# Patient Record
Sex: FEMALE | Race: WHITE | NOT HISPANIC OR LATINO | URBAN - METROPOLITAN AREA
[De-identification: names, ages, dates, MRNs, and addresses within clinical notes are randomized per-mention and may not be internally consistent; named-entity substitution may affect disease eponyms.]

---

## 2017-05-01 PROBLEM — Z00.00 ENCOUNTER FOR PREVENTIVE HEALTH EXAMINATION: Status: ACTIVE | Noted: 2017-05-01

## 2018-03-15 ENCOUNTER — EMERGENCY (EMERGENCY)
Facility: HOSPITAL | Age: 63
LOS: 1 days | Discharge: HOME | End: 2018-03-15
Attending: EMERGENCY MEDICINE | Admitting: EMERGENCY MEDICINE
Payer: COMMERCIAL

## 2018-03-15 VITALS
HEIGHT: 65 IN | TEMPERATURE: 98 F | WEIGHT: 128.09 LBS | RESPIRATION RATE: 22 BRPM | HEART RATE: 102 BPM | OXYGEN SATURATION: 92 %

## 2018-03-15 VITALS
HEART RATE: 93 BPM | DIASTOLIC BLOOD PRESSURE: 88 MMHG | RESPIRATION RATE: 20 BRPM | SYSTOLIC BLOOD PRESSURE: 179 MMHG | TEMPERATURE: 98 F | OXYGEN SATURATION: 95 %

## 2018-03-15 LAB
BASOPHILS # BLD AUTO: 0 K/UL — SIGNIFICANT CHANGE UP (ref 0–0.2)
BASOPHILS NFR BLD AUTO: 0.7 % — SIGNIFICANT CHANGE UP (ref 0–2)
EOSINOPHIL # BLD AUTO: 0 K/UL — SIGNIFICANT CHANGE UP (ref 0–0.5)
EOSINOPHIL NFR BLD AUTO: 0.1 % — SIGNIFICANT CHANGE UP (ref 0–6)
FLUAV H3 RNA SPEC QL NAA+PROBE: DETECTED
HCT VFR BLD CALC: 41.9 % — SIGNIFICANT CHANGE UP (ref 34.5–45)
HGB BLD-MCNC: 14.1 G/DL — SIGNIFICANT CHANGE UP (ref 11.5–15.5)
LYMPHOCYTES # BLD AUTO: 0.6 K/UL — LOW (ref 1–3.3)
LYMPHOCYTES # BLD AUTO: 14.1 % — SIGNIFICANT CHANGE UP (ref 13–44)
MCHC RBC-ENTMCNC: 29.5 PG — SIGNIFICANT CHANGE UP (ref 27–34)
MCHC RBC-ENTMCNC: 33.6 GM/DL — SIGNIFICANT CHANGE UP (ref 32–36)
MCV RBC AUTO: 87.7 FL — SIGNIFICANT CHANGE UP (ref 80–100)
MONOCYTES # BLD AUTO: 0.5 K/UL — SIGNIFICANT CHANGE UP (ref 0–0.9)
MONOCYTES NFR BLD AUTO: 11.9 % — HIGH (ref 1–9)
NEUTROPHILS # BLD AUTO: 3.1 K/UL — SIGNIFICANT CHANGE UP (ref 1.8–7.4)
NEUTROPHILS NFR BLD AUTO: 73.3 % — SIGNIFICANT CHANGE UP (ref 43–77)
NT-PROBNP SERPL-SCNC: 375 PG/ML — HIGH (ref 0–125)
PLATELET # BLD AUTO: 231 K/UL — SIGNIFICANT CHANGE UP (ref 150–400)
RAPID RVP RESULT: DETECTED
RBC # BLD: 4.78 M/UL — SIGNIFICANT CHANGE UP (ref 3.8–5.2)
RBC # FLD: 12.9 % — SIGNIFICANT CHANGE UP (ref 10.3–14.5)
WBC # BLD: 4.3 K/UL — SIGNIFICANT CHANGE UP (ref 3.8–10.5)
WBC # FLD AUTO: 4.3 K/UL — SIGNIFICANT CHANGE UP (ref 3.8–10.5)

## 2018-03-15 PROCEDURE — 87633 RESP VIRUS 12-25 TARGETS: CPT

## 2018-03-15 PROCEDURE — 93005 ELECTROCARDIOGRAM TRACING: CPT

## 2018-03-15 PROCEDURE — 87798 DETECT AGENT NOS DNA AMP: CPT

## 2018-03-15 PROCEDURE — 87486 CHLMYD PNEUM DNA AMP PROBE: CPT

## 2018-03-15 PROCEDURE — 80053 COMPREHEN METABOLIC PANEL: CPT

## 2018-03-15 PROCEDURE — 96374 THER/PROPH/DIAG INJ IV PUSH: CPT

## 2018-03-15 PROCEDURE — 71046 X-RAY EXAM CHEST 2 VIEWS: CPT | Mod: 26

## 2018-03-15 PROCEDURE — 99284 EMERGENCY DEPT VISIT MOD MDM: CPT

## 2018-03-15 PROCEDURE — 85027 COMPLETE CBC AUTOMATED: CPT

## 2018-03-15 PROCEDURE — 83880 ASSAY OF NATRIURETIC PEPTIDE: CPT

## 2018-03-15 PROCEDURE — 99284 EMERGENCY DEPT VISIT MOD MDM: CPT | Mod: 25

## 2018-03-15 PROCEDURE — 87581 M.PNEUMON DNA AMP PROBE: CPT

## 2018-03-15 PROCEDURE — 71046 X-RAY EXAM CHEST 2 VIEWS: CPT

## 2018-03-15 PROCEDURE — 94640 AIRWAY INHALATION TREATMENT: CPT

## 2018-03-15 RX ORDER — AZITHROMYCIN 500 MG/1
250 TABLET, FILM COATED ORAL
Qty: 4 | Refills: 0
Start: 2018-03-15 | End: 2018-03-18

## 2018-03-15 RX ORDER — ALBUTEROL 90 UG/1
2.5 AEROSOL, METERED ORAL ONCE
Qty: 0 | Refills: 0 | Status: COMPLETED | OUTPATIENT
Start: 2018-03-15 | End: 2018-03-15

## 2018-03-15 RX ORDER — IPRATROPIUM/ALBUTEROL SULFATE 18-103MCG
3 AEROSOL WITH ADAPTER (GRAM) INHALATION ONCE
Qty: 0 | Refills: 0 | Status: COMPLETED | OUTPATIENT
Start: 2018-03-15 | End: 2018-03-15

## 2018-03-15 RX ADMIN — ALBUTEROL 2.5 MILLIGRAM(S): 90 AEROSOL, METERED ORAL at 19:48

## 2018-03-15 RX ADMIN — Medication 3 MILLILITER(S): at 18:42

## 2018-03-15 RX ADMIN — Medication 125 MILLIGRAM(S): at 18:42

## 2018-03-15 NOTE — ED PROVIDER NOTE - OBJECTIVE STATEMENT
61 y/o F with hx of COPD with SOB x 1 day.  Pt states she has been using albuterol and Symbicort without relief of symptoms.  Pt denies fevers, chills, sick contacts, chest pain, palpitations, smoking or recent travel.

## 2023-05-01 ENCOUNTER — INPATIENT (INPATIENT)
Facility: HOSPITAL | Age: 68
LOS: 2 days | Discharge: HOME | DRG: 189 | End: 2023-05-04
Attending: INTERNAL MEDICINE | Admitting: STUDENT IN AN ORGANIZED HEALTH CARE EDUCATION/TRAINING PROGRAM
Payer: COMMERCIAL

## 2023-05-01 VITALS
HEART RATE: 123 BPM | OXYGEN SATURATION: 93 % | DIASTOLIC BLOOD PRESSURE: 90 MMHG | TEMPERATURE: 99 F | RESPIRATION RATE: 22 BRPM | HEIGHT: 65 IN | WEIGHT: 145.06 LBS | SYSTOLIC BLOOD PRESSURE: 156 MMHG

## 2023-05-01 DIAGNOSIS — J44.1 CHRONIC OBSTRUCTIVE PULMONARY DISEASE WITH (ACUTE) EXACERBATION: ICD-10-CM

## 2023-05-01 DIAGNOSIS — F41.9 ANXIETY DISORDER, UNSPECIFIED: ICD-10-CM

## 2023-05-01 DIAGNOSIS — M06.9 RHEUMATOID ARTHRITIS, UNSPECIFIED: ICD-10-CM

## 2023-05-01 DIAGNOSIS — Z29.9 ENCOUNTER FOR PROPHYLACTIC MEASURES, UNSPECIFIED: ICD-10-CM

## 2023-05-01 DIAGNOSIS — I10 ESSENTIAL (PRIMARY) HYPERTENSION: ICD-10-CM

## 2023-05-01 PROBLEM — J44.9 CHRONIC OBSTRUCTIVE PULMONARY DISEASE, UNSPECIFIED: Chronic | Status: ACTIVE | Noted: 2018-03-15

## 2023-05-01 LAB
ALBUMIN SERPL ELPH-MCNC: 3.6 G/DL — SIGNIFICANT CHANGE UP (ref 3.3–5)
ALP SERPL-CCNC: 90 U/L — SIGNIFICANT CHANGE UP (ref 40–120)
ALT FLD-CCNC: 16 U/L — SIGNIFICANT CHANGE UP (ref 12–78)
ANION GAP SERPL CALC-SCNC: 4 MMOL/L — LOW (ref 5–17)
AST SERPL-CCNC: 11 U/L — LOW (ref 15–37)
BASE EXCESS BLDA CALC-SCNC: 4.2 MMOL/L — HIGH (ref -2–3)
BASOPHILS # BLD AUTO: 0.03 K/UL — SIGNIFICANT CHANGE UP (ref 0–0.2)
BASOPHILS NFR BLD AUTO: 0.2 % — SIGNIFICANT CHANGE UP (ref 0–2)
BILIRUB SERPL-MCNC: 0.4 MG/DL — SIGNIFICANT CHANGE UP (ref 0.2–1.2)
BLOOD GAS COMMENTS ARTERIAL: SIGNIFICANT CHANGE UP
BUN SERPL-MCNC: 17 MG/DL — SIGNIFICANT CHANGE UP (ref 7–23)
CALCIUM SERPL-MCNC: 10.1 MG/DL — SIGNIFICANT CHANGE UP (ref 8.5–10.1)
CHLORIDE SERPL-SCNC: 105 MMOL/L — SIGNIFICANT CHANGE UP (ref 96–108)
CO2 SERPL-SCNC: 32 MMOL/L — HIGH (ref 22–31)
CREAT SERPL-MCNC: 0.94 MG/DL — SIGNIFICANT CHANGE UP (ref 0.5–1.3)
D DIMER BLD IA.RAPID-MCNC: <150 NG/ML DDU — SIGNIFICANT CHANGE UP
EGFR: 67 ML/MIN/1.73M2 — SIGNIFICANT CHANGE UP
EOSINOPHIL # BLD AUTO: 0.09 K/UL — SIGNIFICANT CHANGE UP (ref 0–0.5)
EOSINOPHIL NFR BLD AUTO: 0.7 % — SIGNIFICANT CHANGE UP (ref 0–6)
GLUCOSE SERPL-MCNC: 109 MG/DL — HIGH (ref 70–99)
HCO3 BLDA-SCNC: 28 MMOL/L — SIGNIFICANT CHANGE UP (ref 21–28)
HCT VFR BLD CALC: 47 % — HIGH (ref 34.5–45)
HGB BLD-MCNC: 14.7 G/DL — SIGNIFICANT CHANGE UP (ref 11.5–15.5)
HOROWITZ INDEX BLDA+IHG-RTO: 40 — SIGNIFICANT CHANGE UP
IMM GRANULOCYTES NFR BLD AUTO: 0.8 % — SIGNIFICANT CHANGE UP (ref 0–0.9)
LYMPHOCYTES # BLD AUTO: 17.4 % — SIGNIFICANT CHANGE UP (ref 13–44)
LYMPHOCYTES # BLD AUTO: 2.23 K/UL — SIGNIFICANT CHANGE UP (ref 1–3.3)
MCHC RBC-ENTMCNC: 26 PG — LOW (ref 27–34)
MCHC RBC-ENTMCNC: 31.3 GM/DL — LOW (ref 32–36)
MCV RBC AUTO: 83.2 FL — SIGNIFICANT CHANGE UP (ref 80–100)
MONOCYTES # BLD AUTO: 0.94 K/UL — HIGH (ref 0–0.9)
MONOCYTES NFR BLD AUTO: 7.3 % — SIGNIFICANT CHANGE UP (ref 2–14)
NEUTROPHILS # BLD AUTO: 9.4 K/UL — HIGH (ref 1.8–7.4)
NEUTROPHILS NFR BLD AUTO: 73.6 % — SIGNIFICANT CHANGE UP (ref 43–77)
NRBC # BLD: 0 /100 WBCS — SIGNIFICANT CHANGE UP (ref 0–0)
PCO2 BLDA: 43 MMHG — HIGH (ref 32–35)
PH BLDA: 7.43 — SIGNIFICANT CHANGE UP (ref 7.35–7.45)
PLATELET # BLD AUTO: 406 K/UL — HIGH (ref 150–400)
PO2 BLDA: 190 MMHG — HIGH (ref 83–108)
POTASSIUM SERPL-MCNC: 3.3 MMOL/L — LOW (ref 3.5–5.3)
POTASSIUM SERPL-SCNC: 3.3 MMOL/L — LOW (ref 3.5–5.3)
PROT SERPL-MCNC: 7.4 G/DL — SIGNIFICANT CHANGE UP (ref 6–8.3)
RAPID RVP RESULT: SIGNIFICANT CHANGE UP
RBC # BLD: 5.65 M/UL — HIGH (ref 3.8–5.2)
RBC # FLD: 14.1 % — SIGNIFICANT CHANGE UP (ref 10.3–14.5)
SAO2 % BLDA: 100 % — HIGH (ref 94–98)
SARS-COV-2 RNA SPEC QL NAA+PROBE: SIGNIFICANT CHANGE UP
SODIUM SERPL-SCNC: 141 MMOL/L — SIGNIFICANT CHANGE UP (ref 135–145)
TROPONIN I, HIGH SENSITIVITY RESULT: 6.6 NG/L — SIGNIFICANT CHANGE UP
WBC # BLD: 12.79 K/UL — HIGH (ref 3.8–10.5)
WBC # FLD AUTO: 12.79 K/UL — HIGH (ref 3.8–10.5)

## 2023-05-01 PROCEDURE — 99223 1ST HOSP IP/OBS HIGH 75: CPT | Mod: GC

## 2023-05-01 PROCEDURE — 93010 ELECTROCARDIOGRAM REPORT: CPT

## 2023-05-01 PROCEDURE — 71045 X-RAY EXAM CHEST 1 VIEW: CPT | Mod: 26,77

## 2023-05-01 PROCEDURE — 71250 CT THORAX DX C-: CPT | Mod: 26,ME

## 2023-05-01 PROCEDURE — G1004: CPT

## 2023-05-01 PROCEDURE — 99285 EMERGENCY DEPT VISIT HI MDM: CPT

## 2023-05-01 PROCEDURE — 71045 X-RAY EXAM CHEST 1 VIEW: CPT | Mod: 26

## 2023-05-01 RX ORDER — ONDANSETRON 8 MG/1
4 TABLET, FILM COATED ORAL EVERY 8 HOURS
Refills: 0 | Status: DISCONTINUED | OUTPATIENT
Start: 2023-05-01 | End: 2023-05-04

## 2023-05-01 RX ORDER — IPRATROPIUM/ALBUTEROL SULFATE 18-103MCG
3 AEROSOL WITH ADAPTER (GRAM) INHALATION
Refills: 0 | Status: COMPLETED | OUTPATIENT
Start: 2023-05-01 | End: 2023-05-01

## 2023-05-01 RX ORDER — IPRATROPIUM/ALBUTEROL SULFATE 18-103MCG
3 AEROSOL WITH ADAPTER (GRAM) INHALATION ONCE
Refills: 0 | Status: COMPLETED | OUTPATIENT
Start: 2023-05-01 | End: 2023-05-01

## 2023-05-01 RX ORDER — LANOLIN ALCOHOL/MO/W.PET/CERES
3 CREAM (GRAM) TOPICAL AT BEDTIME
Refills: 0 | Status: DISCONTINUED | OUTPATIENT
Start: 2023-05-01 | End: 2023-05-04

## 2023-05-01 RX ORDER — HYDRALAZINE HCL 50 MG
10 TABLET ORAL ONCE
Refills: 0 | Status: COMPLETED | OUTPATIENT
Start: 2023-05-01 | End: 2023-05-01

## 2023-05-01 RX ORDER — MAGNESIUM SULFATE 500 MG/ML
2 VIAL (ML) INJECTION ONCE
Refills: 0 | Status: COMPLETED | OUTPATIENT
Start: 2023-05-01 | End: 2023-05-01

## 2023-05-01 RX ORDER — SODIUM CHLORIDE 9 MG/ML
1000 INJECTION INTRAMUSCULAR; INTRAVENOUS; SUBCUTANEOUS ONCE
Refills: 0 | Status: COMPLETED | OUTPATIENT
Start: 2023-05-01 | End: 2023-05-01

## 2023-05-01 RX ORDER — ACETAMINOPHEN 500 MG
650 TABLET ORAL EVERY 6 HOURS
Refills: 0 | Status: DISCONTINUED | OUTPATIENT
Start: 2023-05-01 | End: 2023-05-04

## 2023-05-01 RX ORDER — POTASSIUM CHLORIDE 20 MEQ
40 PACKET (EA) ORAL ONCE
Refills: 0 | Status: COMPLETED | OUTPATIENT
Start: 2023-05-01 | End: 2023-05-01

## 2023-05-01 RX ADMIN — Medication 1 MILLIGRAM(S): at 23:29

## 2023-05-01 RX ADMIN — Medication 150 GRAM(S): at 23:35

## 2023-05-01 RX ADMIN — Medication 3 MILLILITER(S): at 22:37

## 2023-05-01 RX ADMIN — Medication 125 MILLIGRAM(S): at 18:20

## 2023-05-01 RX ADMIN — SODIUM CHLORIDE 1000 MILLILITER(S): 9 INJECTION INTRAMUSCULAR; INTRAVENOUS; SUBCUTANEOUS at 21:30

## 2023-05-01 RX ADMIN — Medication 40 MILLIEQUIVALENT(S): at 22:37

## 2023-05-01 RX ADMIN — Medication 3 MILLILITER(S): at 18:20

## 2023-05-01 RX ADMIN — SODIUM CHLORIDE 1000 MILLILITER(S): 9 INJECTION INTRAMUSCULAR; INTRAVENOUS; SUBCUTANEOUS at 22:30

## 2023-05-01 RX ADMIN — Medication 3 MILLILITER(S): at 18:21

## 2023-05-01 RX ADMIN — SODIUM CHLORIDE 1000 MILLILITER(S): 9 INJECTION INTRAMUSCULAR; INTRAVENOUS; SUBCUTANEOUS at 18:20

## 2023-05-01 RX ADMIN — Medication 10 MILLIGRAM(S): at 23:14

## 2023-05-01 RX ADMIN — Medication 3 MILLILITER(S): at 18:24

## 2023-05-01 NOTE — ED PROVIDER NOTE - CLINICAL SUMMARY MEDICAL DECISION MAKING FREE TEXT BOX
This patient is a 67-year-old female with history of COPD not oxygen or steroid-dependent and rheumatoid arthritis.  Patient states on Wednesday she began with shortness of breath and wheezing and cough and sore her primary doctor who started her on a Z-Parminder and prednisone 40 mg a day and diagnosed her empirically with pneumonia.  Patient states that since then she has not gotten any relief despite taking 4 nebulizers a day at home.  Patient denies calf pain or swelling chest pain, productive cough.  Patient came to the ER for shortness of breath denies pleuritic pain.  Patient states she is not vaccinated for COVID and has never had COVID as far as she knows patient is a neck smoker.  Patient on exam with hypoxia off oxygen and corrects nicely with oxygen patient with mild increased work of breathing and pursed lip breathing with no abdominal breathing or severe respiratory distress breath sounds are distant and patient without peripheral edema.  EKG with sinus tachycardia.  Patient low suspicion for PE and D-dimer is negative EKG with no ischemic changes with a normal troponin and CT of the chest shows no evidence of pneumonia patient treated with steroids IV as well as DuoNeb x4 however she continues to have increased work of breathing.  Patient's blood pressure as well is now elevated will treat with an antihypertensive and check ABG and admit the patient to remote telemetry with continuous pulse ox and continue to monitor

## 2023-05-01 NOTE — ED ADULT NURSE NOTE - OBJECTIVE STATEMENT
Came to ED for worsening SOB.  Pt was seen at urgent care and was given prednisone and Zithromax but her symptoms have progressively been getting worst.

## 2023-05-01 NOTE — H&P ADULT - ATTENDING COMMENTS
67y female with PMHx of COPD (not on home O2), RA, anxiety admitted with COPD exacerbation. Admit to medicine with continuous pulse oximetry. Start nebulized bronchodilators ATC. Start systemic steroids with solu-medrol q8. On low dose prednisone. Supplemental O2 as needed. Low threshold for NIPPV with BIPAP given work of breathing. Was given anxiolytics by ER. Close monitoring of respiratory status. Pulm Dr. Dobson consulted - discussed with him via phone. Discussed with patient and spouse.    Agree with H&P as outlined above, edited where appropriate.

## 2023-05-01 NOTE — ED ADULT NURSE NOTE - NS ED NOTE  TALK SOMEONE YN
pt is here for abscess to penis.  c/o pain 5/10.  Denied chest pain or sob, pt calm at the bedside with family member, no fever.
No

## 2023-05-01 NOTE — H&P ADULT - NSHPREVIEWOFSYSTEMS_GEN_ALL_CORE
Constitutional: denies fever, chills  HEENT: denies headache, dizziness, or lightheadedness  Respiratory: + SOB  Cardiovascular: + Palpitations (chronic) denies CP  Gastrointestinal: denies nausea, vomiting, diarrhea, constipation, abdominal pain, or bloody stools  Genitourinary: denies painful urination, increased frequency, urgency, or bloody urine  Skin/Breast: denies rashes or itching  Musculoskeletal: denies muscle aches, joint swelling, or muscle weakness  ROS negative except as noted above

## 2023-05-01 NOTE — H&P ADULT - PROBLEM SELECTOR PLAN 2
Hypertensive on admission likely 2/2 respiratory distress/anxiety  Continue home  Monitor hemodynamics Hypertensive on admission likely 2/2 respiratory distress/anxiety  Continue home amlodipine w/ hold parameters   Con ASA 81  Monitor hemodynamics

## 2023-05-01 NOTE — H&P ADULT - NSICDXPASTMEDICALHX_GEN_ALL_CORE_FT
PAST MEDICAL HISTORY:  Anxiety     COPD (chronic obstructive pulmonary disease)     HTN (hypertension)     Rheumatoid arthritis

## 2023-05-01 NOTE — ED PROVIDER NOTE - DIFFERENTIAL DIAGNOSIS
Differential Diagnosis Shortness of breath COPD exacerbation versus pneumonia versus pulmonary embolism versus congestive heart failure

## 2023-05-01 NOTE — H&P ADULT - HISTORY OF PRESENT ILLNESS
67y female with PMHx of COPD, anxiety 67y female with PMHx of COPD (not on home O2), anxiety, HTN, Rheumatoid Arthritis presented to the ED for the evaluation of shortness of breath. She had a recent course of antibiotics and steroids by her PCP but had no relief. Thought she had pneumonia. At present, she is extremely short of breath and is using her accessory muscles to breathe. She had multiple nebulizer treatments & steroids in the ED with no improvement. Presented saturating ~93% on RA and now is saturating in the low to mid 90s on supplemental O2 via nasal cannula. She states that she has never had a COPD exacerbation before and that she is extremely anxious. 68 y/o F with PMHx of COPD (not on home O2), anxiety, HTN, Rheumatoid Arthritis presented to the ED for the evaluation of shortness of breath. About 1 week ago patient began with SOB, seen by PCP who rx'd course of antibiotics and steroids with marginal relief. At present, she is extremely short of breath and is using her accessory muscles to breathe. She had multiple nebulizer treatments & steroids in the ED with no improvement. Presented saturating ~93% on RA and now is saturating in the low to mid 90s on supplemental O2 via nasal cannula. She states that she has never had a COPD exacerbation before and that she is extremely anxious. Denies chest pain, cough, diarrhea, constipation, urinary frequency, urgency, or dysuria, headaches.    IN THE ED:  Temp  98.8 F ,  ,  / 90 ,RR 22 , SpO2 93% on RA   S/P Solu-medrol 125 IVP, Hydralazine 10 IVP, Ativan 1mg IVP, Mag 2g IV x 1, KCl 40meq x 1, Duoneb x 3, 2 L NaCl bolus  EKG:  Labs significant for WBC 12.7, K 3.3, CO2 32, Glucose 109, ABG 7.43/43/190/28  Imaging:   CT Chest: No evidence of pneumonia. 66 y/o F with PMHx of COPD (not on home O2), anxiety, HTN, Rheumatoid Arthritis presented to the ED for the evaluation of shortness of breath. About 1 week ago patient began with SOB, seen by PCP who rx'd course of antibiotics and steroids with marginal relief. At present, she is extremely short of breath and is using her accessory muscles to breathe. She had multiple nebulizer treatments & steroids in the ED with no improvement. Presented saturating ~93% on RA and now is saturating in the low to mid 90s on supplemental O2 via nasal cannula. She states that she has never had a COPD exacerbation before and that she is extremely anxious. Denies chest pain, cough, diarrhea, constipation, urinary frequency, urgency, or dysuria, headaches.    IN THE ED:  Temp  98.8 F ,  ,  / 90 ,RR 22 , SpO2 93% on RA   S/P Solu-medrol 125 IVP, Hydralazine 10 IVP, Ativan 1mg IVP, Mag 2g IV x 1, KCl 40meq x 1, Duoneb x 3, 2 L NaCl bolus  EKG: Rate 123, sinus tach, biatrial enlargement, nonspecific st and t wave abnormality  Labs significant for WBC 12.7, K 3.3, CO2 32, Glucose 109, ABG 7.43/43/190/28  Imaging:   CT Chest: No evidence of pneumonia. 68 y/o F with PMHx of COPD (not on home O2), anxiety, HTN, Rheumatoid Arthritis presented to the ED for the evaluation of shortness of breath. About 1 week ago patient began with SOB, seen by PCP who rx'd Z-Parminder and prednisone 40 qd with marginal relief. At present, she is extremely short of breath and is using her accessory muscles to breathe. She had multiple nebulizer treatments & steroids in the ED with no improvement. Presented saturating ~93% on RA and now is saturating in the low to mid 90s on supplemental O2 via nasal cannula. She states that she has never had a COPD exacerbation before and that she is extremely anxious. Denies chest pain, cough, diarrhea, constipation, urinary frequency, urgency, or dysuria, headaches.    IN THE ED:  Temp  98.8 F ,  ,  / 90 ,RR 22 , SpO2 93% on RA   S/P Solu-medrol 125 IVP, Hydralazine 10 IVP, Ativan 1mg IVP, Mag 2g IV x 1, KCl 40meq x 1, Duoneb x 3, 2 L NaCl bolus  EKG: Rate 123, sinus tach, biatrial enlargement, nonspecific st and t wave abnormality  Labs significant for WBC 12.7, K 3.3, CO2 32, Glucose 109, ABG 7.43/43/190/28  Imaging:   CT Chest: No evidence of pneumonia.

## 2023-05-01 NOTE — H&P ADULT - PROBLEM SELECTOR PLAN 5
VTE prophylaxis with subcutaneous enoxaparin VTE prophylaxis with subcutaneous enoxaparin      #Hypokalemia  S/p 40meq x 1  AM MP        #DISPO  PT consult  SW consult

## 2023-05-01 NOTE — H&P ADULT - NSHPLABSRESULTS_GEN_ALL_CORE
CXR - clear lungs    CT chest - no pneumonia     EKG  - Sinus tachycardia at 123bpm    Labs, Imaging and EKG all personally reviewed by the attending physician. CXR - clear lungs    CT chest - no pneumonia     EKG  - Sinus tachycardia at 123bpm    Repeat CXR - no acute change    Labs, Imaging and EKG all personally reviewed by the attending physician.

## 2023-05-01 NOTE — H&P ADULT - NSHPPOADEEPVENOUSTHROMB_GEN_A_CORE
Please return if her symptoms suddenly worsen redevelop other concerning symptoms otherwise follow up as instructed as discussed
no

## 2023-05-01 NOTE — H&P ADULT - PROBLEM SELECTOR PLAN 1
Admit to medicine with continuous pulse oximetry  Continue duonebs q6 ATC and albuterol nebulizations q3 PRN  Start solu-medrol 40mg q8  Continue supplemental O2 as needed  Low threshold for BIPAP tonight  CT chest reviewed - no PNA  Monitor off antibiotics, f/u cultures, RVP negative  Pulm Dr. Dobson consulted Admit to medicine with continuous pulse oximetry  Continue duonebs q6 ATC and albuterol nebulizations q3 PRN  Start solu-medrol 40mg q8  Start symbicort BID  Continue supplemental O2 as needed  Low threshold for BIPAP tonight  CT chest reviewed - no PNA  Monitor off antibiotics, f/u cultures, RVP negative  Pulm Dr. Dobson consulted Admit to medicine with continuous pulse oximetry  Start solu-medrol 40mg q8  On home breztri, Spiriva, albuterol  Continue duonebs q6 ATC and albuterol nebulizations q3 PRN  Start symbicort BID  Continue supplemental O2 as needed  Low threshold for BIPAP tonight  CT chest reviewed - no PNA  Monitor off antibiotics, f/u cultures, RVP negative  Pulm Dr. Dobson consulted

## 2023-05-01 NOTE — H&P ADULT - NSICDXNOPASTSURGICALHX_GEN_ALL_CORE
1/10/2023      Izabel Blank  [address] [place] [address]      To The Employer of Izabel Blank:    This is to certify that Izabel Blank will be under my care from 1/26/23 - . She {SURG SKK RTW OPTIONS:[id number]}       Sincerely,        Genesis Nunez PA-C  General and Endocrine Surgery  {ADDRESS:[id number]}   <-- Click to add NO significant Past Surgical History

## 2023-05-01 NOTE — H&P ADULT - NSHPPHYSICALEXAM_GEN_ALL_CORE
T(C): 37.1 (05-01-23 @ 22:46), Max: 37.1 (05-01-23 @ 17:39)  HR: 121 (05-01-23 @ 22:46) (121 - 123)  BP: 201/93 (05-01-23 @ 22:46) (156/90 - 201/93)  RR: 24 (05-01-23 @ 22:46) (22 - 24)  SpO2: 97% (05-01-23 @ 22:46) (93% - 97%) T(C): 37.1 (05-01-23 @ 22:46), Max: 37.1 (05-01-23 @ 17:39)  HR: 121 (05-01-23 @ 22:46) (121 - 123)  BP: 201/93 (05-01-23 @ 22:46) (156/90 - 201/93)  RR: 24 (05-01-23 @ 22:46) (22 - 24)  SpO2: 97% (05-01-23 @ 22:46) (93% - 97%)    General: in moderate respiratory distress  Head: normocephalic, atraumatic  Eyes: EOMI, anicteric  ENT: moist mucous membranes, no pharyngeal exudates  Heart: tachycardic, S1, S2, no murmurs  Chest: poor air entry bilaterally, using accessory muscles to breathe  Abd: BS+, soft, NT, ND  Back: no tenderness or deformity  Extr: no edema or cyanosis  Neuro: AA&Ox3, no focal weakness, sensation to light touch intact  Psych: appears anxious

## 2023-05-01 NOTE — ED PROVIDER NOTE - OBJECTIVE STATEMENT
This patient is a 67-year-old female with history of COPD not oxygen or steroid-dependent and rheumatoid arthritis.  Patient states on Wednesday she began with shortness of breath and wheezing and cough and sore her primary doctor who started her on a Z-Parminder and prednisone 40 mg a day and diagnosed her empirically with pneumonia.  Patient states that since then she has not gotten any relief despite taking 4 nebulizers a day at home.  Patient denies calf pain or swelling chest pain, productive cough.  Patient came to the ER for shortness of breath denies pleuritic pain.  Patient states she is not vaccinated for COVID and has never had COVID as far as she knows patient is a neck smoker.

## 2023-05-01 NOTE — H&P ADULT - NSHPOUTPATIENTPROVIDERS_GEN_ALL_CORE
PCP - ALESIA Cohen (AFM) PCP - ALESIA Cohen (AFM)  Pulm - Dr. Tres Dobson (stopped taking insurance) - notified PCP - ALESIA Cohen (AFM)  Pulm - Dr. Tres Dobson (stopped taking insurance)

## 2023-05-01 NOTE — ED ADULT TRIAGE NOTE - CHIEF COMPLAINT QUOTE
seen by PCP on wednesday, given antibiotics and prednisone for cough and shortness of breath, now c/o no relief of symptoms. pt satting 93% on RA at this time. pmhx COPD, rhematoid arthritis, anxiety. denies cp, dizziness, lightheadedness, n/v, body aches, fevers.

## 2023-05-02 LAB
ALBUMIN SERPL ELPH-MCNC: 3 G/DL — LOW (ref 3.3–5)
ALP SERPL-CCNC: 76 U/L — SIGNIFICANT CHANGE UP (ref 40–120)
ALT FLD-CCNC: 13 U/L — SIGNIFICANT CHANGE UP (ref 12–78)
ANION GAP SERPL CALC-SCNC: 6 MMOL/L — SIGNIFICANT CHANGE UP (ref 5–17)
AST SERPL-CCNC: 9 U/L — LOW (ref 15–37)
BASOPHILS # BLD AUTO: 0.02 K/UL — SIGNIFICANT CHANGE UP (ref 0–0.2)
BASOPHILS NFR BLD AUTO: 0.2 % — SIGNIFICANT CHANGE UP (ref 0–2)
BILIRUB SERPL-MCNC: 0.3 MG/DL — SIGNIFICANT CHANGE UP (ref 0.2–1.2)
BUN SERPL-MCNC: 14 MG/DL — SIGNIFICANT CHANGE UP (ref 7–23)
CALCIUM SERPL-MCNC: 8.9 MG/DL — SIGNIFICANT CHANGE UP (ref 8.5–10.1)
CHLORIDE SERPL-SCNC: 111 MMOL/L — HIGH (ref 96–108)
CO2 SERPL-SCNC: 26 MMOL/L — SIGNIFICANT CHANGE UP (ref 22–31)
CREAT SERPL-MCNC: 0.86 MG/DL — SIGNIFICANT CHANGE UP (ref 0.5–1.3)
EGFR: 74 ML/MIN/1.73M2 — SIGNIFICANT CHANGE UP
EOSINOPHIL # BLD AUTO: 0 K/UL — SIGNIFICANT CHANGE UP (ref 0–0.5)
EOSINOPHIL NFR BLD AUTO: 0 % — SIGNIFICANT CHANGE UP (ref 0–6)
GLUCOSE SERPL-MCNC: 167 MG/DL — HIGH (ref 70–99)
HCT VFR BLD CALC: 40.5 % — SIGNIFICANT CHANGE UP (ref 34.5–45)
HCV AB S/CO SERPL IA: 0.11 S/CO — SIGNIFICANT CHANGE UP (ref 0–0.99)
HCV AB SERPL-IMP: SIGNIFICANT CHANGE UP
HGB BLD-MCNC: 12.3 G/DL — SIGNIFICANT CHANGE UP (ref 11.5–15.5)
IMM GRANULOCYTES NFR BLD AUTO: 1.4 % — HIGH (ref 0–0.9)
LYMPHOCYTES # BLD AUTO: 0.54 K/UL — LOW (ref 1–3.3)
LYMPHOCYTES # BLD AUTO: 4.6 % — LOW (ref 13–44)
MAGNESIUM SERPL-MCNC: 2.7 MG/DL — HIGH (ref 1.6–2.6)
MCHC RBC-ENTMCNC: 26.2 PG — LOW (ref 27–34)
MCHC RBC-ENTMCNC: 30.4 GM/DL — LOW (ref 32–36)
MCV RBC AUTO: 86.2 FL — SIGNIFICANT CHANGE UP (ref 80–100)
MONOCYTES # BLD AUTO: 0.13 K/UL — SIGNIFICANT CHANGE UP (ref 0–0.9)
MONOCYTES NFR BLD AUTO: 1.1 % — LOW (ref 2–14)
NEUTROPHILS # BLD AUTO: 10.88 K/UL — HIGH (ref 1.8–7.4)
NEUTROPHILS NFR BLD AUTO: 92.7 % — HIGH (ref 43–77)
NRBC # BLD: 0 /100 WBCS — SIGNIFICANT CHANGE UP (ref 0–0)
PLATELET # BLD AUTO: 405 K/UL — HIGH (ref 150–400)
POTASSIUM SERPL-MCNC: 4.1 MMOL/L — SIGNIFICANT CHANGE UP (ref 3.5–5.3)
POTASSIUM SERPL-SCNC: 4.1 MMOL/L — SIGNIFICANT CHANGE UP (ref 3.5–5.3)
PROCALCITONIN SERPL-MCNC: 0.03 NG/ML — SIGNIFICANT CHANGE UP
PROT SERPL-MCNC: 6.4 G/DL — SIGNIFICANT CHANGE UP (ref 6–8.3)
RBC # BLD: 4.7 M/UL — SIGNIFICANT CHANGE UP (ref 3.8–5.2)
RBC # FLD: 14.6 % — HIGH (ref 10.3–14.5)
SODIUM SERPL-SCNC: 143 MMOL/L — SIGNIFICANT CHANGE UP (ref 135–145)
WBC # BLD: 11.73 K/UL — HIGH (ref 3.8–10.5)
WBC # FLD AUTO: 11.73 K/UL — HIGH (ref 3.8–10.5)

## 2023-05-02 PROCEDURE — 99233 SBSQ HOSP IP/OBS HIGH 50: CPT

## 2023-05-02 RX ORDER — TRAMADOL HYDROCHLORIDE 50 MG/1
50 TABLET ORAL AT BEDTIME
Refills: 0 | Status: DISCONTINUED | OUTPATIENT
Start: 2023-05-02 | End: 2023-05-04

## 2023-05-02 RX ORDER — AMLODIPINE BESYLATE 2.5 MG/1
1 TABLET ORAL
Refills: 0 | DISCHARGE

## 2023-05-02 RX ORDER — ENOXAPARIN SODIUM 100 MG/ML
40 INJECTION SUBCUTANEOUS EVERY 24 HOURS
Refills: 0 | Status: DISCONTINUED | OUTPATIENT
Start: 2023-05-02 | End: 2023-05-04

## 2023-05-02 RX ORDER — BUDESONIDE AND FORMOTEROL FUMARATE DIHYDRATE 160; 4.5 UG/1; UG/1
2 AEROSOL RESPIRATORY (INHALATION)
Refills: 0 | Status: DISCONTINUED | OUTPATIENT
Start: 2023-05-02 | End: 2023-05-04

## 2023-05-02 RX ORDER — AMLODIPINE BESYLATE 2.5 MG/1
10 TABLET ORAL DAILY
Refills: 0 | Status: DISCONTINUED | OUTPATIENT
Start: 2023-05-02 | End: 2023-05-04

## 2023-05-02 RX ORDER — ASPIRIN/CALCIUM CARB/MAGNESIUM 324 MG
81 TABLET ORAL DAILY
Refills: 0 | Status: DISCONTINUED | OUTPATIENT
Start: 2023-05-02 | End: 2023-05-04

## 2023-05-02 RX ORDER — METOPROLOL TARTRATE 50 MG
0 TABLET ORAL
Qty: 0 | Refills: 0 | DISCHARGE

## 2023-05-02 RX ORDER — ALPRAZOLAM 0.25 MG
0 TABLET ORAL
Qty: 0 | Refills: 0 | DISCHARGE

## 2023-05-02 RX ORDER — IPRATROPIUM/ALBUTEROL SULFATE 18-103MCG
3 AEROSOL WITH ADAPTER (GRAM) INHALATION EVERY 6 HOURS
Refills: 0 | Status: DISCONTINUED | OUTPATIENT
Start: 2023-05-01 | End: 2023-05-04

## 2023-05-02 RX ORDER — ALBUTEROL 90 UG/1
2.5 AEROSOL, METERED ORAL
Refills: 0 | Status: DISCONTINUED | OUTPATIENT
Start: 2023-05-02 | End: 2023-05-04

## 2023-05-02 RX ORDER — ALPRAZOLAM 0.25 MG
0.25 TABLET ORAL DAILY
Refills: 0 | Status: DISCONTINUED | OUTPATIENT
Start: 2023-05-02 | End: 2023-05-04

## 2023-05-02 RX ADMIN — Medication 40 MILLIGRAM(S): at 22:16

## 2023-05-02 RX ADMIN — Medication 40 MILLIGRAM(S): at 13:31

## 2023-05-02 RX ADMIN — Medication 3 MILLILITER(S): at 20:51

## 2023-05-02 RX ADMIN — Medication 81 MILLIGRAM(S): at 13:31

## 2023-05-02 RX ADMIN — TRAMADOL HYDROCHLORIDE 50 MILLIGRAM(S): 50 TABLET ORAL at 22:16

## 2023-05-02 RX ADMIN — ENOXAPARIN SODIUM 40 MILLIGRAM(S): 100 INJECTION SUBCUTANEOUS at 05:35

## 2023-05-02 RX ADMIN — Medication 0.25 MILLIGRAM(S): at 13:44

## 2023-05-02 RX ADMIN — Medication 3 MILLILITER(S): at 13:47

## 2023-05-02 RX ADMIN — AMLODIPINE BESYLATE 10 MILLIGRAM(S): 2.5 TABLET ORAL at 05:35

## 2023-05-02 RX ADMIN — Medication 40 MILLIGRAM(S): at 05:35

## 2023-05-02 NOTE — PATIENT PROFILE ADULT - FALL HARM RISK - HARM RISK INTERVENTIONS
Assistance with ambulation/Assistance OOB with selected safe patient handling equipment/Communicate Risk of Fall with Harm to all staff/Discuss with provider need for PT consult/Monitor gait and stability/Reinforce activity limits and safety measures with patient and family/Tailored Fall Risk Interventions/Visual Cue: Yellow wristband and red socks/Bed in lowest position, wheels locked, appropriate side rails in place/Call bell, personal items and telephone in reach/Instruct patient to call for assistance before getting out of bed or chair/Non-slip footwear when patient is out of bed/[place] to call system/Physically safe environment - no spills, clutter or unnecessary equipment/Purposeful Proactive Rounding/Room/bathroom lighting operational, light cord in reach

## 2023-05-02 NOTE — PROGRESS NOTE ADULT - PROBLEM SELECTOR PLAN 5
VTE prophylaxis with subcutaneous enoxaparin      #Hypokalemia resolved     #DISPO  PT consult  SW consult

## 2023-05-02 NOTE — CASE MANAGEMENT PROGRESS NOTE - NSCMPROGRESSNOTE_GEN_ALL_CORE
Pt remains acute on N/C 2L, receiving IV solumedrol. Pt confirmed that she has home oxygen, portable and concentrator 2L, pt unsure of DME company. Pt to call spouse to obtain information. Cm will continue to follow. Pt remains acute on N/C 2L, receiving IV solumedrol. Pt confirmed that she has home oxygen, portable and concentrator 2L, pt unsure of DME company. Pt to call spouse to obtain information. CM offered home care visiting nurse, pt declined.

## 2023-05-02 NOTE — CARE COORDINATION ASSESSMENT. - NSPASTMEDSURGHISTORY_GEN_ALL_CORE_FT
PAST MEDICAL & SURGICAL HISTORY:  Rheumatoid arthritis      COPD (chronic obstructive pulmonary disease)      Anxiety      HTN (hypertension)      No significant past surgical history

## 2023-05-02 NOTE — PROGRESS NOTE ADULT - PROBLEM SELECTOR PLAN 2
Hypertensive on admission likely 2/2 respiratory distress/anxiety: BP improved   Continue home amlodipine w/ hold parameters   Con ASA 81  Monitor hemodynamics

## 2023-05-02 NOTE — CARE COORDINATION ASSESSMENT. - NSCAREPROVIDERS_GEN_ALL_CORE_FT
CARE PROVIDERS:  Accepting Physician: Robbie Boone  Administration: Oscar Jones  Administration: Dillan Medina  Administration: Angie Duffy  Admitting: Robbie Boone  Attending: Robbie Boone  Clinical Doc. Improvement: Ashlyn Gamboa  Consultant: Tres Dobson  Covering Nurse: Deejay Ibanez  Covering Team: Robbie Cedeño  Covering Team: Robbie Boone  ED ACP: Vivi Bridges  ED Attending: Jona Dalal ED Nurse: Michelle Montano  Emergency Medicine: Vivi Bridges  Nurse: Alissa Adan  Nurse: Austyn Figueroa  Nurse: Aleksander Pierce  Nurse: Alissa Ashley  Ordered: ADM, User  PCA/Nursing Assistant: Zahira Zepeda  PCA/Nursing Assistant: Kailey Duffy  Primary Team: Serge Mcdonald  Registered Dietitian: Olesya Mo  Registered Dietitian: Jo Naranjo  Respiratory Therapy: Juanjose Mendoza  Respiratory Therapy: Royce Cazares  : Hoda Damian

## 2023-05-03 PROCEDURE — 99232 SBSQ HOSP IP/OBS MODERATE 35: CPT

## 2023-05-03 RX ADMIN — BUDESONIDE AND FORMOTEROL FUMARATE DIHYDRATE 2 PUFF(S): 160; 4.5 AEROSOL RESPIRATORY (INHALATION) at 05:47

## 2023-05-03 RX ADMIN — Medication 3 MILLILITER(S): at 07:24

## 2023-05-03 RX ADMIN — BUDESONIDE AND FORMOTEROL FUMARATE DIHYDRATE 2 PUFF(S): 160; 4.5 AEROSOL RESPIRATORY (INHALATION) at 16:55

## 2023-05-03 RX ADMIN — Medication 3 MILLILITER(S): at 13:46

## 2023-05-03 RX ADMIN — Medication 3 MILLILITER(S): at 01:55

## 2023-05-03 RX ADMIN — Medication 3 MILLILITER(S): at 19:18

## 2023-05-03 RX ADMIN — TRAMADOL HYDROCHLORIDE 50 MILLIGRAM(S): 50 TABLET ORAL at 23:15

## 2023-05-03 RX ADMIN — Medication 40 MILLIGRAM(S): at 13:02

## 2023-05-03 RX ADMIN — Medication 81 MILLIGRAM(S): at 13:01

## 2023-05-03 RX ADMIN — AMLODIPINE BESYLATE 10 MILLIGRAM(S): 2.5 TABLET ORAL at 05:46

## 2023-05-03 RX ADMIN — ENOXAPARIN SODIUM 40 MILLIGRAM(S): 100 INJECTION SUBCUTANEOUS at 05:46

## 2023-05-03 RX ADMIN — TRAMADOL HYDROCHLORIDE 50 MILLIGRAM(S): 50 TABLET ORAL at 00:05

## 2023-05-03 RX ADMIN — Medication 40 MILLIGRAM(S): at 05:46

## 2023-05-03 RX ADMIN — Medication 200 MILLIGRAM(S): at 16:55

## 2023-05-03 RX ADMIN — Medication 0.25 MILLIGRAM(S): at 13:04

## 2023-05-03 RX ADMIN — TRAMADOL HYDROCHLORIDE 50 MILLIGRAM(S): 50 TABLET ORAL at 22:02

## 2023-05-03 NOTE — CHART NOTE - NSCHARTNOTEFT_GEN_A_CORE
consult dictated    Impression:    COPD with exacerbation  Acute Bronchitis - resolving  Hx Anxiety    Plan:    Change to oral steroids  Probable discharge in AM  Will see if office in 1 week

## 2023-05-04 ENCOUNTER — TRANSCRIPTION ENCOUNTER (OUTPATIENT)
Age: 68
End: 2023-05-04

## 2023-05-04 VITALS — OXYGEN SATURATION: 95 %

## 2023-05-04 LAB
ANION GAP SERPL CALC-SCNC: 5 MMOL/L — SIGNIFICANT CHANGE UP (ref 5–17)
BUN SERPL-MCNC: 33 MG/DL — HIGH (ref 7–23)
CALCIUM SERPL-MCNC: 9.6 MG/DL — SIGNIFICANT CHANGE UP (ref 8.5–10.1)
CHLORIDE SERPL-SCNC: 107 MMOL/L — SIGNIFICANT CHANGE UP (ref 96–108)
CO2 SERPL-SCNC: 31 MMOL/L — SIGNIFICANT CHANGE UP (ref 22–31)
CREAT SERPL-MCNC: 0.85 MG/DL — SIGNIFICANT CHANGE UP (ref 0.5–1.3)
EGFR: 76 ML/MIN/1.73M2 — SIGNIFICANT CHANGE UP
GLUCOSE SERPL-MCNC: 158 MG/DL — HIGH (ref 70–99)
HCT VFR BLD CALC: 42.3 % — SIGNIFICANT CHANGE UP (ref 34.5–45)
HGB BLD-MCNC: 13 G/DL — SIGNIFICANT CHANGE UP (ref 11.5–15.5)
MCHC RBC-ENTMCNC: 26.4 PG — LOW (ref 27–34)
MCHC RBC-ENTMCNC: 30.7 GM/DL — LOW (ref 32–36)
MCV RBC AUTO: 85.8 FL — SIGNIFICANT CHANGE UP (ref 80–100)
NRBC # BLD: 0 /100 WBCS — SIGNIFICANT CHANGE UP (ref 0–0)
PLATELET # BLD AUTO: 395 K/UL — SIGNIFICANT CHANGE UP (ref 150–400)
POTASSIUM SERPL-MCNC: 3.9 MMOL/L — SIGNIFICANT CHANGE UP (ref 3.5–5.3)
POTASSIUM SERPL-SCNC: 3.9 MMOL/L — SIGNIFICANT CHANGE UP (ref 3.5–5.3)
RBC # BLD: 4.93 M/UL — SIGNIFICANT CHANGE UP (ref 3.8–5.2)
RBC # FLD: 14.2 % — SIGNIFICANT CHANGE UP (ref 10.3–14.5)
SODIUM SERPL-SCNC: 143 MMOL/L — SIGNIFICANT CHANGE UP (ref 135–145)
WBC # BLD: 14.29 K/UL — HIGH (ref 3.8–10.5)
WBC # FLD AUTO: 14.29 K/UL — HIGH (ref 3.8–10.5)

## 2023-05-04 PROCEDURE — 84484 ASSAY OF TROPONIN QUANT: CPT

## 2023-05-04 PROCEDURE — 80053 COMPREHEN METABOLIC PANEL: CPT

## 2023-05-04 PROCEDURE — 94760 N-INVAS EAR/PLS OXIMETRY 1: CPT

## 2023-05-04 PROCEDURE — 85027 COMPLETE CBC AUTOMATED: CPT

## 2023-05-04 PROCEDURE — 99239 HOSP IP/OBS DSCHRG MGMT >30: CPT

## 2023-05-04 PROCEDURE — 0225U NFCT DS DNA&RNA 21 SARSCOV2: CPT

## 2023-05-04 PROCEDURE — 80048 BASIC METABOLIC PNL TOTAL CA: CPT

## 2023-05-04 PROCEDURE — 71045 X-RAY EXAM CHEST 1 VIEW: CPT

## 2023-05-04 PROCEDURE — 84145 PROCALCITONIN (PCT): CPT

## 2023-05-04 PROCEDURE — G1004: CPT

## 2023-05-04 PROCEDURE — 36415 COLL VENOUS BLD VENIPUNCTURE: CPT

## 2023-05-04 PROCEDURE — 96375 TX/PRO/DX INJ NEW DRUG ADDON: CPT

## 2023-05-04 PROCEDURE — 83735 ASSAY OF MAGNESIUM: CPT

## 2023-05-04 PROCEDURE — 71250 CT THORAX DX C-: CPT | Mod: ME

## 2023-05-04 PROCEDURE — 93005 ELECTROCARDIOGRAM TRACING: CPT

## 2023-05-04 PROCEDURE — 99285 EMERGENCY DEPT VISIT HI MDM: CPT

## 2023-05-04 PROCEDURE — 94640 AIRWAY INHALATION TREATMENT: CPT

## 2023-05-04 PROCEDURE — 86803 HEPATITIS C AB TEST: CPT

## 2023-05-04 PROCEDURE — 85379 FIBRIN DEGRADATION QUANT: CPT

## 2023-05-04 PROCEDURE — 96374 THER/PROPH/DIAG INJ IV PUSH: CPT

## 2023-05-04 PROCEDURE — 82803 BLOOD GASES ANY COMBINATION: CPT

## 2023-05-04 PROCEDURE — 85025 COMPLETE CBC W/AUTO DIFF WBC: CPT

## 2023-05-04 RX ORDER — NALOXONE HYDROCHLORIDE 4 MG/.1ML
4 SPRAY NASAL
Qty: 1 | Refills: 0
Start: 2023-05-04

## 2023-05-04 RX ORDER — IPRATROPIUM/ALBUTEROL SULFATE 18-103MCG
3 AEROSOL WITH ADAPTER (GRAM) INHALATION
Qty: 0 | Refills: 0 | DISCHARGE
Start: 2023-05-04

## 2023-05-04 RX ORDER — ALBUTEROL 90 UG/1
2 AEROSOL, METERED ORAL
Refills: 0 | DISCHARGE

## 2023-05-04 RX ADMIN — AMLODIPINE BESYLATE 10 MILLIGRAM(S): 2.5 TABLET ORAL at 05:26

## 2023-05-04 RX ADMIN — Medication 0.25 MILLIGRAM(S): at 11:36

## 2023-05-04 RX ADMIN — BUDESONIDE AND FORMOTEROL FUMARATE DIHYDRATE 2 PUFF(S): 160; 4.5 AEROSOL RESPIRATORY (INHALATION) at 05:29

## 2023-05-04 RX ADMIN — Medication 3 MILLILITER(S): at 07:35

## 2023-05-04 RX ADMIN — Medication 40 MILLIGRAM(S): at 01:08

## 2023-05-04 RX ADMIN — Medication 3 MILLILITER(S): at 01:08

## 2023-05-04 RX ADMIN — ENOXAPARIN SODIUM 40 MILLIGRAM(S): 100 INJECTION SUBCUTANEOUS at 05:26

## 2023-05-04 RX ADMIN — Medication 40 MILLIGRAM(S): at 11:36

## 2023-05-04 RX ADMIN — Medication 81 MILLIGRAM(S): at 11:37

## 2023-05-04 NOTE — DISCHARGE NOTE NURSING/CASE MANAGEMENT/SOCIAL WORK - PATIENT PORTAL LINK FT
You can access the FollowMyHealth Patient Portal offered by [facility] by registering at the following website: [email or web address]. By joining [facility]’s FollowMyHealth portal, you will also be able to view your health information using other applications (apps) compatible with our system.

## 2023-05-04 NOTE — DISCHARGE NOTE PROVIDER - NSDCFUADDINST_GEN_ALL_CORE_FT
It is important to see your primary physician as well as any specialty physicians within the next week to perform a comprehensive medical review.  Call their offices for an appointment as soon as you leave the hospital.  You will also need to see them for renewal of your medications.  If have any difficulty following with a physician, contact the [facility] [phone] or via [email or web address].   To obtain your results, you can access the [employer] [facility] Patient Portal at [email or web address].  Your medical issues appear to be stable at this time, but if your symptoms recur or worsen, contact your physicians and/or return to the hospital if necessary.  If you encounter any issues or questions with your medication, call your physicians before stopping the medication.  Do not drive.  Limit your diet to 2 grams of sodium daily.

## 2023-05-04 NOTE — DISCHARGE NOTE PROVIDER - NSDCMRMEDTOKEN_GEN_ALL_CORE_FT
ALPRAZolam 0.25 mg oral tablet: 1 tab(s) orally once a day as needed for  anxiety  amLODIPine 10 mg oral tablet: 1 tab(s) orally  aspirin 81 mg oral tablet: 1 tab(s) orally once a day  budesonide/glycopyrrolate/formoterol 160 mcg-9 mcg-4.8 mcg/inh inhalation aerosol: 2 inhaler(s) inhaled 2 times a day  ipratropium-albuterol 0.5 mg-2.5 mg/3 mL inhalation solution: 3 milliliter(s) inhaled every 6 hours  predniSONE 10 mg oral tablet: 4 tab(s) orally once a day Taper by 1 tab every 3 days for 12 days total  tiotropium 2.5 mcg/inh inhalation aerosol: 2 inhaler(s) inhaled 2 times a day  traMADol 50 mg oral tablet: 1 tab(s) orally once a day   ALPRAZolam 0.25 mg oral tablet: 1 tab(s) orally once a day as needed for  anxiety  amLODIPine 10 mg oral tablet: 1 tab(s) orally once a day  aspirin 81 mg oral tablet: 1 tab(s) orally once a day  budesonide/glycopyrrolate/formoterol 160 mcg-9 mcg-4.8 mcg/inh inhalation aerosol: 2 inhaler(s) inhaled 2 times a day  ipratropium-albuterol 0.5 mg-2.5 mg/3 mL inhalation solution: 3 milliliter(s) inhaled every 6 hours  naloxone 4 mg/0.1 mL nasal spray: 4 milligram(s) intranasally once for suspected opioid overdose / sedation  predniSONE 10 mg oral tablet: 4 tab(s) orally once a day Taper by 1 tab every 3 days for 12 days total  tiotropium 2.5 mcg/inh inhalation aerosol: 2 inhaler(s) inhaled 2 times a day  traMADol 50 mg oral tablet: 1 tab(s) orally once a day

## 2023-05-04 NOTE — DISCHARGE NOTE PROVIDER - CARE PROVIDER_API CALL
Tres Dobson)  Internal Medicine; Pulmonary Disease  [address] [place] [address]  Phone: [phone]  Fax: [phone]  Follow Up Time:

## 2023-05-04 NOTE — DISCHARGE NOTE PROVIDER - NSDCCPCAREPLAN_GEN_ALL_CORE_FT
PRINCIPAL DISCHARGE DIAGNOSIS  Diagnosis: COPD exacerbation  Assessment and Plan of Treatment:       SECONDARY DISCHARGE DIAGNOSES  Diagnosis: Anxiety  Assessment and Plan of Treatment:     Diagnosis: RA (rheumatoid arthritis)  Assessment and Plan of Treatment:     Diagnosis: HTN (hypertension)  Assessment and Plan of Treatment:     Diagnosis: Acute on chronic respiratory failure with hypoxia  Assessment and Plan of Treatment:

## 2023-05-04 NOTE — PROGRESS NOTE ADULT - ASSESSMENT
67y female with PMHx of COPD (not on home O2), RA, anxiety admitted with COPD exacerbation.    # Acute COPD Exacerbation / Acute Respiratory Failure with hypoxia - clinically improving.  On home breztri, Spiriva, albuterol.  Continue duonebs q6 ATC and albuterol nebulizations q3 PRN.  Symbicort BID.  Titrate O2.  Taper steroids.  pulmonary input appreciated.    # Essential HTN - Monitor BP.  Continue antihypertensives.  # Anxiety - Continue home xanax PRN.  # RA (rheumatoid arthritis) - Hold home prednisone 5mg - steroid regimen as above.  Continue home tramadol qd for pain.  # Inpatient DVT Prophylaxis - Lovenox subcut  
67y female with PMHx of COPD (not on home O2), RA, anxiety admitted with COPD exacerbation.
67y female with PMHx of COPD (not on home O2), RA, anxiety admitted with COPD exacerbation.    # Acute COPD Exacerbation / Acute Respiratory Failure with hypoxia - clinically improving.  On home breztri, Spiriva, albuterol  Continue duonebs q6 ATC and albuterol nebulizations q3 PRN.  Symbicort BID.  Titrate O2.    # Essential HTN - Monitor BP.  Continue antihypertensives.  # Anxiety - Continue home xanax PRN.  # RA (rheumatoid arthritis) - Hold home prednisone 5mg - steroid regimen as above.  Continue home tramadol qd for pain.  # Inpatient DVT Prophylaxis - Lovenox subcut

## 2023-05-04 NOTE — DISCHARGE NOTE PROVIDER - HOSPITAL COURSE
67y female with PMHx of COPD (not on home O2), RA, anxiety admitted with COPD exacerbation.  Responded well to steroids.  now on RA.  Counselled on outpatient f/u with pulmonary.  Pt and son in agreement.  Disposition: Stable for discharge.  Outpatient followup discussed.  Total time spent on discharge is  35  minutes.

## 2023-05-04 NOTE — PROGRESS NOTE ADULT - SUBJECTIVE AND OBJECTIVE BOX
Patient: KENDALL RIDER [id number] 67y Female                            Hospitalist Attending Note    No new complaints.  Feeling much better.  Now off O2.  Son at bedside reports she appears to be at baseline.   SOB improving. Remains on O2    ____________________PHYSICAL EXAM:  GENERAL:  NAD Alert and Oriented x 3   HEENT: NCAT  CARDIOVASCULAR:  S1, S2  LUNGS: CTAB  no wheezing.   ABDOMEN:  soft, (-) tenderness, (-) distension, (+) bowel sounds, (-) guarding, (-) rebound (-) rigidity  EXTREMITIES:  no cyanosis / clubbing / edema.   ____________________    VITALS:  Vital Signs Last 24 Hrs  T(C): 36.7 (04 May 2023 04:04), Max: 36.8 (03 May 2023 20:07)  T(F): 98 (04 May 2023 04:04), Max: 98.2 (03 May 2023 20:07)  HR: 114 (04 May 2023 07:35) (108 - 118)  BP: 153/79 (04 May 2023 04:04) (153/79 - 154/74)  BP(mean): --  RR: 18 (04 May 2023 04:04) (18 - 20)  SpO2: 95% (04 May 2023 07:35) (95% - 96%)    Parameters below as of 04 May 2023 07:35  Patient On (Oxygen Delivery Method): room air     Daily     Daily Weight in k.4 (04 May 2023 04:04)  CAPILLARY BLOOD GLUCOSE        I&O's Summary    03 May 2023 07:01  -  04 May 2023 07:00  --------------------------------------------------------  IN: 120 mL / OUT: 0 mL / NET: 120 mL        LABS:                        13.0   14.29 )-----------( 395      ( 04 May 2023 08:13 )             42.3     05-04    143  |  107  |  33<H>  ----------------------------<  158<H>  3.9   |  31  |  0.85    Ca    9.6      04 May 2023 08:13                    MEDICATIONS:  acetaminophen     Tablet .. 650 milliGRAM(s) Oral every 6 hours PRN  albuterol    0.083% 2.5 milliGRAM(s) Nebulizer every 3 hours PRN  albuterol/ipratropium for Nebulization 3 milliLiter(s) Nebulizer every 6 hours  ALPRAZolam 0.25 milliGRAM(s) Oral daily PRN  amLODIPine   Tablet 10 milliGRAM(s) Oral daily  aspirin enteric coated 81 milliGRAM(s) Oral daily  budesonide  80 MICROgram(s)/formoterol 4.5 MICROgram(s) Inhaler 2 Puff(s) Inhalation two times a day  enoxaparin Injectable 40 milliGRAM(s) SubCutaneous every 24 hours  guaiFENesin Oral Liquid (Sugar-Free) 200 milliGRAM(s) Oral every 6 hours PRN  melatonin 3 milliGRAM(s) Oral at bedtime PRN  ondansetron Injectable 4 milliGRAM(s) IV Push every 8 hours PRN  predniSONE   Tablet 40 milliGRAM(s) Oral daily  traMADol 50 milliGRAM(s) Oral at bedtime    
                          Patient: KENDALL RIDER [id number] 67y Female                            Hospitalist Attending Note    No new complaints.  + cough.  SOB improving. Remains on O2    ____________________PHYSICAL EXAM:  GENERAL:  NAD Alert and Oriented x 3   HEENT: NCAT  CARDIOVASCULAR:  S1, S2  LUNGS: coarse BS b/l, no wheezing.   ABDOMEN:  soft, (-) tenderness, (-) distension, (+) bowel sounds, (-) guarding, (-) rebound (-) rigidity  EXTREMITIES:  no cyanosis / clubbing / edema.   ____________________       VITALS:  Vital Signs Last 24 Hrs  T(C): 37 (03 May 2023 10:36), Max: 37 (03 May 2023 10:36)  T(F): 98.6 (03 May 2023 10:36), Max: 98.6 (03 May 2023 10:36)  HR: 109 (03 May 2023 14:13) (74 - 114)  BP: 127/65 (03 May 2023 10:36) (127/65 - 174/89)  BP(mean): --  RR: 17 (03 May 2023 10:36) (17 - 22)  SpO2: 95% (03 May 2023 14:13) (91% - 97%)    Parameters below as of 03 May 2023 14:13  Patient On (Oxygen Delivery Method): nasal cannula,2 LPM     Daily     Daily   CAPILLARY BLOOD GLUCOSE        I&O's Summary      HISTORY:  PAST MEDICAL & SURGICAL HISTORY:  COPD (chronic obstructive pulmonary disease)      Rheumatoid arthritis      HTN (hypertension)      Anxiety      No significant past surgical history      Allergies    No Known Allergies    Intolerances       LABS:                        12.3   11.73 )-----------( 405      ( 02 May 2023 04:29 )             40.5     05-02    143  |  111<H>  |  14  ----------------------------<  167<H>  4.1   |  26  |  0.86    Ca    8.9      02 May 2023 04:29  Mg     2.7     05-02    TPro  6.4  /  Alb  3.0<L>  /  TBili  0.3  /  DBili  x   /  AST  9<L>  /  ALT  13  /  AlkPhos  76  05-02      LIVER FUNCTIONS - ( 02 May 2023 04:29 )  Alb: 3.0 g/dL / Pro: 6.4 g/dL / ALK PHOS: 76 U/L / ALT: 13 U/L / AST: 9 U/L / GGT: x                     MEDICATIONS:  MEDICATIONS  (STANDING):  albuterol/ipratropium for Nebulization 3 milliLiter(s) Nebulizer every 6 hours  amLODIPine   Tablet 10 milliGRAM(s) Oral daily  aspirin enteric coated 81 milliGRAM(s) Oral daily  budesonide  80 MICROgram(s)/formoterol 4.5 MICROgram(s) Inhaler 2 Puff(s) Inhalation two times a day  enoxaparin Injectable 40 milliGRAM(s) SubCutaneous every 24 hours  methylPREDNISolone sodium succinate Injectable 40 milliGRAM(s) IV Push every 12 hours  traMADol 50 milliGRAM(s) Oral at bedtime    MEDICATIONS  (PRN):  acetaminophen     Tablet .. 650 milliGRAM(s) Oral every 6 hours PRN Temp greater or equal to 38C (100.4F), Mild Pain (1 - 3)  albuterol    0.083% 2.5 milliGRAM(s) Nebulizer every 3 hours PRN Shortness of Breath and/or Wheezing  ALPRAZolam 0.25 milliGRAM(s) Oral daily PRN for anxiety  guaiFENesin Oral Liquid (Sugar-Free) 200 milliGRAM(s) Oral every 6 hours PRN Cough  melatonin 3 milliGRAM(s) Oral at bedtime PRN Insomnia  ondansetron Injectable 4 milliGRAM(s) IV Push every 8 hours PRN Nausea and/or Vomiting  
Patient is a 67y old  Female who presents with a chief complaint of COPD exacerbation (01 May 2023 23:17)      Subjective:  INTERVAL HPI/OVERNIGHT EVENTS: Patient seen and examined at bedside.  Patient c/o cough, congestion and SOB BUT BETTER THAN YESTERDAY  MEDICATIONS  (STANDING):  albuterol/ipratropium for Nebulization 3 milliLiter(s) Nebulizer every 6 hours  amLODIPine   Tablet 10 milliGRAM(s) Oral daily  aspirin enteric coated 81 milliGRAM(s) Oral daily  budesonide  80 MICROgram(s)/formoterol 4.5 MICROgram(s) Inhaler 2 Puff(s) Inhalation two times a day  enoxaparin Injectable 40 milliGRAM(s) SubCutaneous every 24 hours  methylPREDNISolone sodium succinate Injectable 40 milliGRAM(s) IV Push every 8 hours  traMADol 50 milliGRAM(s) Oral at bedtime    MEDICATIONS  (PRN):  acetaminophen     Tablet .. 650 milliGRAM(s) Oral every 6 hours PRN Temp greater or equal to 38C (100.4F), Mild Pain (1 - 3)  albuterol    0.083% 2.5 milliGRAM(s) Nebulizer every 3 hours PRN Shortness of Breath and/or Wheezing  ALPRAZolam 0.25 milliGRAM(s) Oral daily PRN for anxiety  melatonin 3 milliGRAM(s) Oral at bedtime PRN Insomnia  ondansetron Injectable 4 milliGRAM(s) IV Push every 8 hours PRN Nausea and/or Vomiting      Allergies    No Known Allergies    Intolerances        REVIEW OF SYSTEMS:  CONSTITUTIONAL: No fever or chills  HEENT:  No headache, no sore throat  RESPIRATORY: No cough or shortness of breath  CARDIOVASCULAR: No chest pain or palpitations  GASTROINTESTINAL: No abd pain, nausea, vomiting, or diarrhea      Objective:  Vital Signs Last 24 Hrs  T(C): 37 (02 May 2023 11:23), Max: 37.2 (02 May 2023 00:44)  T(F): 98.6 (02 May 2023 11:23), Max: 99 (02 May 2023 00:44)  HR: 116 (02 May 2023 11:23) (115 - 137)  BP: 138/79 (02 May 2023 11:23) (126/66 - 201/93)  BP(mean): --  RR: 22 (02 May 2023 11:23) (22 - 33)  SpO2: 96% (02 May 2023 11:23) (93% - 97%)    Parameters below as of 02 May 2023 11:23  Patient On (Oxygen Delivery Method): nasal cannula  O2 Flow (L/min): 1.5      GENERAL: NAD, lying in bed comfortably  HEAD:  Normocephalic  EYES:  conjunctiva and sclera clear  ENT: Moist mucous membranes  NECK: Supple  CHEST/LUNG: DECREASE AIR ENTRY, No rales or rhonchi; no wheezing. Unlabored respirations  HEART: Regular rate and rhythm; S1S2+  ABDOMEN: Bowel sounds present; Soft, Nontender, Nondistended.   EXTREMITIES:  + distal Peripheral Pulses;  No cyanosis, or edema  NERVOUS SYSTEM:  Alert & Oriented X3;  No gross focal deficits   MSK: moves all extremities  SKIN: No rashes     LABS:                        12.3   11.73 )-----------( 405      ( 02 May 2023 04:29 )             40.5     02 May 2023 04:29    143    |  111    |  14     ----------------------------<  167    4.1     |  26     |  0.86     Ca    8.9        02 May 2023 04:29  Mg     2.7       02 May 2023 04:29    TPro  6.4    /  Alb  3.0    /  TBili  0.3    /  DBili  x      /  AST  9      /  ALT  13     /  AlkPhos  76     02 May 2023 04:29        CAPILLARY BLOOD GLUCOSE              RADIOLOGY & ADDITIONAL TESTS:    Personally reviewed.     Consultant(s) Notes Reviewed:  [x] YES  [ ] NO    Plan of care discussed with patient /family SON AND SISTER AT BEDSIDE: AS PER PATIENT PERMISSION ; all questions answered

## 2023-05-04 NOTE — CASE MANAGEMENT PROGRESS NOTE - NSCMPROGRESSNOTE_GEN_ALL_CORE
CM met with patient at bedside to discuss transition to home today. CM again offered home care visiting nurse services, pt declined, stated that she has very supportive family. Pt is in agreement with transitioning to home today, her son at bedside will be transporting her home. CM discussed the importance of following up with pulmonologist after discharge. Pt stated that she already contacted her pulmonologist, whom she will call when she gets home to make a follow up appointment.

## 2023-05-04 NOTE — DISCHARGE NOTE NURSING/CASE MANAGEMENT/SOCIAL WORK - NSPROMEDSBROUGHTTOHOSP_GEN_A_NUR
Detail Level: Detailed no Quality 110: Preventive Care And Screening: Influenza Immunization: Influenza Immunization previously received during influenza season Quality 226: Preventive Care And Screening: Tobacco Use: Screening And Cessation Intervention: Patient screened for tobacco use and is an ex/non-smoker

## 2023-05-04 NOTE — DISCHARGE NOTE PROVIDER - NSDCFUADDAPPT_GEN_ALL_CORE_FT
WBC 14.2    During your hospitalization, you had abnormal findings on radiologic / laboratory studies.  Please see your primary doctor for followup of these findings to ensure that they have resolved.  You may require further evaluation to exclude certain serious conditions, and / or treatment.

## 2024-01-13 NOTE — PATIENT PROFILE ADULT - FUNCTIONAL SCREEN CURRENT LEVEL: SWALLOWING (IF SCORE 2 OR MORE FOR ANY ITEM, CONSULT REHAB SERVICES), MLM)
Pt is currently being triaged by Stacie Gonsalez RN.   Reason for Disposition   Caller has already spoken with another triager and has no further questions.    Protocols used: No Contact or Duplicate Contact Call-A-AH     0 = swallows foods/liquids without difficulty

## 2024-01-17 ENCOUNTER — INPATIENT (INPATIENT)
Facility: HOSPITAL | Age: 69
LOS: 2 days | Discharge: HOME | DRG: 192 | End: 2024-01-20
Attending: STUDENT IN AN ORGANIZED HEALTH CARE EDUCATION/TRAINING PROGRAM | Admitting: STUDENT IN AN ORGANIZED HEALTH CARE EDUCATION/TRAINING PROGRAM
Payer: MEDICARE

## 2024-01-17 VITALS
SYSTOLIC BLOOD PRESSURE: 160 MMHG | OXYGEN SATURATION: 97 % | HEART RATE: 120 BPM | RESPIRATION RATE: 20 BRPM | TEMPERATURE: 100 F | WEIGHT: 134.04 LBS | HEIGHT: 65 IN | DIASTOLIC BLOOD PRESSURE: 80 MMHG

## 2024-01-17 LAB
ALBUMIN SERPL ELPH-MCNC: 3.6 G/DL — SIGNIFICANT CHANGE UP (ref 3.3–5)
ALP SERPL-CCNC: 69 U/L — SIGNIFICANT CHANGE UP (ref 40–120)
ALT FLD-CCNC: 17 U/L — SIGNIFICANT CHANGE UP (ref 12–78)
ANION GAP SERPL CALC-SCNC: 10 MMOL/L — SIGNIFICANT CHANGE UP (ref 5–17)
AST SERPL-CCNC: 16 U/L — SIGNIFICANT CHANGE UP (ref 15–37)
BASE EXCESS BLDV CALC-SCNC: -1.5 MMOL/L — SIGNIFICANT CHANGE UP (ref -2–3)
BASOPHILS # BLD AUTO: 0.03 K/UL — SIGNIFICANT CHANGE UP (ref 0–0.2)
BASOPHILS NFR BLD AUTO: 0.3 % — SIGNIFICANT CHANGE UP (ref 0–2)
BILIRUB SERPL-MCNC: 0.5 MG/DL — SIGNIFICANT CHANGE UP (ref 0.2–1.2)
BLOOD GAS COMMENTS, VENOUS: SIGNIFICANT CHANGE UP
BUN SERPL-MCNC: 17 MG/DL — SIGNIFICANT CHANGE UP (ref 7–23)
CALCIUM SERPL-MCNC: 9.7 MG/DL — SIGNIFICANT CHANGE UP (ref 8.5–10.1)
CHLORIDE SERPL-SCNC: 104 MMOL/L — SIGNIFICANT CHANGE UP (ref 96–108)
CO2 SERPL-SCNC: 27 MMOL/L — SIGNIFICANT CHANGE UP (ref 22–31)
CREAT SERPL-MCNC: 1 MG/DL — SIGNIFICANT CHANGE UP (ref 0.5–1.3)
EGFR: 62 ML/MIN/1.73M2 — SIGNIFICANT CHANGE UP
EOSINOPHIL # BLD AUTO: 0.01 K/UL — SIGNIFICANT CHANGE UP (ref 0–0.5)
EOSINOPHIL NFR BLD AUTO: 0.1 % — SIGNIFICANT CHANGE UP (ref 0–6)
FLUAV AG NPH QL: SIGNIFICANT CHANGE UP
FLUBV AG NPH QL: SIGNIFICANT CHANGE UP
GAS PNL BLDV: SIGNIFICANT CHANGE UP
GLUCOSE SERPL-MCNC: 118 MG/DL — HIGH (ref 70–99)
HCO3 BLDV-SCNC: 24 MMOL/L — SIGNIFICANT CHANGE UP (ref 22–29)
HCT VFR BLD CALC: 40.6 % — SIGNIFICANT CHANGE UP (ref 34.5–45)
HGB BLD-MCNC: 11.9 G/DL — SIGNIFICANT CHANGE UP (ref 11.5–15.5)
IMM GRANULOCYTES NFR BLD AUTO: 0.5 % — SIGNIFICANT CHANGE UP (ref 0–0.9)
LYMPHOCYTES # BLD AUTO: 1.45 K/UL — SIGNIFICANT CHANGE UP (ref 1–3.3)
LYMPHOCYTES # BLD AUTO: 14.1 % — SIGNIFICANT CHANGE UP (ref 13–44)
MCHC RBC-ENTMCNC: 21.9 PG — LOW (ref 27–34)
MCHC RBC-ENTMCNC: 29.3 GM/DL — LOW (ref 32–36)
MCV RBC AUTO: 74.8 FL — LOW (ref 80–100)
MONOCYTES # BLD AUTO: 0.72 K/UL — SIGNIFICANT CHANGE UP (ref 0–0.9)
MONOCYTES NFR BLD AUTO: 7 % — SIGNIFICANT CHANGE UP (ref 2–14)
NEUTROPHILS # BLD AUTO: 7.99 K/UL — HIGH (ref 1.8–7.4)
NEUTROPHILS NFR BLD AUTO: 78 % — HIGH (ref 43–77)
NRBC # BLD: 0 /100 WBCS — SIGNIFICANT CHANGE UP (ref 0–0)
PCO2 BLDV: 45 MMHG — HIGH (ref 39–42)
PH BLDV: 7.34 — SIGNIFICANT CHANGE UP (ref 7.32–7.43)
PLATELET # BLD AUTO: 420 K/UL — HIGH (ref 150–400)
PO2 BLDV: 41 MMHG — SIGNIFICANT CHANGE UP (ref 25–45)
POTASSIUM SERPL-MCNC: 4 MMOL/L — SIGNIFICANT CHANGE UP (ref 3.5–5.3)
POTASSIUM SERPL-SCNC: 4 MMOL/L — SIGNIFICANT CHANGE UP (ref 3.5–5.3)
PROT SERPL-MCNC: 7.5 G/DL — SIGNIFICANT CHANGE UP (ref 6–8.3)
RBC # BLD: 5.43 M/UL — HIGH (ref 3.8–5.2)
RBC # FLD: 17 % — HIGH (ref 10.3–14.5)
RSV RNA NPH QL NAA+NON-PROBE: DETECTED
SAO2 % BLDV: 64.2 % — LOW (ref 67–88)
SARS-COV-2 RNA SPEC QL NAA+PROBE: SIGNIFICANT CHANGE UP
SODIUM SERPL-SCNC: 141 MMOL/L — SIGNIFICANT CHANGE UP (ref 135–145)
WBC # BLD: 10.25 K/UL — SIGNIFICANT CHANGE UP (ref 3.8–10.5)
WBC # FLD AUTO: 10.25 K/UL — SIGNIFICANT CHANGE UP (ref 3.8–10.5)

## 2024-01-17 PROCEDURE — 93010 ELECTROCARDIOGRAM REPORT: CPT

## 2024-01-17 PROCEDURE — 99285 EMERGENCY DEPT VISIT HI MDM: CPT

## 2024-01-17 PROCEDURE — 71045 X-RAY EXAM CHEST 1 VIEW: CPT | Mod: 26

## 2024-01-17 RX ORDER — IPRATROPIUM/ALBUTEROL SULFATE 18-103MCG
3 AEROSOL WITH ADAPTER (GRAM) INHALATION ONCE
Refills: 0 | Status: COMPLETED | OUTPATIENT
Start: 2024-01-17 | End: 2024-01-17

## 2024-01-17 RX ORDER — SODIUM CHLORIDE 9 MG/ML
1000 INJECTION INTRAMUSCULAR; INTRAVENOUS; SUBCUTANEOUS ONCE
Refills: 0 | Status: COMPLETED | OUTPATIENT
Start: 2024-01-17 | End: 2024-01-17

## 2024-01-17 RX ORDER — KETOROLAC TROMETHAMINE 30 MG/ML
15 SYRINGE (ML) INJECTION ONCE
Refills: 0 | Status: DISCONTINUED | OUTPATIENT
Start: 2024-01-17 | End: 2024-01-17

## 2024-01-17 RX ADMIN — Medication 3 MILLILITER(S): at 19:31

## 2024-01-17 RX ADMIN — Medication 15 MILLIGRAM(S): at 19:37

## 2024-01-17 RX ADMIN — Medication 3 MILLILITER(S): at 20:19

## 2024-01-17 RX ADMIN — SODIUM CHLORIDE 1000 MILLILITER(S): 9 INJECTION INTRAMUSCULAR; INTRAVENOUS; SUBCUTANEOUS at 19:30

## 2024-01-17 RX ADMIN — Medication 62.5 MILLIGRAM(S): at 19:31

## 2024-01-17 NOTE — ED PROVIDER NOTE - CARE PLAN
1 Principal Discharge DX:	COPD exacerbation  Secondary Diagnosis:	Respiratory syncytial virus (RSV)

## 2024-01-17 NOTE — ED ADULT NURSE REASSESSMENT NOTE - NS ED NURSE REASSESS COMMENT FT1
Pt received sitting up on stretcher in NAD. Pt denies pain or discomfort at this time. Noted with mild dyspnea, bbs diminished b/l. Iv inserted as noted, pt medicated per order.

## 2024-01-17 NOTE — ED ADULT NURSE NOTE - CAS EDN DISCHARGE ASSESSMENT
Instructions (Optional): Left lateral eyebrow . 9x.5cm pink pearly nodule R/O BCC vs Sebaceous Cyst \\nW/Abaza Detail Level: Detailed Procedure To Be Performed At Next Visit: Excision Alert and oriented to person, place and time

## 2024-01-17 NOTE — ED ADULT NURSE NOTE - OBJECTIVE STATEMENT
Pt received in bed alert and oriented and resting in bed with the c/o flu-like symptoms, cough, SOB and a positive COVID test as of this today. As per Md's orders MD's orders IV dawn placed blood specimen obtained and sent to the lab. Pt stable and nursing care ongoing and safety maintained.

## 2024-01-17 NOTE — ED PROVIDER NOTE - OBJECTIVE STATEMENT
69 y/o F PMH of COPD (no home oxygen), RA, Anxiety presenting with dyspnea and cough.     Has noted increased cough and dyspnea since yesterday and tested positive for +Covid-19. No chest pain, nausea/vomiting. Using PRN albuterol frequently, also on maintenance inhaler. Follows with pulmonologist Dr. Dobson. PCP: Dr. Beal 67 y/o F PMH of COPD (no home oxygen), RA, Anxiety presenting with dyspnea and cough.     Has noted increased cough and dyspnea since yesterday and tested positive for +Covid-19. No chest pain, nausea/vomiting. Using PRN albuterol frequently, also on maintenance inhaler. Follows with pulmonologist Dr. Dobson.   PCP: Dr. Beal 69 y/o F PMH of COPD (no home oxygen), RA, Anxiety presenting with dyspnea and cough.     Has noted increased cough and dyspnea since yesterday and tested positive for +Covid-19. No chest pain, nausea/vomiting. Using PRN albuterol frequently, also on maintenance inhaler. Follows with pulmonologist Dr. Dobson.   PCP: Dr. Beal/Dr. Quintero. 69 y/o F PMH of COPD (no home oxygen), RA, HTN, Anxiety presenting with dyspnea and cough.     Has noted increased cough and dyspnea since yesterday and tested positive for +Covid-19. No chest pain, nausea/vomiting. Using PRN albuterol frequently, also on maintenance inhaler. Follows with pulmonologist Dr. Dobson.   PCP: Dr. Beal/Dr. Quintero.

## 2024-01-17 NOTE — ED PROVIDER NOTE - PHYSICAL EXAMINATION
GENERAL: no acute distress respiratory   HEAD:  Atraumatic, Normocephalic  EYES: EOMI, PERRLA, conjunctiva and sclera clear  ENT: MMM; oropharynx clear  NECK: Supple, No JVD  CHEST/LUNG: Decreased air entry diffusely   HEART: Regular rate and rhythm; No murmurs, rubs, or gallops  ABDOMEN: Soft, Nontender, Nondistended; Bowel sounds present  EXTREMITIES:  2+ Peripheral Pulses, No clubbing, cyanosis, or edema  PSYCH: AAOx3  NEUROLOGY: no focal motor or sensory deficits. 5/5 muscle strength in all extremities.   SKIN: No rashes or lesions

## 2024-01-17 NOTE — ED PROVIDER NOTE - CLINICAL SUMMARY MEDICAL DECISION MAKING FREE TEXT BOX
67 y/o F PMH of COPD (no home oxygen), RA, Anxiety presenting with dyspnea and cough.   Decreased breath sounds and hypoxemia on exertion concerning for COPD exacerbation   Treat with bronchodilators, streroids  Check CXR, Flu/Covid, screening labs  EKG NSR  Admit for further management of COPD exacerbation -->desaturated to 86% on ambulation.   Supplemental oxygen to maintain saturation >90%

## 2024-01-17 NOTE — ED PROVIDER NOTE - NSICDXPASTMEDICALHX_GEN_ALL_CORE_FT
Patient is calling regarding cancelling an appointment.    Date/Time: 12/27/2023 8:00 am    Patient was rescheduled: YES [] NO [x]    Patient requesting call back to reschedule: YES [x] NO []  
PAST MEDICAL HISTORY:  Anxiety     COPD (chronic obstructive pulmonary disease)     HTN (hypertension)     Rheumatoid arthritis

## 2024-01-18 DIAGNOSIS — M06.9 RHEUMATOID ARTHRITIS, UNSPECIFIED: ICD-10-CM

## 2024-01-18 DIAGNOSIS — J44.1 CHRONIC OBSTRUCTIVE PULMONARY DISEASE WITH (ACUTE) EXACERBATION: ICD-10-CM

## 2024-01-18 DIAGNOSIS — Z29.9 ENCOUNTER FOR PROPHYLACTIC MEASURES, UNSPECIFIED: ICD-10-CM

## 2024-01-18 DIAGNOSIS — I10 ESSENTIAL (PRIMARY) HYPERTENSION: ICD-10-CM

## 2024-01-18 DIAGNOSIS — F41.9 ANXIETY DISORDER, UNSPECIFIED: ICD-10-CM

## 2024-01-18 LAB
ALBUMIN SERPL ELPH-MCNC: 3.2 G/DL — LOW (ref 3.3–5)
ALP SERPL-CCNC: 58 U/L — SIGNIFICANT CHANGE UP (ref 40–120)
ALT FLD-CCNC: 17 U/L — SIGNIFICANT CHANGE UP (ref 12–78)
ANION GAP SERPL CALC-SCNC: 4 MMOL/L — LOW (ref 5–17)
AST SERPL-CCNC: 12 U/L — LOW (ref 15–37)
BASE EXCESS BLDV CALC-SCNC: -0.3 MMOL/L — SIGNIFICANT CHANGE UP (ref -2–3)
BASOPHILS # BLD AUTO: 0 K/UL — SIGNIFICANT CHANGE UP (ref 0–0.2)
BASOPHILS NFR BLD AUTO: 0 % — SIGNIFICANT CHANGE UP (ref 0–2)
BILIRUB SERPL-MCNC: 0.3 MG/DL — SIGNIFICANT CHANGE UP (ref 0.2–1.2)
BLOOD GAS COMMENTS, VENOUS: SIGNIFICANT CHANGE UP
BUN SERPL-MCNC: 20 MG/DL — SIGNIFICANT CHANGE UP (ref 7–23)
CALCIUM SERPL-MCNC: 9.4 MG/DL — SIGNIFICANT CHANGE UP (ref 8.5–10.1)
CHLORIDE SERPL-SCNC: 107 MMOL/L — SIGNIFICANT CHANGE UP (ref 96–108)
CO2 SERPL-SCNC: 28 MMOL/L — SIGNIFICANT CHANGE UP (ref 22–31)
CREAT SERPL-MCNC: 1 MG/DL — SIGNIFICANT CHANGE UP (ref 0.5–1.3)
EGFR: 62 ML/MIN/1.73M2 — SIGNIFICANT CHANGE UP
EOSINOPHIL # BLD AUTO: 0 K/UL — SIGNIFICANT CHANGE UP (ref 0–0.5)
EOSINOPHIL NFR BLD AUTO: 0 % — SIGNIFICANT CHANGE UP (ref 0–6)
GAS PNL BLDV: SIGNIFICANT CHANGE UP
GLUCOSE SERPL-MCNC: 204 MG/DL — HIGH (ref 70–99)
HCO3 BLDV-SCNC: 26 MMOL/L — SIGNIFICANT CHANGE UP (ref 22–29)
HCT VFR BLD CALC: 36.4 % — SIGNIFICANT CHANGE UP (ref 34.5–45)
HGB BLD-MCNC: 10.8 G/DL — LOW (ref 11.5–15.5)
LYMPHOCYTES # BLD AUTO: 0.4 K/UL — LOW (ref 1–3.3)
LYMPHOCYTES # BLD AUTO: 7 % — LOW (ref 13–44)
MCHC RBC-ENTMCNC: 21.9 PG — LOW (ref 27–34)
MCHC RBC-ENTMCNC: 29.7 GM/DL — LOW (ref 32–36)
MCV RBC AUTO: 73.8 FL — LOW (ref 80–100)
MONOCYTES # BLD AUTO: 0.06 K/UL — SIGNIFICANT CHANGE UP (ref 0–0.9)
MONOCYTES NFR BLD AUTO: 1 % — LOW (ref 2–14)
NEUTROPHILS # BLD AUTO: 5.23 K/UL — SIGNIFICANT CHANGE UP (ref 1.8–7.4)
NEUTROPHILS NFR BLD AUTO: 91 % — HIGH (ref 43–77)
NRBC # BLD: SIGNIFICANT CHANGE UP /100 WBCS (ref 0–0)
PCO2 BLDV: 53 MMHG — HIGH (ref 39–42)
PH BLDV: 7.3 — LOW (ref 7.32–7.43)
PLATELET # BLD AUTO: 356 K/UL — SIGNIFICANT CHANGE UP (ref 150–400)
PO2 BLDV: 106 MMHG — HIGH (ref 25–45)
POTASSIUM SERPL-MCNC: 3.8 MMOL/L — SIGNIFICANT CHANGE UP (ref 3.5–5.3)
POTASSIUM SERPL-SCNC: 3.8 MMOL/L — SIGNIFICANT CHANGE UP (ref 3.5–5.3)
PROT SERPL-MCNC: 6.7 G/DL — SIGNIFICANT CHANGE UP (ref 6–8.3)
RAPID RVP RESULT: DETECTED
RBC # BLD: 4.93 M/UL — SIGNIFICANT CHANGE UP (ref 3.8–5.2)
RBC # FLD: 16.7 % — HIGH (ref 10.3–14.5)
RSV RNA SPEC QL NAA+PROBE: DETECTED
SAO2 % BLDV: 99.4 % — HIGH (ref 67–88)
SARS-COV-2 RNA SPEC QL NAA+PROBE: SIGNIFICANT CHANGE UP
SODIUM SERPL-SCNC: 139 MMOL/L — SIGNIFICANT CHANGE UP (ref 135–145)
WBC # BLD: 5.68 K/UL — SIGNIFICANT CHANGE UP (ref 3.8–10.5)
WBC # FLD AUTO: 5.68 K/UL — SIGNIFICANT CHANGE UP (ref 3.8–10.5)

## 2024-01-18 PROCEDURE — 99223 1ST HOSP IP/OBS HIGH 75: CPT

## 2024-01-18 RX ORDER — BUDESONIDE AND FORMOTEROL FUMARATE DIHYDRATE 160; 4.5 UG/1; UG/1
2 AEROSOL RESPIRATORY (INHALATION)
Refills: 0 | Status: DISCONTINUED | OUTPATIENT
Start: 2024-01-18 | End: 2024-01-20

## 2024-01-18 RX ORDER — TRAMADOL HYDROCHLORIDE 50 MG/1
50 TABLET ORAL DAILY
Refills: 0 | Status: DISCONTINUED | OUTPATIENT
Start: 2024-01-18 | End: 2024-01-20

## 2024-01-18 RX ORDER — PANTOPRAZOLE SODIUM 20 MG/1
40 TABLET, DELAYED RELEASE ORAL
Refills: 0 | Status: DISCONTINUED | OUTPATIENT
Start: 2024-01-18 | End: 2024-01-20

## 2024-01-18 RX ORDER — ENOXAPARIN SODIUM 100 MG/ML
40 INJECTION SUBCUTANEOUS EVERY 24 HOURS
Refills: 0 | Status: DISCONTINUED | OUTPATIENT
Start: 2024-01-18 | End: 2024-01-20

## 2024-01-18 RX ORDER — ACETAMINOPHEN 500 MG
650 TABLET ORAL EVERY 6 HOURS
Refills: 0 | Status: DISCONTINUED | OUTPATIENT
Start: 2024-01-18 | End: 2024-01-20

## 2024-01-18 RX ORDER — TIOTROPIUM BROMIDE 18 UG/1
2 CAPSULE ORAL; RESPIRATORY (INHALATION)
Refills: 0 | DISCHARGE

## 2024-01-18 RX ORDER — ALPRAZOLAM 0.25 MG
0.25 TABLET ORAL AT BEDTIME
Refills: 0 | Status: DISCONTINUED | OUTPATIENT
Start: 2024-01-18 | End: 2024-01-20

## 2024-01-18 RX ORDER — IPRATROPIUM/ALBUTEROL SULFATE 18-103MCG
3 AEROSOL WITH ADAPTER (GRAM) INHALATION EVERY 6 HOURS
Refills: 0 | Status: DISCONTINUED | OUTPATIENT
Start: 2024-01-18 | End: 2024-01-19

## 2024-01-18 RX ORDER — AMLODIPINE BESYLATE 2.5 MG/1
10 TABLET ORAL DAILY
Refills: 0 | Status: DISCONTINUED | OUTPATIENT
Start: 2024-01-18 | End: 2024-01-20

## 2024-01-18 RX ORDER — INFLUENZA VIRUS VACCINE 15; 15; 15; 15 UG/.5ML; UG/.5ML; UG/.5ML; UG/.5ML
0.7 SUSPENSION INTRAMUSCULAR ONCE
Refills: 0 | Status: DISCONTINUED | OUTPATIENT
Start: 2024-01-18 | End: 2024-01-20

## 2024-01-18 RX ORDER — ASPIRIN/CALCIUM CARB/MAGNESIUM 324 MG
81 TABLET ORAL DAILY
Refills: 0 | Status: DISCONTINUED | OUTPATIENT
Start: 2024-01-18 | End: 2024-01-20

## 2024-01-18 RX ADMIN — BUDESONIDE AND FORMOTEROL FUMARATE DIHYDRATE 2 PUFF(S): 160; 4.5 AEROSOL RESPIRATORY (INHALATION) at 08:02

## 2024-01-18 RX ADMIN — Medication 40 MILLIGRAM(S): at 05:40

## 2024-01-18 RX ADMIN — BUDESONIDE AND FORMOTEROL FUMARATE DIHYDRATE 2 PUFF(S): 160; 4.5 AEROSOL RESPIRATORY (INHALATION) at 17:25

## 2024-01-18 RX ADMIN — TRAMADOL HYDROCHLORIDE 50 MILLIGRAM(S): 50 TABLET ORAL at 11:21

## 2024-01-18 RX ADMIN — Medication 3 MILLILITER(S): at 08:01

## 2024-01-18 RX ADMIN — Medication 3 MILLILITER(S): at 13:46

## 2024-01-18 RX ADMIN — Medication 100 MILLIGRAM(S): at 21:56

## 2024-01-18 RX ADMIN — Medication 0.25 MILLIGRAM(S): at 13:17

## 2024-01-18 RX ADMIN — Medication 40 MILLIGRAM(S): at 13:17

## 2024-01-18 RX ADMIN — Medication 40 MILLIGRAM(S): at 21:56

## 2024-01-18 RX ADMIN — AMLODIPINE BESYLATE 10 MILLIGRAM(S): 2.5 TABLET ORAL at 05:40

## 2024-01-18 RX ADMIN — Medication 81 MILLIGRAM(S): at 11:20

## 2024-01-18 RX ADMIN — Medication 100 MILLIGRAM(S): at 13:16

## 2024-01-18 RX ADMIN — Medication 3 MILLILITER(S): at 01:56

## 2024-01-18 RX ADMIN — Medication 100 MILLIGRAM(S): at 05:40

## 2024-01-18 RX ADMIN — ENOXAPARIN SODIUM 40 MILLIGRAM(S): 100 INJECTION SUBCUTANEOUS at 05:40

## 2024-01-18 RX ADMIN — PANTOPRAZOLE SODIUM 40 MILLIGRAM(S): 20 TABLET, DELAYED RELEASE ORAL at 08:01

## 2024-01-18 NOTE — H&P ADULT - NSHPREVIEWOFSYSTEMS_GEN_ALL_CORE
REVIEW OF SYSTEMS:    CONSTITUTIONAL: No weakness, fevers or chills  HEENT:  No headache, no visual changes, no sore throat, neck supple  RESPIRATORY: +nonproductive cough, no wheezing, hemoptysis; +dyspnea  CARDIOVASCULAR: No chest pain or palpitations  GASTROINTESTINAL: No abdominal pain, no nausea, vomiting, or hematemesis; No diarrhea or constipation. No melena or hematochezia.  GENITOURINARY: No dysuria, frequency or hematuria  NEUROLOGICAL: No focal weakness or dizziness   MSK: No myalgias  SKIN: No itching, burning, rashes, or lesions

## 2024-01-18 NOTE — H&P ADULT - HISTORY OF PRESENT ILLNESS
67yo F with PMHx COPD (not on home oxygen), HTN, RA, anxiety presenting with c/o dyspnea and cough since yesterday. She notes her grandson has been sick with RSV recently, and she also took a home COVID test x2 which were both positive. Has been using her rescue inhaler twice a day since symptoms started, but reports at baseline she uses her rescue inhaler multiple times a week. While ambulating in the ED she desaturated to 86% on RA.    IN THE ED  VS: T 97.9, , /76, RR 18, SpO2 94% on RA -> 86% with ambulation -> 97% on 2LNC  Labs: Plt 420, VBG CO2 45, RSV+  Imaging: CXR with hyperinflated lungs, otherwise no acute pathology on personal read  EKG NSR  S/p Duoneb x3, 1LNS, IV Solumedrol 62.5, Toradol 15mg 69yo F with PMHx COPD (not on home oxygen), HTN, RA, anxiety presenting with c/o dyspnea and cough since yesterday. She notes her grandson has been sick with RSV recently, and she also took a home COVID test x2 which were both positive. Has been using her rescue inhaler twice a day since symptoms started, but reports at baseline she uses her rescue inhaler multiple times a week. While ambulating in the ED she desaturated to 86% on RA.    IN THE ED  VS: T 100.4, , /80, RR 18, SpO2 94% on RA -> 86% with ambulation -> 97% on 2LNC  Labs: Plt 420, VBG CO2 45, RSV+  Imaging: CXR with hyperinflated lungs, otherwise no acute pathology on personal read  EKG NSR  S/p Duoneb x3, 1LNS, IV Solumedrol 62.5, Toradol 15mg

## 2024-01-18 NOTE — H&P ADULT - ASSESSMENT
69yo F with PMHx COPD (not on home oxygen), HTN, RA, anxiety presenting with c/o dyspnea and cough since yesterday. Admitted with COPD exacerbation 2/2 RSV and COVID

## 2024-01-18 NOTE — H&P ADULT - NSHPPHYSICALEXAM_GEN_ALL_CORE
T(C): 36.6 (01-17-24 @ 20:24), Max: 38 (01-17-24 @ 17:08)  HR: 114 (01-17-24 @ 20:24) (114 - 120)  BP: 130/76 (01-17-24 @ 20:24) (130/76 - 160/80)  RR: 18 (01-17-24 @ 20:24) (18 - 20)  SpO2: 86% (01-17-24 @ 23:45) (86% - 97%)  Wt(kg): --    Physical Exam:   GENERAL: well-groomed, well-developed, NAD  HEENT: head NC/AT; EOMI, conjunctiva & sclera clear; hearing grossly intact, moist mucous membranes  NECK: supple, no JVD  RESPIRATORY: +b/l diffuse expiratory wheezing, no rales, rhonchi or rubs  CARDIOVASCULAR: S1&S2, RRR, no murmurs or gallops  ABDOMEN: soft, non-tender, non-distended, + Bowel sounds x4 quadrants, no guarding, rebound or rigidity  MUSCULOSKELETAL:  no clubbing, cyanosis or edema of all 4 extremities  SKIN: warm and dry, color normal  NEUROLOGIC: AA&O X3, no focal deficits  Psych: Normal mood and affect, normal behavior

## 2024-01-18 NOTE — PROGRESS NOTE ADULT - PROBLEM SELECTOR PLAN 3
Chronic  - On prednisone 5mg qd - currently on stress dose steroids  - Continue tramadol 50mg qd Chronic  - On prednisone 5mg qd at home. - Hold as currently on stress dose steroids  - Continue tramadol 50mg qd

## 2024-01-18 NOTE — PHARMACOTHERAPY INTERVENTION NOTE - COMMENTS
Patient is a 67 yo female currently ordered for standing tramadol 50 mg daily. Utilizing the age friendly 65+ report, recommended adding hold parameters for lethargy, sedation, or RR < 12. Discussed with Dr. Abdi and recommendation was accepted, order was updated to reflect hold parameters.

## 2024-01-18 NOTE — PROGRESS NOTE ADULT - PROBLEM SELECTOR PLAN 2
Chronic, BP on admission 130/76  - Continue home amlodipine 10mg qd with hold parameters  - Monitor hemodynamics  - DASH/TLC diet

## 2024-01-18 NOTE — H&P ADULT - PROBLEM SELECTOR PLAN 1
Patient with history of COPD who presents with wheezing, worsening cough and dyspnea, concerning for COPD exacerbation 2/2 RSV and COVID+  - RSV +, COVID - on ED panel however likely false negative as pt tested COVID+ x2 at home  - Isolation precautions: airborne and contact  - CXR on personal read with hyperinflated lungs, otherwise no acute pathology  - VBG with mild hypercapnia (45)  - Recheck VBG in AM, if worsening hypercapnia will transition to Bipap  - Monitor on continuous pulse ox, SpO2 goal>88%  - Duonebs q6h  - IV Solumedrol 40mg q8h  - Continue Symbicort BID (therapeutic interchange for home Breztri)  - COVID + : Remdesivir/decadron??  - Pulm consulted Dr. Dobson, f/u recs Patient with history of COPD who presents with wheezing, worsening cough and dyspnea, concerning for COPD exacerbation 2/2 RSV and COVID+  - RSV +, COVID - on ED panel, possibly false negative as pt tested COVID+ x2 at home  - Isolation precautions: airborne and contact until confirmatory RVP repeated  - CXR on personal read with hyperinflated lungs, otherwise no acute pathology  - VBG with mild hypercapnia (45)  - Recheck VBG in AM, if worsening hypercapnia will transition to Bipap  - Monitor on continuous pulse ox, SpO2 goal>88%  - Duonebs q6h  - IV Solumedrol 40mg q8h  - Continue Symbicort BID (therapeutic interchange for home Breztri)  - Will hold off on Remdesivir until repeat RVP confirms COVID  - Pulm consulted Dr. Dobson, f/u recs

## 2024-01-18 NOTE — PROGRESS NOTE ADULT - PROBLEM SELECTOR PLAN 1
Patient with history of COPD who presents with wheezing, worsening cough and dyspnea, concerning for COPD exacerbation 2/2 RSV and COVID+  - RSV +, COVID - on ED panel, possibly false negative as pt tested COVID+ x2 at home  - f/u repeat COVID  - Isolation precautions: airborne and contact until confirmatory RVP repeated  - CXR on personal read with hyperinflated lungs, otherwise no acute pathology, f/u official read  - VBG with mild hypercapnia (45)  - Recheck VBG in AM, if worsening hypercapnia will transition to Bipap  - Monitor on continuous pulse ox, SpO2 goal>88%  - Duonebs q6h  - IV Solumedrol 40mg q8h  - Continue Symbicort BID (therapeutic interchange for home Breztri)  - Will hold off on Remdesivir until repeat RVP confirms COVID  - Pulm consulted Dr. Dobson, f/u recs Patient with history of COPD who presents with wheezing, worsening cough and dyspnea, concerning for COPD exacerbation 2/2 RSV and COVID+  - RSV +, COVID - on ED panel, possibly false negative as pt tested COVID+ x2 at home. f/u repeat COVID  - Isolation precautions: airborne and contact until confirmatory RVP repeated  - CXR - hyperinflated lungs. Clear  - VBG w/ mild hypercapnia (45) - repeat worsening hypercapnia. will observe for now as Pt satting well on RA  - Monitor on continuous pulse ox, SpO2 goal>88%  - Duonebs q6h  - IV Solumedrol 40mg q8h for now, will reassess need daily  - Continue Symbicort BID (therapeutic interchange for home Breztri)  - Will hold off on Remdesivir until repeat RVP confirms COVID  - Pulm consulted Dr. Dobson, f/u recs

## 2024-01-18 NOTE — PROGRESS NOTE ADULT - SUBJECTIVE AND OBJECTIVE BOX
Patient is a 68y old  Female who presents with a chief complaint of COPD exacerbation (18 Jan 2024 01:26)      INTERVAL HPI/OVERNIGHT EVENTS: Patient was seen and examined at bedside. No overnight events. Patient reports no complaints at this time. Mild cough, without phlegm. Denies HA, chest pain, SOB, abdominal pain, n/v/d.     MEDICATIONS  (STANDING):  albuterol/ipratropium for Nebulization 3 milliLiter(s) Nebulizer every 6 hours  amLODIPine   Tablet 10 milliGRAM(s) Oral daily  aspirin enteric coated 81 milliGRAM(s) Oral daily  benzonatate 100 milliGRAM(s) Oral three times a day  budesonide 160 MICROgram(s)/formoterol 4.5 MICROgram(s) Inhaler 2 Puff(s) Inhalation two times a day  enoxaparin Injectable 40 milliGRAM(s) SubCutaneous every 24 hours  methylPREDNISolone sodium succinate Injectable 40 milliGRAM(s) IV Push every 8 hours  pantoprazole    Tablet 40 milliGRAM(s) Oral before breakfast  traMADol 50 milliGRAM(s) Oral daily    MEDICATIONS  (PRN):  acetaminophen     Tablet .. 650 milliGRAM(s) Oral every 6 hours PRN Temp greater or equal to 38C (100.4F), Mild Pain (1 - 3)  ALPRAZolam 0.25 milliGRAM(s) Oral at bedtime PRN for anxiety      Allergies    No Known Allergies    Intolerances        REVIEW OF SYSTEMS:  CONSTITUTIONAL: No fever or chills  HEENT:  No headache, no sore throat  RESPIRATORY: admits mild cough, no shortness of breath  CARDIOVASCULAR: No chest pain, palpitations  GASTROINTESTINAL: No abd pain, nausea, vomiting, or diarrhea  GENITOURINARY: No dysuria or hematuria  NEUROLOGICAL: no focal weakness or dizziness  MUSCULOSKELETAL: no myalgias     Vital Signs Last 24 Hrs  T(C): 36.7 (18 Jan 2024 08:02), Max: 38 (17 Jan 2024 17:08)  T(F): 98.1 (18 Jan 2024 08:02), Max: 100.4 (17 Jan 2024 17:08)  HR: 103 (18 Jan 2024 08:02) (95 - 120)  BP: 135/75 (18 Jan 2024 08:02) (129/68 - 160/80)  BP(mean): --  RR: 18 (18 Jan 2024 08:02) (18 - 20)  SpO2: 98% (18 Jan 2024 08:02) (86% - 98%)    Parameters below as of 18 Jan 2024 05:01  Patient On (Oxygen Delivery Method): room air        PHYSICAL EXAM:  GENERAL: NAD, resting comfortably in bed.   HEENT:  anicteric, moist mucous membranes  CHEST/LUNG:  +mild wheezing appreciated posterior lung fields. normal respiratory effort. no rales  HEART:  RRR, S1, S2  ABDOMEN:  BS+, soft, nontender, nondistended  MUSCULOSKELETAL: no edema, cyanosis, or calf tenderness  NEUROLOGIC: answers questions and follows commands appropriately      LABS:                        10.8   5.68  )-----------( 356      ( 18 Jan 2024 05:48 )             36.4     CBC Full  -  ( 18 Jan 2024 05:48 )  WBC Count : 5.68 K/uL  Hemoglobin : 10.8 g/dL  Hematocrit : 36.4 %  Platelet Count - Automated : 356 K/uL  Mean Cell Volume : 73.8 fl  Mean Cell Hemoglobin : 21.9 pg  Mean Cell Hemoglobin Concentration : 29.7 gm/dL  Auto Neutrophil # : 5.23 K/uL  Auto Lymphocyte # : 0.40 K/uL  Auto Monocyte # : 0.06 K/uL  Auto Eosinophil # : 0.00 K/uL  Auto Basophil # : 0.00 K/uL  Auto Neutrophil % : 91.0 %  Auto Lymphocyte % : 7.0 %  Auto Monocyte % : 1.0 %  Auto Eosinophil % : 0.0 %  Auto Basophil % : 0.0 %    18 Jan 2024 05:48    139    |  107    |  20     ----------------------------<  204    3.8     |  28     |  1.00     Ca    9.4        18 Jan 2024 05:48    TPro  6.7    /  Alb  3.2    /  TBili  0.3    /  DBili  x      /  AST  12     /  ALT  17     /  AlkPhos  58     18 Jan 2024 05:48      Urinalysis Basic - ( 18 Jan 2024 05:48 )    Color: x / Appearance: x / SG: x / pH: x  Gluc: 204 mg/dL / Ketone: x  / Bili: x / Urobili: x   Blood: x / Protein: x / Nitrite: x   Leuk Esterase: x / RBC: x / WBC x   Sq Epi: x / Non Sq Epi: x / Bacteria: x      CAPILLARY BLOOD GLUCOSE              RADIOLOGY & ADDITIONAL TESTS:  n/a  Personally reviewed.     Consultant(s) Notes Reviewed:  [x] YES  [ ] NO     Patient is a 68y old  Female who presents with a chief complaint of COPD exacerbation (18 Jan 2024 01:26)      INTERVAL HPI/OVERNIGHT EVENTS: Patient was seen and examined at bedside. No overnight events. Patient reports no complaints at this time. Mild cough, without phlegm. Denies HA, chest pain, SOB, abdominal pain, n/v/d.     MEDICATIONS  (STANDING):  albuterol/ipratropium for Nebulization 3 milliLiter(s) Nebulizer every 6 hours  amLODIPine   Tablet 10 milliGRAM(s) Oral daily  aspirin enteric coated 81 milliGRAM(s) Oral daily  benzonatate 100 milliGRAM(s) Oral three times a day  budesonide 160 MICROgram(s)/formoterol 4.5 MICROgram(s) Inhaler 2 Puff(s) Inhalation two times a day  enoxaparin Injectable 40 milliGRAM(s) SubCutaneous every 24 hours  methylPREDNISolone sodium succinate Injectable 40 milliGRAM(s) IV Push every 8 hours  pantoprazole    Tablet 40 milliGRAM(s) Oral before breakfast  traMADol 50 milliGRAM(s) Oral daily    MEDICATIONS  (PRN):  acetaminophen     Tablet .. 650 milliGRAM(s) Oral every 6 hours PRN Temp greater or equal to 38C (100.4F), Mild Pain (1 - 3)  ALPRAZolam 0.25 milliGRAM(s) Oral at bedtime PRN for anxiety      Allergies    No Known Allergies    Intolerances        REVIEW OF SYSTEMS:  CONSTITUTIONAL: No fever or chills  HEENT:  No headache, no sore throat  RESPIRATORY: admits mild cough, no shortness of breath  CARDIOVASCULAR: No chest pain, palpitations  GASTROINTESTINAL: No abd pain, nausea, vomiting, or diarrhea  GENITOURINARY: No dysuria or hematuria  NEUROLOGICAL: no focal weakness or dizziness  MUSCULOSKELETAL: no myalgias     Vital Signs Last 24 Hrs  T(C): 36.7 (18 Jan 2024 08:02), Max: 38 (17 Jan 2024 17:08)  T(F): 98.1 (18 Jan 2024 08:02), Max: 100.4 (17 Jan 2024 17:08)  HR: 103 (18 Jan 2024 08:02) (95 - 120)  BP: 135/75 (18 Jan 2024 08:02) (129/68 - 160/80)  BP(mean): --  RR: 18 (18 Jan 2024 08:02) (18 - 20)  SpO2: 98% (18 Jan 2024 08:02) (86% - 98%)    Parameters below as of 18 Jan 2024 05:01  Patient On (Oxygen Delivery Method): room air        PHYSICAL EXAM:  GENERAL: NAD, resting comfortably in bed.   HEENT:  anicteric, moist mucous membranes  CHEST/LUNG:  +mild wheezing appreciated posterior lung fields. normal respiratory effort. no rales  HEART:  RRR, S1, S2  ABDOMEN:  BS+, soft, nontender, nondistended  MUSCULOSKELETAL: no edema, cyanosis, or calf tenderness  NEUROLOGIC: answers questions and follows commands appropriately      LABS:                        10.8   5.68  )-----------( 356      ( 18 Jan 2024 05:48 )             36.4     CBC Full  -  ( 18 Jan 2024 05:48 )  WBC Count : 5.68 K/uL  Hemoglobin : 10.8 g/dL  Hematocrit : 36.4 %  Platelet Count - Automated : 356 K/uL  Mean Cell Volume : 73.8 fl  Mean Cell Hemoglobin : 21.9 pg  Mean Cell Hemoglobin Concentration : 29.7 gm/dL  Auto Neutrophil # : 5.23 K/uL  Auto Lymphocyte # : 0.40 K/uL  Auto Monocyte # : 0.06 K/uL  Auto Eosinophil # : 0.00 K/uL  Auto Basophil # : 0.00 K/uL  Auto Neutrophil % : 91.0 %  Auto Lymphocyte % : 7.0 %  Auto Monocyte % : 1.0 %  Auto Eosinophil % : 0.0 %  Auto Basophil % : 0.0 %    18 Jan 2024 05:48    139    |  107    |  20     ----------------------------<  204    3.8     |  28     |  1.00     Ca    9.4        18 Jan 2024 05:48    TPro  6.7    /  Alb  3.2    /  TBili  0.3    /  DBili  x      /  AST  12     /  ALT  17     /  AlkPhos  58     18 Jan 2024 05:48      Urinalysis Basic - ( 18 Jan 2024 05:48 )    Color: x / Appearance: x / SG: x / pH: x  Gluc: 204 mg/dL / Ketone: x  / Bili: x / Urobili: x   Blood: x / Protein: x / Nitrite: x   Leuk Esterase: x / RBC: x / WBC x   Sq Epi: x / Non Sq Epi: x / Bacteria: x      CAPILLARY BLOOD GLUCOSE              RADIOLOGY & ADDITIONAL TESTS:  < from: Xray Chest 1 View- PORTABLE-Urgent (Xray Chest 1 View- PORTABLE-Urgent .) (01.17.24 @ 18:20) >  IMPRESSION:  Hyperinflation of the lungs.    Clear lungs.    < end of copied text >    Personally reviewed.     Consultant(s) Notes Reviewed:  [x] YES  [ ] NO

## 2024-01-18 NOTE — H&P ADULT - VTE RISK ASSESSMENT
<<--- Click to launch Topical Clindamycin Pregnancy And Lactation Text: This medication is Pregnancy Category B and is considered safe during pregnancy. It is unknown if it is excreted in breast milk.

## 2024-01-18 NOTE — H&P ADULT - NSHPSOCIALHISTORY_GEN_ALL_CORE
Tobacco Usage:  former smoker, 75 pack yr hx, quit 10y ago  Alcohol Usage: denies  Substance Use: denies  Lives with: , sons and grandkids at home  ADLs: Independent

## 2024-01-18 NOTE — CARE COORDINATION ASSESSMENT. - OTHER PERTINENT DISCHARGE PLANNING INFORMATION:
Met with patient at bedside to discuss the role of case management with verbalized understanding.  Needs unclear at present.  Patient is RSV + and currently under medical management.  Patient follows with PCP Dr Carlos Beal and states she has no community insurance.  Will remain available

## 2024-01-18 NOTE — CARE COORDINATION ASSESSMENT. - NS SW HOME EQUIPMENT
Patient states she has home o2; home concentrator and portable tanks.  She does not know name of providing company and uses [employer]oxygen

## 2024-01-18 NOTE — PATIENT PROFILE ADULT - FALL HARM RISK - HARM RISK INTERVENTIONS

## 2024-01-18 NOTE — H&P ADULT - ATTENDING COMMENTS
69yo F with PMHx COPD (not on home oxygen), HTN, RA, anxiety presenting with c/o dyspnea and nonproductive cough since yesterday. Patient hypoxic on ambulating while in ED, requiring 2L NC. Wheezing on exam. CXR reviewed without acute pathology. Patient with exposure to RSV, and tested positive for COVID at home, but negative here.     A/P  -Will repeat RVP for possible false negative, to see if patient qualifies for Remdesivir.   -Will treat COPD exacerbation with IV solumedrol, duo-nebs and supportive measure (antitussives and oxygen as needed).  -Wean of O2 as able.  -PT when stable.

## 2024-01-18 NOTE — CARE COORDINATION ASSESSMENT. - NSCAREPROVIDERS_GEN_ALL_CORE_FT
CARE PROVIDERS:  Accepting Physician: Cuong Ortiz  Administration: Dillan Medina  Administration: Hoda Messer  Administration: Kalyan Cooley  Administration: Leodan Caballero  Admitting: Cuong Ortiz  Attending: Cuong Ortiz  Case Management: Dannie Massey  Covering Team: Destiny Riley  Covering Team: Cuong Ortiz  ED Attending: Oscar Blue  ED Nurse: Sergei Danielle  Infection Control: Susana Gracia  Nurse: Chetna Castillo  Nurse: Arnulfo Massey  Nurse: Jo Harding  Ordered: Doctor, Unknown  Ordered: ADM, User  Override: Arnulfo Massey  Override: Jo Harding  PCA/Nursing Assistant: Jean Boo  PCA/Nursing Assistant: Evita aPtricia  Primary Team: Susy Abdi  Primary Team: Juana Baeza  Primary Team: Mp Watt  Respiratory Therapy: Kristine Huertas  Respiratory Therapy: Juanjose Mendoza  Team: [facility], Team  UR// Supp. Assoc.: Cecelia Herbert

## 2024-01-18 NOTE — H&P ADULT - PROBLEM SELECTOR PLAN 2
Chronic, BP on admission 130/76  -Continue home amlodipine 10mg qd with hold parameters  -Monitor hemodynamics  -DASH/TLC diet

## 2024-01-18 NOTE — PROGRESS NOTE ADULT - ASSESSMENT
67yo F with PMHx COPD (not on home oxygen), HTN, RA, anxiety presenting with c/o dyspnea and cough since yesterday. Admitted with COPD exacerbation 2/2 RSV and possible COVID 69yo F with PMHx COPD (not on home oxygen), HTN, RA, anxiety presenting with c/o dyspnea and cough since yesterday. Admitted with COPD exacerbation 2/2 RSV and possible COVID.

## 2024-01-19 ENCOUNTER — TRANSCRIPTION ENCOUNTER (OUTPATIENT)
Age: 69
End: 2024-01-19

## 2024-01-19 DIAGNOSIS — D72.829 ELEVATED WHITE BLOOD CELL COUNT, UNSPECIFIED: ICD-10-CM

## 2024-01-19 DIAGNOSIS — R00.0 TACHYCARDIA, UNSPECIFIED: ICD-10-CM

## 2024-01-19 LAB
ALBUMIN SERPL ELPH-MCNC: 3.3 G/DL — SIGNIFICANT CHANGE UP (ref 3.3–5)
ALP SERPL-CCNC: 55 U/L — SIGNIFICANT CHANGE UP (ref 40–120)
ALT FLD-CCNC: 16 U/L — SIGNIFICANT CHANGE UP (ref 12–78)
ANION GAP SERPL CALC-SCNC: 1 MMOL/L — LOW (ref 5–17)
AST SERPL-CCNC: 12 U/L — LOW (ref 15–37)
BASOPHILS # BLD AUTO: 0.01 K/UL — SIGNIFICANT CHANGE UP (ref 0–0.2)
BASOPHILS NFR BLD AUTO: 0.1 % — SIGNIFICANT CHANGE UP (ref 0–2)
BILIRUB SERPL-MCNC: 0.3 MG/DL — SIGNIFICANT CHANGE UP (ref 0.2–1.2)
BUN SERPL-MCNC: 24 MG/DL — HIGH (ref 7–23)
CALCIUM SERPL-MCNC: 9.9 MG/DL — SIGNIFICANT CHANGE UP (ref 8.5–10.1)
CHLORIDE SERPL-SCNC: 108 MMOL/L — SIGNIFICANT CHANGE UP (ref 96–108)
CO2 SERPL-SCNC: 31 MMOL/L — SIGNIFICANT CHANGE UP (ref 22–31)
CREAT SERPL-MCNC: 0.87 MG/DL — SIGNIFICANT CHANGE UP (ref 0.5–1.3)
EGFR: 73 ML/MIN/1.73M2 — SIGNIFICANT CHANGE UP
EOSINOPHIL # BLD AUTO: 0 K/UL — SIGNIFICANT CHANGE UP (ref 0–0.5)
EOSINOPHIL NFR BLD AUTO: 0 % — SIGNIFICANT CHANGE UP (ref 0–6)
GLUCOSE SERPL-MCNC: 154 MG/DL — HIGH (ref 70–99)
HCT VFR BLD CALC: 36.2 % — SIGNIFICANT CHANGE UP (ref 34.5–45)
HGB BLD-MCNC: 10.7 G/DL — LOW (ref 11.5–15.5)
IMM GRANULOCYTES NFR BLD AUTO: 0.4 % — SIGNIFICANT CHANGE UP (ref 0–0.9)
LYMPHOCYTES # BLD AUTO: 0.91 K/UL — LOW (ref 1–3.3)
LYMPHOCYTES # BLD AUTO: 6.3 % — LOW (ref 13–44)
MAGNESIUM SERPL-MCNC: 2.2 MG/DL — SIGNIFICANT CHANGE UP (ref 1.6–2.6)
MCHC RBC-ENTMCNC: 22 PG — LOW (ref 27–34)
MCHC RBC-ENTMCNC: 29.6 GM/DL — LOW (ref 32–36)
MCV RBC AUTO: 74.5 FL — LOW (ref 80–100)
MONOCYTES # BLD AUTO: 0.56 K/UL — SIGNIFICANT CHANGE UP (ref 0–0.9)
MONOCYTES NFR BLD AUTO: 3.9 % — SIGNIFICANT CHANGE UP (ref 2–14)
NEUTROPHILS # BLD AUTO: 12.85 K/UL — HIGH (ref 1.8–7.4)
NEUTROPHILS NFR BLD AUTO: 89.3 % — HIGH (ref 43–77)
NRBC # BLD: 0 /100 WBCS — SIGNIFICANT CHANGE UP (ref 0–0)
PHOSPHATE SERPL-MCNC: 3.3 MG/DL — SIGNIFICANT CHANGE UP (ref 2.5–4.5)
PLATELET # BLD AUTO: 432 K/UL — HIGH (ref 150–400)
POTASSIUM SERPL-MCNC: 4.4 MMOL/L — SIGNIFICANT CHANGE UP (ref 3.5–5.3)
POTASSIUM SERPL-SCNC: 4.4 MMOL/L — SIGNIFICANT CHANGE UP (ref 3.5–5.3)
PROT SERPL-MCNC: 6.8 G/DL — SIGNIFICANT CHANGE UP (ref 6–8.3)
RBC # BLD: 4.86 M/UL — SIGNIFICANT CHANGE UP (ref 3.8–5.2)
RBC # FLD: 16.7 % — HIGH (ref 10.3–14.5)
SODIUM SERPL-SCNC: 140 MMOL/L — SIGNIFICANT CHANGE UP (ref 135–145)
WBC # BLD: 14.39 K/UL — HIGH (ref 3.8–10.5)
WBC # FLD AUTO: 14.39 K/UL — HIGH (ref 3.8–10.5)

## 2024-01-19 PROCEDURE — 93010 ELECTROCARDIOGRAM REPORT: CPT

## 2024-01-19 PROCEDURE — 99232 SBSQ HOSP IP/OBS MODERATE 35: CPT | Mod: GC

## 2024-01-19 RX ORDER — LEVALBUTEROL 1.25 MG/.5ML
0.63 SOLUTION, CONCENTRATE RESPIRATORY (INHALATION) EVERY 6 HOURS
Refills: 0 | Status: DISCONTINUED | OUTPATIENT
Start: 2024-01-19 | End: 2024-01-20

## 2024-01-19 RX ORDER — IPRATROPIUM/ALBUTEROL SULFATE 18-103MCG
3 AEROSOL WITH ADAPTER (GRAM) INHALATION EVERY 6 HOURS
Refills: 0 | Status: DISCONTINUED | OUTPATIENT
Start: 2024-01-19 | End: 2024-01-19

## 2024-01-19 RX ADMIN — Medication 100 MILLIGRAM(S): at 14:38

## 2024-01-19 RX ADMIN — Medication 40 MILLIGRAM(S): at 06:01

## 2024-01-19 RX ADMIN — PANTOPRAZOLE SODIUM 40 MILLIGRAM(S): 20 TABLET, DELAYED RELEASE ORAL at 06:01

## 2024-01-19 RX ADMIN — Medication 1200 MILLIGRAM(S): at 17:35

## 2024-01-19 RX ADMIN — Medication 100 MILLIGRAM(S): at 06:01

## 2024-01-19 RX ADMIN — Medication 50 MILLIGRAM(S): at 11:21

## 2024-01-19 RX ADMIN — Medication 3 MILLILITER(S): at 08:19

## 2024-01-19 RX ADMIN — Medication 81 MILLIGRAM(S): at 11:22

## 2024-01-19 RX ADMIN — AMLODIPINE BESYLATE 10 MILLIGRAM(S): 2.5 TABLET ORAL at 06:01

## 2024-01-19 RX ADMIN — ENOXAPARIN SODIUM 40 MILLIGRAM(S): 100 INJECTION SUBCUTANEOUS at 06:01

## 2024-01-19 RX ADMIN — TRAMADOL HYDROCHLORIDE 50 MILLIGRAM(S): 50 TABLET ORAL at 11:22

## 2024-01-19 RX ADMIN — BUDESONIDE AND FORMOTEROL FUMARATE DIHYDRATE 2 PUFF(S): 160; 4.5 AEROSOL RESPIRATORY (INHALATION) at 06:00

## 2024-01-19 NOTE — PROGRESS NOTE ADULT - ATTENDING COMMENTS
67yo F with PMHx COPD (not on home oxygen), HTN, RA, anxiety presenting with c/o dyspnea and cough since yesterday. Admitted with COPD exacerbation 2/2 RSV. Patient requesting DC, advised to plan for AM discharge. Tachycardic overnight- switched to Xopenex. EKG ordered. Pulm Dr Dobson reconsulted. Steroids switched to oral. Titrate off supplemental O2 with appropriate SpO2, has home O2 at home she uses PRN.

## 2024-01-19 NOTE — PROGRESS NOTE ADULT - PROBLEM SELECTOR PLAN 2
Chronic, BP on admission 130/76  - Continue home amlodipine 10mg qd with hold parameters  - Monitor hemodynamics  - DASH/TLC diet HR been in the 100s-120.  - prior EKG NSR, LVH  - f/u repeat EKG   - dced albuterol and started levalbuterol

## 2024-01-19 NOTE — CHART NOTE - NSCHARTNOTEFT_GEN_A_CORE
consult dictated    Impression:    COPD with exacerbation  RSV Bronchitis  Hx Rheumatoid Arthritis  Acute Hypoxic respiratory failure    Plan:    Discharge in AM if patient remains stable  Tapering schedule of oral steroids  Will continue Breztri inhaler after discharge

## 2024-01-19 NOTE — DISCHARGE NOTE PROVIDER - NSDCHC_MEDRECSTATUS_GEN_ALL_CORE
Admission Reconciliation is Completed  Discharge Reconciliation is Not Complete Admission Reconciliation is Completed  Discharge Reconciliation is Completed [Change in Activity] : no change in activity [Fever Above 102] : no fever [Malaise] : no malaise [Rash] : no rash [Itching] : no itching [Eye Pain] : no eye pain [Redness] : no redness [Nasal Stuffiness] : no nasal congestion [Sore Throat] : no sore throat [Wheezing] : no wheezing [Cough] : no cough [Vomiting] : no vomiting [Diarrhea] : no diarrhea [Constipation] : no constipation [Limping] : no limping [Joint Pains] : no arthralgias [Joint Swelling] : no joint swelling [Back Pain] : ~T no back pain [Muscle Aches] : no muscle aches [Diabetes] : no diabetese [Bruising] : no tendency for easy bruising [Swollen Glands] : no lymphadenopathy [Frequent Infections] : no frequent infections

## 2024-01-19 NOTE — PROGRESS NOTE ADULT - PROBLEM SELECTOR PLAN 4
Continue Xanax 0.25mg qhs PRN Yes Chronic  - On prednisone 5mg qd at home. - Hold as on stress dose.  - Continue tramadol 50mg qd Chronic, BP on admission 130/76  - Continue home amlodipine 10mg qd with hold parameters  - Monitor hemodynamics  - DASH/TLC diet

## 2024-01-19 NOTE — DISCHARGE NOTE PROVIDER - NSDCCPCAREPLAN_GEN_ALL_CORE_FT
PRINCIPAL DISCHARGE DIAGNOSIS  Diagnosis: COPD exacerbation  Assessment and Plan of Treatment: You were admitted to the hospital for a COPD exacerbation in setting of viral RSV.   -You were started on IV steroids and switched to oral steroids. Take Prednisone 50mg for 5 days then 40mg for 5 days then 30mg for 5 days then 20mg for 5 days then 10mg for 5 days then continue your home dose of 5mg  -Continue your inhaler regimen  -Follow up with pulmonologist within the week  -Follow up with your primary care physician within the week

## 2024-01-19 NOTE — PROGRESS NOTE ADULT - ASSESSMENT
69yo F with PMHx COPD (not on home oxygen), HTN, RA, anxiety presenting with c/o dyspnea and cough since yesterday. Admitted with COPD exacerbation 2/2 RSV.

## 2024-01-19 NOTE — DISCHARGE NOTE PROVIDER - ATTENDING DISCHARGE PHYSICAL EXAMINATION:
gen: NAD  lungs: diminished breath sounds at bases overall cta b/l, no wheezing  cardio: RRR S1 S2 no murmurs  neuro: AO3 grossly moves all extremities

## 2024-01-19 NOTE — DISCHARGE NOTE PROVIDER - HOSPITAL COURSE
ADMISSION DATE:  01-18-24    ---  FROM ADMISSION H+P:   HPI:  69yo F with PMHx COPD (not on home oxygen), HTN, RA, anxiety presenting with c/o dyspnea and cough since yesterday. She notes her grandson has been sick with RSV recently, and she also took a home COVID test x2 which were both positive. Has been using her rescue inhaler twice a day since symptoms started, but reports at baseline she uses her rescue inhaler multiple times a week. While ambulating in the ED she desaturated to 86% on RA.    IN THE ED  VS: T 100.4, , /80, RR 18, SpO2 94% on RA -> 86% with ambulation -> 97% on 2LNC  Labs: Plt 420, VBG CO2 45, RSV+  Imaging: CXR with hyperinflated lungs, otherwise no acute pathology on personal read  EKG NSR  S/p Duoneb x3, 1LNS, IV Solumedrol 62.5, Toradol 15mg (18 Jan 2024 01:26)      ---  HOSPITAL COURSE/PERTINENT LABS/PROCEDURES PERFORMED/PENDING TESTS:   Pt was admitted for management of COPD exacerbation 2/2 RSV. COVID negative x2. CXR reveals hyperinflated lungs. VBG showed mild hypercapnea, Pt satting appropriately on NC. Started on IV solumedrol, eventually transitioned to PO prednisone. Pt duonebs transitioned to levalbuterol. Pt tachycardic throughout admission, initial EKG NSR, LVH. Repeat EKG ***.       Patient is stable for discharge as per primary medical team and consultants.    PT consulted, recommends discharge ______    Patient showed improvement throughout hospitalization. Patient was seen and examined on day of discharge. Patient was medically optimized for discharge with close outpatient follow up.    ---  PATIENT CONDITION:  - stable    --  VITALS:   T(C): 36.7 (01-19-24 @ 05:15), Max: 36.8 (01-18-24 @ 20:26)  HR: 120 (01-19-24 @ 08:59) (98 - 120)  BP: 134/79 (01-19-24 @ 08:59) (134/79 - 175/83)  RR: 20 (01-19-24 @ 05:15) (18 - 20)  SpO2: 94% (01-19-24 @ 05:15) (94% - 96%)    PHYSICAL EXAM ON DAY OF DISCHARGE:    ---  CONSULTANTS:   -Patricia (Dr. Dobson)      ---  ADVANCED CARE PLANNING:  - Code status:      - [facility] completed:      [  ] NO     [  ] YES    ---  TIME SPENT:  I, the attending physician, was physically present for the key portions of the evaluation and management (E/M) service provided. The total amount of time spent reviewing the hospital notes, laboratory values, imaging findings, assessing/counseling the patient, discussing with consultant physicians, social work, nursing staff was -- minutes       ADMISSION DATE:  01-18-24    ---  FROM ADMISSION H+P:   HPI:  67yo F with PMHx COPD (not on home oxygen), HTN, RA, anxiety presenting with c/o dyspnea and cough since yesterday. She notes her grandson has been sick with RSV recently, and she also took a home COVID test x2 which were both positive. Has been using her rescue inhaler twice a day since symptoms started, but reports at baseline she uses her rescue inhaler multiple times a week. While ambulating in the ED she desaturated to 86% on RA.    IN THE ED  VS: T 100.4, , /80, RR 18, SpO2 94% on RA -> 86% with ambulation -> 97% on 2LNC  Labs: Plt 420, VBG CO2 45, RSV+  Imaging: CXR with hyperinflated lungs, otherwise no acute pathology on personal read  EKG NSR  S/p Duoneb x3, 1LNS, IV Solumedrol 62.5, Toradol 15mg (18 Jan 2024 01:26)      ---  HOSPITAL COURSE/PERTINENT LABS/PROCEDURES PERFORMED/PENDING TESTS:   Pt was admitted for management of COPD exacerbation 2/2 RSV. COVID negative x2. CXR reveals hyperinflated lungs. VBG showed mild hypercapnea, Pt satting appropriately on NC. Started on IV solumedrol, eventually transitioned to PO prednisone. Pt duonebs transitioned to levalbuterol. Pt tachycardic throughout admission, initial EKG NSR, LVH. Repeat EKG ***.       Patient is stable for discharge as per primary medical team and consultants.    PT consulted, recommends discharge ______    Patient showed improvement throughout hospitalization. Patient was seen and examined on day of discharge. Patient was medically optimized for discharge with close outpatient follow up.    ---  PATIENT CONDITION:  - stable    --  Vital Signs Last 24 Hrs  T(C): 36.6 (20 Jan 2024 05:04), Max: 36.9 (19 Jan 2024 12:58)  T(F): 97.8 (20 Jan 2024 05:04), Max: 98.5 (19 Jan 2024 12:58)  HR: 113 (20 Jan 2024 05:04) (97 - 120)  BP: 180/91 (20 Jan 2024 05:04) (134/79 - 180/91)  BP(mean): --  RR: 18 (20 Jan 2024 05:04) (18 - 18)  SpO2: 92% (20 Jan 2024 05:04) (92% - 99%)    Parameters below as of 20 Jan 2024 05:04  Patient On (Oxygen Delivery Method): nasal cannula    GENERAL: NAD  HEENT:  AT/NC, anicteric, moist mucous membranes, EOMI, PERRL, no lid-lag, conjunctiva and sclera clear  CHEST/LUNG: overall CTA b/l,  normal respiratory effort, no intercostal retractions  HEART:  RRR, S1, S2, no murmurs; no pitting edema  ABDOMEN:  BS+, soft, nontender, nondistended  MSK/EXTREMITIES: palpable peripheral pulses, no clubbing or cyanosis  NERVOUS SYSTEM: answers questions and follows commands appropriately A&Ox3 grossly moves all extremities, no focal deficits, speech clear  PSYCH: Appropriate affect, Alert & Awake; Good judgement  ---  CONSULTANTS:   -Pulm (Dr. Dobson)     ADMISSION DATE:  01-18-24    ---  FROM ADMISSION H+P:   HPI:  69yo F with PMHx COPD (not on home oxygen), HTN, RA, anxiety presenting with c/o dyspnea and cough since yesterday. She notes her grandson has been sick with RSV recently, and she also took a home COVID test x2 which were both positive. Has been using her rescue inhaler twice a day since symptoms started, but reports at baseline she uses her rescue inhaler multiple times a week. While ambulating in the ED she desaturated to 86% on RA.    IN THE ED  VS: T 100.4, , /80, RR 18, SpO2 94% on RA -> 86% with ambulation -> 97% on 2LNC  Labs: Plt 420, VBG CO2 45, RSV+  Imaging: CXR with hyperinflated lungs, otherwise no acute pathology on personal read  EKG NSR  S/p Duoneb x3, 1LNS, IV Solumedrol 62.5, Toradol 15mg (18 Jan 2024 01:26)      ---  HOSPITAL COURSE/PERTINENT LABS/PROCEDURES PERFORMED/PENDING TESTS:   Pt was admitted for management of COPD exacerbation 2/2 RSV. COVID negative x2. CXR reveals hyperinflated lungs. VBG showed mild hypercapnea, Pt satting appropriately on NC. Started on IV solumedrol, eventually transitioned to PO prednisone w/ taper before continuing Pt home regimen. Pt duonebs transitioned to levalbuterol. Pt tachycardic throughout admission, initial EKG NSR, LVH. Repeat EKG shows sinus tachycardia.        Patient is stable for discharge as per primary medical team and consultants.      Patient showed improvement throughout hospitalization. Patient was seen and examined on day of discharge. Patient was medically optimized for discharge with close outpatient follow up.    ---  PATIENT CONDITION:  - stable    --  Vital Signs Last 24 Hrs  T(C): 36.6 (20 Jan 2024 05:04), Max: 36.9 (19 Jan 2024 12:58)  T(F): 97.8 (20 Jan 2024 05:04), Max: 98.5 (19 Jan 2024 12:58)  HR: 113 (20 Jan 2024 05:04) (97 - 120)  BP: 180/91 (20 Jan 2024 05:04) (134/79 - 180/91)  BP(mean): --  RR: 18 (20 Jan 2024 05:04) (18 - 18)  SpO2: 92% (20 Jan 2024 05:04) (92% - 99%)    Parameters below as of 20 Jan 2024 05:04  Patient On (Oxygen Delivery Method): nasal cannula    GENERAL: NAD  HEENT:  AT/NC, anicteric, moist mucous membranes, EOMI, PERRL, no lid-lag, conjunctiva and sclera clear  CHEST/LUNG: overall CTA b/l,  normal respiratory effort, no intercostal retractions  HEART:  RRR, S1, S2, no murmurs; no pitting edema  ABDOMEN:  BS+, soft, nontender, nondistended  MSK/EXTREMITIES: palpable peripheral pulses, no clubbing or cyanosis  NERVOUS SYSTEM: answers questions and follows commands appropriately A&Ox3 grossly moves all extremities, no focal deficits, speech clear  PSYCH: Appropriate affect, Alert & Awake; Good judgement  ---  CONSULTANTS:   -Pulm (Dr. Dobson)

## 2024-01-19 NOTE — PROGRESS NOTE ADULT - PROBLEM SELECTOR PLAN 5
DVT ppx: Lovenox 40 qd Continue Xanax 0.25mg qhs PRN Chronic  - On prednisone 5mg qd at home. - Hold as on stress dose.  - Continue tramadol 50mg qd

## 2024-01-19 NOTE — DISCHARGE NOTE PROVIDER - NSDCMRMEDTOKEN_GEN_ALL_CORE_FT
Albuterol (Eqv-ProAir HFA) 90 mcg/inh inhalation aerosol: 2 puff(s) inhaled every 6 hours as needed for  shortness of breath and/or wheezing  ALPRAZolam 0.25 mg oral tablet: 1 tab(s) orally once a day as needed for  anxiety  amLODIPine 10 mg oral tablet: 1 tab(s) orally once a day  aspirin 81 mg oral tablet: 1 tab(s) orally once a day  budesonide/glycopyrrolate/formoterol 160 mcg-9 mcg-4.8 mcg/inh inhalation aerosol: 2 inhaler(s) inhaled 2 times a day  naloxone 4 mg/0.1 mL nasal spray: 4 milligram(s) intranasally once for suspected opioid overdose / sedation  predniSONE 5 mg oral tablet: 1 tab(s) orally once a day  Protonix 40 mg oral delayed release tablet: 1 tab(s) orally once a day  traMADol 50 mg oral tablet: 1 tab(s) orally once a day   Albuterol (Eqv-ProAir HFA) 90 mcg/inh inhalation aerosol: 2 puff(s) inhaled every 6 hours as needed for  shortness of breath and/or wheezing  ALPRAZolam 0.25 mg oral tablet: 1 tab(s) orally once a day as needed for  anxiety  amLODIPine 10 mg oral tablet: 1 tab(s) orally once a day  aspirin 81 mg oral tablet: 1 tab(s) orally once a day  benzonatate 100 mg oral capsule: 1 cap(s) orally 3 times a day  budesonide/glycopyrrolate/formoterol 160 mcg-9 mcg-4.8 mcg/inh inhalation aerosol: 2 inhaler(s) inhaled 2 times a day  guaiFENesin 1200 mg oral tablet, extended release: 1 tab(s) orally every 12 hours  naloxone 4 mg/0.1 mL nasal spray: 4 milligram(s) intranasally once for suspected opioid overdose / sedation  predniSONE 10 mg oral tablet: 1 tab(s) orally once a day  predniSONE 20 mg oral tablet: 2 tab(s) orally once a day 50mg for 5 days -&gt; 40mg for 5 days -&gt; 30mg for 5 days -&gt; 20mg for 5 days -&gt; 10mg for 5 days -&gt; 5mg home dose  predniSONE 50 mg oral tablet: 1 tab(s) orally once a day  Protonix 40 mg oral delayed release tablet: 1 tab(s) orally once a day  traMADol 50 mg oral tablet: 1 tab(s) orally once a day   Albuterol (Eqv-ProAir HFA) 90 mcg/inh inhalation aerosol: 2 puff(s) inhaled every 6 hours as needed for  shortness of breath and/or wheezing  ALPRAZolam 0.25 mg oral tablet: 1 tab(s) orally once a day as needed for  anxiety  amLODIPine 10 mg oral tablet: 1 tab(s) orally once a day  aspirin 81 mg oral tablet: 1 tab(s) orally once a day  benzonatate 100 mg oral capsule: 1 cap(s) orally 3 times a day  budesonide/glycopyrrolate/formoterol 160 mcg-9 mcg-4.8 mcg/inh inhalation aerosol: 2 inhaler(s) inhaled 2 times a day  guaiFENesin 1200 mg oral tablet, extended release: 1 tab(s) orally every 12 hours  Hycodan 5 mg-1.5 mg/5 mL oral syrup: 5 milliliter(s) orally once a day (at bedtime) MDD: 5  naloxone 4 mg/0.1 mL nasal spray: 4 milligram(s) intranasally once for suspected opioid overdose / sedation  nicotine 14 mg/24 hr transdermal film, extended release: 1 patch transdermally once a day  predniSONE 10 mg oral tablet: 1 tab(s) orally once a day  predniSONE 20 mg oral tablet: 2 tab(s) orally once a day 50mg for 5 days -&gt; 40mg for 5 days -&gt; 30mg for 5 days -&gt; 20mg for 5 days -&gt; 10mg for 5 days -&gt; 5mg home dose  predniSONE 50 mg oral tablet: 1 tab(s) orally once a day  Protonix 40 mg oral delayed release tablet: 1 tab(s) orally once a day  traMADol 50 mg oral tablet: 1 tab(s) orally once a day

## 2024-01-19 NOTE — DISCHARGE NOTE PROVIDER - CARE PROVIDERS DIRECT ADDRESSES
[email or web address]

## 2024-01-19 NOTE — PROGRESS NOTE ADULT - SUBJECTIVE AND OBJECTIVE BOX
Patient is a 68y old  Female who presents with a chief complaint of COPD exacerbation (18 Jan 2024 09:28)      INTERVAL HPI/OVERNIGHT EVENTS: Patient was seen and examined at bedside. No overnight events. Pt has no complaints at this time. Pt reports she never knew she had high HR, reports being on metoprolol in the past, but no longer on it. Denies HA, chest pain, SOB, abdominal pain, n/v/d.    MEDICATIONS  (STANDING):  albuterol/ipratropium for Nebulization 3 milliLiter(s) Nebulizer every 6 hours  amLODIPine   Tablet 10 milliGRAM(s) Oral daily  aspirin enteric coated 81 milliGRAM(s) Oral daily  benzonatate 100 milliGRAM(s) Oral three times a day  budesonide 160 MICROgram(s)/formoterol 4.5 MICROgram(s) Inhaler 2 Puff(s) Inhalation two times a day  enoxaparin Injectable 40 milliGRAM(s) SubCutaneous every 24 hours  influenza  Vaccine (HIGH DOSE) 0.7 milliLiter(s) IntraMuscular once  methylPREDNISolone sodium succinate Injectable 40 milliGRAM(s) IV Push every 8 hours  pantoprazole    Tablet 40 milliGRAM(s) Oral before breakfast  traMADol 50 milliGRAM(s) Oral daily    MEDICATIONS  (PRN):  acetaminophen     Tablet .. 650 milliGRAM(s) Oral every 6 hours PRN Temp greater or equal to 38C (100.4F), Mild Pain (1 - 3)  ALPRAZolam 0.25 milliGRAM(s) Oral at bedtime PRN for anxiety      Allergies    No Known Allergies    Intolerances        REVIEW OF SYSTEMS:  CONSTITUTIONAL: No fever or chills  HEENT:  No headache, no sore throat  RESPIRATORY: No cough, wheezing, or shortness of breath  CARDIOVASCULAR: No chest pain, palpitations  GASTROINTESTINAL: No abd pain, nausea, vomiting, or diarrhea  GENITOURINARY: No dysuria or hematuria  NEUROLOGICAL: no focal weakness or dizziness  MUSCULOSKELETAL: no myalgias     Vital Signs Last 24 Hrs  T(C): 36.7 (19 Jan 2024 05:15), Max: 36.8 (18 Jan 2024 20:26)  T(F): 98 (19 Jan 2024 05:15), Max: 98.2 (18 Jan 2024 20:26)  HR: 115 (19 Jan 2024 05:15) (106 - 115)  BP: 175/83 (19 Jan 2024 05:15) (133/66 - 175/83)  BP(mean): --  RR: 20 (19 Jan 2024 05:15) (18 - 20)  SpO2: 94% (19 Jan 2024 05:15) (94% - 98%)    Parameters below as of 19 Jan 2024 05:15  Patient On (Oxygen Delivery Method): nasal cannula        PHYSICAL EXAM:  GENERAL: NAD, NC in place  HEENT:  anicteric, moist mucous membranes  CHEST/LUNG:  + mild wheezing B/L posterior lower lung fields.  no rales  HEART:  tachycardic, S1, S2  ABDOMEN:  BS+, soft, nontender, nondistended  MUSCULOSKELETAL: no edema, cyanosis, or calf tenderness  NEUROLOGIC: answers questions and follows commands appropriately. A&O x3      LABS:                        10.7   14.39 )-----------( 432      ( 19 Jan 2024 06:24 )             36.2     CBC Full  -  ( 19 Jan 2024 06:24 )  WBC Count : 14.39 K/uL  Hemoglobin : 10.7 g/dL  Hematocrit : 36.2 %  Platelet Count - Automated : 432 K/uL  Mean Cell Volume : 74.5 fl  Mean Cell Hemoglobin : 22.0 pg  Mean Cell Hemoglobin Concentration : 29.6 gm/dL  Auto Neutrophil # : 12.85 K/uL  Auto Lymphocyte # : 0.91 K/uL  Auto Monocyte # : 0.56 K/uL  Auto Eosinophil # : 0.00 K/uL  Auto Basophil # : 0.01 K/uL  Auto Neutrophil % : 89.3 %  Auto Lymphocyte % : 6.3 %  Auto Monocyte % : 3.9 %  Auto Eosinophil % : 0.0 %  Auto Basophil % : 0.1 %    19 Jan 2024 06:24    140    |  108    |  24     ----------------------------<  154    4.4     |  31     |  0.87     Ca    9.9        19 Jan 2024 06:24  Phos  3.3       19 Jan 2024 06:24  Mg     2.2       19 Jan 2024 06:24    TPro  6.8    /  Alb  3.3    /  TBili  0.3    /  DBili  x      /  AST  12     /  ALT  16     /  AlkPhos  55     19 Jan 2024 06:24      Urinalysis Basic - ( 19 Jan 2024 06:24 )    Color: x / Appearance: x / SG: x / pH: x  Gluc: 154 mg/dL / Ketone: x  / Bili: x / Urobili: x   Blood: x / Protein: x / Nitrite: x   Leuk Esterase: x / RBC: x / WBC x   Sq Epi: x / Non Sq Epi: x / Bacteria: x      CAPILLARY BLOOD GLUCOSE              RADIOLOGY & ADDITIONAL TESTS:  n/a  Personally reviewed.     Consultant(s) Notes Reviewed:  [x] YES  [ ] NO

## 2024-01-19 NOTE — SOCIAL WORK PROGRESS NOTE - NSSWPROGRESSNOTE_GEN_ALL_CORE
Consult received to offer pt assistance with getting access to food. SW met with patient and provided information regarding (meals on wheels and food pantries) in the pt's area to follow up as needed. Pt confirmed that she is uninsured, patient had [employer] but she lost coverage about one and a half month ago because her spouse lost his job. Case referred to Cindy - Medicaid office.  Consult received to offer pt assistance with getting access to food. SW met with patient and provided information regarding (meals on wheels and food pantries) in the pt's area to follow up as needed. Pt confirmed that she is uninsured, patient had [employer] but she lost coverage about one and a half month ago because her spouse lost his job. Case referred to Cindy - Medicaid office. Consult marked as completed.

## 2024-01-19 NOTE — PROGRESS NOTE ADULT - TIME BILLING
Reviewing chart notes and data, reviewing telemetry monitor records, face to face time counseling the patient, coordinating care with SW/CM at [facility].   Pt seen and examined. Case discussed with resident. Agree with assessment and plan above (edited by me in detail)

## 2024-01-19 NOTE — DISCHARGE NOTE PROVIDER - CARE PROVIDER_API CALL
Tres Dobson  Pulmonary Disease  [address] [place] [address]  Phone: [phone]  Fax: [phone]  Follow Up Time:     GEM RAZO  Phone: [phone]  Fax: [phone]  Follow Up Time:

## 2024-01-19 NOTE — PROGRESS NOTE ADULT - PROBLEM SELECTOR PLAN 1
Patient with history of COPD who presents with wheezing, worsening cough and dyspnea, concerning for COPD exacerbation 2/2 RSV and COVID+  - RSV +, COVID - on ED panel, possibly false negative as pt tested COVID+ x2 at home. f/u repeat COVID  - Isolation precautions: airborne and contact until confirmatory RVP repeated  - CXR - hyperinflated lungs. Clear  - VBG w/ mild hypercapnia (45) - repeat worsening hypercapnia. observe for now as Pt satting well. - check repeat  - Monitor on continuous pulse ox, SpO2 goal>88% - ween as tolerated  - Duonebs q6h  - IV Solumedrol 40mg q8h for now, will reassess need daily *** - dc?  - Continue Symbicort BID (therapeutic interchange for home Breztri)  - COVID negative x2 despite Pt reporting + test at home. Will hold off remdesivir.  - Pulm consulted Dr. Dobson, f/u recs Patient with history of COPD who presents with wheezing, worsening cough and dyspnea, concerning for COPD exacerbation 2/2 RSV and COVID+  - RSV +, COVID - on ED panel, possibly false negative as pt tested COVID+ x2 at home. f/u repeat COVID  - Isolation precautions: airborne and contact until confirmatory RVP repeated  - CXR - hyperinflated lungs. Clear  - VBG w/ mild hypercapnia (45) - repeat worsening hypercapnia. observe for now as Pt satting well. - check repeat  - Monitor on continuous pulse ox, SpO2 goal>88% - ween as tolerated  - dced duonebs given tachycardia. Started levalbuterol  - dced IV solumedrol started PO prednisone 50 daily.  - Continue Symbicort BID (therapeutic interchange for home Breztri)  - COVID negative x2 despite Pt reporting + test at home. Will hold off remdesivir.  - Pulm consulted Dr. Dobson, f/u recs

## 2024-01-19 NOTE — PROGRESS NOTE ADULT - PROBLEM SELECTOR PLAN 3
Chronic  - On prednisone 5mg qd at home. - Hold as currently on stress dose steroids  - Continue tramadol 50mg qd Chronic, BP on admission 130/76  - Continue home amlodipine 10mg qd with hold parameters  - Monitor hemodynamics  - DASH/TLC diet WBC uptrending likely 2/2 steroids  - afebrile.  - continue to monitor.

## 2024-01-20 ENCOUNTER — TRANSCRIPTION ENCOUNTER (OUTPATIENT)
Age: 69
End: 2024-01-20

## 2024-01-20 VITALS
OXYGEN SATURATION: 92 % | SYSTOLIC BLOOD PRESSURE: 145 MMHG | HEART RATE: 116 BPM | TEMPERATURE: 98 F | RESPIRATION RATE: 16 BRPM | DIASTOLIC BLOOD PRESSURE: 72 MMHG

## 2024-01-20 LAB
ALBUMIN SERPL ELPH-MCNC: 3.4 G/DL — SIGNIFICANT CHANGE UP (ref 3.3–5)
ALP SERPL-CCNC: 54 U/L — SIGNIFICANT CHANGE UP (ref 40–120)
ALT FLD-CCNC: 16 U/L — SIGNIFICANT CHANGE UP (ref 12–78)
ANION GAP SERPL CALC-SCNC: 3 MMOL/L — LOW (ref 5–17)
AST SERPL-CCNC: 13 U/L — LOW (ref 15–37)
BASOPHILS # BLD AUTO: 0.02 K/UL — SIGNIFICANT CHANGE UP (ref 0–0.2)
BASOPHILS NFR BLD AUTO: 0.1 % — SIGNIFICANT CHANGE UP (ref 0–2)
BILIRUB SERPL-MCNC: 0.3 MG/DL — SIGNIFICANT CHANGE UP (ref 0.2–1.2)
BUN SERPL-MCNC: 36 MG/DL — HIGH (ref 7–23)
CALCIUM SERPL-MCNC: 9.3 MG/DL — SIGNIFICANT CHANGE UP (ref 8.5–10.1)
CHLORIDE SERPL-SCNC: 107 MMOL/L — SIGNIFICANT CHANGE UP (ref 96–108)
CO2 SERPL-SCNC: 33 MMOL/L — HIGH (ref 22–31)
CREAT SERPL-MCNC: 0.85 MG/DL — SIGNIFICANT CHANGE UP (ref 0.5–1.3)
EGFR: 75 ML/MIN/1.73M2 — SIGNIFICANT CHANGE UP
EOSINOPHIL # BLD AUTO: 0 K/UL — SIGNIFICANT CHANGE UP (ref 0–0.5)
EOSINOPHIL NFR BLD AUTO: 0 % — SIGNIFICANT CHANGE UP (ref 0–6)
GLUCOSE SERPL-MCNC: 110 MG/DL — HIGH (ref 70–99)
HCT VFR BLD CALC: 36 % — SIGNIFICANT CHANGE UP (ref 34.5–45)
HGB BLD-MCNC: 10.4 G/DL — LOW (ref 11.5–15.5)
IMM GRANULOCYTES NFR BLD AUTO: 0.9 % — SIGNIFICANT CHANGE UP (ref 0–0.9)
LYMPHOCYTES # BLD AUTO: 1.3 K/UL — SIGNIFICANT CHANGE UP (ref 1–3.3)
LYMPHOCYTES # BLD AUTO: 7.8 % — LOW (ref 13–44)
MCHC RBC-ENTMCNC: 21.8 PG — LOW (ref 27–34)
MCHC RBC-ENTMCNC: 28.9 GM/DL — LOW (ref 32–36)
MCV RBC AUTO: 75.6 FL — LOW (ref 80–100)
MONOCYTES # BLD AUTO: 1.44 K/UL — HIGH (ref 0–0.9)
MONOCYTES NFR BLD AUTO: 8.7 % — SIGNIFICANT CHANGE UP (ref 2–14)
NEUTROPHILS # BLD AUTO: 13.69 K/UL — HIGH (ref 1.8–7.4)
NEUTROPHILS NFR BLD AUTO: 82.5 % — HIGH (ref 43–77)
NRBC # BLD: 0 /100 WBCS — SIGNIFICANT CHANGE UP (ref 0–0)
PLATELET # BLD AUTO: 402 K/UL — HIGH (ref 150–400)
POTASSIUM SERPL-MCNC: 4.3 MMOL/L — SIGNIFICANT CHANGE UP (ref 3.5–5.3)
POTASSIUM SERPL-SCNC: 4.3 MMOL/L — SIGNIFICANT CHANGE UP (ref 3.5–5.3)
PROT SERPL-MCNC: 6.7 G/DL — SIGNIFICANT CHANGE UP (ref 6–8.3)
RBC # BLD: 4.76 M/UL — SIGNIFICANT CHANGE UP (ref 3.8–5.2)
RBC # FLD: 16.6 % — HIGH (ref 10.3–14.5)
SODIUM SERPL-SCNC: 143 MMOL/L — SIGNIFICANT CHANGE UP (ref 135–145)
WBC # BLD: 16.6 K/UL — HIGH (ref 3.8–10.5)
WBC # FLD AUTO: 16.6 K/UL — HIGH (ref 3.8–10.5)

## 2024-01-20 PROCEDURE — 80053 COMPREHEN METABOLIC PANEL: CPT

## 2024-01-20 PROCEDURE — 82803 BLOOD GASES ANY COMBINATION: CPT

## 2024-01-20 PROCEDURE — 84100 ASSAY OF PHOSPHORUS: CPT

## 2024-01-20 PROCEDURE — 83735 ASSAY OF MAGNESIUM: CPT

## 2024-01-20 PROCEDURE — 36415 COLL VENOUS BLD VENIPUNCTURE: CPT

## 2024-01-20 PROCEDURE — 85025 COMPLETE CBC W/AUTO DIFF WBC: CPT

## 2024-01-20 PROCEDURE — 71045 X-RAY EXAM CHEST 1 VIEW: CPT

## 2024-01-20 PROCEDURE — 94640 AIRWAY INHALATION TREATMENT: CPT

## 2024-01-20 PROCEDURE — 0225U NFCT DS DNA&RNA 21 SARSCOV2: CPT

## 2024-01-20 PROCEDURE — 99239 HOSP IP/OBS DSCHRG MGMT >30: CPT | Mod: GC

## 2024-01-20 PROCEDURE — 93005 ELECTROCARDIOGRAM TRACING: CPT

## 2024-01-20 PROCEDURE — 83036 HEMOGLOBIN GLYCOSYLATED A1C: CPT

## 2024-01-20 PROCEDURE — 99285 EMERGENCY DEPT VISIT HI MDM: CPT

## 2024-01-20 PROCEDURE — 87637 SARSCOV2&INF A&B&RSV AMP PRB: CPT

## 2024-01-20 RX ORDER — HYDROCODONE BITARTRATE AND HOMATROPINE METHYLBROMIDE 5; 1.5 MG/5ML; MG/5ML
5 SOLUTION ORAL
Qty: 25 | Refills: 0
Start: 2024-01-20 | End: 2024-01-24

## 2024-01-20 RX ORDER — NICOTINE POLACRILEX 2 MG
1 GUM BUCCAL
Qty: 2 | Refills: 0
Start: 2024-01-20 | End: 2024-02-18

## 2024-01-20 RX ADMIN — TRAMADOL HYDROCHLORIDE 50 MILLIGRAM(S): 50 TABLET ORAL at 12:15

## 2024-01-20 RX ADMIN — LEVALBUTEROL 0.63 MILLIGRAM(S): 1.25 SOLUTION, CONCENTRATE RESPIRATORY (INHALATION) at 10:07

## 2024-01-20 RX ADMIN — ENOXAPARIN SODIUM 40 MILLIGRAM(S): 100 INJECTION SUBCUTANEOUS at 05:15

## 2024-01-20 RX ADMIN — Medication 100 MILLIGRAM(S): at 05:21

## 2024-01-20 RX ADMIN — Medication 650 MILLIGRAM(S): at 05:20

## 2024-01-20 RX ADMIN — TRAMADOL HYDROCHLORIDE 50 MILLIGRAM(S): 50 TABLET ORAL at 11:33

## 2024-01-20 RX ADMIN — AMLODIPINE BESYLATE 10 MILLIGRAM(S): 2.5 TABLET ORAL at 05:16

## 2024-01-20 RX ADMIN — Medication 81 MILLIGRAM(S): at 11:33

## 2024-01-20 RX ADMIN — Medication 0.25 MILLIGRAM(S): at 05:20

## 2024-01-20 RX ADMIN — Medication 50 MILLIGRAM(S): at 05:16

## 2024-01-20 RX ADMIN — PANTOPRAZOLE SODIUM 40 MILLIGRAM(S): 20 TABLET, DELAYED RELEASE ORAL at 05:16

## 2024-01-20 RX ADMIN — BUDESONIDE AND FORMOTEROL FUMARATE DIHYDRATE 2 PUFF(S): 160; 4.5 AEROSOL RESPIRATORY (INHALATION) at 05:23

## 2024-01-20 NOTE — DISCHARGE NOTE NURSING/CASE MANAGEMENT/SOCIAL WORK - PATIENT PORTAL LINK FT
You can access the FollowMyHealth Patient Portal offered by [facility] by registering at the following website: [email or web address]. By joining [employer]’s FollowMyHealth portal, you will also be able to view your health information using other applications (apps) compatible with our system.

## 2024-01-20 NOTE — SOCIAL WORK PROGRESS NOTE - NSSWPROGRESSNOTE_GEN_ALL_CORE
pt anticipated dc home today. Family at bedside, relaying/ confirming  that there are no food insecurity issues for pt on dc. Anticipated dc home today

## 2024-01-20 NOTE — CASE MANAGEMENT PROGRESS NOTE - NSCMPROGRESSNOTE_GEN_ALL_CORE
Per MD, patient is medically cleared for discharge home today.  CM met with patient and son Andrew at bedside to discussed discharge disposition home with [facility].  However, patient declined home.  Son will transport patient home. Patient and Son verbalized understanding of the transition plan with no formal services and are in agreement.  CM answered all questions to the best of my abilities. CM remains available throughout hospital stay.

## 2024-01-20 NOTE — DISCHARGE NOTE NURSING/CASE MANAGEMENT/SOCIAL WORK - NSDCPEFALRISK_GEN_ALL_CORE
For information on Fall & Injury Prevention, visit: [email or web address] OR  [email or web address] OR  https://www.cdc.gov/steadi/patient.html

## 2024-01-22 ENCOUNTER — TRANSCRIPTION ENCOUNTER (OUTPATIENT)
Age: 69
End: 2024-01-22

## 2024-02-12 ENCOUNTER — EMERGENCY (EMERGENCY)
Facility: HOSPITAL | Age: 69
LOS: 1 days | Discharge: HOME | End: 2024-02-12
Attending: EMERGENCY MEDICINE | Admitting: EMERGENCY MEDICINE
Payer: SELF-PAY

## 2024-02-12 VITALS
DIASTOLIC BLOOD PRESSURE: 98 MMHG | OXYGEN SATURATION: 100 % | HEART RATE: 118 BPM | SYSTOLIC BLOOD PRESSURE: 150 MMHG | HEIGHT: 65 IN | RESPIRATION RATE: 16 BRPM | TEMPERATURE: 98 F

## 2024-02-12 PROCEDURE — 99285 EMERGENCY DEPT VISIT HI MDM: CPT

## 2024-02-12 RX ORDER — SODIUM CHLORIDE 9 MG/ML
1000 INJECTION INTRAMUSCULAR; INTRAVENOUS; SUBCUTANEOUS
Refills: 0 | Status: DISCONTINUED | OUTPATIENT
Start: 2024-02-12 | End: 2024-02-16

## 2024-02-12 RX ADMIN — SODIUM CHLORIDE 150 MILLILITER(S): 9 INJECTION INTRAMUSCULAR; INTRAVENOUS; SUBCUTANEOUS at 23:59

## 2024-02-12 NOTE — ED PROVIDER NOTE - NSICDXPASTMEDICALHX_GEN_ALL_CORE_FT
Include Pregnancy/Lactation Warning?: No Azelaic Acid Counseling: Patient counseled that medicine may cause skin irritation and to avoid applying near the eyes.  In the event of skin irritation, the patient was advised to reduce the amount of the drug applied or use it less frequently.   The patient verbalized understanding of the proper use and possible adverse effects of azelaic acid.  All of the patient's questions and concerns were addressed. Isotretinoin Pregnancy And Lactation Text: This medication is Pregnancy Category X and is considered extremely dangerous during pregnancy. It is unknown if it is excreted in breast milk. Azithromycin Counseling:  I discussed with the patient the risks of azithromycin including but not limited to GI upset, allergic reaction, drug rash, diarrhea, and yeast infections. Minocycline Pregnancy And Lactation Text: This medication is Pregnancy Category D and not consider safe during pregnancy. It is also excreted in breast milk. Birth Control Pills Counseling: Birth Control Pill Counseling: I discussed with the patient the potential side effects of OCPs including but not limited to increased risk of stroke, heart attack, thrombophlebitis, deep venous thrombosis, hepatic adenomas, breast changes, GI upset, headaches, and depression.  The patient verbalized understanding of the proper use and possible adverse effects of OCPs. All of the patient's questions and concerns were addressed. Winlevi Pregnancy And Lactation Text: This medication is considered safe during pregnancy and breastfeeding. Topical Retinoid Pregnancy And Lactation Text: This medication is Pregnancy Category C. It is unknown if this medication is excreted in breast milk. Sarecycline Counseling: Patient advised regarding possible photosensitivity and discoloration of the teeth, skin, lips, tongue and gums.  Patient instructed to avoid sunlight, if possible.  When exposed to sunlight, patients should wear protective clothing, sunglasses, and sunscreen.  The patient was instructed to call the office immediately if the following severe adverse effects occur:  hearing changes, easy bruising/bleeding, severe headache, or vision changes.  The patient verbalized understanding of the proper use and possible adverse effects of sarecycline.  All of the patient's questions and concerns were addressed. Birth Control Pills Pregnancy And Lactation Text: This medication should be avoided if pregnant and for the first 30 days post-partum. Topical Clindamycin Pregnancy And Lactation Text: This medication is Pregnancy Category B and is considered safe during pregnancy. It is unknown if it is excreted in breast milk. Tetracycline Counseling: Patient counseled regarding possible photosensitivity and increased risk for sunburn.  Patient instructed to avoid sunlight, if possible.  When exposed to sunlight, patients should wear protective clothing, sunglasses, and sunscreen.  The patient was instructed to call the office immediately if the following severe adverse effects occur:  hearing changes, easy bruising/bleeding, severe headache, or vision changes.  The patient verbalized understanding of the proper use and possible adverse effects of tetracycline.  All of the patient's questions and concerns were addressed. Patient understands to avoid pregnancy while on therapy due to potential birth defects. Detail Level: Zone Doxycycline Pregnancy And Lactation Text: This medication is Pregnancy Category D and not consider safe during pregnancy. It is also excreted in breast milk but is considered safe for shorter treatment courses. Topical Sulfur Applications Counseling: Topical Sulfur Counseling: Patient counseled that this medication may cause skin irritation or allergic reactions.  In the event of skin irritation, the patient was advised to reduce the amount of the drug applied or use it less frequently.   The patient verbalized understanding of the proper use and possible adverse effects of topical sulfur application.  All of the patient's questions and concerns were addressed. Erythromycin Counseling:  I discussed with the patient the risks of erythromycin including but not limited to GI upset, allergic reaction, drug rash, diarrhea, increase in liver enzymes, and yeast infections. Azelaic Acid Pregnancy And Lactation Text: This medication is considered safe during pregnancy and breast feeding. High Dose Vitamin A Counseling: Side effects reviewed, pt to contact office should one occur. Azithromycin Pregnancy And Lactation Text: This medication is considered safe during pregnancy and is also secreted in breast milk. Dapsone Counseling: I discussed with the patient the risks of dapsone including but not limited to hemolytic anemia, agranulocytosis, rashes, methemoglobinemia, kidney failure, peripheral neuropathy, headaches, GI upset, and liver toxicity.  Patients who start dapsone require monitoring including baseline LFTs and weekly CBCs for the first month, then every month thereafter.  The patient verbalized understanding of the proper use and possible adverse effects of dapsone.  All of the patient's questions and concerns were addressed. Benzoyl Peroxide Counseling: Patient counseled that medicine may cause skin irritation and bleach clothing.  In the event of skin irritation, the patient was advised to reduce the amount of the drug applied or use it less frequently.   The patient verbalized understanding of the proper use and possible adverse effects of benzoyl peroxide.  All of the patient's questions and concerns were addressed. High Dose Vitamin A Pregnancy And Lactation Text: High dose vitamin A therapy is contraindicated during pregnancy and breast feeding. Tazorac Counseling:  Patient advised that medication is irritating and drying.  Patient may need to apply sparingly and wash off after an hour before eventually leaving it on overnight.  The patient verbalized understanding of the proper use and possible adverse effects of tazorac.  All of the patient's questions and concerns were addressed. Erythromycin Pregnancy And Lactation Text: This medication is Pregnancy Category B and is considered safe during pregnancy. It is also excreted in breast milk. PAST MEDICAL HISTORY:  Anxiety     COPD (chronic obstructive pulmonary disease)     HTN (hypertension)     Rheumatoid arthritis      Bactrim Counseling:  I discussed with the patient the risks of sulfa antibiotics including but not limited to GI upset, allergic reaction, drug rash, diarrhea, dizziness, photosensitivity, and yeast infections.  Rarely, more serious reactions can occur including but not limited to aplastic anemia, agranulocytosis, methemoglobinemia, blood dyscrasias, liver or kidney failure, lung infiltrates or desquamative/blistering drug rashes. Tazorac Pregnancy And Lactation Text: This medication is not safe during pregnancy. It is unknown if this medication is excreted in breast milk. Topical Sulfur Applications Pregnancy And Lactation Text: This medication is Pregnancy Category C and has an unknown safety profile during pregnancy. It is unknown if this topical medication is excreted in breast milk. Aklief counseling:  Patient advised to apply a pea-sized amount only at bedtime and wait 30 minutes after washing their face before applying.  If too drying, patient may add a non-comedogenic moisturizer.  The most commonly reported side effects including irritation, redness, scaling, dryness, stinging, burning, itching, and increased risk of sunburn.  The patient verbalized understanding of the proper use and possible adverse effects of retinoids.  All of the patient's questions and concerns were addressed. Benzoyl Peroxide Pregnancy And Lactation Text: This medication is Pregnancy Category C. It is unknown if benzoyl peroxide is excreted in breast milk. Minocycline Counseling: Patient advised regarding possible photosensitivity and discoloration of the teeth, skin, lips, tongue and gums.  Patient instructed to avoid sunlight, if possible.  When exposed to sunlight, patients should wear protective clothing, sunglasses, and sunscreen.  The patient was instructed to call the office immediately if the following severe adverse effects occur:  hearing changes, easy bruising/bleeding, severe headache, or vision changes.  The patient verbalized understanding of the proper use and possible adverse effects of minocycline.  All of the patient's questions and concerns were addressed. Dapsone Pregnancy And Lactation Text: This medication is Pregnancy Category C and is not considered safe during pregnancy or breast feeding. Winlevi Counseling:  I discussed with the patient the risks of topical clascoterone including but not limited to erythema, scaling, itching, and stinging. Patient voiced their understanding. Spironolactone Counseling: Patient advised regarding risks of diarrhea, abdominal pain, hyperkalemia, birth defects (for female patients), liver toxicity and renal toxicity. The patient may need blood work to monitor liver and kidney function and potassium levels while on therapy. The patient verbalized understanding of the proper use and possible adverse effects of spironolactone.  All of the patient's questions and concerns were addressed. Topical Clindamycin Counseling: Patient counseled that this medication may cause skin irritation or allergic reactions.  In the event of skin irritation, the patient was advised to reduce the amount of the drug applied or use it less frequently.   The patient verbalized understanding of the proper use and possible adverse effects of clindamycin.  All of the patient's questions and concerns were addressed. Bactrim Pregnancy And Lactation Text: This medication is Pregnancy Category D and is known to cause fetal risk.  It is also excreted in breast milk. Aklief Pregnancy And Lactation Text: It is unknown if this medication is safe to use during pregnancy.  It is unknown if this medication is excreted in breast milk.  Breastfeeding women should use the topical cream on the smallest area of the skin for the shortest time needed while breastfeeding.  Do not apply to nipple and areola. Doxycycline Counseling:  Patient counseled regarding possible photosensitivity and increased risk for sunburn.  Patient instructed to avoid sunlight, if possible.  When exposed to sunlight, patients should wear protective clothing, sunglasses, and sunscreen.  The patient was instructed to call the office immediately if the following severe adverse effects occur:  hearing changes, easy bruising/bleeding, severe headache, or vision changes.  The patient verbalized understanding of the proper use and possible adverse effects of doxycycline.  All of the patient's questions and concerns were addressed. Isotretinoin Counseling: Patient should get monthly blood tests, not donate blood, not drive at night if vision affected, not share medication, and not undergo elective surgery for 6 months after tx completed. Side effects reviewed, pt to contact office should one occur. Topical Retinoid counseling:  Patient advised to apply a pea-sized amount only at bedtime and wait 30 minutes after washing their face before applying.  If too drying, patient may add a non-comedogenic moisturizer. The patient verbalized understanding of the proper use and possible adverse effects of retinoids.  All of the patient's questions and concerns were addressed. Spironolactone Pregnancy And Lactation Text: This medication can cause feminization of the male fetus and should be avoided during pregnancy. The active metabolite is also found in breast milk. Detail Level: Detailed

## 2024-02-12 NOTE — ED PROVIDER NOTE - OBJECTIVE STATEMENT
68-year-old female with history of COPD, hypertension, rheumatoid arthritis, anxiety is presenting with intermittent rectal bleeding for the past 1 to 2 months.  Patient reports she has started consistently for the last 2 to 3 days.  Patient reports spontaneous rectal bleeding not precipitated by a bowel movement.  Patient reports when she has a bowel movement she normally does not have any bleeding.  States she is not had a colonoscopy to date.  Denies feeling dizzy or lightheaded.  Patient denies use of any anticoagulants.  Denies abdominal pain.  Reports normal appetite.  No unexplained weight loss.

## 2024-02-12 NOTE — ED PROVIDER NOTE - CARE PROVIDER_API CALL
Gregory Rizvi  Gastroenterology  [address] [place] [address]  Phone: [phone]  Fax: [phone]  Follow Up Time: Urgent

## 2024-02-12 NOTE — ED CLERICAL - NS ED CARE COORDINATION INFORMATION
This patient is enrolled in a readmission reduction initiative and has active care navigation. This patient can be followed up by the care navigation team within 24 hours.  To arrange close follow-up or to obtain additional clinical information, please call the care navigation contact number above.      For patients previously on the Hospitalist Service please call the hospitalist at [phone] for input and/or consultation PRIOR to admission. If the patient was on a Voluntary Physician’s service please contact that physician for input and/or consultation PRIOR to admission.

## 2024-02-12 NOTE — ED PROVIDER NOTE - NSFOLLOWUPINSTRUCTIONS_ED_ALL_ED_FT
Call the GI doctor's office in the morning to schedule a prompt appointment  Return to the ER if the rectal bleeding worsens or if you feel dizzy or lightheaded or develop abdominal pain.    Gastrointestinal Bleeding  Gastrointestinal (GI) bleeding is bleeding somewhere along the digestive tract, between the mouth and the anus. The digestive tract includes the mouth, esophagus, stomach, small intestine, large intestine, and anus. The large intestine is often called the colon.    GI bleeding can be caused by various problems. The severity of these problems can be mild, serious, or life-threatening. If you have GI bleeding, you may find blood in your stools (feces), you may have black stools, or you may vomit blood. You may need to stay in the hospital if there is a lot of bleeding.    What are the causes?  This condition may be caused by:  Inflammation, irritation, or swelling of the esophagus (esophagitis). The esophagus is part of the body that moves food from your mouth to your stomach.  Swollen veins in the rectum (hemorrhoids).  Tears in the anus (anal fissures). The tears are often caused by passing hard stool.  Pouches that form on the colon and may bleed (diverticulosis).  Inflammation in areas with diverticulosis. This is called diverticulitis.This can cause pain, fever, and bloody stools.  Growths (polyps) or cancer. Colon cancer often starts out as precancerous polyps.  Gastritis and ulcers. These may cause bleeding in the upper GI tract, near the stomach.  What increases the risk?  You are more likely to develop this condition if:  You have an infection in your stomach from a type of bacteria called Helicobacter pylori.  You take certain medicines, such as:  NSAIDs.  Aspirin.  Selective serotonin reuptake inhibitors (SSRIs).  Steroids.  Antiplatelet or anticoagulant medicines.  You smoke.  You drink alcohol.  What are the signs or symptoms?  Common symptoms of this condition include:  Bright red blood in your vomit, or vomit that looks like coffee grounds.  Bloody, black, or tarry stools.  Bleeding from the lower GI tract will usually cause red or maroon blood in the stools.  Bleeding from the upper GI tract may cause black, tarry stools that are often stronger smelling than usual.  In certain cases, if the bleeding is fast enough, the stools may be red.  Pain or cramping in the abdomen.  How is this diagnosed?  This condition may be diagnosed based on:  Your medical history and a physical exam.  Various tests, such as:  Blood tests.  Stool tests.  X-rays and other imaging tests.  Esophagogastroduodenoscopy (EGD). In this test, a flexible, lighted tube is used to look at your esophagus, stomach, and small intestine.  Colonoscopy. In this test, a flexible, lighted tube is used to look at your colon.  How is this treated?  Treatment for this condition depends on the cause of the bleeding. For example:  For bleeding from the esophagus, stomach, small intestine, or colon, the health care provider may do a procedure to stop bleeding during your EGD or colonoscopy.  Inflammation or infection of the colon can be treated with medicines.  Certain rectal problems can be treated with creams, suppositories, or warm baths.  Medicines may be given to reduce acid in your stomach.  Surgery is sometimes done.  Blood transfusions are sometimes needed if a lot of blood has been lost.  If there is a lot of bleeding, you will need to stay in the hospital for observation. If bleeding is mild, you may be allowed to go home.    Follow these instructions at home:  Foods with fiber, including fruits, vegetables, greens, seeds, and grain.  Take over-the-counter and prescription medicines only as told by your health care provider.  Eat foods that are high in fiber, such as beans, whole grains, and fresh fruits and vegetables. This will help to keep your stools soft. Eating 1–3 prunes each day works well for many people.  Drink enough fluid to keep your urine pale yellow.  Keep all follow-up visits. This is important.  Contact a health care provider if:  Your symptoms do not improve with treatment.  Get help right away if:  Your bleeding does not stop.  You feel light-headed or you faint.  You feel weak.  You have severe cramps in your back or abdomen.  You pass large blood clots in your stool.  Your symptoms are getting worse.  You have chest pain or fast heartbeats.  These symptoms may be an emergency. Get help right away. Call 911.  Do not wait to see if the symptoms will go away.  Do not drive yourself to the hospital.  Summary  Gastrointestinal (GI) bleeding is bleeding somewhere along the digestive tract, between the mouth and anus. GI bleeding can be caused by various problems.  Treatment for this condition depends on the cause of the bleeding.  Take over-the-counter and prescription medicines only as told by your health care provider.  Get help right away if your bleeding increases, your symptoms are getting worse, or you have new symptoms.  Keep all follow-up visits. This is important.  This information is not intended to replace advice given to you by your health care provider. Make sure you discuss any questions you have with your health care provider.

## 2024-02-12 NOTE — ED PROVIDER NOTE - CLINICAL SUMMARY MEDICAL DECISION MAKING FREE TEXT BOX
68-year-old female with intermittent spontaneous rectal bleeding for the past 1 to 2 months but a bit more constant for the last few days.  States it starts and stops on its own sometimes she is barely soils the close or sometimes it is more.  Abdomen is soft nontender.  Will check labs and CT abdomen and pelvis to rule out any colonic mass.  Will reassess.

## 2024-02-12 NOTE — ED PROVIDER NOTE - PATIENT PORTAL LINK FT
You can access the FollowMyHealth Patient Portal offered by [facility] by registering at the following website: [email or web address]. By joining [facility]’s FollowMyHealth portal, you will also be able to view your health information using other applications (apps) compatible with our system.

## 2024-02-12 NOTE — ED PROVIDER NOTE - PROGRESS NOTE DETAILS
rectal exam performed (RYAN Moore, Chaperone): no external hemorrhoids. LEDA performed, stool with some dark red blood    CT report reviewed, findings d/w pt. Advised to call GI in Am to schedule an appt  No active LGIB episodes in ED  Hgb is stable  Pt advised to return to ER if bleeding worsens.

## 2024-02-13 ENCOUNTER — TRANSCRIPTION ENCOUNTER (OUTPATIENT)
Age: 69
End: 2024-02-13

## 2024-02-13 VITALS
TEMPERATURE: 99 F | OXYGEN SATURATION: 99 % | DIASTOLIC BLOOD PRESSURE: 76 MMHG | HEART RATE: 113 BPM | RESPIRATION RATE: 18 BRPM | SYSTOLIC BLOOD PRESSURE: 148 MMHG

## 2024-02-13 LAB
ALBUMIN SERPL ELPH-MCNC: 2.8 G/DL — LOW (ref 3.3–5)
ALP SERPL-CCNC: 60 U/L — SIGNIFICANT CHANGE UP (ref 40–120)
ALT FLD-CCNC: 14 U/L — SIGNIFICANT CHANGE UP (ref 12–78)
ANION GAP SERPL CALC-SCNC: 4 MMOL/L — LOW (ref 5–17)
AST SERPL-CCNC: 13 U/L — LOW (ref 15–37)
BASOPHILS # BLD AUTO: 0.02 K/UL — SIGNIFICANT CHANGE UP (ref 0–0.2)
BASOPHILS NFR BLD AUTO: 0.2 % — SIGNIFICANT CHANGE UP (ref 0–2)
BILIRUB SERPL-MCNC: 0.6 MG/DL — SIGNIFICANT CHANGE UP (ref 0.2–1.2)
BUN SERPL-MCNC: 15 MG/DL — SIGNIFICANT CHANGE UP (ref 7–23)
CALCIUM SERPL-MCNC: 9.9 MG/DL — SIGNIFICANT CHANGE UP (ref 8.5–10.1)
CHLORIDE SERPL-SCNC: 103 MMOL/L — SIGNIFICANT CHANGE UP (ref 96–108)
CO2 SERPL-SCNC: 34 MMOL/L — HIGH (ref 22–31)
CREAT SERPL-MCNC: 0.92 MG/DL — SIGNIFICANT CHANGE UP (ref 0.5–1.3)
EGFR: 68 ML/MIN/1.73M2 — SIGNIFICANT CHANGE UP
EOSINOPHIL # BLD AUTO: 0.09 K/UL — SIGNIFICANT CHANGE UP (ref 0–0.5)
EOSINOPHIL NFR BLD AUTO: 1 % — SIGNIFICANT CHANGE UP (ref 0–6)
GLUCOSE SERPL-MCNC: 136 MG/DL — HIGH (ref 70–99)
HCT VFR BLD CALC: 37.2 % — SIGNIFICANT CHANGE UP (ref 34.5–45)
HGB BLD-MCNC: 10.8 G/DL — LOW (ref 11.5–15.5)
IMM GRANULOCYTES NFR BLD AUTO: 0.3 % — SIGNIFICANT CHANGE UP (ref 0–0.9)
LYMPHOCYTES # BLD AUTO: 1.21 K/UL — SIGNIFICANT CHANGE UP (ref 1–3.3)
LYMPHOCYTES # BLD AUTO: 13.8 % — SIGNIFICANT CHANGE UP (ref 13–44)
MCHC RBC-ENTMCNC: 22.5 PG — LOW (ref 27–34)
MCHC RBC-ENTMCNC: 29 GM/DL — LOW (ref 32–36)
MCV RBC AUTO: 77.7 FL — LOW (ref 80–100)
MONOCYTES # BLD AUTO: 0.83 K/UL — SIGNIFICANT CHANGE UP (ref 0–0.9)
MONOCYTES NFR BLD AUTO: 9.4 % — SIGNIFICANT CHANGE UP (ref 2–14)
NEUTROPHILS # BLD AUTO: 6.62 K/UL — SIGNIFICANT CHANGE UP (ref 1.8–7.4)
NEUTROPHILS NFR BLD AUTO: 75.3 % — SIGNIFICANT CHANGE UP (ref 43–77)
NRBC # BLD: 0 /100 WBCS — SIGNIFICANT CHANGE UP (ref 0–0)
PLATELET # BLD AUTO: 369 K/UL — SIGNIFICANT CHANGE UP (ref 150–400)
POTASSIUM SERPL-MCNC: 4.4 MMOL/L — SIGNIFICANT CHANGE UP (ref 3.5–5.3)
POTASSIUM SERPL-SCNC: 4.4 MMOL/L — SIGNIFICANT CHANGE UP (ref 3.5–5.3)
PROT SERPL-MCNC: 6.5 G/DL — SIGNIFICANT CHANGE UP (ref 6–8.3)
RBC # BLD: 4.79 M/UL — SIGNIFICANT CHANGE UP (ref 3.8–5.2)
RBC # FLD: 21.4 % — HIGH (ref 10.3–14.5)
SODIUM SERPL-SCNC: 141 MMOL/L — SIGNIFICANT CHANGE UP (ref 135–145)
WBC # BLD: 8.8 K/UL — SIGNIFICANT CHANGE UP (ref 3.8–10.5)
WBC # FLD AUTO: 8.8 K/UL — SIGNIFICANT CHANGE UP (ref 3.8–10.5)

## 2024-02-13 PROCEDURE — 74177 CT ABD & PELVIS W/CONTRAST: CPT | Mod: 26,MA

## 2024-02-15 ENCOUNTER — INPATIENT (INPATIENT)
Facility: HOSPITAL | Age: 69
LOS: 20 days | Discharge: HOME | DRG: 329 | End: 2024-03-07
Attending: FAMILY MEDICINE | Admitting: STUDENT IN AN ORGANIZED HEALTH CARE EDUCATION/TRAINING PROGRAM
Payer: MEDICARE

## 2024-02-15 VITALS
OXYGEN SATURATION: 98 % | DIASTOLIC BLOOD PRESSURE: 68 MMHG | HEIGHT: 65 IN | WEIGHT: 130.07 LBS | TEMPERATURE: 98 F | SYSTOLIC BLOOD PRESSURE: 137 MMHG | HEART RATE: 85 BPM | RESPIRATION RATE: 20 BRPM

## 2024-02-15 DIAGNOSIS — K92.2 GASTROINTESTINAL HEMORRHAGE, UNSPECIFIED: ICD-10-CM

## 2024-02-15 LAB
ALBUMIN SERPL ELPH-MCNC: 3.1 G/DL — LOW (ref 3.3–5)
ALP SERPL-CCNC: 55 U/L — SIGNIFICANT CHANGE UP (ref 40–120)
ALT FLD-CCNC: 16 U/L — SIGNIFICANT CHANGE UP (ref 12–78)
ANION GAP SERPL CALC-SCNC: 6 MMOL/L — SIGNIFICANT CHANGE UP (ref 5–17)
APTT BLD: 31 SEC — SIGNIFICANT CHANGE UP (ref 24.5–35.6)
AST SERPL-CCNC: 9 U/L — LOW (ref 15–37)
BASOPHILS # BLD AUTO: 0.01 K/UL — SIGNIFICANT CHANGE UP (ref 0–0.2)
BASOPHILS NFR BLD AUTO: 0.1 % — SIGNIFICANT CHANGE UP (ref 0–2)
BILIRUB SERPL-MCNC: 0.5 MG/DL — SIGNIFICANT CHANGE UP (ref 0.2–1.2)
BLD GP AB SCN SERPL QL: SIGNIFICANT CHANGE UP
BUN SERPL-MCNC: 18 MG/DL — SIGNIFICANT CHANGE UP (ref 7–23)
CALCIUM SERPL-MCNC: 10.3 MG/DL — HIGH (ref 8.5–10.1)
CHLORIDE SERPL-SCNC: 106 MMOL/L — SIGNIFICANT CHANGE UP (ref 96–108)
CO2 SERPL-SCNC: 31 MMOL/L — SIGNIFICANT CHANGE UP (ref 22–31)
CREAT SERPL-MCNC: 1.1 MG/DL — SIGNIFICANT CHANGE UP (ref 0.5–1.3)
EGFR: 55 ML/MIN/1.73M2 — LOW
EOSINOPHIL # BLD AUTO: 0.06 K/UL — SIGNIFICANT CHANGE UP (ref 0–0.5)
EOSINOPHIL NFR BLD AUTO: 0.6 % — SIGNIFICANT CHANGE UP (ref 0–6)
GLUCOSE SERPL-MCNC: 131 MG/DL — HIGH (ref 70–99)
HCT VFR BLD CALC: 36.1 % — SIGNIFICANT CHANGE UP (ref 34.5–45)
HGB BLD-MCNC: 10.6 G/DL — LOW (ref 11.5–15.5)
IMM GRANULOCYTES NFR BLD AUTO: 0.5 % — SIGNIFICANT CHANGE UP (ref 0–0.9)
INR BLD: 1.04 RATIO — SIGNIFICANT CHANGE UP (ref 0.85–1.18)
LIDOCAIN IGE QN: 23 U/L — SIGNIFICANT CHANGE UP (ref 13–75)
LYMPHOCYTES # BLD AUTO: 1.28 K/UL — SIGNIFICANT CHANGE UP (ref 1–3.3)
LYMPHOCYTES # BLD AUTO: 13.3 % — SIGNIFICANT CHANGE UP (ref 13–44)
MCHC RBC-ENTMCNC: 22.7 PG — LOW (ref 27–34)
MCHC RBC-ENTMCNC: 29.4 GM/DL — LOW (ref 32–36)
MCV RBC AUTO: 77.5 FL — LOW (ref 80–100)
MONOCYTES # BLD AUTO: 1.1 K/UL — HIGH (ref 0–0.9)
MONOCYTES NFR BLD AUTO: 11.4 % — SIGNIFICANT CHANGE UP (ref 2–14)
NEUTROPHILS # BLD AUTO: 7.14 K/UL — SIGNIFICANT CHANGE UP (ref 1.8–7.4)
NEUTROPHILS NFR BLD AUTO: 74.1 % — SIGNIFICANT CHANGE UP (ref 43–77)
NRBC # BLD: 0 /100 WBCS — SIGNIFICANT CHANGE UP (ref 0–0)
OB PNL STL: POSITIVE
PLATELET # BLD AUTO: 395 K/UL — SIGNIFICANT CHANGE UP (ref 150–400)
POTASSIUM SERPL-MCNC: 3.7 MMOL/L — SIGNIFICANT CHANGE UP (ref 3.5–5.3)
POTASSIUM SERPL-SCNC: 3.7 MMOL/L — SIGNIFICANT CHANGE UP (ref 3.5–5.3)
PROT SERPL-MCNC: 7.3 G/DL — SIGNIFICANT CHANGE UP (ref 6–8.3)
PROTHROM AB SERPL-ACNC: 12.1 SEC — SIGNIFICANT CHANGE UP (ref 9.5–13)
RBC # BLD: 4.66 M/UL — SIGNIFICANT CHANGE UP (ref 3.8–5.2)
RBC # FLD: 21.9 % — HIGH (ref 10.3–14.5)
SODIUM SERPL-SCNC: 143 MMOL/L — SIGNIFICANT CHANGE UP (ref 135–145)
WBC # BLD: 9.64 K/UL — SIGNIFICANT CHANGE UP (ref 3.8–10.5)
WBC # FLD AUTO: 9.64 K/UL — SIGNIFICANT CHANGE UP (ref 3.8–10.5)

## 2024-02-15 PROCEDURE — 93010 ELECTROCARDIOGRAM REPORT: CPT

## 2024-02-15 PROCEDURE — 71045 X-RAY EXAM CHEST 1 VIEW: CPT | Mod: 26

## 2024-02-15 PROCEDURE — 99223 1ST HOSP IP/OBS HIGH 75: CPT

## 2024-02-15 PROCEDURE — 99285 EMERGENCY DEPT VISIT HI MDM: CPT

## 2024-02-15 RX ORDER — PANTOPRAZOLE SODIUM 20 MG/1
40 TABLET, DELAYED RELEASE ORAL ONCE
Refills: 0 | Status: COMPLETED | OUTPATIENT
Start: 2024-02-15 | End: 2024-02-15

## 2024-02-15 RX ADMIN — PANTOPRAZOLE SODIUM 40 MILLIGRAM(S): 20 TABLET, DELAYED RELEASE ORAL at 21:49

## 2024-02-15 NOTE — ED PROVIDER NOTE - ENMT, MLM
Airway patent, Nasal mucosa clear. Mouth with normal mucosa. Throat has no vesicles, no oropharyngeal exudates and uvula is midline. neck supple. no meningeal signs. no acute MM Pallor seen.

## 2024-02-15 NOTE — ED PROVIDER NOTE - CLINICAL SUMMARY MEDICAL DECISION MAKING FREE TEXT BOX
Acute rectal bleeding over past 2 to 3 days, with some increased generalized fatigue and malaise.  Will check labs, possible admission

## 2024-02-15 NOTE — ED CLERICAL - NS ED CLERK NOTE PRE-ARRIVAL INFORMATION; ADDITIONAL PRE-ARRIVAL INFORMATION
This patient is enrolled in the [facility] readmission reduction initiative and has active care navigation. This patient can be followed up by the care navigation team within 24 hours.  To arrange close follow-up or to obtain additional clinical information, please call the care navigation contact number above.      For patients previously on the Hospitalist Service please call the hospitalist at [phone] for input and/or consultation PRIOR to admission. If the patient was on a Voluntary Physician’s service please contact that physician for input and/or consultation PRIOR to admission.

## 2024-02-15 NOTE — ED ADULT NURSE NOTE - NSFALLUNIVINTERV_ED_ALL_ED
Bed/Stretcher in lowest position, wheels locked, appropriate side rails in place/Call bell, personal items and telephone in reach/Instruct patient to call for assistance before getting out of bed/chair/stretcher/Non-slip footwear applied when patient is off stretcher/[place] to call system/Physically safe environment - no spills, clutter or unnecessary equipment/Purposeful proactive rounding/Room/bathroom lighting operational, light cord in reach

## 2024-02-15 NOTE — ED PROVIDER NOTE - OBJECTIVE STATEMENT
68-year-old female with a history of COPD, hypertension, RA, anxiety, p/w has been having some rectal bleeding for the past 2 to 3 days.  Patient was seen in the ER 2 days ago, had a stable hemoglobin.  Patient called her primary care doctor today, to tell him that she has some persistent bleeding, going around 2-3 times per day with dark to bright red blood.  No abdominal pain.  No rectal pain.  Patient has had some chronic dyspnea related to her COPD after having a recent COVID infection last month.  Patient has been feeling some shortness of breath today as well, has had some on and off shortness of breath for the past few days.  Patient with some generalized fatigue over past 2 days as well.  No other acute complaints at this time.

## 2024-02-15 NOTE — ED PROVIDER NOTE - PROGRESS NOTE DETAILS
Discussed with Dr. Roman, will see patient to admit.  Discussed with patient and family member, agree with admission.  Discussed with Dr. Holt, agree with admission.

## 2024-02-15 NOTE — ED ADULT NURSE NOTE - OBJECTIVE STATEMENT
pt c/o rectal with bloody stool today denies pain pt evaluated and blood work sent to lab pt medicated with protonix iv no BM noted at present in ED

## 2024-02-15 NOTE — ED ADULT TRIAGE NOTE - CHIEF COMPLAINT QUOTE
Patient complaining of difficulty breathing, rectal bleeding. States 81 mg aspirin daily. States bright red/dark blood

## 2024-02-16 DIAGNOSIS — Z29.9 ENCOUNTER FOR PROPHYLACTIC MEASURES, UNSPECIFIED: ICD-10-CM

## 2024-02-16 DIAGNOSIS — F41.9 ANXIETY DISORDER, UNSPECIFIED: ICD-10-CM

## 2024-02-16 DIAGNOSIS — D62 ACUTE POSTHEMORRHAGIC ANEMIA: ICD-10-CM

## 2024-02-16 DIAGNOSIS — K92.2 GASTROINTESTINAL HEMORRHAGE, UNSPECIFIED: ICD-10-CM

## 2024-02-16 DIAGNOSIS — I10 ESSENTIAL (PRIMARY) HYPERTENSION: ICD-10-CM

## 2024-02-16 DIAGNOSIS — J44.9 CHRONIC OBSTRUCTIVE PULMONARY DISEASE, UNSPECIFIED: ICD-10-CM

## 2024-02-16 DIAGNOSIS — M06.9 RHEUMATOID ARTHRITIS, UNSPECIFIED: ICD-10-CM

## 2024-02-16 LAB
ALBUMIN SERPL ELPH-MCNC: 2.7 G/DL — LOW (ref 3.3–5)
ALP SERPL-CCNC: 51 U/L — SIGNIFICANT CHANGE UP (ref 40–120)
ALT FLD-CCNC: 13 U/L — SIGNIFICANT CHANGE UP (ref 12–78)
ANION GAP SERPL CALC-SCNC: 4 MMOL/L — LOW (ref 5–17)
AST SERPL-CCNC: 12 U/L — LOW (ref 15–37)
BILIRUB SERPL-MCNC: 0.5 MG/DL — SIGNIFICANT CHANGE UP (ref 0.2–1.2)
BUN SERPL-MCNC: 18 MG/DL — SIGNIFICANT CHANGE UP (ref 7–23)
CALCIUM SERPL-MCNC: 9.3 MG/DL — SIGNIFICANT CHANGE UP (ref 8.5–10.1)
CHLORIDE SERPL-SCNC: 109 MMOL/L — HIGH (ref 96–108)
CO2 SERPL-SCNC: 32 MMOL/L — HIGH (ref 22–31)
CREAT SERPL-MCNC: 1.1 MG/DL — SIGNIFICANT CHANGE UP (ref 0.5–1.3)
EGFR: 55 ML/MIN/1.73M2 — LOW
FERRITIN SERPL-MCNC: 32 NG/ML — SIGNIFICANT CHANGE UP (ref 13–330)
GLUCOSE SERPL-MCNC: 155 MG/DL — HIGH (ref 70–99)
HCT VFR BLD CALC: 32.2 % — LOW (ref 34.5–45)
HCT VFR BLD CALC: 32.9 % — LOW (ref 34.5–45)
HGB BLD-MCNC: 9.3 G/DL — LOW (ref 11.5–15.5)
HGB BLD-MCNC: 9.5 G/DL — LOW (ref 11.5–15.5)
IRON SATN MFR SERPL: 20 UG/DL — LOW (ref 30–160)
IRON SATN MFR SERPL: 7 % — LOW (ref 14–50)
MCHC RBC-ENTMCNC: 22.5 PG — LOW (ref 27–34)
MCHC RBC-ENTMCNC: 22.6 PG — LOW (ref 27–34)
MCHC RBC-ENTMCNC: 28.9 GM/DL — LOW (ref 32–36)
MCHC RBC-ENTMCNC: 28.9 GM/DL — LOW (ref 32–36)
MCV RBC AUTO: 78 FL — LOW (ref 80–100)
MCV RBC AUTO: 78.2 FL — LOW (ref 80–100)
NRBC # BLD: 0 /100 WBCS — SIGNIFICANT CHANGE UP (ref 0–0)
NRBC # BLD: 0 /100 WBCS — SIGNIFICANT CHANGE UP (ref 0–0)
PLATELET # BLD AUTO: 332 K/UL — SIGNIFICANT CHANGE UP (ref 150–400)
PLATELET # BLD AUTO: 335 K/UL — SIGNIFICANT CHANGE UP (ref 150–400)
POTASSIUM SERPL-MCNC: 3.9 MMOL/L — SIGNIFICANT CHANGE UP (ref 3.5–5.3)
POTASSIUM SERPL-SCNC: 3.9 MMOL/L — SIGNIFICANT CHANGE UP (ref 3.5–5.3)
PROT SERPL-MCNC: 6.3 G/DL — SIGNIFICANT CHANGE UP (ref 6–8.3)
RAPID RVP RESULT: SIGNIFICANT CHANGE UP
RBC # BLD: 4.12 M/UL — SIGNIFICANT CHANGE UP (ref 3.8–5.2)
RBC # BLD: 4.22 M/UL — SIGNIFICANT CHANGE UP (ref 3.8–5.2)
RBC # FLD: 21.2 % — HIGH (ref 10.3–14.5)
RBC # FLD: 21.4 % — HIGH (ref 10.3–14.5)
SARS-COV-2 RNA SPEC QL NAA+PROBE: SIGNIFICANT CHANGE UP
SODIUM SERPL-SCNC: 145 MMOL/L — SIGNIFICANT CHANGE UP (ref 135–145)
TIBC SERPL-MCNC: 278 UG/DL — SIGNIFICANT CHANGE UP (ref 220–430)
TRANSFERRIN SERPL-MCNC: 211 MG/DL — SIGNIFICANT CHANGE UP (ref 200–360)
UIBC SERPL-MCNC: 258 UG/DL — SIGNIFICANT CHANGE UP (ref 110–370)
WBC # BLD: 7.5 K/UL — SIGNIFICANT CHANGE UP (ref 3.8–10.5)
WBC # BLD: 8.18 K/UL — SIGNIFICANT CHANGE UP (ref 3.8–10.5)
WBC # FLD AUTO: 7.5 K/UL — SIGNIFICANT CHANGE UP (ref 3.8–10.5)
WBC # FLD AUTO: 8.18 K/UL — SIGNIFICANT CHANGE UP (ref 3.8–10.5)

## 2024-02-16 PROCEDURE — 99233 SBSQ HOSP IP/OBS HIGH 50: CPT

## 2024-02-16 RX ORDER — NICOTINE POLACRILEX 2 MG
1 GUM BUCCAL DAILY
Refills: 0 | Status: DISCONTINUED | OUTPATIENT
Start: 2024-02-16 | End: 2024-02-19

## 2024-02-16 RX ORDER — ALBUTEROL 90 UG/1
2 AEROSOL, METERED ORAL EVERY 6 HOURS
Refills: 0 | Status: DISCONTINUED | OUTPATIENT
Start: 2024-02-16 | End: 2024-02-20

## 2024-02-16 RX ORDER — LACTULOSE 10 G/15ML
20 SOLUTION ORAL
Refills: 0 | Status: DISCONTINUED | OUTPATIENT
Start: 2024-02-16 | End: 2024-02-27

## 2024-02-16 RX ORDER — IPRATROPIUM/ALBUTEROL SULFATE 18-103MCG
3 AEROSOL WITH ADAPTER (GRAM) INHALATION EVERY 6 HOURS
Refills: 0 | Status: DISCONTINUED | OUTPATIENT
Start: 2024-02-16 | End: 2024-02-16

## 2024-02-16 RX ORDER — TRAMADOL HYDROCHLORIDE 50 MG/1
50 TABLET ORAL DAILY
Refills: 0 | Status: DISCONTINUED | OUTPATIENT
Start: 2024-02-16 | End: 2024-02-16

## 2024-02-16 RX ORDER — INFLUENZA VIRUS VACCINE 15; 15; 15; 15 UG/.5ML; UG/.5ML; UG/.5ML; UG/.5ML
0.7 SUSPENSION INTRAMUSCULAR ONCE
Refills: 0 | Status: DISCONTINUED | OUTPATIENT
Start: 2024-02-16 | End: 2024-03-07

## 2024-02-16 RX ORDER — PANTOPRAZOLE SODIUM 20 MG/1
40 TABLET, DELAYED RELEASE ORAL
Refills: 0 | Status: DISCONTINUED | OUTPATIENT
Start: 2024-02-16 | End: 2024-02-29

## 2024-02-16 RX ORDER — TRAMADOL HYDROCHLORIDE 50 MG/1
50 TABLET ORAL DAILY
Refills: 0 | Status: DISCONTINUED | OUTPATIENT
Start: 2024-02-16 | End: 2024-02-23

## 2024-02-16 RX ORDER — AMLODIPINE BESYLATE 2.5 MG/1
10 TABLET ORAL DAILY
Refills: 0 | Status: DISCONTINUED | OUTPATIENT
Start: 2024-02-16 | End: 2024-02-29

## 2024-02-16 RX ORDER — ACETAMINOPHEN 500 MG
650 TABLET ORAL EVERY 6 HOURS
Refills: 0 | Status: DISCONTINUED | OUTPATIENT
Start: 2024-02-16 | End: 2024-02-29

## 2024-02-16 RX ORDER — ALPRAZOLAM 0.25 MG
0.25 TABLET ORAL ONCE
Refills: 0 | Status: DISCONTINUED | OUTPATIENT
Start: 2024-02-16 | End: 2024-02-21

## 2024-02-16 RX ORDER — IPRATROPIUM/ALBUTEROL SULFATE 18-103MCG
3 AEROSOL WITH ADAPTER (GRAM) INHALATION EVERY 6 HOURS
Refills: 0 | Status: DISCONTINUED | OUTPATIENT
Start: 2024-02-16 | End: 2024-02-29

## 2024-02-16 RX ORDER — ALPRAZOLAM 0.25 MG
0.25 TABLET ORAL DAILY
Refills: 0 | Status: DISCONTINUED | OUTPATIENT
Start: 2024-02-16 | End: 2024-02-19

## 2024-02-16 RX ORDER — LANOLIN ALCOHOL/MO/W.PET/CERES
3 CREAM (GRAM) TOPICAL AT BEDTIME
Refills: 0 | Status: DISCONTINUED | OUTPATIENT
Start: 2024-02-16 | End: 2024-02-29

## 2024-02-16 RX ADMIN — Medication 3 MILLIGRAM(S): at 22:35

## 2024-02-16 RX ADMIN — LACTULOSE 20 GRAM(S): 10 SOLUTION ORAL at 17:32

## 2024-02-16 RX ADMIN — Medication 3 MILLILITER(S): at 01:10

## 2024-02-16 RX ADMIN — Medication 0.25 MILLIGRAM(S): at 11:44

## 2024-02-16 RX ADMIN — AMLODIPINE BESYLATE 10 MILLIGRAM(S): 2.5 TABLET ORAL at 11:44

## 2024-02-16 RX ADMIN — PANTOPRAZOLE SODIUM 40 MILLIGRAM(S): 20 TABLET, DELAYED RELEASE ORAL at 09:45

## 2024-02-16 RX ADMIN — Medication 10 MILLIGRAM(S): at 17:32

## 2024-02-16 RX ADMIN — LACTULOSE 20 GRAM(S): 10 SOLUTION ORAL at 11:44

## 2024-02-16 RX ADMIN — TRAMADOL HYDROCHLORIDE 50 MILLIGRAM(S): 50 TABLET ORAL at 22:35

## 2024-02-16 NOTE — H&P ADULT - ATTENDING COMMENTS
69yo F with PMHx COPD (on 4-4.5 L home oxygen PRN), HTN, RA, anxiety presenting with chief complaint of blood in stools. HGB so far stable. Last admission was found to have a mass-like thickening of the distal sigmoid. Was to follow up outpatient but does not have insurance to do so. GI consulted in the ED for non urgent evaluation. Will check CBC in the AM. Restart home meds. Rest as per resident note above.

## 2024-02-16 NOTE — CONSULT NOTE ADULT - SUBJECTIVE AND OBJECTIVE BOX
[facility]  Bobby Mcdonald PA-C  [address] [place], [place] [address] [phone]      Chief Complaint:  Patient is a 68y old  Female who presents with a chief complaint of GIB bleed (16 Feb 2024 10:09)      HPI:67yo F with PMHx COPD (on 4-4.5 L home oxygen intermittently), HTN, RA, anxiety presenting with chief complaint of blood in stools. Patient states for the last two months, she has intermittently noticed blood in stool. She states she has episodes where she has to run to the bathroom to relieve herself, and has a watery, bloody BM with clots. Other times she has soft formed stools without blood. Patient denies any abdominal pain, nausea, vomiting weight loss, constipation or melena. Patient was recently seen at [facility] ED on 2/12/24 with similar symptoms, CT showed "Masslike mural thickening of the distal sigmoid colon." Patient states she doesn't have insurance and is unable to follow up with GI outpatient for further evaluation    Allergies:  No Known Allergies      Medications:  acetaminophen     Tablet .. 650 milliGRAM(s) Oral every 6 hours PRN  albuterol    90 MICROgram(s) HFA Inhaler 2 Puff(s) Inhalation every 6 hours PRN  albuterol/ipratropium for Nebulization 3 milliLiter(s) Nebulizer every 6 hours PRN  ALPRAZolam 0.25 milliGRAM(s) Oral daily PRN  amLODIPine   Tablet 10 milliGRAM(s) Oral daily  melatonin 3 milliGRAM(s) Oral at bedtime PRN  nicotine -  14 mG/24Hr(s) Patch 1 Patch Transdermal daily  pantoprazole    Tablet 40 milliGRAM(s) Oral before breakfast  traMADol 50 milliGRAM(s) Oral daily PRN      PMHX/PSHX:  COPD (chronic obstructive pulmonary disease)    Rheumatoid arthritis    HTN (hypertension)    Anxiety    No significant past surgical history        Family history:  FHx: lung cancer        Social History:     ROS:     General:  no fevers, chills, night sweats, fatigue,   Eyes:  Good vision, no reported pain  ENT:  No sore throat, pain, runny nose, dysphagia  CV:  No pain, palpitations, hypo/hypertension  Resp:  No dyspnea, cough, tachypnea, wheezing  GI:  No pain, No nausea, No vomiting, No diarrhea, No constipation, No weight loss, No fever, No pruritis, + rectal bleeding, No tarry stools, No dysphagia,  :  No pain, bleeding, incontinence, nocturia  Muscle:  No pain, weakness  Neuro:  No weakness, tingling, memory problems  Psych:  No fatigue, insomnia, mood problems, depression  Endocrine:  No polyuria, polydipsia, cold/heat intolerance  Heme:  No petechiae, ecchymosis, easy bruisability  Skin:  No rash, tattoos, scars, edema      PHYSICAL EXAM:   Vital Signs:  Vital Signs Last 24 Hrs  T(C): 36.6 (16 Feb 2024 07:50), Max: 36.8 (16 Feb 2024 04:57)  T(F): 97.9 (16 Feb 2024 07:50), Max: 98.3 (16 Feb 2024 04:57)  HR: 101 (16 Feb 2024 10:47) (85 - 113)  BP: 113/74 (16 Feb 2024 10:47) (113/74 - 144/77)  BP(mean): --  RR: 18 (16 Feb 2024 10:47) (18 - 20)  SpO2: 98% (16 Feb 2024 10:47) (96% - 100%)    Parameters below as of 16 Feb 2024 10:47  Patient On (Oxygen Delivery Method): nasal cannula      Daily Height in cm: 165.1 (15 Feb 2024 19:37)    Daily     GENERAL:  Appears stated age,   HEENT:  NC/AT,    CHEST:  Full & symmetric excursion,   HEART:  Regular rhythm  ABDOMEN:  Soft, non-tender, non-distended,   EXTEREMITIES:  no cyanosis,clubbing or edema  SKIN:  No rash  NEURO:  Alert,    LABS:                        10.6   9.64  )-----------( 395      ( 15 Feb 2024 21:20 )             36.1     02-15    143  |  106  |  18  ----------------------------<  131<H>  3.7   |  31  |  1.10    Ca    10.3<H>      15 Feb 2024 21:20    TPro  7.3  /  Alb  3.1<L>  /  TBili  0.5  /  DBili  x   /  AST  9<L>  /  ALT  16  /  AlkPhos  55  02-15    LIVER FUNCTIONS - ( 15 Feb 2024 21:20 )  Alb: 3.1 g/dL / Pro: 7.3 g/dL / ALK PHOS: 55 U/L / ALT: 16 U/L / AST: 9 U/L / GGT: x           PT/INR - ( 15 Feb 2024 21:20 )   PT: 12.1 sec;   INR: 1.04 ratio         PTT - ( 15 Feb 2024 21:20 )  PTT:31.0 sec  Urinalysis Basic - ( 15 Feb 2024 21:20 )    Color: x / Appearance: x / SG: x / pH: x  Gluc: 131 mg/dL / Ketone: x  / Bili: x / Urobili: x   Blood: x / Protein: x / Nitrite: x   Leuk Esterase: x / RBC: x / WBC x   Sq Epi: x / Non Sq Epi: x / Bacteria: x      Amylase Serum--      Lipase serum23       Ammonia--      Imaging:

## 2024-02-16 NOTE — PATIENT PROFILE ADULT - FALL HARM RISK - HARM RISK INTERVENTIONS

## 2024-02-16 NOTE — CARE COORDINATION ASSESSMENT. - REFERRED BY
Case management consult noted for no insurance.  Met with patient whom confirmed no insurance.  Call palced to medicaid office and they have patient on their list to speak with

## 2024-02-16 NOTE — CARE COORDINATION ASSESSMENT. - OTHER PERTINENT DISCHARGE PLANNING INFORMATION:
Met with patient at bedside to discuss the role of case management with verbalized understanding.  Patient seen in ED and anticipate inpatient admission.  Patient admitted with GI bleed.  Patient states she has no insurance and was referred to medicaid office.  Patient states she has non functioning o2, and will try to assist patient.  Currently patient on room air in ED.  PCP Dr Carlos Beal Started first trimester

## 2024-02-16 NOTE — PROGRESS NOTE ADULT - SUBJECTIVE AND OBJECTIVE BOX
Continue PT as scheduled.        Patient is a 68y old  Female who presents with a chief complaint of GIB bleed (16 Feb 2024 00:02)      INTERVAL HPI/OVERNIGHT EVENTS:    pt feeling well but visibly anxious    denies any abdominal pain    MEDICATIONS  (STANDING):  amLODIPine   Tablet 10 milliGRAM(s) Oral daily  nicotine -  14 mG/24Hr(s) Patch 1 Patch Transdermal daily  pantoprazole    Tablet 40 milliGRAM(s) Oral before breakfast    MEDICATIONS  (PRN):  acetaminophen     Tablet .. 650 milliGRAM(s) Oral every 6 hours PRN Temp greater or equal to 38C (100.4F), Mild Pain (1 - 3)  albuterol    90 MICROgram(s) HFA Inhaler 2 Puff(s) Inhalation every 6 hours PRN for shortness of breath and/or wheezing  albuterol/ipratropium for Nebulization 3 milliLiter(s) Nebulizer every 6 hours PRN Shortness of Breath and/or Wheezing  ALPRAZolam 0.25 milliGRAM(s) Oral daily PRN for anxiety  melatonin 3 milliGRAM(s) Oral at bedtime PRN Insomnia  traMADol 50 milliGRAM(s) Oral daily PRN Moderate Pain (4 - 6)      Allergies    No Known Allergies    Intolerances        REVIEW OF SYSTEMS:  as above    Vital Signs Last 24 Hrs  T(C): 36.6 (16 Feb 2024 07:50), Max: 36.8 (16 Feb 2024 04:57)  T(F): 97.9 (16 Feb 2024 07:50), Max: 98.3 (16 Feb 2024 04:57)  HR: 113 (16 Feb 2024 07:50) (85 - 113)  BP: 141/62 (16 Feb 2024 07:50) (137/68 - 144/77)  BP(mean): --  RR: 19 (16 Feb 2024 07:50) (19 - 20)  SpO2: 96% (16 Feb 2024 07:50) (96% - 100%)    Parameters below as of 16 Feb 2024 07:50  Patient On (Oxygen Delivery Method): nasal cannula        PHYSICAL EXAM:  GENERAL: ++ anxious appearing  HEENT: head NC/AT; EOMI, conjunctiva & sclera clear; hearing grossly intact, moist mucous membranes  NECK: supple, no JVD  RESPIRATORY: ++ decreased BS b/l, no rales, rhonchi or rubs  CARDIOVASCULAR: S1&S2, RRR, no murmurs or gallops  ABDOMEN: soft, non-tender, ++ distended, + Bowel sounds x4 quadrants, no guarding, rebound or rigidity  MUSCULOSKELETAL:  arthritic fingers, no edema of all 4 extremities  SKIN: warm and dry, color normal  NEUROLOGIC: AA&O X3, no focal deficits  Psych: Normal mood, anxious    LABS:                        10.6   9.64  )-----------( 395      ( 15 Feb 2024 21:20 )             36.1     15 Feb 2024 21:20    143    |  106    |  18     ----------------------------<  131    3.7     |  31     |  1.10     Ca    10.3       15 Feb 2024 21:20    TPro  7.3    /  Alb  3.1    /  TBili  0.5    /  DBili  x      /  AST  9      /  ALT  16     /  AlkPhos  55     15 Feb 2024 21:20    PT/INR - ( 15 Feb 2024 21:20 )   PT: 12.1 sec;   INR: 1.04 ratio         PTT - ( 15 Feb 2024 21:20 )  PTT:31.0 sec  Urinalysis Basic - ( 15 Feb 2024 21:20 )    Color: x / Appearance: x / SG: x / pH: x  Gluc: 131 mg/dL / Ketone: x  / Bili: x / Urobili: x   Blood: x / Protein: x / Nitrite: x   Leuk Esterase: x / RBC: x / WBC x   Sq Epi: x / Non Sq Epi: x / Bacteria: x      CAPILLARY BLOOD GLUCOSE          RADIOLOGY & ADDITIONAL TESTS:

## 2024-02-16 NOTE — PROGRESS NOTE ADULT - PROBLEM SELECTOR PLAN 2
Chronic, recently admitted for COPD exacerbation  - Patient states current SOB likely due to anxiety  - Duonebs q6 PRN  - Continue home inhalers  - Supplemental O2 PRN. At baseline: 4-4.5 L home oxygen intermittently. COPD patient will keep SpO2 goal 88-93%   - Monitor respiratory status   - 3 weeks ago had rsv, recent rvp negative  - Continuous pulse ox Chronic, recently admitted for COPD exacerbation  - Patient states current SOB likely due to anxiety  - Duonebs q6 PRN  - Continue home inhalers  - Supplemental O2 PRN. At baseline: 4-4.5 L home oxygen intermittently. COPD patient will keep SpO2 goal 88-93%   - Monitor respiratory status   - 3 weeks ago had rsv, recent rvp negative  - Continuous pulse ox  consulted Dr. Dobson pulmonary for clearance

## 2024-02-16 NOTE — CARE COORDINATION ASSESSMENT. - NS SW READMITTED REASON
Patient previously admitted for RSV, and returns with GI Bleed/current reason for admission unrelated to previous admission

## 2024-02-16 NOTE — H&P ADULT - HISTORY OF PRESENT ILLNESS
***INCOMPLETE***    69yo F with PMHx COPD (w/ home oxygen), HTN, RA, anxiety presenting with chief complaint of blood in stools. Patient states for the last two months, she has intermittently noticed blood in stool. She states she has episodes where she has to run to the bathroom to relieve herself, and has a watery, bloody BM with clots. Other times she has soft formed stools without blood. Patient denies any abdominal pain, nausea, vomiting weight loss, constipation or melena. Patient was recently seen at [facility] ED on 2/12/24 with similar symptoms, CT showed "Masslike mural thickening of the distal sigmoid colon." Patient states she doesn't have insurance and is unable to follow up with GI outpatient for further evaluation. Patient also has mild shortness of breath on admission, states it is mainly due to anxiety, and chronically takes Xanax 0.25 mg daily as needed. She has COPD and sometimes needs to use home O2. She states currently doesn't feel like shes having a COPD exacerbation.       ED Course:   Vitals: BP: 144/77, HR: 98, Temp: 98.1, RR: 20, SpO2: 100% on 3L  Labs: FOBT positive, HGB 10.6, MCV 77.5  CT AP:  Masslike mural thickening of the distal sigmoid colon. Differential   considerations include malignancy or, less likely, sequelae of chronic   diverticulitis. Gastroenterology evaluation is recommended.    Scattered too small to characterize hypodense foci in the liver, hepatic   protocol MRI is recommended for further characterization.    EKG: Pending  Received in the ED:  Protonix   69yo F with PMHx COPD (w/ home oxygen), HTN, RA, anxiety presenting with chief complaint of blood in stools. Patient states for the last two months, she has intermittently noticed blood in stool. She states she has episodes where she has to run to the bathroom to relieve herself, and has a watery, bloody BM with clots. Other times she has soft formed stools without blood. Patient denies any abdominal pain, nausea, vomiting weight loss, constipation or melena. Patient was recently seen at [facility] ED on 2/12/24 with similar symptoms, CT showed "Masslike mural thickening of the distal sigmoid colon." Patient states she doesn't have insurance and is unable to follow up with GI outpatient for further evaluation. Patient also has mild shortness of breath on admission, states it is mainly due to anxiety, and chronically takes Xanax 0.25 mg daily as needed. She has COPD and sometimes needs to use home O2. She states currently doesn't feel like shes having a COPD exacerbation.       ED Course:   Vitals: BP: 144/77, HR: 98, Temp: 98.1, RR: 20, SpO2: 100% on 3L  Labs: FOBT positive, HGB 10.6, MCV 77.5  CT AP:  Masslike mural thickening of the distal sigmoid colon. Differential   considerations include malignancy or, less likely, sequelae of chronic   diverticulitis. Gastroenterology evaluation is recommended.    Scattered too small to characterize hypodense foci in the liver, hepatic   protocol MRI is recommended for further characterization.    EKG: Pending  Received in the ED:  Protonix   69yo F with PMHx COPD (on 4-4.5 L home oxygen intermittently), HTN, RA, anxiety presenting with chief complaint of blood in stools. Patient states for the last two months, she has intermittently noticed blood in stool. She states she has episodes where she has to run to the bathroom to relieve herself, and has a watery, bloody BM with clots. Other times she has soft formed stools without blood. Patient denies any abdominal pain, nausea, vomiting weight loss, constipation or melena. Patient was recently seen at [facility] ED on 2/12/24 with similar symptoms, CT showed "Masslike mural thickening of the distal sigmoid colon." Patient states she doesn't have insurance and is unable to follow up with GI outpatient for further evaluation. Patient also has mild shortness of breath on admission, states it is mainly due to anxiety, and chronically takes Xanax 0.25 mg daily as needed. She has COPD and sometimes needs to use home O2. She states currently doesn't feel like shes having a COPD exacerbation.       ED Course:   Vitals: BP: 144/77, HR: 98, Temp: 98.1, RR: 20, SpO2: 100% on 3L  Labs: FOBT positive, HGB 10.6, MCV 77.5  CT AP:  Masslike mural thickening of the distal sigmoid colon. Differential   considerations include malignancy or, less likely, sequelae of chronic   diverticulitis. Gastroenterology evaluation is recommended.    Scattered too small to characterize hypodense foci in the liver, hepatic   protocol MRI is recommended for further characterization.    EKG: Pending  Received in the ED:  Protonix

## 2024-02-16 NOTE — H&P ADULT - NSHPPHYSICALEXAM_GEN_ALL_CORE
Physical Exam:   GENERAL: ++ anxious appearing  HEENT: head NC/AT; EOMI, conjunctiva & sclera clear; hearing grossly intact, moist mucous membranes  NECK: supple, no JVD  RESPIRATORY: ++ decreased BS b/l, no rales, rhonchi or rubs  CARDIOVASCULAR: S1&S2, RRR, no murmurs or gallops  ABDOMEN: soft, non-tender, ++ distended, + Bowel sounds x4 quadrants, no guarding, rebound or rigidity  MUSCULOSKELETAL:  no clubbing, cyanosis or edema of all 4 extremities  SKIN: warm and dry, color normal  NEUROLOGIC: AA&O X3, no focal deficits  Psych: Normal mood, anxious

## 2024-02-16 NOTE — H&P ADULT - NSHPREVIEWOFSYSTEMS_GEN_ALL_CORE
REVIEW OF SYSTEMS:  CONSTITUTIONAL: No fever/chills or appetite changes.  HENMT: No HA, lightheadedness/dizziness  RESPIRATORY: No cough, wheezing, hemoptysis; ++ shortness of breath.  CARDIOVASCULAR: No chest pain, palpitations.  GASTROINTESTINAL: ++ rectal bleeding, ++ diarrhea No abdominal or epigastric pain. No nausea or vomiting; No constipation.  GENITOURINARY: No dysuria, changes in frequency, hematuria, or incontinence  NEUROLOGICAL: Baseline strength. Sensation intact bilaterally.  SKIN: No itching, rashes  MUSCULOSKELETAL: No joint pain or swelling; No muscle, back, or extremity pain

## 2024-02-16 NOTE — PROGRESS NOTE ADULT - ASSESSMENT
67yo F with PMHx COPD (not on home oxygen), HTN, RA, anxiety presenting with chief complaint of blood in stools. Admitted for GI bleed.

## 2024-02-16 NOTE — H&P ADULT - PROBLEM SELECTOR PLAN 3
Chronic, BP on admission 144/77  - Continue home amlodipine 10mg qd with hold parameters  - Monitor hemodynamics  - DASH/TLC diet

## 2024-02-16 NOTE — H&P ADULT - PROBLEM SELECTOR PLAN 1
Patient presents with BRBPR, diarrhea with clots 2/2 lower GI bleed   CT AP: Masslike mural thickening of the distal sigmoid colon. Differential considerations include malignancy or, less likely, sequelae of chronic diverticulitis. Gastroenterology evaluation is recommended.  Scattered too small to characterize hypodense foci in the liver, hepatic protocol MRI is recommended for further characterization.  -Patient unable to get outpatient colonoscopy (no insurance)  -S/p Protonix in ED   -HGB 10.8 currently stable, no further bleeding. Check rpt CBC in AM. continue to monitor   - f/u AM iron studies (microcytic anemia)  -Hold all chemical anticoagulation, hold home aspirin  -Vital signs currently stable, continue to monitor  -Transfuse prn for Hgb <7  -Type and screen active  - GI consulted Patient presents with BRBPR, diarrhea with clots 2/2 lower GI bleed   CT AP: Masslike mural thickening of the distal sigmoid colon. Differential considerations include malignancy or, less likely, sequelae of chronic diverticulitis. Gastroenterology evaluation is recommended.  Scattered too small to characterize hypodense foci in the liver, hepatic protocol MRI is recommended for further characterization.  -Patient unable to get outpatient colonoscopy (no insurance)  -S/p Protonix in ED   -HGB 10.8 currently stable, no further bleeding. Check rpt CBC in AM. continue to monitor   - f/u AM iron studies (microcytic anemia), LDH, haptoglobin, reticulocyte count  -Hold all chemical anticoagulation, hold home aspirin  -Vital signs currently stable, continue to monitor  -Transfuse prn for Hgb <7  -Type and screen active  - GI consulted

## 2024-02-16 NOTE — H&P ADULT - ASSESSMENT
69yo F with PMHx COPD (not on home oxygen), HTN, RA, anxiety presenting with chief complaint of blood in stools. Admitted for GI bleed.

## 2024-02-16 NOTE — ED ADULT NURSE REASSESSMENT NOTE - NS ED NURSE REASSESS COMMENT FT1
(shift change) Report obtained from S.C, Pt sitting up in stretcher no acute distress noted at this time, respiration even and unlabored,  pt iv site noted to be dry and intact, no signs of infiltration noted, safey checks completed, no active bleeding noted at this time. will continue to monitor pt. Won DAVIS
Report received from previous RN.  Assumed patient care at this time @2300.  Patient is alert to person, place, and time. Denies pain.  pt requesting oxygen upon assessment. pt states she uses 4-5 liters O2 at home intermittently for COPD. pt placed on cont puilse ox, VS stable (see flowsheet).

## 2024-02-16 NOTE — CARE COORDINATION ASSESSMENT. - NSCAREPROVIDERS_GEN_ALL_CORE_FT
CARE PROVIDERS:  Accepting Physician: Lata Roman  Administration: Dillan Medina  Administration: Leodan Caballero  Admitting: Lata Roman  Attending: Lata Roman  Case Management: Dannie Massey  Consultant: Daniel Goodman  Covering Team: Luther Bowers ED Attending: Adán García  ED Nurse: Oscar Shah  Emergency Medicine: Adán García  Nurse: Opehlia Sr  Nurse: Sade Bradley  Nurse: Noemi Falk  Nurse: Rasheed Sorto  Nurse: Leodan Esquivel  Outpatient Provider: Gregory Rizvi  Override: Ophelia Sr  Override: Brigida Peck  PCA/Nursing Assistant: Pattie Wylie  PCA/Nursing Assistant: Umm Shukla  PCA/Nursing Assistant: Giselle Watters  Primary Team: Kalyan Cooley  Primary Team: Lata Roman  Respiratory Therapy: Gabriel Nichols  : Domonique Machuca  Team: [facility], Team  UR// Supp. Assoc.: Brii Kaplan

## 2024-02-16 NOTE — H&P ADULT - PROBLEM SELECTOR PLAN 2
Chronic, recently admitted for COPD exacerbation  - Patient states current SOB likely due to anxiety  - Duonebs q6  - Continue home inhalers  - Supplemental O2 PRN. Will attempt to wean patient to baseline O2 status. COPD patient will keep SpO2 goal 88-93%   - Monitor respiratory status   - F/u RVP  - Continuous pulse ox Chronic, recently admitted for COPD exacerbation  - Patient states current SOB likely due to anxiety  - Duonebs q6 PRN  - Continue home inhalers  - Supplemental O2 PRN. At baseline: 4-4.5 L home oxygen intermittently. COPD patient will keep SpO2 goal 88-93%   - Monitor respiratory status   - F/u RVP  - Continuous pulse ox

## 2024-02-16 NOTE — CONSULT NOTE ADULT - ASSESSMENT
constipation  rectal bleed  abnormal CT    CT noted; suspected sigmoid mass  no prior h/o colonoscopy  will plan for colonoscopy tuesday (endoscopy closed monday)  lactulose bid given large fecal burden noted on ct  will need pulm clearance given copd  d/w patient

## 2024-02-17 LAB
ALBUMIN SERPL ELPH-MCNC: 2.8 G/DL — LOW (ref 3.3–5)
ALP SERPL-CCNC: 54 U/L — SIGNIFICANT CHANGE UP (ref 40–120)
ALT FLD-CCNC: 15 U/L — SIGNIFICANT CHANGE UP (ref 12–78)
ANION GAP SERPL CALC-SCNC: 2 MMOL/L — LOW (ref 5–17)
AST SERPL-CCNC: 11 U/L — LOW (ref 15–37)
BASOPHILS # BLD AUTO: 0.02 K/UL — SIGNIFICANT CHANGE UP (ref 0–0.2)
BASOPHILS NFR BLD AUTO: 0.3 % — SIGNIFICANT CHANGE UP (ref 0–2)
BILIRUB SERPL-MCNC: 0.8 MG/DL — SIGNIFICANT CHANGE UP (ref 0.2–1.2)
BUN SERPL-MCNC: 17 MG/DL — SIGNIFICANT CHANGE UP (ref 7–23)
CALCIUM SERPL-MCNC: 9.6 MG/DL — SIGNIFICANT CHANGE UP (ref 8.5–10.1)
CHLORIDE SERPL-SCNC: 108 MMOL/L — SIGNIFICANT CHANGE UP (ref 96–108)
CHOLEST SERPL-MCNC: 213 MG/DL — HIGH
CO2 SERPL-SCNC: 35 MMOL/L — HIGH (ref 22–31)
CREAT SERPL-MCNC: 0.92 MG/DL — SIGNIFICANT CHANGE UP (ref 0.5–1.3)
EGFR: 68 ML/MIN/1.73M2 — SIGNIFICANT CHANGE UP
EOSINOPHIL # BLD AUTO: 0.07 K/UL — SIGNIFICANT CHANGE UP (ref 0–0.5)
EOSINOPHIL NFR BLD AUTO: 1.1 % — SIGNIFICANT CHANGE UP (ref 0–6)
GLUCOSE SERPL-MCNC: 115 MG/DL — HIGH (ref 70–99)
HAPTOGLOB SERPL-MCNC: 264 MG/DL — HIGH (ref 34–200)
HCT VFR BLD CALC: 34.6 % — SIGNIFICANT CHANGE UP (ref 34.5–45)
HDLC SERPL-MCNC: 64 MG/DL — SIGNIFICANT CHANGE UP
HGB BLD-MCNC: 9.9 G/DL — LOW (ref 11.5–15.5)
IMM GRANULOCYTES NFR BLD AUTO: 0.5 % — SIGNIFICANT CHANGE UP (ref 0–0.9)
LDH SERPL L TO P-CCNC: 144 U/L — SIGNIFICANT CHANGE UP (ref 50–242)
LIPID PNL WITH DIRECT LDL SERPL: 134 MG/DL — HIGH
LYMPHOCYTES # BLD AUTO: 1.25 K/UL — SIGNIFICANT CHANGE UP (ref 1–3.3)
LYMPHOCYTES # BLD AUTO: 19.8 % — SIGNIFICANT CHANGE UP (ref 13–44)
MCHC RBC-ENTMCNC: 22.7 PG — LOW (ref 27–34)
MCHC RBC-ENTMCNC: 28.6 GM/DL — LOW (ref 32–36)
MCV RBC AUTO: 79.4 FL — LOW (ref 80–100)
MONOCYTES # BLD AUTO: 0.71 K/UL — SIGNIFICANT CHANGE UP (ref 0–0.9)
MONOCYTES NFR BLD AUTO: 11.2 % — SIGNIFICANT CHANGE UP (ref 2–14)
NEUTROPHILS # BLD AUTO: 4.24 K/UL — SIGNIFICANT CHANGE UP (ref 1.8–7.4)
NEUTROPHILS NFR BLD AUTO: 67.1 % — SIGNIFICANT CHANGE UP (ref 43–77)
NON HDL CHOLESTEROL: 149 MG/DL — HIGH
NRBC # BLD: 0 /100 WBCS — SIGNIFICANT CHANGE UP (ref 0–0)
PLATELET # BLD AUTO: 355 K/UL — SIGNIFICANT CHANGE UP (ref 150–400)
POTASSIUM SERPL-MCNC: 4.3 MMOL/L — SIGNIFICANT CHANGE UP (ref 3.5–5.3)
POTASSIUM SERPL-SCNC: 4.3 MMOL/L — SIGNIFICANT CHANGE UP (ref 3.5–5.3)
PROT SERPL-MCNC: 6.7 G/DL — SIGNIFICANT CHANGE UP (ref 6–8.3)
RBC # BLD: 4.36 M/UL — SIGNIFICANT CHANGE UP (ref 3.8–5.2)
RBC # BLD: 4.36 M/UL — SIGNIFICANT CHANGE UP (ref 3.8–5.2)
RBC # FLD: 21.5 % — HIGH (ref 10.3–14.5)
RETICS #: 106.8 K/UL — SIGNIFICANT CHANGE UP (ref 25–125)
RETICS/RBC NFR: 2.5 % — SIGNIFICANT CHANGE UP (ref 0.5–2.5)
SODIUM SERPL-SCNC: 145 MMOL/L — SIGNIFICANT CHANGE UP (ref 135–145)
TRIGL SERPL-MCNC: 84 MG/DL — SIGNIFICANT CHANGE UP
WBC # BLD: 6.32 K/UL — SIGNIFICANT CHANGE UP (ref 3.8–10.5)
WBC # FLD AUTO: 6.32 K/UL — SIGNIFICANT CHANGE UP (ref 3.8–10.5)

## 2024-02-17 PROCEDURE — 99232 SBSQ HOSP IP/OBS MODERATE 35: CPT

## 2024-02-17 RX ORDER — MULTIVIT WITH MIN/MFOLATE/K2 340-15/3 G
296 POWDER (GRAM) ORAL ONCE
Refills: 0 | Status: COMPLETED | OUTPATIENT
Start: 2024-02-17 | End: 2024-02-17

## 2024-02-17 RX ADMIN — TRAMADOL HYDROCHLORIDE 50 MILLIGRAM(S): 50 TABLET ORAL at 21:30

## 2024-02-17 RX ADMIN — Medication 0.25 MILLIGRAM(S): at 15:06

## 2024-02-17 RX ADMIN — LACTULOSE 20 GRAM(S): 10 SOLUTION ORAL at 06:10

## 2024-02-17 RX ADMIN — TRAMADOL HYDROCHLORIDE 50 MILLIGRAM(S): 50 TABLET ORAL at 21:06

## 2024-02-17 RX ADMIN — AMLODIPINE BESYLATE 10 MILLIGRAM(S): 2.5 TABLET ORAL at 06:09

## 2024-02-17 RX ADMIN — LACTULOSE 20 GRAM(S): 10 SOLUTION ORAL at 18:19

## 2024-02-17 RX ADMIN — Medication 3 MILLIGRAM(S): at 21:06

## 2024-02-17 RX ADMIN — Medication 10 MILLIGRAM(S): at 06:10

## 2024-02-17 RX ADMIN — Medication 296 MILLILITER(S): at 17:07

## 2024-02-17 RX ADMIN — PANTOPRAZOLE SODIUM 40 MILLIGRAM(S): 20 TABLET, DELAYED RELEASE ORAL at 06:10

## 2024-02-17 NOTE — PROGRESS NOTE ADULT - SUBJECTIVE AND OBJECTIVE BOX
[facility]  Bobby Mcdonald PA-C  [address] [place] [address]  [phone]      INTERVAL HPI/OVERNIGHT EVENTS:  Pt s/e  Tolerating diet  Still no BM  No abdominal pain or further GI complaints  Awaiting colonoscopy Tuesday    MEDICATIONS  (STANDING):  amLODIPine   Tablet 10 milliGRAM(s) Oral daily  influenza  Vaccine (HIGH DOSE) 0.7 milliLiter(s) IntraMuscular once  lactulose Syrup 20 Gram(s) Oral two times a day  nicotine -  14 mG/24Hr(s) Patch 1 Patch Transdermal daily  pantoprazole    Tablet 40 milliGRAM(s) Oral before breakfast  predniSONE   Tablet 10 milliGRAM(s) Oral daily    MEDICATIONS  (PRN):  acetaminophen     Tablet .. 650 milliGRAM(s) Oral every 6 hours PRN Temp greater or equal to 38C (100.4F), Mild Pain (1 - 3)  albuterol    90 MICROgram(s) HFA Inhaler 2 Puff(s) Inhalation every 6 hours PRN for shortness of breath and/or wheezing  albuterol/ipratropium for Nebulization 3 milliLiter(s) Nebulizer every 6 hours PRN Shortness of Breath and/or Wheezing  ALPRAZolam 0.25 milliGRAM(s) Oral daily PRN for anxiety  ALPRAZolam 0.25 milliGRAM(s) Oral once PRN anxiety  melatonin 3 milliGRAM(s) Oral at bedtime PRN Insomnia  traMADol 50 milliGRAM(s) Oral daily PRN Moderate Pain (4 - 6)      Allergies    No Known Allergies        PHYSICAL EXAM:   Vital Signs:  Vital Signs Last 24 Hrs  T(C): 36.5 (2024 04:30), Max: 37.1 (2024 12:40)  T(F): 97.7 (2024 04:30), Max: 98.7 (2024 12:40)  HR: 97 (2024 04:30) (97 - 108)  BP: 141/79 (2024 04:30) (132/63 - 154/73)  BP(mean): --  RR: 18 (2024 04:30) (18 - 19)  SpO2: 95% (2024 04:30) (93% - 96%)    Parameters below as of 2024 04:30  Patient On (Oxygen Delivery Method): nasal cannula  O2 Flow (L/min): 2    Daily     Daily Weight in k.3 (2024 04:30)    GENERAL:  Appears stated age  HEENT:  NC/AT  CHEST:  Full & symmetric excursion  HEART:  Regular rhythm  ABDOMEN:  Soft, non-tender, non-distended  EXTEREMITIES:  no cyanosis  SKIN:  No rash  NEURO:  Alert      LABS:                        9.9    6.32  )-----------( 355      ( 2024 06:45 )             34.6     02-17    145  |  108  |  17  ----------------------------<  115<H>  4.3   |  35<H>  |  0.92    Ca    9.6      2024 06:45    TPro  6.7  /  Alb  2.8<L>  /  TBili  0.8  /  DBili  x   /  AST  11<L>  /  ALT  15  /  AlkPhos  54  02-17    PT/INR - ( 15 Feb 2024 21:20 )   PT: 12.1 sec;   INR: 1.04 ratio         PTT - ( 15 Feb 2024 21:20 )  PTT:31.0 sec  Urinalysis Basic - ( 2024 06:45 )    Color: x / Appearance: x / SG: x / pH: x  Gluc: 115 mg/dL / Ketone: x  / Bili: x / Urobili: x   Blood: x / Protein: x / Nitrite: x   Leuk Esterase: x / RBC: x / WBC x   Sq Epi: x / Non Sq Epi: x / Bacteria: x

## 2024-02-17 NOTE — PROGRESS NOTE ADULT - PROBLEM SELECTOR PLAN 2
Chronic, recently admitted for COPD exacerbation  - Patient states current SOB likely due to anxiety  - Duonebs q6 PRN  - Continue home inhalers  - Supplemental O2 PRN. At baseline: 4-4.5 L home oxygen intermittently. COPD patient will keep SpO2 goal 88-93%   - Monitor respiratory status   - 3 weeks ago had rsv, recent rvp negative  - Continuous pulse ox  consulted Dr. Dobson pulmonary for clearance

## 2024-02-17 NOTE — CHART NOTE - NSCHARTNOTEFT_GEN_A_CORE
consult dictated    Impression:    COPD  Chronic Hypoxic respiratory failure  Recent RSV bronchitis  GI Bleed    Plan:    In best possible condition for proposed GI procedure  Continue oxygen + inhalers

## 2024-02-17 NOTE — PROGRESS NOTE ADULT - SUBJECTIVE AND OBJECTIVE BOX
Patient is a 68y old  Female who presents with a chief complaint of GIB bleed (17 Feb 2024 10:59)      INTERVAL HPI/OVERNIGHT EVENTS: Patient seen and examined at bedside. No overnight events. Tolerating diet. Denies fever, chills, chest pain, shortness of breath, abdominal pain, nausea/vomiting, headache.    MEDICATIONS  (STANDING):  amLODIPine   Tablet 10 milliGRAM(s) Oral daily  influenza  Vaccine (HIGH DOSE) 0.7 milliLiter(s) IntraMuscular once  lactulose Syrup 20 Gram(s) Oral two times a day  nicotine -  14 mG/24Hr(s) Patch 1 Patch Transdermal daily  pantoprazole    Tablet 40 milliGRAM(s) Oral before breakfast  predniSONE   Tablet 10 milliGRAM(s) Oral daily    MEDICATIONS  (PRN):  acetaminophen     Tablet .. 650 milliGRAM(s) Oral every 6 hours PRN Temp greater or equal to 38C (100.4F), Mild Pain (1 - 3)  albuterol    90 MICROgram(s) HFA Inhaler 2 Puff(s) Inhalation every 6 hours PRN for shortness of breath and/or wheezing  albuterol/ipratropium for Nebulization 3 milliLiter(s) Nebulizer every 6 hours PRN Shortness of Breath and/or Wheezing  ALPRAZolam 0.25 milliGRAM(s) Oral once PRN anxiety  ALPRAZolam 0.25 milliGRAM(s) Oral daily PRN for anxiety  melatonin 3 milliGRAM(s) Oral at bedtime PRN Insomnia  traMADol 50 milliGRAM(s) Oral daily PRN Moderate Pain (4 - 6)      Allergies    No Known Allergies    Intolerances        REVIEW OF SYSTEMS:  CONSTITUTIONAL: No fever or chills  HEENT:  No headache, no sore throat  RESPIRATORY: No cough, wheezing, or shortness of breath  CARDIOVASCULAR: No chest pain, palpitations  GASTROINTESTINAL: No abd pain, nausea, vomiting, or diarrhea  GENITOURINARY: No dysuria, frequency, or hematuria  NEUROLOGICAL: no focal weakness or dizziness  MUSCULOSKELETAL: no myalgias     Vital Signs Last 24 Hrs  T(C): 36.8 (17 Feb 2024 20:23), Max: 37 (16 Feb 2024 21:00)  T(F): 98.2 (17 Feb 2024 20:23), Max: 98.6 (16 Feb 2024 21:00)  HR: 99 (17 Feb 2024 20:23) (97 - 108)  BP: 137/71 (17 Feb 2024 20:23) (130/73 - 141/79)  BP(mean): --  RR: 18 (17 Feb 2024 20:23) (18 - 18)  SpO2: 97% (17 Feb 2024 20:23) (94% - 97%)    Parameters below as of 17 Feb 2024 20:23  Patient On (Oxygen Delivery Method): nasal cannula  O2 Flow (L/min): 2      PHYSICAL EXAM:  GENERAL: NAD  HEENT:  anicteric, moist mucous membranes  CHEST/LUNG:  CTA b/l, no rales, wheezes, or rhonchi  HEART:  RRR, S1, S2  ABDOMEN:  BS+, soft, nontender, nondistended  EXTREMITIES: no edema, cyanosis, or calf tenderness  NERVOUS SYSTEM: answers questions and follows commands appropriately    LABS:                        9.9    6.32  )-----------( 355      ( 17 Feb 2024 06:45 )             34.6     CBC Full  -  ( 17 Feb 2024 06:45 )  WBC Count : 6.32 K/uL  Hemoglobin : 9.9 g/dL  Hematocrit : 34.6 %  Platelet Count - Automated : 355 K/uL  Mean Cell Volume : 79.4 fl  Mean Cell Hemoglobin : 22.7 pg  Mean Cell Hemoglobin Concentration : 28.6 gm/dL  Auto Neutrophil # : 4.24 K/uL  Auto Lymphocyte # : 1.25 K/uL  Auto Monocyte # : 0.71 K/uL  Auto Eosinophil # : 0.07 K/uL  Auto Basophil # : 0.02 K/uL  Auto Neutrophil % : 67.1 %  Auto Lymphocyte % : 19.8 %  Auto Monocyte % : 11.2 %  Auto Eosinophil % : 1.1 %  Auto Basophil % : 0.3 %    17 Feb 2024 06:45    145    |  108    |  17     ----------------------------<  115    4.3     |  35     |  0.92     Ca    9.6        17 Feb 2024 06:45    TPro  6.7    /  Alb  2.8    /  TBili  0.8    /  DBili  x      /  AST  11     /  ALT  15     /  AlkPhos  54     17 Feb 2024 06:45    PT/INR - ( 15 Feb 2024 21:20 )   PT: 12.1 sec;   INR: 1.04 ratio         PTT - ( 15 Feb 2024 21:20 )  PTT:31.0 sec  Urinalysis Basic - ( 17 Feb 2024 06:45 )    Color: x / Appearance: x / SG: x / pH: x  Gluc: 115 mg/dL / Ketone: x  / Bili: x / Urobili: x   Blood: x / Protein: x / Nitrite: x   Leuk Esterase: x / RBC: x / WBC x   Sq Epi: x / Non Sq Epi: x / Bacteria: x      CAPILLARY BLOOD GLUCOSE              RADIOLOGY & ADDITIONAL TESTS:    Personally reviewed.     Consultant(s) Notes Reviewed:  [x] YES  [ ] NO

## 2024-02-17 NOTE — PROGRESS NOTE ADULT - ASSESSMENT
constipation  rectal bleed  abnormal CT    CT noted; suspected sigmoid mass  no prior h/o colonoscopy  will plan for colonoscopy tuesday (endoscopy closed monday)  lactulose bid given large fecal burden noted on ct  will need pulm clearance given copd  d/w patient     I reviewed the overnight course of events on the unit, re-confirming the patient history. I discussed the care with the patient  Differential diagnosis and plan of care discussed with patient after the evaluation  35 minutes spent on total encounter of which more than fifty percent of the encounter was spent counseling and/or coordinating care by the attending physician.

## 2024-02-18 LAB
ALBUMIN SERPL ELPH-MCNC: 2.9 G/DL — LOW (ref 3.3–5)
ALP SERPL-CCNC: 57 U/L — SIGNIFICANT CHANGE UP (ref 40–120)
ALT FLD-CCNC: 14 U/L — SIGNIFICANT CHANGE UP (ref 12–78)
ANION GAP SERPL CALC-SCNC: 1 MMOL/L — LOW (ref 5–17)
AST SERPL-CCNC: 11 U/L — LOW (ref 15–37)
BILIRUB SERPL-MCNC: 0.4 MG/DL — SIGNIFICANT CHANGE UP (ref 0.2–1.2)
BUN SERPL-MCNC: 19 MG/DL — SIGNIFICANT CHANGE UP (ref 7–23)
CALCIUM SERPL-MCNC: 9.5 MG/DL — SIGNIFICANT CHANGE UP (ref 8.5–10.1)
CHLORIDE SERPL-SCNC: 107 MMOL/L — SIGNIFICANT CHANGE UP (ref 96–108)
CO2 SERPL-SCNC: 36 MMOL/L — HIGH (ref 22–31)
CREAT SERPL-MCNC: 1.1 MG/DL — SIGNIFICANT CHANGE UP (ref 0.5–1.3)
EGFR: 55 ML/MIN/1.73M2 — LOW
GLUCOSE SERPL-MCNC: 119 MG/DL — HIGH (ref 70–99)
HCT VFR BLD CALC: 34.9 % — SIGNIFICANT CHANGE UP (ref 34.5–45)
HGB BLD-MCNC: 9.9 G/DL — LOW (ref 11.5–15.5)
MCHC RBC-ENTMCNC: 22.8 PG — LOW (ref 27–34)
MCHC RBC-ENTMCNC: 28.4 GM/DL — LOW (ref 32–36)
MCV RBC AUTO: 80.4 FL — SIGNIFICANT CHANGE UP (ref 80–100)
NRBC # BLD: 0 /100 WBCS — SIGNIFICANT CHANGE UP (ref 0–0)
PLATELET # BLD AUTO: 360 K/UL — SIGNIFICANT CHANGE UP (ref 150–400)
POTASSIUM SERPL-MCNC: 4.3 MMOL/L — SIGNIFICANT CHANGE UP (ref 3.5–5.3)
POTASSIUM SERPL-SCNC: 4.3 MMOL/L — SIGNIFICANT CHANGE UP (ref 3.5–5.3)
PROT SERPL-MCNC: 6.8 G/DL — SIGNIFICANT CHANGE UP (ref 6–8.3)
RBC # BLD: 4.34 M/UL — SIGNIFICANT CHANGE UP (ref 3.8–5.2)
RBC # FLD: 21.1 % — HIGH (ref 10.3–14.5)
SODIUM SERPL-SCNC: 144 MMOL/L — SIGNIFICANT CHANGE UP (ref 135–145)
WBC # BLD: 8.16 K/UL — SIGNIFICANT CHANGE UP (ref 3.8–10.5)
WBC # FLD AUTO: 8.16 K/UL — SIGNIFICANT CHANGE UP (ref 3.8–10.5)

## 2024-02-18 PROCEDURE — 99232 SBSQ HOSP IP/OBS MODERATE 35: CPT

## 2024-02-18 RX ORDER — SENNA PLUS 8.6 MG/1
2 TABLET ORAL AT BEDTIME
Refills: 0 | Status: DISCONTINUED | OUTPATIENT
Start: 2024-02-18 | End: 2024-02-29

## 2024-02-18 RX ORDER — POLYETHYLENE GLYCOL 3350 17 G/17G
17 POWDER, FOR SOLUTION ORAL DAILY
Refills: 0 | Status: DISCONTINUED | OUTPATIENT
Start: 2024-02-18 | End: 2024-02-29

## 2024-02-18 RX ADMIN — Medication 10 MILLIGRAM(S): at 05:19

## 2024-02-18 RX ADMIN — TRAMADOL HYDROCHLORIDE 50 MILLIGRAM(S): 50 TABLET ORAL at 21:53

## 2024-02-18 RX ADMIN — LACTULOSE 20 GRAM(S): 10 SOLUTION ORAL at 18:04

## 2024-02-18 RX ADMIN — PANTOPRAZOLE SODIUM 40 MILLIGRAM(S): 20 TABLET, DELAYED RELEASE ORAL at 05:19

## 2024-02-18 RX ADMIN — SENNA PLUS 2 TABLET(S): 8.6 TABLET ORAL at 21:25

## 2024-02-18 RX ADMIN — Medication 0.25 MILLIGRAM(S): at 15:48

## 2024-02-18 RX ADMIN — LACTULOSE 20 GRAM(S): 10 SOLUTION ORAL at 05:19

## 2024-02-18 RX ADMIN — TRAMADOL HYDROCHLORIDE 50 MILLIGRAM(S): 50 TABLET ORAL at 21:25

## 2024-02-18 RX ADMIN — AMLODIPINE BESYLATE 10 MILLIGRAM(S): 2.5 TABLET ORAL at 05:19

## 2024-02-18 RX ADMIN — POLYETHYLENE GLYCOL 3350 17 GRAM(S): 17 POWDER, FOR SOLUTION ORAL at 12:31

## 2024-02-18 RX ADMIN — Medication 3 MILLIGRAM(S): at 21:25

## 2024-02-18 NOTE — PROGRESS NOTE ADULT - ASSESSMENT
constipation  rectal bleed  abnormal CT    CT noted; suspected sigmoid mass  no prior h/o colonoscopy  will plan for colonoscopy tuesday (endoscopy closed monday)  lactulose bid given large fecal burden noted on ct  will need pulm clearance given copd, noted and appreciated  d/w patient     I reviewed the overnight course of events on the unit, re-confirming the patient history. I discussed the care with the patient  Differential diagnosis and plan of care discussed with patient after the evaluation  35 minutes spent on total encounter of which more than fifty percent of the encounter was spent counseling and/or coordinating care by the attending physician.

## 2024-02-18 NOTE — PROGRESS NOTE ADULT - SUBJECTIVE AND OBJECTIVE BOX
Patient is a 68y old  Female who presents with a chief complaint of GIB bleed (17 Feb 2024 20:49)      INTERVAL HPI/OVERNIGHT EVENTS: Patient seen and examined at bedside. No overnight events. Tolerating diet. Denies fever, chills, chest pain, shortness of breath, abdominal pain, nausea/vomiting, headache.    MEDICATIONS  (STANDING):  amLODIPine   Tablet 10 milliGRAM(s) Oral daily  influenza  Vaccine (HIGH DOSE) 0.7 milliLiter(s) IntraMuscular once  lactulose Syrup 20 Gram(s) Oral two times a day  nicotine -  14 mG/24Hr(s) Patch 1 Patch Transdermal daily  pantoprazole    Tablet 40 milliGRAM(s) Oral before breakfast  predniSONE   Tablet 10 milliGRAM(s) Oral daily    MEDICATIONS  (PRN):  acetaminophen     Tablet .. 650 milliGRAM(s) Oral every 6 hours PRN Temp greater or equal to 38C (100.4F), Mild Pain (1 - 3)  albuterol    90 MICROgram(s) HFA Inhaler 2 Puff(s) Inhalation every 6 hours PRN for shortness of breath and/or wheezing  albuterol/ipratropium for Nebulization 3 milliLiter(s) Nebulizer every 6 hours PRN Shortness of Breath and/or Wheezing  ALPRAZolam 0.25 milliGRAM(s) Oral once PRN anxiety  ALPRAZolam 0.25 milliGRAM(s) Oral daily PRN for anxiety  melatonin 3 milliGRAM(s) Oral at bedtime PRN Insomnia  traMADol 50 milliGRAM(s) Oral daily PRN Moderate Pain (4 - 6)      Allergies    No Known Allergies    Intolerances        REVIEW OF SYSTEMS:  CONSTITUTIONAL: No fever or chills  HEENT:  No headache, no sore throat  RESPIRATORY: No cough, wheezing, or shortness of breath  CARDIOVASCULAR: No chest pain, palpitations  GASTROINTESTINAL: No abd pain, nausea, vomiting, or diarrhea  GENITOURINARY: No dysuria, frequency, or hematuria  NEUROLOGICAL: no focal weakness or dizziness  MUSCULOSKELETAL: no myalgias     Vital Signs Last 24 Hrs  T(C): 36.7 (18 Feb 2024 04:58), Max: 36.9 (17 Feb 2024 14:33)  T(F): 98 (18 Feb 2024 04:58), Max: 98.5 (17 Feb 2024 14:33)  HR: 94 (18 Feb 2024 04:58) (94 - 103)  BP: 136/70 (18 Feb 2024 04:58) (130/73 - 137/71)  BP(mean): --  RR: 18 (18 Feb 2024 04:58) (18 - 18)  SpO2: 96% (18 Feb 2024 04:58) (94% - 97%)    Parameters below as of 18 Feb 2024 04:58  Patient On (Oxygen Delivery Method): nasal cannula  O2 Flow (L/min): 2      PHYSICAL EXAM:  GENERAL: NAD  HEENT:  anicteric, moist mucous membranes  CHEST/LUNG:  CTA b/l, no rales, wheezes, or rhonchi  HEART:  RRR, S1, S2  ABDOMEN:  BS+, soft, nontender, nondistended  EXTREMITIES: no edema, cyanosis, or calf tenderness  NERVOUS SYSTEM: answers questions and follows commands appropriately    LABS:                        9.9    8.16  )-----------( 360      ( 18 Feb 2024 07:10 )             34.9     CBC Full  -  ( 18 Feb 2024 07:10 )  WBC Count : 8.16 K/uL  Hemoglobin : 9.9 g/dL  Hematocrit : 34.9 %  Platelet Count - Automated : 360 K/uL  Mean Cell Volume : 80.4 fl  Mean Cell Hemoglobin : 22.8 pg  Mean Cell Hemoglobin Concentration : 28.4 gm/dL  Auto Neutrophil # : x  Auto Lymphocyte # : x  Auto Monocyte # : x  Auto Eosinophil # : x  Auto Basophil # : x  Auto Neutrophil % : x  Auto Lymphocyte % : x  Auto Monocyte % : x  Auto Eosinophil % : x  Auto Basophil % : x    18 Feb 2024 07:10    144    |  107    |  19     ----------------------------<  119    4.3     |  36     |  1.10     Ca    9.5        18 Feb 2024 07:10    TPro  6.8    /  Alb  2.9    /  TBili  0.4    /  DBili  x      /  AST  11     /  ALT  14     /  AlkPhos  57     18 Feb 2024 07:10      Urinalysis Basic - ( 18 Feb 2024 07:10 )    Color: x / Appearance: x / SG: x / pH: x  Gluc: 119 mg/dL / Ketone: x  / Bili: x / Urobili: x   Blood: x / Protein: x / Nitrite: x   Leuk Esterase: x / RBC: x / WBC x   Sq Epi: x / Non Sq Epi: x / Bacteria: x      CAPILLARY BLOOD GLUCOSE              RADIOLOGY & ADDITIONAL TESTS:    Personally reviewed.     Consultant(s) Notes Reviewed:  [x] YES  [ ] NO

## 2024-02-18 NOTE — PROGRESS NOTE ADULT - SUBJECTIVE AND OBJECTIVE BOX
[facility]  Bobby Mcdonald PA-C  [address] [place] [address]  [phone]      INTERVAL HPI/OVERNIGHT EVENTS:  Pt s/e  Reports no BM  Planned for colonoscopy Tuesday    MEDICATIONS  (STANDING):  amLODIPine   Tablet 10 milliGRAM(s) Oral daily  influenza  Vaccine (HIGH DOSE) 0.7 milliLiter(s) IntraMuscular once  lactulose Syrup 20 Gram(s) Oral two times a day  nicotine -  14 mG/24Hr(s) Patch 1 Patch Transdermal daily  pantoprazole    Tablet 40 milliGRAM(s) Oral before breakfast  predniSONE   Tablet 10 milliGRAM(s) Oral daily    MEDICATIONS  (PRN):  acetaminophen     Tablet .. 650 milliGRAM(s) Oral every 6 hours PRN Temp greater or equal to 38C (100.4F), Mild Pain (1 - 3)  albuterol    90 MICROgram(s) HFA Inhaler 2 Puff(s) Inhalation every 6 hours PRN for shortness of breath and/or wheezing  albuterol/ipratropium for Nebulization 3 milliLiter(s) Nebulizer every 6 hours PRN Shortness of Breath and/or Wheezing  ALPRAZolam 0.25 milliGRAM(s) Oral once PRN anxiety  ALPRAZolam 0.25 milliGRAM(s) Oral daily PRN for anxiety  melatonin 3 milliGRAM(s) Oral at bedtime PRN Insomnia  traMADol 50 milliGRAM(s) Oral daily PRN Moderate Pain (4 - 6)      Allergies    No Known Allergies    PHYSICAL EXAM:   Vital Signs:  Vital Signs Last 24 Hrs  T(C): 36.7 (2024 04:58), Max: 36.9 (2024 14:33)  T(F): 98 (2024 04:58), Max: 98.5 (2024 14:33)  HR: 94 (2024 04:58) (94 - 103)  BP: 136/70 (2024 04:58) (130/73 - 137/71)  BP(mean): --  RR: 18 (2024 04:58) (18 - 18)  SpO2: 96% (2024 04:58) (94% - 97%)    Parameters below as of 2024 04:58  Patient On (Oxygen Delivery Method): nasal cannula  O2 Flow (L/min): 2    Daily     Daily Weight in k.3 (2024 04:58)    GENERAL:  Appears stated age  HEENT:  NC/AT  CHEST:  Full & symmetric excursion  HEART:  Regular rhythm  ABDOMEN:  Soft, non-tender, non-distended  EXTEREMITIES:  no cyanosis  SKIN:  No rash  NEURO:  Alert      LABS:                        9.9    8.16  )-----------( 360      ( 2024 07:10 )             34.9     02-18    144  |  107  |  19  ----------------------------<  119<H>  4.3   |  36<H>  |  1.10    Ca    9.5      2024 07:10    TPro  6.8  /  Alb  2.9<L>  /  TBili  0.4  /  DBili  x   /  AST  11<L>  /  ALT  14  /  AlkPhos  57  -18      Urinalysis Basic - ( 2024 07:10 )    Color: x / Appearance: x / SG: x / pH: x  Gluc: 119 mg/dL / Ketone: x  / Bili: x / Urobili: x   Blood: x / Protein: x / Nitrite: x   Leuk Esterase: x / RBC: x / WBC x   Sq Epi: x / Non Sq Epi: x / Bacteria: x

## 2024-02-19 ENCOUNTER — TRANSCRIPTION ENCOUNTER (OUTPATIENT)
Age: 69
End: 2024-02-19

## 2024-02-19 LAB
ALBUMIN SERPL ELPH-MCNC: 2.9 G/DL — LOW (ref 3.3–5)
ALP SERPL-CCNC: 55 U/L — SIGNIFICANT CHANGE UP (ref 40–120)
ALT FLD-CCNC: 19 U/L — SIGNIFICANT CHANGE UP (ref 12–78)
ANION GAP SERPL CALC-SCNC: 3 MMOL/L — LOW (ref 5–17)
AST SERPL-CCNC: 8 U/L — LOW (ref 15–37)
BILIRUB SERPL-MCNC: 0.4 MG/DL — SIGNIFICANT CHANGE UP (ref 0.2–1.2)
BUN SERPL-MCNC: 17 MG/DL — SIGNIFICANT CHANGE UP (ref 7–23)
CALCIUM SERPL-MCNC: 9.4 MG/DL — SIGNIFICANT CHANGE UP (ref 8.5–10.1)
CHLORIDE SERPL-SCNC: 103 MMOL/L — SIGNIFICANT CHANGE UP (ref 96–108)
CO2 SERPL-SCNC: 37 MMOL/L — HIGH (ref 22–31)
CREAT SERPL-MCNC: 1 MG/DL — SIGNIFICANT CHANGE UP (ref 0.5–1.3)
EGFR: 62 ML/MIN/1.73M2 — SIGNIFICANT CHANGE UP
GLUCOSE SERPL-MCNC: 124 MG/DL — HIGH (ref 70–99)
HCT VFR BLD CALC: 32.2 % — LOW (ref 34.5–45)
HGB BLD-MCNC: 9.2 G/DL — LOW (ref 11.5–15.5)
MCHC RBC-ENTMCNC: 22.7 PG — LOW (ref 27–34)
MCHC RBC-ENTMCNC: 28.6 GM/DL — LOW (ref 32–36)
MCV RBC AUTO: 79.3 FL — LOW (ref 80–100)
NRBC # BLD: 0 /100 WBCS — SIGNIFICANT CHANGE UP (ref 0–0)
PLATELET # BLD AUTO: 324 K/UL — SIGNIFICANT CHANGE UP (ref 150–400)
POTASSIUM SERPL-MCNC: 4.1 MMOL/L — SIGNIFICANT CHANGE UP (ref 3.5–5.3)
POTASSIUM SERPL-SCNC: 4.1 MMOL/L — SIGNIFICANT CHANGE UP (ref 3.5–5.3)
PROT SERPL-MCNC: 6.4 G/DL — SIGNIFICANT CHANGE UP (ref 6–8.3)
RBC # BLD: 4.06 M/UL — SIGNIFICANT CHANGE UP (ref 3.8–5.2)
RBC # FLD: 20.9 % — HIGH (ref 10.3–14.5)
SODIUM SERPL-SCNC: 143 MMOL/L — SIGNIFICANT CHANGE UP (ref 135–145)
WBC # BLD: 7.72 K/UL — SIGNIFICANT CHANGE UP (ref 3.8–10.5)
WBC # FLD AUTO: 7.72 K/UL — SIGNIFICANT CHANGE UP (ref 3.8–10.5)

## 2024-02-19 PROCEDURE — 99232 SBSQ HOSP IP/OBS MODERATE 35: CPT

## 2024-02-19 RX ORDER — SOD SULF/SODIUM/NAHCO3/KCL/PEG
1000 SOLUTION, RECONSTITUTED, ORAL ORAL EVERY 4 HOURS
Refills: 0 | Status: COMPLETED | OUTPATIENT
Start: 2024-02-19 | End: 2024-02-19

## 2024-02-19 RX ORDER — POLYETHYLENE GLYCOL 3350 17 G/17G
17 POWDER, FOR SOLUTION ORAL ONCE
Refills: 0 | Status: COMPLETED | OUTPATIENT
Start: 2024-02-19 | End: 2024-02-19

## 2024-02-19 RX ADMIN — LACTULOSE 20 GRAM(S): 10 SOLUTION ORAL at 05:18

## 2024-02-19 RX ADMIN — SENNA PLUS 2 TABLET(S): 8.6 TABLET ORAL at 21:38

## 2024-02-19 RX ADMIN — TRAMADOL HYDROCHLORIDE 50 MILLIGRAM(S): 50 TABLET ORAL at 22:43

## 2024-02-19 RX ADMIN — LACTULOSE 20 GRAM(S): 10 SOLUTION ORAL at 17:47

## 2024-02-19 RX ADMIN — ALBUTEROL 2 PUFF(S): 90 AEROSOL, METERED ORAL at 15:47

## 2024-02-19 RX ADMIN — POLYETHYLENE GLYCOL 3350 17 GRAM(S): 17 POWDER, FOR SOLUTION ORAL at 22:43

## 2024-02-19 RX ADMIN — Medication 1 ENEMA: at 22:37

## 2024-02-19 RX ADMIN — Medication 1000 MILLILITER(S): at 17:53

## 2024-02-19 RX ADMIN — Medication 3 MILLIGRAM(S): at 22:43

## 2024-02-19 RX ADMIN — Medication 10 MILLIGRAM(S): at 15:46

## 2024-02-19 RX ADMIN — TRAMADOL HYDROCHLORIDE 50 MILLIGRAM(S): 50 TABLET ORAL at 22:00

## 2024-02-19 RX ADMIN — Medication 10 MILLIGRAM(S): at 05:19

## 2024-02-19 RX ADMIN — Medication 0.25 MILLIGRAM(S): at 15:26

## 2024-02-19 RX ADMIN — Medication 10 MILLIGRAM(S): at 20:45

## 2024-02-19 RX ADMIN — PANTOPRAZOLE SODIUM 40 MILLIGRAM(S): 20 TABLET, DELAYED RELEASE ORAL at 05:18

## 2024-02-19 RX ADMIN — AMLODIPINE BESYLATE 10 MILLIGRAM(S): 2.5 TABLET ORAL at 05:18

## 2024-02-19 NOTE — DISCHARGE NOTE PROVIDER - CARE PROVIDER_API CALL
Gregory Rizvi  Gastroenterology  [address] [place] [address]  Phone: [phone]  Fax: [phone]  Follow Up Time:     GEM RAZO  Phone: [phone]  Fax: [phone]  Follow Up Time:

## 2024-02-19 NOTE — PROGRESS NOTE ADULT - ASSESSMENT
constipation  rectal bleed  abnormal CT    CT noted; suspected sigmoid mass  no prior h/o colonoscopy  will plan for colonoscopy tuesday (endoscopy closed monday)  lactulose bid given large fecal burden noted on ct  clear liquid diet today  NPO after midnight  bowel prep ordered  will need pulm clearance given copd, noted and appreciated  d/w patient     I reviewed the overnight course of events on the unit, re-confirming the patient history. I discussed the care with the patient  Differential diagnosis and plan of care discussed with patient after the evaluation  35 minutes spent on total encounter of which more than fifty percent of the encounter was spent counseling and/or coordinating care by the attending physician.

## 2024-02-19 NOTE — PROGRESS NOTE ADULT - ASSESSMENT
67yo F with PMHx COPD (not on home oxygen), HTN, RA, anxiety presenting with chief complaint of blood in stools. Admitted for GI bleed, plan for colonoscopy 2/20.

## 2024-02-19 NOTE — PROGRESS NOTE ADULT - PROBLEM SELECTOR PLAN 2
Chronic, recently admitted for COPD exacerbation  - Patient states current SOB likely due to anxiety  - Duonebs q6 PRN  - Continue home inhalers  - Supplemental O2 PRN. At baseline: 4-4.5 L home oxygen intermittently. COPD patient will keep SpO2 goal 88-93%   - Monitor respiratory status   - 3 weeks ago had rsv, recent rvp negative  - Continuous pulse ox  - Pulmonary Dr. Dobson consult for clearance

## 2024-02-19 NOTE — PROGRESS NOTE ADULT - SUBJECTIVE AND OBJECTIVE BOX
[facility]  Bobby Mcdonald PA-C  [address] [place] [address]  [phone]      INTERVAL HPI/OVERNIGHT EVENTS:  Pt s/e with son at bedside  Planned for colonoscopy tomorrow    MEDICATIONS  (STANDING):  amLODIPine   Tablet 10 milliGRAM(s) Oral daily  bisacodyl 10 milliGRAM(s) Oral every 4 hours  influenza  Vaccine (HIGH DOSE) 0.7 milliLiter(s) IntraMuscular once  lactulose Syrup 20 Gram(s) Oral two times a day  nicotine -  14 mG/24Hr(s) Patch 1 Patch Transdermal daily  pantoprazole    Tablet 40 milliGRAM(s) Oral before breakfast  polyethylene glycol/electrolyte Solution 1000 milliLiter(s) Oral every 4 hours  predniSONE   Tablet 10 milliGRAM(s) Oral daily  senna 2 Tablet(s) Oral at bedtime    MEDICATIONS  (PRN):  acetaminophen     Tablet .. 650 milliGRAM(s) Oral every 6 hours PRN Temp greater or equal to 38C (100.4F), Mild Pain (1 - 3)  albuterol    90 MICROgram(s) HFA Inhaler 2 Puff(s) Inhalation every 6 hours PRN for shortness of breath and/or wheezing  albuterol/ipratropium for Nebulization 3 milliLiter(s) Nebulizer every 6 hours PRN Shortness of Breath and/or Wheezing  ALPRAZolam 0.25 milliGRAM(s) Oral once PRN anxiety  ALPRAZolam 0.25 milliGRAM(s) Oral daily PRN for anxiety  melatonin 3 milliGRAM(s) Oral at bedtime PRN Insomnia  polyethylene glycol 3350 17 Gram(s) Oral daily PRN Constipation  traMADol 50 milliGRAM(s) Oral daily PRN Moderate Pain (4 - 6)      Allergies  No Known Allergies      PHYSICAL EXAM:   Vital Signs:  Vital Signs Last 24 Hrs  T(C): 36.2 (19 Feb 2024 04:33), Max: 37.1 (18 Feb 2024 21:05)  T(F): 97.2 (19 Feb 2024 04:33), Max: 98.7 (18 Feb 2024 21:05)  HR: 96 (19 Feb 2024 04:33) (96 - 103)  BP: 137/74 (19 Feb 2024 04:33) (126/72 - 147/78)  BP(mean): --  RR: 18 (19 Feb 2024 04:33) (18 - 18)  SpO2: 93% (19 Feb 2024 04:33) (93% - 97%)    Parameters below as of 19 Feb 2024 04:33  Patient On (Oxygen Delivery Method): nasal cannula  O2 Flow (L/min): 2    GENERAL:  Appears stated age  HEENT:  NC/AT  CHEST:  Full & symmetric excursion  HEART:  Regular rhythm  ABDOMEN:  Soft, non-tender, non-distended  EXTEREMITIES:  no cyanosis  SKIN:  No rash  NEURO:  Alert      LABS:                        9.2    7.72  )-----------( 324      ( 19 Feb 2024 08:07 )             32.2     02-19    143  |  103  |  17  ----------------------------<  124<H>  4.1   |  37<H>  |  1.00    Ca    9.4      19 Feb 2024 08:07    TPro  6.4  /  Alb  2.9<L>  /  TBili  0.4  /  DBili  x   /  AST  8<L>  /  ALT  19  /  AlkPhos  55  02-19      Urinalysis Basic - ( 19 Feb 2024 08:07 )    Color: x / Appearance: x / SG: x / pH: x  Gluc: 124 mg/dL / Ketone: x  / Bili: x / Urobili: x   Blood: x / Protein: x / Nitrite: x   Leuk Esterase: x / RBC: x / WBC x   Sq Epi: x / Non Sq Epi: x / Bacteria: x

## 2024-02-19 NOTE — DISCHARGE NOTE PROVIDER - NSDCCPCAREPLAN_GEN_ALL_CORE_FT
PRINCIPAL DISCHARGE DIAGNOSIS  Diagnosis: GI bleed  Assessment and Plan of Treatment: You had a colonoscopy on 2/20. Pleaase follow up with GI (Dr. Rizvi) and your PCP within 1 week of discharge.      SECONDARY DISCHARGE DIAGNOSES  Diagnosis: COPD (chronic obstructive pulmonary disease)  Assessment and Plan of Treatment: Resume home medication regimen    Diagnosis: HTN (hypertension)  Assessment and Plan of Treatment: Resume home medication regimen     PRINCIPAL DISCHARGE DIAGNOSIS  Diagnosis: GI bleed  Assessment and Plan of Treatment: resolved -  hb remain stable  Colonoscopy performed on 2/20/2024. Pathology of Sigmoid colon mass, biopsy: ADENOCARCINOMA, AT LEAST INTRAMUCOSAL Pleaase follow up with GI (Dr. Rizvi) and your PCP within 1 week of discharge.      SECONDARY DISCHARGE DIAGNOSES  Diagnosis: HTN (hypertension)  Assessment and Plan of Treatment: Resume home medication regimen    Diagnosis: COPD (chronic obstructive pulmonary disease)  Assessment and Plan of Treatment: Resume home medication regimen    Diagnosis: Pulmonary embolism  Assessment and Plan of Treatment: you found to  new PE - started on full blood thinner / eliquis .- fu up out pt  repeat ct angio chest in 3 to 6 month .

## 2024-02-19 NOTE — PROGRESS NOTE ADULT - SUBJECTIVE AND OBJECTIVE BOX
Patient is a 68y old  Female who presents with a chief complaint of GIB bleed (18 Feb 2024 11:28)      INTERVAL HPI/OVERNIGHT EVENTS: Patient seen and examined at bedside. No overnight events. Tolerating diet. Denies fever, chills, chest pain, shortness of breath, abdominal pain, nausea/vomiting, headache.    MEDICATIONS  (STANDING):  amLODIPine   Tablet 10 milliGRAM(s) Oral daily  influenza  Vaccine (HIGH DOSE) 0.7 milliLiter(s) IntraMuscular once  lactulose Syrup 20 Gram(s) Oral two times a day  nicotine -  14 mG/24Hr(s) Patch 1 Patch Transdermal daily  pantoprazole    Tablet 40 milliGRAM(s) Oral before breakfast  predniSONE   Tablet 10 milliGRAM(s) Oral daily  senna 2 Tablet(s) Oral at bedtime    MEDICATIONS  (PRN):  acetaminophen     Tablet .. 650 milliGRAM(s) Oral every 6 hours PRN Temp greater or equal to 38C (100.4F), Mild Pain (1 - 3)  albuterol    90 MICROgram(s) HFA Inhaler 2 Puff(s) Inhalation every 6 hours PRN for shortness of breath and/or wheezing  albuterol/ipratropium for Nebulization 3 milliLiter(s) Nebulizer every 6 hours PRN Shortness of Breath and/or Wheezing  ALPRAZolam 0.25 milliGRAM(s) Oral daily PRN for anxiety  ALPRAZolam 0.25 milliGRAM(s) Oral once PRN anxiety  melatonin 3 milliGRAM(s) Oral at bedtime PRN Insomnia  polyethylene glycol 3350 17 Gram(s) Oral daily PRN Constipation  traMADol 50 milliGRAM(s) Oral daily PRN Moderate Pain (4 - 6)      Allergies    No Known Allergies    Intolerances        REVIEW OF SYSTEMS:  CONSTITUTIONAL: No fever or chills  HEENT:  No headache, no sore throat  RESPIRATORY: No cough, wheezing, or shortness of breath  CARDIOVASCULAR: No chest pain, palpitations  GASTROINTESTINAL: No abd pain, nausea, vomiting, or diarrhea  GENITOURINARY: No dysuria, frequency, or hematuria  NEUROLOGICAL: no focal weakness or dizziness  MUSCULOSKELETAL: no myalgias     Vital Signs Last 24 Hrs  T(C): 36.2 (19 Feb 2024 04:33), Max: 37.1 (18 Feb 2024 21:05)  T(F): 97.2 (19 Feb 2024 04:33), Max: 98.7 (18 Feb 2024 21:05)  HR: 96 (19 Feb 2024 04:33) (96 - 103)  BP: 137/74 (19 Feb 2024 04:33) (126/72 - 147/78)  BP(mean): --  RR: 18 (19 Feb 2024 04:33) (18 - 18)  SpO2: 93% (19 Feb 2024 04:33) (93% - 97%)    Parameters below as of 19 Feb 2024 04:33  Patient On (Oxygen Delivery Method): nasal cannula  O2 Flow (L/min): 2      PHYSICAL EXAM:  GENERAL: NAD  HEENT:  anicteric, moist mucous membranes  CHEST/LUNG:  CTA b/l, no rales, wheezes, or rhonchi  HEART:  RRR, S1, S2  ABDOMEN:  BS+, soft, nontender, nondistended  EXTREMITIES: no edema, cyanosis, or calf tenderness  NERVOUS SYSTEM: answers questions and follows commands appropriately    LABS:    CBC Full  -  ( 18 Feb 2024 07:10 )  WBC Count : 8.16 K/uL  Hemoglobin : 9.9 g/dL  Hematocrit : 34.9 %  Platelet Count - Automated : 360 K/uL  Mean Cell Volume : 80.4 fl  Mean Cell Hemoglobin : 22.8 pg  Mean Cell Hemoglobin Concentration : 28.4 gm/dL  Auto Neutrophil # : x  Auto Lymphocyte # : x  Auto Monocyte # : x  Auto Eosinophil # : x  Auto Basophil # : x  Auto Neutrophil % : x  Auto Lymphocyte % : x  Auto Monocyte % : x  Auto Eosinophil % : x  Auto Basophil % : x      Ca    9.5        18 Feb 2024 07:10        Urinalysis Basic - ( 18 Feb 2024 07:10 )    Color: x / Appearance: x / SG: x / pH: x  Gluc: 119 mg/dL / Ketone: x  / Bili: x / Urobili: x   Blood: x / Protein: x / Nitrite: x   Leuk Esterase: x / RBC: x / WBC x   Sq Epi: x / Non Sq Epi: x / Bacteria: x      CAPILLARY BLOOD GLUCOSE              RADIOLOGY & ADDITIONAL TESTS:    Personally reviewed.     Consultant(s) Notes Reviewed:  [x] YES  [ ] NO

## 2024-02-19 NOTE — DISCHARGE NOTE PROVIDER - HOSPITAL COURSE
ADMISSION H+P:    HPI:  69yo F with PMHx COPD (on 4-4.5 L home oxygen intermittently), HTN, RA, anxiety presenting with chief complaint of blood in stools. Patient states for the last two months, she has intermittently noticed blood in stool. She states she has episodes where she has to run to the bathroom to relieve herself, and has a watery, bloody BM with clots. Other times she has soft formed stools without blood. Patient denies any abdominal pain, nausea, vomiting weight loss, constipation or melena. Patient was recently seen at [facility] ED on 2/12/24 with similar symptoms, CT showed "Masslike mural thickening of the distal sigmoid colon." Patient states she doesn't have insurance and is unable to follow up with GI outpatient for further evaluation. Patient also has mild shortness of breath on admission, states it is mainly due to anxiety, and chronically takes Xanax 0.25 mg daily as needed. She has COPD and sometimes needs to use home O2. She states currently doesn't feel like shes having a COPD exacerbation.       ED Course:   Vitals: BP: 144/77, HR: 98, Temp: 98.1, RR: 20, SpO2: 100% on 3L  Labs: FOBT positive, HGB 10.6, MCV 77.5  CT AP:  Masslike mural thickening of the distal sigmoid colon. Differential   considerations include malignancy or, less likely, sequelae of chronic   diverticulitis. Gastroenterology evaluation is recommended.    Scattered too small to characterize hypodense foci in the liver, hepatic   protocol MRI is recommended for further characterization.    EKG: Pending  Received in the ED:  Protonix   (16 Feb 2024 00:02)      ---  HOSPITAL COURSE: Patient admitted with abnormal CT a/p findings revealing suspected sigmoid mass, constipation and rectal bleed. Lactulose BID ordered given large fecal burden noted on CT a/p. Colonoscopy performed on 2/20/2024.     Patient was medically optimized and improved clinically throughout hospital course. Patient seen and examined on day of discharge.    Vital Signs  T(C): 36.4 (19 Feb 2024 12:27), Max: 37.1 (18 Feb 2024 21:05)  T(F): 97.6 (19 Feb 2024 12:27), Max: 98.7 (18 Feb 2024 21:05)  HR: 107 (19 Feb 2024 12:27) (96 - 107)  BP: 122/69 (19 Feb 2024 12:27) (122/69 - 147/78)  RR: 18 (19 Feb 2024 12:27) (18 - 18)  SpO2: 94% (19 Feb 2024 12:27) (93% - 96%)    Physical Exam:  General: well-developed, well-nourished, NAD  HEENT: normocephalic, atraumatic, EOMI, moist mucous membranes   Neck: supple, non-tender, no masses  Neurology: AAOx3, sensation intact  Respiratory: clear to auscultation bilaterally; no wheezes, rhonchi, or rales  CV: regular rate and rhythm, soft S1/S2, no murmurs, rubs, or gallops  Abdominal: soft, non-tender, non-distended, bowel sounds present  Extremities: no clubbing, cyanosis, or edema; palpable peripheral pulses  Musculoskeletal: no joint erythema or warmth, no joint swelling   Skin: warm, dry, normal color    Patient is medically stable for discharge to home with outpatient follow up.  ---  CONSULTANTS:   GI: Dr. Goodman    ---  TIME SPENT:   I, the attending physician, was physically present for the key portions of the evaluation and management (E/M) service provided. The total amount of time spent reviewing the hospital course, laboratory values, imaging findings, assessing/counseling the patient, discussing with consultant physicians, social work, nursing staff was 45 minutes.     ---  FINAL DISCHARGE DIAGNOSIS LIST:  Please see last daily progress note for final discharge diagnoses       ADMISSION H+P:    HPI:  69yo F with PMHx COPD (on 4-4.5 L home oxygen intermittently), HTN, RA, anxiety presenting with chief complaint of blood in stools. Patient states for the last two months, she has intermittently noticed blood in stool. She states she has episodes where she has to run to the bathroom to relieve herself, and has a watery, bloody BM with clots. Other times she has soft formed stools without blood. Patient denies any abdominal pain, nausea, vomiting weight loss, constipation or melena. Patient was recently seen at [facility] ED on 2/12/24 with similar symptoms, CT showed "Masslike mural thickening of the distal sigmoid colon." Patient states she doesn't have insurance and is unable to follow up with GI outpatient for further evaluation. Patient also has mild shortness of breath on admission, states it is mainly due to anxiety, and chronically takes Xanax 0.25 mg daily as needed. She has COPD and sometimes needs to use home O2. She states currently doesn't feel like shes having a COPD exacerbation.       ED Course:   Vitals: BP: 144/77, HR: 98, Temp: 98.1, RR: 20, SpO2: 100% on 3L  Labs: FOBT positive, HGB 10.6, MCV 77.5  CT AP:  Masslike mural thickening of the distal sigmoid colon. Differential   considerations include malignancy or, less likely, sequelae of chronic   diverticulitis. Gastroenterology evaluation is recommended.    Scattered too small to characterize hypodense foci in the liver, hepatic   protocol MRI is recommended for further characterization.    EKG: Pending  Received in the ED:  Protonix   (16 Feb 2024 00:02)      ---  HOSPITAL COURSE: Patient admitted with abnormal CT a/p findings revealing suspected sigmoid mass, constipation and rectal bleed. Lactulose BID ordered given large fecal burden noted on CT a/p. Colonoscopy performed on 2/20/2024. Pathology of Sigmoid colon mass, biopsy: ADENOCARCINOMA, AT LEAST INTRAMUCOSAL.  Pt. had CT chest with IV contrast for staging showing no evidence of metastatic disease. Acute pulmonary embolus in the right middle lobe lobar and segmental branches.  Pt. was then started on heparin drip.  LE dopplers were negative for DVT.  Echocardiogram showed Left ventricular systolic function is normal with an ejection fraction visually estimated at 60 to 65 %.  Pt. scheduled for lap sigmoidetomy on 2/29.           Patient was medically optimized and improved clinically throughout hospital course. Patient seen and examined on day of discharge.    Vital Signs  T(C): 36.4 (19 Feb 2024 12:27), Max: 37.1 (18 Feb 2024 21:05)  T(F): 97.6 (19 Feb 2024 12:27), Max: 98.7 (18 Feb 2024 21:05)  HR: 107 (19 Feb 2024 12:27) (96 - 107)  BP: 122/69 (19 Feb 2024 12:27) (122/69 - 147/78)  RR: 18 (19 Feb 2024 12:27) (18 - 18)  SpO2: 94% (19 Feb 2024 12:27) (93% - 96%)    Physical Exam:  General: well-developed, well-nourished, NAD  HEENT: normocephalic, atraumatic, EOMI, moist mucous membranes   Neck: supple, non-tender, no masses  Neurology: AAOx3, sensation intact  Respiratory: clear to auscultation bilaterally; no wheezes, rhonchi, or rales  CV: regular rate and rhythm, soft S1/S2, no murmurs, rubs, or gallops  Abdominal: soft, non-tender, non-distended, bowel sounds present  Extremities: no clubbing, cyanosis, or edema; palpable peripheral pulses  Musculoskeletal: no joint erythema or warmth, no joint swelling   Skin: warm, dry, normal color    Patient is medically stable for discharge to home with outpatient follow up.  ---  CONSULTANTS:   GI: Dr. Goodman    ---  TIME SPENT:   I, the attending physician, was physically present for the key portions of the evaluation and management (E/M) service provided. The total amount of time spent reviewing the hospital course, laboratory values, imaging findings, assessing/counseling the patient, discussing with consultant physicians, social work, nursing staff was 45 minutes.     ---  FINAL DISCHARGE DIAGNOSIS LIST:  Please see last daily progress note for final discharge diagnoses       ADMISSION H+P:    HPI:  69yo F with PMHx COPD (on 4-4.5 L home oxygen intermittently), HTN, RA, anxiety presenting with chief complaint of blood in stools. Patient states for the last two months, she has intermittently noticed blood in stool. She states she has episodes where she has to run to the bathroom to relieve herself, and has a watery, bloody BM with clots. Other times she has soft formed stools without blood. Patient denies any abdominal pain, nausea, vomiting weight loss, constipation or melena. Patient was recently seen at [facility] ED on 2/12/24 with similar symptoms, CT showed "Masslike mural thickening of the distal sigmoid colon." Patient states she doesn't have insurance and is unable to follow up with GI outpatient for further evaluation. Patient also has mild shortness of breath on admission, states it is mainly due to anxiety, and chronically takes Xanax 0.25 mg daily as needed. She has COPD and sometimes needs to use home O2. She states currently doesn't feel like shes having a COPD exacerbation.       ED Course:   Vitals: BP: 144/77, HR: 98, Temp: 98.1, RR: 20, SpO2: 100% on 3L  Labs: FOBT positive, HGB 10.6, MCV 77.5  CT AP:  Masslike mural thickening of the distal sigmoid colon. Differential   considerations include malignancy or, less likely, sequelae of chronic   diverticulitis. Gastroenterology evaluation is recommended.    Scattered too small to characterize hypodense foci in the liver, hepatic   protocol MRI is recommended for further characterization.    EKG: Pending  Received in the ED:  Protonix   (16 Feb 2024 00:02)      ---  HOSPITAL COURSE: Patient admitted with abnormal CT a/p findings revealing suspected sigmoid mass, constipation and rectal bleed. Lactulose BID ordered given large fecal burden noted on CT a/p. Colonoscopy performed on 2/20/2024. Pathology of Sigmoid colon mass, biopsy: ADENOCARCINOMA, AT LEAST INTRAMUCOSAL.  Pt. had CT chest with IV contrast for staging showing no evidence of metastatic disease. Acute pulmonary embolus in the right middle lobe lobar and segmental branches.  Pt. was then started on heparin drip.  LE dopplers were negative for DVT.  Echocardiogram showed Left ventricular systolic function is normal with an ejection fraction visually estimated at 60 to 65 %.  Patient had lap sigmoidetomy on 2/29.Sent to ICU for close monitoring after that and later transferred to  on 03/04/2024. Started on IV heparin and coumadin bridging for treatement of PE, since does not have Insurance to cover Eliquis or Xarelto. Final Pathology pending. Oncology to discuss biopsy result and plan of care with patient. Patient will need to stay inpatient until INR therapeutic          Patient was medically optimized and improved clinically throughout hospital course. Patient seen and examined on day of discharge.    Vital Signs  T(C): 36.4 (19 Feb 2024 12:27), Max: 37.1 (18 Feb 2024 21:05)  T(F): 97.6 (19 Feb 2024 12:27), Max: 98.7 (18 Feb 2024 21:05)  HR: 107 (19 Feb 2024 12:27) (96 - 107)  BP: 122/69 (19 Feb 2024 12:27) (122/69 - 147/78)  RR: 18 (19 Feb 2024 12:27) (18 - 18)  SpO2: 94% (19 Feb 2024 12:27) (93% - 96%)    Physical Exam:  General: well-developed, well-nourished, NAD  HEENT: normocephalic, atraumatic, EOMI, moist mucous membranes   Neck: supple, non-tender, no masses  Neurology: AAOx3, sensation intact  Respiratory: clear to auscultation bilaterally; no wheezes, rhonchi, or rales  CV: regular rate and rhythm, soft S1/S2, no murmurs, rubs, or gallops  Abdominal: soft, non-tender, non-distended, bowel sounds present  Extremities: no clubbing, cyanosis, or edema; palpable peripheral pulses  Musculoskeletal: no joint erythema or warmth, no joint swelling   Skin: warm, dry, normal color    Patient is medically stable for discharge to home with outpatient follow up.  ---  CONSULTANTS:   GI: Dr. Goodman    ---  TIME SPENT:   I, the attending physician, was physically present for the key portions of the evaluation and management (E/M) service provided. The total amount of time spent reviewing the hospital course, laboratory values, imaging findings, assessing/counseling the patient, discussing with consultant physicians, social work, nursing staff was 45 minutes.     ---  FINAL DISCHARGE DIAGNOSIS LIST:  Please see last daily progress note for final discharge diagnoses       ADMISSION H+P:    HPI:  67yo F with PMHx COPD (on 4-4.5 L home oxygen intermittently), HTN, RA, anxiety presenting with chief complaint of blood in stools. Patient states for the last two months, she has intermittently noticed blood in stool. She states she has episodes where she has to run to the bathroom to relieve herself, and has a watery, bloody BM with clots. Other times she has soft formed stools without blood. Patient denies any abdominal pain, nausea, vomiting weight loss, constipation or melena. Patient was recently seen at [facility] ED on 2/12/24 with similar symptoms, CT showed "Masslike mural thickening of the distal sigmoid colon." Patient states she doesn't have insurance and is unable to follow up with GI outpatient for further evaluation. Patient also has mild shortness of breath on admission, states it is mainly due to anxiety, and chronically takes Xanax 0.25 mg daily as needed. She has COPD and sometimes needs to use home O2. She states currently doesn't feel like shes having a COPD exacerbation.       ED Course:   Vitals: BP: 144/77, HR: 98, Temp: 98.1, RR: 20, SpO2: 100% on 3L  Labs: FOBT positive, HGB 10.6, MCV 77.5  CT AP:  Masslike mural thickening of the distal sigmoid colon. Differential   considerations include malignancy or, less likely, sequelae of chronic   diverticulitis. Gastroenterology evaluation is recommended.    Scattered too small to characterize hypodense foci in the liver, hepatic   protocol MRI is recommended for further characterization.    EKG: Pending  Received in the ED:  Protonix   (16 Feb 2024 00:02)      ---  HOSPITAL COURSE: Patient admitted with abnormal CT a/p findings revealing suspected sigmoid mass, constipation and rectal bleed   / lower  gi bleed  cause  of bleed sec to  possible  sigmoid mass . Lactulose BID ordered given large fecal burden noted on CT a/p. Colonoscopy performed on 2/20/2024. Pathology of Sigmoid colon mass, biopsy: ADENOCARCINOMA, AT LEAST INTRAMUCOSAL.  Pt. had CT chest with IV contrast for staging showing no evidence of metastatic disease.   pt found to have   Acute pulmonary embolus in the right middle lobe lobar and segmental branches.  Pt. was then started on heparin drip.  LE dopplers were negative for DVT.  Echocardiogram showed Left ventricular systolic function is normal with an ejection fraction visually estimated at 60 to 65 %.  Patient had lap sigmoidectomy on 2/29.Sent to ICU for close monitoring after that and later transferred to  on 03/04/2024. Started on IV heparin and coumadin bridging for treatement of PE,   since does not have Insurance to cover Eliquis    but later family agree for po Eliquis to pay . Oncology to discuss biopsy result and plan of care with patient.   mild hypernatremia  iv fluid d570 cc/hr  sec to dehydration -   sodium wnl   D/c  home once home meds full ac arrangement    Patient was medically optimized and improved clinically throughout hospital course.   Patient seen and examined on day of discharge  3/7/24 .    Vital Signs  T(C): 36.4 (19 Feb 2024 12:27), Max: 37.1 (18 Feb 2024 21:05)  T(F): 97.6 (19 Feb 2024 12:27), Max: 98.7 (18 Feb 2024 21:05)  HR: 107 (19 Feb 2024 12:27) (96 - 107)  BP: 122/69 (19 Feb 2024 12:27) (122/69 - 147/78)  RR: 18 (19 Feb 2024 12:27) (18 - 18)  SpO2: 94% (19 Feb 2024 12:27) (93% - 96%)    Physical Exam:  General: well-developed, NAD  HEENT: EOMI, moist mucous membranes   Neck: supple,  no masses  Neurology: AAOx3, sensation intact/ motor intact   Respiratory:  auscultation bilaterally; no wheezes, rhonchi,   CV: regular rate and rhythm, soft S1/S2, no murmurs, no tachy   Abdominal: soft, non-tender, non-distended, bowel sounds present  Extremities: no clubbing, cyanosis, or edema; palpable peripheral pulses  Musculoskeletal: no joint erythema or warmth, no joint swelling   Skin: warm, dry, normal color    Patient is medically stable for discharge to home with outpatient follow up.  ---  CONSULTANTS:   GI: Dr. Goodman    ---  TIME SPENT:   I, the attending physician, was physically present for the key portions of the evaluation and management (E/M) service provided. The total amount of time spent reviewing the hospital course, laboratory values, imaging findings, assessing/counseling the patient, discussing with consultant physicians, social work, nursing staff was 45 minutes.

## 2024-02-19 NOTE — DISCHARGE NOTE PROVIDER - NSDCMRMEDTOKEN_GEN_ALL_CORE_FT
Albuterol (Eqv-ProAir HFA) 90 mcg/inh inhalation aerosol: 2 puff(s) inhaled every 6 hours as needed for  shortness of breath and/or wheezing  ALPRAZolam 0.25 mg oral tablet: 1 tab(s) orally once a day as needed for  anxiety  amLODIPine 10 mg oral tablet: 1 tab(s) orally once a day  aspirin 81 mg oral tablet: 1 tab(s) orally once a day  benzonatate 100 mg oral capsule: 1 cap(s) orally 3 times a day  budesonide/glycopyrrolate/formoterol 160 mcg-9 mcg-4.8 mcg/inh inhalation aerosol: 2 inhaler(s) inhaled 2 times a day  naloxone 4 mg/0.1 mL nasal spray: 4 milligram(s) intranasally once for suspected opioid overdose / sedation  nicotine 14 mg/24 hr transdermal film, extended release: 1 patch transdermally once a day  Protonix 40 mg oral delayed release tablet: 1 tab(s) orally once a day  traMADol 50 mg oral tablet: 1 tab(s) orally once a day   Albuterol (Eqv-ProAir HFA) 90 mcg/inh inhalation aerosol: 2 puff(s) inhaled every 6 hours as needed for  shortness of breath and/or wheezing  ALPRAZolam 0.25 mg oral tablet: 1 tab(s) orally once a day as needed for  anxiety  amLODIPine 10 mg oral tablet: 1 tab(s) orally once a day  aspirin 81 mg oral tablet: 1 tab(s) orally once a day  benzonatate 100 mg oral capsule: 1 cap(s) orally 3 times a day  budesonide/glycopyrrolate/formoterol 160 mcg-9 mcg-4.8 mcg/inh inhalation aerosol: 2 inhaler(s) inhaled 2 times a day  naloxone 4 mg/0.1 mL nasal spray: 4 milligram(s) intranasally once for suspected opioid overdose / sedation  nicotine 14 mg/24 hr transdermal film, extended release: 1 patch transdermally once a day  predniSONE 10 mg oral tablet: 1 tab(s) orally once a day Please take daily for rheumatoid arthritis  Protonix 40 mg oral delayed release tablet: 1 tab(s) orally once a day  traMADol 50 mg oral tablet: 1 tab(s) orally once a day   Albuterol (Eqv-ProAir HFA) 90 mcg/inh inhalation aerosol: 2 puff(s) inhaled every 6 hours as needed for  shortness of breath and/or wheezing  ALPRAZolam 0.25 mg oral tablet: 1 tab(s) orally once a day as needed for  anxiety  amLODIPine 10 mg oral tablet: 1 tab(s) orally once a day  apixaban 5 mg oral tablet: 1 tab(s) orally every 12 hours after 5 days 1 tab 2 times day / refill by pcp  aspirin 81 mg oral tablet: 1 tab(s) orally once a day  budesonide/glycopyrrolate/formoterol 160 mcg-9 mcg-4.8 mcg/inh inhalation aerosol: 2 inhaler(s) inhaled 2 times a day  naloxone 4 mg/0.1 mL nasal spray: 4 milligram(s) intranasally once for suspected opioid overdose / sedation  nicotine 14 mg/24 hr transdermal film, extended release: 1 patch transdermally once a day  predniSONE 10 mg oral tablet: 1 tab(s) orally once a day Please take daily for rheumatoid arthritis  Protonix 40 mg oral delayed release tablet: 1 tab(s) orally once a day  traMADol 50 mg oral tablet: 1 tab(s) orally once a day

## 2024-02-20 ENCOUNTER — APPOINTMENT (OUTPATIENT)
Dept: GASTROENTEROLOGY | Facility: CLINIC | Age: 69
End: 2024-02-20

## 2024-02-20 DIAGNOSIS — K92.2 GASTROINTESTINAL HEMORRHAGE, UNSPECIFIED: ICD-10-CM

## 2024-02-20 DIAGNOSIS — K63.89 OTHER SPECIFIED DISEASES OF INTESTINE: ICD-10-CM

## 2024-02-20 LAB
ALBUMIN SERPL ELPH-MCNC: 2.9 G/DL — LOW (ref 3.3–5)
ALP SERPL-CCNC: 57 U/L — SIGNIFICANT CHANGE UP (ref 40–120)
ALT FLD-CCNC: 13 U/L — SIGNIFICANT CHANGE UP (ref 12–78)
ANION GAP SERPL CALC-SCNC: 3 MMOL/L — LOW (ref 5–17)
APTT BLD: 32.4 SEC — SIGNIFICANT CHANGE UP (ref 24.5–35.6)
AST SERPL-CCNC: 15 U/L — SIGNIFICANT CHANGE UP (ref 15–37)
BILIRUB SERPL-MCNC: 0.4 MG/DL — SIGNIFICANT CHANGE UP (ref 0.2–1.2)
BUN SERPL-MCNC: 12 MG/DL — SIGNIFICANT CHANGE UP (ref 7–23)
CALCIUM SERPL-MCNC: 9.5 MG/DL — SIGNIFICANT CHANGE UP (ref 8.5–10.1)
CHLORIDE SERPL-SCNC: 104 MMOL/L — SIGNIFICANT CHANGE UP (ref 96–108)
CO2 SERPL-SCNC: 37 MMOL/L — HIGH (ref 22–31)
CREAT SERPL-MCNC: 0.83 MG/DL — SIGNIFICANT CHANGE UP (ref 0.5–1.3)
EGFR: 77 ML/MIN/1.73M2 — SIGNIFICANT CHANGE UP
GLUCOSE SERPL-MCNC: 123 MG/DL — HIGH (ref 70–99)
HCT VFR BLD CALC: 34.2 % — LOW (ref 34.5–45)
HGB BLD-MCNC: 9.6 G/DL — LOW (ref 11.5–15.5)
INR BLD: 1.02 RATIO — SIGNIFICANT CHANGE UP (ref 0.85–1.18)
MCHC RBC-ENTMCNC: 22.3 PG — LOW (ref 27–34)
MCHC RBC-ENTMCNC: 28.1 GM/DL — LOW (ref 32–36)
MCV RBC AUTO: 79.5 FL — LOW (ref 80–100)
NRBC # BLD: 0 /100 WBCS — SIGNIFICANT CHANGE UP (ref 0–0)
PLATELET # BLD AUTO: 319 K/UL — SIGNIFICANT CHANGE UP (ref 150–400)
POTASSIUM SERPL-MCNC: 4.1 MMOL/L — SIGNIFICANT CHANGE UP (ref 3.5–5.3)
POTASSIUM SERPL-SCNC: 4.1 MMOL/L — SIGNIFICANT CHANGE UP (ref 3.5–5.3)
PROT SERPL-MCNC: 6.1 G/DL — SIGNIFICANT CHANGE UP (ref 6–8.3)
PROTHROM AB SERPL-ACNC: 11.9 SEC — SIGNIFICANT CHANGE UP (ref 9.5–13)
RBC # BLD: 4.3 M/UL — SIGNIFICANT CHANGE UP (ref 3.8–5.2)
RBC # FLD: 20.7 % — HIGH (ref 10.3–14.5)
SODIUM SERPL-SCNC: 144 MMOL/L — SIGNIFICANT CHANGE UP (ref 135–145)
WBC # BLD: 7.52 K/UL — SIGNIFICANT CHANGE UP (ref 3.8–10.5)
WBC # FLD AUTO: 7.52 K/UL — SIGNIFICANT CHANGE UP (ref 3.8–10.5)

## 2024-02-20 PROCEDURE — 93970 EXTREMITY STUDY: CPT | Mod: 26

## 2024-02-20 PROCEDURE — 88305 TISSUE EXAM BY PATHOLOGIST: CPT | Mod: 26

## 2024-02-20 PROCEDURE — 71260 CT THORAX DX C+: CPT | Mod: 26

## 2024-02-20 PROCEDURE — 99233 SBSQ HOSP IP/OBS HIGH 50: CPT

## 2024-02-20 RX ORDER — BUDESONIDE AND FORMOTEROL FUMARATE DIHYDRATE 160; 4.5 UG/1; UG/1
2 AEROSOL RESPIRATORY (INHALATION)
Refills: 0 | Status: DISCONTINUED | OUTPATIENT
Start: 2024-02-20 | End: 2024-02-29

## 2024-02-20 RX ORDER — HEPARIN SODIUM 5000 [USP'U]/ML
INJECTION INTRAVENOUS; SUBCUTANEOUS
Qty: 25000 | Refills: 0 | Status: DISCONTINUED | OUTPATIENT
Start: 2024-02-20 | End: 2024-02-29

## 2024-02-20 RX ORDER — HEPARIN SODIUM 5000 [USP'U]/ML
4500 INJECTION INTRAVENOUS; SUBCUTANEOUS ONCE
Refills: 0 | Status: COMPLETED | OUTPATIENT
Start: 2024-02-20 | End: 2024-02-20

## 2024-02-20 RX ORDER — IRON SUCROSE 20 MG/ML
100 INJECTION, SOLUTION INTRAVENOUS EVERY 24 HOURS
Refills: 0 | Status: COMPLETED | OUTPATIENT
Start: 2024-02-20 | End: 2024-02-22

## 2024-02-20 RX ORDER — HEPARIN SODIUM 5000 [USP'U]/ML
4500 INJECTION INTRAVENOUS; SUBCUTANEOUS EVERY 6 HOURS
Refills: 0 | Status: DISCONTINUED | OUTPATIENT
Start: 2024-02-20 | End: 2024-02-29

## 2024-02-20 RX ORDER — HEPARIN SODIUM 5000 [USP'U]/ML
2000 INJECTION INTRAVENOUS; SUBCUTANEOUS EVERY 6 HOURS
Refills: 0 | Status: DISCONTINUED | OUTPATIENT
Start: 2024-02-20 | End: 2024-02-29

## 2024-02-20 RX ADMIN — LACTULOSE 20 GRAM(S): 10 SOLUTION ORAL at 05:16

## 2024-02-20 RX ADMIN — Medication 3 MILLIGRAM(S): at 22:16

## 2024-02-20 RX ADMIN — LACTULOSE 20 GRAM(S): 10 SOLUTION ORAL at 17:22

## 2024-02-20 RX ADMIN — TRAMADOL HYDROCHLORIDE 50 MILLIGRAM(S): 50 TABLET ORAL at 22:18

## 2024-02-20 RX ADMIN — HEPARIN SODIUM 1100 UNIT(S)/HR: 5000 INJECTION INTRAVENOUS; SUBCUTANEOUS at 20:25

## 2024-02-20 RX ADMIN — AMLODIPINE BESYLATE 10 MILLIGRAM(S): 2.5 TABLET ORAL at 05:16

## 2024-02-20 RX ADMIN — HEPARIN SODIUM 4500 UNIT(S): 5000 INJECTION INTRAVENOUS; SUBCUTANEOUS at 20:28

## 2024-02-20 RX ADMIN — PANTOPRAZOLE SODIUM 40 MILLIGRAM(S): 20 TABLET, DELAYED RELEASE ORAL at 05:16

## 2024-02-20 RX ADMIN — Medication 10 MILLIGRAM(S): at 05:16

## 2024-02-20 RX ADMIN — IRON SUCROSE 100 MILLIGRAM(S): 20 INJECTION, SOLUTION INTRAVENOUS at 12:38

## 2024-02-20 NOTE — PROGRESS NOTE ADULT - PROBLEM SELECTOR PLAN 3
Chronic, stable  - Continue home amlodipine 10mg qd with hold parameters  - Monitor hemodynamics  - DASH/TLC diet
Chronic, BP on admission 144/77  - Continue home amlodipine 10mg qd with hold parameters  - Monitor hemodynamics  - DASH/TLC diet

## 2024-02-20 NOTE — CONSULT NOTE ADULT - SUBJECTIVE AND OBJECTIVE BOX
SURGERY CONSULT  Consult Recieved: 02-20-24 @ 13:08  Consult Seen: 02-20-24 @ 13:08    HPI:  69yo F with PMHx COPD (on 4-4.5 L home oxygen intermittently), HTN, RA, anxiety presenting with chief complaint of blood in stools for the past two months. She denies recent weight loss and has been tolerating diet. Other than blood and loose stool, has not noted change in the caliber or quality of stool.     CT showed sigmoid colon mass and Colonoscopy today showed a partially obstructing sigmoid colon/rectal mass approximately 13cm from the anal verge. Rest of colon without disease to the cecum.     Patient also complaining of occasional SOB but states that is secondary to anxiety.       PAST MEDICAL HISTORY:  COPD (chronic obstructive pulmonary disease)  Rheumatoid arthritis  HTN (hypertension)  Anxiety    PAST SURGICAL HISTORY:  No significant past surgical history    ALLERGIES:  No Known Allergies    FAMILY HISTORY: Family history of lung cancer    SOCIAL HISTORY: Denies tobacco for 10 years, but prior use hx of 75 pack years. Denies EtOH, illicit substance use.  Lives at home with , sons, and grandchildren. Functionally independent    HOME MEDICATIONS: See H&P. No current anticoagulants    VITALS & I/Os:  Vital Signs Last 24 Hrs  T(C): 37.4 (20 Feb 2024 10:06), Max: 37.4 (20 Feb 2024 10:06)  T(F): 99.3 (20 Feb 2024 10:06), Max: 99.3 (20 Feb 2024 10:06)  HR: 109 (20 Feb 2024 10:06) (102 - 109)  BP: 122/69 (20 Feb 2024 10:06) (118/65 - 136/75)  BP(mean): --  RR: 14 (20 Feb 2024 10:06) (14 - 18)  SpO2: 97% (20 Feb 2024 10:06) (92% - 97%)    Parameters below as of 20 Feb 2024 10:06  Patient On (Oxygen Delivery Method): nasal cannula  O2 Flow (L/min): 2    CAPILLARY BLOOD GLUCOSE    I&O's Summary      GENERAL: Alert, in no acute distress.  MENTAL STATUS: AAOx3. Appropriate affect.  HEENT: PERRLA. EOMI. MMM.  Trachea midline. No lymph node swelling or tenderness.  RESPIRATORY: CTAB. No wheezing, rales or rhonchi.  CARDIOVASCULAR: RRR. No audible murmurs, rubs or gallops.   GASTROINTESTINAL: Abdomen soft, NT, ND, -R/-G.  No pulsatile mass, no flank tenderness or suprapubic tenderness. No hepatosplenomegaly.  NEUROLOGIC: Cranial nerves II-XII grossly intact. No focal neurological deficits. Moves all extremities spontaneously. Sensation intact bilaterally.  INTEGUMENTARY: No overt rashes or lesions, petechia or purpura. Good turgor. No edema.  MUSCULOSKELETAL: No cyanosis or clubbing. No gross deformities.   LYMPHATIC: Palpation of neck reveals no swelling or tenderness of neck nodes. Palpation of groin reveals no swelling or tenderness of groin nodes.    LABS:                        9.6    7.52  )-----------( 319      ( 20 Feb 2024 06:55 )             34.2     02-20    144  |  104  |  12  ----------------------------<  123<H>  4.1   |  37<H>  |  0.83    Ca    9.5      20 Feb 2024 06:55    TPro  6.1  /  Alb  2.9<L>  /  TBili  0.4  /  DBili  x   /  AST  15  /  ALT  13  /  AlkPhos  57  02-20    Lactate: acetaminophen     Tablet .. 650 milliGRAM(s) Oral every 6 hours PRN  albuterol    90 MICROgram(s) HFA Inhaler 2 Puff(s) Inhalation every 6 hours PRN  albuterol/ipratropium for Nebulization 3 milliLiter(s) Nebulizer every 6 hours PRN  ALPRAZolam 0.25 milliGRAM(s) Oral once PRN  ALPRAZolam 0.25 milliGRAM(s) Oral daily PRN  amLODIPine   Tablet 10 milliGRAM(s) Oral daily  influenza  Vaccine (HIGH DOSE) 0.7 milliLiter(s) IntraMuscular once  iron sucrose Injectable 100 milliGRAM(s) IV Push every 24 hours  lactulose Syrup 20 Gram(s) Oral two times a day  melatonin 3 milliGRAM(s) Oral at bedtime PRN  pantoprazole    Tablet 40 milliGRAM(s) Oral before breakfast  polyethylene glycol 3350 17 Gram(s) Oral daily PRN  predniSONE   Tablet 10 milliGRAM(s) Oral daily  senna 2 Tablet(s) Oral at bedtime  traMADol 50 milliGRAM(s) Oral daily PRN         Urinalysis Basic - ( 20 Feb 2024 06:55 )    Color: x / Appearance: x / SG: x / pH: x  Gluc: 123 mg/dL / Ketone: x  / Bili: x / Urobili: x   Blood: x / Protein: x / Nitrite: x   Leuk Esterase: x / RBC: x / WBC x   Sq Epi: x / Non Sq Epi: x / Bacteria: x        IMAGING:  -2/13/24 CT A/P: IMPRESSION:  Masslike mural thickening of the distal sigmoid colon. Differential   considerations include malignancy or, less likely, sequelae of chronic   diverticulitis. Gastroenterology evaluation is recommended.    Scattered too small to characterize hypodense foci in the liver, hepatic   protocol MRI is recommended for further characterization.

## 2024-02-20 NOTE — CONSULT NOTE ADULT - ASSESSMENT
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  Having bowel function and tolerating diet. No signs of obstruction. No B symptoms  -Completion of staging workup - CT chest, Rectal MR for staging, CEA  -Pulmonology, Cardiology, & Oncology consult  -Bowel reg given high colonic stool burden.   -Patient will likely require resection of specimen +/- Neoadjuvant chemoradiation  -Okay to continue regular diet  -Rest of care per primary

## 2024-02-20 NOTE — PROGRESS NOTE ADULT - PROBLEM SELECTOR PLAN 4
- Chronic  - recently finished prednisone course, takes tramadol 50mg Qd

## 2024-02-20 NOTE — PROGRESS NOTE ADULT - PROBLEM SELECTOR PLAN 5
- Chronic, SOB likely 2/2 anxiety  - Continue Xanax 0.25mg qhs PRN
- Chronic, SOB likely 2/2 anxiety  - Continue Xanax 0.25mg qhs PRN  currently tachycardic 2/2 anxiety  will give one dose of xanax 0.25mg

## 2024-02-20 NOTE — CHART NOTE - NSCHARTNOTEFT_GEN_A_CORE
Called by radiology that CT chest with IV contrast revealed a RML PE - with no secondary signs of right heart strain. Patient admitted with rectal bleeding and had colonoscopy today with sigmoid mass found. Discussed finding of PE with Dr. Goodman. States that we can start heparin gtt for PE - patient will likely ooze blood but unlikely to hemorrhage. To watch closely on heparin gtt for significant bleeding and/or hemodynamic instability. Night team informed. Called by radiology that CT chest with IV contrast revealed a RML PE - with no secondary signs of right heart strain. Patient admitted with rectal bleeding and had colonoscopy today with sigmoid mass found. Discussed finding of PE with Dr. Goodman. States that we can start heparin gtt for PE - patient will likely ooze blood but unlikely to hemorrhage. To watch closely on heparin gtt for significant bleeding and/or hemodynamic instability. Will check US dopplers LE to evaluate for residual clot burden. Will check proBNP, CE's and 2D ECHO. Night team informed. Pulmonary consultation.

## 2024-02-20 NOTE — PROGRESS NOTE ADULT - SUBJECTIVE AND OBJECTIVE BOX
Patient is a 68y old  Female who presents with a chief complaint of GIB bleed (20 Feb 2024 18:42)      INTERVAL HPI/OVERNIGHT EVENTS:    colonoscopy findings noted. CAT scan angiogram of chest was positive for pulmonary emboli. Patient admits to shortness of breath    MEDICATIONS  (STANDING):  amLODIPine   Tablet 10 milliGRAM(s) Oral daily  heparin  Infusion.  Unit(s)/Hr (11 mL/Hr) IV Continuous <Continuous>  influenza  Vaccine (HIGH DOSE) 0.7 milliLiter(s) IntraMuscular once  iron sucrose Injectable 100 milliGRAM(s) IV Push every 24 hours  lactulose Syrup 20 Gram(s) Oral two times a day  pantoprazole    Tablet 40 milliGRAM(s) Oral before breakfast  predniSONE   Tablet 10 milliGRAM(s) Oral daily  senna 2 Tablet(s) Oral at bedtime      MEDICATIONS  (PRN):  acetaminophen     Tablet .. 650 milliGRAM(s) Oral every 6 hours PRN Temp greater or equal to 38C (100.4F), Mild Pain (1 - 3)  albuterol    90 MICROgram(s) HFA Inhaler 2 Puff(s) Inhalation every 6 hours PRN for shortness of breath and/or wheezing  albuterol/ipratropium for Nebulization 3 milliLiter(s) Nebulizer every 6 hours PRN Shortness of Breath and/or Wheezing  ALPRAZolam 0.25 milliGRAM(s) Oral daily PRN for anxiety  ALPRAZolam 0.25 milliGRAM(s) Oral once PRN anxiety  heparin   Injectable 4500 Unit(s) IV Push every 6 hours PRN For aPTT less than 40  heparin   Injectable 2000 Unit(s) IV Push every 6 hours PRN For aPTT between 40 - 57  melatonin 3 milliGRAM(s) Oral at bedtime PRN Insomnia  polyethylene glycol 3350 17 Gram(s) Oral daily PRN Constipation  traMADol 50 milliGRAM(s) Oral daily PRN Moderate Pain (4 - 6)      Allergies    No Known Allergies    Intolerances        PAST MEDICAL & SURGICAL HISTORY:  COPD (chronic obstructive pulmonary disease)      Rheumatoid arthritis      HTN (hypertension)      Anxiety      No significant past surgical history          Vital Signs Last 24 Hrs  T(C): 36.6 (20 Feb 2024 22:15), Max: 37.4 (20 Feb 2024 10:06)  T(F): 97.8 (20 Feb 2024 22:15), Max: 99.3 (20 Feb 2024 10:06)  HR: 100 (20 Feb 2024 22:15) (86 - 109)  BP: 128/65 (20 Feb 2024 22:15) (122/69 - 143/72)  BP(mean): --  RR: 18 (20 Feb 2024 22:15) (14 - 18)  SpO2: 95% (20 Feb 2024 22:15) (95% - 97%)    Parameters below as of 20 Feb 2024 22:15  Patient On (Oxygen Delivery Method): nasal cannula  O2 Flow (L/min): 2      PHYSICAL EXAMINATION:    GENERAL: The patient is awake and alert in no apparent distress.     HEENT: Head is normocephalic and atraumatic.    NECK: no JVD    LUNGS: diminished air entry bilateral    HEART: Regular rate and rhythm without murmur.    ABDOMEN: Soft, nontender, and nondistended.      EXTREMITIES: Without any cyanosis, clubbing, rash, lesions or edema.    NEUROLOGIC: Grossly intact.    SKIN: No ulceration or induration present.      LABS:                        9.6    7.52  )-----------( 319      ( 20 Feb 2024 06:55 )             34.2     02-20    144  |  104  |  12  ----------------------------<  123<H>  4.1   |  37<H>  |  0.83    Ca    9.5      20 Feb 2024 06:55    TPro  6.1  /  Alb  2.9<L>  /  TBili  0.4  /  DBili  x   /  AST  15  /  ALT  13  /  AlkPhos  57  02-20    PT/INR - ( 20 Feb 2024 12:40 )   PT: 11.9 sec;   INR: 1.02 ratio         PTT - ( 20 Feb 2024 12:40 )  PTT:32.4 sec  Urinalysis Basic - ( 20 Feb 2024 06:55 )    Color: x / Appearance: x / SG: x / pH: x  Gluc: 123 mg/dL / Ketone: x  / Bili: x / Urobili: x   Blood: x / Protein: x / Nitrite: x   Leuk Esterase: x / RBC: x / WBC x   Sq Epi: x / Non Sq Epi: x / Bacteria: x            Assessment:    COPD  Pulmonary emboli  Probable colon CA  Chronic Hypoxic respiratory failure    Plan:    Anticoagulation with heparin  Await pathology report  Supplemental oxygen  Duoneb QID  Symbicort inhaler

## 2024-02-20 NOTE — PROGRESS NOTE ADULT - PROBLEM SELECTOR PLAN 1
Patient presents with BRBPR, diarrhea with clots 2/2 lower GI bleed   CT AP: Masslike mural thickening of the distal sigmoid colon. Differential considerations include malignancy or, less likely, sequelae of chronic diverticulitis. Gastroenterology evaluation is recommended.  Scattered too small to characterize hypodense foci in the liver, hepatic protocol MRI is recommended for further characterization.  -Patient unable to get outpatient colonoscopy (no insurance)  -S/p Protonix in ED   -HGB 10.8 currently stable, no further bleeding. Check rpt CBC in AM. continue to monitor   - f/u AM iron studies (microcytic anemia), LDH, haptoglobin, reticulocyte count  -Hold all chemical anticoagulation, hold home aspirin  -Vital signs currently stable, continue to monitor  -Transfuse prn for Hgb <7  -Type and screen active  - GI consulted, recs pending
Patient presents with BRBPR, diarrhea with clots 2/2 lower GI bleed   CT AP: Masslike mural thickening of the distal sigmoid colon. Differential considerations include malignancy or, less likely, sequelae of chronic diverticulitis. Gastroenterology evaluation is recommended. Scattered too small to characterize hypodense foci in the liver, hepatic protocol MRI is recommended for further characterization.  -Patient unable to get outpatient colonoscopy (no insurance), plan for colonoscopy inpatient on 2/20  -HGB currently stable, no further bleeding.    -Hold all chemical anticoagulation, hold home aspirin  -Vital signs currently stable, continue to monitor  -Transfuse prn for Hgb <7  -Type and screen active  -GI consulted, recs appreciated
Patient presents with BRBPR, diarrhea with clots 2/2 lower GI bleed   CT AP: Masslike mural thickening of the distal sigmoid colon. Differential considerations include malignancy or, less likely, sequelae of chronic diverticulitis. Gastroenterology evaluation is recommended.  Scattered too small to characterize hypodense foci in the liver, hepatic protocol MRI is recommended for further characterization.  -Patient unable to get outpatient colonoscopy (no insurance)  -S/p Protonix in ED   -HGB currently stable, no further bleeding.    -Hold all chemical anticoagulation, hold home aspirin  -Vital signs currently stable, continue to monitor  -Transfuse prn for Hgb <7  -Type and screen active  - GI consulted, recs appreciated
Patient presents with BRBPR, diarrhea with clots 2/2 lower GI bleed   CT AP: Masslike mural thickening of the distal sigmoid colon. Differential considerations include malignancy or, less likely, sequelae of chronic diverticulitis. Gastroenterology evaluation is recommended.  Scattered too small to characterize hypodense foci in the liver, hepatic protocol MRI is recommended for further characterization.  -Patient unable to get outpatient colonoscopy (no insurance)  -S/p Protonix in ED   -HGB currently stable, no further bleeding.    -Hold all chemical anticoagulation, hold home aspirin  -Vital signs currently stable, continue to monitor  -Transfuse prn for Hgb <7  -Type and screen active  - GI consulted, recs appreciated
Patient presents with BRBPR, diarrhea with clots 2/2 lower GI bleed   CT AP: Masslike mural thickening of the distal sigmoid colon. Differential considerations include malignancy or, less likely, sequelae of chronic diverticulitis. Gastroenterology evaluation is recommended. Scattered too small to characterize hypodense foci in the liver, hepatic protocol MRI is recommended for further characterization.  -Patient unable to get outpatient colonoscopy (no insurance)  -HGB currently stable, no further bleeding.    -Hold all chemical anticoagulation, hold home aspirin  -Vital signs currently stable, continue to monitor  -Transfuse prn   -Colonoscopy 2/20 with: large partially obstructing mass noted at approximately 13cm from anal verge  -DW surgery - recs CT chest with IV contrast, CEA and rectal MR for staging - per radiology unable to get staging MRI done at this facility as it requires a different magnet  -GI and surgery following - follow up recs

## 2024-02-20 NOTE — PROGRESS NOTE ADULT - SUBJECTIVE AND OBJECTIVE BOX
Patient is a 68y old  Female who presents with a chief complaint of GIB bleed (20 Feb 2024 13:07)      FROM ADMISSION H+P:   HPI:  67yo F with PMHx COPD (on 4-4.5 L home oxygen intermittently), HTN, RA, anxiety presenting with chief complaint of blood in stools. Patient states for the last two months, she has intermittently noticed blood in stool. She states she has episodes where she has to run to the bathroom to relieve herself, and has a watery, bloody BM with clots. Other times she has soft formed stools without blood. Patient denies any abdominal pain, nausea, vomiting weight loss, constipation or melena. Patient was recently seen at [facility] ED on 2/12/24 with similar symptoms, CT showed "Masslike mural thickening of the distal sigmoid colon." Patient states she doesn't have insurance and is unable to follow up with GI outpatient for further evaluation. Patient also has mild shortness of breath on admission, states it is mainly due to anxiety, and chronically takes Xanax 0.25 mg daily as needed. She has COPD and sometimes needs to use home O2. She states currently doesn't feel like shes having a COPD exacerbation.       ED Course:   Vitals: BP: 144/77, HR: 98, Temp: 98.1, RR: 20, SpO2: 100% on 3L  Labs: FOBT positive, HGB 10.6, MCV 77.5  CT AP:  Masslike mural thickening of the distal sigmoid colon. Differential   considerations include malignancy or, less likely, sequelae of chronic   diverticulitis. Gastroenterology evaluation is recommended.    Scattered too small to characterize hypodense foci in the liver, hepatic   protocol MRI is recommended for further characterization.    EKG: Pending  Received in the ED:  Protonix   (16 Feb 2024 00:02)      INTERVAL HPI/OVERNIGHT EVENTS: Patient was seen and examined. No acute events occurred overnight. No new complaints. Seen after colonoscopy - tolerated well. No pain or discomfort. Surgery consulted for mass seen on colonoscopy. Patient states that she cannot afford her home oxygen - has been borrowing her neighbors. States that she cannot afford follow up.    I&O's Summary      PAST MEDICAL & SURGICAL HISTORY:  COPD (chronic obstructive pulmonary disease)      Rheumatoid arthritis      HTN (hypertension)      Anxiety      No significant past surgical history          LABS:                        9.6    7.52  )-----------( 319      ( 20 Feb 2024 06:55 )             34.2     02-20    144  |  104  |  12  ----------------------------<  123<H>  4.1   |  37<H>  |  0.83    Ca    9.5      20 Feb 2024 06:55    TPro  6.1  /  Alb  2.9<L>  /  TBili  0.4  /  DBili  x   /  AST  15  /  ALT  13  /  AlkPhos  57  02-20    PT/INR - ( 20 Feb 2024 12:40 )   PT: 11.9 sec;   INR: 1.02 ratio         PTT - ( 20 Feb 2024 12:40 )  PTT:32.4 sec  Urinalysis Basic - ( 20 Feb 2024 06:55 )    Color: x / Appearance: x / SG: x / pH: x  Gluc: 123 mg/dL / Ketone: x  / Bili: x / Urobili: x   Blood: x / Protein: x / Nitrite: x   Leuk Esterase: x / RBC: x / WBC x   Sq Epi: x / Non Sq Epi: x / Bacteria: x      CAPILLARY BLOOD GLUCOSE            Urinalysis Basic - ( 20 Feb 2024 06:55 )    Color: x / Appearance: x / SG: x / pH: x  Gluc: 123 mg/dL / Ketone: x  / Bili: x / Urobili: x   Blood: x / Protein: x / Nitrite: x   Leuk Esterase: x / RBC: x / WBC x   Sq Epi: x / Non Sq Epi: x / Bacteria: x        MEDICATIONS  (STANDING):  amLODIPine   Tablet 10 milliGRAM(s) Oral daily  influenza  Vaccine (HIGH DOSE) 0.7 milliLiter(s) IntraMuscular once  iron sucrose Injectable 100 milliGRAM(s) IV Push every 24 hours  lactulose Syrup 20 Gram(s) Oral two times a day  pantoprazole    Tablet 40 milliGRAM(s) Oral before breakfast  predniSONE   Tablet 10 milliGRAM(s) Oral daily  senna 2 Tablet(s) Oral at bedtime    MEDICATIONS  (PRN):  acetaminophen     Tablet .. 650 milliGRAM(s) Oral every 6 hours PRN Temp greater or equal to 38C (100.4F), Mild Pain (1 - 3)  albuterol    90 MICROgram(s) HFA Inhaler 2 Puff(s) Inhalation every 6 hours PRN for shortness of breath and/or wheezing  albuterol/ipratropium for Nebulization 3 milliLiter(s) Nebulizer every 6 hours PRN Shortness of Breath and/or Wheezing  ALPRAZolam 0.25 milliGRAM(s) Oral daily PRN for anxiety  ALPRAZolam 0.25 milliGRAM(s) Oral once PRN anxiety  melatonin 3 milliGRAM(s) Oral at bedtime PRN Insomnia  polyethylene glycol 3350 17 Gram(s) Oral daily PRN Constipation  traMADol 50 milliGRAM(s) Oral daily PRN Moderate Pain (4 - 6)      REVIEW OF SYSTEMS:  CONSTITUTIONAL: No fever or chills  HEENT:  No headache, no sore throat  RESPIRATORY: No cough, wheezing, or shortness of breath  CARDIOVASCULAR: No chest pain, palpitations  GASTROINTESTINAL: No abdominal pain, nausea, vomiting, or diarrhea  GENITOURINARY: No dysuria, frequency, or hematuria  NEUROLOGICAL: no focal weakness or dizziness  MUSCULOSKELETAL: no myalgias  PSYCH: no recent changes in mood    RADIOLOGY & ADDITIONAL TESTS:    Imaging Personally Reviewed:  [x] YES  [ ] NO    Consultant(s) Notes Reviewed:  [x] YES  [ ] NO    PHYSICAL EXAM:  T(C): 36.7 (02-20-24 @ 13:49), Max: 37.4 (02-20-24 @ 10:06)  HR: 108 (02-20-24 @ 13:49) (102 - 109)  BP: 143/72 (02-20-24 @ 13:49) (118/65 - 143/72)  RR: 16 (02-20-24 @ 13:49) (14 - 18)  SpO2: 96% (02-20-24 @ 13:49) (92% - 97%)    GENERAL: NAD, resting in bed with NC on  HEENT:  anicteric, moist mucous membranes  CHEST/LUNG:  CTA b/l, no rales, wheezes, or rhonchi  HEART:  RRR, S1, S2  ABDOMEN:  BS+, soft, nontender, nondistended  EXTREMITIES: no edema, cyanosis, or calf tenderness  NERVOUS SYSTEM: answers questions and follows commands appropriately  PSYCH: normal affect    Care Discussed with Consultants/Other Providers [x] YES  [ ] NO

## 2024-02-20 NOTE — PROGRESS NOTE ADULT - PROBLEM SELECTOR PLAN 6
- SCDs in setting of GIB

## 2024-02-20 NOTE — PROGRESS NOTE ADULT - ASSESSMENT
69yo F with PMHx COPD (not on home oxygen), HTN, RA, anxiety presenting with chief complaint of blood in stools. Admitted for GI bleed, plan for colonoscopy 2/20.

## 2024-02-20 NOTE — CASE MANAGEMENT PROGRESS NOTE - NSCMPROGRESSNOTE_GEN_ALL_CORE
Met with patient at the bedside. She only has medicare part A. Recently applied for part B. Does not qualify for medicaid. The patient had oxygen at home it has broken. Unable to receive oxygen from them. Has a balance with established oxygen company. The patient can not afford the oxygen cost from [facility]. The patient transitional plan is unclear at this point. Patient educated on the role of CM and discussed DC planning. All questions answered.

## 2024-02-21 LAB
ANION GAP SERPL CALC-SCNC: 5 MMOL/L — SIGNIFICANT CHANGE UP (ref 5–17)
APTT BLD: 135.6 SEC — CRITICAL HIGH (ref 24.5–35.6)
APTT BLD: 95.1 SEC — HIGH (ref 24.5–35.6)
APTT BLD: >200 SEC — CRITICAL HIGH (ref 24.5–35.6)
BUN SERPL-MCNC: 11 MG/DL — SIGNIFICANT CHANGE UP (ref 7–23)
CALCIUM SERPL-MCNC: 9.4 MG/DL — SIGNIFICANT CHANGE UP (ref 8.5–10.1)
CEA SERPL-MCNC: 17.8 NG/ML — HIGH (ref 0–3.8)
CHLORIDE SERPL-SCNC: 103 MMOL/L — SIGNIFICANT CHANGE UP (ref 96–108)
CK MB BLD-MCNC: 6.5 % — HIGH (ref 0–3.5)
CK MB CFR SERPL CALC: 1.3 NG/ML — SIGNIFICANT CHANGE UP (ref 0–3.6)
CK SERPL-CCNC: 20 U/L — LOW (ref 26–192)
CO2 SERPL-SCNC: 34 MMOL/L — HIGH (ref 22–31)
CREAT SERPL-MCNC: 0.96 MG/DL — SIGNIFICANT CHANGE UP (ref 0.5–1.3)
EGFR: 65 ML/MIN/1.73M2 — SIGNIFICANT CHANGE UP
GLUCOSE SERPL-MCNC: 110 MG/DL — HIGH (ref 70–99)
HCT VFR BLD CALC: 31 % — LOW (ref 34.5–45)
HCT VFR BLD CALC: 33.2 % — LOW (ref 34.5–45)
HGB BLD-MCNC: 8.9 G/DL — LOW (ref 11.5–15.5)
HGB BLD-MCNC: 9.5 G/DL — LOW (ref 11.5–15.5)
MAGNESIUM SERPL-MCNC: 2.3 MG/DL — SIGNIFICANT CHANGE UP (ref 1.6–2.6)
MCHC RBC-ENTMCNC: 22.5 PG — LOW (ref 27–34)
MCHC RBC-ENTMCNC: 22.6 PG — LOW (ref 27–34)
MCHC RBC-ENTMCNC: 28.6 GM/DL — LOW (ref 32–36)
MCHC RBC-ENTMCNC: 28.7 GM/DL — LOW (ref 32–36)
MCV RBC AUTO: 78.3 FL — LOW (ref 80–100)
MCV RBC AUTO: 78.9 FL — LOW (ref 80–100)
NRBC # BLD: 0 /100 WBCS — SIGNIFICANT CHANGE UP (ref 0–0)
NRBC # BLD: 0 /100 WBCS — SIGNIFICANT CHANGE UP (ref 0–0)
NT-PROBNP SERPL-SCNC: 60 PG/ML — SIGNIFICANT CHANGE UP (ref 0–125)
PHOSPHATE SERPL-MCNC: 2.8 MG/DL — SIGNIFICANT CHANGE UP (ref 2.5–4.5)
PLATELET # BLD AUTO: 313 K/UL — SIGNIFICANT CHANGE UP (ref 150–400)
PLATELET # BLD AUTO: 348 K/UL — SIGNIFICANT CHANGE UP (ref 150–400)
POTASSIUM SERPL-MCNC: 3.5 MMOL/L — SIGNIFICANT CHANGE UP (ref 3.5–5.3)
POTASSIUM SERPL-SCNC: 3.5 MMOL/L — SIGNIFICANT CHANGE UP (ref 3.5–5.3)
RBC # BLD: 3.96 M/UL — SIGNIFICANT CHANGE UP (ref 3.8–5.2)
RBC # BLD: 4.21 M/UL — SIGNIFICANT CHANGE UP (ref 3.8–5.2)
RBC # FLD: 20.7 % — HIGH (ref 10.3–14.5)
RBC # FLD: 21.1 % — HIGH (ref 10.3–14.5)
SODIUM SERPL-SCNC: 142 MMOL/L — SIGNIFICANT CHANGE UP (ref 135–145)
TROPONIN I, HIGH SENSITIVITY RESULT: 5.8 NG/L — SIGNIFICANT CHANGE UP
WBC # BLD: 8.82 K/UL — SIGNIFICANT CHANGE UP (ref 3.8–10.5)
WBC # BLD: 9.33 K/UL — SIGNIFICANT CHANGE UP (ref 3.8–10.5)
WBC # FLD AUTO: 8.82 K/UL — SIGNIFICANT CHANGE UP (ref 3.8–10.5)
WBC # FLD AUTO: 9.33 K/UL — SIGNIFICANT CHANGE UP (ref 3.8–10.5)

## 2024-02-21 PROCEDURE — 99223 1ST HOSP IP/OBS HIGH 75: CPT

## 2024-02-21 PROCEDURE — 99232 SBSQ HOSP IP/OBS MODERATE 35: CPT

## 2024-02-21 RX ADMIN — HEPARIN SODIUM 900 UNIT(S)/HR: 5000 INJECTION INTRAVENOUS; SUBCUTANEOUS at 12:30

## 2024-02-21 RX ADMIN — AMLODIPINE BESYLATE 10 MILLIGRAM(S): 2.5 TABLET ORAL at 05:09

## 2024-02-21 RX ADMIN — IRON SUCROSE 100 MILLIGRAM(S): 20 INJECTION, SOLUTION INTRAVENOUS at 14:18

## 2024-02-21 RX ADMIN — Medication 0.25 MILLIGRAM(S): at 12:32

## 2024-02-21 RX ADMIN — HEPARIN SODIUM 700 UNIT(S)/HR: 5000 INJECTION INTRAVENOUS; SUBCUTANEOUS at 20:49

## 2024-02-21 RX ADMIN — HEPARIN SODIUM 900 UNIT(S)/HR: 5000 INJECTION INTRAVENOUS; SUBCUTANEOUS at 19:26

## 2024-02-21 RX ADMIN — Medication 10 MILLIGRAM(S): at 05:09

## 2024-02-21 RX ADMIN — HEPARIN SODIUM 0 UNIT(S)/HR: 5000 INJECTION INTRAVENOUS; SUBCUTANEOUS at 03:59

## 2024-02-21 RX ADMIN — TRAMADOL HYDROCHLORIDE 50 MILLIGRAM(S): 50 TABLET ORAL at 23:59

## 2024-02-21 RX ADMIN — LACTULOSE 20 GRAM(S): 10 SOLUTION ORAL at 17:19

## 2024-02-21 RX ADMIN — Medication 3 MILLIGRAM(S): at 23:59

## 2024-02-21 RX ADMIN — HEPARIN SODIUM 900 UNIT(S)/HR: 5000 INJECTION INTRAVENOUS; SUBCUTANEOUS at 07:16

## 2024-02-21 RX ADMIN — HEPARIN SODIUM 900 UNIT(S)/HR: 5000 INJECTION INTRAVENOUS; SUBCUTANEOUS at 19:28

## 2024-02-21 RX ADMIN — HEPARIN SODIUM 900 UNIT(S)/HR: 5000 INJECTION INTRAVENOUS; SUBCUTANEOUS at 05:08

## 2024-02-21 RX ADMIN — HEPARIN SODIUM 0 UNIT(S)/HR: 5000 INJECTION INTRAVENOUS; SUBCUTANEOUS at 19:45

## 2024-02-21 RX ADMIN — PANTOPRAZOLE SODIUM 40 MILLIGRAM(S): 20 TABLET, DELAYED RELEASE ORAL at 05:09

## 2024-02-21 NOTE — PROGRESS NOTE ADULT - SUBJECTIVE AND OBJECTIVE BOX
[facility]  Bobby Mcdonald PA-C  [address] [place] [address]  [phone]      INTERVAL HPI/OVERNIGHT EVENTS:  Pt s/e  Pt feels well s/p colonoscopy. Small amount of blood in stool overnight  No further GI complaints    MEDICATIONS  (STANDING):  amLODIPine   Tablet 10 milliGRAM(s) Oral daily  budesonide 160 MICROgram(s)/formoterol 4.5 MICROgram(s) Inhaler 2 Puff(s) Inhalation two times a day  heparin  Infusion.  Unit(s)/Hr (11 mL/Hr) IV Continuous <Continuous>  influenza  Vaccine (HIGH DOSE) 0.7 milliLiter(s) IntraMuscular once  iron sucrose Injectable 100 milliGRAM(s) IV Push every 24 hours  lactulose Syrup 20 Gram(s) Oral two times a day  pantoprazole    Tablet 40 milliGRAM(s) Oral before breakfast  senna 2 Tablet(s) Oral at bedtime    MEDICATIONS  (PRN):  acetaminophen     Tablet .. 650 milliGRAM(s) Oral every 6 hours PRN Temp greater or equal to 38C (100.4F), Mild Pain (1 - 3)  albuterol/ipratropium for Nebulization 3 milliLiter(s) Nebulizer every 6 hours PRN Shortness of Breath and/or Wheezing  ALPRAZolam 0.25 milliGRAM(s) Oral once PRN anxiety  ALPRAZolam 0.25 milliGRAM(s) Oral daily PRN for anxiety  heparin   Injectable 4500 Unit(s) IV Push every 6 hours PRN For aPTT less than 40  heparin   Injectable 2000 Unit(s) IV Push every 6 hours PRN For aPTT between 40 - 57  melatonin 3 milliGRAM(s) Oral at bedtime PRN Insomnia  polyethylene glycol 3350 17 Gram(s) Oral daily PRN Constipation  traMADol 50 milliGRAM(s) Oral daily PRN Moderate Pain (4 - 6)      Allergies  No Known Allergies      PHYSICAL EXAM:   Vital Signs:  Vital Signs Last 24 Hrs  T(C): 36.6 (21 Feb 2024 04:50), Max: 36.8 (20 Feb 2024 20:30)  T(F): 97.8 (21 Feb 2024 04:50), Max: 98.3 (20 Feb 2024 20:30)  HR: 98 (21 Feb 2024 04:50) (86 - 108)  BP: 141/74 (21 Feb 2024 04:50) (128/65 - 143/72)  BP(mean): --  RR: 18 (21 Feb 2024 04:50) (16 - 18)  SpO2: 94% (21 Feb 2024 04:50) (94% - 96%)    Parameters below as of 21 Feb 2024 04:50  Patient On (Oxygen Delivery Method): nasal cannula  O2 Flow (L/min): 2      GENERAL:  Appears stated age  HEENT:  NC/AT  CHEST:  Full & symmetric excursion  HEART:  Regular rhythm  ABDOMEN:  Soft, non-tender, non-distended  EXTEREMITIES:  no cyanosis  SKIN:  No rash  NEURO:  Alert      LABS:                        9.5    8.82  )-----------( 348      ( 21 Feb 2024 07:00 )             33.2     02-21    142  |  103  |  11  ----------------------------<  110<H>  3.5   |  34<H>  |  0.96    Ca    9.4      21 Feb 2024 07:00  Phos  2.8     02-21  Mg     2.3     02-21    TPro  6.1  /  Alb  2.9<L>  /  TBili  0.4  /  DBili  x   /  AST  15  /  ALT  13  /  AlkPhos  57  02-20    PT/INR - ( 20 Feb 2024 12:40 )   PT: 11.9 sec;   INR: 1.02 ratio         PTT - ( 21 Feb 2024 03:20 )  PTT:>200.0 sec  Urinalysis Basic - ( 21 Feb 2024 07:00 )    Color: x / Appearance: x / SG: x / pH: x  Gluc: 110 mg/dL / Ketone: x  / Bili: x / Urobili: x   Blood: x / Protein: x / Nitrite: x   Leuk Esterase: x / RBC: x / WBC x   Sq Epi: x / Non Sq Epi: x / Bacteria: x

## 2024-02-21 NOTE — PROGRESS NOTE ADULT - ASSESSMENT
69yo F with PMHx COPD (not on home oxygen), HTN, RA, anxiety presenting with chief complaint of blood in stools. Admitted for GI bleed, plan for colonoscopy 2/20.        Problem/Plan - 1:  ·  Problem: Colonic mass.   ·  Plan: Patient presents with BRBPR, diarrhea with clots 2/2 lower GI bleed   CT AP: Masslike mural thickening of the distal sigmoid colon. Differential considerations include malignancy or, less likely, sequelae of chronic diverticulitis. Gastroenterology evaluation is recommended. Scattered too small to characterize hypodense foci in the liver, hepatic protocol MRI is recommended for further characterization.  -Patient unable to get outpatient colonoscopy (no insurance)  -HGB currently stable, no further bleeding.    -Hold home aspirin  -Vital signs currently stable, continue to monitor  -Transfuse prn   -Colonoscopy 2/20 with: large partially obstructing mass noted at approximately 13cm from anal verge  -DW surgery - recs CT chest with IV contrast, CEA and rectal MR for staging - per radiology unable to get staging MRI done at this facility as it requires a different magnet  -CT chest: No evidence of metastatic disease. Acute pulmonary embolus in the right middle lobe lobar and segmental branches.  -GI and surgery following - follow up recs.     Problem/Plan - 2:  ·  Problem: COPD (chronic obstructive pulmonary disease).   ·  Plan: Chronic, recently admitted for COPD exacerbation  - Patient states current SOB likely due to anxiety  - Duonebs q6 PRN  - Continue home inhalers   - Supplemental O2 PRN. At baseline: 4-4.5 L home oxygen intermittently. COPD patient will keep SpO2 goal 88-93%   - Monitor respiratory status   - 3 weeks ago had rsv, recent rvp negative  - Continuous pulse ox  - Pulmonary Dr. Dobson consult for clearance.     Problem/Plan - 3:  ·  Problem: HTN (hypertension).   ·  Plan: Chronic, stable  - Continue home amlodipine 10mg qd with hold parameters  - Monitor hemodynamics  - DASH/TLC diet.     Problem/Plan - 4:  ·  Problem: Rheumatoid arthritis.   ·  Plan: - Chronic  - recently finished prednisone course, takes tramadol 50mg Qd.     Problem/Plan - 5:  ·  Problem: Anxiety.   ·  Plan: - Chronic, SOB likely 2/2 anxiety  - Continue Xanax 0.25mg qhs PRN.     Problem/Plan - 6:  ·  Problem: Encounter for deep vein thrombosis (DVT) prophylaxis.   ·  Plan: - SCDs in setting of GIB.     69yo F with PMHx COPD (not on home oxygen), HTN, RA, anxiety presenting with chief complaint of blood in stools. Admitted for GI bleed, plan for colonoscopy 2/20.        Problem/Plan - 1:  ·  Problem: Colonic mass.   ·  Plan: Patient presents with BRBPR, diarrhea with clots 2/2 lower GI bleed   CT AP: Masslike mural thickening of the distal sigmoid colon. Differential considerations include malignancy or, less likely, sequelae of chronic diverticulitis. Gastroenterology evaluation is recommended. Scattered too small to characterize hypodense foci in the liver, hepatic protocol MRI is recommended for further characterization.  -Patient unable to get outpatient colonoscopy (no insurance)  -HGB currently stable, no further bleeding.    -Hold home aspirin  -Vital signs currently stable, continue to monitor  -Transfuse prn   -Colonoscopy 2/20 with: large partially obstructing mass noted at approximately 13cm from anal verge  -DW surgery - recs CT chest with IV contrast, CEA and rectal MR for staging - per radiology unable to get staging MRI done at this facility as it requires a different magnet  -CT chest: No evidence of metastatic disease. Acute pulmonary embolus in the right middle lobe lobar and segmental branches.  -GI and surgery following - follow up recs.     Problem/Plan - 2:  ·  Problem: COPD (chronic obstructive pulmonary disease).   ·  Plan: Chronic, recently admitted for COPD exacerbation  - Patient states current SOB likely due to anxiety  - Duonebs q6 PRN  - Continue home inhalers   - Supplemental O2 PRN. At baseline: 4-4.5 L home oxygen intermittently. COPD patient will keep SpO2 goal 88-93%   - Monitor respiratory status   - 3 weeks ago had rsv, recent rvp negative  - Continuous pulse ox  - Pulmonary Dr. Dobson consult for clearance.     Problem/Plan - 3:  ·  Problem: HTN (hypertension).   ·  Plan: Chronic, stable  - Continue home amlodipine 10mg qd with hold parameters  - Monitor hemodynamics  - DASH/TLC diet.     Problem/Plan - 4:  ·  Problem: Rheumatoid arthritis.   ·  Plan: - Chronic  - recently finished prednisone course, takes tramadol 50mg Qd.     Problem/Plan - 5:  ·  Problem: Anxiety.   ·  Plan: - Chronic, SOB likely 2/2 anxiety  - Continue Xanax 0.25mg qhs PRN.     Problem/Plan - 6:  ·  Problem: Encounter for deep vein thrombosis (DVT) prophylaxis.   ·  Plan: - on heparin drip for pulmonary embolism    #Pulmonary embolism:  CT chest with No evidence of metastatic disease. Acute pulmonary embolus in the right middle lobe lobar and segmental branches.  Continue heparin drip.  Check echo.

## 2024-02-21 NOTE — PROGRESS NOTE ADULT - SUBJECTIVE AND OBJECTIVE BOX
SURGERY PA NOTE ON BEHALF OF DR. ROCHA:    S: Patient seen and examined at bedside.   No acute events overnight.  Patient tolerating diet, reports still minor amount of blood per rectum upon having BMs.  Denies fevers, chills, chest pain, SOB, palpitations, calf pain.        MEDICATIONS:  acetaminophen     Tablet .. 650 milliGRAM(s) Oral every 6 hours PRN  albuterol/ipratropium for Nebulization 3 milliLiter(s) Nebulizer every 6 hours PRN  ALPRAZolam 0.25 milliGRAM(s) Oral once PRN  ALPRAZolam 0.25 milliGRAM(s) Oral daily PRN  amLODIPine   Tablet 10 milliGRAM(s) Oral daily  budesonide 160 MICROgram(s)/formoterol 4.5 MICROgram(s) Inhaler 2 Puff(s) Inhalation two times a day  heparin   Injectable 4500 Unit(s) IV Push every 6 hours PRN  heparin   Injectable 2000 Unit(s) IV Push every 6 hours PRN  heparin  Infusion.  Unit(s)/Hr IV Continuous <Continuous>  influenza  Vaccine (HIGH DOSE) 0.7 milliLiter(s) IntraMuscular once  iron sucrose Injectable 100 milliGRAM(s) IV Push every 24 hours  lactulose Syrup 20 Gram(s) Oral two times a day  melatonin 3 milliGRAM(s) Oral at bedtime PRN  pantoprazole    Tablet 40 milliGRAM(s) Oral before breakfast  polyethylene glycol 3350 17 Gram(s) Oral daily PRN  predniSONE   Tablet 10 milliGRAM(s) Oral daily  senna 2 Tablet(s) Oral at bedtime  traMADol 50 milliGRAM(s) Oral daily PRN      O:  Vital Signs Last 24 Hrs  T(C): 36.6 (21 Feb 2024 04:50), Max: 37.4 (20 Feb 2024 10:06)  T(F): 97.8 (21 Feb 2024 04:50), Max: 99.3 (20 Feb 2024 10:06)  HR: 98 (21 Feb 2024 04:50) (86 - 109)  BP: 141/74 (21 Feb 2024 04:50) (122/69 - 143/72)  BP(mean): --  RR: 18 (21 Feb 2024 04:50) (14 - 18)  SpO2: 94% (21 Feb 2024 04:50) (94% - 97%)    Parameters below as of 21 Feb 2024 04:50  Patient On (Oxygen Delivery Method): nasal cannula  O2 Flow (L/min): 2      I&O SUMMARY:      PHYSICAL EXAM:  Lungs: CTA bilat without W/R/R  Card: S1S2  Abd: Soft, NT, ND.  +BS x 4.  No rebound/guarding.    Ext: Calves soft, NT, without edema bilat    LABS:                        9.5    8.82  )-----------( 348      ( 21 Feb 2024 07:00 )             33.2     02-21    142  |  103  |  11  ----------------------------<  110<H>  3.5   |  34<H>  |  0.96    Ca    9.4      21 Feb 2024 07:00  Mg     2.3     02-21    TPro  6.1  /  Alb  2.9<L>  /  TBili  0.4  /  DBili  x   /  AST  15  /  ALT  13  /  AlkPhos  57  02-20    PT/INR - ( 20 Feb 2024 12:40 )   PT: 11.9 sec;   INR: 1.02 ratio         PTT - ( 21 Feb 2024 03:20 )  PTT:>200.0 sec          Assessment:  68 year old female with PMHx COPD (on 4-4.5 L home oxygen intermittently), HTN, RA, anxiety presenting with chief complaint of blood in stools for the past two months found to have 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  VSS, afebrile.  Labs unremarkable, H/H stable.  Abdominal exam benign.  Now with newfound PE on CT chest.        Plan:  - appreciate Pulmonology, Cardiology, & Oncology input  - Bowel reg given high colonic stool burden.   - Patient will likely require resection of specimen +/- Neoadjuvant chemoradiation  - Soft diet   - Rest of care per primary   - Discussed w/ Dr. Rocha    SURGERY PA NOTE ON BEHALF OF DR. ROCHA:    S: Patient seen and examined at bedside.   No acute events overnight.  Patient tolerating diet, reports still minor amount of blood per rectum upon having BMs.  Denies fevers, chills, chest pain, SOB, palpitations, calf pain.        MEDICATIONS:  acetaminophen     Tablet .. 650 milliGRAM(s) Oral every 6 hours PRN  albuterol/ipratropium for Nebulization 3 milliLiter(s) Nebulizer every 6 hours PRN  ALPRAZolam 0.25 milliGRAM(s) Oral once PRN  ALPRAZolam 0.25 milliGRAM(s) Oral daily PRN  amLODIPine   Tablet 10 milliGRAM(s) Oral daily  budesonide 160 MICROgram(s)/formoterol 4.5 MICROgram(s) Inhaler 2 Puff(s) Inhalation two times a day  heparin   Injectable 4500 Unit(s) IV Push every 6 hours PRN  heparin   Injectable 2000 Unit(s) IV Push every 6 hours PRN  heparin  Infusion.  Unit(s)/Hr IV Continuous <Continuous>  influenza  Vaccine (HIGH DOSE) 0.7 milliLiter(s) IntraMuscular once  iron sucrose Injectable 100 milliGRAM(s) IV Push every 24 hours  lactulose Syrup 20 Gram(s) Oral two times a day  melatonin 3 milliGRAM(s) Oral at bedtime PRN  pantoprazole    Tablet 40 milliGRAM(s) Oral before breakfast  polyethylene glycol 3350 17 Gram(s) Oral daily PRN  predniSONE   Tablet 10 milliGRAM(s) Oral daily  senna 2 Tablet(s) Oral at bedtime  traMADol 50 milliGRAM(s) Oral daily PRN      O:  Vital Signs Last 24 Hrs  T(C): 36.6 (21 Feb 2024 04:50), Max: 37.4 (20 Feb 2024 10:06)  T(F): 97.8 (21 Feb 2024 04:50), Max: 99.3 (20 Feb 2024 10:06)  HR: 98 (21 Feb 2024 04:50) (86 - 109)  BP: 141/74 (21 Feb 2024 04:50) (122/69 - 143/72)  BP(mean): --  RR: 18 (21 Feb 2024 04:50) (14 - 18)  SpO2: 94% (21 Feb 2024 04:50) (94% - 97%)    Parameters below as of 21 Feb 2024 04:50  Patient On (Oxygen Delivery Method): nasal cannula  O2 Flow (L/min): 2      I&O SUMMARY:      PHYSICAL EXAM:  Lungs: CTA bilat without W/R/R  Card: S1S2  Abd: Soft, NT, ND.  +BS x 4.  No rebound/guarding.    Ext: Calves soft, NT, without edema bilat    LABS:                        9.5    8.82  )-----------( 348      ( 21 Feb 2024 07:00 )             33.2     02-21    142  |  103  |  11  ----------------------------<  110<H>  3.5   |  34<H>  |  0.96    Ca    9.4      21 Feb 2024 07:00  Mg     2.3     02-21    TPro  6.1  /  Alb  2.9<L>  /  TBili  0.4  /  DBili  x   /  AST  15  /  ALT  13  /  AlkPhos  57  02-20    PT/INR - ( 20 Feb 2024 12:40 )   PT: 11.9 sec;   INR: 1.02 ratio         PTT - ( 21 Feb 2024 03:20 )  PTT:>200.0 sec          Assessment:  68 year old female with PMHx COPD (on 4-4.5 L home oxygen intermittently), HTN, RA, anxiety presenting with chief complaint of blood in stools for the past two months found to have 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  VSS, afebrile.  Labs unremarkable, H/H stable.  Abdominal exam benign.  Now with newfound PE on CT chest.        Plan:  - Will plan for Lap sigmoidectomy next week 2/28   - appreciate Pulmonology, Cardiology, and medical clearances  - Bowel reg given high colonic stool burden   - Soft diet   - Rest of care per primary   - Discussed w/ Dr. Rocha

## 2024-02-21 NOTE — CONSULT NOTE ADULT - NS ATTEND AMEND GEN_ALL_CORE FT
Leora is a pleasant 69yo F with COPD (not on home oxygen), HTN, RA, anxiety admitted with GI bleed.  She is now s/p colonoscopy 2/20, large partially obstructing mass noted at approximately 13cm from anal verge.  also found to have an acute PE, now on heparin    - no prior cardiac history, though does report some dyspnea on exertion, which has been blamed on her COPD  - remains with 02 requirement, on the basis of a PE and COPD  - titrate down 02 as able  - no sign of volume overload on exam  - no sign of acute ischemia  - needs an echocardiogram to evaluate LV function and pulmonary pressures  - troponin and bnp minimal  - in the setting of a PE, will need to minimize the time off AC  - pulmonary follow up.

## 2024-02-21 NOTE — PROGRESS NOTE ADULT - ASSESSMENT
constipation  rectal bleed  abnormal CT    s/p colonoscopy 2/20, large partially obstructing mass noted at approximately 13cm from anal verge  follow up pathology  follow CEA  surgery recs noted and appreciated  d/w pt and sister at bedside    I reviewed the overnight course of events on the unit, re-confirming the patient history. I discussed the care with the patient  Differential diagnosis and plan of care discussed with patient after the evaluation  35 minutes spent on total encounter of which more than fifty percent of the encounter was spent counseling and/or coordinating care by the attending physician.

## 2024-02-21 NOTE — CONSULT NOTE ADULT - SUBJECTIVE AND OBJECTIVE BOX
[facility] - James Mcghee,  Alexa, Arnel De León, Nolvia, Gordon Collazo  Office Number: [phone]    Initial Consult Note    CHIEF COMPLAINT: Patient is a 68y old  Female who presents with a chief complaint of GIB bleed (21 Feb 2024 11:06)      HPI:  69yo F with PMHx COPD (on 4-4.5 L home oxygen intermittently), HTN, RA, anxiety presenting with chief complaint of blood in stools. Patient states for the last two months, she has intermittently noticed blood in stool. She states she has episodes where she has to run to the bathroom to relieve herself, and has a watery, bloody BM with clots. Other times she has soft formed stools without blood. Patient denies any abdominal pain, nausea, vomiting weight loss, constipation or melena. Patient was recently seen at [facility] ED on 2/12/24 with similar symptoms, CT showed "Masslike mural thickening of the distal sigmoid colon." Patient states she doesn't have insurance and is unable to follow up with GI outpatient for further evaluation. Patient also has mild shortness of breath on admission, states it is mainly due to anxiety, and chronically takes Xanax 0.25 mg daily as needed. She has COPD and sometimes needs to use home O2. She states currently doesn't feel like shes having a COPD exacerbation.       ED Course:   Vitals: BP: 144/77, HR: 98, Temp: 98.1, RR: 20, SpO2: 100% on 3L  Labs: FOBT positive, HGB 10.6, MCV 77.5  CT AP:  Masslike mural thickening of the distal sigmoid colon. Differential   considerations include malignancy or, less likely, sequelae of chronic   diverticulitis. Gastroenterology evaluation is recommended.    Scattered too small to characterize hypodense foci in the liver, hepatic   protocol MRI is recommended for further characterization.    EKG: Pending  Received in the ED:  Protonix   (16 Feb 2024 00:02)    PAST MEDICAL & SURGICAL HISTORY:  COPD (chronic obstructive pulmonary disease)      Rheumatoid arthritis      HTN (hypertension)      Anxiety      No significant past surgical history        SOCIAL HISTORY:  No tobacco, ethanol, or drug abuse.  FAMILY HISTORY:  FHx: lung cancer      No family history of acute MI or sudden cardiac death.  MEDICATIONS  (STANDING):  amLODIPine   Tablet 10 milliGRAM(s) Oral daily  budesonide 160 MICROgram(s)/formoterol 4.5 MICROgram(s) Inhaler 2 Puff(s) Inhalation two times a day  heparin  Infusion.  Unit(s)/Hr (11 mL/Hr) IV Continuous <Continuous>  influenza  Vaccine (HIGH DOSE) 0.7 milliLiter(s) IntraMuscular once  iron sucrose Injectable 100 milliGRAM(s) IV Push every 24 hours  lactulose Syrup 20 Gram(s) Oral two times a day  pantoprazole    Tablet 40 milliGRAM(s) Oral before breakfast  senna 2 Tablet(s) Oral at bedtime    MEDICATIONS  (PRN):  acetaminophen     Tablet .. 650 milliGRAM(s) Oral every 6 hours PRN Temp greater or equal to 38C (100.4F), Mild Pain (1 - 3)  albuterol/ipratropium for Nebulization 3 milliLiter(s) Nebulizer every 6 hours PRN Shortness of Breath and/or Wheezing  ALPRAZolam 0.25 milliGRAM(s) Oral daily PRN for anxiety  heparin   Injectable 4500 Unit(s) IV Push every 6 hours PRN For aPTT less than 40  heparin   Injectable 2000 Unit(s) IV Push every 6 hours PRN For aPTT between 40 - 57  melatonin 3 milliGRAM(s) Oral at bedtime PRN Insomnia  polyethylene glycol 3350 17 Gram(s) Oral daily PRN Constipation  traMADol 50 milliGRAM(s) Oral daily PRN Moderate Pain (4 - 6)    Allergies    No Known Allergies    Intolerances      REVIEW OF SYSTEMS:  All other review of systems is negative unless indicated above  VITAL SIGNS:   Vital Signs Last 24 Hrs  T(C): 36.5 (21 Feb 2024 11:25), Max: 36.8 (20 Feb 2024 20:30)  T(F): 97.7 (21 Feb 2024 11:25), Max: 98.3 (20 Feb 2024 20:30)  HR: 110 (21 Feb 2024 11:25) (86 - 110)  BP: 140/73 (21 Feb 2024 11:25) (128/65 - 143/72)  BP(mean): --  RR: 18 (21 Feb 2024 11:25) (16 - 18)  SpO2: 91% (21 Feb 2024 11:25) (91% - 96%)    Parameters below as of 21 Feb 2024 11:25  Patient On (Oxygen Delivery Method): nasal cannula      I&O's Summary    On Exam:  Constitutional: NAD, alert and oriented x 3  Lungs:  Non-labored, breath sounds are clear bilaterally, No wheezing, rales or rhonchi  Cardiovascular: RRR.  S1 and S2 positive.  No murmurs, rubs, gallops or clicks  Gastrointestinal: Bowel Sounds present, soft, nontender.   Extremities: No peripheral edema.   Neurological: Alert, no focal deficits  Skin: No rashes or ulcers   Psych:  Mood & affect appropriate.    LABS: All Labs Reviewed:                        9.5    8.82  )-----------( 348      ( 21 Feb 2024 07:00 )             33.2                         8.9    9.33  )-----------( 313      ( 21 Feb 2024 03:20 )             31.0                         9.6    7.52  )-----------( 319      ( 20 Feb 2024 06:55 )             34.2     21 Feb 2024 07:00    142    |  103    |  11     ----------------------------<  110    3.5     |  34     |  0.96   20 Feb 2024 06:55    144    |  104    |  12     ----------------------------<  123    4.1     |  37     |  0.83   19 Feb 2024 08:07    143    |  103    |  17     ----------------------------<  124    4.1     |  37     |  1.00     Ca    9.4        21 Feb 2024 07:00  Ca    9.5        20 Feb 2024 06:55  Ca    9.4        19 Feb 2024 08:07  Phos  2.8       21 Feb 2024 07:00  Mg     2.3       21 Feb 2024 07:00    TPro  6.1    /  Alb  2.9    /  TBili  0.4    /  DBili  x      /  AST  15     /  ALT  13     /  AlkPhos  57     20 Feb 2024 06:55  TPro  6.4    /  Alb  2.9    /  TBili  0.4    /  DBili  x      /  AST  8      /  ALT  19     /  AlkPhos  55     19 Feb 2024 08:07    PT/INR - ( 20 Feb 2024 12:40 )   PT: 11.9 sec;   INR: 1.02 ratio         PTT - ( 21 Feb 2024 10:45 )  PTT:95.1 sec  CARDIAC MARKERS ( 21 Feb 2024 07:00 )  x     / x     / 20 U/L / x     / 1.3 ng/mL      Blood Culture:         RADIOLOGY:    EKG:       [facility] - James Mcghee,  Alexa, Arnel De León, Nolvia, Gordon Collazo  Office Number: [phone]    Initial Consult Note    CHIEF COMPLAINT: Patient is a 68y old  Female who presents with a chief complaint of GIB bleed (21 Feb 2024 11:06)      HPI:  67yo F with PMHx COPD (on 4-4.5 L home oxygen intermittently), HTN, RA, anxiety presenting with chief complaint of blood in stools. Patient states for the last two months, she has intermittently noticed blood in stool. She states she has episodes where she has to run to the bathroom to relieve herself, and has a watery, bloody BM with clots. Other times she has soft formed stools without blood. Patient denies any abdominal pain, nausea, vomiting weight loss, constipation or melena. Patient was recently seen at [facility] ED on 2/12/24 with similar symptoms, CT showed "Masslike mural thickening of the distal sigmoid colon." Patient states she doesn't have insurance and is unable to follow up with GI outpatient for further evaluation. Patient also has mild shortness of breath on admission, states it is mainly due to anxiety, and chronically takes Xanax 0.25 mg daily as needed. She has COPD and sometimes needs to use home O2. She states currently doesn't feel like shes having a COPD exacerbation.       ED Course:   Vitals: BP: 144/77, HR: 98, Temp: 98.1, RR: 20, SpO2: 100% on 3L  Labs: FOBT positive, HGB 10.6, MCV 77.5  CT AP:  Masslike mural thickening of the distal sigmoid colon. Differential   considerations include malignancy or, less likely, sequelae of chronic   diverticulitis. Gastroenterology evaluation is recommended.    Scattered too small to characterize hypodense foci in the liver, hepatic   protocol MRI is recommended for further characterization.    EKG: Pending  Received in the ED:  Protonix   (16 Feb 2024 00:02)    PAST MEDICAL & SURGICAL HISTORY:  COPD (chronic obstructive pulmonary disease)      Rheumatoid arthritis      HTN (hypertension)      Anxiety      No significant past surgical history        SOCIAL HISTORY:  No tobacco, ethanol, or drug abuse.  FAMILY HISTORY:  FHx: lung cancer      No family history of acute MI or sudden cardiac death.  MEDICATIONS  (STANDING):  amLODIPine   Tablet 10 milliGRAM(s) Oral daily  budesonide 160 MICROgram(s)/formoterol 4.5 MICROgram(s) Inhaler 2 Puff(s) Inhalation two times a day  heparin  Infusion.  Unit(s)/Hr (11 mL/Hr) IV Continuous <Continuous>  influenza  Vaccine (HIGH DOSE) 0.7 milliLiter(s) IntraMuscular once  iron sucrose Injectable 100 milliGRAM(s) IV Push every 24 hours  lactulose Syrup 20 Gram(s) Oral two times a day  pantoprazole    Tablet 40 milliGRAM(s) Oral before breakfast  senna 2 Tablet(s) Oral at bedtime    MEDICATIONS  (PRN):  acetaminophen     Tablet .. 650 milliGRAM(s) Oral every 6 hours PRN Temp greater or equal to 38C (100.4F), Mild Pain (1 - 3)  albuterol/ipratropium for Nebulization 3 milliLiter(s) Nebulizer every 6 hours PRN Shortness of Breath and/or Wheezing  ALPRAZolam 0.25 milliGRAM(s) Oral daily PRN for anxiety  heparin   Injectable 4500 Unit(s) IV Push every 6 hours PRN For aPTT less than 40  heparin   Injectable 2000 Unit(s) IV Push every 6 hours PRN For aPTT between 40 - 57  melatonin 3 milliGRAM(s) Oral at bedtime PRN Insomnia  polyethylene glycol 3350 17 Gram(s) Oral daily PRN Constipation  traMADol 50 milliGRAM(s) Oral daily PRN Moderate Pain (4 - 6)    Allergies    No Known Allergies    Intolerances      REVIEW OF SYSTEMS:  All other review of systems is negative unless indicated above  VITAL SIGNS:   Vital Signs Last 24 Hrs  T(C): 36.5 (21 Feb 2024 11:25), Max: 36.8 (20 Feb 2024 20:30)  T(F): 97.7 (21 Feb 2024 11:25), Max: 98.3 (20 Feb 2024 20:30)  HR: 110 (21 Feb 2024 11:25) (86 - 110)  BP: 140/73 (21 Feb 2024 11:25) (128/65 - 143/72)  BP(mean): --  RR: 18 (21 Feb 2024 11:25) (16 - 18)  SpO2: 91% (21 Feb 2024 11:25) (91% - 96%)    Parameters below as of 21 Feb 2024 11:25  Patient On (Oxygen Delivery Method): nasal cannula      I&O's Summary    On Exam:  Constitutional: NAD, alert and oriented x 3, frail  Lungs:  Non-labored, breath sounds are dim bilaterally, No wheezing, rales or rhonchi  Cardiovascular: RRR.  S1 and S2 positive.  No murmurs, rubs, gallops or clicks  Gastrointestinal: Bowel Sounds present, soft, nontender.   Extremities: No peripheral edema.   Neurological: Alert, no focal deficits  Skin: No rashes or ulcers   Psych:  Mood & affect appropriate.    LABS: All Labs Reviewed:                        9.5    8.82  )-----------( 348      ( 21 Feb 2024 07:00 )             33.2                         8.9    9.33  )-----------( 313      ( 21 Feb 2024 03:20 )             31.0                         9.6    7.52  )-----------( 319      ( 20 Feb 2024 06:55 )             34.2     21 Feb 2024 07:00    142    |  103    |  11     ----------------------------<  110    3.5     |  34     |  0.96   20 Feb 2024 06:55    144    |  104    |  12     ----------------------------<  123    4.1     |  37     |  0.83   19 Feb 2024 08:07    143    |  103    |  17     ----------------------------<  124    4.1     |  37     |  1.00     Ca    9.4        21 Feb 2024 07:00  Ca    9.5        20 Feb 2024 06:55  Ca    9.4        19 Feb 2024 08:07  Phos  2.8       21 Feb 2024 07:00  Mg     2.3       21 Feb 2024 07:00    TPro  6.1    /  Alb  2.9    /  TBili  0.4    /  DBili  x      /  AST  15     /  ALT  13     /  AlkPhos  57     20 Feb 2024 06:55  TPro  6.4    /  Alb  2.9    /  TBili  0.4    /  DBili  x      /  AST  8      /  ALT  19     /  AlkPhos  55     19 Feb 2024 08:07    PT/INR - ( 20 Feb 2024 12:40 )   PT: 11.9 sec;   INR: 1.02 ratio         PTT - ( 21 Feb 2024 10:45 )  PTT:95.1 sec  CARDIAC MARKERS ( 21 Feb 2024 07:00 )  x     / x     / 20 U/L / x     / 1.3 ng/mL      Blood Culture:         RADIOLOGY:  < from: CT Chest w/ IV Cont (02.20.24 @ 17:26) >    ACC: [id number] EXAM:  CT CHEST IC   ORDERED BY:  DILLAN VEGA     PROCEDURE DATE:  02/20/2024          INTERPRETATION:  CLINICAL INFORMATION: Suspected rectal cancer, staging,   history of COPD    COMPARISON: 2/13/2024    CONTRAST/COMPLICATIONS:  IV Contrast: Omnipaque 350  90 cc administered   10 cc discarded  Oral Contrast: NONE  Complications: None reported at time of study completion    PROCEDURE:  CT of the Chest was performed.  Sagittal and coronal reformats were performed.    FINDINGS:    LUNGS AND AIRWAYS: Patent central airways.  Emphysematous changes of the   lungs without evidence of nodule  PLEURA: No pleural effusion.  MEDIASTINUM AND AYAH: No lymphadenopathy.  VESSELS: Thrombus in the right middle lobe pulmonary artery which does   not appear to have been present on prior examination  HEART: Heart size is normal. No pericardial effusion. No evidence of   right heart strain  CHEST WALL AND LOWER NECK: Within normal limits.  VISUALIZED UPPER ABDOMEN: Again seen are small hypodensities in the liver   too small to characterize.  BONES: Within normal limits.    IMPRESSION:  No evidence of metastatic disease. Acute pulmonary embolus in the right   middle lobe lobar and segmental branches.    Findings were discussed with Dr.JUSTIN VEGA [phone] 2/20/2024 7:24   PM by Dr. Wheat with read back confirmation.    --- End of Report ---    < end of copied text >      < from: US Duplex Venous Lower Ext Complete, Bilateral (02.20.24 @ 20:29) >    ACC: [id number] EXAM:  US DPLX LWR EXT VEINS COMPL BI   ORDERED BY:  DILLAN VEGA     PROCEDURE DATE:  02/20/2024          INTERPRETATION:  CLINICAL INFORMATION: Patient with pulmonary embolism    COMPARISON: None available.    TECHNIQUE: Duplex sonography of the BILATERAL LOWER extremity veins with   color and spectral Doppler, with and without compression.    FINDINGS:    RIGHT:  Normal compressibility of the RIGHT common femoral, femoral and popliteal   veins.  Doppler examination shows normal spontaneous and phasic flow.  No RIGHT calf vein thrombosis is detected.  2.3 cm Baker cyst.    LEFT:  Normal compressibility of the LEFT common femoral, femoral and popliteal   veins.  Doppler examination shows normal spontaneous and phasic flow.  No LEFT calf vein thrombosis is detected.  2.5 cm Baker cyst.    IMPRESSION:  No evidence of deep venous thrombosis in either lower extremity.      --- End of Report ---    < end of copied text >   EKG demonstrates NSR with nonspecific twave abnormality

## 2024-02-21 NOTE — PROGRESS NOTE ADULT - SUBJECTIVE AND OBJECTIVE BOX
CHIEF COMPLAINT/INTERVAL HISTORY:  Pt. seen and evaluated for Lower GI bleed and pulmonary embolism.  Pt. is in no distress.  Tolerating heparin drip.  Denies having any gross bleeding today.  Denies having CP or SOB.      REVIEW OF SYSTEMS:  No fever, CP, SOB, or abdominal pain    Vital Signs Last 24 Hrs  T(C): 36.6 (21 Feb 2024 04:50), Max: 36.8 (20 Feb 2024 20:30)  T(F): 97.8 (21 Feb 2024 04:50), Max: 98.3 (20 Feb 2024 20:30)  HR: 98 (21 Feb 2024 04:50) (86 - 108)  BP: 141/74 (21 Feb 2024 04:50) (128/65 - 143/72)  BP(mean): --  RR: 18 (21 Feb 2024 04:50) (16 - 18)  SpO2: 94% (21 Feb 2024 04:50) (94% - 96%)    Parameters below as of 21 Feb 2024 04:50  Patient On (Oxygen Delivery Method): nasal cannula  O2 Flow (L/min): 2      PHYSICAL EXAM:  GENERAL: NAD  HEENT: EOMI, hearing normal, conjunctiva and sclera clear  Chest: CTA bilaterally, no wheezing  CV: S1S2, RRR,   GI: soft, +BS, NT/ND  Musculoskeletal: no edema  Psychiatric: affect nL, mood nL  Skin: warm and dry    LABS:                        9.5    8.82  )-----------( 348      ( 21 Feb 2024 07:00 )             33.2     02-21    142  |  103  |  11  ----------------------------<  110<H>  3.5   |  34<H>  |  0.96    Ca    9.4      21 Feb 2024 07:00  Phos  2.8     02-21  Mg     2.3     02-21    TPro  6.1  /  Alb  2.9<L>  /  TBili  0.4  /  DBili  x   /  AST  15  /  ALT  13  /  AlkPhos  57  02-20    PT/INR - ( 20 Feb 2024 12:40 )   PT: 11.9 sec;   INR: 1.02 ratio         PTT - ( 21 Feb 2024 03:20 )  PTT:>200.0 sec  Urinalysis Basic - ( 21 Feb 2024 07:00 )    Color: x / Appearance: x / SG: x / pH: x  Gluc: 110 mg/dL / Ketone: x  / Bili: x / Urobili: x   Blood: x / Protein: x / Nitrite: x   Leuk Esterase: x / RBC: x / WBC x   Sq Epi: x / Non Sq Epi: x / Bacteria: x

## 2024-02-21 NOTE — CONSULT NOTE ADULT - ASSESSMENT
69yo F with PMHx COPD (not on home oxygen), HTN, RA, anxiety admitted with GI bleed     cardiac clearance, HTN, PE,  - admitted with GIB,  s/p colonoscopy 2/20, large partially obstructing mass noted at approximately 13cm from anal verge. Gi following  - surgery following, will likely require resection of specimen +/- Neoadjuvant chemoradiation  - CT chest with  Acute pulmonary embolus in the right middle lobe lobar and segmental branches, on heparin gtt for AC  - on 2L NC for supplemental 02    - EKG demonstrates NSR with nonspecific twave abnormality  - Patient euvolemic on examination with no overt signs of heart failure or cardiac ischemia.   - No severe valvular abnormalities noted on examination  - Would check routine ECHO to r/o valvular abnormalities and to assess for pulmonary hypertension and heart function.     - Bp controlled  -continue home amlodipine 10mg qd with hold parameters    - Pt has no active ischemia, decompensated heart failure, unstable arrythmia, or severe stenotic valvular disease. Patient is optimized from cardiovascular standpoint to proceed with planned procedure with routine hemodynamic monitoring.   - Monitor and replete lytes, keep K>4, Mg>2.  - Will continue to follow. 69yo F with PMHx COPD (not on home oxygen), HTN, RA, anxiety admitted with GI bleed.     cardiac clearance, HTN, PE,  - admitted with GIB,  s/p colonoscopy 2/20, large partially obstructing mass noted at approximately 13cm from anal verge. Gi following  - surgery following, will likely require resection of specimen +/- Neoadjuvant chemoradiation  - CT chest with  Acute pulmonary embolus in the right middle lobe lobar and segmental branches, on heparin gtt for AC  - on 2L NC for supplemental 02, wean as tolerated, on prn at home    - EKG demonstrates NSR with nonspecific twave abnormality  - Patient euvolemic on examination with no overt signs of heart failure or cardiac ischemia.   - No severe valvular abnormalities noted on examination  - Would check routine ECHO to r/o valvular abnormalities and to assess for pulmonary hypertension and heart function.     - Bp controlled  -continue home amlodipine 10mg qd with hold parameters    - Pt has no active ischemia, decompensated heart failure, unstable arrythmia, or severe stenotic valvular disease.   Patient is optimized from cardiovascular standpoint to proceed with planned procedure with routine hemodynamic monitoring.   - Monitor and replete lytes, keep K>4, Mg>2.  - Will continue to follow.    Lavelle Carlin NP  Nurse Practitioner- Cardiology   Call TEAMS 67yo F with PMHx COPD (not on home oxygen), HTN, RA, anxiety admitted with GI bleed.     cardiac clearance, HTN, PE,  - admitted with GIB,  s/p colonoscopy 2/20, large partially obstructing mass noted at approximately 13cm from anal verge. Gi following  - surgery following, will likely require resection of specimen +/- Neoadjuvant chemoradiation  - CT chest with  Acute pulmonary embolus in the right middle lobe lobar and segmental branches, on heparin gtt for AC  - on 2L NC for supplemental 02, wean as tolerated, on prn at home    - EKG demonstrates NSR with nonspecific twave abnormality  - Patient euvolemic on examination with no overt signs of heart failure or cardiac ischemia.   - No severe valvular abnormalities noted on examination  - Would check routine ECHO to r/o valvular abnormalities and to assess for pulmonary hypertension and heart function.     - Bp controlled  -continue home amlodipine 10mg qd with hold parameters    - Pt has no active ischemia, decompensated heart failure, unstable arrythmia, or severe stenotic valvular disease.   Patient is optimized from cardiovascular standpoint pending TTE to proceed with planned procedure with routine hemodynamic monitoring.   - Monitor and replete lytes, keep K>4, Mg>2.  - Will continue to follow.    Lavelle Carlin NP  Nurse Practitioner- Cardiology   Call TEAMS 69yo F with PMHx COPD (not on home oxygen), HTN, RA, anxiety admitted with GI bleed.     cardiac clearance, HTN, PE,  - admitted with GIB,  s/p colonoscopy 2/20, large partially obstructing mass noted at approximately 13cm from anal verge. Gi following  - surgery following, will likely require resection of specimen +/- Neoadjuvant chemoradiation  - CT chest with  Acute pulmonary embolus in the right middle lobe lobar and segmental branches, on heparin gtt for AC  - on 2L NC for supplemental 02, wean as tolerated, on prn at home    - EKG demonstrates NSR with nonspecific twave abnormality  - Patient euvolemic on examination with no overt signs of heart failure or cardiac ischemia.   - No severe valvular abnormalities noted on examination  - Would check routine ECHO to r/o valvular abnormalities and to assess for pulmonary hypertension and heart function.     - Bp controlled  -continue home amlodipine 10mg qd with hold parameters    - Pt has no active ischemia, decompensated heart failure, unstable arrythmia, or severe stenotic valvular disease.   - will proceed with clearance pending pulm and echo  - Monitor and replete lytes, keep K>4, Mg>2.  - Will continue to follow.    Lavelle Carlin NP  Nurse Practitioner- Cardiology   Call TEAMS

## 2024-02-22 ENCOUNTER — RESULT REVIEW (OUTPATIENT)
Age: 69
End: 2024-02-22

## 2024-02-22 LAB
ALBUMIN SERPL ELPH-MCNC: 2.7 G/DL — LOW (ref 3.3–5)
ALP SERPL-CCNC: 58 U/L — SIGNIFICANT CHANGE UP (ref 40–120)
ALT FLD-CCNC: 14 U/L — SIGNIFICANT CHANGE UP (ref 12–78)
ANION GAP SERPL CALC-SCNC: 4 MMOL/L — LOW (ref 5–17)
APTT BLD: 43.3 SEC — HIGH (ref 24.5–35.6)
APTT BLD: 54.5 SEC — HIGH (ref 24.5–35.6)
APTT BLD: 56.2 SEC — HIGH (ref 24.5–35.6)
APTT BLD: 69.4 SEC — HIGH (ref 24.5–35.6)
APTT BLD: >200 SEC — CRITICAL HIGH (ref 24.5–35.6)
AST SERPL-CCNC: 11 U/L — LOW (ref 15–37)
BILIRUB SERPL-MCNC: 0.4 MG/DL — SIGNIFICANT CHANGE UP (ref 0.2–1.2)
BUN SERPL-MCNC: 11 MG/DL — SIGNIFICANT CHANGE UP (ref 7–23)
CALCIUM SERPL-MCNC: 9.4 MG/DL — SIGNIFICANT CHANGE UP (ref 8.5–10.1)
CHLORIDE SERPL-SCNC: 106 MMOL/L — SIGNIFICANT CHANGE UP (ref 96–108)
CO2 SERPL-SCNC: 37 MMOL/L — HIGH (ref 22–31)
CREAT SERPL-MCNC: 0.93 MG/DL — SIGNIFICANT CHANGE UP (ref 0.5–1.3)
EGFR: 67 ML/MIN/1.73M2 — SIGNIFICANT CHANGE UP
GLUCOSE SERPL-MCNC: 109 MG/DL — HIGH (ref 70–99)
HCT VFR BLD CALC: 32.7 % — LOW (ref 34.5–45)
HGB BLD-MCNC: 9.2 G/DL — LOW (ref 11.5–15.5)
MAGNESIUM SERPL-MCNC: 2.2 MG/DL — SIGNIFICANT CHANGE UP (ref 1.6–2.6)
MCHC RBC-ENTMCNC: 22.4 PG — LOW (ref 27–34)
MCHC RBC-ENTMCNC: 28.1 GM/DL — LOW (ref 32–36)
MCV RBC AUTO: 79.6 FL — LOW (ref 80–100)
NRBC # BLD: 0 /100 WBCS — SIGNIFICANT CHANGE UP (ref 0–0)
PHOSPHATE SERPL-MCNC: 3.3 MG/DL — SIGNIFICANT CHANGE UP (ref 2.5–4.5)
PLATELET # BLD AUTO: 327 K/UL — SIGNIFICANT CHANGE UP (ref 150–400)
POTASSIUM SERPL-MCNC: 3.5 MMOL/L — SIGNIFICANT CHANGE UP (ref 3.5–5.3)
POTASSIUM SERPL-SCNC: 3.5 MMOL/L — SIGNIFICANT CHANGE UP (ref 3.5–5.3)
PROT SERPL-MCNC: 6.2 G/DL — SIGNIFICANT CHANGE UP (ref 6–8.3)
RBC # BLD: 4.11 M/UL — SIGNIFICANT CHANGE UP (ref 3.8–5.2)
RBC # FLD: 21.1 % — HIGH (ref 10.3–14.5)
SODIUM SERPL-SCNC: 147 MMOL/L — HIGH (ref 135–145)
WBC # BLD: 5.96 K/UL — SIGNIFICANT CHANGE UP (ref 3.8–10.5)
WBC # FLD AUTO: 5.96 K/UL — SIGNIFICANT CHANGE UP (ref 3.8–10.5)

## 2024-02-22 PROCEDURE — 93306 TTE W/DOPPLER COMPLETE: CPT | Mod: 26

## 2024-02-22 PROCEDURE — 99232 SBSQ HOSP IP/OBS MODERATE 35: CPT

## 2024-02-22 PROCEDURE — 99233 SBSQ HOSP IP/OBS HIGH 50: CPT

## 2024-02-22 RX ADMIN — LACTULOSE 20 GRAM(S): 10 SOLUTION ORAL at 17:43

## 2024-02-22 RX ADMIN — HEPARIN SODIUM 500 UNIT(S)/HR: 5000 INJECTION INTRAVENOUS; SUBCUTANEOUS at 13:13

## 2024-02-22 RX ADMIN — BUDESONIDE AND FORMOTEROL FUMARATE DIHYDRATE 2 PUFF(S): 160; 4.5 AEROSOL RESPIRATORY (INHALATION) at 00:03

## 2024-02-22 RX ADMIN — IRON SUCROSE 100 MILLIGRAM(S): 20 INJECTION, SOLUTION INTRAVENOUS at 09:22

## 2024-02-22 RX ADMIN — Medication 3 MILLIGRAM(S): at 21:12

## 2024-02-22 RX ADMIN — HEPARIN SODIUM 700 UNIT(S)/HR: 5000 INJECTION INTRAVENOUS; SUBCUTANEOUS at 04:08

## 2024-02-22 RX ADMIN — TRAMADOL HYDROCHLORIDE 50 MILLIGRAM(S): 50 TABLET ORAL at 00:29

## 2024-02-22 RX ADMIN — PANTOPRAZOLE SODIUM 40 MILLIGRAM(S): 20 TABLET, DELAYED RELEASE ORAL at 05:51

## 2024-02-22 RX ADMIN — HEPARIN SODIUM 2000 UNIT(S): 5000 INJECTION INTRAVENOUS; SUBCUTANEOUS at 20:14

## 2024-02-22 RX ADMIN — BUDESONIDE AND FORMOTEROL FUMARATE DIHYDRATE 2 PUFF(S): 160; 4.5 AEROSOL RESPIRATORY (INHALATION) at 19:22

## 2024-02-22 RX ADMIN — AMLODIPINE BESYLATE 10 MILLIGRAM(S): 2.5 TABLET ORAL at 05:49

## 2024-02-22 RX ADMIN — HEPARIN SODIUM 600 UNIT(S)/HR: 5000 INJECTION INTRAVENOUS; SUBCUTANEOUS at 20:05

## 2024-02-22 RX ADMIN — TRAMADOL HYDROCHLORIDE 50 MILLIGRAM(S): 50 TABLET ORAL at 22:12

## 2024-02-22 RX ADMIN — BUDESONIDE AND FORMOTEROL FUMARATE DIHYDRATE 2 PUFF(S): 160; 4.5 AEROSOL RESPIRATORY (INHALATION) at 11:42

## 2024-02-22 RX ADMIN — LACTULOSE 20 GRAM(S): 10 SOLUTION ORAL at 05:49

## 2024-02-22 RX ADMIN — HEPARIN SODIUM 700 UNIT(S)/HR: 5000 INJECTION INTRAVENOUS; SUBCUTANEOUS at 07:28

## 2024-02-22 RX ADMIN — HEPARIN SODIUM 500 UNIT(S)/HR: 5000 INJECTION INTRAVENOUS; SUBCUTANEOUS at 19:21

## 2024-02-22 RX ADMIN — HEPARIN SODIUM 2000 UNIT(S): 5000 INJECTION INTRAVENOUS; SUBCUTANEOUS at 09:20

## 2024-02-22 RX ADMIN — TRAMADOL HYDROCHLORIDE 50 MILLIGRAM(S): 50 TABLET ORAL at 21:12

## 2024-02-22 RX ADMIN — HEPARIN SODIUM 0 UNIT(S)/HR: 5000 INJECTION INTRAVENOUS; SUBCUTANEOUS at 12:08

## 2024-02-22 NOTE — PROGRESS NOTE ADULT - SUBJECTIVE AND OBJECTIVE BOX
Patient is a 68y old  Female who presents with a chief complaint of Gastrointestinal hemorrhage     (22 Feb 2024 14:41)      INTERVAL HPI/OVERNIGHT EVENTS:    no change in shortness of breath    MEDICATIONS  (STANDING):  amLODIPine   Tablet 10 milliGRAM(s) Oral daily  budesonide 160 MICROgram(s)/formoterol 4.5 MICROgram(s) Inhaler 2 Puff(s) Inhalation two times a day  heparin  Infusion.  Unit(s)/Hr (11 mL/Hr) IV Continuous <Continuous>  influenza  Vaccine (HIGH DOSE) 0.7 milliLiter(s) IntraMuscular once  lactulose Syrup 20 Gram(s) Oral two times a day  pantoprazole    Tablet 40 milliGRAM(s) Oral before breakfast  senna 2 Tablet(s) Oral at bedtime      MEDICATIONS  (PRN):  acetaminophen     Tablet .. 650 milliGRAM(s) Oral every 6 hours PRN Temp greater or equal to 38C (100.4F), Mild Pain (1 - 3)  albuterol/ipratropium for Nebulization 3 milliLiter(s) Nebulizer every 6 hours PRN Shortness of Breath and/or Wheezing  ALPRAZolam 0.25 milliGRAM(s) Oral daily PRN for anxiety  heparin   Injectable 4500 Unit(s) IV Push every 6 hours PRN For aPTT less than 40  heparin   Injectable 2000 Unit(s) IV Push every 6 hours PRN For aPTT between 40 - 57  melatonin 3 milliGRAM(s) Oral at bedtime PRN Insomnia  polyethylene glycol 3350 17 Gram(s) Oral daily PRN Constipation  traMADol 50 milliGRAM(s) Oral daily PRN Moderate Pain (4 - 6)      Allergies    No Known Allergies    Intolerances        PAST MEDICAL & SURGICAL HISTORY:  COPD (chronic obstructive pulmonary disease)      Rheumatoid arthritis      HTN (hypertension)      Anxiety      No significant past surgical history          Vital Signs Last 24 Hrs  T(C): 37.3 (22 Feb 2024 11:04), Max: 37.3 (22 Feb 2024 11:04)  T(F): 99.2 (22 Feb 2024 11:04), Max: 99.2 (22 Feb 2024 11:04)  HR: 88 (22 Feb 2024 11:04) (88 - 96)  BP: 112/56 (22 Feb 2024 11:04) (112/56 - 129/71)  BP(mean): --  RR: 19 (22 Feb 2024 11:04) (18 - 19)  SpO2: 95% (22 Feb 2024 11:04) (93% - 95%)    Parameters below as of 22 Feb 2024 11:04  Patient On (Oxygen Delivery Method): nasal cannula        PHYSICAL EXAMINATION:    GENERAL: The patient is awake and alert in no apparent distress.     HEENT: Head is normocephalic and atraumatic    NECK: no JVD    LUNGS: diminished air entry bilateral    HEART: Regular rate and rhythm without murmur.    ABDOMEN: Soft, nontender, and nondistended.      EXTREMITIES: Without any cyanosis, clubbing, rash, lesions or edema.    NEUROLOGIC: Grossly intact.    SKIN: No ulceration or induration present.      LABS:                        9.2    5.96  )-----------( 327      ( 22 Feb 2024 08:10 )             32.7     02-22    147<H>  |  106  |  11  ----------------------------<  109<H>  3.5   |  37<H>  |  0.93    Ca    9.4      22 Feb 2024 08:10  Phos  3.3     02-22  Mg     2.2     02-22    TPro  6.2  /  Alb  2.7<L>  /  TBili  0.4  /  DBili  x   /  AST  11<L>  /  ALT  14  /  AlkPhos  58  02-22    PTT - ( 22 Feb 2024 13:40 )  PTT:54.5 sec  Urinalysis Basic - ( 22 Feb 2024 08:10 )    Color: x / Appearance: x / SG: x / pH: x  Gluc: 109 mg/dL / Ketone: x  / Bili: x / Urobili: x   Blood: x / Protein: x / Nitrite: x   Leuk Esterase: x / RBC: x / WBC x   Sq Epi: x / Non Sq Epi: x / Bacteria: x        CARDIAC MARKERS ( 21 Feb 2024 07:00 )  x     / x     / 20 U/L / x     / 1.3 ng/mL        Assessment:    Chronic Hypoxic respiratory failure  COPD  S/P Pulmonary emboli  Colon CA    Plan:    Continue anticoagulation  Supplemental oxygen  Incentive Spirometry  Will order pulmonary function testing to be performed pre-op

## 2024-02-22 NOTE — PROGRESS NOTE ADULT - ASSESSMENT
67yo F with PMHx COPD (not on home oxygen), HTN, RA, anxiety admitted with GI bleed.     cardiac clearance, HTN, PE,  - admitted with GIB,  s/p colonoscopy 2/20, large partially obstructing mass noted at approximately 13cm from anal verge. Gi following  - surgery following, will likely require resection of specimen +/- Neoadjuvant chemoradiation  - CT chest with  Acute pulmonary embolus in the right middle lobe lobar and segmental branches, on heparin gtt for AC  - on 2L NC for supplemental 02, wean as tolerated, on prn at home  - Pt has no active ischemia, decompensated heart failure, unstable arrythmia, or severe stenotic valvular disease. will proceed with clearance pending pulm and echo. In the setting of a PE, will need to minimize the time off AC  - pulmonary follow up.    - EKG demonstrates NSR with nonspecific twave abnormality  - Tele SR, no events  - Patient euvolemic on examination with no overt signs of heart failure or cardiac ischemia.   - No severe valvular abnormalities noted on examination  - Would check routine ECHO to r/o valvular abnormalities and to assess for pulmonary hypertension and heart function.     - Bp controlled  -continue home amlodipine 10mg qd with hold parameters    - Monitor and replete lytes, keep K>4, Mg>2.  - Will continue to follow.    Lavelle Carlin NP  Nurse Practitioner- Cardiology   Call TEAMS 67yo F with PMHx COPD (not on home oxygen), HTN, RA, anxiety admitted with GI bleed.     cardiac clearance, HTN, PE,  - admitted with GIB,  s/p colonoscopy 2/20, large partially obstructing mass noted at approximately 13cm from anal verge. Gi following  - surgery following, will likely require resection of specimen +/- Neoadjuvant chemoradiation  - CT chest with  Acute pulmonary embolus in the right middle lobe lobar and segmental branches, on heparin gtt for AC  - PTT is subtherapeutic, would adjust accordingly   - on 2L NC for supplemental 02, wean as tolerated, on prn at home  - Pt has no active ischemia, decompensated heart failure, unstable arrythmia, or severe stenotic valvular disease. will proceed with clearance pending pulm and echo. In the setting of a PE, will need to minimize the time off AC  - pulmonary follow up.    - EKG demonstrates NSR with nonspecific twave abnormality  - Tele SR, no events  - Patient euvolemic on examination with no overt signs of heart failure or cardiac ischemia.   - No severe valvular abnormalities noted on examination  - Would check routine ECHO to r/o valvular abnormalities and to assess for pulmonary hypertension and heart function.     - Bp controlled  -continue home amlodipine 10mg qd with hold parameters    - Monitor and replete lytes, keep K>4, Mg>2.  - Will continue to follow.    Lavelle Carlin NP  Nurse Practitioner- Cardiology   Call TEAMS

## 2024-02-22 NOTE — PROGRESS NOTE ADULT - ASSESSMENT
67yo F with PMHx COPD (not on home oxygen), HTN, RA, anxiety presenting with chief complaint of blood in stools. Admitted for GI bleed, plan for colonoscopy 2/20.        Problem/Plan - 1:  ·  Problem: Colonic mass.   ·  Plan: Patient presents with BRBPR, diarrhea with clots 2/2 lower GI bleed   CT AP: Masslike mural thickening of the distal sigmoid colon. Differential considerations include malignancy or, less likely, sequelae of chronic diverticulitis. Gastroenterology evaluation is recommended. Scattered too small to characterize hypodense foci in the liver, hepatic protocol MRI is recommended for further characterization.  -Patient unable to get outpatient colonoscopy (no insurance)  -HGB currently stable, no further bleeding.    -Hold home aspirin  -Vital signs currently stable, continue to monitor  -Transfuse prn   -Colonoscopy 2/20 with: large partially obstructing mass noted at approximately 13cm from anal verge.  Pathology with ADENOCARCINOMA, AT LEAST INTRAMUCOSAL.  -DW surgery - recs CT chest with IV contrast, CEA and rectal MR for staging - per radiology unable to get staging MRI done at this facility as it requires a different magnet  -CT chest: No evidence of metastatic disease. Acute pulmonary embolus in the right middle lobe lobar and segmental branches.  -Plan for lap sigmoidectomy on 2/28.  Cardiology preop evaluation underway.  Awaiting echo results.   -GI and surgery following - follow up recs.     Problem/Plan - 2:  ·  Problem: COPD (chronic obstructive pulmonary disease).   ·  Plan: Chronic, recently admitted for COPD exacerbation  - Patient states current SOB likely due to anxiety  - Duonebs q6 PRN  - Continue home inhalers   - Supplemental O2 PRN. At baseline: 4-4.5 L home oxygen intermittently. COPD patient will keep SpO2 goal 88-93%   - Monitor respiratory status   - 3 weeks ago had rsv, recent rvp negative  - Continuous pulse ox  - Pulmonary Dr. Dobson consult for clearance.     Problem/Plan - 3:  ·  Problem: HTN (hypertension).   ·  Plan: Chronic, stable  - Continue home amlodipine 10mg qd with hold parameters  - Monitor hemodynamics  - DASH/TLC diet.     Problem/Plan - 4:  ·  Problem: Rheumatoid arthritis.   ·  Plan: - Chronic  - recently finished prednisone course, takes tramadol 50mg Qd.     Problem/Plan - 5:  ·  Problem: Anxiety.   ·  Plan: - Chronic, SOB likely 2/2 anxiety  - Continue Xanax 0.25mg qhs PRN.     Problem/Plan - 6:  ·  Problem: Encounter for deep vein thrombosis (DVT) prophylaxis.   ·  Plan: - on heparin drip for pulmonary embolism    #Pulmonary embolism:  CT chest with No evidence of metastatic disease. Acute pulmonary embolus in the right middle lobe lobar and segmental branches.  Continue heparin drip.  Check echo.

## 2024-02-22 NOTE — PROGRESS NOTE ADULT - SUBJECTIVE AND OBJECTIVE BOX
[facility]  Bobby Mcdonald PA-C  [address] [place] [address]  [phone]      INTERVAL HPI/OVERNIGHT EVENTS:  Pt s/e  No overt GI bleeding    MEDICATIONS  (STANDING):  amLODIPine   Tablet 10 milliGRAM(s) Oral daily  budesonide 160 MICROgram(s)/formoterol 4.5 MICROgram(s) Inhaler 2 Puff(s) Inhalation two times a day  heparin  Infusion.  Unit(s)/Hr (11 mL/Hr) IV Continuous <Continuous>  influenza  Vaccine (HIGH DOSE) 0.7 milliLiter(s) IntraMuscular once  lactulose Syrup 20 Gram(s) Oral two times a day  pantoprazole    Tablet 40 milliGRAM(s) Oral before breakfast  senna 2 Tablet(s) Oral at bedtime    MEDICATIONS  (PRN):  acetaminophen     Tablet .. 650 milliGRAM(s) Oral every 6 hours PRN Temp greater or equal to 38C (100.4F), Mild Pain (1 - 3)  albuterol/ipratropium for Nebulization 3 milliLiter(s) Nebulizer every 6 hours PRN Shortness of Breath and/or Wheezing  ALPRAZolam 0.25 milliGRAM(s) Oral daily PRN for anxiety  heparin   Injectable 4500 Unit(s) IV Push every 6 hours PRN For aPTT less than 40  heparin   Injectable 2000 Unit(s) IV Push every 6 hours PRN For aPTT between 40 - 57  melatonin 3 milliGRAM(s) Oral at bedtime PRN Insomnia  polyethylene glycol 3350 17 Gram(s) Oral daily PRN Constipation  traMADol 50 milliGRAM(s) Oral daily PRN Moderate Pain (4 - 6)      Allergies  No Known Allergies    PHYSICAL EXAM:   Vital Signs:  Vital Signs Last 24 Hrs  T(C): 37.3 (2024 11:04), Max: 37.3 (2024 11:04)  T(F): 99.2 (2024 11:04), Max: 99.2 (2024 11:04)  HR: 88 (2024 11:04) (88 - 96)  BP: 112/56 (2024 11:04) (112/56 - 129/71)  BP(mean): --  RR: 19 (2024 11:04) (18 - 19)  SpO2: 95% (2024 11:04) (93% - 95%)    Parameters below as of 2024 11:04  Patient On (Oxygen Delivery Method): nasal cannula      Daily     Daily Weight in k.1 (2024 04:42)    GENERAL:  Appears stated age  HEENT:  NC/AT  CHEST:  Full & symmetric excursion  HEART:  Regular rhythm  ABDOMEN:  Soft, non-tender, non-distended  EXTEREMITIES:  no cyanosis  SKIN:  No rash  NEURO:  Alert      LABS:                        9.2    5.96  )-----------( 327      ( 2024 08:10 )             32.7     02-    147<H>  |  106  |  11  ----------------------------<  109<H>  3.5   |  37<H>  |  0.93    Ca    9.4      2024 08:10  Phos  3.3     02-  Mg     2.2     -    TPro  6.2  /  Alb  2.7<L>  /  TBili  0.4  /  DBili  x   /  AST  11<L>  /  ALT  14  /  AlkPhos  58  02-22    PT/INR - ( 2024 12:40 )   PT: 11.9 sec;   INR: 1.02 ratio         PTT - ( 2024 10:37 )  PTT:>200.0 sec  Urinalysis Basic - ( 2024 08:10 )    Color: x / Appearance: x / SG: x / pH: x  Gluc: 109 mg/dL / Ketone: x  / Bili: x / Urobili: x   Blood: x / Protein: x / Nitrite: x   Leuk Esterase: x / RBC: x / WBC x   Sq Epi: x / Non Sq Epi: x / Bacteria: x

## 2024-02-22 NOTE — DIETITIAN INITIAL EVALUATION ADULT - PROBLEM SELECTOR PLAN 2
Chronic, recently admitted for COPD exacerbation  - Patient states current SOB likely due to anxiety  - Duonebs q6 PRN  - Continue home inhalers  - Supplemental O2 PRN. At baseline: 4-4.5 L home oxygen intermittently. COPD patient will keep SpO2 goal 88-93%   - Monitor respiratory status   - F/u RVP  - Continuous pulse ox

## 2024-02-22 NOTE — DIETITIAN INITIAL EVALUATION ADULT - NUTRITIONGOAL OUTCOME1
Pt to comply with rx therapeuitc diet consuming >75% meals daily with emphasis on HBV protein sources.

## 2024-02-22 NOTE — PROGRESS NOTE ADULT - SUBJECTIVE AND OBJECTIVE BOX
CHIEF COMPLAINT/INTERVAL HISTORY:  Pt. seen and evaluated for lower GI bleed and colon mass.  Pt. is in no distress.  Denies having any abdominal pain.  Tolerating heparin drip with no significant bleeding.      REVIEW OF SYSTEMS:  No fever, CP, SOB, or abdominal pain    Vital Signs Last 24 Hrs  T(C): 36.8 (22 Feb 2024 04:42), Max: 36.8 (21 Feb 2024 20:58)  T(F): 98.3 (22 Feb 2024 04:42), Max: 98.3 (22 Feb 2024 04:42)  HR: 96 (22 Feb 2024 04:42) (94 - 110)  BP: 129/71 (22 Feb 2024 04:42) (128/63 - 140/73)  BP(mean): --  RR: 19 (22 Feb 2024 04:42) (18 - 19)  SpO2: 93% (22 Feb 2024 04:42) (91% - 93%)    Parameters below as of 22 Feb 2024 04:42  Patient On (Oxygen Delivery Method): nasal cannula        PHYSICAL EXAM:  GENERAL: NAD  HEENT: EOMI, hearing normal, conjunctiva and sclera clear  Chest: CTA bilaterally, no wheezing  CV: S1S2, RRR,   GI: soft, +BS, NT/ND  Musculoskeletal: no edema  Psychiatric: affect nL, mood nL  Skin: warm and dry    LABS:                        9.2    5.96  )-----------( 327      ( 22 Feb 2024 08:10 )             32.7     02-22    147<H>  |  106  |  11  ----------------------------<  109<H>  3.5   |  37<H>  |  0.93    Ca    9.4      22 Feb 2024 08:10  Phos  3.3     02-22  Mg     2.2     02-22    TPro  6.2  /  Alb  2.7<L>  /  TBili  0.4  /  DBili  x   /  AST  11<L>  /  ALT  14  /  AlkPhos  58  02-22    PT/INR - ( 20 Feb 2024 12:40 )   PT: 11.9 sec;   INR: 1.02 ratio         PTT - ( 22 Feb 2024 08:10 )  PTT:56.2 sec  Urinalysis Basic - ( 22 Feb 2024 08:10 )    Color: x / Appearance: x / SG: x / pH: x  Gluc: 109 mg/dL / Ketone: x  / Bili: x / Urobili: x   Blood: x / Protein: x / Nitrite: x   Leuk Esterase: x / RBC: x / WBC x   Sq Epi: x / Non Sq Epi: x / Bacteria: x

## 2024-02-22 NOTE — DIETITIAN INITIAL EVALUATION ADULT - OTHER INFO
67yo F with PMHx COPD (not on home oxygen), HTN, RA, anxiety presenting with chief complaint of blood in stools. Admitted for GI bleed. Dx colonic mass per colonoscopy with plan for lap sigmoidectomy 2/28.    Pt A+Ox4 during visit; seen secondary to LOS > 7days. Soft and bite sized diet rx. Well tolerated with fair appetite/po intake. No report N/V. Fecal incontinence reported; persistently loose stool during urination. Bowel regimen rx. Low Fe 20, Ferritin 7- IV venofer rx. Pt reports UBW 130lbs. Stable. Pt is not interested in oral nutritional supplements. Encourage po intake with emphasis on HBV protein sources at all meals. Pt offering no food preferences at this time. Declined diet education.

## 2024-02-22 NOTE — DIETITIAN INITIAL EVALUATION ADULT - ETIOLOGY
related to alteration in GI tract structure/function  related to increased needs in setting of catabolic disease

## 2024-02-22 NOTE — PROGRESS NOTE ADULT - SUBJECTIVE AND OBJECTIVE BOX
[facility] -- James Mcghee,  Alexa, Arnel De León Savella, Goodger, Cohen  Office # [phone]    Follow Up:  PE, cardiac optimization    Subjective/Observations: No events overnight resting comfortably in bed, remains on 2L NC.  No complaints of chest pain, dyspnea, or palpitations reported. No signs of orthopnea or PND.      REVIEW OF SYSTEMS: All other review of systems is negative unless indicated above  PAST MEDICAL & SURGICAL HISTORY:  COPD (chronic obstructive pulmonary disease)      Rheumatoid arthritis      HTN (hypertension)      Anxiety      No significant past surgical history        MEDICATIONS  (STANDING):  amLODIPine   Tablet 10 milliGRAM(s) Oral daily  budesonide 160 MICROgram(s)/formoterol 4.5 MICROgram(s) Inhaler 2 Puff(s) Inhalation two times a day  heparin  Infusion.  Unit(s)/Hr (11 mL/Hr) IV Continuous <Continuous>  influenza  Vaccine (HIGH DOSE) 0.7 milliLiter(s) IntraMuscular once  lactulose Syrup 20 Gram(s) Oral two times a day  pantoprazole    Tablet 40 milliGRAM(s) Oral before breakfast  senna 2 Tablet(s) Oral at bedtime    MEDICATIONS  (PRN):  acetaminophen     Tablet .. 650 milliGRAM(s) Oral every 6 hours PRN Temp greater or equal to 38C (100.4F), Mild Pain (1 - 3)  albuterol/ipratropium for Nebulization 3 milliLiter(s) Nebulizer every 6 hours PRN Shortness of Breath and/or Wheezing  ALPRAZolam 0.25 milliGRAM(s) Oral daily PRN for anxiety  heparin   Injectable 4500 Unit(s) IV Push every 6 hours PRN For aPTT less than 40  heparin   Injectable 2000 Unit(s) IV Push every 6 hours PRN For aPTT between 40 - 57  melatonin 3 milliGRAM(s) Oral at bedtime PRN Insomnia  polyethylene glycol 3350 17 Gram(s) Oral daily PRN Constipation  traMADol 50 milliGRAM(s) Oral daily PRN Moderate Pain (4 - 6)    Allergies    No Known Allergies    Intolerances      Vital Signs Last 24 Hrs  T(C): 36.8 (22 Feb 2024 04:42), Max: 36.8 (21 Feb 2024 20:58)  T(F): 98.3 (22 Feb 2024 04:42), Max: 98.3 (22 Feb 2024 04:42)  HR: 96 (22 Feb 2024 04:42) (94 - 110)  BP: 129/71 (22 Feb 2024 04:42) (128/63 - 140/73)  BP(mean): --  RR: 19 (22 Feb 2024 04:42) (18 - 19)  SpO2: 93% (22 Feb 2024 04:42) (91% - 93%)    Parameters below as of 22 Feb 2024 04:42  Patient On (Oxygen Delivery Method): nasal cannula      I&O's Summary      TELE: SR  PHYSICAL EXAM:  Constitutional: NAD, alert and oriented x 3, frail  Lungs:  Non-labored, breath sounds are dim bilaterally, No wheezing, rales or rhonchi  Cardiovascular: RRR.  S1 and S2 positive.  No murmurs, rubs, gallops or clicks  Gastrointestinal: Bowel Sounds present, soft, nontender.   Extremities: No peripheral edema.   Neurological: Alert, no focal deficits  Skin: No rashes or ulcers   Psych:  Mood & affect appropriate.    LABS: All Labs Reviewed:                        9.2    5.96  )-----------( 327      ( 22 Feb 2024 08:10 )             32.7                         9.5    8.82  )-----------( 348      ( 21 Feb 2024 07:00 )             33.2                         8.9    9.33  )-----------( 313      ( 21 Feb 2024 03:20 )             31.0     22 Feb 2024 08:10    147    |  106    |  11     ----------------------------<  109    3.5     |  37     |  0.93   21 Feb 2024 07:00    142    |  103    |  11     ----------------------------<  110    3.5     |  34     |  0.96   20 Feb 2024 06:55    144    |  104    |  12     ----------------------------<  123    4.1     |  37     |  0.83     Ca    9.4        22 Feb 2024 08:10  Ca    9.4        21 Feb 2024 07:00  Ca    9.5        20 Feb 2024 06:55  Phos  3.3       22 Feb 2024 08:10  Phos  2.8       21 Feb 2024 07:00  Mg     2.2       22 Feb 2024 08:10  Mg     2.3       21 Feb 2024 07:00    TPro  6.2    /  Alb  2.7    /  TBili  0.4    /  DBili  x      /  AST  11     /  ALT  14     /  AlkPhos  58     22 Feb 2024 08:10  TPro  6.1    /  Alb  2.9    /  TBili  0.4    /  DBili  x      /  AST  15     /  ALT  13     /  AlkPhos  57     20 Feb 2024 06:55    PT/INR - ( 20 Feb 2024 12:40 )   PT: 11.9 sec;   INR: 1.02 ratio         PTT - ( 22 Feb 2024 08:10 )  PTT:56.2 sec  CARDIAC MARKERS ( 21 Feb 2024 07:00 )  x     / x     / 20 U/L / x     / 1.3 ng/mL      12 Lead ECG:   Ventricular Rate 98 BPM    Atrial Rate 98 BPM    P-R Interval 154 ms    QRS Duration 76 ms    Q-T Interval 340 ms    QTC Calculation(Bazett) 434 ms    P Axis 88 degrees    R Axis 51 degrees    T Axis 96 degrees    Diagnosis Line Normal sinus rhythm  Minimal voltage criteria for LVH, may be normal variant ( Sokolow-Jason )  Nonspecific T wave abnormality  Abnormal ECG  When compared with ECG of 19-JAN-2024 11:54,  No significant change was found  Confirmed by SHRUTHI SANCHEZ ([id number]) on 2/16/2024 6:48:17 PM (02-15-24 @ 23:33)        [facility] -- James Mcghee,  Alexa, Arnel De León Savella, Goodger, Cohen  Office # [phone]    Follow Up:  PE, cardiac optimization    Subjective/Observations: No events overnight resting comfortably in bed, remains on 2L NC.  No complaints of chest pain, dyspnea, or palpitations reported. No signs of orthopnea or PND.      REVIEW OF SYSTEMS: All other review of systems is negative unless indicated above  PAST MEDICAL & SURGICAL HISTORY:  COPD (chronic obstructive pulmonary disease)      Rheumatoid arthritis      HTN (hypertension)      Anxiety      No significant past surgical history        MEDICATIONS  (STANDING):  amLODIPine   Tablet 10 milliGRAM(s) Oral daily  budesonide 160 MICROgram(s)/formoterol 4.5 MICROgram(s) Inhaler 2 Puff(s) Inhalation two times a day  heparin  Infusion.  Unit(s)/Hr (11 mL/Hr) IV Continuous <Continuous>  influenza  Vaccine (HIGH DOSE) 0.7 milliLiter(s) IntraMuscular once  lactulose Syrup 20 Gram(s) Oral two times a day  pantoprazole    Tablet 40 milliGRAM(s) Oral before breakfast  senna 2 Tablet(s) Oral at bedtime    MEDICATIONS  (PRN):  acetaminophen     Tablet .. 650 milliGRAM(s) Oral every 6 hours PRN Temp greater or equal to 38C (100.4F), Mild Pain (1 - 3)  albuterol/ipratropium for Nebulization 3 milliLiter(s) Nebulizer every 6 hours PRN Shortness of Breath and/or Wheezing  ALPRAZolam 0.25 milliGRAM(s) Oral daily PRN for anxiety  heparin   Injectable 4500 Unit(s) IV Push every 6 hours PRN For aPTT less than 40  heparin   Injectable 2000 Unit(s) IV Push every 6 hours PRN For aPTT between 40 - 57  melatonin 3 milliGRAM(s) Oral at bedtime PRN Insomnia  polyethylene glycol 3350 17 Gram(s) Oral daily PRN Constipation  traMADol 50 milliGRAM(s) Oral daily PRN Moderate Pain (4 - 6)    Allergies    No Known Allergies    Intolerances      Vital Signs Last 24 Hrs  T(C): 36.8 (22 Feb 2024 04:42), Max: 36.8 (21 Feb 2024 20:58)  T(F): 98.3 (22 Feb 2024 04:42), Max: 98.3 (22 Feb 2024 04:42)  HR: 96 (22 Feb 2024 04:42) (94 - 110)  BP: 129/71 (22 Feb 2024 04:42) (128/63 - 140/73)  BP(mean): --  RR: 19 (22 Feb 2024 04:42) (18 - 19)  SpO2: 93% (22 Feb 2024 04:42) (91% - 93%)    Parameters below as of 22 Feb 2024 04:42  Patient On (Oxygen Delivery Method): nasal cannula      I&O's Summary      TELE: SR  PHYSICAL EXAM:  Constitutional: NAD, alert and oriented x 3, frail  Lungs:  Non-labored, breath sounds are dim bilaterally, No wheezing, rales or rhonchi  Cardiovascular: RRR.  S1 and S2 positive.  No murmurs, rubs, gallops or clicks  Gastrointestinal: Bowel Sounds present, soft, nontender.   Extremities: No peripheral edema.   Neurological: Alert, no focal deficits  Skin: No rashes or ulcers   Psych:  Mood & affect appropriate.    LABS: All Labs Reviewed:                        9.2    5.96  )-----------( 327      ( 22 Feb 2024 08:10 )             32.7                         9.5    8.82  )-----------( 348      ( 21 Feb 2024 07:00 )             33.2                         8.9    9.33  )-----------( 313      ( 21 Feb 2024 03:20 )             31.0     22 Feb 2024 08:10    147    |  106    |  11     ----------------------------<  109    3.5     |  37     |  0.93   21 Feb 2024 07:00    142    |  103    |  11     ----------------------------<  110    3.5     |  34     |  0.96   20 Feb 2024 06:55    144    |  104    |  12     ----------------------------<  123    4.1     |  37     |  0.83     Ca    9.4        22 Feb 2024 08:10  Ca    9.4        21 Feb 2024 07:00  Ca    9.5        20 Feb 2024 06:55  Phos  3.3       22 Feb 2024 08:10  Phos  2.8       21 Feb 2024 07:00  Mg     2.2       22 Feb 2024 08:10  Mg     2.3       21 Feb 2024 07:00    TPro  6.2    /  Alb  2.7    /  TBili  0.4    /  DBili  x      /  AST  11     /  ALT  14     /  AlkPhos  58     22 Feb 2024 08:10  TPro  6.1    /  Alb  2.9    /  TBili  0.4    /  DBili  x      /  AST  15     /  ALT  13     /  AlkPhos  57     20 Feb 2024 06:55    PT/INR - ( 20 Feb 2024 12:40 )   PT: 11.9 sec;   INR: 1.02 ratio         PTT - ( 22 Feb 2024 08:10 )  PTT:56.2 sec  CARDIAC MARKERS ( 21 Feb 2024 07:00 )  x     / x     / 20 U/L / x     / 1.3 ng/mL      12 Lead ECG:   Ventricular Rate 98 BPM    Atrial Rate 98 BPM    P-R Interval 154 ms    QRS Duration 76 ms    Q-T Interval 340 ms    QTC Calculation(Bazett) 434 ms    P Axis 88 degrees    R Axis 51 degrees    T Axis 96 degrees    Diagnosis Line Normal sinus rhythm  Minimal voltage criteria for LVH, may be normal variant ( Sokolow-Jason )  Nonspecific T wave abnormality  Abnormal ECG  When compared with ECG of 19-JAN-2024 11:54,  No significant change was found  Confirmed by SHRUTHI SANCHEZ ([id number]) on 2/16/2024 6:48:17 PM (02-15-24 @ 23:33)

## 2024-02-22 NOTE — DIETITIAN INITIAL EVALUATION ADULT - PERTINENT MEDS FT
MEDICATIONS  (STANDING):  amLODIPine   Tablet 10 milliGRAM(s) Oral daily  budesonide 160 MICROgram(s)/formoterol 4.5 MICROgram(s) Inhaler 2 Puff(s) Inhalation two times a day  heparin  Infusion.  Unit(s)/Hr (11 mL/Hr) IV Continuous <Continuous>  influenza  Vaccine (HIGH DOSE) 0.7 milliLiter(s) IntraMuscular once  lactulose Syrup 20 Gram(s) Oral two times a day  pantoprazole    Tablet 40 milliGRAM(s) Oral before breakfast  senna 2 Tablet(s) Oral at bedtime    MEDICATIONS  (PRN):  acetaminophen     Tablet .. 650 milliGRAM(s) Oral every 6 hours PRN Temp greater or equal to 38C (100.4F), Mild Pain (1 - 3)  albuterol/ipratropium for Nebulization 3 milliLiter(s) Nebulizer every 6 hours PRN Shortness of Breath and/or Wheezing  ALPRAZolam 0.25 milliGRAM(s) Oral daily PRN for anxiety  heparin   Injectable 4500 Unit(s) IV Push every 6 hours PRN For aPTT less than 40  heparin   Injectable 2000 Unit(s) IV Push every 6 hours PRN For aPTT between 40 - 57  melatonin 3 milliGRAM(s) Oral at bedtime PRN Insomnia  polyethylene glycol 3350 17 Gram(s) Oral daily PRN Constipation  traMADol 50 milliGRAM(s) Oral daily PRN Moderate Pain (4 - 6)

## 2024-02-22 NOTE — PROGRESS NOTE ADULT - SUBJECTIVE AND OBJECTIVE BOX
68y Female admitted with Gastrointestinal hemorrhage    .    Patient seen and examined bedside resting comfortably. No complaints offered. No overnight events.  No abdominal pain. Denies nausea and vomiting. Tolerating diet. States she intermittently removes NC and breathes without dyspnea. Passing flatus/BM. Denies chest pain, dyspnea, cough, lower leg/calf tenderness.      T(F): 98.3 (02-22-24 @ 04:42), Max: 98.3 (02-22-24 @ 04:42)  HR: 96 (02-22-24 @ 04:42) (94 - 110)  BP: 129/71 (02-22-24 @ 04:42) (128/63 - 140/73)  RR: 19 (02-22-24 @ 04:42) (18 - 19)  SpO2: 93% (02-22-24 @ 04:42) (91% - 93%)  Wt(kg): --  CAPILLARY BLOOD GLUCOSE          PHYSICAL EXAM:  General: NAD, WDWN.   Neuro:  Alert & oriented x 3  CV: regular rate  Lung: no increased work of breathing on RA  Abdomen: soft, NTND  Extremities: no calf tenderness bilaterally    LABS:                        9.2    5.96  )-----------( 327      ( 22 Feb 2024 08:10 )             32.7     02-22    147<H>  |  106  |  11  ----------------------------<  109<H>  3.5   |  37<H>  |  0.93    Ca    9.4      22 Feb 2024 08:10  Phos  3.3     02-22  Mg     2.2     02-22    TPro  6.2  /  Alb  2.7<L>  /  TBili  0.4  /  DBili  x   /  AST  11<L>  /  ALT  14  /  AlkPhos  58  02-22    PT/INR - ( 20 Feb 2024 12:40 )   PT: 11.9 sec;   INR: 1.02 ratio         PTT - ( 22 Feb 2024 08:10 )  PTT:56.2 sec  I&O's Detail      Impression: 68 year old female with PMHx COPD (on 4-4.5 L home oxygen intermittently), HTN, RA, anxiety presenting with chief complaint of blood in stools for the past two months found to have 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  VSS, afebrile.  Labs unremarkable, H/H stable. PTT 56 this am with bolus given. CEA 17.8.     Plan:  - plan for Lap sigmoidectomy next week 2/28   - plan for ICU postoperatively  - appreciate Pulmonology, Cardiology, and medical clearances: OZIEL performed, results pending, EKG NSR  - Bowel reg given high colonic stool burden   - continue soft diet   - continue heparin drip for PE, fu next PTT to ensure accurate therapeutic dosing  - Rest of care per primary     Discussed w/ Dr. Rocha and Chief Resident Trip Jimenez PGY5    Coby Willingham PGY1  Phone: [email or web address] or [phone]   68y Female admitted with Gastrointestinal hemorrhage    .    Patient seen and examined bedside resting comfortably. No complaints offered. No overnight events.  No abdominal pain. Denies nausea and vomiting. Tolerating diet. States she intermittently removes NC and breathes without dyspnea. Passing flatus/BM. Denies chest pain, dyspnea, cough, lower leg/calf tenderness.      T(F): 98.3 (02-22-24 @ 04:42), Max: 98.3 (02-22-24 @ 04:42)  HR: 96 (02-22-24 @ 04:42) (94 - 110)  BP: 129/71 (02-22-24 @ 04:42) (128/63 - 140/73)  RR: 19 (02-22-24 @ 04:42) (18 - 19)  SpO2: 93% (02-22-24 @ 04:42) (91% - 93%)  Wt(kg): --  CAPILLARY BLOOD GLUCOSE          PHYSICAL EXAM:  General: NAD, WDWN.   Neuro:  Alert & oriented x 3  CV: regular rate  Lung: no increased work of breathing on 2LNC  Abdomen: soft, NTND  Extremities: no calf tenderness bilaterally    LABS:                        9.2    5.96  )-----------( 327      ( 22 Feb 2024 08:10 )             32.7     02-22    147<H>  |  106  |  11  ----------------------------<  109<H>  3.5   |  37<H>  |  0.93    Ca    9.4      22 Feb 2024 08:10  Phos  3.3     02-22  Mg     2.2     02-22    TPro  6.2  /  Alb  2.7<L>  /  TBili  0.4  /  DBili  x   /  AST  11<L>  /  ALT  14  /  AlkPhos  58  02-22    PT/INR - ( 20 Feb 2024 12:40 )   PT: 11.9 sec;   INR: 1.02 ratio         PTT - ( 22 Feb 2024 08:10 )  PTT:56.2 sec  I&O's Detail      Impression: 68 year old female with PMHx COPD (on 4-4.5 L home oxygen intermittently), HTN, RA, anxiety presenting with chief complaint of blood in stools for the past two months found to have 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  VSS, afebrile.  Labs unremarkable, H/H stable. PTT 56 this am with bolus given. CEA 17.8.     Plan:  - plan for Lap sigmoidectomy next week 2/28   - plan for ICU postoperatively  - appreciate Pulmonology, Cardiology, and medical clearances: OZIEL performed, results pending, EKG NSR  - Bowel reg given high colonic stool burden   - continue soft diet   - continue heparin drip for PE, fu next PTT to ensure accurate therapeutic dosing  - Rest of care per primary     Discussed w/ Dr. Rocha and Chief Resident Trip Jimenez PGY5    Coby Willingham PGY1  Phone: [phone] or [phone]

## 2024-02-22 NOTE — DIETITIAN INITIAL EVALUATION ADULT - CALCULATED TO (ML/KG)
Malodor urine: will treat as UTI for now, order urine culture to make sure on right antibiotic. If have fever, worse back pain, nausea or vomiting, feeling confused, go to the emergency room immediately.    Foot: referring to podiatry, try to wear looser shoes at the tip.   Patient Education     Bladder Infection, Female (Adult)    Urine is normally doesn't have any bacteria in it. But bacteria can get into the urinary tract from the skin around the rectum. Or they can travel in the blood from elsewhere in the body. Once they are in your urinary tract, they can cause infection in the urethra (urethritis), the bladder (cystitis), or the kidneys (pyelonephritis).  The most common place for an infection is in the bladder. This is called a bladder infection. This is one of the most common infections in women. Most bladder infections are easily treated. They are not serious unless the infection spreads to the kidney.  The phrases \"bladder infection,\" \"UTI,\" and \"cystitis\" are often used to describe the same thing. But they are not always the same. Cystitis is an inflammation of the bladder. The most common cause of cystitis is an infection.  Symptoms  The infection causes inflammation in the urethra and bladder. This causes many of the symptoms. The most common symptoms of a bladder infection are:  · Pain or burning when urinating  · Having to urinate more often than usual  · Urgent need to urinate  · Only a small amount of urine comes out  · Blood in urine  · Abdominal discomfort. This is usually in the lower abdomen above the pubic bone.  · Cloudy urine  · Strong- or bad-smelling urine  · Unable to urinate (urinary retention)  · Unable to hold urine in (urinary incontinence)  · Fever  · Loss of appetite  · Confusion (in older adults)  Causes  Bladder infections are not contagious. You can't get one from someone else, from a toilet seat, or from sharing a bath.  The most common cause of bladder infections is bacteria  from the bowels. The bacteria get onto the skin around the opening of the urethra. From there, they can get into the urine and travel up to the bladder, causing inflammation and infection. This usually happens because of:  · Wiping improperly after urinating. Always wipe from front to back.  · Bowel incontinence  · Pregnancy  · Procedures such as having a catheter inserted  · Older age  · Not emptying your bladder. This can allow bacteria a chance to grow in your urine.  · Dehydration  · Constipation  · Sex  · Use of a diaphragm for birth control   Treatment  Bladder infections are diagnosed by a urine test. They are treated with antibiotics and usually clear up quickly without complications. Treatment helps prevent a more serious kidney infection.  Medicines  Medicines can help in the treatment of a bladder infection:  · Take antibiotics until they are used up, even if you feel better. It is important to finish them to make sure the infection has cleared.  · You can use acetaminophen or ibuprofen for pain, fever, or discomfort, unless another medicine was prescribed. If you have chronic liver or kidney disease, talk with your healthcare provider before using these medicines. Also talk with your provider if you've ever had a stomach ulcer or gastrointestinal bleeding, or are taking blood-thinner medicines.  · If you are given phenazopydridine to reduce burning with urination, it will cause your urine to become a bright orange color. This can stain clothing.  Care and prevention  These self-care steps can help prevent future infections:  · Drink plenty of fluids to prevent dehydration and flush out your bladder. Do this unless you must restrict fluids for other health reasons, or your doctor told you not to.  · Proper cleaning after going to the bathroom is important. Wipe from front to back after using the toilet to prevent the spread of bacteria.  · Urinate more often. Don't try to hold urine in for a long  time.  · Wear loose-fitting clothes and cotton underwear. Avoid tight-fitting pants.  · Improve your diet and prevent constipation. Eat more fresh fruit and vegetables, and fiber, and less junk and fatty foods.  · Avoid sex until your symptoms are gone.  · Avoid caffeine, alcohol, and spicy foods. These can irritate your bladder.  · Urinate right after intercourse to flush out your bladder.  · If you use birth control pills and have frequent bladder infections, discuss it with your doctor.  Follow-up care  Call your healthcare provider if all symptoms are not gone after 3 days of treatment. This is especially important if you have repeat infections.  If a culture was done, you will be told if your treatment needs to be changed. If directed, you can call to find out the results.  If X-rays were done, you will be told if the results will affect your treatment.  Call 911  Call 911 if any of the following occur:  · Trouble breathing  · Hard to wake up or confusion  · Fainting or loss of consciousness  · Rapid heart rate  When to seek medical advice  Call your healthcare provider right away if any of these occur:  · Fever of 100.4ºF (38.0ºC) or higher, or as directed by your healthcare provider  · Symptoms are not better by the third day of treatment  · Back or belly (abdominal) pain that gets worse  · Repeated vomiting, or unable to keep medicine down  · Weakness or dizziness  · Vaginal discharge  · Pain, redness, or swelling in the outer vaginal area (labia)  Date Last Reviewed: 10/1/2016  © 3448-2400 [employer]. [address] [place] [address], [place] [address]. All rights reserved. This information is not intended as a substitute for professional medical care. Always follow your healthcare professional's instructions.            [id number]

## 2024-02-22 NOTE — DIETITIAN INITIAL EVALUATION ADULT - PROBLEM SELECTOR PLAN 1
Patient presents with BRBPR, diarrhea with clots 2/2 lower GI bleed   CT AP: Masslike mural thickening of the distal sigmoid colon. Differential considerations include malignancy or, less likely, sequelae of chronic diverticulitis. Gastroenterology evaluation is recommended.  Scattered too small to characterize hypodense foci in the liver, hepatic protocol MRI is recommended for further characterization.  -Patient unable to get outpatient colonoscopy (no insurance)  -S/p Protonix in ED   -HGB 10.8 currently stable, no further bleeding. Check rpt CBC in AM. continue to monitor   - f/u AM iron studies (microcytic anemia), LDH, haptoglobin, reticulocyte count  -Hold all chemical anticoagulation, hold home aspirin  -Vital signs currently stable, continue to monitor  -Transfuse prn for Hgb <7  -Type and screen active  - GI consulted

## 2024-02-22 NOTE — DIETITIAN INITIAL EVALUATION ADULT - SIGNS/SYMPTOMS
as evidenced by dx GI bleed, colonic mass per colonoscopy, persistent diarrhea.  as evidenced by +colon mass - adenocarcinoma, hx COPD.

## 2024-02-22 NOTE — DIETITIAN INITIAL EVALUATION ADULT - ADD RECOMMEND
Encourage po intake with emphasis on HBV protein sources. Recommend MVI with minerals daily. Pt declined oral nutritional supplements.

## 2024-02-22 NOTE — DIETITIAN INITIAL EVALUATION ADULT - PERTINENT LABORATORY DATA
02-22    147<H>  |  106  |  11  ----------------------------<  109<H>  3.5   |  37<H>  |  0.93    Ca    9.4      22 Feb 2024 08:10  Phos  3.3     02-22  Mg     2.2     02-22    TPro  6.2  /  Alb  2.7<L>  /  TBili  0.4  /  DBili  x   /  AST  11<L>  /  ALT  14  /  AlkPhos  58  02-22  A1C with Estimated Average Glucose Result: 6.3 % (01-18-24 @ 05:48)

## 2024-02-23 LAB
ANION GAP SERPL CALC-SCNC: 6 MMOL/L — SIGNIFICANT CHANGE UP (ref 5–17)
APTT BLD: 44.2 SEC — HIGH (ref 24.5–35.6)
APTT BLD: 50.4 SEC — HIGH (ref 24.5–35.6)
APTT BLD: 51.3 SEC — HIGH (ref 24.5–35.6)
APTT BLD: 62.3 SEC — HIGH (ref 24.5–35.6)
BUN SERPL-MCNC: 10 MG/DL — SIGNIFICANT CHANGE UP (ref 7–23)
CALCIUM SERPL-MCNC: 9.5 MG/DL — SIGNIFICANT CHANGE UP (ref 8.5–10.1)
CHLORIDE SERPL-SCNC: 104 MMOL/L — SIGNIFICANT CHANGE UP (ref 96–108)
CO2 SERPL-SCNC: 35 MMOL/L — HIGH (ref 22–31)
CREAT SERPL-MCNC: 0.85 MG/DL — SIGNIFICANT CHANGE UP (ref 0.5–1.3)
EGFR: 75 ML/MIN/1.73M2 — SIGNIFICANT CHANGE UP
GLUCOSE SERPL-MCNC: 105 MG/DL — HIGH (ref 70–99)
HCT VFR BLD CALC: 32.1 % — LOW (ref 34.5–45)
HGB BLD-MCNC: 9.4 G/DL — LOW (ref 11.5–15.5)
MCHC RBC-ENTMCNC: 23 PG — LOW (ref 27–34)
MCHC RBC-ENTMCNC: 29.3 GM/DL — LOW (ref 32–36)
MCV RBC AUTO: 78.7 FL — LOW (ref 80–100)
NRBC # BLD: 0 /100 WBCS — SIGNIFICANT CHANGE UP (ref 0–0)
PLATELET # BLD AUTO: 312 K/UL — SIGNIFICANT CHANGE UP (ref 150–400)
POTASSIUM SERPL-MCNC: 3.5 MMOL/L — SIGNIFICANT CHANGE UP (ref 3.5–5.3)
POTASSIUM SERPL-SCNC: 3.5 MMOL/L — SIGNIFICANT CHANGE UP (ref 3.5–5.3)
RBC # BLD: 4.08 M/UL — SIGNIFICANT CHANGE UP (ref 3.8–5.2)
RBC # FLD: 21.2 % — HIGH (ref 10.3–14.5)
SODIUM SERPL-SCNC: 145 MMOL/L — SIGNIFICANT CHANGE UP (ref 135–145)
WBC # BLD: 6.02 K/UL — SIGNIFICANT CHANGE UP (ref 3.8–10.5)
WBC # FLD AUTO: 6.02 K/UL — SIGNIFICANT CHANGE UP (ref 3.8–10.5)

## 2024-02-23 PROCEDURE — 99233 SBSQ HOSP IP/OBS HIGH 50: CPT

## 2024-02-23 PROCEDURE — 99232 SBSQ HOSP IP/OBS MODERATE 35: CPT

## 2024-02-23 RX ORDER — ALPRAZOLAM 0.25 MG
0.25 TABLET ORAL ONCE
Refills: 0 | Status: DISCONTINUED | OUTPATIENT
Start: 2024-02-23 | End: 2024-02-23

## 2024-02-23 RX ADMIN — TRAMADOL HYDROCHLORIDE 50 MILLIGRAM(S): 50 TABLET ORAL at 21:02

## 2024-02-23 RX ADMIN — BUDESONIDE AND FORMOTEROL FUMARATE DIHYDRATE 2 PUFF(S): 160; 4.5 AEROSOL RESPIRATORY (INHALATION) at 05:44

## 2024-02-23 RX ADMIN — HEPARIN SODIUM 600 UNIT(S)/HR: 5000 INJECTION INTRAVENOUS; SUBCUTANEOUS at 02:24

## 2024-02-23 RX ADMIN — AMLODIPINE BESYLATE 10 MILLIGRAM(S): 2.5 TABLET ORAL at 05:29

## 2024-02-23 RX ADMIN — Medication 3 MILLIGRAM(S): at 21:02

## 2024-02-23 RX ADMIN — Medication 650 MILLIGRAM(S): at 21:12

## 2024-02-23 RX ADMIN — HEPARIN SODIUM 800 UNIT(S)/HR: 5000 INJECTION INTRAVENOUS; SUBCUTANEOUS at 23:33

## 2024-02-23 RX ADMIN — HEPARIN SODIUM 2000 UNIT(S): 5000 INJECTION INTRAVENOUS; SUBCUTANEOUS at 15:07

## 2024-02-23 RX ADMIN — TRAMADOL HYDROCHLORIDE 50 MILLIGRAM(S): 50 TABLET ORAL at 22:02

## 2024-02-23 RX ADMIN — LACTULOSE 20 GRAM(S): 10 SOLUTION ORAL at 05:30

## 2024-02-23 RX ADMIN — PANTOPRAZOLE SODIUM 40 MILLIGRAM(S): 20 TABLET, DELAYED RELEASE ORAL at 05:29

## 2024-02-23 RX ADMIN — Medication 650 MILLIGRAM(S): at 22:13

## 2024-02-23 RX ADMIN — Medication 0.25 MILLIGRAM(S): at 14:15

## 2024-02-23 RX ADMIN — HEPARIN SODIUM 700 UNIT(S)/HR: 5000 INJECTION INTRAVENOUS; SUBCUTANEOUS at 19:52

## 2024-02-23 RX ADMIN — BUDESONIDE AND FORMOTEROL FUMARATE DIHYDRATE 2 PUFF(S): 160; 4.5 AEROSOL RESPIRATORY (INHALATION) at 21:42

## 2024-02-23 RX ADMIN — LACTULOSE 20 GRAM(S): 10 SOLUTION ORAL at 17:38

## 2024-02-23 RX ADMIN — HEPARIN SODIUM 700 UNIT(S)/HR: 5000 INJECTION INTRAVENOUS; SUBCUTANEOUS at 15:09

## 2024-02-23 RX ADMIN — HEPARIN SODIUM 600 UNIT(S)/HR: 5000 INJECTION INTRAVENOUS; SUBCUTANEOUS at 07:23

## 2024-02-23 NOTE — PROGRESS NOTE ADULT - SUBJECTIVE AND OBJECTIVE BOX
68y Female admitted with Gastrointestinal hemorrhage    .    Patient seen and examined bedside resting comfortably. No complaints offered, no overnight events. No abdominal pain.  Denies nausea and vomiting. Tolerating diet. +flatus/BM. Denies chest pain, dyspnea, cough, lower leg/calf tenderness.      T(F): 97.7 (02-23-24 @ 05:16), Max: 99.2 (02-22-24 @ 11:04)  HR: 95 (02-23-24 @ 05:16) (88 - 95)  BP: 130/72 (02-23-24 @ 05:16) (112/56 - 130/72)  RR: 18 (02-23-24 @ 05:16) (18 - 19)  SpO2: 94% (02-23-24 @ 05:16) (94% - 97%)  Wt(kg): --  CAPILLARY BLOOD GLUCOSE          PHYSICAL EXAM:  General: NAD, WDWN.   Neuro:  Alert & oriented x 3  CV: regular rate  Lung: no increased work of breathing on 2LNC  Abdomen: soft, NTND, no guarding or rebound tenderness; no signs of peritonitis  Extremities: no calf tenderness bilaterally    LABS:                        9.4    6.02  )-----------( 312      ( 23 Feb 2024 05:55 )             32.1     02-23    145  |  104  |  10  ----------------------------<  105<H>  3.5   |  35<H>  |  0.85    Ca    9.5      23 Feb 2024 05:55  Phos  3.3     02-22  Mg     2.2     02-22    TPro  6.2  /  Alb  2.7<L>  /  TBili  0.4  /  DBili  x   /  AST  11<L>  /  ALT  14  /  AlkPhos  58  02-22    PTT - ( 23 Feb 2024 05:55 )  PTT:50.4 sec  I&O's Detail        Impression: 68 year old female with PMHx COPD (on 4-4.5 L home oxygen intermittently), HTN, RA, anxiety presenting with chief complaint of blood in stools for the past two months found to have partially obstructing sigmoid/rectal cancer (13cm from anal verge) on imaging and colonoscopy and acute PE on CT chest on heparin.  VSS, afebrile. CEA 17.8. Labs unremarkable, H/H stable. PTT 62 this am and now 50, subtherapeutic. CEA 17.8. TTE performed yesterday, EF 60-65%. Pathology positive for adenocarcinoma.     Plan:  - plan for Lap sigmoidectomy next week, now scheduled for 2/29  - plan for ICU postoperatively  - appreciate Pulmonology and Cardiology recs: TTE performed, EKG NSR; PFTs unable to be performed at this location, will continue AC/O2/IS  - Heme/Onc consult/recommendations  - Medical preoperative clearance  - GI following, recs appreciated  - Bowel reg given high colonic stool burden   - continue soft diet   - continue heparin drip for PE, follow nomogram and fu q6 PTT to ensure accurate therapeutic dosing  - Rest of care per primary     Plan discussed w/ Dr. Rocha and patients seen with Chief Resident Trip Jimenez PGY5    Coby Willingham PGY1  Phone: [phone] or [phone]   68y Female admitted with Gastrointestinal hemorrhage    .    Patient seen and examined bedside resting comfortably. No complaints offered, no overnight events. No abdominal pain.  Denies nausea and vomiting. Tolerating diet. +flatus/BM. Denies chest pain, dyspnea, cough, lower leg/calf tenderness.      T(F): 97.7 (02-23-24 @ 05:16), Max: 99.2 (02-22-24 @ 11:04)  HR: 95 (02-23-24 @ 05:16) (88 - 95)  BP: 130/72 (02-23-24 @ 05:16) (112/56 - 130/72)  RR: 18 (02-23-24 @ 05:16) (18 - 19)  SpO2: 94% (02-23-24 @ 05:16) (94% - 97%)  Wt(kg): --  CAPILLARY BLOOD GLUCOSE          PHYSICAL EXAM:  General: NAD, WDWN.   Neuro:  Alert & oriented x 3  CV: regular rate  Lung: no increased work of breathing on 2LNC  Abdomen: soft, NTND, no guarding or rebound tenderness; no signs of peritonitis  Extremities: no calf tenderness bilaterally    LABS:                        9.4    6.02  )-----------( 312      ( 23 Feb 2024 05:55 )             32.1     02-23    145  |  104  |  10  ----------------------------<  105<H>  3.5   |  35<H>  |  0.85    Ca    9.5      23 Feb 2024 05:55  Phos  3.3     02-22  Mg     2.2     02-22    TPro  6.2  /  Alb  2.7<L>  /  TBili  0.4  /  DBili  x   /  AST  11<L>  /  ALT  14  /  AlkPhos  58  02-22    PTT - ( 23 Feb 2024 05:55 )  PTT:50.4 sec  I&O's Detail        Impression: 68 year old female with PMHx COPD (on 4-4.5 L home oxygen intermittently), HTN, RA, anxiety presenting with chief complaint of blood in stools for the past two months found to have partially obstructing sigmoid/rectal cancer (13cm from anal verge) on imaging and colonoscopy and acute PE on CT chest on heparin.  VSS, afebrile. CEA 17.8. Labs unremarkable, H/H stable. PTT 62 this am and now 50, subtherapeutic. CEA 17.8. TTE performed yesterday, EF 60-65%. Pathology positive for adenocarcinoma.     Plan:  - plan for Lap sigmoidectomy next week, now scheduled for 2/29  - plan for ICU postoperatively  - appreciate Pulmonology and Cardiology recs: TTE performed, EKG NSR; PFTs unable to be performed at this location, will continue AC/O2/IS  - Heme/Onc consult/recommendations  - Medical preoperative clearance  - GI following, recs appreciated  - Bowel reg given high colonic stool burden   - continue soft diet   - continue heparin drip for PE, follow nomogram and fu q6 PTT to ensure accurate therapeutic dosing  - Rest of care per primary     Plan discussed w/ Dr. Rocha and patients seen and examined with Chief Resident Trip Jimenez PGY5 and Dr. Anai Willingham PGY1  Phone: [phone] or [phone]

## 2024-02-23 NOTE — PROGRESS NOTE ADULT - ASSESSMENT
As in above full Surgical Resident notation.  Ms. Taylor personally seen/ examined during afternoon rounds.  No new complaints.  Vitals non-suggestive.  Abdomen soft/ non tender.  Labs reassuring.  Clinically, complex medical surgical condition with O2 requirement, PE and bleeding low sigmoid/ high rectal lesion.  Surgically, stable for present.  To continue current supportive care.  Cardiology note with High Risk clearance given risk to benefit ration.  Awaiting further Pulmonary input and possibility of PFT testing.

## 2024-02-23 NOTE — PROGRESS NOTE ADULT - SUBJECTIVE AND OBJECTIVE BOX
[facility]  Bobby Mcdonald PA-C  [address] [place] [address]  [phone]      INTERVAL HPI/OVERNIGHT EVENTS:  Pt s/e  Planned for surgery 2/29    MEDICATIONS  (STANDING):  amLODIPine   Tablet 10 milliGRAM(s) Oral daily  budesonide 160 MICROgram(s)/formoterol 4.5 MICROgram(s) Inhaler 2 Puff(s) Inhalation two times a day  heparin  Infusion.  Unit(s)/Hr (11 mL/Hr) IV Continuous <Continuous>  influenza  Vaccine (HIGH DOSE) 0.7 milliLiter(s) IntraMuscular once  lactulose Syrup 20 Gram(s) Oral two times a day  pantoprazole    Tablet 40 milliGRAM(s) Oral before breakfast  senna 2 Tablet(s) Oral at bedtime    MEDICATIONS  (PRN):  acetaminophen     Tablet .. 650 milliGRAM(s) Oral every 6 hours PRN Temp greater or equal to 38C (100.4F), Mild Pain (1 - 3)  albuterol/ipratropium for Nebulization 3 milliLiter(s) Nebulizer every 6 hours PRN Shortness of Breath and/or Wheezing  ALPRAZolam 0.25 milliGRAM(s) Oral daily PRN for anxiety  heparin   Injectable 4500 Unit(s) IV Push every 6 hours PRN For aPTT less than 40  heparin   Injectable 2000 Unit(s) IV Push every 6 hours PRN For aPTT between 40 - 57  melatonin 3 milliGRAM(s) Oral at bedtime PRN Insomnia  polyethylene glycol 3350 17 Gram(s) Oral daily PRN Constipation  traMADol 50 milliGRAM(s) Oral daily PRN Moderate Pain (4 - 6)      Allergies  No Known Allergies      PHYSICAL EXAM:   Vital Signs:  Vital Signs Last 24 Hrs  T(C): 36.5 (23 Feb 2024 05:16), Max: 37.2 (22 Feb 2024 20:57)  T(F): 97.7 (23 Feb 2024 05:16), Max: 98.9 (22 Feb 2024 20:57)  HR: 95 (23 Feb 2024 05:16) (93 - 95)  BP: 130/72 (23 Feb 2024 05:16) (129/74 - 130/72)  BP(mean): --  RR: 18 (23 Feb 2024 05:16) (18 - 18)  SpO2: 94% (23 Feb 2024 05:16) (94% - 97%)    Parameters below as of 23 Feb 2024 05:16  Patient On (Oxygen Delivery Method): nasal cannula  O2 Flow (L/min): 2    GENERAL:  Appears stated age  HEENT:  NC/AT  CHEST:  Full & symmetric excursion  HEART:  Regular rhythm  ABDOMEN:  Soft, non-tender, non-distended  EXTEREMITIES:  no cyanosis  SKIN:  No rash  NEURO:  Alert      LABS:                        9.4    6.02  )-----------( 312      ( 23 Feb 2024 05:55 )             32.1     02-23    145  |  104  |  10  ----------------------------<  105<H>  3.5   |  35<H>  |  0.85    Ca    9.5      23 Feb 2024 05:55  Phos  3.3     02-22  Mg     2.2     02-22    TPro  6.2  /  Alb  2.7<L>  /  TBili  0.4  /  DBili  x   /  AST  11<L>  /  ALT  14  /  AlkPhos  58  02-22    PTT - ( 23 Feb 2024 05:55 )  PTT:50.4 sec  Urinalysis Basic - ( 23 Feb 2024 05:55 )    Color: x / Appearance: x / SG: x / pH: x  Gluc: 105 mg/dL / Ketone: x  / Bili: x / Urobili: x   Blood: x / Protein: x / Nitrite: x   Leuk Esterase: x / RBC: x / WBC x   Sq Epi: x / Non Sq Epi: x / Bacteria: x

## 2024-02-23 NOTE — PROGRESS NOTE ADULT - SUBJECTIVE AND OBJECTIVE BOX
[facility] -- James Mcghee,  Alexa, Arnel De León, Nolvia Collazo Cohen  Office # [phone]    Follow Up:  PE, cardiac optimization    Subjective/Observations: Pt seen and examined, resting comfortably in bed, remains on 2L NC.  No complaints of chest pain, dyspnea, or palpitations reported. No signs of orthopnea or PND.  episodes of tachycardia on telemetry.     REVIEW OF SYSTEMS: All other review of systems is negative unless indicated above  PAST MEDICAL & SURGICAL HISTORY:  COPD (chronic obstructive pulmonary disease)      Rheumatoid arthritis      HTN (hypertension)      Anxiety      No significant past surgical history        MEDICATIONS  (STANDING):  amLODIPine   Tablet 10 milliGRAM(s) Oral daily  budesonide 160 MICROgram(s)/formoterol 4.5 MICROgram(s) Inhaler 2 Puff(s) Inhalation two times a day  heparin  Infusion.  Unit(s)/Hr (11 mL/Hr) IV Continuous <Continuous>  influenza  Vaccine (HIGH DOSE) 0.7 milliLiter(s) IntraMuscular once  lactulose Syrup 20 Gram(s) Oral two times a day  pantoprazole    Tablet 40 milliGRAM(s) Oral before breakfast  senna 2 Tablet(s) Oral at bedtime    MEDICATIONS  (PRN):  acetaminophen     Tablet .. 650 milliGRAM(s) Oral every 6 hours PRN Temp greater or equal to 38C (100.4F), Mild Pain (1 - 3)  albuterol/ipratropium for Nebulization 3 milliLiter(s) Nebulizer every 6 hours PRN Shortness of Breath and/or Wheezing  ALPRAZolam 0.25 milliGRAM(s) Oral daily PRN for anxiety  heparin   Injectable 4500 Unit(s) IV Push every 6 hours PRN For aPTT less than 40  heparin   Injectable 2000 Unit(s) IV Push every 6 hours PRN For aPTT between 40 - 57  melatonin 3 milliGRAM(s) Oral at bedtime PRN Insomnia  polyethylene glycol 3350 17 Gram(s) Oral daily PRN Constipation  traMADol 50 milliGRAM(s) Oral daily PRN Moderate Pain (4 - 6)    Allergies    No Known Allergies    Intolerances      Vital Signs Last 24 Hrs  T(C): 36.5 (23 Feb 2024 05:16), Max: 37.3 (22 Feb 2024 11:04)  T(F): 97.7 (23 Feb 2024 05:16), Max: 99.2 (22 Feb 2024 11:04)  HR: 95 (23 Feb 2024 05:16) (88 - 95)  BP: 130/72 (23 Feb 2024 05:16) (112/56 - 130/72)  BP(mean): --  RR: 18 (23 Feb 2024 05:16) (18 - 19)  SpO2: 94% (23 Feb 2024 05:16) (94% - 97%)    Parameters below as of 23 Feb 2024 05:16  Patient On (Oxygen Delivery Method): nasal cannula  O2 Flow (L/min): 2    I&O's Summary      TELE: - ST  PHYSICAL EXAM:  Constitutional: NAD, alert and oriented x 3, frail  Lungs:  Non-labored, breath sounds are dim bilaterally, No wheezing, rales or rhonchi  Cardiovascular: RRR.  S1 and S2 positive.  No murmurs, rubs, gallops or clicks  Gastrointestinal: Bowel Sounds present, soft, nontender.   Extremities: No peripheral edema.   Neurological: Alert, no focal deficits  Skin: No rashes or ulcers   Psych:  Mood & affect appropriate.    LABS: All Labs Reviewed:                        9.4    6.02  )-----------( 312      ( 23 Feb 2024 05:55 )             32.1                         9.2    5.96  )-----------( 327      ( 22 Feb 2024 08:10 )             32.7                         9.5    8.82  )-----------( 348      ( 21 Feb 2024 07:00 )             33.2     23 Feb 2024 05:55    145    |  104    |  10     ----------------------------<  105    3.5     |  35     |  0.85   22 Feb 2024 08:10    147    |  106    |  11     ----------------------------<  109    3.5     |  37     |  0.93   21 Feb 2024 07:00    142    |  103    |  11     ----------------------------<  110    3.5     |  34     |  0.96     Ca    9.5        23 Feb 2024 05:55  Ca    9.4        22 Feb 2024 08:10  Ca    9.4        21 Feb 2024 07:00  Phos  3.3       22 Feb 2024 08:10  Phos  2.8       21 Feb 2024 07:00  Mg     2.2       22 Feb 2024 08:10  Mg     2.3       21 Feb 2024 07:00    TPro  6.2    /  Alb  2.7    /  TBili  0.4    /  DBili  x      /  AST  11     /  ALT  14     /  AlkPhos  58     22 Feb 2024 08:10    PTT - ( 23 Feb 2024 05:55 )  PTT:50.4 sec      12 Lead ECG:   Ventricular Rate 98 BPM    Atrial Rate 98 BPM    P-R Interval 154 ms    QRS Duration 76 ms    Q-T Interval 340 ms    QTC Calculation(Bazett) 434 ms    P Axis 88 degrees    R Axis 51 degrees    T Axis 96 degrees    Diagnosis Line Normal sinus rhythm  Minimal voltage criteria for LVH, may be normal variant ( Sokolow-Jason )  Nonspecific T wave abnormality  Abnormal ECG  When compared with ECG of 19-JAN-2024 11:54,  No significant change was found  Confirmed by SHRUTHI SANCHEZ ([id number]) on 2/16/2024 6:48:17 PM (02-15-24 @ 23:33)

## 2024-02-23 NOTE — CARE COORDINATION ASSESSMENT. - DO YOU FEEL SAFE LIVING IN YOUR HOME?
Internal medicine clinic progress note    Date of Service:  2/23/2024    Internal Medicine Clinic / [facility] [place]    Chief Complaint   Patient presents with    Establish Care    Medicare Wellness Visit       Assessment:    1. Essential hypertension    2. Hyperlipidemia LDL goal <130    3. Stage 3a chronic kidney disease (CMD)        Plan:  Hypertension, BP is controlled, continue lisinopril, check CMP.  Hyperlipidemia, continue statin, check fasting lipid panel.  CKD 3 A, GFR 58, repeat CMP.    Return to clinic in 6 months.  Orders Placed This Encounter    CBC with Automated Differential    Comprehensive Metabolic Panel    Lipid Panel With Reflex    Microalbumin Urine Random       History of Present Illness:  79 year old  female who presents for  [facility].    Hypertension, patient is taking lisinopril 40 mg daily.  Her BP today is normal.  Patient is taking atorvastatin 10 mg daily, denies myalgia.  Her most recent GFR was 58.     Current medications, Allergies, Problem list, Past Medical History, Past Surgical History, Social History and Family History have been reviewed.    Labs and other tests results are reviewed.    Review of Systems:    Please see HPI (History of Present Illness).    12 points of review of system negative except as above.    Physical Exam:    Vitals:    02/23/24 1244   BP: 130/80   BP Location: LUE - Left upper extremity   Patient Position: Sitting   Cuff Size: Regular   Pulse: 82   Resp: 14   Temp: 98.3 °F (36.8 °C)   TempSrc: Temporal   Weight: 70.6 kg (155 lb 11.2 oz)   Height: 5' 5\" (1.651 m)   PainSc:  0    Body mass index is 25.91 kg/m².  Pleasant female  GENERAL: No acute distress, appears stated age.  HEAD: Normocephalic and atraumatic  CARDIO/CHEST:  Regular rate and rhythm. No murmurs, rubs, palpable thrills.    RESPIRATORY:  No wheezing/crackles/rales, no dullness, no respiratory distress or accessory muscles used.  ABDOMINAL: Non-tender/non-distended, no  hepatomegaly, no rebound/guarding.   SKIN: Dry, warm, no rash .  PSYCHIATRIC:  Full range of affect, mood. Alert and oriented to person, place and time.      Past Medical History:   Diagnosis Date    Acute bronchitis     Allergy     Cataract     Colon polyps     Esophageal reflux     Female infertility     High cholesterol     HTN (hypertension)     Lichen sclerosus     Pneumonia     PONV (postoperative nausea and vomiting)     EVEN WITH SEDATION    Shoulder pain, right     Tobacco dependence     quit in the past on patches    Urinary tract infection        Social History     Tobacco Use   Smoking Status Every Day    Current packs/day: 0.25    Average packs/day: 0.3 packs/day for 30.0 years (7.5 ttl pk-yrs)    Types: Cigarettes   Smokeless Tobacco Never       Allergies as of 02/23/2024 - Reviewed 02/23/2024   Allergen Reaction Noted    Penicillins RASH        Current Outpatient Medications   Medication Sig    atorvastatin (LIPITOR) 10 MG tablet Take 1 tablet by mouth once daily    lisinopril (ZESTRIL) 40 MG tablet Take 1 tablet by mouth once daily    Probiotic Product (CULTRELLE KIDS IMMUNE DEFENSE PO) Take 1 tablet by mouth daily.    NON FORMULARY Take 1 tablet by mouth 2 times daily before breakfast and at night. Pressor vision vitamin  Take one tablet with breakfast and one tablet in evening    meclizine (ANTIVERT) 12.5 MG tablet TAKE 1 TABLET BY MOUTH THREE TIMES DAILY AS NEEDED FOR UP TO 10 DAYS FOR DIZZINESS OR NAUSEA    clobetasol (TEMOVATE) 0.05 % ointment apply fingertip amount to area of skin itching (external vagina) 3 times a week    acetaminophen (TYLENOL) 500 MG tablet Take 1,000 mg by mouth every 6 hours as needed for Pain.     aspirin 81 MG tablet Take 81 mg by mouth daily.     No current facility-administered medications for this visit.       Patient Active Problem List    Diagnosis Date Noted    Regular astigmatism of both eyes 12/20/2017     Priority: Medium    Dry eye syndrome of both eyes  06/14/2023     Priority: Low    Early dry stage nonexudative age-related macular degeneration of both eyes 06/14/2023     Priority: Low    Lichen sclerosus et atrophicus of the vulva 10/09/2021     Priority: Low    HTN (hypertension)      Priority: Low    High cholesterol      Priority: Low    Tobacco dependence      Priority: Low     quit in the past on patches      Constant left esotropia with strabismic amblyopia  07/21/2014     Priority: Low    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    MEDICARE WELLNESS VISIT NOTE    HISTORY OF PRESENT ILLNESS:   Meliza presents for her Subsequent Annual Medicare Wellness Visit.   She has no current complaints or concerns.      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    Pseudophakia of both eyes '11    History of ophthalmic migraine    Regular astigmatism of both eyes    HTN (hypertension)    High cholesterol    Tobacco dependence    Lichen sclerosus et atrophicus of the vulva    Dry eye syndrome of both eyes    Early dry stage nonexudative age-related macular degeneration of both eyes         Past Medical History:   Diagnosis Date    Acute bronchitis     Allergy     Cataract     Colon polyps     Esophageal reflux     Female infertility     High cholesterol     HTN (hypertension)     Lichen sclerosus     Pneumonia     PONV (postoperative nausea and vomiting)     EVEN WITH SEDATION    Shoulder pain, right      Tobacco dependence     quit in the past on patches    Urinary tract infection          Past Surgical History:   Procedure Laterality Date    Appendectomy      Breast biopsy Right 1972    Cataract extraction w/  intraocular lens implant Bilateral 08/10/2011    Colonoscopy diagnostic  07/09/2019    HISTORY OF POLYPS. RENE. Repeat in 5 years    Dexa bone density axial skeleton  03/23/2011    Hysterectomy  1977    with right oophorectomy    Oophorectomy Left 1964    due to cysts         Social History     Tobacco Use    Smoking status: Every Day     Current packs/day: 0.25     Average packs/day: 0.3 packs/day for 30.0 years (7.5 ttl pk-yrs)     Types: Cigarettes    Smokeless tobacco: Never   Vaping Use    Vaping Use: never used   Substance Use Topics    Alcohol use: Yes     Alcohol/week: 3.0 - 4.0 standard drinks of alcohol     Types: 3 - 4 Cans of beer per week    Drug use: No     Drug use:    Drug Use:    No              Family History   Problem Relation Age of Onset    Other Mother         bladder cancer    Strabismus Mother     Hypertension Mother     Migraine Mother     Bone cancer Father     Glaucoma Father     Strabismus Father     Diabetes Father     Hypertension Father     Cancer, Prostate Father     Thyroid Sister     Glaucoma Sister     Cancer, Breast Sister 65    Cancer Brother         tonsillar    Heart disease Brother     Aneurysm Brother     Heart disease Brother     Sjogren's Syndrome Daughter     Cancer, Ovarian Neg Hx     Cancer, Endometrial Neg Hx     Cancer, Colon Neg Hx        Current Outpatient Medications   Medication Sig Dispense Refill    atorvastatin (LIPITOR) 10 MG tablet Take 1 tablet by mouth once daily 90 tablet 0    lisinopril (ZESTRIL) 40 MG tablet Take 1 tablet by mouth once daily 90 tablet 0    Probiotic Product (CULTRELLE KIDS IMMUNE DEFENSE PO) Take 1 tablet by mouth daily.      NON FORMULARY Take 1 tablet by mouth 2 times daily before breakfast and at night. Pressor vision  vitamin  Take one tablet with breakfast and one tablet in evening      meclizine (ANTIVERT) 12.5 MG tablet TAKE 1 TABLET BY MOUTH THREE TIMES DAILY AS NEEDED FOR UP TO 10 DAYS FOR DIZZINESS OR NAUSEA      clobetasol (TEMOVATE) 0.05 % ointment apply fingertip amount to area of skin itching (external vagina) 3 times a week 60 g 1    acetaminophen (TYLENOL) 500 MG tablet Take 1,000 mg by mouth every 6 hours as needed for Pain.       aspirin 81 MG tablet Take 81 mg by mouth daily.       No current facility-administered medications for this visit.        The following items on the Medicare Health Risk Assessment were found to be positive  5.) Do you do moderate to strenuous exercise (brisk walk) for about 20 minutes for 3 or more days per week?: No, I usually do not exercise this much     11b.) Bowel control problems: Sometimes     11h.) Problems with your hearing: Always         Vision and Hearing screens: Hearing Screening - Comments:: WHISPER: FAIL     Patient wears hearing aids, states needs them to hear well     Advance care planning documents on file - yes     Cognitive/Functional Status: no evidence of cognitive dysfunction by direct observation    Opioid Review: Meliza is not taking opioid medications.    Recent PHQ 2/9 Score:    PHQ 2:  PHQ 2 Score Adult PHQ 2 Score Adult PHQ 2 Interpretation Little interest or pleasure in activity?   2/23/2024  12:48 PM 0 No further screening needed 0       PHQ 9:       DEPRESSION ASSESSMENT/PLAN:  Depression screening is negative no further plan needed.     Body mass index is 25.91 kg/m².    BMI FOLLOW-UP/PLAN:  Patient BMI is within normal range.    Needed Screening/Treatment:   None  Needed follow up:  None    See orders.   See Patient Instructions section.   Return in about 6 months (around 8/23/2024) for routine follow up.        Yes

## 2024-02-23 NOTE — PROGRESS NOTE ADULT - ASSESSMENT
67yo F with PMHx COPD (not on home oxygen), HTN, RA, anxiety presenting with chief complaint of blood in stools. Admitted for GI bleed, plan for colonoscopy 2/20.        Problem/Plan - 1:  ·  Problem: Colonic mass.   ·  Plan: Patient presents with BRBPR, diarrhea with clots 2/2 lower GI bleed   CT AP: Masslike mural thickening of the distal sigmoid colon. Differential considerations include malignancy or, less likely, sequelae of chronic diverticulitis. Gastroenterology evaluation is recommended. Scattered too small to characterize hypodense foci in the liver, hepatic protocol MRI is recommended for further characterization.  -Patient unable to get outpatient colonoscopy (no insurance)  -HGB currently stable  -Hold home aspirin  -Vital signs currently stable, continue to monitor  -Transfuse prn   -Colonoscopy 2/20 with: large partially obstructing mass noted at approximately 13cm from anal verge.  Pathology with ADENOCARCINOMA, AT LEAST INTRAMUCOSAL.  -DW surgery - recs CT chest with IV contrast, CEA and rectal MR for staging - per radiology unable to get staging MRI done at this facility as it requires a different magnet  -CT chest: No evidence of metastatic disease. Acute pulmonary embolus in the right middle lobe lobar and segmental branches.  -Plan for lap sigmoidectomy on 2/28.  Cardiology preop evaluation underway.  Awaiting echo results.   -GI and surgery following - follow up recs.     Problem/Plan - 2:  ·  Problem: COPD (chronic obstructive pulmonary disease).   ·  Plan: Chronic, recently admitted for COPD exacerbation  - Patient states current SOB likely due to anxiety  - Duonebs q6 PRN  - Continue home inhalers   - Supplemental O2 PRN. At baseline: 4-4.5 L home oxygen intermittently. COPD patient will keep SpO2 goal 88-93%   - Monitor respiratory status   - 3 weeks ago had rsv, recent rvp negative  - Continuous pulse ox  - Pulmonary Dr. Dobson consult for clearance.     Problem/Plan - 3:  ·  Problem: HTN (hypertension).   ·  Plan: Chronic, stable  - Continue home amlodipine 10mg qd with hold parameters  - Monitor hemodynamics  - DASH/TLC diet.     Problem/Plan - 4:  ·  Problem: Rheumatoid arthritis.   ·  Plan: - Chronic  - recently finished prednisone course, takes tramadol 50mg Qd.     Problem/Plan - 5:  ·  Problem: Anxiety.   ·  Plan: - Chronic, SOB likely 2/2 anxiety  - Continue Xanax 0.25mg qhs PRN.     Problem/Plan - 6:  ·  Problem: Encounter for deep vein thrombosis (DVT) prophylaxis.   ·  Plan: - on heparin drip for pulmonary embolism    #Pulmonary embolism:  CT chest with No evidence of metastatic disease. Acute pulmonary embolus in the right middle lobe lobar and segmental branches.  Continue heparin drip.  Echo  Left ventricular systolic function is normal with an ejection fraction visually estimated at 60 to 65 %.    .

## 2024-02-23 NOTE — CONSULT NOTE ADULT - SUBJECTIVE AND OBJECTIVE BOX
All records reviewed.  pt seen w son present(allowed by pt)    HPI:  67yo F w hx COPD on 4-4.5L O2, HTN, RA, anxiety, adm w 2wk hx of intermittent blood mixed w stool  Never had colonoscopy  No abdomen pain anorexia, weight loss  Colonoscopy 2/20 here large partially obstru mass at ~13cm from anal verge sig for adenocarcinoma, MMR status still pending  CTchest 2/20 and CT a/p 2/13 no mets but new RML PE, duplex legs negative now on IV Heparin  No further bleeds since adm, reports does have some SOB since adm but thought was due to anxiety, episode leg pain 2months ago  Seen by Surgery, for planned lap colon resection 2/29      PAST MEDICAL & SURGICAL HISTORY:  COPD (chronic obstructive pulmonary disease)      Rheumatoid arthritis      HTN (hypertension)      Anxiety      No significant past surgical history          Review of System:  see above    MEDICATIONS  (STANDING):  amLODIPine   Tablet 10 milliGRAM(s) Oral daily  budesonide 160 MICROgram(s)/formoterol 4.5 MICROgram(s) Inhaler 2 Puff(s) Inhalation two times a day  heparin  Infusion.  Unit(s)/Hr (11 mL/Hr) IV Continuous <Continuous>  influenza  Vaccine (HIGH DOSE) 0.7 milliLiter(s) IntraMuscular once  lactulose Syrup 20 Gram(s) Oral two times a day  pantoprazole    Tablet 40 milliGRAM(s) Oral before breakfast  senna 2 Tablet(s) Oral at bedtime    MEDICATIONS  (PRN):  acetaminophen     Tablet .. 650 milliGRAM(s) Oral every 6 hours PRN Temp greater or equal to 38C (100.4F), Mild Pain (1 - 3)  albuterol/ipratropium for Nebulization 3 milliLiter(s) Nebulizer every 6 hours PRN Shortness of Breath and/or Wheezing  ALPRAZolam 0.25 milliGRAM(s) Oral daily PRN for anxiety  heparin   Injectable 4500 Unit(s) IV Push every 6 hours PRN For aPTT less than 40  heparin   Injectable 2000 Unit(s) IV Push every 6 hours PRN For aPTT between 40 - 57  melatonin 3 milliGRAM(s) Oral at bedtime PRN Insomnia  polyethylene glycol 3350 17 Gram(s) Oral daily PRN Constipation  traMADol 50 milliGRAM(s) Oral daily PRN Moderate Pain (4 - 6)      Allergies    No Known Allergies    Intolerances        SOCIAL HISTORY:  dc tobacco 2012 when had pneumonia, smoked since age 15 up to 1 1/2ppd  no etoh    FAMILY HISTORY:  FHx: lung cancer sister and mother        Vital Signs Last 24 Hrs  T(C): 37.1 (23 Feb 2024 12:22), Max: 37.2 (22 Feb 2024 20:57)  T(F): 98.8 (23 Feb 2024 12:22), Max: 98.9 (22 Feb 2024 20:57)  HR: 104 (23 Feb 2024 12:22) (93 - 104)  BP: 117/69 (23 Feb 2024 12:22) (117/69 - 130/72)  BP(mean): --  RR: 18 (23 Feb 2024 12:22) (18 - 18)  SpO2: 93% (23 Feb 2024 12:22) (93% - 97%)    Parameters below as of 23 Feb 2024 12:22  Patient On (Oxygen Delivery Method): nasal cannula  O2 Flow (L/min): 2      PHYSICAL EXAM:      General: thin woman sitting at bedside in no acute distress  Eyes:sclera anicteric, pupils equal and reactive to light  ENMT:buccal mucosa moist, pharynx not injected  Neck:supple, trachea midline  Lungs:clear, no wheeze/rhonchi  Cardiovascular:regular rate and rhythm, S1 S2  Abdomen:soft, nontender, no organomegaly present, bowel sounds normal  Neurological:alert and oriented x3, Cranial Nerves II-XII grossly intact  Skin:no increased ecchymosis/petechiae/purpura  Lymph Nodes:no palpable cervical/supraclavicular lymph nodes enlargements  Extremities:no cyanosis/clubbing/edema        LABS:       ceCarcinoembryonic Antigen (02.21.24 @ 07:00)   Carcinoembryonic Antigen: 17.8: Method: Roche Electrochemiluminescence Immuno Assay                      9.4    6.02  )-----------( 312      ( 23 Feb 2024 05:55 )             32.1     02-23 @ 05:55  WBC6.02  RBC4.08 Hgb9.4 Hct32.1  MCV78.7  [id number]  Auto Neutro-- Band-- Auto Lymph-- Atypical Lymph-- Reactive Lymph-- Auto Mono-- Auto Eos-- Auto Baso--        02-22 @ 08:10  WBC5.96  RBC4.11 Hgb9.2 Hct32.7  MCV79.6  [id number]  Auto Neutro-- Band-- Auto Lymph-- Atypical Lymph-- Reactive Lymph-- Auto Mono-- Auto Eos-- Auto Baso--        02-21 @ 07:00  WBC8.82  RBC4.21 Hgb9.5 Hct33.2  MCV78.9  [id number]  Auto Neutro-- Band-- Auto Lymph-- Atypical Lymph-- Reactive Lymph-- Auto Mono-- Auto Eos-- Auto Baso--        02-21 @ 03:20  WBC9.33  RBC3.96 Hgb8.9 Hct31.0  MCV78.3  [id number]  Auto Neutro-- Band-- Auto Lymph-- Atypical Lymph-- Reactive Lymph-- Auto Mono-- Auto Eos-- Auto Baso--        02-20 @ 06:55  WBC7.52  RBC4.30 Hgb9.6 Hct34.2  MCV79.5  [id number]  Auto Neutro-- Band-- Auto Lymph-- Atypical Lymph-- Reactive Lymph-- Auto Mono-- Auto Eos-- Auto Baso--        02-19 @ 08:07  WBC7.72  RBC4.06 Hgb9.2 Hct32.2  MCV79.3  [id number]  Auto Neutro-- Band-- Auto Lymph-- Atypical Lymph-- Reactive Lymph-- Auto Mono-- Auto Eos-- Auto Baso--          02-23    145  |  104  |  10  ----------------------------<  105<H>  3.5   |  35<H>  |  0.85    Ca    9.5      23 Feb 2024 05:55  Phos  3.3     02-22  Mg     2.2     02-22    TPro  6.2  /  Alb  2.7<L>  /  TBili  0.4  /  DBili  x   /  AST  11<L>  /  ALT  14  /  AlkPhos  58  02-22      Urinalysis Basic - ( 23 Feb 2024 05:55 )    Color: x / Appearance: x / SG: x / pH: x  Gluc: 105 mg/dL / Ketone: x  / Bili: x / Urobili: x   Blood: x / Protein: x / Nitrite: x   Leuk Esterase: x / RBC: x / WBC x   Sq Epi: x / Non Sq Epi: x / Bacteria: x        PERIPHERAL BLOOD SMEAR REVIEW:      RADIOLOGY & ADDITIONAL STUDIES:  < from: US Duplex Venous Lower Ext Complete, Bilateral (02.20.24 @ 20:29) >    ACC: [id number] EXAM:  US DPLX LWR EXT VEINS COMPL BI   ORDERED BY:  DILLAN VEGA     PROCEDURE DATE:  02/20/2024          INTERPRETATION:  CLINICAL INFORMATION: Patient with pulmonary embolism    COMPARISON: None available.    TECHNIQUE: Duplex sonography of the BILATERAL LOWER extremity veins with   color and spectral Doppler, with and without compression.    FINDINGS:    RIGHT:  Normal compressibility of the RIGHT common femoral, femoral and popliteal   veins.  Doppler examination shows normal spontaneous and phasic flow.  No RIGHT calf vein thrombosis is detected.  2.3 cm Baker cyst.    LEFT:  Normal compressibility of the LEFT common femoral, femoral and popliteal   veins.  Doppler examination shows normal spontaneous and phasic flow.  No LEFT calf vein thrombosis is detected.  2.5 cm Baker cyst.    IMPRESSION:  No evidence of deep venous thrombosis in either lower extremity.        < end of copied text >  < from: CT Chest w/ IV Cont (02.20.24 @ 17:26) >    ACC: [id number] EXAM:  CT CHEST IC   ORDERED BY:  DILLAN VEGA     PROCEDURE DATE:  02/20/2024          INTERPRETATION:  CLINICAL INFORMATION: Suspected rectal cancer, staging,   history of COPD    COMPARISON: 2/13/2024    CONTRAST/COMPLICATIONS:  IV Contrast: Omnipaque 350  90 cc administered   10 cc discarded  Oral Contrast: NONE  Complications: None reported at time of study completion    PROCEDURE:  CT of the Chest was performed.  Sagittal and coronal reformats were performed.    FINDINGS:    LUNGS AND AIRWAYS: Patent central airways.  Emphysematous changes of the   lungs without evidence of nodule  PLEURA: No pleural effusion.  MEDIASTINUM AND AYAH: No lymphadenopathy.  VESSELS: Thrombus in the right middle lobe pulmonary artery which does   not appear to have been present on prior examination  HEART: Heart size is normal. No pericardial effusion. No evidence of   right heart strain  CHEST WALL AND LOWER NECK: Within normal limits.  VISUALIZED UPPER ABDOMEN: Again seen are small hypodensities in the liver   too small to characterize.  BONES: Within normal limits.    IMPRESSION:  No evidence of metastatic disease. Acute pulmonary embolus in the right   middle lobe lobar and segmental branches.    Findings were discussed with Dr.JUSTIN VEGA [id number] 2/20/2024 7:24   PM by Dr. Wheat with read back confirmation.      < end of copied text >  < from: CT Abdomen and Pelvis w/ IV Cont (02.13.24 @ 01:31) >    ACC: [id number] EXAM:  CT ABDOMEN AND PELVIS IC   ORDERED BY:  DEAN MCNEILL     PROCEDURE DATE:  02/13/2024          INTERPRETATION:  CLINICAL INFORMATION: Rectal bleeding    COMPARISON: CT chest 5/1/2023    CONTRAST/COMPLICATIONS:  IV Contrast:Omnipaque 350  90 cc administered   10 cc discarded  Oral Contrast: NONE  Complications: None reported at time of study completion    PROCEDURE:  CT of the Abdomen and Pelvis was performed.  Sagittal and coronal reformats were performed.    FINDINGS:  LOWER CHEST: Centrilobular emphysematous changes    LIVER: Scattered too small to characterize hypodense foci  BILE DUCTS: Normal caliber.  GALLBLADDER: Within normal limits.  SPLEEN: Within normal limits.  PANCREAS: Within normal limits.  ADRENALS:Within normal limits.  KIDNEYS/URETERS: Bilateral renal cysts and too small to characterize   hypodense foci which most commonly represent cysts. No hydronephrosis.    BLADDER: Within normal limits.  REPRODUCTIVE ORGANS: Diminutive uterus versus supracervical hysterectomy    BOWEL: Masslike mural thickening of the distal sigmoid colon (5/77).   Colonic diverticulosis without evidence of acute diverticular is.   Moderate colonic stool burden. Appendix is normal.  PERITONEUM: No ascites.  VESSELS: Atherosclerotic changes.  RETROPERITONEUM/LYMPH NODES: No lymphadenopathy.  ABDOMINAL WALL: Within normal limits.  BONES: No acute osseous abnormality.    IMPRESSION:  Masslike mural thickening of the distal sigmoid colon. Differential   considerations include malignancy or, less likely, sequelae of chronic   diverticulitis. Gastroenterology evaluation is recommended.    Scattered too small to characterize hypodense foci in the liver, hepatic   protocol MRI is recommended for further characterization.        < end of copied text >

## 2024-02-23 NOTE — PROGRESS NOTE ADULT - SUBJECTIVE AND OBJECTIVE BOX
Patient is a 68y old  Female who presents with a chief complaint of GIB bleed (23 Feb 2024 12:42)      INTERVAL HPI/OVERNIGHT EVENTS:    denies cough or shortness of breath    MEDICATIONS  (STANDING):  amLODIPine   Tablet 10 milliGRAM(s) Oral daily  budesonide 160 MICROgram(s)/formoterol 4.5 MICROgram(s) Inhaler 2 Puff(s) Inhalation two times a day  heparin  Infusion.  Unit(s)/Hr (11 mL/Hr) IV Continuous <Continuous>  influenza  Vaccine (HIGH DOSE) 0.7 milliLiter(s) IntraMuscular once  lactulose Syrup 20 Gram(s) Oral two times a day  pantoprazole    Tablet 40 milliGRAM(s) Oral before breakfast  senna 2 Tablet(s) Oral at bedtime      MEDICATIONS  (PRN):  acetaminophen     Tablet .. 650 milliGRAM(s) Oral every 6 hours PRN Temp greater or equal to 38C (100.4F), Mild Pain (1 - 3)  albuterol/ipratropium for Nebulization 3 milliLiter(s) Nebulizer every 6 hours PRN Shortness of Breath and/or Wheezing  ALPRAZolam 0.25 milliGRAM(s) Oral daily PRN for anxiety  heparin   Injectable 4500 Unit(s) IV Push every 6 hours PRN For aPTT less than 40  heparin   Injectable 2000 Unit(s) IV Push every 6 hours PRN For aPTT between 40 - 57  melatonin 3 milliGRAM(s) Oral at bedtime PRN Insomnia  polyethylene glycol 3350 17 Gram(s) Oral daily PRN Constipation  traMADol 50 milliGRAM(s) Oral daily PRN Moderate Pain (4 - 6)      Allergies    No Known Allergies    Intolerances        PAST MEDICAL & SURGICAL HISTORY:  COPD (chronic obstructive pulmonary disease)      Rheumatoid arthritis      HTN (hypertension)      Anxiety      No significant past surgical history          Vital Signs Last 24 Hrs  T(C): 37.1 (23 Feb 2024 12:22), Max: 37.2 (22 Feb 2024 20:57)  T(F): 98.8 (23 Feb 2024 12:22), Max: 98.9 (22 Feb 2024 20:57)  HR: 104 (23 Feb 2024 12:22) (93 - 104)  BP: 117/69 (23 Feb 2024 12:22) (117/69 - 130/72)  BP(mean): --  RR: 18 (23 Feb 2024 12:22) (18 - 18)  SpO2: 93% (23 Feb 2024 12:22) (93% - 97%)    Parameters below as of 23 Feb 2024 12:22  Patient On (Oxygen Delivery Method): nasal cannula  O2 Flow (L/min): 2      PHYSICAL EXAMINATION:    GENERAL: The patient is awake and alert in no apparent distress.     HEENT: Head is normocephalic and atraumatic.     NECK: no JVD    LUNGS: diminished air entry bilateral    HEART: Regular rate and rhythm without murmur.    ABDOMEN: Soft, nontender, and nondistended.      EXTREMITIES: Without any cyanosis, clubbing, rash, lesions or edema.    NEUROLOGIC: Grossly intact.    SKIN: No ulceration or induration present.      LABS:                        9.4    6.02  )-----------( 312      ( 23 Feb 2024 05:55 )             32.1     02-23    145  |  104  |  10  ----------------------------<  105<H>  3.5   |  35<H>  |  0.85    Ca    9.5      23 Feb 2024 05:55  Phos  3.3     02-22  Mg     2.2     02-22    TPro  6.2  /  Alb  2.7<L>  /  TBili  0.4  /  DBili  x   /  AST  11<L>  /  ALT  14  /  AlkPhos  58  02-22    PTT - ( 23 Feb 2024 14:37 )  PTT:44.2 sec  Urinalysis Basic - ( 23 Feb 2024 05:55 )    Color: x / Appearance: x / SG: x / pH: x  Gluc: 105 mg/dL / Ketone: x  / Bili: x / Urobili: x   Blood: x / Protein: x / Nitrite: x   Leuk Esterase: x / RBC: x / WBC x   Sq Epi: x / Non Sq Epi: x / Bacteria: x        Assessment:    COPD  Colon Carcinoma  S/P Pulmonary emboli    Plan:    Will schedule pulmonary function testing to be performed on Monday 2/26 for pre-op evaluation  Continue Symbicort inhaler  Incentive Spirometry

## 2024-02-23 NOTE — CONSULT NOTE ADULT - ASSESSMENT
67yo F w hx COPD on 4-4.5L O2, HTN, RA, anxiety, adm w 2wk hx of intermittent blood mixed w stool  Never had colonoscopy  No abdomen pain anorexia, weight loss  Colonoscopy 2/20 here large partially obstructing mass at ~13cm from anal verge sig for adenocarcinoma, MMR status still pending  CEA 17.8  CTchest 2/20 and CT a/p 2/13 no mets but new RML PE, duplex legs negative now on IV Heparin  No further bleeds since adm, reports does have some SOB since adm but thought was due to anxiety, episode leg pain 2months ago  Seen by Surgery, for planned lap colon resection 2/29    -adenocarcinoma of colon assoc w bleed, no distant mets by imaging studies, for definitive surgery. Await final path to see if pt needs adjuvant therapy  -RML PE-likely malignancy associated, continue IV heparin, can change to oral DOAC when ready for discharge. Monitor serial CBC carefully as pt w GI bleeds    discussed findings, sig, treatment plans and reasons. Also GI faisal maria risk factors for family memebers    thank you, will follow w you

## 2024-02-23 NOTE — PROGRESS NOTE ADULT - ASSESSMENT
67yo F with PMHx COPD (not on home oxygen), HTN, RA, anxiety admitted with GI bleed.     cardiac clearance, HTN, PE,  - admitted with GIB,  s/p colonoscopy 2/20, large partially obstructing mass noted at approximately 13cm from anal verge. Gi following  - surgery following, will likely require resection of specimen +/- Neoadjuvant chemoradiation, plan for 2/29  - CT chest with  Acute pulmonary embolus in the right middle lobe lobar and segmental branches, on heparin gtt for AC. PTT is subtherapeutic, would adjust accordingly   - on 2L NC for supplemental 02, wean as tolerated, on prn at home  - Pt has no active ischemia, decompensated heart failure, unstable arrythmia, or severe stenotic valvular disease.In the setting of a PE, will need to minimize the time off AC  - pulmonary follow up.    - EKG demonstrates NSR with nonspecific twave abnormality  - Tele SR- ST  - Patient euvolemic on examination with no overt signs of heart failure or cardiac ischemia.   - No severe valvular abnormalities noted on examination  - TTE 2/22 shows normal LV and RV size and function, EF 60-65%     - Bp controlled  - continue home amlodipine 10mg qd with hold parameters    - Monitor and replete lytes, keep K>4, Mg>2.  - Will continue to follow.    Lavelle Carlin NP  Nurse Practitioner- Cardiology   Call TEAMS 67yo F with PMHx COPD (not on home oxygen), HTN, RA, anxiety admitted with GI bleed.     cardiac clearance, HTN, PE,  - admitted with GIB,  s/p colonoscopy 2/20, large partially obstructing mass noted at approximately 13cm from anal verge. Gi following  - surgery following, will likely require resection of specimen +/- Neoadjuvant chemoradiation, plan for 2/29  - CT chest with  Acute pulmonary embolus in the right middle lobe lobar and segmental branches, on heparin gtt for AC. PTT is subtherapeutic, would adjust accordingly   - on 2L NC for supplemental 02, wean as tolerated, on prn at home  - Pt has no active ischemia, decompensated heart failure, unstable arrythmia, or severe stenotic valvular disease. In the setting of a PE, will need to minimize the time off AC.  Patient at high risk for procedure given PE but benefit of procedure outweighs the risk. OK to proceed with the planned procedure.  - pulmonary follow up.    - EKG demonstrates NSR with nonspecific twave abnormality  - Tele SR- ST  - Patient euvolemic on examination with no overt signs of heart failure or cardiac ischemia.   - No severe valvular abnormalities noted on examination  - TTE 2/22 shows normal LV and RV size and function, EF 60-65%     - Bp controlled  - continue home amlodipine 10mg qd with hold parameters    - Monitor and replete lytes, keep K>4, Mg>2.  - Will continue to follow.    Lavelle Carlin NP  Nurse Practitioner- Cardiology   Call TEAMS 67yo F with PMHx COPD (not on home oxygen), HTN, RA, anxiety admitted with GI bleed.     cardiac clearance, HTN, PE,  - admitted with GIB,  s/p colonoscopy 2/20, large partially obstructing mass noted at approximately 13cm from anal verge. Gi following  - surgery following, will likely require resection of specimen +/- Neoadjuvant chemoradiation, plan for 2/29  - CT chest with  Acute pulmonary embolus in the right middle lobe lobar and segmental branches, on heparin gtt for AC. PTT is subtherapeutic, would adjust accordingly   - on 2L NC for supplemental 02, wean as tolerated, on prn at home  - EKG demonstrates NSR with nonspecific twave abnormality  - Tele SR- ST  - Patient euvolemic on examination with no overt signs of heart failure or cardiac ischemia.   - No severe valvular abnormalities noted on examination  - TTE 2/22 shows normal LV and RV size and function, EF 60-65%   - Pt has no active ischemia, decompensated heart failure, unstable arrythmia, or severe stenotic valvular disease. In the setting of a PE, will need to minimize the time off AC.  Patient at high risk for procedure given PE but benefit of procedure outweighs the risk. OK to proceed with the planned procedure.  - pulmonary follow up.    - Bp controlled  - continue home amlodipine 10mg qd with hold parameters    - Monitor and replete lytes, keep K>4, Mg>2.  - Will continue to follow.    Lavelle Carlin NP  Nurse Practitioner- Cardiology   Call TEAMS

## 2024-02-23 NOTE — PROGRESS NOTE ADULT - SUBJECTIVE AND OBJECTIVE BOX
CHIEF COMPLAINT/INTERVAL HISTORY:  Pt. seen and evaluated for lower GI bleed and colon mass.  Pt. is in no distress.  Reports scant bleeding with BM.  Otherwise tolerating heparin drip.   Pt. denies having CP or SOB.      REVIEW OF SYSTEMS:  No fever, CP, SOB, or abdominal pain    Vital Signs Last 24 Hrs  T(C): 36.5 (23 Feb 2024 05:16), Max: 37.3 (22 Feb 2024 11:04)  T(F): 97.7 (23 Feb 2024 05:16), Max: 99.2 (22 Feb 2024 11:04)  HR: 95 (23 Feb 2024 05:16) (88 - 95)  BP: 130/72 (23 Feb 2024 05:16) (112/56 - 130/72)  BP(mean): --  RR: 18 (23 Feb 2024 05:16) (18 - 19)  SpO2: 94% (23 Feb 2024 05:16) (94% - 97%)    Parameters below as of 23 Feb 2024 05:16  Patient On (Oxygen Delivery Method): nasal cannula  O2 Flow (L/min): 2      PHYSICAL EXAM:  GENERAL: NAD  HEENT: EOMI, hearing normal, conjunctiva and sclera clear  Chest: CTA bilaterally, no wheezing  CV: S1S2, RRR,   GI: soft, +BS, NT/ND  Musculoskeletal: no edema  Psychiatric: affect nL, mood nL  Skin: warm and dry    LABS:                        9.4    6.02  )-----------( 312      ( 23 Feb 2024 05:55 )             32.1     02-23    145  |  104  |  10  ----------------------------<  105<H>  3.5   |  35<H>  |  0.85    Ca    9.5      23 Feb 2024 05:55  Phos  3.3     02-22  Mg     2.2     02-22    TPro  6.2  /  Alb  2.7<L>  /  TBili  0.4  /  DBili  x   /  AST  11<L>  /  ALT  14  /  AlkPhos  58  02-22    PTT - ( 23 Feb 2024 05:55 )  PTT:50.4 sec  Urinalysis Basic - ( 23 Feb 2024 05:55 )    Color: x / Appearance: x / SG: x / pH: x  Gluc: 105 mg/dL / Ketone: x  / Bili: x / Urobili: x   Blood: x / Protein: x / Nitrite: x   Leuk Esterase: x / RBC: x / WBC x   Sq Epi: x / Non Sq Epi: x / Bacteria: x

## 2024-02-23 NOTE — PROGRESS NOTE ADULT - ASSESSMENT
constipation  rectal bleed  abnormal CT    s/p colonoscopy 2/20, large partially obstructing mass noted at approximately 13cm from anal verge  pathology noted, positive for adenocarcinoma  CEA elevated  surgery recs noted and appreciated, currently planned for surgery 2/29  d/w pt    I reviewed the overnight course of events on the unit, re-confirming the patient history. I discussed the care with the patient  Differential diagnosis and plan of care discussed with patient after the evaluation  35 minutes spent on total encounter of which more than fifty percent of the encounter was spent counseling and/or coordinating care by the attending physician.

## 2024-02-24 LAB
ANION GAP SERPL CALC-SCNC: 3 MMOL/L — LOW (ref 5–17)
APPEARANCE UR: ABNORMAL
APTT BLD: 58.8 SEC — HIGH (ref 24.5–35.6)
APTT BLD: 83.6 SEC — HIGH (ref 24.5–35.6)
BACTERIA # UR AUTO: ABNORMAL /HPF
BILIRUB UR-MCNC: NEGATIVE — SIGNIFICANT CHANGE UP
BUN SERPL-MCNC: 12 MG/DL — SIGNIFICANT CHANGE UP (ref 7–23)
CALCIUM SERPL-MCNC: 9.4 MG/DL — SIGNIFICANT CHANGE UP (ref 8.5–10.1)
CHLORIDE SERPL-SCNC: 105 MMOL/L — SIGNIFICANT CHANGE UP (ref 96–108)
CO2 SERPL-SCNC: 36 MMOL/L — HIGH (ref 22–31)
COLOR SPEC: YELLOW — SIGNIFICANT CHANGE UP
CREAT SERPL-MCNC: 0.98 MG/DL — SIGNIFICANT CHANGE UP (ref 0.5–1.3)
DIFF PNL FLD: ABNORMAL
EGFR: 63 ML/MIN/1.73M2 — SIGNIFICANT CHANGE UP
EPI CELLS # UR: PRESENT
GLUCOSE SERPL-MCNC: 120 MG/DL — HIGH (ref 70–99)
GLUCOSE UR QL: NEGATIVE MG/DL — SIGNIFICANT CHANGE UP
HCT VFR BLD CALC: 31.4 % — LOW (ref 34.5–45)
HGB BLD-MCNC: 9 G/DL — LOW (ref 11.5–15.5)
KETONES UR-MCNC: ABNORMAL MG/DL
LACTATE SERPL-SCNC: 1.3 MMOL/L — SIGNIFICANT CHANGE UP (ref 0.7–2)
LEUKOCYTE ESTERASE UR-ACNC: ABNORMAL
MCHC RBC-ENTMCNC: 22.4 PG — LOW (ref 27–34)
MCHC RBC-ENTMCNC: 28.7 GM/DL — LOW (ref 32–36)
MCV RBC AUTO: 78.3 FL — LOW (ref 80–100)
NITRITE UR-MCNC: POSITIVE
NRBC # BLD: 0 /100 WBCS — SIGNIFICANT CHANGE UP (ref 0–0)
PH UR: 5.5 — SIGNIFICANT CHANGE UP (ref 5–8)
PLATELET # BLD AUTO: 281 K/UL — SIGNIFICANT CHANGE UP (ref 150–400)
POTASSIUM SERPL-MCNC: 4.1 MMOL/L — SIGNIFICANT CHANGE UP (ref 3.5–5.3)
POTASSIUM SERPL-SCNC: 4.1 MMOL/L — SIGNIFICANT CHANGE UP (ref 3.5–5.3)
PROCALCITONIN SERPL-MCNC: 0.23 NG/ML — HIGH
PROT UR-MCNC: SIGNIFICANT CHANGE UP MG/DL
RAPID RVP RESULT: SIGNIFICANT CHANGE UP
RBC # BLD: 4.01 M/UL — SIGNIFICANT CHANGE UP (ref 3.8–5.2)
RBC # FLD: 22.2 % — HIGH (ref 10.3–14.5)
RBC CASTS # UR COMP ASSIST: 2 /HPF — SIGNIFICANT CHANGE UP (ref 0–4)
SARS-COV-2 RNA SPEC QL NAA+PROBE: SIGNIFICANT CHANGE UP
SODIUM SERPL-SCNC: 144 MMOL/L — SIGNIFICANT CHANGE UP (ref 135–145)
SP GR SPEC: 1.02 — SIGNIFICANT CHANGE UP (ref 1–1.03)
UROBILINOGEN FLD QL: 4 MG/DL (ref 0.2–1)
WBC # BLD: 10.47 K/UL — SIGNIFICANT CHANGE UP (ref 3.8–10.5)
WBC # FLD AUTO: 10.47 K/UL — SIGNIFICANT CHANGE UP (ref 3.8–10.5)
WBC UR QL: 10 /HPF — HIGH (ref 0–5)

## 2024-02-24 PROCEDURE — 99232 SBSQ HOSP IP/OBS MODERATE 35: CPT

## 2024-02-24 PROCEDURE — 99233 SBSQ HOSP IP/OBS HIGH 50: CPT

## 2024-02-24 PROCEDURE — 71045 X-RAY EXAM CHEST 1 VIEW: CPT | Mod: 26

## 2024-02-24 RX ORDER — TRAMADOL HYDROCHLORIDE 50 MG/1
50 TABLET ORAL DAILY
Refills: 0 | Status: DISCONTINUED | OUTPATIENT
Start: 2024-02-24 | End: 2024-02-29

## 2024-02-24 RX ORDER — ALPRAZOLAM 0.25 MG
0.25 TABLET ORAL DAILY
Refills: 0 | Status: DISCONTINUED | OUTPATIENT
Start: 2024-02-24 | End: 2024-02-29

## 2024-02-24 RX ADMIN — HEPARIN SODIUM 800 UNIT(S)/HR: 5000 INJECTION INTRAVENOUS; SUBCUTANEOUS at 19:32

## 2024-02-24 RX ADMIN — PANTOPRAZOLE SODIUM 40 MILLIGRAM(S): 20 TABLET, DELAYED RELEASE ORAL at 06:24

## 2024-02-24 RX ADMIN — Medication 3 MILLIGRAM(S): at 22:20

## 2024-02-24 RX ADMIN — AMLODIPINE BESYLATE 10 MILLIGRAM(S): 2.5 TABLET ORAL at 06:24

## 2024-02-24 RX ADMIN — TRAMADOL HYDROCHLORIDE 50 MILLIGRAM(S): 50 TABLET ORAL at 23:20

## 2024-02-24 RX ADMIN — BUDESONIDE AND FORMOTEROL FUMARATE DIHYDRATE 2 PUFF(S): 160; 4.5 AEROSOL RESPIRATORY (INHALATION) at 18:54

## 2024-02-24 RX ADMIN — Medication 650 MILLIGRAM(S): at 16:29

## 2024-02-24 RX ADMIN — HEPARIN SODIUM 800 UNIT(S)/HR: 5000 INJECTION INTRAVENOUS; SUBCUTANEOUS at 19:22

## 2024-02-24 RX ADMIN — TRAMADOL HYDROCHLORIDE 50 MILLIGRAM(S): 50 TABLET ORAL at 22:20

## 2024-02-24 RX ADMIN — BUDESONIDE AND FORMOTEROL FUMARATE DIHYDRATE 2 PUFF(S): 160; 4.5 AEROSOL RESPIRATORY (INHALATION) at 06:24

## 2024-02-24 RX ADMIN — HEPARIN SODIUM 800 UNIT(S)/HR: 5000 INJECTION INTRAVENOUS; SUBCUTANEOUS at 06:27

## 2024-02-24 RX ADMIN — HEPARIN SODIUM 800 UNIT(S)/HR: 5000 INJECTION INTRAVENOUS; SUBCUTANEOUS at 14:01

## 2024-02-24 RX ADMIN — HEPARIN SODIUM 800 UNIT(S)/HR: 5000 INJECTION INTRAVENOUS; SUBCUTANEOUS at 07:24

## 2024-02-24 NOTE — PROGRESS NOTE ADULT - ASSESSMENT
67yo F with PMHx COPD (not on home oxygen), HTN, RA, anxiety presenting with chief complaint of blood in stools. Admitted for GI bleed, plan for colonoscopy 2/20.        Problem/Plan - 1:  ·  Problem: Colonic mass.   ·  Plan: Patient presents with BRBPR, diarrhea with clots 2/2 lower GI bleed   CT AP: Masslike mural thickening of the distal sigmoid colon. Differential considerations include malignancy or, less likely, sequelae of chronic diverticulitis. Gastroenterology evaluation is recommended. Scattered too small to characterize hypodense foci in the liver, hepatic protocol MRI is recommended for further characterization.  -Patient unable to get outpatient colonoscopy (no insurance)  -HGB currently stable  -Hold home aspirin  -Vital signs currently stable, continue to monitor  -Transfuse prn   -Colonoscopy 2/20 with: large partially obstructing mass noted at approximately 13cm from anal verge.  Pathology with ADENOCARCINOMA, AT LEAST INTRAMUCOSAL.  -DW surgery - recs CT chest with IV contrast, CEA and rectal MR for staging - per radiology unable to get staging MRI done at this facility as it requires a different magnet  -CT chest: No evidence of metastatic disease. Acute pulmonary embolus in the right middle lobe lobar and segmental branches.  -Plan for lap sigmoidectomy on 2/28.  Cardiology preop evaluation underway.  Echo:  Left ventricular systolic function is normal with an ejection fraction visually estimated at 60 to 65 %.  -GI and surgery following - follow up recs.     Problem/Plan - 2:  ·  Problem: COPD (chronic obstructive pulmonary disease).   ·  Plan: Chronic, recently admitted for COPD exacerbation  - Duonebs q6 PRN  - Continue home inhalers   - Supplemental O2 PRN. At baseline: 4-4.5 L home oxygen intermittently. COPD patient will keep SpO2 goal 88-93%   - Monitor respiratory status   - 3 weeks ago had rsv, recent rvp negative  - Continuous pulse ox  - PFT on 2/26 for preop eval  - Pulmonary Dr. Dobson f/u     Problem/Plan - 3:  ·  Problem: HTN (hypertension).   ·  Plan: Chronic, stable  - Continue home amlodipine 10mg qd with hold parameters  - Monitor hemodynamics  - DASH/TLC diet.     Problem/Plan - 4:  ·  Problem: Rheumatoid arthritis.   ·  Plan: - Chronic  - recently finished prednisone course, takes tramadol 50mg Qd.     Problem/Plan - 5:  ·  Problem: Anxiety.   ·  Plan: - Chronic, SOB likely 2/2 anxiety  - Continue Xanax 0.25mg qhs PRN.     Problem/Plan - 6:  ·  Problem: Encounter for deep vein thrombosis (DVT) prophylaxis.   ·  Plan: - on heparin drip for pulmonary embolism    #Pulmonary embolism:  CT chest with No evidence of metastatic disease. Acute pulmonary embolus in the right middle lobe lobar and segmental branches.  Continue heparin drip.  Echo  Left ventricular systolic function is normal with an ejection fraction visually estimated at 60 to 65 %.      #Fever:  will check blood cx, UA/Urine cx, CXR, and RVP.    .

## 2024-02-24 NOTE — PROGRESS NOTE ADULT - SUBJECTIVE AND OBJECTIVE BOX
Pt seen and examined at bedside, reports fever and chills overnight, noted temp of 102.5F around 21:00 last night, states she felt a bit confused during that time. She does admit to feeling the chills returning at time of exam. Vital signs stable as of 05:00. Passing flatus, (+) small BM this morning. Pt OOB & ambulating. Denies any pain, nausea, vomiting, chest pain, palpitations, shortness of breath (on supplemental O2 via nasal cannula).    T(C): 37.1 (02-24-24 @ 07:51), Max: 39.2 (02-23-24 @ 21:08)  HR: 94 (02-24-24 @ 05:13) (94 - 113)  BP: 124/65 (02-24-24 @ 05:13) (117/69 - 135/66)  RR: 18 (02-24-24 @ 05:13) (18 - 18)  SpO2: 94% (02-24-24 @ 05:13) (92% - 94%)  Wt(kg): --    PHYSICAL EXAM:  General: no acute distress, appears comfortable  HEENT: normocephalic, anicteric, on supplemental O2  Pulm: non labored respirations  Cardio: RRR  Abdomen: soft, nontender, mildly distended  Extremities: no LE edema noted    I&O's Detail      LABS:                        9.0    10.47 )-----------( 281      ( 24 Feb 2024 05:18 )             31.4     02-24    144  |  105  |  12  ----------------------------<  120<H>  4.1   |  36<H>  |  0.98    Ca    9.4      24 Feb 2024 05:18  Phos  3.3     02-22  Mg     2.2     02-22    TPro  6.2  /  Alb  2.7<L>  /  TBili  0.4  /  DBili  x   /  AST  11<L>  /  ALT  14  /  AlkPhos  58  02-22    PTT - ( 24 Feb 2024 05:18 )  PTT:83.6 sec  Urinalysis Basic - ( 24 Feb 2024 05:18 )    Color: x / Appearance: x / SG: x / pH: x  Gluc: 120 mg/dL / Ketone: x  / Bili: x / Urobili: x   Blood: x / Protein: x / Nitrite: x   Leuk Esterase: x / RBC: x / WBC x   Sq Epi: x / Non Sq Epi: x / Bacteria: x    MEDICATIONS:  acetaminophen     Tablet .. 650 milliGRAM(s) Oral every 6 hours PRN  albuterol/ipratropium for Nebulization 3 milliLiter(s) Nebulizer every 6 hours PRN  amLODIPine   Tablet 10 milliGRAM(s) Oral daily  budesonide 160 MICROgram(s)/formoterol 4.5 MICROgram(s) Inhaler 2 Puff(s) Inhalation two times a day  heparin   Injectable 4500 Unit(s) IV Push every 6 hours PRN  heparin   Injectable 2000 Unit(s) IV Push every 6 hours PRN  heparin  Infusion.  Unit(s)/Hr IV Continuous <Continuous>  influenza  Vaccine (HIGH DOSE) 0.7 milliLiter(s) IntraMuscular once  lactulose Syrup 20 Gram(s) Oral two times a day  melatonin 3 milliGRAM(s) Oral at bedtime PRN  pantoprazole    Tablet 40 milliGRAM(s) Oral before breakfast  polyethylene glycol 3350 17 Gram(s) Oral daily PRN  senna 2 Tablet(s) Oral at bedtime

## 2024-02-24 NOTE — PROGRESS NOTE ADULT - ASSESSMENT
67 y/o female w/ PMHx of COPD (on 4-4.5 L home oxygen intermittently), HTN, RA, anxiety presenting w/ bloody stools, found to have 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 Patho revealing adenocarcinoma. Febrile last night.    PLAN:  - Tentative plan for lap sigmoidectomy on 2/29, to ICU post-operatively  - Awaiting clearances: high risk per cards given PE - however optimized for OR; pending pulm workup - awaiting PFT, pending medicine clearance  - Encourage OOB/ambulation & incentive spirometry  - Trend daily labs  - Continue heparin drip, heme/onc following  - Bowel regimen, GI following  - Follow up on repeat temperature this morning  - Above to be discussed with Dr. Santoyo    Surgical Team  Spectralink: [id number]

## 2024-02-24 NOTE — PROGRESS NOTE ADULT - ASSESSMENT
67yo F with PMHx COPD (not on home oxygen), HTN, RA, anxiety admitted with GI bleed.     cardiac clearance, HTN, PE,  - admitted with GIB,  s/p colonoscopy 2/20, large partially obstructing mass noted at approximately 13cm from anal verge. Gi following  - surgery following, will likely require resection of specimen +/- Neoadjuvant chemoradiation, plan for 2/29  - CT chest with  Acute pulmonary embolus in the right middle lobe lobar and segmental branches, on heparin gtt for AC. PTT is subtherapeutic, would adjust accordingly. would consider a switch to Lovenox so PTTs do not have to be monitored, until prior to OR date.   - on 4L NC for supplemental 02, wean as tolerated, on prn at home  - pulm following    - EKG demonstrates NSR with nonspecific twave abnormality  - Tele SR- ST, no events  - Patient euvolemic on examination with no overt signs of heart failure or cardiac ischemia.   - No severe valvular abnormalities noted on examination  - TTE 2/22 shows normal LV and RV size and function, EF 60-65%   - Pt has no active ischemia, decompensated heart failure, unstable arrythmia, or severe stenotic valvular disease. In the setting of a PE, will need to minimize the time off AC.  Patient at high risk for procedure given PE but benefit of procedure outweighs the risk. OK to proceed with the planned procedur    - Bp controlled  - continue home amlodipine 10mg qd with hold parameters    - Monitor and replete lytes, keep K>4, Mg>2.  - Will continue to follow.    Lavelle Carlin NP  Nurse Practitioner- Cardiology   Call TEAMS 67yo F with PMHx COPD (not on home oxygen), HTN, RA, anxiety admitted with GI bleed.     cardiac clearance, HTN, PE,  - admitted with GIB,  s/p colonoscopy 2/20, large partially obstructing mass noted at approximately 13cm from anal verge. Gi following  - surgery following, will likely require resection of specimen +/- Neoadjuvant chemoradiation, plan for 2/29  - CT chest with  Acute pulmonary embolus in the right middle lobe lobar and segmental branches, on heparin gtt for AC. PTT is subtherapeutic, would adjust accordingly. would consider a switch to Lovenox so PTTs do not have to be monitored, until prior to OR date.   - on 4L NC for supplemental 02, wean as tolerated, on prn at home  - pulm following    - EKG demonstrates NSR with nonspecific twave abnormality  - Tele SR- ST, no events  - Patient euvolemic on examination with no overt signs of heart failure or cardiac ischemia.   - No severe valvular abnormalities noted on examination  - TTE 2/22 shows normal LV and RV size and function, EF 60-65%   - Pt has no active ischemia, decompensated heart failure, unstable arrythmia, or severe stenotic valvular disease. In the setting of a PE, will need to minimize the time off AC.  Patient at high risk for procedure given PE but benefit of procedure outweighs the risk. OK to proceed with the planned procedure    - Bp controlled  - continue home amlodipine 10mg qd with hold parameters    - Monitor and replete lytes, keep K>4, Mg>2.  - Will continue to follow.    Lavelle Carlin NP  Nurse Practitioner- Cardiology   Call TEAMS

## 2024-02-24 NOTE — PROGRESS NOTE ADULT - SUBJECTIVE AND OBJECTIVE BOX
[facility] -- James Mcghee,  Alexa, Arnel De León, Nolvia Collazo Cohen  Office # [phone]    Follow Up:  PE, cardiac optimization    Subjective/Observations: Pt seen and examined, resting comfortably in bed.  No complaints of chest pain, dyspnea, or palpitations reported. No signs of orthopnea or PND. Remains on 4L NC.   Febrile overnight, Tmax 102.5. SR- ST on telemetry      REVIEW OF SYSTEMS: All other review of systems is negative unless indicated above  PAST MEDICAL & SURGICAL HISTORY:  COPD (chronic obstructive pulmonary disease)      Rheumatoid arthritis      HTN (hypertension)      Anxiety      No significant past surgical history        MEDICATIONS  (STANDING):  amLODIPine   Tablet 10 milliGRAM(s) Oral daily  budesonide 160 MICROgram(s)/formoterol 4.5 MICROgram(s) Inhaler 2 Puff(s) Inhalation two times a day  heparin  Infusion.  Unit(s)/Hr (11 mL/Hr) IV Continuous <Continuous>  influenza  Vaccine (HIGH DOSE) 0.7 milliLiter(s) IntraMuscular once  lactulose Syrup 20 Gram(s) Oral two times a day  pantoprazole    Tablet 40 milliGRAM(s) Oral before breakfast  senna 2 Tablet(s) Oral at bedtime    MEDICATIONS  (PRN):  acetaminophen     Tablet .. 650 milliGRAM(s) Oral every 6 hours PRN Temp greater or equal to 38C (100.4F), Mild Pain (1 - 3)  albuterol/ipratropium for Nebulization 3 milliLiter(s) Nebulizer every 6 hours PRN Shortness of Breath and/or Wheezing  ALPRAZolam 0.25 milliGRAM(s) Oral daily PRN for anxiety  heparin   Injectable 4500 Unit(s) IV Push every 6 hours PRN For aPTT less than 40  heparin   Injectable 2000 Unit(s) IV Push every 6 hours PRN For aPTT between 40 - 57  melatonin 3 milliGRAM(s) Oral at bedtime PRN Insomnia  polyethylene glycol 3350 17 Gram(s) Oral daily PRN Constipation  traMADol 50 milliGRAM(s) Oral daily PRN Moderate Pain (4 - 6)    Allergies    No Known Allergies    Intolerances      Vital Signs Last 24 Hrs  T(C): 37.1 (24 Feb 2024 07:51), Max: 39.2 (23 Feb 2024 21:08)  T(F): 98.8 (24 Feb 2024 07:51), Max: 102.5 (23 Feb 2024 21:08)  HR: 94 (24 Feb 2024 05:13) (94 - 113)  BP: 124/65 (24 Feb 2024 05:13) (117/69 - 135/66)  BP(mean): --  RR: 18 (24 Feb 2024 05:13) (18 - 18)  SpO2: 94% (24 Feb 2024 05:13) (92% - 94%)    Parameters below as of 24 Feb 2024 05:13  Patient On (Oxygen Delivery Method): nasal cannula  O2 Flow (L/min): 2    I&O's Summary          TELE: - ST  PHYSICAL EXAM:  Constitutional: NAD, alert and oriented x 3, frail  Lungs:  Non-labored, breath sounds are clear bilaterally, No wheezing, rales or rhonchi  Cardiovascular: RRR.  S1 and S2 positive.  No murmurs, rubs, gallops or clicks  Gastrointestinal: Bowel Sounds present, soft, nontender.   Extremities: No peripheral edema.   Neurological: Alert, no focal deficits  Skin: No rashes or ulcers   Psych:  Mood & affect appropriate.  LABS: All Labs Reviewed:                        9.0    10.47 )-----------( 281      ( 24 Feb 2024 05:18 )             31.4                         9.4    6.02  )-----------( 312      ( 23 Feb 2024 05:55 )             32.1                         9.2    5.96  )-----------( 327      ( 22 Feb 2024 08:10 )             32.7     24 Feb 2024 05:18    144    |  105    |  12     ----------------------------<  120    4.1     |  36     |  0.98   23 Feb 2024 05:55    145    |  104    |  10     ----------------------------<  105    3.5     |  35     |  0.85   22 Feb 2024 08:10    147    |  106    |  11     ----------------------------<  109    3.5     |  37     |  0.93     Ca    9.4        24 Feb 2024 05:18  Ca    9.5        23 Feb 2024 05:55  Ca    9.4        22 Feb 2024 08:10  Phos  3.3       22 Feb 2024 08:10  Mg     2.2       22 Feb 2024 08:10    TPro  6.2    /  Alb  2.7    /  TBili  0.4    /  DBili  x      /  AST  11     /  ALT  14     /  AlkPhos  58     22 Feb 2024 08:10    PTT - ( 24 Feb 2024 05:18 )  PTT:83.6 sec      12 Lead ECG:   Ventricular Rate 98 BPM    Atrial Rate 98 BPM    P-R Interval 154 ms    QRS Duration 76 ms    Q-T Interval 340 ms    QTC Calculation(Bazett) 434 ms    P Axis 88 degrees    R Axis 51 degrees    T Axis 96 degrees    Diagnosis Line Normal sinus rhythm  Minimal voltage criteria for LVH, may be normal variant ( Sokolow-Jason )  Nonspecific T wave abnormality  Abnormal ECG  When compared with ECG of 19-JAN-2024 11:54,  No significant change was found  Confirmed by SHRUTHI SANCHEZ ([id number]) on 2/16/2024 6:48:17 PM (02-15-24 @ 23:33)

## 2024-02-24 NOTE — PROGRESS NOTE ADULT - SUBJECTIVE AND OBJECTIVE BOX
[facility]  Bobby Mcdonald PA-C  [address] [place] [address]  [phone]      INTERVAL HPI/OVERNIGHT EVENTS:  Pt s/e,   Planned for surgery 2/29  no n/v reproted +Bm today    MEDICATIONS  (STANDING):  amLODIPine   Tablet 10 milliGRAM(s) Oral daily  budesonide 160 MICROgram(s)/formoterol 4.5 MICROgram(s) Inhaler 2 Puff(s) Inhalation two times a day  heparin  Infusion.  Unit(s)/Hr (11 mL/Hr) IV Continuous <Continuous>  influenza  Vaccine (HIGH DOSE) 0.7 milliLiter(s) IntraMuscular once  lactulose Syrup 20 Gram(s) Oral two times a day  pantoprazole    Tablet 40 milliGRAM(s) Oral before breakfast  senna 2 Tablet(s) Oral at bedtime    MEDICATIONS  (PRN):  acetaminophen     Tablet .. 650 milliGRAM(s) Oral every 6 hours PRN Temp greater or equal to 38C (100.4F), Mild Pain (1 - 3)  albuterol/ipratropium for Nebulization 3 milliLiter(s) Nebulizer every 6 hours PRN Shortness of Breath and/or Wheezing  ALPRAZolam 0.25 milliGRAM(s) Oral daily PRN for anxiety  heparin   Injectable 4500 Unit(s) IV Push every 6 hours PRN For aPTT less than 40  heparin   Injectable 2000 Unit(s) IV Push every 6 hours PRN For aPTT between 40 - 57  melatonin 3 milliGRAM(s) Oral at bedtime PRN Insomnia  polyethylene glycol 3350 17 Gram(s) Oral daily PRN Constipation  traMADol 50 milliGRAM(s) Oral daily PRN Moderate Pain (4 - 6)      Allergies  No Known Allergies      PHYSICAL EXAM:   Vital Signs Last 24 Hrs  T(C): 37.1 (24 Feb 2024 07:51), Max: 39.2 (23 Feb 2024 21:08)  T(F): 98.8 (24 Feb 2024 07:51), Max: 102.5 (23 Feb 2024 21:08)  HR: 94 (24 Feb 2024 05:13) (94 - 113)  BP: 124/65 (24 Feb 2024 05:13) (117/69 - 135/66)  BP(mean): --  RR: 18 (24 Feb 2024 05:13) (18 - 18)  SpO2: 94% (24 Feb 2024 05:13) (92% - 94%)    Parameters below as of 24 Feb 2024 05:13  Patient On (Oxygen Delivery Method): nasal cannula  O2 Flow (L/min): 2      GENERAL:  Appears stated age  HEENT:  NC/AT  CHEST:  Full & symmetric excursion  HEART:  Regular rhythm  ABDOMEN:  Soft, non-tender, non-distended  EXTEREMITIES:  no cyanosis  SKIN:  No rash  NEURO:  Alert      LABS:                        9.0    10.47 )-----------( 281      ( 24 Feb 2024 05:18 )             31.4     02-24    144  |  105  |  12  ----------------------------<  120<H>  4.1   |  36<H>  |  0.98    Ca    9.4      24 Feb 2024 05:18      TPro  6.2  /  Alb  2.7<L>  /  TBili  0.4  /  DBili  x   /  AST  11<L>  /  ALT  14  /  AlkPhos  58  02-22    PTT - ( 23 Feb 2024 05:55 )  PTT:50.4 sec  Urinalysis Basic - ( 23 Feb 2024 05:55 )    Color: x / Appearance: x / SG: x / pH: x  Gluc: 105 mg/dL / Ketone: x  / Bili: x / Urobili: x   Blood: x / Protein: x / Nitrite: x   Leuk Esterase: x / RBC: x / WBC x   Sq Epi: x / Non Sq Epi: x / Bacteria: x

## 2024-02-24 NOTE — PROGRESS NOTE ADULT - SUBJECTIVE AND OBJECTIVE BOX
CHIEF COMPLAINT/INTERVAL HISTORY:  Pt. seen and evaluated for lower GI bleed and colon mass.  Pt. is in no distress.  Denies having any bleeding this morning.  Noted to have fever 102.5 last night.  Denies having SOB or CP.      REVIEW OF SYSTEMS:  +fever.  Denies having CP, SOB, or abdominal pain.      Vital Signs Last 24 Hrs  T(C): 37.1 (24 Feb 2024 07:51), Max: 39.2 (23 Feb 2024 21:08)  T(F): 98.8 (24 Feb 2024 07:51), Max: 102.5 (23 Feb 2024 21:08)  HR: 94 (24 Feb 2024 05:13) (94 - 113)  BP: 124/65 (24 Feb 2024 05:13) (117/69 - 135/66)  BP(mean): --  RR: 18 (24 Feb 2024 05:13) (18 - 18)  SpO2: 94% (24 Feb 2024 05:13) (92% - 94%)    Parameters below as of 24 Feb 2024 05:13  Patient On (Oxygen Delivery Method): nasal cannula  O2 Flow (L/min): 2      PHYSICAL EXAM:  GENERAL: NAD  HEENT: EOMI, hearing normal, conjunctiva and sclera clear  Chest: CTA bilaterally, no wheezing  CV: S1S2, RRR,   GI: soft, +BS, NT/ND  Musculoskeletal: no edema  Psychiatric: affect nL, mood nL  Skin: warm and dry    LABS:                        9.0    10.47 )-----------( 281      ( 24 Feb 2024 05:18 )             31.4     02-24    144  |  105  |  12  ----------------------------<  120<H>  4.1   |  36<H>  |  0.98    Ca    9.4      24 Feb 2024 05:18      PTT - ( 24 Feb 2024 05:18 )  PTT:83.6 sec  Urinalysis Basic - ( 24 Feb 2024 05:18 )    Color: x / Appearance: x / SG: x / pH: x  Gluc: 120 mg/dL / Ketone: x  / Bili: x / Urobili: x   Blood: x / Protein: x / Nitrite: x   Leuk Esterase: x / RBC: x / WBC x   Sq Epi: x / Non Sq Epi: x / Bacteria: x

## 2024-02-25 LAB
ALBUMIN SERPL ELPH-MCNC: 2.5 G/DL — LOW (ref 3.3–5)
ALP SERPL-CCNC: 51 U/L — SIGNIFICANT CHANGE UP (ref 40–120)
ALT FLD-CCNC: 12 U/L — SIGNIFICANT CHANGE UP (ref 12–78)
ANION GAP SERPL CALC-SCNC: 4 MMOL/L — LOW (ref 5–17)
APTT BLD: 57 SEC — HIGH (ref 24.5–35.6)
APTT BLD: 62.3 SEC — HIGH (ref 24.5–35.6)
APTT BLD: 73.2 SEC — HIGH (ref 24.5–35.6)
AST SERPL-CCNC: 11 U/L — LOW (ref 15–37)
BASOPHILS # BLD AUTO: 0.03 K/UL — SIGNIFICANT CHANGE UP (ref 0–0.2)
BASOPHILS NFR BLD AUTO: 0.4 % — SIGNIFICANT CHANGE UP (ref 0–2)
BILIRUB SERPL-MCNC: 0.5 MG/DL — SIGNIFICANT CHANGE UP (ref 0.2–1.2)
BUN SERPL-MCNC: 13 MG/DL — SIGNIFICANT CHANGE UP (ref 7–23)
CALCIUM SERPL-MCNC: 9.2 MG/DL — SIGNIFICANT CHANGE UP (ref 8.5–10.1)
CHLORIDE SERPL-SCNC: 104 MMOL/L — SIGNIFICANT CHANGE UP (ref 96–108)
CO2 SERPL-SCNC: 35 MMOL/L — HIGH (ref 22–31)
CREAT SERPL-MCNC: 0.81 MG/DL — SIGNIFICANT CHANGE UP (ref 0.5–1.3)
EGFR: 80 ML/MIN/1.73M2 — SIGNIFICANT CHANGE UP
EOSINOPHIL # BLD AUTO: 0.15 K/UL — SIGNIFICANT CHANGE UP (ref 0–0.5)
EOSINOPHIL NFR BLD AUTO: 1.9 % — SIGNIFICANT CHANGE UP (ref 0–6)
GLUCOSE SERPL-MCNC: 107 MG/DL — HIGH (ref 70–99)
HCT VFR BLD CALC: 29.5 % — LOW (ref 34.5–45)
HGB BLD-MCNC: 8.7 G/DL — LOW (ref 11.5–15.5)
IMM GRANULOCYTES NFR BLD AUTO: 1.4 % — HIGH (ref 0–0.9)
LYMPHOCYTES # BLD AUTO: 1.44 K/UL — SIGNIFICANT CHANGE UP (ref 1–3.3)
LYMPHOCYTES # BLD AUTO: 18.7 % — SIGNIFICANT CHANGE UP (ref 13–44)
MCHC RBC-ENTMCNC: 23.5 PG — LOW (ref 27–34)
MCHC RBC-ENTMCNC: 29.5 GM/DL — LOW (ref 32–36)
MCV RBC AUTO: 79.5 FL — LOW (ref 80–100)
MONOCYTES # BLD AUTO: 0.96 K/UL — HIGH (ref 0–0.9)
MONOCYTES NFR BLD AUTO: 12.4 % — SIGNIFICANT CHANGE UP (ref 2–14)
NEUTROPHILS # BLD AUTO: 5.03 K/UL — SIGNIFICANT CHANGE UP (ref 1.8–7.4)
NEUTROPHILS NFR BLD AUTO: 65.2 % — SIGNIFICANT CHANGE UP (ref 43–77)
NRBC # BLD: 0 /100 WBCS — SIGNIFICANT CHANGE UP (ref 0–0)
PLATELET # BLD AUTO: 253 K/UL — SIGNIFICANT CHANGE UP (ref 150–400)
POTASSIUM SERPL-MCNC: 3.8 MMOL/L — SIGNIFICANT CHANGE UP (ref 3.5–5.3)
POTASSIUM SERPL-SCNC: 3.8 MMOL/L — SIGNIFICANT CHANGE UP (ref 3.5–5.3)
PROT SERPL-MCNC: 5.8 G/DL — LOW (ref 6–8.3)
RBC # BLD: 3.71 M/UL — LOW (ref 3.8–5.2)
RBC # FLD: 22.3 % — HIGH (ref 10.3–14.5)
SODIUM SERPL-SCNC: 143 MMOL/L — SIGNIFICANT CHANGE UP (ref 135–145)
WBC # BLD: 7.72 K/UL — SIGNIFICANT CHANGE UP (ref 3.8–10.5)
WBC # FLD AUTO: 7.72 K/UL — SIGNIFICANT CHANGE UP (ref 3.8–10.5)

## 2024-02-25 PROCEDURE — 99233 SBSQ HOSP IP/OBS HIGH 50: CPT

## 2024-02-25 PROCEDURE — 99232 SBSQ HOSP IP/OBS MODERATE 35: CPT

## 2024-02-25 RX ADMIN — HEPARIN SODIUM 900 UNIT(S)/HR: 5000 INJECTION INTRAVENOUS; SUBCUTANEOUS at 21:37

## 2024-02-25 RX ADMIN — BUDESONIDE AND FORMOTEROL FUMARATE DIHYDRATE 2 PUFF(S): 160; 4.5 AEROSOL RESPIRATORY (INHALATION) at 19:03

## 2024-02-25 RX ADMIN — HEPARIN SODIUM 900 UNIT(S)/HR: 5000 INJECTION INTRAVENOUS; SUBCUTANEOUS at 13:52

## 2024-02-25 RX ADMIN — Medication 3 MILLIGRAM(S): at 21:29

## 2024-02-25 RX ADMIN — TRAMADOL HYDROCHLORIDE 50 MILLIGRAM(S): 50 TABLET ORAL at 21:29

## 2024-02-25 RX ADMIN — HEPARIN SODIUM 900 UNIT(S)/HR: 5000 INJECTION INTRAVENOUS; SUBCUTANEOUS at 07:24

## 2024-02-25 RX ADMIN — HEPARIN SODIUM 900 UNIT(S)/HR: 5000 INJECTION INTRAVENOUS; SUBCUTANEOUS at 19:29

## 2024-02-25 RX ADMIN — Medication 0.25 MILLIGRAM(S): at 12:58

## 2024-02-25 RX ADMIN — HEPARIN SODIUM 900 UNIT(S)/HR: 5000 INJECTION INTRAVENOUS; SUBCUTANEOUS at 07:17

## 2024-02-25 RX ADMIN — AMLODIPINE BESYLATE 10 MILLIGRAM(S): 2.5 TABLET ORAL at 07:05

## 2024-02-25 RX ADMIN — BUDESONIDE AND FORMOTEROL FUMARATE DIHYDRATE 2 PUFF(S): 160; 4.5 AEROSOL RESPIRATORY (INHALATION) at 08:10

## 2024-02-25 RX ADMIN — PANTOPRAZOLE SODIUM 40 MILLIGRAM(S): 20 TABLET, DELAYED RELEASE ORAL at 07:05

## 2024-02-25 RX ADMIN — HEPARIN SODIUM 2000 UNIT(S): 5000 INJECTION INTRAVENOUS; SUBCUTANEOUS at 07:22

## 2024-02-25 NOTE — PROGRESS NOTE ADULT - SUBJECTIVE AND OBJECTIVE BOX
[facility] -- Alexa Ramirez, Arnel De León Savella, Goodger, Cohen  Office # [phone]    Follow Up:  PE, cardiac optimization    Subjective/Observations: seen and examined, awake, alert, resting comfortably in bed, denies chest pain, dyspnea, palpitations or dizziness, orthopnea and PND. + body shaking,on O2 via NC, no events overnight     REVIEW OF SYSTEMS: All other review of systems is negative unless indicated above  PAST MEDICAL & SURGICAL HISTORY:  COPD (chronic obstructive pulmonary disease)      Rheumatoid arthritis      HTN (hypertension)      Anxiety      No significant past surgical history        MEDICATIONS  (STANDING):  amLODIPine   Tablet 10 milliGRAM(s) Oral daily  budesonide 160 MICROgram(s)/formoterol 4.5 MICROgram(s) Inhaler 2 Puff(s) Inhalation two times a day  heparin  Infusion.  Unit(s)/Hr (11 mL/Hr) IV Continuous <Continuous>  influenza  Vaccine (HIGH DOSE) 0.7 milliLiter(s) IntraMuscular once  lactulose Syrup 20 Gram(s) Oral two times a day  pantoprazole    Tablet 40 milliGRAM(s) Oral before breakfast  senna 2 Tablet(s) Oral at bedtime    MEDICATIONS  (PRN):  acetaminophen     Tablet .. 650 milliGRAM(s) Oral every 6 hours PRN Temp greater or equal to 38C (100.4F), Mild Pain (1 - 3)  albuterol/ipratropium for Nebulization 3 milliLiter(s) Nebulizer every 6 hours PRN Shortness of Breath and/or Wheezing  ALPRAZolam 0.25 milliGRAM(s) Oral daily PRN for anxiety  heparin   Injectable 4500 Unit(s) IV Push every 6 hours PRN For aPTT less than 40  heparin   Injectable 2000 Unit(s) IV Push every 6 hours PRN For aPTT between 40 - 57  melatonin 3 milliGRAM(s) Oral at bedtime PRN Insomnia  polyethylene glycol 3350 17 Gram(s) Oral daily PRN Constipation  traMADol 50 milliGRAM(s) Oral daily PRN Moderate Pain (4 - 6)    Allergies    No Known Allergies    Intolerances      Vital Signs Last 24 Hrs  T(C): 36.7 (25 Feb 2024 06:30), Max: 38 (24 Feb 2024 16:15)  T(F): 98.1 (25 Feb 2024 06:30), Max: 100.4 (24 Feb 2024 16:15)  HR: 98 (25 Feb 2024 06:30) (88 - 98)  BP: 118/71 (25 Feb 2024 06:30) (118/71 - 121/62)  BP(mean): --  RR: 16 (25 Feb 2024 06:30) (16 - 18)  SpO2: 95% (25 Feb 2024 06:30) (91% - 95%)    Parameters below as of 25 Feb 2024 06:30  Patient On (Oxygen Delivery Method): nasal cannula  O2 Flow (L/min): 2    I&O's Summary        TELE: SR- ST 80 up to 130's with quadrigeminy PVCs  PHYSICAL EXAM:  Constitutional: NAD, alert and oriented x 3, frail  Lungs:  Non-labored, breath sounds are clear bilaterally, No wheezing, rales or rhonchi  Cardiovascular: RRR.  S1 and S2 positive.  No murmurs, rubs, gallops or clicks  Gastrointestinal: Bowel Sounds present, soft, nontender.   Extremities: No peripheral edema.   Neurological: Alert, no focal deficits  Skin: No rashes or ulcers   Psych:  Mood & affect appropriate.  LABS: All Labs Reviewed:                        8.7    7.72  )-----------( 253      ( 25 Feb 2024 05:35 )             29.5                         9.0    10.47 )-----------( 281      ( 24 Feb 2024 05:18 )             31.4                         9.4    6.02  )-----------( 312      ( 23 Feb 2024 05:55 )             32.1     25 Feb 2024 05:35    143    |  104    |  13     ----------------------------<  107    3.8     |  35     |  0.81   24 Feb 2024 05:18    144    |  105    |  12     ----------------------------<  120    4.1     |  36     |  0.98   23 Feb 2024 05:55    145    |  104    |  10     ----------------------------<  105    3.5     |  35     |  0.85     Ca    9.2        25 Feb 2024 05:35  Ca    9.4        24 Feb 2024 05:18  Ca    9.5        23 Feb 2024 05:55    TPro  5.8    /  Alb  2.5    /  TBili  0.5    /  DBili  x      /  AST  11     /  ALT  12     /  AlkPhos  51     25 Feb 2024 05:35    PTT - ( 25 Feb 2024 05:35 )  PTT:57.0 sec      12 Lead ECG:   Ventricular Rate 98 BPM    Atrial Rate 98 BPM    P-R Interval 154 ms    QRS Duration 76 ms    Q-T Interval 340 ms    QTC Calculation(Bazett) 434 ms    P Axis 88 degrees    R Axis 51 degrees    T Axis 96 degrees    Diagnosis Line Normal sinus rhythm  Minimal voltage criteria for LVH, may be normal variant ( Sokolow-Jason )  Nonspecific T wave abnormality  Abnormal ECG  When compared with ECG of 19-JAN-2024 11:54,  No significant change was found  Confirmed by ANETTE SEALS ([id number]) on 2/16/2024 6:48:17 PM (02-15-24 @ 23:33)      TRANSTHORACIC ECHOCARDIOGRAM REPORT  ________________________________________________________________________________                                      _______       Pt. Name:       KENDALL RIDER Study Date:    2/22/2024  MRN:            [id number]             YOB: 1955  Accession #:    [id number]            Age:           68 years  Account#:       [id number]           Gender:        F  Heart Rate:                          Height:        64.96 in (165.00 cm)  Rhythm:                      Weight:        130.07 lb (59.00 kg)  Blood Pressure: 129/71 mmHg          BSA/BMI:       1.65 m² / 21.67 kg/m²  ________________________________________________________________________________________  Referring Physician:    [id number] Robbie Boone  Interpreting Physician: Anette Seals  Primary Sonographer:    Judy Braga RDCS    CPT:               ECHO TTE WO CON COMP W DOPP - 07072.m  Indication(s):     Dyspnea, unspecified - R06.00  Procedure:         Transthoracic echocardiogram with 2-D, M-mode and complete                     spectral and color flow Doppler.  Ordering Location: [facility]  Admission Status:  Inpatient    _______________________________________________________________________________________     CONCLUSIONS:      1. Left ventricular systolic function is normal with an ejection fraction visually estimated at 60 to 65 %.   2. There is normal LV mass and concentric remodeling.   3. Normal right ventricular cavity size and normal systolic function.  4. The left atrium is normal.   5. Structurally normal mitral valve with normal leaflet excursion.   6. Trileaflet aortic valve with normal systolic excursion.   7. Structurally normal tricuspid valve with normal leaflet excursion. Pulmonary artery systolic pressure could not be estimated.   8. No pericardial effusion seen.    ________________________________________________________________________________________  FINDINGS:     Left Ventricle:  Left ventricular systolic function is normal with an ejection fraction visually estimated at 60 to 65%. There is normal LV mass and concentric remodeling.     Right Ventricle:  The right ventricular cavity is normal in size and normal systolic function.     Left Atrium:  The left atrium is normal.     Right Atrium:  The right atrium is normal in size.     Aortic Valve:  The aortic valve appears trileaflet with normal systolic excursion.     Mitral Valve:  Structurally normal mitral valve with normal leaflet excursion.     Tricuspid Valve:  Structurally normal tricuspid valve with normal leaflet excursion. There is insufficient tricuspid regurgitation detected to calculate pulmonary artery systolic pressure.     Pulmonic Valve:  The pulmonic valve was not well visualized.     Pericardium:  No pericardial effusion seen.     Systemic Veins:  The inferior vena cava is dilated (dilated >2.1cm) with normal inspiratory collapse (normal >50%) consistent with mildly elevated right atrial pressure (~8, range 5-10mmHg).  ____________________________________________________________________  QUANTITATIVE DATA:  Left Ventricle Measurements: (Indexed to BSA)     IVSd (2D):   1.4 cm  LVPWd (2D):  1.1 cm  LVIDd (2D):  3.1 cm  LVIDs (2D):  2.1 cm  LV Mass:     122 g  74.0 g/m²  Visualized LV EF%: 60 to 65%     MV E Vmax:    0.75 m/s  MV A Vmax:    0.79 m/s  MV E/A:       0.95  e' lateral:   15.70 cm/s  e' medial:    10.90 cm/s  E/e' lateral: 4.77  E/e' medial:  6.87  E/e' Average: 5.63  MV DT:        211 msec    Aorta Measurements: (Normal range) (Indexed to BSA)     Sinuses of Valsalva: 2.80 cm (2.7 - 3.3 cm)       Left Atrium Measurements: (Indexed to BSA)  LA Diam 2D: 2.80 cm    Mitral Valve Measurements:     MV E Vmax: 0.7 m/s  MV A Vmax: 0.8 m/s  MV E/A:    1.0       Tricuspid Valve Measurements:     RA Pressure: 8 mmHg    ________________________________________________________________________________________  Electronically signed on 2/22/2024 at 1:42:01 PM by Anette Seals         *** Final ***

## 2024-02-25 NOTE — PROGRESS NOTE ADULT - ASSESSMENT
69yo F with PMHx COPD (not on home oxygen), HTN, RA, anxiety admitted with GI bleed.    cardiac clearance, HTN, PE,  - a/w GIB, s/p colonoscopy 2/20, large partially obstructing mass noted at approximately 13cm from anal verge. Gi following  - surgery following, will likely require resection of specimen +/- Neoadjuvant chemoradiation, plan for 2/29  - CT chest with  Acute pulmonary embolus in the right middle lobe lobar and segmental branches, on heparin gtt for AC. PTT is subtherapeutic, would adjust accordingly. would consider a switch to Lovenox so PTTs do not have to be monitored, until prior to OR date.   - on 4L NC for supplemental 02, wean as tolerated, on prn at home  - pulm following    - EKG demonstrates NSR with nonspecific twave abnormality  - Tele SR- ST, no events  - Patient euvolemic on examination with no overt signs of heart failure or cardiac ischemia.   - No severe valvular abnormalities noted on examination  - TTE 2/22 shows normal LV and RV size and function, EF 60-65%   - Pt has no active ischemia, decompensated heart failure, unstable arrythmia, or severe stenotic valvular disease. In the setting of a PE, will need to minimize the time off AC.  Patient at high risk for procedure given PE but benefit of procedure outweighs the risk. OK to proceed with the planned procedure    - BP controlled  - continue amlodipine 10mg qd with hold parameters  - Monitor and replete Lytes. Keep K > 4 and Mg > 2  - Will continue to follow.    Karissa Arias [facility]  Nurse Practitioner - Cardiology   call TEAMS

## 2024-02-25 NOTE — PROGRESS NOTE ADULT - ASSESSMENT
69yo F w hx COPD on 4-4.5L O2, HTN, RA, anxiety, adm w 2wk hx of intermittent blood mixed w stool  Never had colonoscopy  No abdomen pain anorexia, weight loss  Colonoscopy 2/20 here large partially obstructing mass at ~13cm from anal verge sig for adenocarcinoma, MMR status still pending  CEA 17.8  CTchest 2/20 and CT a/p 2/13 no mets but new RML PE, duplex legs negative now on IV Heparin  No further bleeds since adm, reports does have some SOB since adm but thought was due to anxiety, episode leg pain 2months ago  Seen by Surgery, for planned lap colon resection 2/29  Iron studies c/w deficiency post IV Venofer    -adenocarcinoma of colon assoc w bleed, no distant mets by imaging studies, for definitive surgery. Will followup final path to see if needs adjuvant therapy  -RML PE-likely provoked etiology/ malignancy associated, continue IV heparin, can change to oral DOAC when ready for discharge.   -anemia-w/u sig for iron deficiency post IV Venofer also due to continued GI bleed, monitor serial CBC, rishi w pt on Heparin for PE. Discussed w pt potential need for transfuion PRBC if cont to decrease/in prep for surgery

## 2024-02-25 NOTE — PROGRESS NOTE ADULT - SUBJECTIVE AND OBJECTIVE BOX
Patient is a 68y old  Female who presents with a chief complaint of GIB bleed (25 Feb 2024 12:08)      INTERVAL HPI/OVERNIGHT EVENTS:    denies shortness of breath or chest pain    MEDICATIONS  (STANDING):  amLODIPine   Tablet 10 milliGRAM(s) Oral daily  budesonide 160 MICROgram(s)/formoterol 4.5 MICROgram(s) Inhaler 2 Puff(s) Inhalation two times a day  heparin  Infusion.  Unit(s)/Hr (11 mL/Hr) IV Continuous <Continuous>  influenza  Vaccine (HIGH DOSE) 0.7 milliLiter(s) IntraMuscular once  lactulose Syrup 20 Gram(s) Oral two times a day  pantoprazole    Tablet 40 milliGRAM(s) Oral before breakfast  senna 2 Tablet(s) Oral at bedtime      MEDICATIONS  (PRN):  acetaminophen     Tablet .. 650 milliGRAM(s) Oral every 6 hours PRN Temp greater or equal to 38C (100.4F), Mild Pain (1 - 3)  albuterol/ipratropium for Nebulization 3 milliLiter(s) Nebulizer every 6 hours PRN Shortness of Breath and/or Wheezing  ALPRAZolam 0.25 milliGRAM(s) Oral daily PRN for anxiety  heparin   Injectable 4500 Unit(s) IV Push every 6 hours PRN For aPTT less than 40  heparin   Injectable 2000 Unit(s) IV Push every 6 hours PRN For aPTT between 40 - 57  melatonin 3 milliGRAM(s) Oral at bedtime PRN Insomnia  polyethylene glycol 3350 17 Gram(s) Oral daily PRN Constipation  traMADol 50 milliGRAM(s) Oral daily PRN Moderate Pain (4 - 6)      Allergies    No Known Allergies    Intolerances        PAST MEDICAL & SURGICAL HISTORY:  COPD (chronic obstructive pulmonary disease)      Rheumatoid arthritis      HTN (hypertension)      Anxiety      No significant past surgical history          Vital Signs Last 24 Hrs  T(C): 37.3 (25 Feb 2024 13:00), Max: 38 (24 Feb 2024 16:15)  T(F): 99.1 (25 Feb 2024 13:00), Max: 100.4 (24 Feb 2024 16:15)  HR: 94 (25 Feb 2024 13:00) (88 - 98)  BP: 118/66 (25 Feb 2024 13:00) (118/66 - 119/66)  BP(mean): --  RR: 18 (25 Feb 2024 13:00) (16 - 18)  SpO2: 94% (25 Feb 2024 13:00) (93% - 95%)    Parameters below as of 25 Feb 2024 13:00  Patient On (Oxygen Delivery Method): nasal cannula        PHYSICAL EXAMINATION:    GENERAL: The patient is awake and alert in no apparent distress.     HEENT: Head is normocephalic and atraumatic    NECK: no JVD    LUNGS: diminished breath sounds bilateral    HEART: Regular rate and rhythm without murmur.    ABDOMEN: Soft, nontender, and nondistended.      EXTREMITIES: Without any cyanosis, clubbing, rash, lesions or edema.    NEUROLOGIC: Grossly intact.    SKIN: No ulceration or induration present.      LABS:                        8.7    7.72  )-----------( 253      ( 25 Feb 2024 05:35 )             29.5     02-25    143  |  104  |  13  ----------------------------<  107<H>  3.8   |  35<H>  |  0.81    Ca    9.2      25 Feb 2024 05:35    TPro  5.8<L>  /  Alb  2.5<L>  /  TBili  0.5  /  DBili  x   /  AST  11<L>  /  ALT  12  /  AlkPhos  51  02-25    PTT - ( 25 Feb 2024 13:15 )  PTT:73.2 sec  Urinalysis Basic - ( 25 Feb 2024 05:35 )    Color: x / Appearance: x / SG: x / pH: x  Gluc: 107 mg/dL / Ketone: x  / Bili: x / Urobili: x   Blood: x / Protein: x / Nitrite: x   Leuk Esterase: x / RBC: x / WBC x   Sq Epi: x / Non Sq Epi: x / Bacteria: x                Lactate, Blood: 1.3 mmol/L (02-24-24 @ 17:15)    Procalcitonin, Serum: 0.23 ng/mL (02-24-24 @ 17:15)          Assessment:    Severe COPD  S/P Pulmonary Emboli  Colon CA  Acute on Chronic Hypoxic respiratory failure    Plan:    For pulmonary function test in AM  Will determine whether surgery will be performed at [place] or [place]

## 2024-02-25 NOTE — PROGRESS NOTE ADULT - ASSESSMENT
69 y/o female w/ PMHx of COPD (on 4-4.5 L home oxygen intermittently), HTN, RA, anxiety presenting w/ bloody stools, found to have 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 Patho revealing adenocarcinoma. Afebrile overnight.    PLAN:  - Tentative plan for lap sigmoidectomy on 2/29, to ICU post-operatively  - Awaiting clearances: high risk per cards given PE - however optimized for OR; pending pulm workup - awaiting PFTs ?monday, pending medicine clearance/optimization  - Encourage OOB/ambulation & incentive spirometry, supplemental O2 as needed  - Continue heparin drip, heme/onc following  - Bowel regimen, GI following  - Above to be discussed with Dr. Santoyo    Surgical Team  Spectralink: [id number] 69 y/o female w/ PMHx of COPD (on 4-4.5 L home oxygen intermittently), HTN, RA, anxiety presenting w/ bloody stools, found to have 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 Patho revealing adenocarcinoma. Afebrile overnight.    PLAN:  - Tentative plan for lap sigmoidectomy on 2/29, to ICU post-operatively  - Awaiting clearances: high risk per cards given PE - however optimized for OR; pending pulm workup - awaiting PFTs ?monday, pending medicine clearance/optimization  - UA resulted (+) leuk esterase and nitrites - ?treatment per primary team  - Encourage OOB/ambulation & incentive spirometry, supplemental O2 as needed  - Continue heparin drip, heme/onc following  - Bowel regimen, GI following  - Above to be discussed with Dr. Santoyo    Surgical Team  Spectralink: [id number]

## 2024-02-25 NOTE — PROGRESS NOTE ADULT - SUBJECTIVE AND OBJECTIVE BOX
CHIEF COMPLAINT/INTERVAL HISTORY:  Pt. seen and evaluated for lower GI bleed and colon mass.  Pt. reports slight blood with BMs.  Denies having any CP or SOB.  On heparin drip for pulmonary embolism.  Had fever 100.4 yesterday.      REVIEW OF SYSTEMS:  +fever.  Denies having CP, SOB, or abdominal pain      Vital Signs Last 24 Hrs  T(C): 36.7 (25 Feb 2024 06:30), Max: 38 (24 Feb 2024 16:15)  T(F): 98.1 (25 Feb 2024 06:30), Max: 100.4 (24 Feb 2024 16:15)  HR: 98 (25 Feb 2024 06:30) (88 - 98)  BP: 118/71 (25 Feb 2024 06:30) (118/71 - 121/62)  BP(mean): --  RR: 16 (25 Feb 2024 06:30) (16 - 18)  SpO2: 95% (25 Feb 2024 06:30) (91% - 95%)    Parameters below as of 25 Feb 2024 06:30  Patient On (Oxygen Delivery Method): nasal cannula  O2 Flow (L/min): 2      PHYSICAL EXAM:  GENERAL: NAD  HEENT: EOMI, hearing normal, conjunctiva and sclera clear  Chest: CTA bilaterally, no wheezing  CV: S1S2, RRR,   GI: soft, +BS, NT/ND  Musculoskeletal: no edema  Psychiatric: affect nL, mood nL  Skin: warm and dry    LABS:                        8.7    7.72  )-----------( 253      ( 25 Feb 2024 05:35 )             29.5     02-25    143  |  104  |  13  ----------------------------<  107<H>  3.8   |  35<H>  |  0.81    Ca    9.2      25 Feb 2024 05:35    TPro  5.8<L>  /  Alb  2.5<L>  /  TBili  0.5  /  DBili  x   /  AST  11<L>  /  ALT  12  /  AlkPhos  51  02-25    PTT - ( 25 Feb 2024 05:35 )  PTT:57.0 sec  Urinalysis Basic - ( 25 Feb 2024 05:35 )    Color: x / Appearance: x / SG: x / pH: x  Gluc: 107 mg/dL / Ketone: x  / Bili: x / Urobili: x   Blood: x / Protein: x / Nitrite: x   Leuk Esterase: x / RBC: x / WBC x   Sq Epi: x / Non Sq Epi: x / Bacteria: x

## 2024-02-25 NOTE — PROGRESS NOTE ADULT - SUBJECTIVE AND OBJECTIVE BOX
All interim records and events noted.    up in bed, comfortable, mild bleed w bowel movements      MEDICATIONS  (STANDING):  amLODIPine   Tablet 10 milliGRAM(s) Oral daily  budesonide 160 MICROgram(s)/formoterol 4.5 MICROgram(s) Inhaler 2 Puff(s) Inhalation two times a day  heparin  Infusion.  Unit(s)/Hr (11 mL/Hr) IV Continuous <Continuous>  influenza  Vaccine (HIGH DOSE) 0.7 milliLiter(s) IntraMuscular once  lactulose Syrup 20 Gram(s) Oral two times a day  pantoprazole    Tablet 40 milliGRAM(s) Oral before breakfast  senna 2 Tablet(s) Oral at bedtime    MEDICATIONS  (PRN):  acetaminophen     Tablet .. 650 milliGRAM(s) Oral every 6 hours PRN Temp greater or equal to 38C (100.4F), Mild Pain (1 - 3)  albuterol/ipratropium for Nebulization 3 milliLiter(s) Nebulizer every 6 hours PRN Shortness of Breath and/or Wheezing  ALPRAZolam 0.25 milliGRAM(s) Oral daily PRN for anxiety  heparin   Injectable 4500 Unit(s) IV Push every 6 hours PRN For aPTT less than 40  heparin   Injectable 2000 Unit(s) IV Push every 6 hours PRN For aPTT between 40 - 57  melatonin 3 milliGRAM(s) Oral at bedtime PRN Insomnia  polyethylene glycol 3350 17 Gram(s) Oral daily PRN Constipation  traMADol 50 milliGRAM(s) Oral daily PRN Moderate Pain (4 - 6)      Vital Signs Last 24 Hrs  T(C): 36.7 (25 Feb 2024 06:30), Max: 38 (24 Feb 2024 16:15)  T(F): 98.1 (25 Feb 2024 06:30), Max: 100.4 (24 Feb 2024 16:15)  HR: 98 (25 Feb 2024 06:30) (88 - 98)  BP: 118/71 (25 Feb 2024 06:30) (118/71 - 121/62)  BP(mean): --  RR: 16 (25 Feb 2024 06:30) (16 - 18)  SpO2: 95% (25 Feb 2024 06:30) (91% - 95%)    Parameters below as of 25 Feb 2024 06:30  Patient On (Oxygen Delivery Method): nasal cannula  O2 Flow (L/min): 2      PHYSICAL EXAM  General: in no acute distress  Head: atraumatic, normocephalic  ENT: sclera anicteric, buccal mucosa moist  Neck: supple, trachea midline  CV: S1 S2, regular rate and rhythm  Lungs: clear to auscultation, no wheezes/rhonchi  Abdomen: soft, nontender, bowel sounds present  Extrem: no clubbing/cyanosis/edema  Skin: no significant increased ecchymosis/petechiae  Neuro: alert and oriented X3,  no focal deficits      LABS:             8.7    7.72  )-----------( 253      ( 02-25 @ 05:35 )             29.5                9.0    10.47 )-----------( 281      ( 02-24 @ 05:18 )             31.4                9.4    6.02  )-----------( 312      ( 02-23 @ 05:55 )             32.1       02-25    143  |  104  |  13  ----------------------------<  107<H>  3.8   |  35<H>  |  0.81    Ca    9.2      25 Feb 2024 05:35    TPro  5.8<L>  /  Alb  2.5<L>  /  TBili  0.5  /  DBili  x   /  AST  11<L>  /  ALT  12  /  AlkPhos  51  02-25 02-25 @ 05:35  PT-- INR--  PTT57.0  02-24 @ 12:25  PT-- INR--  PTT58.8  02-24 @ 05:18  PT-- INR--  PTT83.6  02-23 @ 21:25  PT-- INR--  PTT51.3  02-23 @ 14:37  PT-- INR--  PTT44.2  02-23 @ 05:55  PT-- INR--  PTT50.4  02-23 @ 01:20  PT-- INR--  PTT62.3  02-22 @ 19:10  PT-- INR--  PTT43.3  02-22 @ 13:40  PT-- INR--  PTT54.5  02-22 @ 10:37  PT-- INR--  PTT>200.0  02-22 @ 08:10  PT-- INR--  PTT56.2  02-22 @ 02:50  PT-- INR--  PTT69.4  02-21 @ 18:35  PT-- INR--  TOR115.6  02-21 @ 10:45  PT-- INR--  PTT95.1  02-21 @ 03:20  PT-- INR--  PTT>200.0  02-20 @ 12:40  PT11.9 INR1.02  PTT32.4      RADIOLOGY & ADDITIONAL STUDIES:    IMPRESSION/RECOMMENDATIONS:

## 2024-02-25 NOTE — PROGRESS NOTE ADULT - ASSESSMENT
69yo F with PMHx COPD (not on home oxygen), HTN, RA, anxiety presenting with chief complaint of blood in stools. Admitted for GI bleed, plan for colonoscopy 2/20.        Problem/Plan - 1:  ·  Problem: Colonic mass.   ·  Plan: Patient presents with BRBPR, diarrhea with clots 2/2 lower GI bleed   CT AP: Masslike mural thickening of the distal sigmoid colon. Differential considerations include malignancy or, less likely, sequelae of chronic diverticulitis. Gastroenterology evaluation is recommended. Scattered too small to characterize hypodense foci in the liver, hepatic protocol MRI is recommended for further characterization.  -Patient unable to get outpatient colonoscopy (no insurance)  -HGB currently stable  -Hold home aspirin  -Vital signs currently stable, continue to monitor  -Transfuse prn   -Colonoscopy 2/20 with: large partially obstructing mass noted at approximately 13cm from anal verge.  Pathology with ADENOCARCINOMA, AT LEAST INTRAMUCOSAL.  -DW surgery - recs CT chest with IV contrast, CEA and rectal MR for staging - per radiology unable to get staging MRI done at this facility as it requires a different magnet  -CT chest: No evidence of metastatic disease. Acute pulmonary embolus in the right middle lobe lobar and segmental branches.  -Plan for lap sigmoidectomy on 2/29.  Cardiology preop evaluation noted.  Echo:  Left ventricular systolic function is normal with an ejection fraction visually estimated at 60 to 65 %.  To have PFT tomorrow.    -GI and surgery following - follow up recs.     Problem/Plan - 2:  ·  Problem: COPD (chronic obstructive pulmonary disease).   ·  Plan: Chronic, recently admitted for COPD exacerbation  - Duonebs q6 PRN  - Continue home inhalers   - Supplemental O2 PRN. At baseline: 4-4.5 L home oxygen intermittently. COPD patient will keep SpO2 goal 88-93%   - Monitor respiratory status   - 3 weeks ago had rsv, recent rvp negative  - Continuous pulse ox  - PFT on 2/26 for preop eval  - Pulmonary Dr. Dobson f/u     Problem/Plan - 3:  ·  Problem: HTN (hypertension).   ·  Plan: Chronic, stable  - Continue home amlodipine 10mg qd with hold parameters  - Monitor hemodynamics  - DASH/TLC diet.     Problem/Plan - 4:  ·  Problem: Rheumatoid arthritis.   ·  Plan: - Chronic  - recently finished prednisone course, takes tramadol 50mg Qd.     Problem/Plan - 5:  ·  Problem: Anxiety.   ·  Plan: - Chronic, SOB likely 2/2 anxiety  - Continue Xanax 0.25mg qhs PRN.     Problem/Plan - 6:  ·  Problem: Encounter for deep vein thrombosis (DVT) prophylaxis.   ·  Plan: - on heparin drip for pulmonary embolism    #Pulmonary embolism:  CT chest with No evidence of metastatic disease. Acute pulmonary embolus in the right middle lobe lobar and segmental branches.  Continue heparin drip.  Echo  Left ventricular systolic function is normal with an ejection fraction visually estimated at 60 to 65 %.      #Fever:  check blood cx and Urine cx. CXR Unchanged chest with COPD and slight blunting right base   laterally.  RVP negative.  Procalcitonin 0.23.  Monitor off antibiotics.    .

## 2024-02-25 NOTE — PROGRESS NOTE ADULT - SUBJECTIVE AND OBJECTIVE BOX
[facility]  Bobby Mcdonald PA-C  [address] [place] [address]  [phone]      INTERVAL HPI/OVERNIGHT EVENTS:  Pt s/e,   no n/v reported +Bm today    MEDICATIONS  (STANDING):  amLODIPine   Tablet 10 milliGRAM(s) Oral daily  budesonide 160 MICROgram(s)/formoterol 4.5 MICROgram(s) Inhaler 2 Puff(s) Inhalation two times a day  heparin  Infusion.  Unit(s)/Hr (11 mL/Hr) IV Continuous <Continuous>  influenza  Vaccine (HIGH DOSE) 0.7 milliLiter(s) IntraMuscular once  lactulose Syrup 20 Gram(s) Oral two times a day  pantoprazole    Tablet 40 milliGRAM(s) Oral before breakfast  senna 2 Tablet(s) Oral at bedtime    MEDICATIONS  (PRN):  acetaminophen     Tablet .. 650 milliGRAM(s) Oral every 6 hours PRN Temp greater or equal to 38C (100.4F), Mild Pain (1 - 3)  albuterol/ipratropium for Nebulization 3 milliLiter(s) Nebulizer every 6 hours PRN Shortness of Breath and/or Wheezing  ALPRAZolam 0.25 milliGRAM(s) Oral daily PRN for anxiety  heparin   Injectable 4500 Unit(s) IV Push every 6 hours PRN For aPTT less than 40  heparin   Injectable 2000 Unit(s) IV Push every 6 hours PRN For aPTT between 40 - 57  melatonin 3 milliGRAM(s) Oral at bedtime PRN Insomnia  polyethylene glycol 3350 17 Gram(s) Oral daily PRN Constipation  traMADol 50 milliGRAM(s) Oral daily PRN Moderate Pain (4 - 6)      Allergies  No Known Allergies      PHYSICAL EXAM:   Vital Signs Last 24 Hrs  T(C): 36.7 (25 Feb 2024 06:30), Max: 38 (24 Feb 2024 16:15)  T(F): 98.1 (25 Feb 2024 06:30), Max: 100.4 (24 Feb 2024 16:15)  HR: 98 (25 Feb 2024 06:30) (88 - 98)  BP: 118/71 (25 Feb 2024 06:30) (118/71 - 121/62)  BP(mean): --  RR: 16 (25 Feb 2024 06:30) (16 - 18)  SpO2: 95% (25 Feb 2024 06:30) (91% - 95%)    Parameters below as of 25 Feb 2024 06:30  Patient On (Oxygen Delivery Method): nasal cannula  O2 Flow (L/min): 2    GENERAL:  Appears stated age  HEENT:  NC/AT  CHEST:  Full & symmetric excursion  HEART:  Regular rhythm  ABDOMEN:  Soft, non-tender, non-distended  EXTEREMITIES:  no cyanosis  SKIN:  No rash  NEURO:  Alert      LABS:                                 8.7    7.72  )-----------( 253      ( 25 Feb 2024 05:35 )             29.5     02-25    143  |  104  |  13  ----------------------------<  107<H>  3.8   |  35<H>  |  0.81    Ca    9.2      25 Feb 2024 05:35    TPro  5.8<L>  /  Alb  2.5<L>  /  TBili  0.5  /  DBili  x   /  AST  11<L>  /  ALT  12  /  AlkPhos  51  02-25      PTT - ( 23 Feb 2024 05:55 )  PTT:50.4 sec  Urinalysis Basic - ( 23 Feb 2024 05:55 )    Color: x / Appearance: x / SG: x / pH: x  Gluc: 105 mg/dL / Ketone: x  / Bili: x / Urobili: x   Blood: x / Protein: x / Nitrite: x   Leuk Esterase: x / RBC: x / WBC x   Sq Epi: x / Non Sq Epi: x / Bacteria: x

## 2024-02-25 NOTE — PROGRESS NOTE ADULT - SUBJECTIVE AND OBJECTIVE BOX
Pt seen and examined at bedside, reports sleeping well, no fever or chills. Vital signs stable. Passing flatus, (+) small soft BM overnight - however pt did not look so unsure if it was bloody. Denies abdominal pain, nausea, vomiting, chest pain, palpitations, shortness of breath (on supplemental O2)    T(C): 36.7 (02-25-24 @ 06:30), Max: 38 (02-24-24 @ 16:15)  HR: 98 (02-25-24 @ 06:30) (88 - 98)  BP: 118/71 (02-25-24 @ 06:30) (118/71 - 121/62)  RR: 16 (02-25-24 @ 06:30) (16 - 18)  SpO2: 95% (02-25-24 @ 06:30) (91% - 95%)  Wt(kg): --    PHYSICAL EXAM:  General: no acute distress, appears comfortable  HEENT: normocephalic, anicteric, on supplemental O2  Pulm: non labored respirations  Cardio: RRR  Abdomen: soft, nontender, mildly distended  Extremities: no LE edema noted    I&O's Detail      LABS:                        8.7    7.72  )-----------( 253      ( 25 Feb 2024 05:35 )             29.5     02-25    143  |  104  |  13  ----------------------------<  107<H>  3.8   |  35<H>  |  0.81    Ca    9.2      25 Feb 2024 05:35    TPro  5.8<L>  /  Alb  2.5<L>  /  TBili  0.5  /  DBili  x   /  AST  11<L>  /  ALT  12  /  AlkPhos  51  02-25    PTT - ( 25 Feb 2024 05:35 )  PTT:57.0 sec  Urinalysis Basic - ( 25 Feb 2024 05:35 )    Color: x / Appearance: x / SG: x / pH: x  Gluc: 107 mg/dL / Ketone: x  / Bili: x / Urobili: x   Blood: x / Protein: x / Nitrite: x   Leuk Esterase: x / RBC: x / WBC x   Sq Epi: x / Non Sq Epi: x / Bacteria: x    MEDICATIONS:  acetaminophen     Tablet .. 650 milliGRAM(s) Oral every 6 hours PRN  albuterol/ipratropium for Nebulization 3 milliLiter(s) Nebulizer every 6 hours PRN  ALPRAZolam 0.25 milliGRAM(s) Oral daily PRN  amLODIPine   Tablet 10 milliGRAM(s) Oral daily  budesonide 160 MICROgram(s)/formoterol 4.5 MICROgram(s) Inhaler 2 Puff(s) Inhalation two times a day  heparin   Injectable 4500 Unit(s) IV Push every 6 hours PRN  heparin   Injectable 2000 Unit(s) IV Push every 6 hours PRN  heparin  Infusion.  Unit(s)/Hr IV Continuous <Continuous>  influenza  Vaccine (HIGH DOSE) 0.7 milliLiter(s) IntraMuscular once  lactulose Syrup 20 Gram(s) Oral two times a day  melatonin 3 milliGRAM(s) Oral at bedtime PRN  pantoprazole    Tablet 40 milliGRAM(s) Oral before breakfast  polyethylene glycol 3350 17 Gram(s) Oral daily PRN  senna 2 Tablet(s) Oral at bedtime  traMADol 50 milliGRAM(s) Oral daily PRN

## 2024-02-26 LAB
ANION GAP SERPL CALC-SCNC: 4 MMOL/L — LOW (ref 5–17)
APTT BLD: 68.1 SEC — HIGH (ref 24.5–35.6)
BASOPHILS # BLD AUTO: 0.03 K/UL — SIGNIFICANT CHANGE UP (ref 0–0.2)
BASOPHILS NFR BLD AUTO: 0.4 % — SIGNIFICANT CHANGE UP (ref 0–2)
BUN SERPL-MCNC: 11 MG/DL — SIGNIFICANT CHANGE UP (ref 7–23)
CALCIUM SERPL-MCNC: 9.2 MG/DL — SIGNIFICANT CHANGE UP (ref 8.5–10.1)
CHLORIDE SERPL-SCNC: 104 MMOL/L — SIGNIFICANT CHANGE UP (ref 96–108)
CO2 SERPL-SCNC: 35 MMOL/L — HIGH (ref 22–31)
CREAT SERPL-MCNC: 0.73 MG/DL — SIGNIFICANT CHANGE UP (ref 0.5–1.3)
EGFR: 90 ML/MIN/1.73M2 — SIGNIFICANT CHANGE UP
EOSINOPHIL # BLD AUTO: 0.11 K/UL — SIGNIFICANT CHANGE UP (ref 0–0.5)
EOSINOPHIL NFR BLD AUTO: 1.6 % — SIGNIFICANT CHANGE UP (ref 0–6)
GLUCOSE SERPL-MCNC: 102 MG/DL — HIGH (ref 70–99)
HCT VFR BLD CALC: 30 % — LOW (ref 34.5–45)
HGB BLD-MCNC: 8.6 G/DL — LOW (ref 11.5–15.5)
IMM GRANULOCYTES NFR BLD AUTO: 1.3 % — HIGH (ref 0–0.9)
LYMPHOCYTES # BLD AUTO: 1.3 K/UL — SIGNIFICANT CHANGE UP (ref 1–3.3)
LYMPHOCYTES # BLD AUTO: 18.6 % — SIGNIFICANT CHANGE UP (ref 13–44)
MCHC RBC-ENTMCNC: 23.1 PG — LOW (ref 27–34)
MCHC RBC-ENTMCNC: 28.7 GM/DL — LOW (ref 32–36)
MCV RBC AUTO: 80.4 FL — SIGNIFICANT CHANGE UP (ref 80–100)
MONOCYTES # BLD AUTO: 0.86 K/UL — SIGNIFICANT CHANGE UP (ref 0–0.9)
MONOCYTES NFR BLD AUTO: 12.3 % — SIGNIFICANT CHANGE UP (ref 2–14)
NEUTROPHILS # BLD AUTO: 4.6 K/UL — SIGNIFICANT CHANGE UP (ref 1.8–7.4)
NEUTROPHILS NFR BLD AUTO: 65.8 % — SIGNIFICANT CHANGE UP (ref 43–77)
NRBC # BLD: 0 /100 WBCS — SIGNIFICANT CHANGE UP (ref 0–0)
PLATELET # BLD AUTO: 260 K/UL — SIGNIFICANT CHANGE UP (ref 150–400)
POTASSIUM SERPL-MCNC: 3.9 MMOL/L — SIGNIFICANT CHANGE UP (ref 3.5–5.3)
POTASSIUM SERPL-SCNC: 3.9 MMOL/L — SIGNIFICANT CHANGE UP (ref 3.5–5.3)
RBC # BLD: 3.73 M/UL — LOW (ref 3.8–5.2)
RBC # FLD: 22.6 % — HIGH (ref 10.3–14.5)
SODIUM SERPL-SCNC: 143 MMOL/L — SIGNIFICANT CHANGE UP (ref 135–145)
WBC # BLD: 6.99 K/UL — SIGNIFICANT CHANGE UP (ref 3.8–10.5)
WBC # FLD AUTO: 6.99 K/UL — SIGNIFICANT CHANGE UP (ref 3.8–10.5)

## 2024-02-26 PROCEDURE — 99222 1ST HOSP IP/OBS MODERATE 55: CPT

## 2024-02-26 PROCEDURE — 99232 SBSQ HOSP IP/OBS MODERATE 35: CPT

## 2024-02-26 PROCEDURE — 99233 SBSQ HOSP IP/OBS HIGH 50: CPT

## 2024-02-26 PROCEDURE — 74177 CT ABD & PELVIS W/CONTRAST: CPT | Mod: 26

## 2024-02-26 RX ORDER — CEFTRIAXONE 500 MG/1
1000 INJECTION, POWDER, FOR SOLUTION INTRAMUSCULAR; INTRAVENOUS EVERY 24 HOURS
Refills: 0 | Status: DISCONTINUED | OUTPATIENT
Start: 2024-02-26 | End: 2024-02-27

## 2024-02-26 RX ADMIN — BUDESONIDE AND FORMOTEROL FUMARATE DIHYDRATE 2 PUFF(S): 160; 4.5 AEROSOL RESPIRATORY (INHALATION) at 18:07

## 2024-02-26 RX ADMIN — HEPARIN SODIUM 900 UNIT(S)/HR: 5000 INJECTION INTRAVENOUS; SUBCUTANEOUS at 07:21

## 2024-02-26 RX ADMIN — HEPARIN SODIUM 900 UNIT(S)/HR: 5000 INJECTION INTRAVENOUS; SUBCUTANEOUS at 07:23

## 2024-02-26 RX ADMIN — TRAMADOL HYDROCHLORIDE 50 MILLIGRAM(S): 50 TABLET ORAL at 22:43

## 2024-02-26 RX ADMIN — CEFTRIAXONE 100 MILLIGRAM(S): 500 INJECTION, POWDER, FOR SOLUTION INTRAMUSCULAR; INTRAVENOUS at 11:39

## 2024-02-26 RX ADMIN — PANTOPRAZOLE SODIUM 40 MILLIGRAM(S): 20 TABLET, DELAYED RELEASE ORAL at 05:52

## 2024-02-26 RX ADMIN — Medication 0.25 MILLIGRAM(S): at 18:06

## 2024-02-26 RX ADMIN — TRAMADOL HYDROCHLORIDE 50 MILLIGRAM(S): 50 TABLET ORAL at 21:43

## 2024-02-26 RX ADMIN — BUDESONIDE AND FORMOTEROL FUMARATE DIHYDRATE 2 PUFF(S): 160; 4.5 AEROSOL RESPIRATORY (INHALATION) at 05:55

## 2024-02-26 RX ADMIN — AMLODIPINE BESYLATE 10 MILLIGRAM(S): 2.5 TABLET ORAL at 05:51

## 2024-02-26 RX ADMIN — HEPARIN SODIUM 900 UNIT(S)/HR: 5000 INJECTION INTRAVENOUS; SUBCUTANEOUS at 00:45

## 2024-02-26 RX ADMIN — HEPARIN SODIUM 900 UNIT(S)/HR: 5000 INJECTION INTRAVENOUS; SUBCUTANEOUS at 19:23

## 2024-02-26 RX ADMIN — Medication 3 MILLIGRAM(S): at 21:43

## 2024-02-26 NOTE — PROGRESS NOTE ADULT - SUBJECTIVE AND OBJECTIVE BOX
[facility] -- James Mcghee,  Alexa, Arnel De León Savella, Goodger, Cohen  Office # [phone]    Follow Up:  PE, cardiac optimization      Subjective/Observations: Pt seen and examined, awake, alert, resting comfortably in bed, denies chest pain, dyspnea, palpitations or dizziness, orthopnea and PND.   + blood in stool  remains on 3 L NC.     REVIEW OF SYSTEMS: All other review of systems is negative unless indicated above  PAST MEDICAL & SURGICAL HISTORY:  COPD (chronic obstructive pulmonary disease)      Rheumatoid arthritis      HTN (hypertension)      Anxiety      No significant past surgical history        MEDICATIONS  (STANDING):  amLODIPine   Tablet 10 milliGRAM(s) Oral daily  budesonide 160 MICROgram(s)/formoterol 4.5 MICROgram(s) Inhaler 2 Puff(s) Inhalation two times a day  cefTRIAXone   IVPB 1000 milliGRAM(s) IV Intermittent every 24 hours  heparin  Infusion.  Unit(s)/Hr (11 mL/Hr) IV Continuous <Continuous>  influenza  Vaccine (HIGH DOSE) 0.7 milliLiter(s) IntraMuscular once  lactulose Syrup 20 Gram(s) Oral two times a day  pantoprazole    Tablet 40 milliGRAM(s) Oral before breakfast  senna 2 Tablet(s) Oral at bedtime    MEDICATIONS  (PRN):  acetaminophen     Tablet .. 650 milliGRAM(s) Oral every 6 hours PRN Temp greater or equal to 38C (100.4F), Mild Pain (1 - 3)  albuterol/ipratropium for Nebulization 3 milliLiter(s) Nebulizer every 6 hours PRN Shortness of Breath and/or Wheezing  ALPRAZolam 0.25 milliGRAM(s) Oral daily PRN for anxiety  heparin   Injectable 4500 Unit(s) IV Push every 6 hours PRN For aPTT less than 40  heparin   Injectable 2000 Unit(s) IV Push every 6 hours PRN For aPTT between 40 - 57  melatonin 3 milliGRAM(s) Oral at bedtime PRN Insomnia  polyethylene glycol 3350 17 Gram(s) Oral daily PRN Constipation  traMADol 50 milliGRAM(s) Oral daily PRN Moderate Pain (4 - 6)    Allergies    No Known Allergies    Intolerances      Vital Signs Last 24 Hrs  T(C): 36.3 (26 Feb 2024 04:13), Max: 37.5 (25 Feb 2024 20:08)  T(F): 97.4 (26 Feb 2024 04:13), Max: 99.5 (25 Feb 2024 20:08)  HR: 92 (26 Feb 2024 04:13) (92 - 96)  BP: 130/74 (26 Feb 2024 04:13) (118/66 - 130/74)  BP(mean): --  RR: 18 (26 Feb 2024 04:13) (18 - 18)  SpO2: 93% (26 Feb 2024 04:13) (93% - 94%)    Parameters below as of 26 Feb 2024 04:13  Patient On (Oxygen Delivery Method): nasal cannula  O2 Flow (L/min): 2    I&O's Summary    25 Feb 2024 07:01  -  26 Feb 2024 07:00  --------------------------------------------------------  IN: 240 mL / OUT: 0 mL / NET: 240 mL        TELE: SR- ST   PHYSICAL EXAM:  Constitutional: NAD, alert and oriented x 3, frail  Lungs:  Non-labored, breath sounds are clear bilaterally, No wheezing, rales or rhonchi  Cardiovascular: RRR.  S1 and S2 positive.  No murmurs, rubs, gallops or clicks  Gastrointestinal: Bowel Sounds present, soft, nontender.   Extremities: No peripheral edema.   Neurological: Alert, no focal deficits  Skin: No rashes or ulcers   Psych:  Mood & affect appropriate    LABS: All Labs Reviewed:                        8.6    6.99  )-----------( 260      ( 26 Feb 2024 05:20 )             30.0                         8.7    7.72  )-----------( 253      ( 25 Feb 2024 05:35 )             29.5                         9.0    10.47 )-----------( 281      ( 24 Feb 2024 05:18 )             31.4     26 Feb 2024 05:20    143    |  104    |  11     ----------------------------<  102    3.9     |  35     |  0.73   25 Feb 2024 05:35    143    |  104    |  13     ----------------------------<  107    3.8     |  35     |  0.81   24 Feb 2024 05:18    144    |  105    |  12     ----------------------------<  120    4.1     |  36     |  0.98     Ca    9.2        26 Feb 2024 05:20  Ca    9.2        25 Feb 2024 05:35  Ca    9.4        24 Feb 2024 05:18    TPro  5.8    /  Alb  2.5    /  TBili  0.5    /  DBili  x      /  AST  11     /  ALT  12     /  AlkPhos  51     25 Feb 2024 05:35    PTT - ( 26 Feb 2024 05:20 )  PTT:68.1 sec      12 Lead ECG:   Ventricular Rate 98 BPM    Atrial Rate 98 BPM    P-R Interval 154 ms    QRS Duration 76 ms    Q-T Interval 340 ms    QTC Calculation(Bazett) 434 ms    P Axis 88 degrees    R Axis 51 degrees    T Axis 96 degrees    Diagnosis Line Normal sinus rhythm  Minimal voltage criteria for LVH, may be normal variant ( Sokolow-Jason )  Nonspecific T wave abnormality  Abnormal ECG  When compared with ECG of 19-JAN-2024 11:54,  No significant change was found  Confirmed by SHRUTHI SANCHEZ ([id number]) on 2/16/2024 6:48:17 PM (02-15-24 @ 23:33)

## 2024-02-26 NOTE — CONSULT NOTE ADULT - ASSESSMENT
Surg. Att.  Pt. seen. Doing very well.   I discussed surgical management with the patient and her sister. I focused on post op recovery in view of her significant pulmonary compromise. In addition to possible surgical complication, pt is a very high risk for pulmonary complications. She may remain intubated and even require tracheostomy. Family understands.  I also discussed pt. with a vascular consultant. DVT work up was negative, therefore we are planning for venous pelvic MRI.

## 2024-02-26 NOTE — CONSULT NOTE ADULT - ATTENDING COMMENTS
CTV reviewed. No evidence of DVT in pelvic veins. AC prior to surgery and post sx is recommended. No IVC filter indicated.

## 2024-02-26 NOTE — PROGRESS NOTE ADULT - ASSESSMENT
69yo F with PMHx COPD (not on home oxygen), HTN, RA, anxiety presenting with chief complaint of blood in stools. Admitted for GI bleed, plan for colonoscopy 2/20.        Problem/Plan - 1:  ·  Problem: Colonic mass.   ·  Plan: Patient presents with BRBPR, diarrhea with clots 2/2 lower GI bleed   CT AP: Masslike mural thickening of the distal sigmoid colon. Differential considerations include malignancy or, less likely, sequelae of chronic diverticulitis. Gastroenterology evaluation is recommended. Scattered too small to characterize hypodense foci in the liver, hepatic protocol MRI is recommended for further characterization.  -Patient unable to get outpatient colonoscopy (no insurance)  -HGB currently stable  -Hold home aspirin  -Vital signs currently stable, continue to monitor  -Transfuse prn   -Colonoscopy 2/20 with: large partially obstructing mass noted at approximately 13cm from anal verge.  Pathology with ADENOCARCINOMA, AT LEAST INTRAMUCOSAL.  -DW surgery - recs CT chest with IV contrast, CEA and rectal MR for staging - per radiology unable to get staging MRI done at this facility as it requires a different magnet  -CT chest: No evidence of metastatic disease. Acute pulmonary embolus in the right middle lobe lobar and segmental branches.  -Plan for lap sigmoidectomy on 2/29.  Cardiology preop evaluation noted.  Echo:  Left ventricular systolic function is normal with an ejection fraction visually estimated at 60 to 65 %.  To have PFT today.  Pt. is without s/s of ACS, decompensated CHF, or unstable arrhythmia.  Pt. is considered high risk for procedure given recently diagnosed pulmonary embolism.  Benefit of procedure outweighs the risk.  May proceed with planned lap sigmoidectomy on 2/29.    -GI and surgery following - follow up recs.     Problem/Plan - 2:  ·  Problem: COPD (chronic obstructive pulmonary disease).   ·  Plan: Chronic, recently admitted for COPD exacerbation  - Duonebs q6 PRN  - Continue home inhalers   - Supplemental O2 PRN. At baseline: 4-4.5 L home oxygen intermittently. COPD patient will keep SpO2 goal 88-93%   - Monitor respiratory status   - 3 weeks ago had rsv, recent rvp negative  - Continuous pulse ox  - PFT on 2/26 for preop eval  - Pulmonary Dr. Dobson f/u     Problem/Plan - 3:  ·  Problem: HTN (hypertension).   ·  Plan: Chronic, stable  - Continue home amlodipine 10mg qd with hold parameters  - Monitor hemodynamics  - DASH/TLC diet.     Problem/Plan - 4:  ·  Problem: Rheumatoid arthritis.   ·  Plan: - Chronic  - recently finished prednisone course, takes tramadol 50mg Qd.     Problem/Plan - 5:  ·  Problem: Anxiety.   ·  Plan: - Chronic, SOB likely 2/2 anxiety  - Continue Xanax 0.25mg qhs PRN.     Problem/Plan - 6:  ·  Problem: Encounter for deep vein thrombosis (DVT) prophylaxis.   ·  Plan: - on heparin drip for pulmonary embolism    #Pulmonary embolism:  CT chest with No evidence of metastatic disease. Acute pulmonary embolus in the right middle lobe lobar and segmental branches.  Continue heparin drip.  Echo  Left ventricular systolic function is normal with an ejection fraction visually estimated at 60 to 65 %.      #Fever/UTI:  blood cx NGTD.  Urine cx +E. coli.  CXR Unchanged chest with COPD and slight blunting right base   laterally.  RVP negative.  Started on Ceftriaxone 1gm IV daily.   .

## 2024-02-26 NOTE — PROGRESS NOTE ADULT - SUBJECTIVE AND OBJECTIVE BOX
CHIEF COMPLAINT/INTERVAL HISTORY:  Pt. seen and evaluated for colon mass and pulmonary embolism.  Pt. is in no distress.  Tolerating heparin drip.  Reports very scant bleeding with BMs.  Started on IV antibiotic for UTI.      REVIEW OF SYSTEMS:  No fever, CP, SOB, or abdominal pain    Vital Signs Last 24 Hrs  T(C): 36.3 (26 Feb 2024 04:13), Max: 37.5 (25 Feb 2024 20:08)  T(F): 97.4 (26 Feb 2024 04:13), Max: 99.5 (25 Feb 2024 20:08)  HR: 92 (26 Feb 2024 04:13) (92 - 96)  BP: 130/74 (26 Feb 2024 04:13) (118/66 - 130/74)  BP(mean): --  RR: 18 (26 Feb 2024 04:13) (18 - 18)  SpO2: 93% (26 Feb 2024 04:13) (93% - 94%)    Parameters below as of 26 Feb 2024 04:13  Patient On (Oxygen Delivery Method): nasal cannula  O2 Flow (L/min): 2      PHYSICAL EXAM:  GENERAL: NAD  HEENT: EOMI, hearing normal, conjunctiva and sclera clear  Chest: CTA bilaterally, no wheezing  CV: S1S2, RRR,   GI: soft, +BS, NT/ND  Musculoskeletal: no edema  Psychiatric: affect nL, mood nL  Skin: warm and dry    LABS:                        8.6    6.99  )-----------( 260      ( 26 Feb 2024 05:20 )             30.0     02-26    143  |  104  |  11  ----------------------------<  102<H>  3.9   |  35<H>  |  0.73    Ca    9.2      26 Feb 2024 05:20    TPro  5.8<L>  /  Alb  2.5<L>  /  TBili  0.5  /  DBili  x   /  AST  11<L>  /  ALT  12  /  AlkPhos  51  02-25    PTT - ( 26 Feb 2024 05:20 )  PTT:68.1 sec  Urinalysis Basic - ( 26 Feb 2024 05:20 )    Color: x / Appearance: x / SG: x / pH: x  Gluc: 102 mg/dL / Ketone: x  / Bili: x / Urobili: x   Blood: x / Protein: x / Nitrite: x   Leuk Esterase: x / RBC: x / WBC x   Sq Epi: x / Non Sq Epi: x / Bacteria: x

## 2024-02-26 NOTE — CONSULT NOTE ADULT - SUBJECTIVE AND OBJECTIVE BOX
VASCULAR SURGERY CONSULT NOTE    HPI:  69yo F with PMHx COPD (on 4-4.5 L home oxygen intermittently), HTN, RA, anxiety presenting with chief complaint of blood in stools. Patient states for the last two months, she has intermittently noticed blood in stool. She states she has episodes where she has to run to the bathroom to relieve herself, and has a watery, bloody BM with clots. Other times she has soft formed stools without blood. Patient denies any abdominal pain, nausea, vomiting weight loss, constipation or melena. Patient was recently seen at [facility] ED on 2/12/24 with similar symptoms, CT showed "Masslike mural thickening of the distal sigmoid colon." Patient states she doesn't have insurance and is unable to follow up with GI outpatient for further evaluation. Patient also has mild shortness of breath on admission, states it is mainly due to anxiety, and chronically takes Xanax 0.25 mg daily as needed. She has COPD and sometimes needs to use home O2. She states currently doesn't feel like shes having a COPD exacerbation.     Interval HPI:   Patient is admitted for a sigmoid mass discovered on colonoscopy, 13cm from the anal verge. Discovered to have an acute PE while inpatient on 2/20/24, now on therapeutic heparin. Patient seen and followed by pulmonology, general surgery, GI, and cardiology. Currently, denies shortness of breath unless with exertion and states she is able to breath intermittently without NC O2. Denies calf tenderness bilaterally or abdominal/pelvic pain.       ED Course:   Vitals: BP: 144/77, HR: 98, Temp: 98.1, RR: 20, SpO2: 100% on 3L  Labs: FOBT positive, HGB 10.6, MCV 77.5  CT AP:  Masslike mural thickening of the distal sigmoid colon. Differential   considerations include malignancy or, less likely, sequelae of chronic   diverticulitis. Gastroenterology evaluation is recommended.    Scattered too small to characterize hypodense foci in the liver, hepatic   protocol MRI is recommended for further characterization.    EKG: Pending  Received in the ED:  Protonix   (16 Feb 2024 00:02)        PAST MEDICAL & SURGICAL HISTORY:  COPD (chronic obstructive pulmonary disease)      Rheumatoid arthritis      HTN (hypertension)      Anxiety      No significant past surgical history        No Known Allergies    Home Medications:  Albuterol (Eqv-ProAir HFA) 90 mcg/inh inhalation aerosol: 2 puff(s) inhaled every 6 hours as needed for  shortness of breath and/or wheezing (16 Feb 2024 00:22)  ALPRAZolam 0.25 mg oral tablet: 1 tab(s) orally once a day as needed for  anxiety (16 Feb 2024 00:22)  amLODIPine 10 mg oral tablet: 1 tab(s) orally once a day (16 Feb 2024 00:22)  aspirin 81 mg oral tablet: 1 tab(s) orally once a day (16 Feb 2024 00:22)  budesonide/glycopyrrolate/formoterol 160 mcg-9 mcg-4.8 mcg/inh inhalation aerosol: 2 inhaler(s) inhaled 2 times a day (16 Feb 2024 00:22)  Protonix 40 mg oral delayed release tablet: 1 tab(s) orally once a day (16 Feb 2024 00:22)  traMADol 50 mg oral tablet: 1 tab(s) orally once a day (16 Feb 2024 00:22)      PHYSICAL EXAM  Vital Signs Last 24 Hrs  T(C): 36.9 (26 Feb 2024 11:45), Max: 37.5 (25 Feb 2024 20:08)  T(F): 98.5 (26 Feb 2024 11:45), Max: 99.5 (25 Feb 2024 20:08)  HR: 96 (26 Feb 2024 11:45) (92 - 96)  BP: 111/64 (26 Feb 2024 11:45) (111/64 - 130/74)  BP(mean): --  RR: 20 (26 Feb 2024 11:45) (18 - 20)  SpO2: 92% (26 Feb 2024 11:45) (92% - 94%)    Parameters below as of 26 Feb 2024 11:45  Patient On (Oxygen Delivery Method): nasal cannula        General: NAD, WDWN.   Neuro:  Alert & oriented x 3  CV: regular rate  Lung: no increased work of breathing on 2LNC  Abdomen: soft, nontender, nondistended  Extremities: no calf tenderness bilaterally, warm and well perfused      MEDICATIONS:   MEDICATIONS  (STANDING):  amLODIPine   Tablet 10 milliGRAM(s) Oral daily  budesonide 160 MICROgram(s)/formoterol 4.5 MICROgram(s) Inhaler 2 Puff(s) Inhalation two times a day  cefTRIAXone   IVPB 1000 milliGRAM(s) IV Intermittent every 24 hours  heparin  Infusion.  Unit(s)/Hr (11 mL/Hr) IV Continuous <Continuous>  influenza  Vaccine (HIGH DOSE) 0.7 milliLiter(s) IntraMuscular once  lactulose Syrup 20 Gram(s) Oral two times a day  pantoprazole    Tablet 40 milliGRAM(s) Oral before breakfast  senna 2 Tablet(s) Oral at bedtime    MEDICATIONS  (PRN):  acetaminophen     Tablet .. 650 milliGRAM(s) Oral every 6 hours PRN Temp greater or equal to 38C (100.4F), Mild Pain (1 - 3)  albuterol/ipratropium for Nebulization 3 milliLiter(s) Nebulizer every 6 hours PRN Shortness of Breath and/or Wheezing  ALPRAZolam 0.25 milliGRAM(s) Oral daily PRN for anxiety  heparin   Injectable 4500 Unit(s) IV Push every 6 hours PRN For aPTT less than 40  heparin   Injectable 2000 Unit(s) IV Push every 6 hours PRN For aPTT between 40 - 57  melatonin 3 milliGRAM(s) Oral at bedtime PRN Insomnia  polyethylene glycol 3350 17 Gram(s) Oral daily PRN Constipation  traMADol 50 milliGRAM(s) Oral daily PRN Moderate Pain (4 - 6)      LAB/STUDIES:                        8.6    6.99  )-----------( 260      ( 26 Feb 2024 05:20 )             30.0     02-26    143  |  104  |  11  ----------------------------<  102<H>  3.9   |  35<H>  |  0.73    Ca    9.2      26 Feb 2024 05:20    TPro  5.8<L>  /  Alb  2.5<L>  /  TBili  0.5  /  DBili  x   /  AST  11<L>  /  ALT  12  /  AlkPhos  51  02-25    PTT - ( 26 Feb 2024 05:20 )  PTT:68.1 sec  LIVER FUNCTIONS - ( 25 Feb 2024 05:35 )  Alb: 2.5 g/dL / Pro: 5.8 g/dL / ALK PHOS: 51 U/L / ALT: 12 U/L / AST: 11 U/L / GGT: x             Urinalysis Basic - ( 26 Feb 2024 05:20 )    Color: x / Appearance: x / SG: x / pH: x  Gluc: 102 mg/dL / Ketone: x  / Bili: x / Urobili: x   Blood: x / Protein: x / Nitrite: x   Leuk Esterase: x / RBC: x / WBC x   Sq Epi: x / Non Sq Epi: x / Bacteria: x                  Culture - Urine (collected 24 Feb 2024 21:00)  Source: Clean Catch Clean Catch (Midstream)  Preliminary Report (26 Feb 2024 00:15):    >100,000 CFU/ml Escherichia coli    Culture - Blood (collected 24 Feb 2024 11:05)  Source: .Blood Blood-Peripheral  Preliminary Report (26 Feb 2024 16:02):    No growth at 48 Hours    Culture - Blood (collected 24 Feb 2024 09:45)  Source: .Blood Blood-Peripheral  Preliminary Report (26 Feb 2024 16:02):    No growth at 48 Hours        IMAGING:    < from: US Duplex Venous Lower Ext Complete, Bilateral (02.20.24 @ 20:29) >    ACC: [id number] EXAM:  US DPLX LWR EXT VEINS COMPL BI   ORDERED BY:  DILLAN VEGA     PROCEDURE DATE:  02/20/2024          INTERPRETATION:  CLINICAL INFORMATION: Patient with pulmonary embolism    COMPARISON: None available.    TECHNIQUE: Duplex sonography of the BILATERAL LOWER extremity veins with   color and spectral Doppler, with and without compression.    FINDINGS:    RIGHT:  Normal compressibility of the RIGHT common femoral, femoral and popliteal   veins.  Doppler examination shows normal spontaneous and phasic flow.  No RIGHT calf vein thrombosis is detected.  2.3 cm Baker cyst.    LEFT:  Normal compressibility of the LEFT common femoral, femoral and popliteal   veins.  Doppler examination shows normal spontaneous and phasic flow.  No LEFT calf vein thrombosis is detected.  2.5 cm Baker cyst.    IMPRESSION:  No evidence of deep venous thrombosis in either lower extremity.      --- End of Report ---            ABDIAZIZ SANABRIA MD; Attending Radiologist  This document has been electronically signed. Feb 20 2024 10:43PM    < end of copied text >      ASSESSMENT:  67 y/o female w/ PMHx of COPD (on 4-4.5 L home oxygen intermittently), HTN, RA, anxiety presenting w/ bloody stools, found to have 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 Patho revealing adenocarcinoma. Afebrile. VSS. Previous b/l LE venous duplex negative for DVT, plan to rule out DVT in abdomen/pelvis.     Plan:  - CT venogram of abdomen/pelvis  - continue heparin  - transfuse if less than 7 Hgb  - OOB/IS/supplemental O2 PRN  - no acute intervention at this time  - vascular surgery to follow  - rest of care per primary team    Coby Willingham, PGY1  Vascular Surgery  SPECTRA: 1023 Cosentyx Pregnancy And Lactation Text: This medication is Pregnancy Category B and is considered safe during pregnancy. It is unknown if this medication is excreted in breast milk.

## 2024-02-26 NOTE — PROGRESS NOTE ADULT - SUBJECTIVE AND OBJECTIVE BOX
[INTERVAL HX: ]  Patient seen and examined;  Chart reviewed and events noted;     Pt with sister and brother in law at bedside.   Seen by Dr Bronson forde.     s.p PFTs?        [MEDICATIONS]  MEDICATIONS  (STANDING):  amLODIPine   Tablet 10 milliGRAM(s) Oral daily  budesonide 160 MICROgram(s)/formoterol 4.5 MICROgram(s) Inhaler 2 Puff(s) Inhalation two times a day  cefTRIAXone   IVPB 1000 milliGRAM(s) IV Intermittent every 24 hours  heparin  Infusion.  Unit(s)/Hr (11 mL/Hr) IV Continuous <Continuous>  influenza  Vaccine (HIGH DOSE) 0.7 milliLiter(s) IntraMuscular once  lactulose Syrup 20 Gram(s) Oral two times a day  pantoprazole    Tablet 40 milliGRAM(s) Oral before breakfast  senna 2 Tablet(s) Oral at bedtime    MEDICATIONS  (PRN):  acetaminophen     Tablet .. 650 milliGRAM(s) Oral every 6 hours PRN Temp greater or equal to 38C (100.4F), Mild Pain (1 - 3)  albuterol/ipratropium for Nebulization 3 milliLiter(s) Nebulizer every 6 hours PRN Shortness of Breath and/or Wheezing  ALPRAZolam 0.25 milliGRAM(s) Oral daily PRN for anxiety  heparin   Injectable 4500 Unit(s) IV Push every 6 hours PRN For aPTT less than 40  heparin   Injectable 2000 Unit(s) IV Push every 6 hours PRN For aPTT between 40 - 57  melatonin 3 milliGRAM(s) Oral at bedtime PRN Insomnia  polyethylene glycol 3350 17 Gram(s) Oral daily PRN Constipation  traMADol 50 milliGRAM(s) Oral daily PRN Moderate Pain (4 - 6)      [VITALS]  Vital Signs Last 24 Hrs  T(C): 36.9 (2024 11:45), Max: 37.5 (2024 20:08)  T(F): 98.5 (2024 11:45), Max: 99.5 (2024 20:08)  HR: 96 (2024 11:45) (92 - 96)  BP: 111/64 (2024 11:45) (111/64 - 130/74)  BP(mean): --  RR: 20 (2024 11:45) (18 - 20)  SpO2: 92% (2024 11:45) (92% - 94%)    Parameters below as of 2024 11:45  Patient On (Oxygen Delivery Method): nasal cannula      [WT/HT]  Daily     Daily Weight in k.6 (2024 04:13)  [VENT]      [PHYSICAL EXAM]  GEN: NAD  HEENT: normocephalic and atraumatic. EOMI. PERRL.    NECK: Supple.  No lymphadenopathy   LUNGS: Clear to auscultation.  HEART: Regular rate and rhythm,  no MRG  ABDOMEN: Soft, nontender, and nondistended.  Positive bowel sounds.    : No CVA tenderness  EXTREMITIES: Without edema.  NEUROLOGIC: grossly intact.  PSYCHIATRIC: Appropriate affect .  SKIN: No rash     [LABS:]                        8.6    6.99  )-----------( 260      ( 2024 05:20 )             30.0         143  |  104  |  11  ----------------------------<  102<H>  3.9   |  35<H>  |  0.73    Ca    9.2      2024 05:20    TPro  5.8<L>  /  Alb  2.5<L>  /  TBili  0.5  /  DBili  x   /  AST  11<L>  /  ALT  12  /  AlkPhos  51      PTT - ( 2024 05:20 )  PTT:68.1 sec      Reticulocyte Count (24 @ 06:45)  Reticulocyte Percent: 2.5 % [0.5 - 2.5]  Absolute Reticulocytes: 106.8 K/uL [25.0 - 125.0]    Iron - Total Binding Capacity.: 278 ug/dL [220 - 430] (24 @ 06:25)    Ferritin: 32 ng/mL [13 - 330] (24 @ 06:25)      Urinalysis Basic - ( 2024 05:20 )    Color: x / Appearance: x / SG: x / pH: x  Gluc: 102 mg/dL / Ketone: x  / Bili: x / Urobili: x   Blood: x / Protein: x / Nitrite: x   Leuk Esterase: x / RBC: x / WBC x   Sq Epi: x / Non Sq Epi: x / Bacteria: x        Culture - Urine (collected 2024 21:00)  Source: Clean Catch Clean Catch (Midstream)  Preliminary Report (2024 00:15):    >100,000 CFU/ml Escherichia coli    Culture - Blood (collected 2024 11:05)  Source: .Blood Blood-Peripheral  Preliminary Report (2024 16:02):    No growth at 48 Hours    Culture - Blood (collected 2024 09:45)  Source: .Blood Blood-Peripheral  Preliminary Report (2024 16:02):    No growth at 48 Hours      SARS-CoV-2: NotDetec (2024 14:17)  SARS-CoV-2: NotDetec (2024 01:10)  SARS-CoV-2: NotDetec (2024 10:48)        Culture - Urine (collected 2024 21:00)  Source: Clean Catch Clean Catch (Midstream)  Preliminary Report (2024 00:15):    >100,000 CFU/ml Escherichia coli    Culture - Blood (collected 2024 11:05)  Source: .Blood Blood-Peripheral  Preliminary Report (2024 16:02):    No growth at 48 Hours    Culture - Blood (collected 2024 09:45)  Source: .Blood Blood-Peripheral  Preliminary Report (2024 16:02):    No growth at 48 Hours        [RADIOLOGY STUDIES:]

## 2024-02-26 NOTE — PROGRESS NOTE ADULT - ASSESSMENT
IMPRESSION    69yo F w hx COPD on 4-4.5L O2, HTN, RA, anxiety, adm w 2wk hx of intermittent blood mixed w stool  Never had colonoscopy  No abdomen pain anorexia, weight loss  Colonoscopy 2/20 here large partially obstructing mass at ~13cm from anal verge sig for adenocarcinoma, MMR status still pending  CEA 17.8  CTchest 2/20 and CT a/p 2/13 no mets but new RML PE, duplex legs negative now on IV Heparin    Recent COVID 19 infection and RSV last month.     No further bleeds since adm, reports does have some SOB since adm but thought was due to anxiety, episode leg pain 2months ago  Seen by Surgery, for planned lap colon resection 2/29  Iron studies c/w deficiency post IV Venofer    -adenocarcinoma of colon assoc w bleed, no distant mets by imaging studies, for definitive surgery. Will followup final path to see if needs adjuvant therapy  -RML PE-likely provoked etiology/ malignancy associated, continue IV heparin, can change to oral DOAC when ready for discharge.   -anemia-w/u sig for iron deficiency post IV Venofer also due to continued GI bleed, monitor serial CBC, rishi w pt on Heparin for PE. Discussed w pt potential need for transfuion PRBC if cont to decrease/in prep for surgery      RECOMMENDATIONS   IMPRESSION    67yo F w hx COPD on 4-4.5L O2, HTN, RA, anxiety, adm w 2wk hx of intermittent blood mixed w stool  Never had colonoscopy  No abdomen pain anorexia, weight loss  Colonoscopy 2/20 here large partially obstructing mass at ~13cm from anal verge sig for adenocarcinoma, MMR status still pending  CEA 17.8  CTchest 2/20 and CT a/p 2/13 no mets but new RML PE, duplex legs negative now on IV Heparin    Recent COVID 19 infection and RSV last month.     No further bleeds since adm, reports does have some SOB since adm but thought was due to anxiety, episode leg pain 2months ago  Seen by Surgery, for planned lap colon resection 2/29  Iron studies c/w deficiency post IV Venofer    -adenocarcinoma of colon assoc w bleed, no distant mets by imaging studies, for definitive surgery. Will followup final path to see if needs adjuvant therapy  -RML PE-likely provoked etiology/ malignancy associated, continue IV heparin, can change to oral DOAC when ready for discharge.   -anemia-w/u sig for iron deficiency post IV Venofer also due to continued GI bleed, monitor serial CBC, rishi w pt on Heparin for PE. Discussed w pt potential need for transfuion PRBC if cont to decrease/in prep for surgery      d/w GI Dr Goodman, lesion in Sigmoid on colonoscopy evaluation.  s/p colonoscopy 2/20, large partially obstructing mass noted at approximately 13cm from anal verge  pathology noted, positive for adenocarcinoma  CEA elevated    s/p surgery eval    CT scan Abd neg for liver mets    RECOMMENDATIONS    Await pulm recommendations post workup    Cont heparin drip    Await surgical recommendations.     Check MRV of pelvis to eval for ilaic vein thrombosis.  Suspect PE due to confluence of COVID, RSV infection, and unbeknowst to pt, occult cancer, last month.   Possible pt may not have underlying DVT    further recommendations pending multidisciplinary discussion    pt high risk from hematology perspective for VTE risk.

## 2024-02-26 NOTE — PROGRESS NOTE ADULT - SUBJECTIVE AND OBJECTIVE BOX
68y Female admitted with Gastrointestinal hemorrhage    .    Patient seen and examined bedside resting comfortably. No complaints offered. No overnight events. No abdominal pain.  Denies nausea and vomiting. Tolerating diet. + flatus/BM, some episodes of bloody BM. Denies chest pain, dyspnea, cough, lower leg/calf tenderness.      T(F): 97.4 (02-26-24 @ 04:13), Max: 99.5 (02-25-24 @ 20:08)  HR: 92 (02-26-24 @ 04:13) (92 - 96)  BP: 130/74 (02-26-24 @ 04:13) (118/66 - 130/74)  RR: 18 (02-26-24 @ 04:13) (18 - 18)  SpO2: 93% (02-26-24 @ 04:13) (93% - 94%)  Wt(kg): --  CAPILLARY BLOOD GLUCOSE          PHYSICAL EXAM:  General: NAD, WDWN.   Neuro:  Alert & oriented x 3  CV: regular rate  Lung: no increased work of breathing on NC  Abdomen: soft, nontender, nondistended; no guarding, rebound tenderness or signs of peritonitis  Extremities: no calf tenderness bilaterally    LABS:                        8.6    6.99  )-----------( 260      ( 26 Feb 2024 05:20 )             30.0     02-26    143  |  104  |  11  ----------------------------<  102<H>  3.9   |  35<H>  |  0.73    Ca    9.2      26 Feb 2024 05:20    TPro  5.8<L>  /  Alb  2.5<L>  /  TBili  0.5  /  DBili  x   /  AST  11<L>  /  ALT  12  /  AlkPhos  51  02-25    PTT - ( 26 Feb 2024 05:20 )  PTT:68.1 sec  I&O's Detail    25 Feb 2024 07:01  -  26 Feb 2024 07:00  --------------------------------------------------------  IN:    Oral Fluid: 240 mL  Total IN: 240 mL    OUT:  Total OUT: 0 mL    Total NET: 240 mL        Impression: 69 y/o female w/ PMHx of COPD (on 4-4.5 L home oxygen intermittently), HTN, RA, anxiety presenting w/ bloody stools, found to have 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 Patho revealing adenocarcinoma. Afebrile overnight. VSS.     PLAN:  - Tentative plan for lap sigmoidectomy on 2/29, to ICU post-operatively  - Awaiting clearances:           - Cards: high risk given PE but optimized for OR          - Pulm: pending workup - awaiting PFTs, possibly today?          - Medicine: pending clearance/optimization          - heme/onc: to follow final path and assess need for adjuvant therapy, continue IV heparin, PO DOAC on DC, monitor H&H          - ICU: awaiting assessment  - UA resulted (+) leuk esterase and nitrites - ?treatment per primary team  - Encourage OOB/ambulation & incentive spirometry, supplemental O2 as needed  - Continue heparin drip  - Bowel regimen, GI following  - rest of care per primary team    Plan to be discussed with Dr. Rocha. Patient seen and examined with Trip Jimenez PGY5    Coby Willingham, PGY1  Spectralink: [id number]

## 2024-02-26 NOTE — PROGRESS NOTE ADULT - SUBJECTIVE AND OBJECTIVE BOX
[facility]  Bobby Mcdonald PA-C  [address] [place] [address]  [phone]      INTERVAL HPI/OVERNIGHT EVENTS:  Pt s/e,   no n/v reported +Bm today    MEDICATIONS  (STANDING):  amLODIPine   Tablet 10 milliGRAM(s) Oral daily  budesonide 160 MICROgram(s)/formoterol 4.5 MICROgram(s) Inhaler 2 Puff(s) Inhalation two times a day  heparin  Infusion.  Unit(s)/Hr (11 mL/Hr) IV Continuous <Continuous>  influenza  Vaccine (HIGH DOSE) 0.7 milliLiter(s) IntraMuscular once  lactulose Syrup 20 Gram(s) Oral two times a day  pantoprazole    Tablet 40 milliGRAM(s) Oral before breakfast  senna 2 Tablet(s) Oral at bedtime    MEDICATIONS  (PRN):  acetaminophen     Tablet .. 650 milliGRAM(s) Oral every 6 hours PRN Temp greater or equal to 38C (100.4F), Mild Pain (1 - 3)  albuterol/ipratropium for Nebulization 3 milliLiter(s) Nebulizer every 6 hours PRN Shortness of Breath and/or Wheezing  ALPRAZolam 0.25 milliGRAM(s) Oral daily PRN for anxiety  heparin   Injectable 4500 Unit(s) IV Push every 6 hours PRN For aPTT less than 40  heparin   Injectable 2000 Unit(s) IV Push every 6 hours PRN For aPTT between 40 - 57  melatonin 3 milliGRAM(s) Oral at bedtime PRN Insomnia  polyethylene glycol 3350 17 Gram(s) Oral daily PRN Constipation  traMADol 50 milliGRAM(s) Oral daily PRN Moderate Pain (4 - 6)      Allergies  No Known Allergies      PHYSICAL EXAM:   Vital Signs Last 24 Hrs  T(C): 36.9 (26 Feb 2024 11:45), Max: 37.5 (25 Feb 2024 20:08)  T(F): 98.5 (26 Feb 2024 11:45), Max: 99.5 (25 Feb 2024 20:08)  HR: 96 (26 Feb 2024 11:45) (92 - 96)  BP: 111/64 (26 Feb 2024 11:45) (111/64 - 130/74)  BP(mean): --  RR: 20 (26 Feb 2024 11:45) (18 - 20)  SpO2: 92% (26 Feb 2024 11:45) (92% - 94%)    Parameters below as of 26 Feb 2024 11:45  Patient On (Oxygen Delivery Method): nasal cannula    GENERAL:  Appears stated age  HEENT:  NC/AT  CHEST:  Full & symmetric excursion  HEART:  Regular rhythm  ABDOMEN:  Soft, non-tender, non-distended  EXTEREMITIES:  no cyanosis  SKIN:  No rash  NEURO:  Alert      LABS:                                             8.6    6.99  )-----------( 260      ( 26 Feb 2024 05:20 )             30.0     02-26    143  |  104  |  11  ----------------------------<  102<H>  3.9   |  35<H>  |  0.73    Ca    9.2      26 Feb 2024 05:20    TPro  5.8<L>  /  Alb  2.5<L>  /  TBili  0.5  /  DBili  x   /  AST  11<L>  /  ALT  12  /  AlkPhos  51  02-25

## 2024-02-26 NOTE — PROGRESS NOTE ADULT - SUBJECTIVE AND OBJECTIVE BOX
Patient is a 68y old  Female who presents with a chief complaint of GIB bleed (26 Feb 2024 09:22)      INTERVAL HPI/OVERNIGHT EVENTS:    denies shortness of breath or chest pain    MEDICATIONS  (STANDING):  amLODIPine   Tablet 10 milliGRAM(s) Oral daily  budesonide 160 MICROgram(s)/formoterol 4.5 MICROgram(s) Inhaler 2 Puff(s) Inhalation two times a day  cefTRIAXone   IVPB 1000 milliGRAM(s) IV Intermittent every 24 hours  heparin  Infusion.  Unit(s)/Hr (11 mL/Hr) IV Continuous <Continuous>  influenza  Vaccine (HIGH DOSE) 0.7 milliLiter(s) IntraMuscular once  lactulose Syrup 20 Gram(s) Oral two times a day  pantoprazole    Tablet 40 milliGRAM(s) Oral before breakfast  senna 2 Tablet(s) Oral at bedtime      MEDICATIONS  (PRN):  acetaminophen     Tablet .. 650 milliGRAM(s) Oral every 6 hours PRN Temp greater or equal to 38C (100.4F), Mild Pain (1 - 3)  albuterol/ipratropium for Nebulization 3 milliLiter(s) Nebulizer every 6 hours PRN Shortness of Breath and/or Wheezing  ALPRAZolam 0.25 milliGRAM(s) Oral daily PRN for anxiety  heparin   Injectable 4500 Unit(s) IV Push every 6 hours PRN For aPTT less than 40  heparin   Injectable 2000 Unit(s) IV Push every 6 hours PRN For aPTT between 40 - 57  melatonin 3 milliGRAM(s) Oral at bedtime PRN Insomnia  polyethylene glycol 3350 17 Gram(s) Oral daily PRN Constipation  traMADol 50 milliGRAM(s) Oral daily PRN Moderate Pain (4 - 6)      Allergies    No Known Allergies    Intolerances        PAST MEDICAL & SURGICAL HISTORY:  COPD (chronic obstructive pulmonary disease)      Rheumatoid arthritis      HTN (hypertension)      Anxiety      No significant past surgical history          Vital Signs Last 24 Hrs  T(C): 36.3 (26 Feb 2024 04:13), Max: 37.5 (25 Feb 2024 20:08)  T(F): 97.4 (26 Feb 2024 04:13), Max: 99.5 (25 Feb 2024 20:08)  HR: 92 (26 Feb 2024 04:13) (92 - 96)  BP: 130/74 (26 Feb 2024 04:13) (118/66 - 130/74)  BP(mean): --  RR: 18 (26 Feb 2024 04:13) (18 - 18)  SpO2: 93% (26 Feb 2024 04:13) (93% - 94%)    Parameters below as of 26 Feb 2024 04:13  Patient On (Oxygen Delivery Method): nasal cannula  O2 Flow (L/min): 2      PHYSICAL EXAMINATION:    GENERAL: The patient is awake and alert in no apparent distress.     HEENT: Head is normocephalic and atraumatic.     NECK: no JVD    LUNGS: diminished air entry bilateral    HEART: Regular rate and rhythm without murmur.    ABDOMEN: Soft, nontender, and nondistended.      EXTREMITIES: Without any cyanosis, clubbing, rash, lesions or edema.    NEUROLOGIC: Grossly intact.    SKIN: No ulceration or induration present.      LABS:                        8.6    6.99  )-----------( 260      ( 26 Feb 2024 05:20 )             30.0     02-26    143  |  104  |  11  ----------------------------<  102<H>  3.9   |  35<H>  |  0.73    Ca    9.2      26 Feb 2024 05:20    TPro  5.8<L>  /  Alb  2.5<L>  /  TBili  0.5  /  DBili  x   /  AST  11<L>  /  ALT  12  /  AlkPhos  51  02-25    PTT - ( 26 Feb 2024 05:20 )  PTT:68.1 sec  Urinalysis Basic - ( 26 Feb 2024 05:20 )    Color: x / Appearance: x / SG: x / pH: x  Gluc: 102 mg/dL / Ketone: x  / Bili: x / Urobili: x   Blood: x / Protein: x / Nitrite: x   Leuk Esterase: x / RBC: x / WBC x   Sq Epi: x / Non Sq Epi: x / Bacteria: x                  Procalcitonin, Serum: 0.23 ng/mL (02-24-24 @ 17:15)      MICROBIOLOGY:  Culture Results:   >100,000 CFU/ml Escherichia coli (02-24 @ 21:00)  Culture Results:   No growth at 24 hours (02-24 @ 11:05)  Culture Results:   No growth at 24 hours (02-24 @ 09:45)        Assessment:    Severe COPD  Colon CA  S/P Pulmonary emboli  Acute on Chronic Hypoxic respiratory failure  E. Coli UTI    Plan:    For pulmonary function testing today to help determine pulmonary risks for colon surgery  Anticoagulation with heparin drip  IV Rocephin  Symbicort inhaler  Duoneb PRN  Supplemental oxygen

## 2024-02-26 NOTE — PROGRESS NOTE ADULT - ASSESSMENT
67yo F with PMHx COPD (not on home oxygen), HTN, RA, anxiety admitted with GI bleed.    cardiac clearance, HTN, PE,  - a/w GIB, s/p colonoscopy 2/20, large partially obstructing mass noted at approximately 13cm from anal verge. Gi following  - surgery following, will likely require resection of specimen +/- Neoadjuvant chemoradiation, plan for 2/29  - CT chest with  Acute pulmonary embolus in the right middle lobe lobar and segmental branches, on heparin gtt for AC. PTT is subtherapeutic, would adjust accordingly. would consider a switch to Lovenox so PTTs do not have to be monitored, until prior to OR date.   - on 3L NC for supplemental 02,  on the basis of COPD and PE.  - wean as tolerated, on prn at home  - pulm following    - EKG demonstrates NSR with nonspecific twave abnormality  - Tele SR- ST, no events  - Patient euvolemic on examination with no overt signs of heart failure or cardiac ischemia.   - No severe valvular abnormalities noted on examination  - TTE 2/22 shows normal LV and RV size and function, EF 60-65%   - Pt has no active ischemia, decompensated heart failure, unstable arrythmia, or severe stenotic valvular disease. In the setting of a PE, will need to minimize the time off AC.  Patient at high risk for procedure given PE but benefit of procedure outweighs the risk. OK to proceed with the planned procedure    - BP controlled  - continue amlodipine 10mg qd with hold parameters    - Monitor and replete lytes, keep K>4, Mg>2.  - Will continue to follow.    Lavelle Carlin NP  Nurse Practitioner- Cardiology   Call TEAMS

## 2024-02-27 LAB
-  AMOXICILLIN/CLAVULANIC ACID: SIGNIFICANT CHANGE UP
-  AMPICILLIN/SULBACTAM: SIGNIFICANT CHANGE UP
-  AMPICILLIN: SIGNIFICANT CHANGE UP
-  AMPICILLIN: SIGNIFICANT CHANGE UP
-  AZTREONAM: SIGNIFICANT CHANGE UP
-  CEFAZOLIN: SIGNIFICANT CHANGE UP
-  CEFEPIME: SIGNIFICANT CHANGE UP
-  CEFOXITIN: SIGNIFICANT CHANGE UP
-  CEFTRIAXONE: SIGNIFICANT CHANGE UP
-  CEFUROXIME: SIGNIFICANT CHANGE UP
-  CIPROFLOXACIN: SIGNIFICANT CHANGE UP
-  CIPROFLOXACIN: SIGNIFICANT CHANGE UP
-  ERTAPENEM: SIGNIFICANT CHANGE UP
-  GENTAMICIN: SIGNIFICANT CHANGE UP
-  IMIPENEM: SIGNIFICANT CHANGE UP
-  LEVOFLOXACIN: SIGNIFICANT CHANGE UP
-  LEVOFLOXACIN: SIGNIFICANT CHANGE UP
-  MEROPENEM: SIGNIFICANT CHANGE UP
-  NITROFURANTOIN: SIGNIFICANT CHANGE UP
-  NITROFURANTOIN: SIGNIFICANT CHANGE UP
-  PIPERACILLIN/TAZOBACTAM: SIGNIFICANT CHANGE UP
-  TETRACYCLINE: SIGNIFICANT CHANGE UP
-  TOBRAMYCIN: SIGNIFICANT CHANGE UP
-  TRIMETHOPRIM/SULFAMETHOXAZOLE: SIGNIFICANT CHANGE UP
-  VANCOMYCIN: SIGNIFICANT CHANGE UP
ANION GAP SERPL CALC-SCNC: 4 MMOL/L — LOW (ref 5–17)
APTT BLD: 66.1 SEC — HIGH (ref 24.5–35.6)
BASOPHILS # BLD AUTO: 0.03 K/UL — SIGNIFICANT CHANGE UP (ref 0–0.2)
BASOPHILS NFR BLD AUTO: 0.5 % — SIGNIFICANT CHANGE UP (ref 0–2)
BUN SERPL-MCNC: 9 MG/DL — SIGNIFICANT CHANGE UP (ref 7–23)
CALCIUM SERPL-MCNC: 9.6 MG/DL — SIGNIFICANT CHANGE UP (ref 8.5–10.1)
CHLORIDE SERPL-SCNC: 105 MMOL/L — SIGNIFICANT CHANGE UP (ref 96–108)
CO2 SERPL-SCNC: 34 MMOL/L — HIGH (ref 22–31)
CREAT SERPL-MCNC: 0.79 MG/DL — SIGNIFICANT CHANGE UP (ref 0.5–1.3)
CULTURE RESULTS: ABNORMAL
EGFR: 82 ML/MIN/1.73M2 — SIGNIFICANT CHANGE UP
EOSINOPHIL # BLD AUTO: 0.12 K/UL — SIGNIFICANT CHANGE UP (ref 0–0.5)
EOSINOPHIL NFR BLD AUTO: 2 % — SIGNIFICANT CHANGE UP (ref 0–6)
GLUCOSE SERPL-MCNC: 98 MG/DL — SIGNIFICANT CHANGE UP (ref 70–99)
HCT VFR BLD CALC: 30.4 % — LOW (ref 34.5–45)
HGB BLD-MCNC: 8.7 G/DL — LOW (ref 11.5–15.5)
IMM GRANULOCYTES NFR BLD AUTO: 1.1 % — HIGH (ref 0–0.9)
LYMPHOCYTES # BLD AUTO: 1.08 K/UL — SIGNIFICANT CHANGE UP (ref 1–3.3)
LYMPHOCYTES # BLD AUTO: 17.6 % — SIGNIFICANT CHANGE UP (ref 13–44)
MCHC RBC-ENTMCNC: 23.2 PG — LOW (ref 27–34)
MCHC RBC-ENTMCNC: 28.6 GM/DL — LOW (ref 32–36)
MCV RBC AUTO: 81.1 FL — SIGNIFICANT CHANGE UP (ref 80–100)
METHOD TYPE: SIGNIFICANT CHANGE UP
METHOD TYPE: SIGNIFICANT CHANGE UP
MONOCYTES # BLD AUTO: 0.85 K/UL — SIGNIFICANT CHANGE UP (ref 0–0.9)
MONOCYTES NFR BLD AUTO: 13.9 % — SIGNIFICANT CHANGE UP (ref 2–14)
NEUTROPHILS # BLD AUTO: 3.97 K/UL — SIGNIFICANT CHANGE UP (ref 1.8–7.4)
NEUTROPHILS NFR BLD AUTO: 64.9 % — SIGNIFICANT CHANGE UP (ref 43–77)
NRBC # BLD: 0 /100 WBCS — SIGNIFICANT CHANGE UP (ref 0–0)
ORGANISM # SPEC MICROSCOPIC CNT: ABNORMAL
ORGANISM # SPEC MICROSCOPIC CNT: ABNORMAL
ORGANISM # SPEC MICROSCOPIC CNT: SIGNIFICANT CHANGE UP
PLATELET # BLD AUTO: 248 K/UL — SIGNIFICANT CHANGE UP (ref 150–400)
POTASSIUM SERPL-MCNC: 4 MMOL/L — SIGNIFICANT CHANGE UP (ref 3.5–5.3)
POTASSIUM SERPL-SCNC: 4 MMOL/L — SIGNIFICANT CHANGE UP (ref 3.5–5.3)
RBC # BLD: 3.75 M/UL — LOW (ref 3.8–5.2)
RBC # FLD: 22.7 % — HIGH (ref 10.3–14.5)
SODIUM SERPL-SCNC: 143 MMOL/L — SIGNIFICANT CHANGE UP (ref 135–145)
SPECIMEN SOURCE: SIGNIFICANT CHANGE UP
WBC # BLD: 6.12 K/UL — SIGNIFICANT CHANGE UP (ref 3.8–10.5)
WBC # FLD AUTO: 6.12 K/UL — SIGNIFICANT CHANGE UP (ref 3.8–10.5)

## 2024-02-27 PROCEDURE — 99232 SBSQ HOSP IP/OBS MODERATE 35: CPT

## 2024-02-27 PROCEDURE — 99222 1ST HOSP IP/OBS MODERATE 55: CPT

## 2024-02-27 RX ORDER — PIPERACILLIN AND TAZOBACTAM 4; .5 G/20ML; G/20ML
3.38 INJECTION, POWDER, LYOPHILIZED, FOR SOLUTION INTRAVENOUS ONCE
Refills: 0 | Status: COMPLETED | OUTPATIENT
Start: 2024-02-27 | End: 2024-02-27

## 2024-02-27 RX ORDER — PIPERACILLIN AND TAZOBACTAM 4; .5 G/20ML; G/20ML
3.38 INJECTION, POWDER, LYOPHILIZED, FOR SOLUTION INTRAVENOUS EVERY 8 HOURS
Refills: 0 | Status: DISCONTINUED | OUTPATIENT
Start: 2024-02-27 | End: 2024-02-29

## 2024-02-27 RX ADMIN — POLYETHYLENE GLYCOL 3350 17 GRAM(S): 17 POWDER, FOR SOLUTION ORAL at 18:48

## 2024-02-27 RX ADMIN — PIPERACILLIN AND TAZOBACTAM 25 GRAM(S): 4; .5 INJECTION, POWDER, LYOPHILIZED, FOR SOLUTION INTRAVENOUS at 21:29

## 2024-02-27 RX ADMIN — HEPARIN SODIUM 900 UNIT(S)/HR: 5000 INJECTION INTRAVENOUS; SUBCUTANEOUS at 04:54

## 2024-02-27 RX ADMIN — HEPARIN SODIUM 900 UNIT(S)/HR: 5000 INJECTION INTRAVENOUS; SUBCUTANEOUS at 07:33

## 2024-02-27 RX ADMIN — Medication 3 MILLIGRAM(S): at 21:29

## 2024-02-27 RX ADMIN — TRAMADOL HYDROCHLORIDE 50 MILLIGRAM(S): 50 TABLET ORAL at 22:29

## 2024-02-27 RX ADMIN — BUDESONIDE AND FORMOTEROL FUMARATE DIHYDRATE 2 PUFF(S): 160; 4.5 AEROSOL RESPIRATORY (INHALATION) at 05:32

## 2024-02-27 RX ADMIN — HEPARIN SODIUM 900 UNIT(S)/HR: 5000 INJECTION INTRAVENOUS; SUBCUTANEOUS at 07:47

## 2024-02-27 RX ADMIN — Medication 10 MILLIGRAM(S): at 08:16

## 2024-02-27 RX ADMIN — HEPARIN SODIUM 900 UNIT(S)/HR: 5000 INJECTION INTRAVENOUS; SUBCUTANEOUS at 19:15

## 2024-02-27 RX ADMIN — PIPERACILLIN AND TAZOBACTAM 200 GRAM(S): 4; .5 INJECTION, POWDER, LYOPHILIZED, FOR SOLUTION INTRAVENOUS at 11:29

## 2024-02-27 RX ADMIN — PIPERACILLIN AND TAZOBACTAM 25 GRAM(S): 4; .5 INJECTION, POWDER, LYOPHILIZED, FOR SOLUTION INTRAVENOUS at 15:30

## 2024-02-27 RX ADMIN — AMLODIPINE BESYLATE 10 MILLIGRAM(S): 2.5 TABLET ORAL at 05:30

## 2024-02-27 RX ADMIN — TRAMADOL HYDROCHLORIDE 50 MILLIGRAM(S): 50 TABLET ORAL at 21:29

## 2024-02-27 RX ADMIN — BUDESONIDE AND FORMOTEROL FUMARATE DIHYDRATE 2 PUFF(S): 160; 4.5 AEROSOL RESPIRATORY (INHALATION) at 18:48

## 2024-02-27 RX ADMIN — PANTOPRAZOLE SODIUM 40 MILLIGRAM(S): 20 TABLET, DELAYED RELEASE ORAL at 05:30

## 2024-02-27 NOTE — PROGRESS NOTE ADULT - SUBJECTIVE AND OBJECTIVE BOX
[facility] -- Alexa Ramirez Pannella, Patel, Savella, Goodger, Cohen  Office # [phone]    Follow Up:    PE, cardiac optimization    Subjective/Observations: Pt seen and examined, awake, alert, resting comfortably in bed, denies chest pain, dyspnea, palpitations or dizziness, orthopnea and PND. on O2 via NC, hep gtt  infusing, no events overnight     REVIEW OF SYSTEMS: All other review of systems is negative unless indicated above  PAST MEDICAL & SURGICAL HISTORY:  COPD (chronic obstructive pulmonary disease)      Rheumatoid arthritis      HTN (hypertension)      Anxiety      No significant past surgical history        MEDICATIONS  (STANDING):  amLODIPine   Tablet 10 milliGRAM(s) Oral daily  budesonide 160 MICROgram(s)/formoterol 4.5 MICROgram(s) Inhaler 2 Puff(s) Inhalation two times a day  cefTRIAXone   IVPB 1000 milliGRAM(s) IV Intermittent every 24 hours  heparin  Infusion.  Unit(s)/Hr (11 mL/Hr) IV Continuous <Continuous>  influenza  Vaccine (HIGH DOSE) 0.7 milliLiter(s) IntraMuscular once  lactulose Syrup 20 Gram(s) Oral two times a day  pantoprazole    Tablet 40 milliGRAM(s) Oral before breakfast  predniSONE   Tablet 10 milliGRAM(s) Oral daily  senna 2 Tablet(s) Oral at bedtime    MEDICATIONS  (PRN):  acetaminophen     Tablet .. 650 milliGRAM(s) Oral every 6 hours PRN Temp greater or equal to 38C (100.4F), Mild Pain (1 - 3)  albuterol/ipratropium for Nebulization 3 milliLiter(s) Nebulizer every 6 hours PRN Shortness of Breath and/or Wheezing  ALPRAZolam 0.25 milliGRAM(s) Oral daily PRN for anxiety  heparin   Injectable 4500 Unit(s) IV Push every 6 hours PRN For aPTT less than 40  heparin   Injectable 2000 Unit(s) IV Push every 6 hours PRN For aPTT between 40 - 57  melatonin 3 milliGRAM(s) Oral at bedtime PRN Insomnia  polyethylene glycol 3350 17 Gram(s) Oral daily PRN Constipation  traMADol 50 milliGRAM(s) Oral daily PRN Moderate Pain (4 - 6)    Allergies    No Known Allergies    Intolerances      Vital Signs Last 24 Hrs  T(C): 36.7 (27 Feb 2024 05:12), Max: 36.9 (26 Feb 2024 11:45)  T(F): 98 (27 Feb 2024 05:12), Max: 98.5 (26 Feb 2024 11:45)  HR: 94 (27 Feb 2024 05:12) (82 - 96)  BP: 131/72 (27 Feb 2024 05:12) (111/64 - 131/72)  BP(mean): --  RR: 19 (27 Feb 2024 05:12) (19 - 20)  SpO2: 93% (27 Feb 2024 05:12) (92% - 94%)    Parameters below as of 27 Feb 2024 05:12  Patient On (Oxygen Delivery Method): nasal cannula      I&O's Summary        TELE: SR/ST 90 's up to 120's non sustained   PHYSICAL EXAM:  Constitutional: NAD, awake and alert, frail   HEENT: Moist Mucous Membranes, Anicteric  Pulmonary: Non-labored, breath sounds are clear bilaterally, No wheezing, rales or rhonchi  Cardiovascular: Regular, S1 and S2, No murmurs, rubs, gallops or clicks  Gastrointestinal: Bowel Sounds present, soft, nontender. distended   Lymph: No peripheral edema. No lymphadenopathy.  Skin: No visible rashes or ulcers.  Psych:  Mood & affect appropriate  LABS: All Labs Reviewed:                        8.7    6.12  )-----------( 248      ( 27 Feb 2024 05:20 )             30.4                         8.6    6.99  )-----------( 260      ( 26 Feb 2024 05:20 )             30.0                         8.7    7.72  )-----------( 253      ( 25 Feb 2024 05:35 )             29.5     27 Feb 2024 05:20    143    |  105    |  9      ----------------------------<  98     4.0     |  34     |  0.79   26 Feb 2024 05:20    143    |  104    |  11     ----------------------------<  102    3.9     |  35     |  0.73   25 Feb 2024 05:35    143    |  104    |  13     ----------------------------<  107    3.8     |  35     |  0.81     Ca    9.6        27 Feb 2024 05:20  Ca    9.2        26 Feb 2024 05:20  Ca    9.2        25 Feb 2024 05:35    TPro  5.8    /  Alb  2.5    /  TBili  0.5    /  DBili  x      /  AST  11     /  ALT  12     /  AlkPhos  51     25 Feb 2024 05:35    PTT - ( 27 Feb 2024 05:20 )  PTT:66.1 sec      12 Lead ECG:   Ventricular Rate 98 BPM    Atrial Rate 98 BPM    P-R Interval 154 ms    QRS Duration 76 ms    Q-T Interval 340 ms    QTC Calculation(Bazett) 434 ms    P Axis 88 degrees    R Axis 51 degrees    T Axis 96 degrees    Diagnosis Line Normal sinus rhythm  Minimal voltage criteria for LVH, may be normal variant ( Sokolow-Jason )  Nonspecific T wave abnormality  Abnormal ECG  When compared with ECG of 19-JAN-2024 11:54,  No significant change was found  Confirmed by ANETTE SEALS ([id number]) on 2/16/2024 6:48:17 PM (02-15-24 @ 23:33)      TRANSTHORACIC ECHOCARDIOGRAM REPORT  ________________________________________________________________________________                                      _______       Pt. Name:       KENDALL RIDER Study Date:    2/22/2024  MRN:            [id number]             YOB: 1955  Accession #:    [id number]            Age:           68 years  Account#:       [id number]           Gender:        F  Heart Rate:                          Height:        64.96 in (165.00 cm)  Rhythm:                      Weight:        130.07 lb (59.00 kg)  Blood Pressure: 129/71 mmHg          BSA/BMI:       1.65 m² / 21.67 kg/m²  ________________________________________________________________________________________  Referring Physician:    [id number] Robbie Boone  Interpreting Physician: Anette Seals  Primary Sonographer:    Judy Braga RDCS    CPT:               ECHO TTE WO CON COMP W DOPP - 98919.m  Indication(s):     Dyspnea, unspecified - R06.00  Procedure:         Transthoracic echocardiogram with 2-D, M-mode and complete                     spectral and color flow Doppler.  Ordering Location: [facility]  Admission Status:  Inpatient    _______________________________________________________________________________________     CONCLUSIONS:      1. Left ventricular systolic function is normal with an ejection fraction visually estimated at 60 to 65 %.   2. There is normal LV mass and concentric remodeling.   3. Normal right ventricular cavity size and normal systolic function.  4. The left atrium is normal.   5. Structurally normal mitral valve with normal leaflet excursion.   6. Trileaflet aortic valve with normal systolic excursion.   7. Structurally normal tricuspid valve with normal leaflet excursion. Pulmonary artery systolic pressure could not be estimated.   8. No pericardial effusion seen.    ________________________________________________________________________________________  FINDINGS:     Left Ventricle:  Left ventricular systolic function is normal with an ejection fraction visually estimated at 60 to 65%. There is normal LV mass and concentric remodeling.     Right Ventricle:  The right ventricular cavity is normal in size and normal systolic function.     Left Atrium:  The left atrium is normal.     Right Atrium:  The right atrium is normal in size.     Aortic Valve:  The aortic valve appears trileaflet with normal systolic excursion.     Mitral Valve:  Structurally normal mitral valve with normal leaflet excursion.     Tricuspid Valve:  Structurally normal tricuspid valve with normal leaflet excursion. There is insufficient tricuspid regurgitation detected to calculate pulmonary artery systolic pressure.     Pulmonic Valve:  The pulmonic valve was not well visualized.     Pericardium:  No pericardial effusion seen.     Systemic Veins:  The inferior vena cava is dilated (dilated >2.1cm) with normal inspiratory collapse (normal >50%) consistent with mildly elevated right atrial pressure (~8, range 5-10mmHg).  ____________________________________________________________________  QUANTITATIVE DATA:  Left Ventricle Measurements: (Indexed to BSA)     IVSd (2D):   1.4 cm  LVPWd (2D):  1.1 cm  LVIDd (2D):  3.1 cm  LVIDs (2D):  2.1 cm  LV Mass:     122 g  74.0 g/m²  Visualized LV EF%: 60 to 65%     MV E Vmax:    0.75 m/s  MV A Vmax:    0.79 m/s  MV E/A:       0.95  e' lateral:   15.70 cm/s  e' medial:    10.90 cm/s  E/e' lateral: 4.77  E/e' medial:  6.87  E/e' Average: 5.63  MV DT:        211 msec    Aorta Measurements: (Normal range) (Indexed to BSA)     Sinuses of Valsalva: 2.80 cm (2.7 - 3.3 cm)       Left Atrium Measurements: (Indexed to BSA)  LA Diam 2D: 2.80 cm    Mitral Valve Measurements:     MV E Vmax: 0.7 m/s  MV A Vmax: 0.8 m/s  MV E/A:    1.0       Tricuspid Valve Measurements:     RA Pressure: 8 mmHg    ________________________________________________________________________________________  Electronically signed on 2/22/2024 at 1:42:01 PM by Anette Seals         *** Final ***

## 2024-02-27 NOTE — PROGRESS NOTE ADULT - SUBJECTIVE AND OBJECTIVE BOX
Patient is a 68y old  Female who presents with a chief complaint of GIB bleed (27 Feb 2024 11:47)      INTERVAL HPI/OVERNIGHT EVENTS:    no change in respiratory status    MEDICATIONS  (STANDING):  amLODIPine   Tablet 10 milliGRAM(s) Oral daily  budesonide 160 MICROgram(s)/formoterol 4.5 MICROgram(s) Inhaler 2 Puff(s) Inhalation two times a day  heparin  Infusion.  Unit(s)/Hr (11 mL/Hr) IV Continuous <Continuous>  influenza  Vaccine (HIGH DOSE) 0.7 milliLiter(s) IntraMuscular once  pantoprazole    Tablet 40 milliGRAM(s) Oral before breakfast  piperacillin/tazobactam IVPB.. 3.375 Gram(s) IV Intermittent every 8 hours  predniSONE   Tablet 10 milliGRAM(s) Oral daily  senna 2 Tablet(s) Oral at bedtime      MEDICATIONS  (PRN):  acetaminophen     Tablet .. 650 milliGRAM(s) Oral every 6 hours PRN Temp greater or equal to 38C (100.4F), Mild Pain (1 - 3)  albuterol/ipratropium for Nebulization 3 milliLiter(s) Nebulizer every 6 hours PRN Shortness of Breath and/or Wheezing  ALPRAZolam 0.25 milliGRAM(s) Oral daily PRN for anxiety  heparin   Injectable 4500 Unit(s) IV Push every 6 hours PRN For aPTT less than 40  heparin   Injectable 2000 Unit(s) IV Push every 6 hours PRN For aPTT between 40 - 57  melatonin 3 milliGRAM(s) Oral at bedtime PRN Insomnia  polyethylene glycol 3350 17 Gram(s) Oral daily PRN Constipation  traMADol 50 milliGRAM(s) Oral daily PRN Moderate Pain (4 - 6)      Allergies    No Known Allergies    Intolerances        PAST MEDICAL & SURGICAL HISTORY:  COPD (chronic obstructive pulmonary disease)      Rheumatoid arthritis      HTN (hypertension)      Anxiety      No significant past surgical history          Vital Signs Last 24 Hrs  T(C): 36.8 (27 Feb 2024 20:08), Max: 37.7 (27 Feb 2024 11:48)  T(F): 98.3 (27 Feb 2024 20:08), Max: 99.8 (27 Feb 2024 11:48)  HR: 93 (27 Feb 2024 20:08) (93 - 100)  BP: 125/73 (27 Feb 2024 20:08) (111/66 - 131/72)  BP(mean): --  RR: 18 (27 Feb 2024 20:08) (18 - 19)  SpO2: 94% (27 Feb 2024 20:08) (93% - 94%)    Parameters below as of 27 Feb 2024 20:08  Patient On (Oxygen Delivery Method): nasal cannula        PHYSICAL EXAMINATION:    GENERAL: The patient is awake and alert in no apparent distress.     HEENT: Head is normocephalic and atraumatic    NECK: no JVD    LUNGS: diminished air entry bilateral    HEART: Regular rate and rhythm without murmur.    ABDOMEN: Soft, nontender, and nondistended.      EXTREMITIES: Without any cyanosis, clubbing, rash, lesions or edema.    NEUROLOGIC: Grossly intact.    SKIN: No ulceration or induration present.      LABS:                        8.7    6.12  )-----------( 248      ( 27 Feb 2024 05:20 )             30.4     02-27    143  |  105  |  9   ----------------------------<  98  4.0   |  34<H>  |  0.79    Ca    9.6      27 Feb 2024 05:20      PTT - ( 27 Feb 2024 05:20 )  PTT:66.1 sec  Urinalysis Basic - ( 27 Feb 2024 05:20 )    Color: x / Appearance: x / SG: x / pH: x  Gluc: 98 mg/dL / Ketone: x  / Bili: x / Urobili: x   Blood: x / Protein: x / Nitrite: x   Leuk Esterase: x / RBC: x / WBC x   Sq Epi: x / Non Sq Epi: x / Bacteria: x                      MICROBIOLOGY:  Culture Results:   >100,000 CFU/ml Escherichia coli  >100,000 CFU/ml Enterococcus faecalis (02-24 @ 21:00)  Culture Results:   No growth at 72 Hours (02-24 @ 11:05)  Culture Results:   No growth at 72 Hours (02-24 @ 09:45)      Spirometry performed 2/26/2024 revealed severe obstructive ventilatory impairment with moderate reduction of forced vital capacity    FVC   1.65      FEV1    0.56     FEV1/FVC    34%    Assessment:    Severe COPD  Colon CA    Plan:    Continue present medications  Patient would benefit from transfer to [facility] for colon surgery  She is at moderate risk of post-op respiratory issues    Plan:     Patient is a 68y old  Female who presents with a chief complaint of GIB bleed (27 Feb 2024 11:47)      INTERVAL HPI/OVERNIGHT EVENTS:    no change in respiratory status    MEDICATIONS  (STANDING):  amLODIPine   Tablet 10 milliGRAM(s) Oral daily  budesonide 160 MICROgram(s)/formoterol 4.5 MICROgram(s) Inhaler 2 Puff(s) Inhalation two times a day  heparin  Infusion.  Unit(s)/Hr (11 mL/Hr) IV Continuous <Continuous>  influenza  Vaccine (HIGH DOSE) 0.7 milliLiter(s) IntraMuscular once  pantoprazole    Tablet 40 milliGRAM(s) Oral before breakfast  piperacillin/tazobactam IVPB.. 3.375 Gram(s) IV Intermittent every 8 hours  predniSONE   Tablet 10 milliGRAM(s) Oral daily  senna 2 Tablet(s) Oral at bedtime      MEDICATIONS  (PRN):  acetaminophen     Tablet .. 650 milliGRAM(s) Oral every 6 hours PRN Temp greater or equal to 38C (100.4F), Mild Pain (1 - 3)  albuterol/ipratropium for Nebulization 3 milliLiter(s) Nebulizer every 6 hours PRN Shortness of Breath and/or Wheezing  ALPRAZolam 0.25 milliGRAM(s) Oral daily PRN for anxiety  heparin   Injectable 4500 Unit(s) IV Push every 6 hours PRN For aPTT less than 40  heparin   Injectable 2000 Unit(s) IV Push every 6 hours PRN For aPTT between 40 - 57  melatonin 3 milliGRAM(s) Oral at bedtime PRN Insomnia  polyethylene glycol 3350 17 Gram(s) Oral daily PRN Constipation  traMADol 50 milliGRAM(s) Oral daily PRN Moderate Pain (4 - 6)      Allergies    No Known Allergies    Intolerances        PAST MEDICAL & SURGICAL HISTORY:  COPD (chronic obstructive pulmonary disease)      Rheumatoid arthritis      HTN (hypertension)      Anxiety      No significant past surgical history          Vital Signs Last 24 Hrs  T(C): 36.8 (27 Feb 2024 20:08), Max: 37.7 (27 Feb 2024 11:48)  T(F): 98.3 (27 Feb 2024 20:08), Max: 99.8 (27 Feb 2024 11:48)  HR: 93 (27 Feb 2024 20:08) (93 - 100)  BP: 125/73 (27 Feb 2024 20:08) (111/66 - 131/72)  BP(mean): --  RR: 18 (27 Feb 2024 20:08) (18 - 19)  SpO2: 94% (27 Feb 2024 20:08) (93% - 94%)    Parameters below as of 27 Feb 2024 20:08  Patient On (Oxygen Delivery Method): nasal cannula        PHYSICAL EXAMINATION:    GENERAL: The patient is awake and alert in no apparent distress.     HEENT: Head is normocephalic and atraumatic    NECK: no JVD    LUNGS: diminished air entry bilateral    HEART: Regular rate and rhythm without murmur.    ABDOMEN: Soft, nontender, and nondistended.      EXTREMITIES: Without any cyanosis, clubbing, rash, lesions or edema.    NEUROLOGIC: Grossly intact.    SKIN: No ulceration or induration present.      LABS:                        8.7    6.12  )-----------( 248      ( 27 Feb 2024 05:20 )             30.4     02-27    143  |  105  |  9   ----------------------------<  98  4.0   |  34<H>  |  0.79    Ca    9.6      27 Feb 2024 05:20      PTT - ( 27 Feb 2024 05:20 )  PTT:66.1 sec  Urinalysis Basic - ( 27 Feb 2024 05:20 )    Color: x / Appearance: x / SG: x / pH: x  Gluc: 98 mg/dL / Ketone: x  / Bili: x / Urobili: x   Blood: x / Protein: x / Nitrite: x   Leuk Esterase: x / RBC: x / WBC x   Sq Epi: x / Non Sq Epi: x / Bacteria: x                      MICROBIOLOGY:  Culture Results:   >100,000 CFU/ml Escherichia coli  >100,000 CFU/ml Enterococcus faecalis (02-24 @ 21:00)  Culture Results:   No growth at 72 Hours (02-24 @ 11:05)  Culture Results:   No growth at 72 Hours (02-24 @ 09:45)      Spirometry performed 2/26/2024 revealed severe obstructive ventilatory impairment with moderate reduction of forced vital capacity    FVC   1.65      FEV1    0.56     FEV1/FVC    34%    Assessment:    Severe COPD  Pulmonary emboli  Colon CA    Plan:    Continue anticoagulation  Patient would benefit from transfer to [facility] for colon surgery  She is at moderate risk of post-op respiratory issues  Duoneb QID   Patient is a 68y old  Female who presents with a chief complaint of GIB bleed (27 Feb 2024 11:47)      INTERVAL HPI/OVERNIGHT EVENTS:    no change in respiratory status    MEDICATIONS  (STANDING):  amLODIPine   Tablet 10 milliGRAM(s) Oral daily  budesonide 160 MICROgram(s)/formoterol 4.5 MICROgram(s) Inhaler 2 Puff(s) Inhalation two times a day  heparin  Infusion.  Unit(s)/Hr (11 mL/Hr) IV Continuous <Continuous>  influenza  Vaccine (HIGH DOSE) 0.7 milliLiter(s) IntraMuscular once  pantoprazole    Tablet 40 milliGRAM(s) Oral before breakfast  piperacillin/tazobactam IVPB.. 3.375 Gram(s) IV Intermittent every 8 hours  predniSONE   Tablet 10 milliGRAM(s) Oral daily  senna 2 Tablet(s) Oral at bedtime      MEDICATIONS  (PRN):  acetaminophen     Tablet .. 650 milliGRAM(s) Oral every 6 hours PRN Temp greater or equal to 38C (100.4F), Mild Pain (1 - 3)  albuterol/ipratropium for Nebulization 3 milliLiter(s) Nebulizer every 6 hours PRN Shortness of Breath and/or Wheezing  ALPRAZolam 0.25 milliGRAM(s) Oral daily PRN for anxiety  heparin   Injectable 4500 Unit(s) IV Push every 6 hours PRN For aPTT less than 40  heparin   Injectable 2000 Unit(s) IV Push every 6 hours PRN For aPTT between 40 - 57  melatonin 3 milliGRAM(s) Oral at bedtime PRN Insomnia  polyethylene glycol 3350 17 Gram(s) Oral daily PRN Constipation  traMADol 50 milliGRAM(s) Oral daily PRN Moderate Pain (4 - 6)      Allergies    No Known Allergies    Intolerances        PAST MEDICAL & SURGICAL HISTORY:  COPD (chronic obstructive pulmonary disease)      Rheumatoid arthritis      HTN (hypertension)      Anxiety      No significant past surgical history          Vital Signs Last 24 Hrs  T(C): 36.8 (27 Feb 2024 20:08), Max: 37.7 (27 Feb 2024 11:48)  T(F): 98.3 (27 Feb 2024 20:08), Max: 99.8 (27 Feb 2024 11:48)  HR: 93 (27 Feb 2024 20:08) (93 - 100)  BP: 125/73 (27 Feb 2024 20:08) (111/66 - 131/72)  BP(mean): --  RR: 18 (27 Feb 2024 20:08) (18 - 19)  SpO2: 94% (27 Feb 2024 20:08) (93% - 94%)    Parameters below as of 27 Feb 2024 20:08  Patient On (Oxygen Delivery Method): nasal cannula        PHYSICAL EXAMINATION:    GENERAL: The patient is awake and alert in no apparent distress.     HEENT: Head is normocephalic and atraumatic    NECK: no JVD    LUNGS: diminished air entry bilateral    HEART: Regular rate and rhythm without murmur.    ABDOMEN: Soft, nontender, and nondistended.      EXTREMITIES: Without any cyanosis, clubbing, rash, lesions or edema.    NEUROLOGIC: Grossly intact.    SKIN: No ulceration or induration present.      LABS:                        8.7    6.12  )-----------( 248      ( 27 Feb 2024 05:20 )             30.4     02-27    143  |  105  |  9   ----------------------------<  98  4.0   |  34<H>  |  0.79    Ca    9.6      27 Feb 2024 05:20      PTT - ( 27 Feb 2024 05:20 )  PTT:66.1 sec  Urinalysis Basic - ( 27 Feb 2024 05:20 )    Color: x / Appearance: x / SG: x / pH: x  Gluc: 98 mg/dL / Ketone: x  / Bili: x / Urobili: x   Blood: x / Protein: x / Nitrite: x   Leuk Esterase: x / RBC: x / WBC x   Sq Epi: x / Non Sq Epi: x / Bacteria: x                      MICROBIOLOGY:  Culture Results:   >100,000 CFU/ml Escherichia coli  >100,000 CFU/ml Enterococcus faecalis (02-24 @ 21:00)  Culture Results:   No growth at 72 Hours (02-24 @ 11:05)  Culture Results:   No growth at 72 Hours (02-24 @ 09:45)      Spirometry performed 2/26/2024 revealed severe obstructive ventilatory impairment with moderate reduction of forced vital capacity    FVC   1.65      FEV1    0.56     FEV1/FVC    34%    Assessment:    Severe COPD  Pulmonary emboli  Colon CA    Plan:    Continue anticoagulation  Addendum (2/28/2024)  discussed risks and benefits of surgical procedure with patient, her family and the surgical team. In view of the history of COPD, the patient may require post-op mechanical ventilation on a respiration. However, it is likely that she will be able to be extubated in the recovery room. Highly recommend post-op care in the ICU for several days, depending on clinical condition. The patient is aware that she is at risk for post-op respiratory complications, in view of the history of severe COPD. She will require nebulized bronchodilators post-op, as well as pain control, and incentive spirometry. I will follow her on a daily basis post-op, along with the ICU team.  She is at moderate risk of post-op respiratory issues  Duoneb QID

## 2024-02-27 NOTE — PROGRESS NOTE ADULT - ASSESSMENT
67yo F with PMHx COPD (not on home oxygen), HTN, RA, anxiety presenting with chief complaint of blood in stools. Admitted for GI bleed, plan for colonoscopy 2/20.        Problem/Plan - 1:  ·  Problem: Colonic mass.   ·  Plan: Patient presents with BRBPR, diarrhea with clots 2/2 lower GI bleed   CT AP: Masslike mural thickening of the distal sigmoid colon. Differential considerations include malignancy or, less likely, sequelae of chronic diverticulitis. Gastroenterology evaluation is recommended. Scattered too small to characterize hypodense foci in the liver, hepatic protocol MRI is recommended for further characterization.  -Patient unable to get outpatient colonoscopy (no insurance)  -HGB currently stable  -Hold home aspirin  -Vital signs currently stable, continue to monitor  -Transfuse prn   -Colonoscopy 2/20 with: large partially obstructing mass noted at approximately 13cm from anal verge.  Pathology with ADENOCARCINOMA, AT LEAST INTRAMUCOSAL.  -DW surgery - recs CT chest with IV contrast, CEA and rectal MR for staging - per radiology unable to get staging MRI done at this facility as it requires a different magnet  -CT chest: No evidence of metastatic disease. Acute pulmonary embolus in the right middle lobe lobar and segmental branches.  -Plan for lap sigmoidectomy on 2/29.  Cardiology preop evaluation noted.  Echo:  Left ventricular systolic function is normal with an ejection fraction visually estimated at 60 to 65 %.  Pt. is without s/s of ACS, decompensated CHF, or unstable arrhythmia.  Pt. is considered high risk for procedure given recently diagnosed pulmonary embolism.  Benefit of procedure outweighs the risk.  May proceed with planned lap sigmoidectomy on 2/29.    -GI and surgery following - follow up recs.     Problem/Plan - 2:  ·  Problem: COPD (chronic obstructive pulmonary disease).   ·  Plan: Chronic, recently admitted for COPD exacerbation  - Duonebs q6 PRN  - Continue home inhalers   - Supplemental O2 PRN. At baseline: 4-4.5 L home oxygen intermittently. COPD patient will keep SpO2 goal 88-93%   - Monitor respiratory status   - 3 weeks ago had rsv, recent rvp negative  - Continuous pulse ox  - PFT on 2/26 for preop eval  - Pulmonary Dr. Dobson f/u     Problem/Plan - 3:  ·  Problem: HTN (hypertension).   ·  Plan: Chronic, stable  - Continue home amlodipine 10mg qd with hold parameters  - Monitor hemodynamics  - DASH/TLC diet.     Problem/Plan - 4:  ·  Problem: Rheumatoid arthritis.   ·  Plan: - Chronic  - resume prednisone 10mg PO Daily. Takes tramadol 50mg Qd.     Problem/Plan - 5:  ·  Problem: Anxiety.   ·  Plan: - Chronic, SOB likely 2/2 anxiety  - Continue Xanax 0.25mg qhs PRN.     Problem/Plan - 6:  ·  Problem: Encounter for deep vein thrombosis (DVT) prophylaxis.   ·  Plan: - on heparin drip for pulmonary embolism    #Pulmonary embolism:  CT chest with No evidence of metastatic disease. Acute pulmonary embolus in the right middle lobe lobar and segmental branches.  Continue heparin drip.  Echo  Left ventricular systolic function is normal with an ejection fraction visually estimated at 60 to 65 %.      #Fever/UTI:  blood cx NGTD.  Urine cx +E. coli and enterococcus faecalis.  CXR Unchanged chest with COPD and slight blunting right base laterally.  RVP negative.  On Ceftriaxone 1gm IV daily.  ID consult requested.

## 2024-02-27 NOTE — PROGRESS NOTE ADULT - SUBJECTIVE AND OBJECTIVE BOX
68y Female admitted with Gastrointestinal hemorrhage    .    Patient seen and examined bedside resting comfortably. No complaints offered. No abdominal pain. Denies nausea and vomiting. Tolerating diet. + flatus/BM.  Denies chest pain, dyspnea, cough, lower leg/calf tenderness.      T(F): 98 (02-27-24 @ 05:12), Max: 98.5 (02-26-24 @ 11:45)  HR: 94 (02-27-24 @ 05:12) (82 - 96)  BP: 131/72 (02-27-24 @ 05:12) (111/64 - 131/72)  RR: 19 (02-27-24 @ 05:12) (19 - 20)  SpO2: 93% (02-27-24 @ 05:12) (92% - 94%)  Wt(kg): --  CAPILLARY BLOOD GLUCOSE          PHYSICAL EXAM:  General: NAD, WDWN.   Neuro:  Alert & oriented x 3  CV: regular rate  Lung: no increased work of breathing on NC  Abdomen: soft, NTND, no guarding, rebound tenderness or signs of peritonitis  Extremities: no calf tenderness, warm and well perfused    LABS:                        8.7    6.12  )-----------( 248      ( 27 Feb 2024 05:20 )             30.4     02-27    143  |  105  |  9   ----------------------------<  98  4.0   |  34<H>  |  0.79    Ca    9.6      27 Feb 2024 05:20      PTT - ( 27 Feb 2024 05:20 )  PTT:66.1 sec  I&O's Detail        RADIOLOGY:    Impression: 67 y/o female w/ PMHx of COPD (on 4-4.5 L home oxygen intermittently), HTN, RA, anxiety presenting w/ bloody stools, found to have 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 Patho revealing adenocarcinoma. Afebrile overnight. VSS. CT venogram performed yesterday showed no abdominal/pelvic venous occlusions.     PLAN:  - preop tomorrow with maurice's prep and golytely  - plan for lap sigmoidectomy on 2/29, to ICU post-operatively  - Awaiting clearances:           - Cards: high risk given PE but optimized for OR          - Pulm: pending workup - fu PFT results          - Medicine: pending clearance/optimization          - heme/onc: to follow final path and assess need for adjuvant therapy, continue IV heparin, PO DOAC on DC, monitor H&H          - ICU: fu preoperative assessment          - vascular surgery following: awaiting recs  - UA resulted (+) leuk esterase and nitrites - now on ceftriaxone  - Encourage OOB/ambulation & incentive spirometry, supplemental O2 as needed  - Continue heparin drip  - Bowel regimen, GI following  - rest of care per primary team    Plan to be discussed with Dr. Rocha. Patient seen and examined with Trip Jimenez PGY5    Coby Willingham, PGY1  Spectralink: [phone]

## 2024-02-27 NOTE — PROGRESS NOTE ADULT - ASSESSMENT
67yo F with PMHx COPD (not on home oxygen), HTN, RA, anxiety admitted with GI bleed.    cardiac clearance, HTN, PE,  - a/w GIB, s/p colonoscopy 2/20, large partially obstructing mass noted at approximately 13cm from anal verge. Gi following  - surgery following, will likely require resection of specimen +/- Neoadjuvant chemoradiation, plan for 2/29  - CT chest with  Acute pulmonary embolus in the right middle lobe lobar and segmental branches  - on heparin gtt for AC  - on 3L NC for supplemental 02, on the basis of COPD and PE.  - wean as tolerated, on prn at home  - no sign of volume overload on exam   - No severe valvular abnormalities noted on examination  - TTE 2/22 shows normal LV and RV size and function, EF 60-65%   - pulm following    - EKG: NSR with nonspecific TWA  - no  anginal complaints     - Tele SR/ST 90-up to 120's non sustained   - BP stable and controlled   - continue amlodipine 10mg qd with hold parameters  - Monitor and replete Lytes. Keep K > 4 and Mg > 2    - Pt has no active ischemia, decompensated heart failure, unstable arrythmia, or severe stenotic valvular disease. In the setting of a PE, will need to minimize the time off AC.  Patient at high risk for procedure given PE but benefit of procedure outweighs the risk. OK to proceed with the planned procedure    - Will continue to follow.    Karissa Arias [facility]  Nurse Practitioner - Cardiology   call TEAMS

## 2024-02-27 NOTE — PROGRESS NOTE ADULT - SUBJECTIVE AND OBJECTIVE BOX
[facility]  Bobby Mcdonald PA-C  [address] [place] [address]  [phone]      INTERVAL HPI/OVERNIGHT EVENTS:  Pt s/e    MEDICATIONS  (STANDING):  amLODIPine   Tablet 10 milliGRAM(s) Oral daily  budesonide 160 MICROgram(s)/formoterol 4.5 MICROgram(s) Inhaler 2 Puff(s) Inhalation two times a day  heparin  Infusion.  Unit(s)/Hr (11 mL/Hr) IV Continuous <Continuous>  influenza  Vaccine (HIGH DOSE) 0.7 milliLiter(s) IntraMuscular once  lactulose Syrup 20 Gram(s) Oral two times a day  pantoprazole    Tablet 40 milliGRAM(s) Oral before breakfast  piperacillin/tazobactam IVPB. 3.375 Gram(s) IV Intermittent once  piperacillin/tazobactam IVPB.- 3.375 Gram(s) IV Intermittent once  piperacillin/tazobactam IVPB.. 3.375 Gram(s) IV Intermittent every 8 hours  predniSONE   Tablet 10 milliGRAM(s) Oral daily  senna 2 Tablet(s) Oral at bedtime    MEDICATIONS  (PRN):  acetaminophen     Tablet .. 650 milliGRAM(s) Oral every 6 hours PRN Temp greater or equal to 38C (100.4F), Mild Pain (1 - 3)  albuterol/ipratropium for Nebulization 3 milliLiter(s) Nebulizer every 6 hours PRN Shortness of Breath and/or Wheezing  ALPRAZolam 0.25 milliGRAM(s) Oral daily PRN for anxiety  heparin   Injectable 4500 Unit(s) IV Push every 6 hours PRN For aPTT less than 40  heparin   Injectable 2000 Unit(s) IV Push every 6 hours PRN For aPTT between 40 - 57  melatonin 3 milliGRAM(s) Oral at bedtime PRN Insomnia  polyethylene glycol 3350 17 Gram(s) Oral daily PRN Constipation  traMADol 50 milliGRAM(s) Oral daily PRN Moderate Pain (4 - 6)      Allergies  No Known Allergies      PHYSICAL EXAM:   Vital Signs:  Vital Signs Last 24 Hrs  T(C): 36.7 (2024 05:12), Max: 36.9 (2024 11:45)  T(F): 98 (2024 05:12), Max: 98.5 (2024 11:45)  HR: 94 (2024 05:12) (82 - 96)  BP: 131/72 (2024 05:12) (111/64 - 131/72)  BP(mean): --  RR: 19 (2024 05:12) (19 - 20)  SpO2: 93% (2024 05:12) (92% - 94%)    Parameters below as of 2024 05:12  Patient On (Oxygen Delivery Method): nasal cannula      Daily     Daily Weight in k.6 (2024 05:12)    GENERAL:  Appears stated age  HEENT:  NC/AT  CHEST:  Full & symmetric excursion  HEART:  Regular rhythm  ABDOMEN:  Soft, non-tender, non-distended  EXTEREMITIES:  no cyanosis  SKIN:  No rash  NEURO:  Alert      LABS:                        8.7    6.12  )-----------( 248      ( 2024 05:20 )             30.4     02    143  |  105  |  9   ----------------------------<  98  4.0   |  34<H>  |  0.79    Ca    9.6      2024 05:20      PTT - ( 2024 05:20 )  PTT:66.1 sec  Urinalysis Basic - ( 2024 05:20 )    Color: x / Appearance: x / SG: x / pH: x  Gluc: 98 mg/dL / Ketone: x  / Bili: x / Urobili: x   Blood: x / Protein: x / Nitrite: x   Leuk Esterase: x / RBC: x / WBC x   Sq Epi: x / Non Sq Epi: x / Bacteria: x

## 2024-02-27 NOTE — PROGRESS NOTE ADULT - ASSESSMENT
Surg. Att.  Pt. had PFT's last night.Results are pending.  ICU team will evaluate the pt. as well.  Surgery is scheduled for Thursday.  Pt. and the family are educated in regards of upcoming surgery. Surg. Att.  Pt. had PFT's last night.Results are pending.  ICU team will evaluate the pt. as well.  Surgery is scheduled for Thursday.  Pt. and the family are educated in regards of upcoming surgery.  Will re discuss with Pulmonary consultant.

## 2024-02-27 NOTE — CASE MANAGEMENT PROGRESS NOTE - NSCMPROGRESSNOTE_GEN_ALL_CORE
Update Communication Note: As per morning rounds on [address], On Heparin gtt PE in lung, pending MRI ? Mass. Patient not ready for Discharge, will continue to follow patient.

## 2024-02-27 NOTE — CONSULT NOTE ADULT - SUBJECTIVE AND OBJECTIVE BOX
[facility]  INFECTIOUS DISEASES   [address] [place] [address]  Tel: [phone]     Fax: [phone]  ========================================================  MD Velasquez Scott Kaushal, MD Cho, Michelle, MD Sunjit, Jaspal, MD  ========================================================    MRN-[id number]  KENDALL RIDER     CC: Patient is a 68y old  Female who presents with a chief complaint of GIB bleed (27 Feb 2024 11:43)    HPI:  67yo F with PMHx COPD (on 4-4.5 L home oxygen intermittently), HTN, RA, anxiety presenting with chief complaint of blood in stools. Patient states for the last two months, she has intermittently noticed blood in stool. She states she has episodes where she has to run to the bathroom to relieve herself, and has a watery, bloody BM with clots. Other times she has soft formed stools without blood. Patient denies any abdominal pain, nausea, vomiting weight loss, constipation or melena. Patient was recently seen at [facility] ED on 2/12/24 with similar symptoms, CT showed "Masslike mural thickening of the distal sigmoid colon." Patient states she doesn't have insurance and is unable to follow up with GI outpatient for further evaluation. Patient also has mild shortness of breath on admission, states it is mainly due to anxiety, and chronically takes Xanax 0.25 mg daily as needed. She has COPD and sometimes needs to use home O2. She states currently doesn't feel like shes having a COPD exacerbation.       ED Course:   Vitals: BP: 144/77, HR: 98, Temp: 98.1, RR: 20, SpO2: 100% on 3L  Labs: FOBT positive, HGB 10.6, MCV 77.5  CT AP:  Masslike mural thickening of the distal sigmoid colon. Differential   considerations include malignancy or, less likely, sequelae of chronic   diverticulitis. Gastroenterology evaluation is recommended.    Scattered too small to characterize hypodense foci in the liver, hepatic   protocol MRI is recommended for further characterization.    EKG: Pending  Received in the ED:  Protonix   (16 Feb 2024 00:02)    PAST MEDICAL & SURGICAL HISTORY:  COPD (chronic obstructive pulmonary disease)  Rheumatoid arthritis  HTN (hypertension)  Anxiety  No significant past surgical history    Social Hx: No current smoking, EtOH or drugs     FAMILY HISTORY:  FHx: lung cancer    Allergies  No Known Allergies    MEDICATIONS  (STANDING):  amLODIPine   Tablet 10 milliGRAM(s) Oral daily  budesonide 160 MICROgram(s)/formoterol 4.5 MICROgram(s) Inhaler 2 Puff(s) Inhalation two times a day  heparin  Infusion.  Unit(s)/Hr (11 mL/Hr) IV Continuous <Continuous>  influenza  Vaccine (HIGH DOSE) 0.7 milliLiter(s) IntraMuscular once  lactulose Syrup 20 Gram(s) Oral two times a day  pantoprazole    Tablet 40 milliGRAM(s) Oral before breakfast  piperacillin/tazobactam IVPB. 3.375 Gram(s) IV Intermittent once  piperacillin/tazobactam IVPB.- 3.375 Gram(s) IV Intermittent once  piperacillin/tazobactam IVPB.. 3.375 Gram(s) IV Intermittent every 8 hours  predniSONE   Tablet 10 milliGRAM(s) Oral daily  senna 2 Tablet(s) Oral at bedtime    MEDICATIONS  (PRN):  acetaminophen     Tablet .. 650 milliGRAM(s) Oral every 6 hours PRN Temp greater or equal to 38C (100.4F), Mild Pain (1 - 3)  albuterol/ipratropium for Nebulization 3 milliLiter(s) Nebulizer every 6 hours PRN Shortness of Breath and/or Wheezing  ALPRAZolam 0.25 milliGRAM(s) Oral daily PRN for anxiety  heparin   Injectable 4500 Unit(s) IV Push every 6 hours PRN For aPTT less than 40  heparin   Injectable 2000 Unit(s) IV Push every 6 hours PRN For aPTT between 40 - 57  melatonin 3 milliGRAM(s) Oral at bedtime PRN Insomnia  polyethylene glycol 3350 17 Gram(s) Oral daily PRN Constipation  traMADol 50 milliGRAM(s) Oral daily PRN Moderate Pain (4 - 6)     REVIEW OF SYSTEMS:  CONSTITUTIONAL:  No Fever or chills  HEENT:  No diplopia or blurred vision.  No sore throat or runny nose.  CARDIOVASCULAR:  No chest pain   RESPIRATORY:  No cough, shortness of breath, PND or orthopnea.  GASTROINTESTINAL:  No nausea, vomiting or diarrhea.  GENITOURINARY:  No dysuria, frequency or urgency. No Blood in urine  MUSCULOSKELETAL:  no joint aches, no muscle pain  SKIN:  No change in skin, hair or nails.    Physical Exam:  Vital Signs Last 24 Hrs  T(C): 36.7 (27 Feb 2024 05:12), Max: 36.9 (26 Feb 2024 20:29)  T(F): 98 (27 Feb 2024 05:12), Max: 98.5 (26 Feb 2024 20:29)  HR: 94 (27 Feb 2024 05:12) (82 - 94)  BP: 131/72 (27 Feb 2024 05:12) (116/66 - 131/72)  BP(mean): --  RR: 19 (27 Feb 2024 05:12) (19 - 20)  SpO2: 93% (27 Feb 2024 05:12) (93% - 94%)  Parameters below as of 27 Feb 2024 05:12  Patient On (Oxygen Delivery Method): nasal cannula  GEN: NAD  HEENT: normocephalic and atraumatic. EOMI. PERRL.    NECK: Supple.  No lymphadenopathy   LUNGS: Clear to auscultation.  HEART: Regular rate and rhythm without murmur.  ABDOMEN: Soft, nontender, and nondistended.  Positive bowel sounds.    : No CVA tenderness  EXTREMITIES: Without edema.  NEUROLOGIC: grossly intact.  PSYCHIATRIC: Appropriate affect .  SKIN: No rash     Labs:  02-27    143  |  105  |  9   ----------------------------<  98  4.0   |  34<H>  |  0.79    Ca    9.6      27 Feb 2024 05:20                      8.7    6.12  )-----------( 248      ( 27 Feb 2024 05:20 )             30.4     PTT - ( 27 Feb 2024 05:20 )  PTT:66.1 sec  Urinalysis Basic - ( 27 Feb 2024 05:20 )    Color: x / Appearance: x / SG: x / pH: x  Gluc: 98 mg/dL / Ketone: x  / Bili: x / Urobili: x   Blood: x / Protein: x / Nitrite: x   Leuk Esterase: x / RBC: x / WBC x   Sq Epi: x / Non Sq Epi: x / Bacteria: x    Procalcitonin, Serum: 0.23 ng/mL (02-24-24 @ 17:15)    SARS-CoV-2: NotDetec (02-24-24 @ 14:17)  SARS-CoV-2: NotDetec (02-16-24 @ 01:10)    RECENT CULTURES:  02-24 @ 21:00 Clean Catch Clean Catch (Midstream)     >100,000 CFU/ml Escherichia coli  >100,000 CFU/ml Enterococcus faecalis    02-24 @ 14:17      NotDetec    02-24 @ 11:05 .Blood Blood-Peripheral     No growth at 48 Hours    02-24 @ 09:45 .Blood Blood-Peripheral     No growth at 48 Hours    02-16 @ 01:10      NotDetec    All imaging and other data have been reviewed.  < from: CT Abdomen and Pelvis w/ IV Cont (02.26.24 @ 18:57) >  IMPRESSION:  No evidence of acute thrombosis in the IVC, iliac veins and common   femoral veins bilaterally.  Redemonstration of a large rectosigmoid mass as described concerning for   neoplasm. No evidence of colonic obstruction.  Proximal to the rectosigmoid mass there is concern for developing acute,   noncomplicated sigmoid diverticulitis.  Numerous subcentimeter hepatic hypodensities are not characterized due to   small size, however for some, metastatic disease is not excluded.    Assessment and Plan:   67yo F with PMHx COPD (on 4-4.5 L home oxygen intermittently), HTN, RA, anxiety presenting with chief complaint of blood in stools.   CT showed a large colonic mass for which has colonoscopy and biopsy showing adenocarcinoma for which has been scheduled for surgery on 2/29.  CT also concerning for developing diverticulitis but she has no abdominal pain or fever.   Her UA was abnormal and UC is growing ecoli and enterococcus, she was on ceftriaxone and ID called for further recommendations.  Since she has a large mass in area and concern for diverticulitis will cover GI annetta and also UC until after surgery.      # UTI  # Large colon mass  # Diverticulitis?    - Blood cultures negative   - UA with large WBC and UC with ecoli and enterococcus faecalis will follow sensitives   - Stop Ceftriaxone   - Start Zosyn 3.375gm q8  - Based on culture results and surgical findings will decide about the length of treatment     Thank you for courtesy of this consult.     Will follow.  Discussed with the primary team.     Lenora Mondragon MD  Division of Infectious Diseases   Please call ID service at [phone] with any question.    75 minutes spent on total encounter assessing patient, examination, chart review, counseling and coordinating care by the attending physician/nurse/care manager.

## 2024-02-27 NOTE — PROGRESS NOTE ADULT - SUBJECTIVE AND OBJECTIVE BOX
CHIEF COMPLAINT/INTERVAL HISTORY:  Pt. seen and evaluated for colon mass and pulmonary embolism.  Pt. is in no distress.  Denies having any CP or SOB.  Tolerating heparin drip.  Hbg stable.  Scant bleeding with BMs.      REVIEW OF SYSTEMS:  No fever, CP, SOB, or abdominal pain    Vital Signs Last 24 Hrs  T(C): 36.7 (27 Feb 2024 05:12), Max: 36.9 (26 Feb 2024 11:45)  T(F): 98 (27 Feb 2024 05:12), Max: 98.5 (26 Feb 2024 11:45)  HR: 94 (27 Feb 2024 05:12) (82 - 96)  BP: 131/72 (27 Feb 2024 05:12) (111/64 - 131/72)  BP(mean): --  RR: 19 (27 Feb 2024 05:12) (19 - 20)  SpO2: 93% (27 Feb 2024 05:12) (92% - 94%)    Parameters below as of 27 Feb 2024 05:12  Patient On (Oxygen Delivery Method): nasal cannula        PHYSICAL EXAM:  GENERAL: NAD  HEENT: EOMI, hearing normal, conjunctiva and sclera clear  Chest: CTA bilaterally, no wheezing  CV: S1S2, RRR,   GI: soft, +BS, NT/ND  Musculoskeletal: no edema  Psychiatric: affect nL, mood nL  Skin: warm and dry    LABS:                        8.7    6.12  )-----------( 248      ( 27 Feb 2024 05:20 )             30.4     02-27    143  |  105  |  9   ----------------------------<  98  4.0   |  34<H>  |  0.79    Ca    9.6      27 Feb 2024 05:20      PTT - ( 27 Feb 2024 05:20 )  PTT:66.1 sec  Urinalysis Basic - ( 27 Feb 2024 05:20 )    Color: x / Appearance: x / SG: x / pH: x  Gluc: 98 mg/dL / Ketone: x  / Bili: x / Urobili: x   Blood: x / Protein: x / Nitrite: x   Leuk Esterase: x / RBC: x / WBC x   Sq Epi: x / Non Sq Epi: x / Bacteria: x

## 2024-02-28 ENCOUNTER — TRANSCRIPTION ENCOUNTER (OUTPATIENT)
Age: 69
End: 2024-02-28

## 2024-02-28 LAB
ANION GAP SERPL CALC-SCNC: 6 MMOL/L — SIGNIFICANT CHANGE UP (ref 5–17)
APTT BLD: 67.8 SEC — HIGH (ref 24.5–35.6)
BASOPHILS # BLD AUTO: 0.03 K/UL — SIGNIFICANT CHANGE UP (ref 0–0.2)
BASOPHILS NFR BLD AUTO: 0.5 % — SIGNIFICANT CHANGE UP (ref 0–2)
BUN SERPL-MCNC: 10 MG/DL — SIGNIFICANT CHANGE UP (ref 7–23)
CALCIUM SERPL-MCNC: 9.9 MG/DL — SIGNIFICANT CHANGE UP (ref 8.5–10.1)
CHLORIDE SERPL-SCNC: 104 MMOL/L — SIGNIFICANT CHANGE UP (ref 96–108)
CO2 SERPL-SCNC: 33 MMOL/L — HIGH (ref 22–31)
CREAT SERPL-MCNC: 0.91 MG/DL — SIGNIFICANT CHANGE UP (ref 0.5–1.3)
EGFR: 69 ML/MIN/1.73M2 — SIGNIFICANT CHANGE UP
EOSINOPHIL # BLD AUTO: 0.08 K/UL — SIGNIFICANT CHANGE UP (ref 0–0.5)
EOSINOPHIL NFR BLD AUTO: 1.4 % — SIGNIFICANT CHANGE UP (ref 0–6)
GLUCOSE SERPL-MCNC: 116 MG/DL — HIGH (ref 70–99)
HCT VFR BLD CALC: 28.9 % — LOW (ref 34.5–45)
HGB BLD-MCNC: 8.5 G/DL — LOW (ref 11.5–15.5)
IMM GRANULOCYTES NFR BLD AUTO: 1.1 % — HIGH (ref 0–0.9)
LYMPHOCYTES # BLD AUTO: 0.77 K/UL — LOW (ref 1–3.3)
LYMPHOCYTES # BLD AUTO: 13.6 % — SIGNIFICANT CHANGE UP (ref 13–44)
MAGNESIUM SERPL-MCNC: 2 MG/DL — SIGNIFICANT CHANGE UP (ref 1.6–2.6)
MCHC RBC-ENTMCNC: 23.5 PG — LOW (ref 27–34)
MCHC RBC-ENTMCNC: 29.4 GM/DL — LOW (ref 32–36)
MCV RBC AUTO: 79.8 FL — LOW (ref 80–100)
MONOCYTES # BLD AUTO: 0.54 K/UL — SIGNIFICANT CHANGE UP (ref 0–0.9)
MONOCYTES NFR BLD AUTO: 9.5 % — SIGNIFICANT CHANGE UP (ref 2–14)
NEUTROPHILS # BLD AUTO: 4.2 K/UL — SIGNIFICANT CHANGE UP (ref 1.8–7.4)
NEUTROPHILS NFR BLD AUTO: 73.9 % — SIGNIFICANT CHANGE UP (ref 43–77)
NRBC # BLD: 0 /100 WBCS — SIGNIFICANT CHANGE UP (ref 0–0)
PLATELET # BLD AUTO: 278 K/UL — SIGNIFICANT CHANGE UP (ref 150–400)
POTASSIUM SERPL-MCNC: 3.9 MMOL/L — SIGNIFICANT CHANGE UP (ref 3.5–5.3)
POTASSIUM SERPL-SCNC: 3.9 MMOL/L — SIGNIFICANT CHANGE UP (ref 3.5–5.3)
RBC # BLD: 3.62 M/UL — LOW (ref 3.8–5.2)
RBC # FLD: 22.4 % — HIGH (ref 10.3–14.5)
SODIUM SERPL-SCNC: 143 MMOL/L — SIGNIFICANT CHANGE UP (ref 135–145)
WBC # BLD: 5.68 K/UL — SIGNIFICANT CHANGE UP (ref 3.8–10.5)
WBC # FLD AUTO: 5.68 K/UL — SIGNIFICANT CHANGE UP (ref 3.8–10.5)

## 2024-02-28 PROCEDURE — 99233 SBSQ HOSP IP/OBS HIGH 50: CPT

## 2024-02-28 PROCEDURE — 99232 SBSQ HOSP IP/OBS MODERATE 35: CPT

## 2024-02-28 RX ORDER — SODIUM CHLORIDE 9 MG/ML
1000 INJECTION INTRAMUSCULAR; INTRAVENOUS; SUBCUTANEOUS
Refills: 0 | Status: DISCONTINUED | OUTPATIENT
Start: 2024-02-28 | End: 2024-02-29

## 2024-02-28 RX ORDER — NEOMYCIN SULFATE 500 MG/1
1000 TABLET ORAL
Refills: 0 | Status: COMPLETED | OUTPATIENT
Start: 2024-02-28 | End: 2024-02-28

## 2024-02-28 RX ORDER — SOD SULF/SODIUM/NAHCO3/KCL/PEG
4000 SOLUTION, RECONSTITUTED, ORAL ORAL ONCE
Refills: 0 | Status: DISCONTINUED | OUTPATIENT
Start: 2024-02-28 | End: 2024-02-28

## 2024-02-28 RX ORDER — SOD SULF/SODIUM/NAHCO3/KCL/PEG
1000 SOLUTION, RECONSTITUTED, ORAL ORAL EVERY 4 HOURS
Refills: 0 | Status: COMPLETED | OUTPATIENT
Start: 2024-02-28 | End: 2024-02-28

## 2024-02-28 RX ORDER — ERYTHROMYCIN ETHYLSUCCINATE 400 MG
1000 TABLET ORAL
Refills: 0 | Status: COMPLETED | OUTPATIENT
Start: 2024-02-28 | End: 2024-02-28

## 2024-02-28 RX ORDER — ONDANSETRON 8 MG/1
4 TABLET, FILM COATED ORAL EVERY 6 HOURS
Refills: 0 | Status: DISCONTINUED | OUTPATIENT
Start: 2024-02-28 | End: 2024-02-29

## 2024-02-28 RX ADMIN — SODIUM CHLORIDE 100 MILLILITER(S): 9 INJECTION INTRAMUSCULAR; INTRAVENOUS; SUBCUTANEOUS at 13:51

## 2024-02-28 RX ADMIN — HEPARIN SODIUM 900 UNIT(S)/HR: 5000 INJECTION INTRAVENOUS; SUBCUTANEOUS at 10:52

## 2024-02-28 RX ADMIN — ONDANSETRON 4 MILLIGRAM(S): 8 TABLET, FILM COATED ORAL at 21:05

## 2024-02-28 RX ADMIN — HEPARIN SODIUM 900 UNIT(S)/HR: 5000 INJECTION INTRAVENOUS; SUBCUTANEOUS at 07:19

## 2024-02-28 RX ADMIN — SENNA PLUS 2 TABLET(S): 8.6 TABLET ORAL at 21:06

## 2024-02-28 RX ADMIN — Medication 1000 MILLIGRAM(S): at 13:52

## 2024-02-28 RX ADMIN — NEOMYCIN SULFATE 1000 MILLIGRAM(S): 500 TABLET ORAL at 14:57

## 2024-02-28 RX ADMIN — HEPARIN SODIUM 900 UNIT(S)/HR: 5000 INJECTION INTRAVENOUS; SUBCUTANEOUS at 19:10

## 2024-02-28 RX ADMIN — PIPERACILLIN AND TAZOBACTAM 25 GRAM(S): 4; .5 INJECTION, POWDER, LYOPHILIZED, FOR SOLUTION INTRAVENOUS at 21:05

## 2024-02-28 RX ADMIN — Medication 1000 MILLIGRAM(S): at 14:58

## 2024-02-28 RX ADMIN — BUDESONIDE AND FORMOTEROL FUMARATE DIHYDRATE 2 PUFF(S): 160; 4.5 AEROSOL RESPIRATORY (INHALATION) at 06:00

## 2024-02-28 RX ADMIN — PIPERACILLIN AND TAZOBACTAM 25 GRAM(S): 4; .5 INJECTION, POWDER, LYOPHILIZED, FOR SOLUTION INTRAVENOUS at 05:59

## 2024-02-28 RX ADMIN — Medication 10 MILLIGRAM(S): at 05:55

## 2024-02-28 RX ADMIN — PANTOPRAZOLE SODIUM 40 MILLIGRAM(S): 20 TABLET, DELAYED RELEASE ORAL at 05:58

## 2024-02-28 RX ADMIN — NEOMYCIN SULFATE 1000 MILLIGRAM(S): 500 TABLET ORAL at 13:52

## 2024-02-28 RX ADMIN — HEPARIN SODIUM 900 UNIT(S)/HR: 5000 INJECTION INTRAVENOUS; SUBCUTANEOUS at 07:16

## 2024-02-28 RX ADMIN — AMLODIPINE BESYLATE 10 MILLIGRAM(S): 2.5 TABLET ORAL at 05:55

## 2024-02-28 RX ADMIN — Medication 1000 MILLIGRAM(S): at 21:06

## 2024-02-28 RX ADMIN — NEOMYCIN SULFATE 1000 MILLIGRAM(S): 500 TABLET ORAL at 21:06

## 2024-02-28 RX ADMIN — Medication 0.25 MILLIGRAM(S): at 13:03

## 2024-02-28 RX ADMIN — BUDESONIDE AND FORMOTEROL FUMARATE DIHYDRATE 2 PUFF(S): 160; 4.5 AEROSOL RESPIRATORY (INHALATION) at 19:09

## 2024-02-28 RX ADMIN — PIPERACILLIN AND TAZOBACTAM 25 GRAM(S): 4; .5 INJECTION, POWDER, LYOPHILIZED, FOR SOLUTION INTRAVENOUS at 13:52

## 2024-02-28 RX ADMIN — Medication 1000 MILLILITER(S): at 13:48

## 2024-02-28 NOTE — PROGRESS NOTE ADULT - SUBJECTIVE AND OBJECTIVE BOX
[facility] -- Alexa Ramirez Pannella, Patel, Savella, Goodger, Cohen: Office # [phone]    Follow Up:    PE, cardiac optimization    Subjective/Observations: Pt seen and examined, awake, alert, resting comfortably in bed, denies chest pain, dyspnea, palpitations or dizziness, orthopnea and PND. on O2 via NC, hep gtt  infusing, no events overnight     REVIEW OF SYSTEMS: All other review of systems are negative unless indicated above    PAST MEDICAL & SURGICAL HISTORY:  COPD (chronic obstructive pulmonary disease)      Rheumatoid arthritis      HTN (hypertension)      Anxiety      No significant past surgical history      MEDICATIONS  (STANDING):  amLODIPine   Tablet 10 milliGRAM(s) Oral daily  budesonide 160 MICROgram(s)/formoterol 4.5 MICROgram(s) Inhaler 2 Puff(s) Inhalation two times a day  heparin  Infusion.  Unit(s)/Hr (11 mL/Hr) IV Continuous <Continuous>  influenza  Vaccine (HIGH DOSE) 0.7 milliLiter(s) IntraMuscular once  pantoprazole    Tablet 40 milliGRAM(s) Oral before breakfast  piperacillin/tazobactam IVPB.. 3.375 Gram(s) IV Intermittent every 8 hours  predniSONE   Tablet 10 milliGRAM(s) Oral daily  senna 2 Tablet(s) Oral at bedtime    MEDICATIONS  (PRN):  acetaminophen     Tablet .. 650 milliGRAM(s) Oral every 6 hours PRN Temp greater or equal to 38C (100.4F), Mild Pain (1 - 3)  albuterol/ipratropium for Nebulization 3 milliLiter(s) Nebulizer every 6 hours PRN Shortness of Breath and/or Wheezing  ALPRAZolam 0.25 milliGRAM(s) Oral daily PRN for anxiety  heparin   Injectable 4500 Unit(s) IV Push every 6 hours PRN For aPTT less than 40  heparin   Injectable 2000 Unit(s) IV Push every 6 hours PRN For aPTT between 40 - 57  melatonin 3 milliGRAM(s) Oral at bedtime PRN Insomnia  polyethylene glycol 3350 17 Gram(s) Oral daily PRN Constipation  traMADol 50 milliGRAM(s) Oral daily PRN Moderate Pain (4 - 6)    Allergies    No Known Allergies    Intolerances      Vital Signs Last 24 Hrs  T(C): 36.6 (28 Feb 2024 05:16), Max: 37.7 (27 Feb 2024 11:48)  T(F): 97.9 (28 Feb 2024 05:16), Max: 99.8 (27 Feb 2024 11:48)  HR: 91 (28 Feb 2024 05:16) (91 - 100)  BP: 127/71 (28 Feb 2024 05:16) (111/66 - 127/71)  BP(mean): --  RR: 18 (28 Feb 2024 05:16) (18 - 18)  SpO2: 94% (28 Feb 2024 05:16) (94% - 94%)    Parameters below as of 28 Feb 2024 05:16  Patient On (Oxygen Delivery Method): nasal cannula      I&O's Summary    27 Feb 2024 07:01  -  28 Feb 2024 07:00  --------------------------------------------------------  IN: 680 mL / OUT: 0 mL / NET: 680 mL          TELE:   PHYSICAL EXAM:  Constitutional: NAD, awake and alert  HEENT: Moist Mucous Membranes, Anicteric  Pulmonary: Non-labored, breath sounds are clear bilaterally, No wheezing, rales or rhonchi  Cardiovascular: Regular, S1 and S2, No murmurs, No rubs, gallops or clicks  Gastrointestinal:  soft, nontender, nondistended   Lymph: No peripheral edema. No lymphadenopathy.   Skin: No visible rashes or ulcers.  Psych:  Mood & affect appropriate      LABS: All Labs Reviewed:                        8.7    6.12  )-----------( 248      ( 27 Feb 2024 05:20 )             30.4                         8.6    6.99  )-----------( 260      ( 26 Feb 2024 05:20 )             30.0     27 Feb 2024 05:20    143    |  105    |  9      ----------------------------<  98     4.0     |  34     |  0.79   26 Feb 2024 05:20    143    |  104    |  11     ----------------------------<  102    3.9     |  35     |  0.73     Ca    9.6        27 Feb 2024 05:20  Ca    9.2        26 Feb 2024 05:20       PTT - ( 27 Feb 2024 05:20 )  PTT:66.1 secCreatine Kinase, Serum: 20 U/L (02-21-24 @ 07:00)  Troponin I, High Sensitivity Result: 5.8 ng/L (02-21-24 @ 07:00)  Cholesterol: 213 mg/dL (02-17-24 @ 06:45)  HDL Cholesterol: 64 mg/dL (02-17-24 @ 06:45)  Triglycerides, Serum: 84 mg/dL (02-17-24 @ 06:45)    12 Lead ECG:   Ventricular Rate 98 BPM    Atrial Rate 98 BPM    P-R Interval 154 ms    QRS Duration 76 ms    Q-T Interval 340 ms    QTC Calculation(Bazett) 434 ms    P Axis 88 degrees    R Axis 51 degrees    T Axis 96 degrees    Diagnosis Line Normal sinus rhythm  Minimal voltage criteria for LVH, may be normal variant ( Sokolow-Jason )  Nonspecific T wave abnormality  Abnormal ECG  When compared with ECG of 19-JAN-2024 11:54,  No significant change was found  Confirmed by ANETTE SEALS ([id number]) on 2/16/2024 6:48:17 PM (02-15-24 @ 23:33)      TRANSTHORACIC ECHOCARDIOGRAM REPORT  ________________________________________________________________________________                                      _______       Pt. Name:       KENDALL RIDER Study Date:    2/22/2024  MRN:            [id number]             YOB: 1955  Accession #:    [id number]            Age:           68 years  Account#:       [id number]           Gender:        F  Heart Rate:                          Height:        64.96 in (165.00 cm)  Rhythm:                      Weight:        130.07 lb (59.00 kg)  Blood Pressure: 129/71 mmHg          BSA/BMI:       1.65 m² / 21.67 kg/m²  ________________________________________________________________________________________  Referring Physician:    [id number] Robbie Boone  Interpreting Physician: Anette Seals  Primary Sonographer:    Judy Braga RDCS    CPT:               ECHO TTE WO CON COMP W DOPP - 53526.m  Indication(s):     Dyspnea, unspecified - R06.00  Procedure:         Transthoracic echocardiogram with 2-D, M-mode and complete                     spectral and color flow Doppler.  Ordering Location: [facility]  Admission Status:  Inpatient    _______________________________________________________________________________________     CONCLUSIONS:      1. Left ventricular systolic function is normal with an ejection fraction visually estimated at 60 to 65 %.   2. There is normal LV mass and concentric remodeling.   3. Normal right ventricular cavity size and normal systolic function.  4. The left atrium is normal.   5. Structurally normal mitral valve with normal leaflet excursion.   6. Trileaflet aortic valve with normal systolic excursion.   7. Structurally normal tricuspid valve with normal leaflet excursion. Pulmonary artery systolic pressure could not be estimated.   8. No pericardial effusion seen.    ________________________________________________________________________________________  FINDINGS:     Left Ventricle:  Left ventricular systolic function is normal with an ejection fraction visually estimated at 60 to 65%. There is normal LV mass and concentric remodeling.     Right Ventricle:  The right ventricular cavity is normal in size and normal systolic function.     Left Atrium:  The left atrium is normal.     Right Atrium:  The right atrium is normal in size.     Aortic Valve:  The aortic valve appears trileaflet with normal systolic excursion.     Mitral Valve:  Structurally normal mitral valve with normal leaflet excursion.     Tricuspid Valve:  Structurally normal tricuspid valve with normal leaflet excursion. There is insufficient tricuspid regurgitation detected to calculate pulmonary artery systolic pressure.     Pulmonic Valve:  The pulmonic valve was not well visualized.     Pericardium:  No pericardial effusion seen.     Systemic Veins:  The inferior vena cava is dilated (dilated >2.1cm) with normal inspiratory collapse (normal >50%) consistent with mildly elevated right atrial pressure (~8, range 5-10mmHg).  ____________________________________________________________________  QUANTITATIVE DATA:  Left Ventricle Measurements: (Indexed to BSA)     IVSd (2D):   1.4 cm  LVPWd (2D):  1.1 cm  LVIDd (2D):  3.1 cm  LVIDs (2D):  2.1 cm  LV Mass:     122 g  74.0 g/m²  Visualized LV EF%: 60 to 65%     MV E Vmax:    0.75 m/s  MV A Vmax:    0.79 m/s  MV E/A:       0.95  e' lateral:   15.70 cm/s  e' medial:    10.90 cm/s  E/e' lateral: 4.77  E/e' medial:  6.87  E/e' Average: 5.63  MV DT:        211 msec    Aorta Measurements: (Normal range) (Indexed to BSA)     Sinuses of Valsalva: 2.80 cm (2.7 - 3.3 cm)       Left Atrium Measurements: (Indexed to BSA)  LA Diam 2D: 2.80 cm    Mitral Valve Measurements:     MV E Vmax: 0.7 m/s  MV A Vmax: 0.8 m/s  MV E/A:    1.0       Tricuspid Valve Measurements:     RA Pressure: 8 mmHg    ________________________________________________________________________________________  Electronically signed on 2/22/2024 at 1:42:01 PM by Anette Seals         *** Final ***   [facility] -- Alexa Ramirez Pannella, Patel, Savella, Goodger, Cohen: Office # [phone]    Follow Up:    PE, cardiac optimization    Subjective/Observations: Pt seen and examined, awake, alert, resting comfortably in bed, denies chest pain, dyspnea, palpitations or dizziness, orthopnea and PND. on O2 via NC, hep gtt  infusing, no events overnight     REVIEW OF SYSTEMS: All other review of systems are negative unless indicated above    PAST MEDICAL & SURGICAL HISTORY:  COPD (chronic obstructive pulmonary disease)      Rheumatoid arthritis      HTN (hypertension)      Anxiety      No significant past surgical history      MEDICATIONS  (STANDING):  amLODIPine   Tablet 10 milliGRAM(s) Oral daily  budesonide 160 MICROgram(s)/formoterol 4.5 MICROgram(s) Inhaler 2 Puff(s) Inhalation two times a day  heparin  Infusion.  Unit(s)/Hr (11 mL/Hr) IV Continuous <Continuous>  influenza  Vaccine (HIGH DOSE) 0.7 milliLiter(s) IntraMuscular once  pantoprazole    Tablet 40 milliGRAM(s) Oral before breakfast  piperacillin/tazobactam IVPB.. 3.375 Gram(s) IV Intermittent every 8 hours  predniSONE   Tablet 10 milliGRAM(s) Oral daily  senna 2 Tablet(s) Oral at bedtime    MEDICATIONS  (PRN):  acetaminophen     Tablet .. 650 milliGRAM(s) Oral every 6 hours PRN Temp greater or equal to 38C (100.4F), Mild Pain (1 - 3)  albuterol/ipratropium for Nebulization 3 milliLiter(s) Nebulizer every 6 hours PRN Shortness of Breath and/or Wheezing  ALPRAZolam 0.25 milliGRAM(s) Oral daily PRN for anxiety  heparin   Injectable 4500 Unit(s) IV Push every 6 hours PRN For aPTT less than 40  heparin   Injectable 2000 Unit(s) IV Push every 6 hours PRN For aPTT between 40 - 57  melatonin 3 milliGRAM(s) Oral at bedtime PRN Insomnia  polyethylene glycol 3350 17 Gram(s) Oral daily PRN Constipation  traMADol 50 milliGRAM(s) Oral daily PRN Moderate Pain (4 - 6)    Allergies    No Known Allergies    Intolerances      Vital Signs Last 24 Hrs  T(C): 36.6 (28 Feb 2024 05:16), Max: 37.7 (27 Feb 2024 11:48)  T(F): 97.9 (28 Feb 2024 05:16), Max: 99.8 (27 Feb 2024 11:48)  HR: 91 (28 Feb 2024 05:16) (91 - 100)  BP: 127/71 (28 Feb 2024 05:16) (111/66 - 127/71)  BP(mean): --  RR: 18 (28 Feb 2024 05:16) (18 - 18)  SpO2: 94% (28 Feb 2024 05:16) (94% - 94%)    Parameters below as of 28 Feb 2024 05:16  Patient On (Oxygen Delivery Method): nasal cannula      I&O's Summary    27 Feb 2024 07:01  -  28 Feb 2024 07:00  --------------------------------------------------------  IN: 680 mL / OUT: 0 mL / NET: 680 mL          TELE:  70-90s   PHYSICAL EXAM:  Constitutional: NAD, awake and alert  HEENT: Moist Mucous Membranes, Anicteric  Pulmonary: Non-labored, breath sounds are clear bilaterally, No wheezing, rales or rhonchi  Cardiovascular: Regular, S1 and S2, No murmurs, No rubs, gallops or clicks  Gastrointestinal:  soft, nontender, nondistended   Lymph: No peripheral edema. No lymphadenopathy.   Skin: No visible rashes or ulcers.  Psych:  Mood & affect appropriate      LABS: All Labs Reviewed:                        8.7    6.12  )-----------( 248      ( 27 Feb 2024 05:20 )             30.4                         8.6    6.99  )-----------( 260      ( 26 Feb 2024 05:20 )             30.0     27 Feb 2024 05:20    143    |  105    |  9      ----------------------------<  98     4.0     |  34     |  0.79   26 Feb 2024 05:20    143    |  104    |  11     ----------------------------<  102    3.9     |  35     |  0.73     Ca    9.6        27 Feb 2024 05:20  Ca    9.2        26 Feb 2024 05:20       PTT - ( 27 Feb 2024 05:20 )  PTT:66.1 secCreatine Kinase, Serum: 20 U/L (02-21-24 @ 07:00)  Troponin I, High Sensitivity Result: 5.8 ng/L (02-21-24 @ 07:00)  Cholesterol: 213 mg/dL (02-17-24 @ 06:45)  HDL Cholesterol: 64 mg/dL (02-17-24 @ 06:45)  Triglycerides, Serum: 84 mg/dL (02-17-24 @ 06:45)    12 Lead ECG:   Ventricular Rate 98 BPM    Atrial Rate 98 BPM    P-R Interval 154 ms    QRS Duration 76 ms    Q-T Interval 340 ms    QTC Calculation(Bazett) 434 ms    P Axis 88 degrees    R Axis 51 degrees    T Axis 96 degrees    Diagnosis Line Normal sinus rhythm  Minimal voltage criteria for LVH, may be normal variant ( Sokolow-Jason )  Nonspecific T wave abnormality  Abnormal ECG  When compared with ECG of 19-JAN-2024 11:54,  No significant change was found  Confirmed by ANETTE SEALS ([id number]) on 2/16/2024 6:48:17 PM (02-15-24 @ 23:33)      TRANSTHORACIC ECHOCARDIOGRAM REPORT  ________________________________________________________________________________                                      _______       Pt. Name:       KENDALL RIDER Study Date:    2/22/2024  MRN:            [id number]             YOB: 1955  Accession #:    [id number]            Age:           68 years  Account#:       [id number]           Gender:        F  Heart Rate:                          Height:        64.96 in (165.00 cm)  Rhythm:                      Weight:        130.07 lb (59.00 kg)  Blood Pressure: 129/71 mmHg          BSA/BMI:       1.65 m² / 21.67 kg/m²  ________________________________________________________________________________________  Referring Physician:    [id number] Robbie Boone  Interpreting Physician: Anette Seals  Primary Sonographer:    Judy Braga RDCS    CPT:               ECHO TTE WO CON COMP W DOPP - 96321.m  Indication(s):     Dyspnea, unspecified - R06.00  Procedure:         Transthoracic echocardiogram with 2-D, M-mode and complete                     spectral and color flow Doppler.  Ordering Location: [facility]  Admission Status:  Inpatient    _______________________________________________________________________________________     CONCLUSIONS:      1. Left ventricular systolic function is normal with an ejection fraction visually estimated at 60 to 65 %.   2. There is normal LV mass and concentric remodeling.   3. Normal right ventricular cavity size and normal systolic function.  4. The left atrium is normal.   5. Structurally normal mitral valve with normal leaflet excursion.   6. Trileaflet aortic valve with normal systolic excursion.   7. Structurally normal tricuspid valve with normal leaflet excursion. Pulmonary artery systolic pressure could not be estimated.   8. No pericardial effusion seen.    ________________________________________________________________________________________  FINDINGS:     Left Ventricle:  Left ventricular systolic function is normal with an ejection fraction visually estimated at 60 to 65%. There is normal LV mass and concentric remodeling.     Right Ventricle:  The right ventricular cavity is normal in size and normal systolic function.     Left Atrium:  The left atrium is normal.     Right Atrium:  The right atrium is normal in size.     Aortic Valve:  The aortic valve appears trileaflet with normal systolic excursion.     Mitral Valve:  Structurally normal mitral valve with normal leaflet excursion.     Tricuspid Valve:  Structurally normal tricuspid valve with normal leaflet excursion. There is insufficient tricuspid regurgitation detected to calculate pulmonary artery systolic pressure.     Pulmonic Valve:  The pulmonic valve was not well visualized.     Pericardium:  No pericardial effusion seen.     Systemic Veins:  The inferior vena cava is dilated (dilated >2.1cm) with normal inspiratory collapse (normal >50%) consistent with mildly elevated right atrial pressure (~8, range 5-10mmHg).  ____________________________________________________________________  QUANTITATIVE DATA:  Left Ventricle Measurements: (Indexed to BSA)     IVSd (2D):   1.4 cm  LVPWd (2D):  1.1 cm  LVIDd (2D):  3.1 cm  LVIDs (2D):  2.1 cm  LV Mass:     122 g  74.0 g/m²  Visualized LV EF%: 60 to 65%     MV E Vmax:    0.75 m/s  MV A Vmax:    0.79 m/s  MV E/A:       0.95  e' lateral:   15.70 cm/s  e' medial:    10.90 cm/s  E/e' lateral: 4.77  E/e' medial:  6.87  E/e' Average: 5.63  MV DT:        211 msec    Aorta Measurements: (Normal range) (Indexed to BSA)     Sinuses of Valsalva: 2.80 cm (2.7 - 3.3 cm)       Left Atrium Measurements: (Indexed to BSA)  LA Diam 2D: 2.80 cm    Mitral Valve Measurements:     MV E Vmax: 0.7 m/s  MV A Vmax: 0.8 m/s  MV E/A:    1.0       Tricuspid Valve Measurements:     RA Pressure: 8 mmHg    ________________________________________________________________________________________  Electronically signed on 2/22/2024 at 1:42:01 PM by Anette Seals         *** Final ***

## 2024-02-28 NOTE — CHART NOTE - NSCHARTNOTEFT_GEN_A_CORE
Assessment: patient seen for follow up    68y old  Female who presents with a chief complaint of GIB bleed colon mass , pulmonary embolism  for OR tomorrow for lap sigmoidectomy  + adenocarcinoma   + BM  patient seen in room was tolerating solid diet now on clear liquids prior to surgery        Factors impacting intake: [ ] none [ ] nausea  [ ] vomiting [ ] diarrhea [ ] constipation  [ ]chewing problems [ ] swallowing issues  [x ] other: for OR tomorrow     Diet Prescription: Diet, Clear Liquid (02-28-24 @ 12:41)  Diet, NPO after Midnight:      NPO Start Date: 28-Feb-2024,   NPO Start Time: 23:59  Except Medications (02-28-24 @ 12:41)    Intake: good PO on solids prior to clear liquids    Current Weight: 2/28 120.3# no edema  % Weight Change    Pertinent Medications: MEDICATIONS  (STANDING):  amLODIPine   Tablet 10 milliGRAM(s) Oral daily  budesonide 160 MICROgram(s)/formoterol 4.5 MICROgram(s) Inhaler 2 Puff(s) Inhalation two times a day  erythromycin     enteric coated 1000 milliGRAM(s) Oral <User Schedule>  heparin  Infusion.  Unit(s)/Hr (11 mL/Hr) IV Continuous <Continuous>  influenza  Vaccine (HIGH DOSE) 0.7 milliLiter(s) IntraMuscular once  neomycin 1000 milliGRAM(s) Oral <User Schedule>  pantoprazole    Tablet 40 milliGRAM(s) Oral before breakfast  piperacillin/tazobactam IVPB.. 3.375 Gram(s) IV Intermittent every 8 hours  polyethylene glycol/electrolyte Solution 1000 milliLiter(s) Oral every 4 hours  predniSONE   Tablet 10 milliGRAM(s) Oral daily  senna 2 Tablet(s) Oral at bedtime  sodium chloride 0.9%. 1000 milliLiter(s) (100 mL/Hr) IV Continuous <Continuous>    MEDICATIONS  (PRN):  acetaminophen     Tablet .. 650 milliGRAM(s) Oral every 6 hours PRN Temp greater or equal to 38C (100.4F), Mild Pain (1 - 3)  albuterol/ipratropium for Nebulization 3 milliLiter(s) Nebulizer every 6 hours PRN Shortness of Breath and/or Wheezing  ALPRAZolam 0.25 milliGRAM(s) Oral daily PRN for anxiety  heparin   Injectable 4500 Unit(s) IV Push every 6 hours PRN For aPTT less than 40  heparin   Injectable 2000 Unit(s) IV Push every 6 hours PRN For aPTT between 40 - 57  melatonin 3 milliGRAM(s) Oral at bedtime PRN Insomnia  polyethylene glycol 3350 17 Gram(s) Oral daily PRN Constipation  traMADol 50 milliGRAM(s) Oral daily PRN Moderate Pain (4 - 6)    Pertinent Labs: Hgb Hct MCV low dx noted  Skin:  no pressure injury     Estimated Needs:   [x ] no change since previous assessment based on # 30-35kcals/kg 1767-2061kcals and 1.2-1.5gms protein/kg 70-88 gms protein  [ ] recalculated:     Previous Nutrition Diagnosis:    [x ] Increased Nutrient Needs   [ x] Altered GI Function     Nutrition Diagnosis is [x ] ongoing  [ ] resolved [ ] not applicable     New Nutrition Diagnosis: [x ] not applicable       Interventions:   Recommend  [ ] Change Diet To:  [ ] Nutrition Supplement  [ ] Nutrition Support  [x ] Other: follow PO status after procedure. will remain available , recommend MVI with minerals as diet advanced     Monitoring and Evaluation:   [ ] PO intake [ x ] Tolerance to diet prescription [ x ] weights [ x ] labs[ x ] follow up per protocol  [ ] other:

## 2024-02-28 NOTE — PROGRESS NOTE ADULT - SUBJECTIVE AND OBJECTIVE BOX
Patient is a 68y old  Female who presents with a chief complaint of GIB bleed (28 Feb 2024 11:25)      INTERVAL HPI/OVERNIGHT EVENTS:    offers no complaints. using incentive spirometry    MEDICATIONS  (STANDING):  amLODIPine   Tablet 10 milliGRAM(s) Oral daily  budesonide 160 MICROgram(s)/formoterol 4.5 MICROgram(s) Inhaler 2 Puff(s) Inhalation two times a day  heparin  Infusion.  Unit(s)/Hr (11 mL/Hr) IV Continuous <Continuous>  influenza  Vaccine (HIGH DOSE) 0.7 milliLiter(s) IntraMuscular once  pantoprazole    Tablet 40 milliGRAM(s) Oral before breakfast  piperacillin/tazobactam IVPB.. 3.375 Gram(s) IV Intermittent every 8 hours  predniSONE   Tablet 10 milliGRAM(s) Oral daily  senna 2 Tablet(s) Oral at bedtime      MEDICATIONS  (PRN):  acetaminophen     Tablet .. 650 milliGRAM(s) Oral every 6 hours PRN Temp greater or equal to 38C (100.4F), Mild Pain (1 - 3)  albuterol/ipratropium for Nebulization 3 milliLiter(s) Nebulizer every 6 hours PRN Shortness of Breath and/or Wheezing  ALPRAZolam 0.25 milliGRAM(s) Oral daily PRN for anxiety  heparin   Injectable 4500 Unit(s) IV Push every 6 hours PRN For aPTT less than 40  heparin   Injectable 2000 Unit(s) IV Push every 6 hours PRN For aPTT between 40 - 57  melatonin 3 milliGRAM(s) Oral at bedtime PRN Insomnia  polyethylene glycol 3350 17 Gram(s) Oral daily PRN Constipation  traMADol 50 milliGRAM(s) Oral daily PRN Moderate Pain (4 - 6)      Allergies    No Known Allergies    Intolerances        PAST MEDICAL & SURGICAL HISTORY:  COPD (chronic obstructive pulmonary disease)      Rheumatoid arthritis      HTN (hypertension)      Anxiety      No significant past surgical history          Vital Signs Last 24 Hrs  T(C): 36.6 (28 Feb 2024 05:16), Max: 37.7 (27 Feb 2024 11:48)  T(F): 97.9 (28 Feb 2024 05:16), Max: 99.8 (27 Feb 2024 11:48)  HR: 91 (28 Feb 2024 05:16) (91 - 100)  BP: 127/71 (28 Feb 2024 05:16) (111/66 - 127/71)  BP(mean): --  RR: 18 (28 Feb 2024 05:16) (18 - 18)  SpO2: 94% (28 Feb 2024 05:16) (94% - 94%)    Parameters below as of 28 Feb 2024 05:16  Patient On (Oxygen Delivery Method): nasal cannula        PHYSICAL EXAMINATION:    GENERAL: The patient is awake and alert in no apparent distress.     HEENT: Head is normocephalic and atraumatic. Extraocular muscles are intact. Mucous membranes are moist.    NECK: no JVD    LUNGS: diminished breath sounds bilateral    HEART: Regular rate and rhythm without murmur.    ABDOMEN: Soft, nontender, and nondistended.      EXTREMITIES: Without any cyanosis, clubbing, rash, lesions or edema.    NEUROLOGIC: Grossly intact.    SKIN: No ulceration or induration present.      LABS:                        8.5    5.68  )-----------( 278      ( 28 Feb 2024 08:04 )             28.9     02-28    143  |  104  |  10  ----------------------------<  116<H>  3.9   |  33<H>  |  0.91    Ca    9.9      28 Feb 2024 08:04  Mg     2.0     02-28      PTT - ( 28 Feb 2024 08:04 )  PTT:67.8 sec  Urinalysis Basic - ( 28 Feb 2024 08:04 )    Color: x / Appearance: x / SG: x / pH: x  Gluc: 116 mg/dL / Ketone: x  / Bili: x / Urobili: x   Blood: x / Protein: x / Nitrite: x   Leuk Esterase: x / RBC: x / WBC x   Sq Epi: x / Non Sq Epi: x / Bacteria: x                      MICROBIOLOGY:  Culture Results:   >100,000 CFU/ml Escherichia coli  >100,000 CFU/ml Enterococcus faecalis (02-24 @ 21:00)        Assessment:    Severe COPD  Colon CA  S/P Pulmonary emboli    Plan:    In best possible condition for surgical procedure. The benefit outweighs the risks. Discussed with family and patient in room

## 2024-02-28 NOTE — PROGRESS NOTE ADULT - SUBJECTIVE AND OBJECTIVE BOX
SUBJECTIVE:  Patient seen and examined at bedside.  No overnight events.  Patient reports no new complaints at this time.  Admits to flatus and reporting two episodes of BM this AM.  Tolerating diet.  Patient denies any fever, chills, chest pain, shortness of breath, nausea, vomiting, or urinary complaints.      VITALS  Vital Signs Last 24 Hrs  T(C): 36.7 (28 Feb 2024 13:11), Max: 36.8 (27 Feb 2024 20:08)  T(F): 98 (28 Feb 2024 13:11), Max: 98.3 (27 Feb 2024 20:08)  HR: 95 (28 Feb 2024 13:11) (91 - 95)  BP: 116/66 (28 Feb 2024 13:11) (116/66 - 127/71)  RR: 18 (28 Feb 2024 13:11) (18 - 18)  SpO2: 95% (28 Feb 2024 13:11) (94% - 95%)    Parameters below as of 28 Feb 2024 13:11  Patient On (Oxygen Delivery Method): nasal cannula        PHYSICAL EXAM  GENERAL:  Female lying comfortably in bed in NAD.  HEENT:  NC/AT.   RESPIRATORY:  Nonlabored breathing  ABDOMEN:  Soft, nondistended, minimal tenderness in RLQ.   EXTREMITIES: No calf tenderness bilaterally.  NEURO:  A&O x 3    INTAKE & OUTPUT  I&O's Summary    27 Feb 2024 07:01  -  28 Feb 2024 07:00  --------------------------------------------------------  IN: 680 mL / OUT: 0 mL / NET: 680 mL      I&O's Detail    27 Feb 2024 07:01  -  28 Feb 2024 07:00  --------------------------------------------------------  IN:    Oral Fluid: 680 mL  Total IN: 680 mL    OUT:  Total OUT: 0 mL    Total NET: 680 mL          MEDICATIONS  MEDICATIONS  (STANDING):  amLODIPine   Tablet 10 milliGRAM(s) Oral daily  budesonide 160 MICROgram(s)/formoterol 4.5 MICROgram(s) Inhaler 2 Puff(s) Inhalation two times a day  erythromycin     enteric coated 1000 milliGRAM(s) Oral <User Schedule>  heparin  Infusion.  Unit(s)/Hr (11 mL/Hr) IV Continuous <Continuous>  influenza  Vaccine (HIGH DOSE) 0.7 milliLiter(s) IntraMuscular once  neomycin 1000 milliGRAM(s) Oral <User Schedule>  pantoprazole    Tablet 40 milliGRAM(s) Oral before breakfast  piperacillin/tazobactam IVPB.. 3.375 Gram(s) IV Intermittent every 8 hours  polyethylene glycol/electrolyte Solution 1000 milliLiter(s) Oral every 4 hours  predniSONE   Tablet 10 milliGRAM(s) Oral daily  senna 2 Tablet(s) Oral at bedtime  sodium chloride 0.9%. 1000 milliLiter(s) (100 mL/Hr) IV Continuous <Continuous>    MEDICATIONS  (PRN):  acetaminophen     Tablet .. 650 milliGRAM(s) Oral every 6 hours PRN Temp greater or equal to 38C (100.4F), Mild Pain (1 - 3)  albuterol/ipratropium for Nebulization 3 milliLiter(s) Nebulizer every 6 hours PRN Shortness of Breath and/or Wheezing  ALPRAZolam 0.25 milliGRAM(s) Oral daily PRN for anxiety  heparin   Injectable 4500 Unit(s) IV Push every 6 hours PRN For aPTT less than 40  heparin   Injectable 2000 Unit(s) IV Push every 6 hours PRN For aPTT between 40 - 57  melatonin 3 milliGRAM(s) Oral at bedtime PRN Insomnia  polyethylene glycol 3350 17 Gram(s) Oral daily PRN Constipation  traMADol 50 milliGRAM(s) Oral daily PRN Moderate Pain (4 - 6)      LABS:                        8.5    5.68  )-----------( 278      ( 28 Feb 2024 08:04 )             28.9     02-28    143  |  104  |  10  ----------------------------<  116<H>  3.9   |  33<H>  |  0.91    Ca    9.9      28 Feb 2024 08:04  Mg     2.0     02-28      PTT - ( 28 Feb 2024 08:04 )  PTT:67.8 sec

## 2024-02-28 NOTE — PROGRESS NOTE ADULT - SUBJECTIVE AND OBJECTIVE BOX
[facility]  Bobby Mcdonald PA-C  [address] [place] [address]  [phone]      INTERVAL HPI/OVERNIGHT EVENTS:  Pt s/e  Planned for OR tomorrow    MEDICATIONS  (STANDING):  amLODIPine   Tablet 10 milliGRAM(s) Oral daily  budesonide 160 MICROgram(s)/formoterol 4.5 MICROgram(s) Inhaler 2 Puff(s) Inhalation two times a day  heparin  Infusion.  Unit(s)/Hr (11 mL/Hr) IV Continuous <Continuous>  influenza  Vaccine (HIGH DOSE) 0.7 milliLiter(s) IntraMuscular once  pantoprazole    Tablet 40 milliGRAM(s) Oral before breakfast  piperacillin/tazobactam IVPB.. 3.375 Gram(s) IV Intermittent every 8 hours  predniSONE   Tablet 10 milliGRAM(s) Oral daily  senna 2 Tablet(s) Oral at bedtime    MEDICATIONS  (PRN):  acetaminophen     Tablet .. 650 milliGRAM(s) Oral every 6 hours PRN Temp greater or equal to 38C (100.4F), Mild Pain (1 - 3)  albuterol/ipratropium for Nebulization 3 milliLiter(s) Nebulizer every 6 hours PRN Shortness of Breath and/or Wheezing  ALPRAZolam 0.25 milliGRAM(s) Oral daily PRN for anxiety  heparin   Injectable 4500 Unit(s) IV Push every 6 hours PRN For aPTT less than 40  heparin   Injectable 2000 Unit(s) IV Push every 6 hours PRN For aPTT between 40 - 57  melatonin 3 milliGRAM(s) Oral at bedtime PRN Insomnia  polyethylene glycol 3350 17 Gram(s) Oral daily PRN Constipation  traMADol 50 milliGRAM(s) Oral daily PRN Moderate Pain (4 - 6)      Allergies    No Known Allergies    PHYSICAL EXAM:   Vital Signs:  Vital Signs Last 24 Hrs  T(C): 36.6 (2024 05:16), Max: 37.7 (2024 11:48)  T(F): 97.9 (2024 05:16), Max: 99.8 (2024 11:48)  HR: 91 (2024 05:16) (91 - 100)  BP: 127/71 (2024 05:16) (111/66 - 127/71)  BP(mean): --  RR: 18 (2024 05:16) (18 - 18)  SpO2: 94% (2024 05:16) (94% - 94%)    Parameters below as of 2024 05:16  Patient On (Oxygen Delivery Method): nasal cannula      Daily     Daily Weight in k.6 (2024 05:16)    GENERAL:  Appears stated age  HEENT:  NC/AT  CHEST:  Full & symmetric excursion  HEART:  Regular rhythm  ABDOMEN:  Soft, non-tender, non-distended  EXTEREMITIES:  no cyanosis  SKIN:  No rash  NEURO:  Alert      LABS:                        8.5    5.68  )-----------( 278      ( 2024 08:04 )             28.9     02-    143  |  104  |  10  ----------------------------<  116<H>  3.9   |  33<H>  |  0.91    Ca    9.9      2024 08:04  Mg     2.0     02-28      PTT - ( 2024 08:04 )  PTT:67.8 sec  Urinalysis Basic - ( 2024 08:04 )    Color: x / Appearance: x / SG: x / pH: x  Gluc: 116 mg/dL / Ketone: x  / Bili: x / Urobili: x   Blood: x / Protein: x / Nitrite: x   Leuk Esterase: x / RBC: x / WBC x   Sq Epi: x / Non Sq Epi: x / Bacteria: x

## 2024-02-28 NOTE — PROGRESS NOTE ADULT - ASSESSMENT
67yo F with PMHx COPD (not on home oxygen), HTN, RA, anxiety presenting with chief complaint of blood in stools. Admitted for GI bleed, plan for colonoscopy 2/20.      Problem/Plan - 1:  ·  Problem: Colonic mass.   ·  Plan: Patient presents with BRBPR, diarrhea with clots 2/2 lower GI bleed   CT AP: Masslike mural thickening of the distal sigmoid colon. Differential considerations include malignancy or, less likely, sequelae of chronic diverticulitis. Gastroenterology evaluation is recommended. Scattered too small to characterize hypodense foci in the liver, hepatic protocol MRI is recommended for further characterization.  -Patient unable to get outpatient colonoscopy (no insurance)  -HGB currently stable  -Hold home aspirin  -Vital signs currently stable, continue to monitor  -Transfuse prn   -Colonoscopy 2/20 with: large partially obstructing mass noted at approximately 13cm from anal verge.  Pathology with ADENOCARCINOMA, AT LEAST INTRAMUCOSAL.  -Surgery recs CT chest with IV contrast, CEA and rectal MR for staging - per radiology unable to get staging MRI done at this facility as it requires a different magnet  -CT chest: No evidence of metastatic disease. Acute pulmonary embolus in the right middle lobe lobar and segmental branches.  -Plan for lap sigmoidectomy on 2/29.  Cardiology and pulm preop evaluation noted.  Echo:  Left ventricular systolic function is normal with an ejection fraction visually estimated at 60 to 65 %.  Pt. is without s/s of ACS, decompensated CHF, or unstable arrhythmia.  Pt. is considered high risk for procedure given recently diagnosed pulmonary embolism.  Benefit of procedure outweighs the risk.  May proceed with planned lap sigmoidectomy on 2/29.    -GI and surgery following - follow up recs.     Problem/Plan - 2:  ·  Problem: COPD (chronic obstructive pulmonary disease).   ·  Plan: Chronic, recently admitted for COPD exacerbation  - Duonebs q6 PRN  - Continue home inhalers   - Supplemental O2 PRN. At baseline: 4-4.5 L home oxygen intermittently. COPD patient will keep SpO2 goal 88-93%   - Monitor respiratory status   - 3 weeks ago had rsv, recent rvp negative  - Continuous pulse ox  - PFT on 2/26 for preop eval  - Pulmonary Dr. Dobson f/u     Problem/Plan - 3:  ·  Problem: HTN (hypertension).   ·  Plan: Chronic, stable  - Continue home amlodipine 10mg qd with hold parameters  - Monitor hemodynamics  - DASH/TLC diet.     Problem/Plan - 4:  ·  Problem: Rheumatoid arthritis.   ·  Plan: - Chronic  - resumed prednisone 10mg PO Daily. Takes tramadol 50mg Qd.     Problem/Plan - 5:  ·  Problem: Anxiety.   ·  Plan: - Chronic, SOB likely 2/2 anxiety  - Continue Xanax 0.25mg qhs PRN.     Problem/Plan - 6:  ·  Problem: Pulmonary embolism  ·  Plan: CT chest with No evidence of metastatic disease. Acute pulmonary embolus in the right middle lobe lobar and segmental branches.    - Echo:  Left ventricular systolic function is normal with an ejection fraction visually estimated at 60 to 65 %.    - On heparin drip.       Problem/Plan - 7:  ·  Problem: Fever/UTI  ·  Plan: Blood cx NGTD.  Urine cx +E. coli and enterococcus faecalis.    - CXR Unchanged chest with COPD and slight blunting right base laterally.  RVP negative.    - On Ceftriaxone 1gm IV daily.    - ID consult requested.     Problem/Plan - 8:  ·  Problem: Encounter for deep vein thrombosis (DVT) prophylaxis.   ·  Plan: - on heparin drip for pulmonary embolism, to be stopped preop

## 2024-02-28 NOTE — PROGRESS NOTE ADULT - SUBJECTIVE AND OBJECTIVE BOX
[facility]  INFECTIOUS DISEASES   [address] [place] [address]  Tel: [phone]     Fax: [phone]  ========================================================  MD Velasquez Scott Kaushal, MD Cho, Michelle, MD Sunjit, Jaspal, MD  ========================================================    MRN-[id number]  KENDALL TORTORELLA     Follow up: Colonic mass, UTI    No pain, no fever, no urinary symptoms.     EKG: Pending  Received in the ED:  Protonix   (16 Feb 2024 00:02)    PAST MEDICAL & SURGICAL HISTORY:  COPD (chronic obstructive pulmonary disease)  Rheumatoid arthritis  HTN (hypertension)  Anxiety  No significant past surgical history    Social Hx: No current smoking, EtOH or drugs     FAMILY HISTORY:  FHx: lung cancer    Allergies  No Known Allergies    MEDICATIONS  (STANDING):  amLODIPine   Tablet 10 milliGRAM(s) Oral daily  budesonide 160 MICROgram(s)/formoterol 4.5 MICROgram(s) Inhaler 2 Puff(s) Inhalation two times a day  heparin  Infusion.  Unit(s)/Hr (11 mL/Hr) IV Continuous <Continuous>  influenza  Vaccine (HIGH DOSE) 0.7 milliLiter(s) IntraMuscular once  lactulose Syrup 20 Gram(s) Oral two times a day  pantoprazole    Tablet 40 milliGRAM(s) Oral before breakfast  piperacillin/tazobactam IVPB. 3.375 Gram(s) IV Intermittent once  piperacillin/tazobactam IVPB.- 3.375 Gram(s) IV Intermittent once  piperacillin/tazobactam IVPB.. 3.375 Gram(s) IV Intermittent every 8 hours  predniSONE   Tablet 10 milliGRAM(s) Oral daily  senna 2 Tablet(s) Oral at bedtime    MEDICATIONS  (PRN):  acetaminophen     Tablet .. 650 milliGRAM(s) Oral every 6 hours PRN Temp greater or equal to 38C (100.4F), Mild Pain (1 - 3)  albuterol/ipratropium for Nebulization 3 milliLiter(s) Nebulizer every 6 hours PRN Shortness of Breath and/or Wheezing  ALPRAZolam 0.25 milliGRAM(s) Oral daily PRN for anxiety  heparin   Injectable 4500 Unit(s) IV Push every 6 hours PRN For aPTT less than 40  heparin   Injectable 2000 Unit(s) IV Push every 6 hours PRN For aPTT between 40 - 57  melatonin 3 milliGRAM(s) Oral at bedtime PRN Insomnia  polyethylene glycol 3350 17 Gram(s) Oral daily PRN Constipation  traMADol 50 milliGRAM(s) Oral daily PRN Moderate Pain (4 - 6)     REVIEW OF SYSTEMS:  CONSTITUTIONAL:  No Fever or chills  HEENT:  No diplopia or blurred vision.  No sore throat or runny nose.  CARDIOVASCULAR:  No chest pain   RESPIRATORY:  No cough, shortness of breath, PND or orthopnea.  GASTROINTESTINAL:  No nausea, vomiting or diarrhea.  GENITOURINARY:  No dysuria, frequency or urgency. No Blood in urine  MUSCULOSKELETAL:  no joint aches, no muscle pain  SKIN:  No change in skin, hair or nails.    Physical Exam:  Vital Signs Last 24 Hrs  T(C): 36.7 (28 Feb 2024 13:11), Max: 36.8 (27 Feb 2024 20:08)  T(F): 98 (28 Feb 2024 13:11), Max: 98.3 (27 Feb 2024 20:08)  HR: 95 (28 Feb 2024 13:11) (91 - 95)  BP: 116/66 (28 Feb 2024 13:11) (116/66 - 127/71)  BP(mean): --  RR: 18 (28 Feb 2024 13:11) (18 - 18)  SpO2: 95% (28 Feb 2024 13:11) (94% - 95%)  Parameters below as of 28 Feb 2024 13:11  Patient On (Oxygen Delivery Method): nasal cannula  GEN: NAD  HEENT: normocephalic and atraumatic. EOMI. PERRL.    NECK: Supple.  No lymphadenopathy   LUNGS: Clear to auscultation.  HEART: Regular rate and rhythm without murmur.  ABDOMEN: Soft, nontender, and nondistended.  Positive bowel sounds.    : No CVA tenderness  EXTREMITIES: Without edema.  NEUROLOGIC: grossly intact.  PSYCHIATRIC: Appropriate affect .  SKIN: No rash     Labs:                        8.5    5.68  )-----------( 278      ( 28 Feb 2024 08:04 )             28.9     02-28    143  |  104  |  10  ----------------------------<  116<H>  3.9   |  33<H>  |  0.91    Ca    9.9      28 Feb 2024 08:04  Mg     2.0     02-28    Culture - Urine (collected 02-24-24 @ 21:00)  Source: Clean Catch Clean Catch (Midstream)  Final Report (02-27-24 @ 20:41):    >100,000 CFU/ml Escherichia coli    >100,000 CFU/ml Enterococcus faecalis  Organism: Escherichia coli  Enterococcus faecalis (02-27-24 @ 20:41)  Organism: Enterococcus faecalis (02-27-24 @ 20:41)    Sensitivities:      Method Type: JULIA      -  Ampicillin: S <=2 Predicts results to ampicillin/sulbactam, amoxacillin-clavulanate and  piperacillin-tazobactam.      -  Ciprofloxacin: S <=1      -  Levofloxacin: S 1      -  Nitrofurantoin: S <=32 Should not be used to treat pyelonephritis.      -  Tetracycline: S <=1      -  Vancomycin: S 1  Organism: Escherichia coli (02-27-24 @ 20:41)    Sensitivities:      Method Type: JULIA      -  Amoxicillin/Clavulanic Acid: I 16/8      -  Ampicillin: R >16 These ampicillin results predict results for amoxicillin      -  Ampicillin/Sulbactam: R >16/8      -  Aztreonam: S <=4      -  Cefazolin: S 4 For uncomplicated UTI with K. pneumoniae, E. coli, or P. mirablis: JULIA <=16 is sensitive and JULIA >=32 is resistant. This also predicts results for oral agents cefaclor, cefdinir, cefpodoxime, cefprozil, cefuroxime axetil, cephalexin and locarbef for uncomplicated UTI. Note that some isolates may be susceptible to these agents while testing resistant to cefazolin.      -  Cefepime: S <=2      -  Cefoxitin: S <=8      -  Ceftriaxone: S <=1      -  Cefuroxime: S <=4      -  Ciprofloxacin: S <=0.25      -  Ertapenem: S <=0.5      -  Gentamicin: S <=2      -  Imipenem: S <=1      -  Levofloxacin: S <=0.5      -  Meropenem: S <=1      -  Nitrofurantoin: S <=32 Should not be used to treat pyelonephritis      -  Piperacillin/Tazobactam: S <=8      -  Tobramycin: S <=2      -  Trimethoprim/Sulfamethoxazole: R >2/38    Culture - Blood (collected 02-24-24 @ 11:05)  Source: .Blood Blood-Peripheral  Preliminary Report (02-27-24 @ 16:01):    No growth at 72 Hours    Culture - Blood (collected 02-24-24 @ 09:45)  Source: .Blood Blood-Peripheral  Preliminary Report (02-27-24 @ 16:01):    No growth at 72 Hours    WBC Count: 5.68 K/uL (02-28-24 @ 08:04)  WBC Count: 6.12 K/uL (02-27-24 @ 05:20)  WBC Count: 6.99 K/uL (02-26-24 @ 05:20)  WBC Count: 7.72 K/uL (02-25-24 @ 05:35)  WBC Count: 10.47 K/uL (02-24-24 @ 05:18)    Creatinine: 0.91 mg/dL (02-28-24 @ 08:04)  Creatinine: 0.79 mg/dL (02-27-24 @ 05:20)  Creatinine: 0.73 mg/dL (02-26-24 @ 05:20)  Creatinine: 0.81 mg/dL (02-25-24 @ 05:35)  Creatinine: 0.98 mg/dL (02-24-24 @ 05:18)    Ferritin: 32 ng/mL (02-16-24 @ 06:25)    Procalcitonin, Serum: 0.23 ng/mL (02-24-24 @ 17:15)     SARS-CoV-2: NotDetec (02-24-24 @ 14:17)  SARS-CoV-2: NotDetec (02-16-24 @ 01:10)    All imaging and other data have been reviewed.  < from: CT Abdomen and Pelvis w/ IV Cont (02.26.24 @ 18:57) >  IMPRESSION:  No evidence of acute thrombosis in the IVC, iliac veins and common   femoral veins bilaterally.  Redemonstration of a large rectosigmoid mass as described concerning for   neoplasm. No evidence of colonic obstruction.  Proximal to the rectosigmoid mass there is concern for developing acute,   noncomplicated sigmoid diverticulitis.  Numerous subcentimeter hepatic hypodensities are not characterized due to   small size, however for some, metastatic disease is not excluded.    Assessment and Plan:   67yo F with PMHx COPD (on 4-4.5 L home oxygen intermittently), HTN, RA, anxiety presenting with chief complaint of blood in stools.   CT showed a large colonic mass for which has colonoscopy and biopsy showing adenocarcinoma for which has been scheduled for surgery on 2/29.  CT also concerning for developing diverticulitis but she has no abdominal pain or fever.   Her UA was abnormal and UC is growing ecoli and enterococcus, she was on ceftriaxone and ID called for further recommendations.  Since she has a large mass in area and concern for diverticulitis will cover GI annetta and also UC until after surgery.      # UTI  # Large colon mass  # Diverticulitis?    - Blood cultures negative   - UA with large WBC and UC with ecoli and enterococcus faecalis  - Continue Zosyn 3.375gm q8  - Will treat for 3-5days total, would be after surgery, if surgery goes well can stop after surgery     Will follow.    Lenora Mondragon MD  Division of Infectious Diseases   Please call ID service at [phone] with any question.    50 minutes spent on total encounter assessing patient, examination, chart review, counseling and coordinating care by the attending physician/nurse/care manager.

## 2024-02-28 NOTE — PROGRESS NOTE ADULT - ASSESSMENT
ASSESSMENT & PLAN  67 y/o female w/ PMHx of COPD (on 4-4.5 L home oxygen intermittently), HTN, RA, anxiety presenting w/ bloody stools, found to have 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    - preop today with maurice's prep and golytely  - plan for lap sigmoidectomy on 2/29 to ICU post-operatively  - Per cardiology clearance, patient is high risk for procedure but benefit outweighs risk, will need to minimize the time off AC in setting of a PE  - Per pulm, in best possible condition for surgical procedure as the benefit outweighs the risks  - Case to be discussed with Dr. Rocha ASSESSMENT & PLAN  69 y/o female w/ PMHx of COPD (on 4-4.5 L home oxygen intermittently), HTN, RA, anxiety presenting w/ bloody stools, found to have 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    - preop today with maurice's prep and golytely  - plan for lap sigmoidectomy on 2/29 to ICU post-operatively  - Per cardiology clearance, patient is high risk for procedure but benefit outweighs risk, will need to minimize the time off AC in setting of a PE  - Per pulm, in best possible condition for surgical procedure as the benefit outweighs the risks  - Case to be discussed with Dr. Rocha  Surg. Att.  Pt and the family updated. She is on the schedule for tomorrow. All the risks were re discussed withe patient her son and sister.  Case was also discussed with ICU attending , Pulmonary and cardiology.  Heparin to be stopped prior to surgery . Pt to undergo  bowel prep.

## 2024-02-28 NOTE — PROGRESS NOTE ADULT - ASSESSMENT
IMPRESSION    69yo F w hx COPD on 4-4.5L O2, HTN, RA, anxiety, adm w 2wk hx of intermittent blood mixed w stool  Never had colonoscopy  No abdomen pain anorexia, weight loss  Colonoscopy 2/20 here large partially obstructing mass at ~13cm from anal verge sig for adenocarcinoma, MMR status still pending  CEA 17.8  CT chest 2/20 and CT a/p 2/13 no mets but new RML PE, duplex legs negative now on IV Heparin    Recent COVID 19 infection and RSV last month.     No further bleeds since adm, reports does have some SOB since adm but thought was due to anxiety, episode leg pain 2months ago  Seen by Surgery, for planned lap colon resection 2/29, due to risk for pending obstruction  Iron studies c/w deficiency post IV Venofer    -adenocarcinoma of colon assoc w bleed, no distant mets by imaging studies, for definitive surgery. Will followup final path to see if needs adjuvant therapy  -RML PE-likely provoked etiology/ malignancy associated, continue IV heparin, can change to oral DOAC when ready for discharge.   -anemia-w/u sig for iron deficiency post IV Venofer also due to continued GI bleed, monitor serial CBC, rishi w pt on Heparin for PE. Discussed w pt potential need for transfuion PRBC if cont to decrease/in prep for surgery      d/w GI Dr Goodman, lesion in Sigmoid on colonoscopy evaluation.  s/p colonoscopy 2/20, large partially obstructing mass noted at approximately 13cm from anal verge  pathology noted, positive for adenocarcinoma  CEA elevated    s/p surgery eval    02/13 CT scan Abd neg for liver mets  Scattered too small to characterize hypodense foci    02/26 CT scan AP  LIVER: Numerous subcentimeter hepatic hypodensities measuring up to 9 mm are not characterized due to small size. Some are poorly delineated and metastatic disease is not excluded, for example image 2:15.  BOWEL: Redemonstration of a masslike thickening in the rectosigmoid junction which is at least 4.6 cm in length and extends into the mesorectal/mesosigmoid fat, the mass is concerning for neoplasm. Proximal   to the mass there is mild to moderate stool burden without evidence of colonic obstruction.  Sigmoid colonic diverticulosis is present proximal to the mass with a single inflamed diverticulum, image 2:81, and adjacent fat stranding suggesting mild and developing acute diverticulitis. No fluid collection   or extraluminal air to suspect abscess or perforation. No bowel obstruction. Appendix is normal  VESSELS: Aorta has normal caliber. Mild atherosclerotic changes. Hepatic veins, portal vein, SMV and splenic vein are patent.  There is adequate opacification of the entire IVC, common, external and internal iliac veins without evidence of filling defects or thrombosis.   No evidence of common femoral thrombosis bilaterally.  RETROPERITONEUM/LYMPH NODES: No lymphadenopathy. Numerous abnormal mesorectal lymph nodes along the superior rectal artery up to 1.0 x 1.1 mm, image 2:58.      RECOMMENDATIONS    Cont heparin drip, given possible need for procedure    Suspect PE due to confluence of COVID, RSV infection, and unbeknowst to pt, occult cancer, last month.   Possible pt may not have underlying DVT  CT scan abd with no thrombosis in IVC nor iliac    No zero risk options for pt.     Await final pathology.

## 2024-02-28 NOTE — CHART NOTE - NSCHARTNOTEFT_GEN_A_CORE
Approached by surgery regarding pt's potential post-operative status. In brief, pt is 68F h/o COPD on 4L home O2 PRN, HTN, RA, anxiety who initially presented for BRBPR, subsequently found to have a partially obstructing sigmoid/rectal mass on CT and colonoscopy with pathology revealing adenocarcinoma. Of note, pt also found on this admission to have acute PE in the right middle lobe lobar and segmental branches currently on full anticoagulation with heparin drip. PFTs performed by pulmonary on 2/26 showing severe obstructive ventilatory impairment with moderate reduction of forced vital capacity. Per cardiology clearance, patient is high risk for procedure but benefit outweighs risk and should limit time off AC as much as possible. Pulmonary has rated patient moderate risk from respiratory standpoint. Pt is at risk for prolonged post-operative mechanical ventilatory requirements based on PFT findings and known h/o oxygen semi-dependent COPD, but this would be in the realm of our ICU capabilities, assuming no other unforeseen surgical complications. Surgery to further discuss with pulmonary.

## 2024-02-28 NOTE — PROGRESS NOTE ADULT - SUBJECTIVE AND OBJECTIVE BOX
Patient is a 68y old  Female who presents with a chief complaint of GIB bleed (28 Feb 2024 14:14)    Patient seen and examined with her son at bedside. No acute overnight events. Had BM today without any blood noted by patient.    ALLERGIES:  No Known Allergies    MEDICATIONS  (STANDING):  amLODIPine   Tablet 10 milliGRAM(s) Oral daily  budesonide 160 MICROgram(s)/formoterol 4.5 MICROgram(s) Inhaler 2 Puff(s) Inhalation two times a day  heparin  Infusion.  Unit(s)/Hr (11 mL/Hr) IV Continuous <Continuous>  influenza  Vaccine (HIGH DOSE) 0.7 milliLiter(s) IntraMuscular once  pantoprazole    Tablet 40 milliGRAM(s) Oral before breakfast  piperacillin/tazobactam IVPB.. 3.375 Gram(s) IV Intermittent every 8 hours  polyethylene glycol/electrolyte Solution 1000 milliLiter(s) Oral every 4 hours  predniSONE   Tablet 10 milliGRAM(s) Oral daily  senna 2 Tablet(s) Oral at bedtime  sodium chloride 0.9%. 1000 milliLiter(s) (100 mL/Hr) IV Continuous <Continuous>    MEDICATIONS  (PRN):  acetaminophen     Tablet .. 650 milliGRAM(s) Oral every 6 hours PRN Temp greater or equal to 38C (100.4F), Mild Pain (1 - 3)  albuterol/ipratropium for Nebulization 3 milliLiter(s) Nebulizer every 6 hours PRN Shortness of Breath and/or Wheezing  ALPRAZolam 0.25 milliGRAM(s) Oral daily PRN for anxiety  heparin   Injectable 4500 Unit(s) IV Push every 6 hours PRN For aPTT less than 40  heparin   Injectable 2000 Unit(s) IV Push every 6 hours PRN For aPTT between 40 - 57  melatonin 3 milliGRAM(s) Oral at bedtime PRN Insomnia  ondansetron Injectable 4 milliGRAM(s) IV Push every 6 hours PRN Nausea and/or Vomiting  polyethylene glycol 3350 17 Gram(s) Oral daily PRN Constipation  traMADol 50 milliGRAM(s) Oral daily PRN Moderate Pain (4 - 6)    Vital Signs Last 24 Hrs  T(F): 98.1 (28 Feb 2024 21:00), Max: 98.1 (28 Feb 2024 21:00)  HR: 103 (28 Feb 2024 21:00) (91 - 103)  BP: 141/75 (28 Feb 2024 21:00) (116/66 - 141/75)  RR: 18 (28 Feb 2024 21:00) (18 - 18)  SpO2: 96% (28 Feb 2024 21:00) (94% - 96%)    I&O's Summary    27 Feb 2024 07:01  -  28 Feb 2024 07:00  --------------------------------------------------------  IN: 680 mL / OUT: 0 mL / NET: 680 mL          PHYSICAL EXAM:  General: NAD, lying in bed  Eyes: Anicteric  ENT: Moist mucous membranes  Neck: Supple, No JVD  Lungs: Clear to auscultation bilaterally, normal respiratory effort  Cardio: RRR, S1/S2, No murmurs  Abdomen: Soft, Nontender, Nondistended; Bowel sounds present  Extremities: No calf tenderness, No pitting edema, no digital cyanosis  Skin: Warm, dry  Psych: A&O x 3, follows commands    LABS:                        8.5    5.68  )-----------( 278      ( 28 Feb 2024 08:04 )             28.9       02-28    143  |  104  |  10  ----------------------------<  116  3.9   |  33  |  0.91    Ca    9.9      28 Feb 2024 08:04  Mg     2.0     02-28         PTT - ( 28 Feb 2024 08:04 )  PTT:67.8 sec         02-17 Chol 213 mg/dL LDL -- HDL 64 mg/dL Trig 84 mg/dL                  Urinalysis Basic - ( 28 Feb 2024 08:04 )    Color: x / Appearance: x / SG: x / pH: x  Gluc: 116 mg/dL / Ketone: x  / Bili: x / Urobili: x   Blood: x / Protein: x / Nitrite: x   Leuk Esterase: x / RBC: x / WBC x   Sq Epi: x / Non Sq Epi: x / Bacteria: x        Culture - Urine (collected 24 Feb 2024 21:00)  Source: Clean Catch Clean Catch (Midstream)  Final Report (27 Feb 2024 20:41):    >100,000 CFU/ml Escherichia coli    >100,000 CFU/ml Enterococcus faecalis  Organism: Escherichia coli  Enterococcus faecalis (27 Feb 2024 20:41)  Organism: Enterococcus faecalis (27 Feb 2024 20:41)      Method Type: JULIA      -  Ampicillin: S <=2 Predicts results to ampicillin/sulbactam, amoxacillin-clavulanate and  piperacillin-tazobactam.      -  Ciprofloxacin: S <=1      -  Levofloxacin: S 1      -  Nitrofurantoin: S <=32 Should not be used to treat pyelonephritis.      -  Tetracycline: S <=1      -  Vancomycin: S 1  Organism: Escherichia coli (27 Feb 2024 20:41)      Method Type: JULIA      -  Amoxicillin/Clavulanic Acid: I 16/8      -  Ampicillin: R >16 These ampicillin results predict results for amoxicillin      -  Ampicillin/Sulbactam: R >16/8      -  Aztreonam: S <=4      -  Cefazolin: S 4 For uncomplicated UTI with K. pneumoniae, E. coli, or P. mirablis: JULIA <=16 is sensitive and JULIA >=32 is resistant. This also predicts results for oral agents cefaclor, cefdinir, cefpodoxime, cefprozil, cefuroxime axetil, cephalexin and locarbef for uncomplicated UTI. Note that some isolates may be susceptible to these agents while testing resistant to cefazolin.      -  Cefepime: S <=2      -  Cefoxitin: S <=8      -  Ceftriaxone: S <=1      -  Cefuroxime: S <=4      -  Ciprofloxacin: S <=0.25      -  Ertapenem: S <=0.5      -  Gentamicin: S <=2      -  Imipenem: S <=1      -  Levofloxacin: S <=0.5      -  Meropenem: S <=1      -  Nitrofurantoin: S <=32 Should not be used to treat pyelonephritis      -  Piperacillin/Tazobactam: S <=8      -  Tobramycin: S <=2      -  Trimethoprim/Sulfamethoxazole: R >2/38    Culture - Blood (collected 24 Feb 2024 11:05)  Source: .Blood Blood-Peripheral  Preliminary Report (28 Feb 2024 16:00):    No growth at 4 days    Culture - Blood (collected 24 Feb 2024 09:45)  Source: .Blood Blood-Peripheral  Preliminary Report (28 Feb 2024 16:00):    No growth at 4 days          RADIOLOGY & ADDITIONAL TESTS:   Personally reviewed.     Consultant(s) Notes Reviewed:  [x] YES  [ ] NO    Care Discussed with Consultants/Other Providers:

## 2024-02-28 NOTE — PROGRESS NOTE ADULT - ASSESSMENT
69yo F with PMHx COPD (not on home oxygen), HTN, RA, anxiety admitted with GI bleed.    cardiac clearance, HTN, PE,  - a/w GIB, s/p colonoscopy 2/20, large partially obstructing mass noted at approximately 13cm from anal verge. Gi following  - surgery following, will likely require resection of specimen +/- Neoadjuvant chemoradiation, plan for 2/29  - CT chest with  Acute pulmonary embolus in the right middle lobe lobar and segmental branches  - Continue heparin gtt for AC  - Pt on 3L NC for supplemental 02, on the basis of COPD and PE, wean as tolerated, on prn at home  - Pulm following    - No sign of volume overload on exam   - No severe valvular abnormalities noted on examination  - TTE 2/22/23 shows normal LV and RV size and function, EF 60-65%     - EKG: NSR with nonspecific TWA  - No  anginal complaints   - No evidence of any active ischemia   - LDLc 134  - Would start statin     - Tele SR/ST 90-up to 120's non sustained   - BP stable and controlled   - Continue amlodipine 10mg qd with hold parameters    - Pt has no active ischemia, decompensated heart failure, unstable arrythmia, or severe stenotic valvular disease. In the setting of a PE, will need to minimize the time off AC.  Patient at high risk for procedure given PE but benefit of procedure outweighs the risk. OK to proceed with the planned procedure    - Monitor and replete lytes, keep K>4, Mg>2.  - Will continue to follow.    Christopher Ibanez, MS FNP, AGACNP  Nurse Practitioner- Cardiology   Please call on TEAMS   69yo F with PMHx COPD (not on home oxygen), HTN, RA, anxiety admitted with GI bleed.    cardiac clearance, HTN, PE,  - CT chest with  Acute pulmonary embolus in the right middle lobe lobar and segmental branches  - Continue heparin gtt for AC  - Pt on 3L NC for supplemental 02, on the basis of COPD and PE, wean as tolerated, on prn at home  - Pulm following    - No sign of volume overload on exam   - No severe valvular abnormalities noted on examination  - TTE 2/22/23 shows normal LV and RV size and function, EF 60-65%     - EKG: NSR with nonspecific TWA  - No  anginal complaints   - No evidence of any active ischemia   - LDLc 134  - Would start statin     - Tele SR/ST 90-up to 120's non sustained   - BP stable and controlled   - Continue amlodipine 10mg qd with hold parameters    - Pt a/w GIB, s/p colonoscopy 2/20, large partially obstructing mass noted at approximately 13cm from anal verge. Gi following  - Surgery following, will likely require resection of specimen +/- Neoadjuvant chemoradiation, plan for lap sigmoidectomy on 2/29  - Pt has no active ischemia, decompensated heart failure, unstable arrythmia, or severe stenotic valvular disease. In the setting of a PE, will need to minimize the time off AC.  Patient at high risk for procedure given PE but benefit of procedure outweighs the risk. OK to proceed with the planned procedure    - Monitor and replete lytes, keep K>4, Mg>2.  - Will continue to follow.    Christopher Ibanez, MS FNP, AGACNP  Nurse Practitioner- Cardiology   Please call on TEAMS   67yo F with PMHx COPD (not on home oxygen), HTN, RA, anxiety admitted with GI bleed.    cardiac clearance, HTN, PE,  - CT chest with  Acute pulmonary embolus in the right middle lobe lobar and segmental branches  - Continue heparin gtt for AC  - Pt on 3L NC for supplemental 02, on the basis of COPD and PE, wean as tolerated, on prn at home  - Pulm following    - No sign of volume overload on exam   - No severe valvular abnormalities noted on examination  - TTE 2/22/23 shows normal LV and RV size and function, EF 60-65%     - EKG: NSR with nonspecific TWA  - No  anginal complaints   - No evidence of any active ischemia   - LDLc 134  - Would start statin     - Tele SR 70-90s   - BP stable and controlled   - Continue amlodipine 10mg qd with hold parameters    - Pt a/w GIB, s/p colonoscopy 2/20, large partially obstructing mass noted at approximately 13cm from anal verge. Gi following  - Surgery following, will likely require resection of specimen +/- Neoadjuvant chemoradiation, plan for lap sigmoidectomy on 2/29  - Pt has no active ischemia, decompensated heart failure, unstable arrythmia, or severe stenotic valvular disease. In the setting of a PE, will need to minimize the time off AC.  Patient at high risk for procedure given PE but benefit of procedure outweighs the risk. OK to proceed with the planned procedure    - Monitor and replete lytes, keep K>4, Mg>2.  - Will continue to follow.    Christopher Ibanez, MS FNP, AGACNP  Nurse Practitioner- Cardiology   Please call on TEAMS

## 2024-02-28 NOTE — PROGRESS NOTE ADULT - SUBJECTIVE AND OBJECTIVE BOX
[INTERVAL HX: ]  Patient seen and examined this afternooon;  Chart reviewed and events noted;     drinking bowel prep      [MEDICATIONS]  MEDICATIONS  (STANDING):  amLODIPine   Tablet 10 milliGRAM(s) Oral daily  budesonide 160 MICROgram(s)/formoterol 4.5 MICROgram(s) Inhaler 2 Puff(s) Inhalation two times a day  heparin  Infusion.  Unit(s)/Hr (11 mL/Hr) IV Continuous <Continuous>  influenza  Vaccine (HIGH DOSE) 0.7 milliLiter(s) IntraMuscular once  pantoprazole    Tablet 40 milliGRAM(s) Oral before breakfast  piperacillin/tazobactam IVPB.. 3.375 Gram(s) IV Intermittent every 8 hours  polyethylene glycol/electrolyte Solution 1000 milliLiter(s) Oral every 4 hours  predniSONE   Tablet 10 milliGRAM(s) Oral daily  senna 2 Tablet(s) Oral at bedtime  sodium chloride 0.9%. 1000 milliLiter(s) (100 mL/Hr) IV Continuous <Continuous>    MEDICATIONS  (PRN):  acetaminophen     Tablet .. 650 milliGRAM(s) Oral every 6 hours PRN Temp greater or equal to 38C (100.4F), Mild Pain (1 - 3)  albuterol/ipratropium for Nebulization 3 milliLiter(s) Nebulizer every 6 hours PRN Shortness of Breath and/or Wheezing  ALPRAZolam 0.25 milliGRAM(s) Oral daily PRN for anxiety  heparin   Injectable 4500 Unit(s) IV Push every 6 hours PRN For aPTT less than 40  heparin   Injectable 2000 Unit(s) IV Push every 6 hours PRN For aPTT between 40 - 57  melatonin 3 milliGRAM(s) Oral at bedtime PRN Insomnia  ondansetron Injectable 4 milliGRAM(s) IV Push every 6 hours PRN Nausea and/or Vomiting  polyethylene glycol 3350 17 Gram(s) Oral daily PRN Constipation  traMADol 50 milliGRAM(s) Oral daily PRN Moderate Pain (4 - 6)      [VITALS]  Vital Signs Last 24 Hrs  T(C): 36.7 (2024 21:00), Max: 36.7 (2024 13:11)  T(F): 98.1 (2024 21:00), Max: 98.1 (2024 21:00)  HR: 103 (2024 21:00) (91 - 103)  BP: 141/75 (2024 21:00) (116/66 - 141/75)  BP(mean): --  RR: 18 (2024 21:00) (18 - 18)  SpO2: 96% (2024 21:00) (94% - 96%)    Parameters below as of 2024 21:00  Patient On (Oxygen Delivery Method): nasal cannula      [WT/HT]  Daily     Daily Weight in k.6 (2024 05:16)  [VENT]      [PHYSICAL EXAM]  GEN: NAD  HEENT: normocephalic and atraumatic. EOMI. PERRL.    NECK: Supple.  No lymphadenopathy   LUNGS: Clear to auscultation.  HEART: Regular rate and rhythm,  no MRG  ABDOMEN: Soft, nontender, and nondistended.  Positive bowel sounds.    : No CVA tenderness  EXTREMITIES: Without edema.  NEUROLOGIC: grossly intact.  PSYCHIATRIC: Appropriate affect .  SKIN: No rash     [LABS:]                        8.5    5.68  )-----------( 278      ( 2024 08:04 )             28.9     02    143  |  104  |  10  ----------------------------<  116<H>  3.9   |  33<H>  |  0.91    Ca    9.9      2024 08:04  Mg     2.0           PTT - ( 2024 08:04 )  PTT:67.8 sec      Reticulocyte Count (24 @ 06:45)  Reticulocyte Percent: 2.5 % [0.5 - 2.5]  Absolute Reticulocytes: 106.8 K/uL [25.0 - 125.0]    Iron - Total Binding Capacity.: 278 ug/dL [220 - 430] (24 @ 06:25)    Ferritin: 32 ng/mL [13 - 330] (24 @ 06:25)      Urinalysis Basic - ( 2024 08:04 )    Color: x / Appearance: x / SG: x / pH: x  Gluc: 116 mg/dL / Ketone: x  / Bili: x / Urobili: x   Blood: x / Protein: x / Nitrite: x   Leuk Esterase: x / RBC: x / WBC x   Sq Epi: x / Non Sq Epi: x / Bacteria: x        SARS-CoV-2: NotDetec (2024 14:17)  SARS-CoV-2: NotDetec (2024 01:10)  SARS-CoV-2: NotDetec (2024 10:48)          [RADIOLOGY STUDIES:]

## 2024-02-29 ENCOUNTER — RESULT REVIEW (OUTPATIENT)
Age: 69
End: 2024-02-29

## 2024-02-29 LAB
ANION GAP SERPL CALC-SCNC: 7 MMOL/L — SIGNIFICANT CHANGE UP (ref 5–17)
APTT BLD: 31.7 SEC — SIGNIFICANT CHANGE UP (ref 24.5–35.6)
BUN SERPL-MCNC: 9 MG/DL — SIGNIFICANT CHANGE UP (ref 7–23)
CALCIUM SERPL-MCNC: 9.1 MG/DL — SIGNIFICANT CHANGE UP (ref 8.5–10.1)
CHLORIDE SERPL-SCNC: 108 MMOL/L — SIGNIFICANT CHANGE UP (ref 96–108)
CO2 SERPL-SCNC: 31 MMOL/L — SIGNIFICANT CHANGE UP (ref 22–31)
CREAT SERPL-MCNC: 0.72 MG/DL — SIGNIFICANT CHANGE UP (ref 0.5–1.3)
CULTURE RESULTS: SIGNIFICANT CHANGE UP
CULTURE RESULTS: SIGNIFICANT CHANGE UP
EGFR: 92 ML/MIN/1.73M2 — SIGNIFICANT CHANGE UP
GLUCOSE SERPL-MCNC: 93 MG/DL — SIGNIFICANT CHANGE UP (ref 70–99)
HCT VFR BLD CALC: 28.3 % — LOW (ref 34.5–45)
HCT VFR BLD CALC: 29.1 % — LOW (ref 34.5–45)
HGB BLD-MCNC: 8.2 G/DL — LOW (ref 11.5–15.5)
HGB BLD-MCNC: 8.6 G/DL — LOW (ref 11.5–15.5)
INR BLD: 1.01 RATIO — SIGNIFICANT CHANGE UP (ref 0.85–1.18)
MAGNESIUM SERPL-MCNC: 2 MG/DL — SIGNIFICANT CHANGE UP (ref 1.6–2.6)
MCHC RBC-ENTMCNC: 23.6 PG — LOW (ref 27–34)
MCHC RBC-ENTMCNC: 23.6 PG — LOW (ref 27–34)
MCHC RBC-ENTMCNC: 29 GM/DL — LOW (ref 32–36)
MCHC RBC-ENTMCNC: 29.6 GM/DL — LOW (ref 32–36)
MCV RBC AUTO: 79.9 FL — LOW (ref 80–100)
MCV RBC AUTO: 81.3 FL — SIGNIFICANT CHANGE UP (ref 80–100)
NRBC # BLD: 0 /100 WBCS — SIGNIFICANT CHANGE UP (ref 0–0)
NRBC # BLD: 0 /100 WBCS — SIGNIFICANT CHANGE UP (ref 0–0)
PLATELET # BLD AUTO: 283 K/UL — SIGNIFICANT CHANGE UP (ref 150–400)
PLATELET # BLD AUTO: 321 K/UL — SIGNIFICANT CHANGE UP (ref 150–400)
POTASSIUM SERPL-MCNC: 3.6 MMOL/L — SIGNIFICANT CHANGE UP (ref 3.5–5.3)
POTASSIUM SERPL-SCNC: 3.6 MMOL/L — SIGNIFICANT CHANGE UP (ref 3.5–5.3)
PROTHROM AB SERPL-ACNC: 11.8 SEC — SIGNIFICANT CHANGE UP (ref 9.5–13)
RBC # BLD: 3.48 M/UL — LOW (ref 3.8–5.2)
RBC # BLD: 3.64 M/UL — LOW (ref 3.8–5.2)
RBC # FLD: 22.1 % — HIGH (ref 10.3–14.5)
RBC # FLD: 22.3 % — HIGH (ref 10.3–14.5)
SODIUM SERPL-SCNC: 146 MMOL/L — HIGH (ref 135–145)
SPECIMEN SOURCE: SIGNIFICANT CHANGE UP
SPECIMEN SOURCE: SIGNIFICANT CHANGE UP
WBC # BLD: 11.5 K/UL — HIGH (ref 3.8–10.5)
WBC # BLD: 6.01 K/UL — SIGNIFICANT CHANGE UP (ref 3.8–10.5)
WBC # FLD AUTO: 11.5 K/UL — HIGH (ref 3.8–10.5)
WBC # FLD AUTO: 6.01 K/UL — SIGNIFICANT CHANGE UP (ref 3.8–10.5)

## 2024-02-29 PROCEDURE — 99232 SBSQ HOSP IP/OBS MODERATE 35: CPT

## 2024-02-29 PROCEDURE — 88304 TISSUE EXAM BY PATHOLOGIST: CPT | Mod: 26

## 2024-02-29 PROCEDURE — 44213 LAP MOBIL SPLENIC FL ADD-ON: CPT

## 2024-02-29 PROCEDURE — 99233 SBSQ HOSP IP/OBS HIGH 50: CPT

## 2024-02-29 PROCEDURE — 88309 TISSUE EXAM BY PATHOLOGIST: CPT | Mod: 26

## 2024-02-29 PROCEDURE — 44207 L COLECTOMY/COLOPROCTOSTOMY: CPT | Mod: 22,57

## 2024-02-29 PROCEDURE — 44207 L COLECTOMY/COLOPROCTOSTOMY: CPT | Mod: 80,22

## 2024-02-29 PROCEDURE — 44213 LAP MOBIL SPLENIC FL ADD-ON: CPT | Mod: 80

## 2024-02-29 DEVICE — CLIP APPLIER COVIDIEN ENDOCLIP 10MM LARGE: Type: IMPLANTABLE DEVICE | Status: FUNCTIONAL

## 2024-02-29 DEVICE — STAPLER COVIDIEN TRI-STAPLE 60MM TAN RELOAD: Type: IMPLANTABLE DEVICE | Status: FUNCTIONAL

## 2024-02-29 DEVICE — STAPLER COVIDIEN EEA CIRCULAR DST 28MM 3.5MM BLUE: Type: IMPLANTABLE DEVICE | Status: FUNCTIONAL

## 2024-02-29 DEVICE — STAPLER COVIDIEN TRI-STAPLE CURVED 60MM TAN RELOAD: Type: IMPLANTABLE DEVICE | Status: FUNCTIONAL

## 2024-02-29 DEVICE — CLIP APPLIER COVIDIEN ENDOCLIP II 10MM MED/LG: Type: IMPLANTABLE DEVICE | Status: FUNCTIONAL

## 2024-02-29 RX ORDER — ALPRAZOLAM 0.25 MG
0.25 TABLET ORAL ONCE
Refills: 0 | Status: DISCONTINUED | OUTPATIENT
Start: 2024-02-29 | End: 2024-02-29

## 2024-02-29 RX ORDER — ONDANSETRON 8 MG/1
4 TABLET, FILM COATED ORAL ONCE
Refills: 0 | Status: DISCONTINUED | OUTPATIENT
Start: 2024-02-29 | End: 2024-02-29

## 2024-02-29 RX ORDER — HYDROCORTISONE 20 MG
100 TABLET ORAL ONCE
Refills: 0 | Status: DISCONTINUED | OUTPATIENT
Start: 2024-02-29 | End: 2024-02-29

## 2024-02-29 RX ORDER — PANTOPRAZOLE SODIUM 20 MG/1
40 TABLET, DELAYED RELEASE ORAL DAILY
Refills: 0 | Status: DISCONTINUED | OUTPATIENT
Start: 2024-02-29 | End: 2024-03-01

## 2024-02-29 RX ORDER — ACETAMINOPHEN 500 MG
1000 TABLET ORAL ONCE
Refills: 0 | Status: COMPLETED | OUTPATIENT
Start: 2024-02-29 | End: 2024-02-29

## 2024-02-29 RX ORDER — HYDROMORPHONE HYDROCHLORIDE 2 MG/ML
0.5 INJECTION INTRAMUSCULAR; INTRAVENOUS; SUBCUTANEOUS
Refills: 0 | Status: DISCONTINUED | OUTPATIENT
Start: 2024-02-29 | End: 2024-03-01

## 2024-02-29 RX ORDER — SODIUM CHLORIDE 9 MG/ML
1000 INJECTION, SOLUTION INTRAVENOUS
Refills: 0 | Status: DISCONTINUED | OUTPATIENT
Start: 2024-02-29 | End: 2024-02-29

## 2024-02-29 RX ORDER — HEPARIN SODIUM 5000 [USP'U]/ML
5000 INJECTION INTRAVENOUS; SUBCUTANEOUS EVERY 8 HOURS
Refills: 0 | Status: DISCONTINUED | OUTPATIENT
Start: 2024-02-29 | End: 2024-03-02

## 2024-02-29 RX ORDER — CEFOTETAN DISODIUM 1 G
2000 VIAL (EA) INJECTION ONCE
Refills: 0 | Status: COMPLETED | OUTPATIENT
Start: 2024-02-29 | End: 2024-02-29

## 2024-02-29 RX ORDER — HYDROMORPHONE HYDROCHLORIDE 2 MG/ML
1 INJECTION INTRAMUSCULAR; INTRAVENOUS; SUBCUTANEOUS EVERY 4 HOURS
Refills: 0 | Status: DISCONTINUED | OUTPATIENT
Start: 2024-02-29 | End: 2024-03-03

## 2024-02-29 RX ORDER — HYDROMORPHONE HYDROCHLORIDE 2 MG/ML
0.5 INJECTION INTRAMUSCULAR; INTRAVENOUS; SUBCUTANEOUS
Refills: 0 | Status: DISCONTINUED | OUTPATIENT
Start: 2024-02-29 | End: 2024-02-29

## 2024-02-29 RX ADMIN — Medication 1000 MILLIGRAM(S): at 20:51

## 2024-02-29 RX ADMIN — Medication 1 ENEMA: at 08:59

## 2024-02-29 RX ADMIN — AMLODIPINE BESYLATE 10 MILLIGRAM(S): 2.5 TABLET ORAL at 06:18

## 2024-02-29 RX ADMIN — HYDROMORPHONE HYDROCHLORIDE 0.5 MILLIGRAM(S): 2 INJECTION INTRAMUSCULAR; INTRAVENOUS; SUBCUTANEOUS at 16:10

## 2024-02-29 RX ADMIN — BUDESONIDE AND FORMOTEROL FUMARATE DIHYDRATE 2 PUFF(S): 160; 4.5 AEROSOL RESPIRATORY (INHALATION) at 06:18

## 2024-02-29 RX ADMIN — Medication 10 MILLIGRAM(S): at 06:17

## 2024-02-29 RX ADMIN — SODIUM CHLORIDE 75 MILLILITER(S): 9 INJECTION, SOLUTION INTRAVENOUS at 19:44

## 2024-02-29 RX ADMIN — PANTOPRAZOLE SODIUM 40 MILLIGRAM(S): 20 TABLET, DELAYED RELEASE ORAL at 06:18

## 2024-02-29 RX ADMIN — Medication 1 ENEMA: at 01:35

## 2024-02-29 RX ADMIN — Medication 400 MILLIGRAM(S): at 19:44

## 2024-02-29 RX ADMIN — PIPERACILLIN AND TAZOBACTAM 25 GRAM(S): 4; .5 INJECTION, POWDER, LYOPHILIZED, FOR SOLUTION INTRAVENOUS at 06:22

## 2024-02-29 RX ADMIN — HEPARIN SODIUM 5000 UNIT(S): 5000 INJECTION INTRAVENOUS; SUBCUTANEOUS at 21:05

## 2024-02-29 NOTE — PROGRESS NOTE ADULT - SUBJECTIVE AND OBJECTIVE BOX
Interval History:  patient is s/p exp lap sigmoid colon resection   is doing well post op in ICU    Chart reviewed and events noted;   Overnight events:    MEDICATIONS  (STANDING):  heparin   Injectable 5000 Unit(s) SubCutaneous every 8 hours  influenza  Vaccine (HIGH DOSE) 0.7 milliLiter(s) IntraMuscular once  pantoprazole  Injectable 40 milliGRAM(s) IV Push daily    MEDICATIONS  (PRN):  HYDROmorphone  Injectable 0.5 milliGRAM(s) IV Push every 10 minutes PRN Moderate Pain (4 - 6)  HYDROmorphone  Injectable 1 milliGRAM(s) IV Push every 4 hours PRN Severe Pain (7 - 10)      Vital Signs Last 24 Hrs  T(C): 37.1 (29 Feb 2024 19:29), Max: 37.1 (29 Feb 2024 19:29)  T(F): 98.7 (29 Feb 2024 19:29), Max: 98.7 (29 Feb 2024 19:29)  HR: 93 (29 Feb 2024 22:00) (93 - 101)  BP: 127/60 (29 Feb 2024 22:00) (106/62 - 140/85)  BP(mean): 87 (29 Feb 2024 22:00) (87 - 93)  RR: 16 (29 Feb 2024 22:00) (13 - 26)  SpO2: 98% (29 Feb 2024 22:00) (96% - 100%)    Parameters below as of 29 Feb 2024 20:00  Patient On (Oxygen Delivery Method): nasal cannula  O2 Flow (L/min): 2      PHYSICAL EXAM  General: adult in NAD  HEENT: clear oropharynx, anicteric sclera, pink conjunctivae  Neck: supple  CV: normal S1S2 with no murmur rubs or gallops  Lungs: clear to auscultation, no wheezes, no rhales  Abdomen: soft non-tender non-distended, no hepato/splenomegaly  Ext: no clubbing cyanosis or edema  Skin: no rashes and no petichiae  Neuro: alert and oriented X3 no focal deficits      LABS:  CBC Full  -  ( 29 Feb 2024 05:20 )  WBC Count : 6.01 K/uL  RBC Count : 3.48 M/uL  Hemoglobin : 8.2 g/dL  Hematocrit : 28.3 %  Platelet Count - Automated : 283 K/uL  Mean Cell Volume : 81.3 fl  Mean Cell Hemoglobin : 23.6 pg  Mean Cell Hemoglobin Concentration : 29.0 gm/dL  Auto Neutrophil # : x  Auto Lymphocyte # : x  Auto Monocyte # : x  Auto Eosinophil # : x  Auto Basophil # : x  Auto Neutrophil % : x  Auto Lymphocyte % : x  Auto Monocyte % : x  Auto Eosinophil % : x  Auto Basophil % : x    02-29    146<H>  |  108  |  9   ----------------------------<  93  3.6   |  31  |  0.72    Ca    9.1      29 Feb 2024 05:20  Mg     2.0     02-29      PT/INR - ( 29 Feb 2024 05:20 )   PT: 11.8 sec;   INR: 1.01 ratio         PTT - ( 29 Feb 2024 05:20 )  PTT:31.7 sec    fe studies      WBC trend  6.01 K/uL (02-29-24 @ 05:20)  5.68 K/uL (02-28-24 @ 08:04)  6.12 K/uL (02-27-24 @ 05:20)      Hgb trend  8.2 g/dL (02-29-24 @ 05:20)  8.5 g/dL (02-28-24 @ 08:04)  8.7 g/dL (02-27-24 @ 05:20)      plt trend  283 K/uL (02-29-24 @ 05:20)  278 K/uL (02-28-24 @ 08:04)  248 K/uL (02-27-24 @ 05:20)        RADIOLOGY & ADDITIONAL STUDIES:

## 2024-02-29 NOTE — PROGRESS NOTE ADULT - SUBJECTIVE AND OBJECTIVE BOX
INTERVAL HPI/OVERNIGHT EVENTS: Heparin drip stopped at midnight in anticipation of OR. No acute events overnight    SUBJECTIVE: Denies pain this AM. Afebrile. Tolerated approximately 1/2 of oral prep, NPO since that time w/ IVF . Ambulating without assistance. +Urination. + Flatus. +BM. Denies F/C/N/V/CP/SOB.       MEDICATIONS  (STANDING):  amLODIPine   Tablet 10 milliGRAM(s) Oral daily  bisacodyl Suppository 10 milliGRAM(s) Rectal once  budesonide 160 MICROgram(s)/formoterol 4.5 MICROgram(s) Inhaler 2 Puff(s) Inhalation two times a day  influenza  Vaccine (HIGH DOSE) 0.7 milliLiter(s) IntraMuscular once  pantoprazole    Tablet 40 milliGRAM(s) Oral before breakfast  piperacillin/tazobactam IVPB.. 3.375 Gram(s) IV Intermittent every 8 hours  predniSONE   Tablet 10 milliGRAM(s) Oral daily  saline laxative (FLEET) Rectal Enema 1 Enema Rectal once  senna 2 Tablet(s) Oral at bedtime  sodium chloride 0.9%. 1000 milliLiter(s) (100 mL/Hr) IV Continuous <Continuous>    MEDICATIONS  (PRN):  acetaminophen     Tablet .. 650 milliGRAM(s) Oral every 6 hours PRN Temp greater or equal to 38C (100.4F), Mild Pain (1 - 3)  albuterol/ipratropium for Nebulization 3 milliLiter(s) Nebulizer every 6 hours PRN Shortness of Breath and/or Wheezing  ALPRAZolam 0.25 milliGRAM(s) Oral daily PRN for anxiety  melatonin 3 milliGRAM(s) Oral at bedtime PRN Insomnia  ondansetron Injectable 4 milliGRAM(s) IV Push every 6 hours PRN Nausea and/or Vomiting  polyethylene glycol 3350 17 Gram(s) Oral daily PRN Constipation  traMADol 50 milliGRAM(s) Oral daily PRN Moderate Pain (4 - 6)      Vital Signs Last 24 Hrs  T(C): 36.7 (29 Feb 2024 06:15), Max: 36.7 (28 Feb 2024 13:11)  T(F): 98 (29 Feb 2024 06:15), Max: 98.1 (28 Feb 2024 21:00)  HR: 93 (29 Feb 2024 06:15) (93 - 103)  BP: 126/63 (29 Feb 2024 06:15) (116/66 - 141/75)  BP(mean): --  RR: 17 (29 Feb 2024 06:15) (17 - 18)  SpO2: 96% (29 Feb 2024 06:15) (95% - 96%)    Parameters below as of 29 Feb 2024 06:15  Patient On (Oxygen Delivery Method): nasal cannula        PE  Gen: AAO x3, NAD  Pulm: CTAB, Symmetrical chest rise  CV: RRR  Abd: Soft, NT, ND, -R/-G  Ext: No C/C/E  Vasc: 2+ Radial, DP pulses  Neuro: No focal neurological deficits      I&O's Detail    28 Feb 2024 07:01  -  29 Feb 2024 07:00  --------------------------------------------------------  IN:    Heparin Infusion: 45 mL    sodium chloride 0.9%: 1200 mL  Total IN: 1245 mL    OUT:  Total OUT: 0 mL    Total NET: 1245 mL          LABS:                        8.2    6.01  )-----------( 283      ( 29 Feb 2024 05:20 )             28.3     02-29    146<H>  |  108  |  9   ----------------------------<  93  3.6   |  31  |  0.72    Ca    9.1      29 Feb 2024 05:20  Mg     2.0     02-29      PT/INR - ( 29 Feb 2024 05:20 )   PT: 11.8 sec;   INR: 1.01 ratio         PTT - ( 29 Feb 2024 05:20 )  PTT:31.7 sec  Urinalysis Basic - ( 29 Feb 2024 05:20 )    Color: x / Appearance: x / SG: x / pH: x  Gluc: 93 mg/dL / Ketone: x  / Bili: x / Urobili: x   Blood: x / Protein: x / Nitrite: x   Leuk Esterase: x / RBC: x / WBC x   Sq Epi: x / Non Sq Epi: x / Bacteria: x

## 2024-02-29 NOTE — PROGRESS NOTE ADULT - SUBJECTIVE AND OBJECTIVE BOX
[facility] -- Alexa Ramirez Pannella, Patel, Savella Goodger, Cohen  Office # [phone]      Follow Up:    PE , cardiac optimization   Subjective/Observations:     No events overnight resting comfortably in bed.  No complaints of chest pain, dyspnea, or palpitations reported. No signs of orthopnea or PND.    REVIEW OF SYSTEMS: All other review of systems is negative unless indicated above    PAST MEDICAL & SURGICAL HISTORY:  COPD (chronic obstructive pulmonary disease)      Rheumatoid arthritis      HTN (hypertension)      Anxiety      No significant past surgical history          MEDICATIONS  (STANDING):  amLODIPine   Tablet 10 milliGRAM(s) Oral daily  bisacodyl Suppository 10 milliGRAM(s) Rectal once  budesonide 160 MICROgram(s)/formoterol 4.5 MICROgram(s) Inhaler 2 Puff(s) Inhalation two times a day  influenza  Vaccine (HIGH DOSE) 0.7 milliLiter(s) IntraMuscular once  pantoprazole    Tablet 40 milliGRAM(s) Oral before breakfast  piperacillin/tazobactam IVPB.. 3.375 Gram(s) IV Intermittent every 8 hours  predniSONE   Tablet 10 milliGRAM(s) Oral daily  saline laxative (FLEET) Rectal Enema 1 Enema Rectal once  senna 2 Tablet(s) Oral at bedtime  sodium chloride 0.9%. 1000 milliLiter(s) (100 mL/Hr) IV Continuous <Continuous>    MEDICATIONS  (PRN):  acetaminophen     Tablet .. 650 milliGRAM(s) Oral every 6 hours PRN Temp greater or equal to 38C (100.4F), Mild Pain (1 - 3)  albuterol/ipratropium for Nebulization 3 milliLiter(s) Nebulizer every 6 hours PRN Shortness of Breath and/or Wheezing  ALPRAZolam 0.25 milliGRAM(s) Oral daily PRN for anxiety  melatonin 3 milliGRAM(s) Oral at bedtime PRN Insomnia  ondansetron Injectable 4 milliGRAM(s) IV Push every 6 hours PRN Nausea and/or Vomiting  polyethylene glycol 3350 17 Gram(s) Oral daily PRN Constipation  traMADol 50 milliGRAM(s) Oral daily PRN Moderate Pain (4 - 6)      Allergies    No Known Allergies    Intolerances        Vital Signs Last 24 Hrs  T(C): 36.7 (2024 06:15), Max: 36.7 (2024 13:11)  T(F): 98 (2024 06:15), Max: 98.1 (2024 21:00)  HR: 93 (2024 06:15) (93 - 103)  BP: 126/63 (2024 06:15) (116/66 - 141/75)  BP(mean): --  RR: 17 (2024 06:15) (17 - 18)  SpO2: 96% (2024 06:15) (95% - 96%)    Parameters below as of 2024 06:15  Patient On (Oxygen Delivery Method): nasal cannula        I&O's Summary    2024 07:01  -  2024 07:00  --------------------------------------------------------  IN: 1245 mL / OUT: 0 mL / NET: 1245 mL          PHYSICAL EXAM:  TELE:   Constitutional: NAD, awake and alert, well-developed  HEENT: Moist Mucous Membranes, Anicteric  Pulmonary: Non-labored, breath sounds are clear bilaterally, No wheezing, crackles or rhonchi  Cardiovascular: Regular, S1 and S2 nl, No murmurs, rubs, gallops or clicks  Gastrointestinal: Bowel Sounds present, soft, nontender.   Lymph: No lymphadenopathy. No peripheral edema.  Skin: No visible rashes or ulcers.  Psych:  Mood & affect appropriate    LABS: All Labs Reviewed:                        8.2    6.01  )-----------( 283      ( 2024 05:20 )             28.3                         8.5    5.68  )-----------( 278      ( 2024 08:04 )             28.9                         8.7    6.12  )-----------( 248      ( 2024 05:20 )             30.4     2024 05:20    146    |  108    |  9      ----------------------------<  93     3.6     |  31     |  0.72   2024 08:04    143    |  104    |  10     ----------------------------<  116    3.9     |  33     |  0.91   2024 05:20    143    |  105    |  9      ----------------------------<  98     4.0     |  34     |  0.79     Ca    9.1        2024 05:20  Ca    9.9        2024 08:04  Ca    9.6        2024 05:20  Mg     2.0       2024 05:20  Mg     2.0       2024 08:04      PT/INR - ( 2024 05:20 )   PT: 11.8 sec;   INR: 1.01 ratio         PTT - ( 2024 05:20 )  PTT:31.7 sec         EC Lead ECG:   Ventricular Rate 98 BPM    Atrial Rate 98 BPM    P-R Interval 154 ms    QRS Duration 76 ms    Q-T Interval 340 ms    QTC Calculation(Bazett) 434 ms    P Axis 88 degrees    R Axis 51 degrees    T Axis 96 degrees    Diagnosis Line Normal sinus rhythm  Minimal voltage criteria for LVH, may be normal variant ( Sokolow-Jason )  Nonspecific T wave abnormality  Abnormal ECG  When compared with ECG of 2024 11:54,  No significant change was found  Confirmed by ANETTE SEALS ([id number]) on 2024 6:48:17 PM (02-15-24 @ 23:33)      TRANSTHORACIC ECHOCARDIOGRAM REPORT  ________________________________________________________________________________                                      _______       Pt. Name:       KENDALL RIDER Study Date:    2024  MRN:            [id number]             YOB: 1955  Accession #:    [id number]            Age:           68 years  Account#:       [id number]           Gender:        F  Heart Rate:                          Height:        64.96 in (165.00 cm)  Rhythm:                      Weight:        130.07 lb (59.00 kg)  Blood Pressure: 129/71 mmHg          BSA/BMI:       1.65 m² / 21.67 kg/m²  ________________________________________________________________________________________  Referring Physician:    [id number] Robbie Boone  Interpreting Physician: Anette Seals  Primary Sonographer:    Judy Braga RDCS    CPT:               ECHO TTE WO CON COMP W DOPP - 25420.m  Indication(s):     Dyspnea, unspecified - R06.00  Procedure:         Transthoracic echocardiogram with 2-D, M-mode and complete                     spectral and color flow Doppler.  Ordering Location: [facility]  Admission Status:  Inpatient    _______________________________________________________________________________________     CONCLUSIONS:      1. Left ventricular systolic function is normal with an ejection fraction visually estimated at 60 to 65 %.   2. There is normal LV mass and concentric remodeling.   3. Normal right ventricular cavity size and normal systolic function.  4. The left atrium is normal.   5. Structurally normal mitral valve with normal leaflet excursion.   6. Trileaflet aortic valve with normal systolic excursion.   7. Structurally normal tricuspid valve with normal leaflet excursion. Pulmonary artery systolic pressure could not be estimated.   8. No pericardial effusion seen.    ________________________________________________________________________________________  FINDINGS:     Left Ventricle:  Left ventricular systolic function is normal with an ejection fraction visually estimated at 60 to 65%. There is normal LV mass and concentric remodeling.     Right Ventricle:  The right ventricular cavity is normal in size and normal systolic function.     Left Atrium:  The left atrium is normal.     Right Atrium:  The right atrium is normal in size.     Aortic Valve:  The aortic valve appears trileaflet with normal systolic excursion.     Mitral Valve:  Structurally normal mitral valve with normal leaflet excursion.     Tricuspid Valve:  Structurally normal tricuspid valve with normal leaflet excursion. There is insufficient tricuspid regurgitation detected to calculate pulmonary artery systolic pressure.     Pulmonic Valve:  The pulmonic valve was not well visualized.     Pericardium:  No pericardial effusion seen.     Systemic Veins:  The inferior vena cava is dilated (dilated >2.1cm) with normal inspiratory collapse (normal >50%) consistent with mildly elevated right atrial pressure (~8, range 5-10mmHg).  ____________________________________________________________________  QUANTITATIVE DATA:  Left Ventricle Measurements: (Indexed to BSA)     IVSd (2D):   1.4 cm  LVPWd (2D):  1.1 cm  LVIDd (2D):  3.1 cm  LVIDs (2D):  2.1 cm  LV Mass:     122 g  74.0 g/m²  Visualized LV EF%: 60 to 65%     MV E Vmax:    0.75 m/s  MV A Vmax:    0.79 m/s  MV E/A:       0.95  e' lateral:   15.70 cm/s  e' medial:    10.90 cm/s  E/e' lateral: 4.77  E/e' medial:  6.87  E/e' Average: 5.63  MV DT:        211 msec    Aorta Measurements: (Normal range) (Indexed to BSA)     Sinuses of Valsalva: 2.80 cm (2.7 - 3.3 cm)       Left Atrium Measurements: (Indexed to BSA)  LA Diam 2D: 2.80 cm    Mitral Valve Measurements:     MV E Vmax: 0.7 m/s  MV A Vmax: 0.8 m/s  MV E/A:    1.0       Tricuspid Valve Measurements:     RA Pressure: 8 mmHg    ________________________________________________________________________________________  Electronically signed on 2024 at 1:42:01 PM by Anette Seals         *** Final ***      Radiology:         [facility] -- Alexa Ramirez Pannella, Patel, Savella Goodger, Cohen  Office # [phone]      Follow Up:    PE , cardiac optimization   Subjective/Observations:     No events overnight resting comfortably in bed.  No complaints of chest pain, dyspnea, or palpitations reported. No signs of orthopnea or PND.  Family at bedside,     REVIEW OF SYSTEMS: All other review of systems is negative unless indicated above    PAST MEDICAL & SURGICAL HISTORY:  COPD (chronic obstructive pulmonary disease)      Rheumatoid arthritis      HTN (hypertension)      Anxiety      No significant past surgical history          MEDICATIONS  (STANDING):  amLODIPine   Tablet 10 milliGRAM(s) Oral daily  bisacodyl Suppository 10 milliGRAM(s) Rectal once  budesonide 160 MICROgram(s)/formoterol 4.5 MICROgram(s) Inhaler 2 Puff(s) Inhalation two times a day  influenza  Vaccine (HIGH DOSE) 0.7 milliLiter(s) IntraMuscular once  pantoprazole    Tablet 40 milliGRAM(s) Oral before breakfast  piperacillin/tazobactam IVPB.. 3.375 Gram(s) IV Intermittent every 8 hours  predniSONE   Tablet 10 milliGRAM(s) Oral daily  saline laxative (FLEET) Rectal Enema 1 Enema Rectal once  senna 2 Tablet(s) Oral at bedtime  sodium chloride 0.9%. 1000 milliLiter(s) (100 mL/Hr) IV Continuous <Continuous>    MEDICATIONS  (PRN):  acetaminophen     Tablet .. 650 milliGRAM(s) Oral every 6 hours PRN Temp greater or equal to 38C (100.4F), Mild Pain (1 - 3)  albuterol/ipratropium for Nebulization 3 milliLiter(s) Nebulizer every 6 hours PRN Shortness of Breath and/or Wheezing  ALPRAZolam 0.25 milliGRAM(s) Oral daily PRN for anxiety  melatonin 3 milliGRAM(s) Oral at bedtime PRN Insomnia  ondansetron Injectable 4 milliGRAM(s) IV Push every 6 hours PRN Nausea and/or Vomiting  polyethylene glycol 3350 17 Gram(s) Oral daily PRN Constipation  traMADol 50 milliGRAM(s) Oral daily PRN Moderate Pain (4 - 6)      Allergies    No Known Allergies    Intolerances        Vital Signs Last 24 Hrs  T(C): 36.7 (2024 06:15), Max: 36.7 (2024 13:11)  T(F): 98 (2024 06:15), Max: 98.1 (2024 21:00)  HR: 93 (2024 06:15) (93 - 103)  BP: 126/63 (2024 06:15) (116/66 - 141/75)  BP(mean): --  RR: 17 (2024 06:15) (17 - 18)  SpO2: 96% (2024 06:15) (95% - 96%)    Parameters below as of 2024 06:15  Patient On (Oxygen Delivery Method): nasal cannula        I&O's Summary    2024 07:01  -  2024 07:00  --------------------------------------------------------  IN: 1245 mL / OUT: 0 mL / NET: 1245 mL          PHYSICAL EXAM:  TELE:   Constitutional: NAD, awake and alert, well-developed  HEENT: Moist Mucous Membranes, Anicteric  Pulmonary: Non-labored, breath sounds are clear bilaterally, No wheezing, crackles or rhonchi  Cardiovascular: Regular, S1 and S2 nl, No murmurs, rubs, gallops or clicks  Gastrointestinal: Bowel Sounds present, soft, nontender.   Lymph: No lymphadenopathy. No peripheral edema.  Skin: No visible rashes or ulcers.  Psych:  Mood & affect appropriate    LABS: All Labs Reviewed:                        8.2    6.01  )-----------( 283      ( 2024 05:20 )             28.3                         8.5    5.68  )-----------( 278      ( 2024 08:04 )             28.9                         8.7    6.12  )-----------( 248      ( 2024 05:20 )             30.4     2024 05:20    146    |  108    |  9      ----------------------------<  93     3.6     |  31     |  0.72   2024 08:04    143    |  104    |  10     ----------------------------<  116    3.9     |  33     |  0.91   2024 05:20    143    |  105    |  9      ----------------------------<  98     4.0     |  34     |  0.79     Ca    9.1        2024 05:20  Ca    9.9        2024 08:04  Ca    9.6        2024 05:20  Mg     2.0       2024 05:20  Mg     2.0       2024 08:04      PT/INR - ( 2024 05:20 )   PT: 11.8 sec;   INR: 1.01 ratio         PTT - ( 2024 05:20 )  PTT:31.7 sec         EC Lead ECG:   Ventricular Rate 98 BPM    Atrial Rate 98 BPM    P-R Interval 154 ms    QRS Duration 76 ms    Q-T Interval 340 ms    QTC Calculation(Bazett) 434 ms    P Axis 88 degrees    R Axis 51 degrees    T Axis 96 degrees    Diagnosis Line Normal sinus rhythm  Minimal voltage criteria for LVH, may be normal variant ( Sokolow-Jason )  Nonspecific T wave abnormality  Abnormal ECG  When compared with ECG of 2024 11:54,  No significant change was found  Confirmed by ANETTE SEALS ([id number]) on 2024 6:48:17 PM (02-15-24 @ 23:33)      TRANSTHORACIC ECHOCARDIOGRAM REPORT  ________________________________________________________________________________                                      _______       Pt. Name:       KENDALL RIDER Study Date:    2024  MRN:            [id number]             YOB: 1955  Accession #:    [id number]            Age:           68 years  Account#:       [id number]           Gender:        F  Heart Rate:                          Height:        64.96 in (165.00 cm)  Rhythm:                      Weight:        130.07 lb (59.00 kg)  Blood Pressure: 129/71 mmHg          BSA/BMI:       1.65 m² / 21.67 kg/m²  ________________________________________________________________________________________  Referring Physician:    [id number] Robbie Boone  Interpreting Physician: Anette Seals  Primary Sonographer:    Judy Braga RDCS    CPT:               ECHO TTE WO CON COMP W DOPP - 31038.m  Indication(s):     Dyspnea, unspecified - R06.00  Procedure:         Transthoracic echocardiogram with 2-D, M-mode and complete                     spectral and color flow Doppler.  Ordering Location: [facility]  Admission Status:  Inpatient    _______________________________________________________________________________________     CONCLUSIONS:      1. Left ventricular systolic function is normal with an ejection fraction visually estimated at 60 to 65 %.   2. There is normal LV mass and concentric remodeling.   3. Normal right ventricular cavity size and normal systolic function.  4. The left atrium is normal.   5. Structurally normal mitral valve with normal leaflet excursion.   6. Trileaflet aortic valve with normal systolic excursion.   7. Structurally normal tricuspid valve with normal leaflet excursion. Pulmonary artery systolic pressure could not be estimated.   8. No pericardial effusion seen.    ________________________________________________________________________________________  FINDINGS:     Left Ventricle:  Left ventricular systolic function is normal with an ejection fraction visually estimated at 60 to 65%. There is normal LV mass and concentric remodeling.     Right Ventricle:  The right ventricular cavity is normal in size and normal systolic function.     Left Atrium:  The left atrium is normal.     Right Atrium:  The right atrium is normal in size.     Aortic Valve:  The aortic valve appears trileaflet with normal systolic excursion.     Mitral Valve:  Structurally normal mitral valve with normal leaflet excursion.     Tricuspid Valve:  Structurally normal tricuspid valve with normal leaflet excursion. There is insufficient tricuspid regurgitation detected to calculate pulmonary artery systolic pressure.     Pulmonic Valve:  The pulmonic valve was not well visualized.     Pericardium:  No pericardial effusion seen.     Systemic Veins:  The inferior vena cava is dilated (dilated >2.1cm) with normal inspiratory collapse (normal >50%) consistent with mildly elevated right atrial pressure (~8, range 5-10mmHg).  ____________________________________________________________________  QUANTITATIVE DATA:  Left Ventricle Measurements: (Indexed to BSA)     IVSd (2D):   1.4 cm  LVPWd (2D):  1.1 cm  LVIDd (2D):  3.1 cm  LVIDs (2D):  2.1 cm  LV Mass:     122 g  74.0 g/m²  Visualized LV EF%: 60 to 65%     MV E Vmax:    0.75 m/s  MV A Vmax:    0.79 m/s  MV E/A:       0.95  e' lateral:   15.70 cm/s  e' medial:    10.90 cm/s  E/e' lateral: 4.77  E/e' medial:  6.87  E/e' Average: 5.63  MV DT:        211 msec    Aorta Measurements: (Normal range) (Indexed to BSA)     Sinuses of Valsalva: 2.80 cm (2.7 - 3.3 cm)       Left Atrium Measurements: (Indexed to BSA)  LA Diam 2D: 2.80 cm    Mitral Valve Measurements:     MV E Vmax: 0.7 m/s  MV A Vmax: 0.8 m/s  MV E/A:    1.0       Tricuspid Valve Measurements:     RA Pressure: 8 mmHg    ________________________________________________________________________________________  Electronically signed on 2024 at 1:42:01 PM by Anette Seals         *** Final ***      Radiology:

## 2024-02-29 NOTE — PROGRESS NOTE ADULT - SUBJECTIVE AND OBJECTIVE BOX
SUBJECTIVE:  Patient seen and examined at bedside. s/p lap sigmoid colon resection w/ colorectal anastomosis this afternoon. Pt extubated immediately post-op, transferred to ICU for observation. Alert on encounter.  Does not offer up acute complaints. Diaz in place.  Patient denies any fever, chills, chest pain, shortness of breath, nausea, vomiting, or urinary complaints.    VITALS  Vital Signs Last 24 Hrs  T(C): 36.7 (29 Feb 2024 17:00), Max: 36.7 (28 Feb 2024 21:00)  T(F): 98 (29 Feb 2024 17:00), Max: 98.1 (28 Feb 2024 21:00)  HR: 94 (29 Feb 2024 17:00) (93 - 103)  BP: 132/62 (29 Feb 2024 17:00) (106/62 - 141/75)  BP(mean): 91 (29 Feb 2024 17:00) (91 - 91)  RR: 13 (29 Feb 2024 17:00) (13 - 22)  SpO2: 97% (29 Feb 2024 17:00) (96% - 100%)    Parameters below as of 29 Feb 2024 09:46  Patient On (Oxygen Delivery Method): nasal cannula  O2 Flow (L/min): 2    PHYSICAL EXAM  GENERAL:  Well-nourished, well-developed Female lying comfortably in bed in NAD. Diaz in place w/ clear straw-colored urine.  HEENT:  NC/AT. Sclera white. Mucous membranes moist.  ABDOMEN: Soft, nondistended, nontender; incisions well-approximated w/ overlying Dermabond; No rebound tenderness or guarding.  SKIN:  No jaundice, pallor, or cyanosis  NEURO:  A&O x 3    INTAKE & OUTPUT  I&O's Summary    28 Feb 2024 07:01  -  29 Feb 2024 07:00  --------------------------------------------------------  IN: 1245 mL / OUT: 0 mL / NET: 1245 mL    I&O's Detail    28 Feb 2024 07:01  -  29 Feb 2024 07:00  --------------------------------------------------------  IN:    Heparin Infusion: 45 mL    sodium chloride 0.9%: 1200 mL  Total IN: 1245 mL    OUT:  Total OUT: 0 mL    Total NET: 1245 mL    MEDICATIONS  MEDICATIONS  (STANDING):  hydrocortisone sodium succinate Injectable 100 milliGRAM(s) IV Push once  influenza  Vaccine (HIGH DOSE) 0.7 milliLiter(s) IntraMuscular once  lactated ringers. 1000 milliLiter(s) (75 mL/Hr) IV Continuous <Continuous>  pantoprazole  Injectable 40 milliGRAM(s) IV Push daily    MEDICATIONS  (PRN):  HYDROmorphone  Injectable 0.5 milliGRAM(s) IV Push every 10 minutes PRN Severe Pain (7 - 10)  ondansetron Injectable 4 milliGRAM(s) IV Push once PRN Nausea and/or Vomiting    LABS:                      8.2    6.01  )-----------( 283      ( 29 Feb 2024 05:20 )             28.3     02-29    146<H>  |  108  |  9   ----------------------------<  93  3.6   |  31  |  0.72    Ca    9.1      29 Feb 2024 05:20  Mg     2.0     02-29    PT/INR - ( 29 Feb 2024 05:20 )   PT: 11.8 sec;   INR: 1.01 ratio    PTT - ( 29 Feb 2024 05:20 )  PTT:31.7 sec    ASSESSMENT & PLAN   67 y/o female w/ PMHx of COPD (on 4-4.5 L home oxygen intermittently), HTN, RA, anxiety presenting w/ bloody stools, found to have 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   Now s/p lap sigmoid colon resection w/ colorectal anastomosis. Awake and alert post-op, denies abdominal complaints.    - NPO/sips & chips. Will reassess in AM and likely start clear liquid diet.  - ID recs appreciated: continue IV Zosyn and consider stopping 03/01  - Okay from surgical perspective to resume full-dose AC in AM; Heme-onc recs appreciated  - Rest of care as per ICU.  - Case to be discussed with Dr. Rocha

## 2024-02-29 NOTE — CONSULT NOTE ADULT - ASSESSMENT
Patient is a 67 y/o female with pmhx of COPD (not on home oxygen), HTN, RA and anxiety admitted with:    1. GIB   2. Acute PE   3. Adenocarcinoma of sigmoid colon   4. E.Coli/Enterococcus UTI     Neuro: Alert and interactive.   Cardio: Hemodynamics intact. Remains at high risk of decompensation given acute PE.   Resp: Successfully extubated to NC s/p procedure. Maintaining O2 sat > 90%. Supportive care with ATC duonebs   GI: POD #0 from laparoscopic sigoidectomy with colorectal anastomosis. Added protonix for stress ulcer prophylaxis. Will f/u with surgery recs for restarting diet. Serial abdominal exams.   Renal: Renal function stable. Continue with IVFs as tolerated.   ID: Completed course of zosyn for UTI. Will continue to monitor wbc and fever curve.   Endo: Maintain euglycemia to promote wound healing   Heme: As per discussion with surgery team, plan to restart full AC 48 hours after procedure.       CRITICAL CARE TIME SPENT: ***   Time spent evaluating/treating patient with medical issues that pose a high risk for life threatening deterioration, and/or end-organ damage, reviewing data/labs/imaging, discussing case with multidisciplinary team, discussing plan/goals of care with patient/family. Non-inclusive of procedure time. Date of entry of this note is equal to the date of services rendered.     Case Discussed with ICU Attending,  Patient is a 67 y/o female with pmhx of COPD (not on home oxygen), HTN, RA and anxiety admitted with:    1. GIB   2. Acute PE   3. Adenocarcinoma of sigmoid colon   4. E.Coli/Enterococcus UTI     Neuro: Alert and interactive. Pain control with dilaudid PRN   Cardio: Hemodynamics intact. Formal echo reviewed, normal LV and RV function noted. However patient remains high risk of decompensation given acute PE. Cardiology team following.   Resp: Successfully extubated to NC s/p procedure. Maintaining O2 sat > 90%. Supportive care with ATC duonebs   GI: POD #0 from laparoscopic sigoidectomy with colorectal anastomosis. Added protonix for stress ulcer prophylaxis. Will f/u with surgery recs for restarting diet. Serial abdominal exams.   Renal: Renal function stable. Continue with IVFs as tolerated. Maintain mag>2, phos>3 and potassium>4.   ID: Completed course of zosyn today for UTI. Will continue to monitor wbc and fever curve.   Endo: Maintain euglycemia to promote wound healing. Received dose of steroids intraop   Heme: As per discussion with surgery team, plan to restart full AC 48 hours after procedure. SCDs ordered     CRITICAL CARE TIME SPENT: 40 minutes   Time spent evaluating/treating patient with medical issues that pose a high risk for life threatening deterioration, and/or end-organ damage, reviewing data/labs/imaging, discussing case with multidisciplinary team, discussing plan/goals of care with patient/family. Non-inclusive of procedure time. Date of entry of this note is equal to the date of services rendered.     Case Discussed with ICU Attending, Dr. Ignacio

## 2024-02-29 NOTE — PROGRESS NOTE ADULT - ASSESSMENT
69yo F with PMHx COPD (not on home oxygen), HTN, RA, anxiety presenting with chief complaint of blood in stools. Admitted for GI bleed, plan for colonoscopy 2/20.      Problem/Plan - 1:  ·  Problem: Colonic mass.   ·  Plan: Patient presents with BRBPR, diarrhea with clots 2/2 lower GI bleed   CT AP: Masslike mural thickening of the distal sigmoid colon. Differential considerations include malignancy or, less likely, sequelae of chronic diverticulitis. Gastroenterology evaluation is recommended. Scattered too small to characterize hypodense foci in the liver, hepatic protocol MRI is recommended for further characterization.  -Patient unable to get outpatient colonoscopy (no insurance)  -HGB currently stable  -Hold home aspirin  -Vital signs currently stable, continue to monitor  -Transfuse prn   -Colonoscopy 2/20 with: large partially obstructing mass noted at approximately 13cm from anal verge.  Pathology with ADENOCARCINOMA, AT LEAST INTRAMUCOSAL.  -Surgery recs CT chest with IV contrast, CEA and rectal MR for staging - per radiology unable to get staging MRI done at this facility as it requires a different magnet  -CT chest: No evidence of metastatic disease. Acute pulmonary embolus in the right middle lobe lobar and segmental branches.  -Plan for lap sigmoidectomy on 2/29.  Cardiology and pulm preop evaluation noted.  Echo:  Left ventricular systolic function is normal with an ejection fraction visually estimated at 60 to 65 %.  Pt. is without s/s of ACS, decompensated CHF, or unstable arrhythmia.  Pt. is considered high risk for procedure given recently diagnosed pulmonary embolism.  Benefit of procedure outweighs the risk.  May proceed with planned lap sigmoidectomy on 2/29.    -GI and surgery following - follow up recs.     Problem/Plan - 2:  ·  Problem: COPD (chronic obstructive pulmonary disease).   ·  Plan: Chronic, recently admitted for COPD exacerbation  - Duonebs q6 PRN  - Continue home inhalers   - Supplemental O2 PRN. At baseline: 4-4.5 L home oxygen intermittently. COPD patient will keep SpO2 goal 88-93%   - Monitor respiratory status   - 3 weeks ago had rsv, recent rvp negative  - Continuous pulse ox  - Pulmonary Dr. Dobson f/u     Problem/Plan - 3:  ·  Problem: HTN (hypertension).   ·  Plan: Chronic, stable  - Continue home amlodipine 10mg qd with hold parameters  - Monitor hemodynamics  - DASH/TLC diet.     Problem/Plan - 4:  ·  Problem: Rheumatoid arthritis.   ·  Plan: - Chronic  - resumed prednisone 10mg PO Daily. Takes tramadol 50mg Qd.     Problem/Plan - 5:  ·  Problem: Anxiety.   ·  Plan: - Chronic, SOB likely 2/2 anxiety  - Continue Xanax 0.25mg qhs PRN.     Problem/Plan - 6:  ·  Problem: Pulmonary embolism  ·  Plan: CT chest with No evidence of metastatic disease. Acute pulmonary embolus in the right middle lobe lobar and segmental branches.    - Echo:  Left ventricular systolic function is normal with an ejection fraction visually estimated at 60 to 65 %.    - On heparin drip.       Problem/Plan - 7:  ·  Problem: Fever/UTI  ·  Plan: Blood cx NGTD.  Urine cx +E. coli and enterococcus faecalis.    - CXR Unchanged chest with COPD and slight blunting right base laterally.  RVP negative.    - On Ceftriaxone 1gm IV daily.    - ID consult requested.     Problem/Plan - 8:  ·  Problem: Encounter for deep vein thrombosis (DVT) prophylaxis.   ·  Plan: - on heparin drip for pulmonary embolism, to be stopped preop

## 2024-02-29 NOTE — BRIEF OPERATIVE NOTE - NSICDXBRIEFPROCEDURE_GEN_ALL_CORE_FT
PROCEDURES:  Laparoscopic colorectal anastomosis 29-Feb-2024 14:51:19  Dwight Ospina  Laparoscopic mobilization of splenic flexure of colon 29-Feb-2024 14:51:54  Dwight Ospina

## 2024-02-29 NOTE — CONSULT NOTE ADULT - CONSULT REQUESTED DATE/TIME
21-Feb-2024 13:25
27-Feb-2024 11:47
16-Feb-2024 11:03
20-Feb-2024 13:08
23-Feb-2024 12:44
26-Feb-2024 17:18
29-Feb-2024 15:00

## 2024-02-29 NOTE — PROGRESS NOTE ADULT - ASSESSMENT
67yo F with PMHx COPD (not on home oxygen), HTN, RA, anxiety admitted with GI bleed.    IN progress    cardiac clearance, HTN, PE,  - CT chest with  Acute pulmonary embolus in the right middle lobe lobar and segmental branches  - Continue heparin gtt for AC  - Pt on 3L NC for supplemental 02, on the basis of COPD and PE, wean as tolerated, on prn at home  - Pulm following    - No sign of volume overload on exam   - No severe valvular abnormalities noted on examination  - TTE 2/22/23 shows normal LV and RV size and function, EF 60-65%     - EKG: NSR with nonspecific TWA  - No  anginal complaints   - No evidence of any active ischemia   - LDLc 134  - Would start statin     - Tele SR 70-90s   - BP stable and controlled   - Continue amlodipine 10mg qd with hold parameters    - Pt a/w GIB, s/p colonoscopy 2/20, large partially obstructing mass noted at approximately 13cm from anal verge. Gi following  - Surgery following, will likely require resection of specimen +/- Neoadjuvant chemoradiation, plan for lap sigmoidectomy on 2/29  - Pt has no active ischemia, decompensated heart failure, unstable arrythmia, or severe stenotic valvular disease. In the setting of a PE, will need to minimize the time off AC.  Patient at high risk for procedure given PE but benefit of procedure outweighs the risk. OK to proceed with the planned procedure    - Monitor and replete lytes, keep K>4, Mg>2.  - Will continue to follow.   68 year old female with PMHx COPD (not on home oxygen), HTN, RA, anxiety admitted with GI bleed.    cardiac clearance, HTN, PE,  - CT chest with  Acute pulmonary embolus in the right middle lobe lobar and segmental branches  - Heparin on HOLD for OR   - Pulm following    - No sign of volume overload on exam   - No severe valvular abnormalities noted on examination  - TTE 2/22/23 shows normal LV and RV size and function, EF 60-65%     - EKG: NSR with nonspecific TWA  - No evidence of any active ischemia   -lipid profile reviewed- start statin post procedure     - Tele SR no events overnight   - BP stable and controlled   - Continue Norvasc    - Pt a/w GIB, s/p colonoscopy 2/20, large partially obstructing mass noted at approximately 13cm from anal verge. Gi following  - Surgery following, will likely require resection of specimen +/- Neoadjuvant chemoradiation, plan for lap sigmoidectomy on 2/29   - Pt has no active ischemia, decompensated heart failure, unstable arrythmia, or severe stenotic valvular disease.   In the setting of a PE, will need to minimize the time off AC.  Patient at high risk for procedure given PE but benefit of procedure outweighs the risk. OK to proceed with the planned procedure    - Monitor and replete lytes, keep K>4, Mg>2.  - Will continue to follow.

## 2024-02-29 NOTE — CONSULT NOTE ADULT - SUBJECTIVE AND OBJECTIVE BOX
Patient is a 68y old  Female who presents with a chief complaint of GIB bleed (29 Feb 2024 14:07)      BRIEF HOSPITAL COURSE: Patient is a 69 y/o female with pmhx of COPD (not on home oxygen), HTN, RA and anxiety who was admitted on 2/15 for GIB. Patient had underwent colonoscopy during admission where she was found to have a large partially obstructing mass in sigmoid colon. Further CT imaging without evidence of metastatic disease, but revealed acute pulmonary embolism in the right middle     Events last 24 hours: Patient admitted to ICU s/p laparoscopic sigmoidectomy with colorectal anastomosis     PAST MEDICAL & SURGICAL HISTORY:  COPD (chronic obstructive pulmonary disease)      Rheumatoid arthritis      HTN (hypertension)      Anxiety      No significant past surgical history        Allergies    No Known Allergies    Intolerances      FAMILY HISTORY:  FHx: lung cancer        Review of Systems:  CONSTITUTIONAL: No fever, chills, or fatigue  EYES: No eye pain, visual disturbances, or discharge  ENMT:  No difficulty hearing, tinnitus, vertigo; No sinus or throat pain  NECK: No pain or stiffness  RESPIRATORY: No cough, wheezing, chills or hemoptysis; No shortness of breath  CARDIOVASCULAR: No chest pain, palpitations, dizziness, or leg swelling  GASTROINTESTINAL: No abdominal or epigastric pain. No nausea, vomiting, or hematemesis; No diarrhea or constipation. No melena or hematochezia.  GENITOURINARY: No dysuria, frequency, hematuria, or incontinence  NEUROLOGICAL: No headaches, memory loss, loss of strength, numbness, or tremors  SKIN: No itching, burning, rashes, or lesions   MUSCULOSKELETAL: No joint pain or swelling; No muscle, back, or extremity pain  PSYCHIATRIC: No depression, anxiety, mood swings, or difficulty sleeping      Medications:        HYDROmorphone  Injectable 0.5 milliGRAM(s) IV Push every 10 minutes PRN  ondansetron Injectable 4 milliGRAM(s) IV Push once PRN            hydrocortisone sodium succinate Injectable 100 milliGRAM(s) IV Push once    lactated ringers. 1000 milliLiter(s) IV Continuous <Continuous>    influenza  Vaccine (HIGH DOSE) 0.7 milliLiter(s) IntraMuscular once              ICU Vital Signs Last 24 Hrs  T(C): 36.6 (29 Feb 2024 14:30), Max: 36.7 (28 Feb 2024 21:00)  T(F): 97.9 (29 Feb 2024 14:30), Max: 98.1 (28 Feb 2024 21:00)  HR: 93 (29 Feb 2024 15:30) (93 - 103)  BP: 137/81 (29 Feb 2024 15:30) (106/62 - 141/75)  BP(mean): --  ABP: --  ABP(mean): --  RR: 15 (29 Feb 2024 15:30) (15 - 22)  SpO2: 98% (29 Feb 2024 15:30) (96% - 100%)    O2 Parameters below as of 29 Feb 2024 09:46  Patient On (Oxygen Delivery Method): nasal cannula  O2 Flow (L/min): 2        Vital Signs Last 24 Hrs  T(C): 36.6 (29 Feb 2024 14:30), Max: 36.7 (28 Feb 2024 21:00)  T(F): 97.9 (29 Feb 2024 14:30), Max: 98.1 (28 Feb 2024 21:00)  HR: 93 (29 Feb 2024 15:30) (93 - 103)  BP: 137/81 (29 Feb 2024 15:30) (106/62 - 141/75)  BP(mean): --  RR: 15 (29 Feb 2024 15:30) (15 - 22)  SpO2: 98% (29 Feb 2024 15:30) (96% - 100%)    Parameters below as of 29 Feb 2024 09:46  Patient On (Oxygen Delivery Method): nasal cannula  O2 Flow (L/min): 2          I&O's Detail    28 Feb 2024 07:01  -  29 Feb 2024 07:00  --------------------------------------------------------  IN:    Heparin Infusion: 45 mL    sodium chloride 0.9%: 1200 mL  Total IN: 1245 mL    OUT:  Total OUT: 0 mL    Total NET: 1245 mL            LABS:                        8.2    6.01  )-----------( 283      ( 29 Feb 2024 05:20 )             28.3     02-29    146<H>  |  108  |  9   ----------------------------<  93  3.6   |  31  |  0.72    Ca    9.1      29 Feb 2024 05:20  Mg     2.0     02-29            CAPILLARY BLOOD GLUCOSE      POCT Blood Glucose.: 212 mg/dL (29 Feb 2024 14:41)    PT/INR - ( 29 Feb 2024 05:20 )   PT: 11.8 sec;   INR: 1.01 ratio         PTT - ( 29 Feb 2024 05:20 )  PTT:31.7 sec  Urinalysis Basic - ( 29 Feb 2024 05:20 )    Color: x / Appearance: x / SG: x / pH: x  Gluc: 93 mg/dL / Ketone: x  / Bili: x / Urobili: x   Blood: x / Protein: x / Nitrite: x   Leuk Esterase: x / RBC: x / WBC x   Sq Epi: x / Non Sq Epi: x / Bacteria: x      CULTURES:  Culture Results:   >100,000 CFU/ml Escherichia coli  >100,000 CFU/ml Enterococcus faecalis (02-24 @ 21:00)  Culture Results:   No growth at 5 days (02-24 @ 11:05)  Culture Results:   No growth at 5 days (02-24 @ 09:45)      Physical Examination:    General: No acute distress.  Alert, oriented, interactive, nonfocal    HEENT: Pupils equal, reactive to light.  Symmetric.    PULM: Clear to auscultation bilaterally, no significant sputum production    CVS: Regular rate and rhythm, no murmurs, rubs, or gallops    ABD: Soft, nondistended, nontender, normoactive bowel sounds, no masses    EXT: No edema, nontender    SKIN: Warm and well perfused, no rashes noted.    RADIOLOGY: < from: CT Abdomen and Pelvis w/ IV Cont (02.26.24 @ 18:57) >  IMPRESSION:  No evidence of acute thrombosis in the IVC, iliac veins and common   femoral veins bilaterally.    Redemonstration of a large rectosigmoid mass as described concerning for   neoplasm. No evidence of colonic obstruction.    Proximal to the rectosigmoid mass there is concern for developing acute,   noncomplicated sigmoid diverticulitis.    Numerous subcentimeter hepatic hypodensities are not characterized due to   small size, however for some, metastatic disease is not excluded.    < from: US Duplex Venous Lower Ext Complete, Bilateral (02.20.24 @ 20:29) >  2.5 cm Baker cyst.    IMPRESSION:  No evidence of deep venous thrombosis in either lower extremity.    < end of copied text >         Patient is a 68y old  Female who presents with a chief complaint of GIB bleed (29 Feb 2024 14:07)      BRIEF HOSPITAL COURSE: Patient is a 69 y/o female with pmhx of COPD (not on home oxygen), HTN, RA and anxiety who was admitted on 2/15 for GIB. Patient had underwent colonoscopy during admission where she was found to have a large partially obstructing mass in sigmoid colon. Pathology report confirming adenocarcinoma. Further CT imaging without evidence of metastatic disease but revealed acute pulmonary embolism in the right middle lobe lobar and segmental branches and patient was consequently placed on heparin infusion. Hospital course further complicated by E. Coli/Enterococcus faecalis UTI being treated with zosyn. After obtaining cardiac/pulmonary clearance, patient was scheduled to undergo lap sigmoidectomy on 2/29.     EVENTS LAST 24 HOURS: Patient upgraded to ICU s/p laparoscopic sigmoidectomy by Dr. Rocha/Dr. Ospina       PAST MEDICAL & SURGICAL HISTORY:  COPD (chronic obstructive pulmonary disease)      Rheumatoid arthritis      HTN (hypertension)      Anxiety      No significant past surgical history        Allergies    No Known Allergies    Intolerances      FAMILY HISTORY:  FHx: lung cancer        Review of Systems:  CONSTITUTIONAL: No fever, chills, or fatigue  EYES: No eye pain, visual disturbances, or discharge  ENMT:  No difficulty hearing, tinnitus, vertigo; No sinus or throat pain  NECK: No pain or stiffness  RESPIRATORY: No cough, wheezing, chills or hemoptysis; No shortness of breath  CARDIOVASCULAR: No chest pain, palpitations, dizziness, or leg swelling  GASTROINTESTINAL: No abdominal or epigastric pain. No nausea, vomiting, or hematemesis; No diarrhea or constipation. No melena or hematochezia.  GENITOURINARY: No dysuria, frequency, hematuria, or incontinence  NEUROLOGICAL: No headaches, memory loss, loss of strength, numbness, or tremors  SKIN: No itching, burning, rashes, or lesions   MUSCULOSKELETAL: No joint pain or swelling; No muscle, back, or extremity pain  PSYCHIATRIC: No depression, anxiety, mood swings, or difficulty sleeping      Medications:        HYDROmorphone  Injectable 0.5 milliGRAM(s) IV Push every 10 minutes PRN  ondansetron Injectable 4 milliGRAM(s) IV Push once PRN            hydrocortisone sodium succinate Injectable 100 milliGRAM(s) IV Push once    lactated ringers. 1000 milliLiter(s) IV Continuous <Continuous>    influenza  Vaccine (HIGH DOSE) 0.7 milliLiter(s) IntraMuscular once              ICU Vital Signs Last 24 Hrs  T(C): 36.6 (29 Feb 2024 14:30), Max: 36.7 (28 Feb 2024 21:00)  T(F): 97.9 (29 Feb 2024 14:30), Max: 98.1 (28 Feb 2024 21:00)  HR: 93 (29 Feb 2024 15:30) (93 - 103)  BP: 137/81 (29 Feb 2024 15:30) (106/62 - 141/75)  BP(mean): --  ABP: --  ABP(mean): --  RR: 15 (29 Feb 2024 15:30) (15 - 22)  SpO2: 98% (29 Feb 2024 15:30) (96% - 100%)    O2 Parameters below as of 29 Feb 2024 09:46  Patient On (Oxygen Delivery Method): nasal cannula  O2 Flow (L/min): 2        Vital Signs Last 24 Hrs  T(C): 36.6 (29 Feb 2024 14:30), Max: 36.7 (28 Feb 2024 21:00)  T(F): 97.9 (29 Feb 2024 14:30), Max: 98.1 (28 Feb 2024 21:00)  HR: 93 (29 Feb 2024 15:30) (93 - 103)  BP: 137/81 (29 Feb 2024 15:30) (106/62 - 141/75)  BP(mean): --  RR: 15 (29 Feb 2024 15:30) (15 - 22)  SpO2: 98% (29 Feb 2024 15:30) (96% - 100%)    Parameters below as of 29 Feb 2024 09:46  Patient On (Oxygen Delivery Method): nasal cannula  O2 Flow (L/min): 2          I&O's Detail    28 Feb 2024 07:01  -  29 Feb 2024 07:00  --------------------------------------------------------  IN:    Heparin Infusion: 45 mL    sodium chloride 0.9%: 1200 mL  Total IN: 1245 mL    OUT:  Total OUT: 0 mL    Total NET: 1245 mL            LABS:                        8.2    6.01  )-----------( 283      ( 29 Feb 2024 05:20 )             28.3     02-29    146<H>  |  108  |  9   ----------------------------<  93  3.6   |  31  |  0.72    Ca    9.1      29 Feb 2024 05:20  Mg     2.0     02-29            CAPILLARY BLOOD GLUCOSE      POCT Blood Glucose.: 212 mg/dL (29 Feb 2024 14:41)    PT/INR - ( 29 Feb 2024 05:20 )   PT: 11.8 sec;   INR: 1.01 ratio         PTT - ( 29 Feb 2024 05:20 )  PTT:31.7 sec  Urinalysis Basic - ( 29 Feb 2024 05:20 )    Color: x / Appearance: x / SG: x / pH: x  Gluc: 93 mg/dL / Ketone: x  / Bili: x / Urobili: x   Blood: x / Protein: x / Nitrite: x   Leuk Esterase: x / RBC: x / WBC x   Sq Epi: x / Non Sq Epi: x / Bacteria: x      CULTURES:  Culture Results:   >100,000 CFU/ml Escherichia coli  >100,000 CFU/ml Enterococcus faecalis (02-24 @ 21:00)  Culture Results:   No growth at 5 days (02-24 @ 11:05)  Culture Results:   No growth at 5 days (02-24 @ 09:45)      Physical Examination:    General: NAD     HEENT: Pupils equal, reactive to light.  Symmetric.    PULM: Clear to auscultation bilaterally, no significant sputum production    CVS: Regular rate and rhythm, no murmurs, rubs, or gallops    ABD: Abdomen appropriately tender. Incision site clean/dry/intact.     EXT: No edema, nontender    SKIN: Warm and well perfused, no rashes noted.    RADIOLOGY: < from: CT Abdomen and Pelvis w/ IV Cont (02.26.24 @ 18:57) >  IMPRESSION:  No evidence of acute thrombosis in the IVC, iliac veins and common   femoral veins bilaterally.    Redemonstration of a large rectosigmoid mass as described concerning for   neoplasm. No evidence of colonic obstruction.    Proximal to the rectosigmoid mass there is concern for developing acute,   noncomplicated sigmoid diverticulitis.    Numerous subcentimeter hepatic hypodensities are not characterized due to   small size, however for some, metastatic disease is not excluded.    < from: US Duplex Venous Lower Ext Complete, Bilateral (02.20.24 @ 20:29) >  2.5 cm Baker cyst.    IMPRESSION:  No evidence of deep venous thrombosis in either lower extremity.    < from: CT Chest w/ IV Cont (02.20.24 @ 17:26) >  IMPRESSION:  No evidence of metastatic disease. Acute pulmonary embolus in the right   middle lobe lobar and segmental branches.    < from: TTE W or WO Ultrasound Enhancing Agent (02.22.24 @ 07:42) >      1. Left ventricular systolic function is normal with an ejection fraction visually estimated at 60 to 65 %.   2. There is normal LV mass and concentric remodeling.   3. Normal right ventricular cavity size and normal systolic function.  4. The left atrium is normal.   5. Structurally normal mitral valve with normal leaflet excursion.   6. Trileaflet aortic valve with normal systolic excursion.   7. Structurally normal tricuspid valve with normal leaflet excursion. Pulmonary artery systolic pressure could not be estimated.   8. No pericardial effusion seen.                   Patient is a 68y old  Female who presents with a chief complaint of GIB bleed (29 Feb 2024 14:07)      BRIEF HOSPITAL COURSE: Patient is a 69 y/o female with pmhx of COPD (not on home oxygen), HTN, RA and anxiety who was admitted on 2/15 for GIB. Patient had underwent colonoscopy during admission where she was found to have a large partially obstructing mass in sigmoid colon. Pathology report confirming adenocarcinoma. Further CT imaging without evidence of metastatic disease but revealed acute pulmonary embolism in the right middle lobe lobar and segmental branches and patient was consequently placed on heparin infusion. Hospital course further complicated by E. Coli/Enterococcus faecalis UTI which was treated with IV zosyn. After obtaining cardiac/pulmonary clearance, patient was scheduled to undergo lap sigmoidectomy on 2/29.     EVENTS LAST 24 HOURS: Patient upgraded to ICU s/p laparoscopic sigmoidectomy by Dr. Rocha/Dr. Ospina       PAST MEDICAL & SURGICAL HISTORY:  COPD (chronic obstructive pulmonary disease)      Rheumatoid arthritis      HTN (hypertension)      Anxiety      No significant past surgical history        Allergies    No Known Allergies    Intolerances      FAMILY HISTORY:  FHx: lung cancer        Review of Systems:  CONSTITUTIONAL: No fever, chills, or fatigue  EYES: No eye pain, visual disturbances, or discharge  ENMT:  No difficulty hearing, tinnitus, vertigo; No sinus or throat pain  NECK: No pain or stiffness  RESPIRATORY: No cough, wheezing, chills or hemoptysis; No shortness of breath  CARDIOVASCULAR: No chest pain, palpitations, dizziness, or leg swelling  GASTROINTESTINAL: No abdominal or epigastric pain. No nausea, vomiting, or hematemesis; No diarrhea or constipation. No melena or hematochezia.  GENITOURINARY: No dysuria, frequency, hematuria, or incontinence  NEUROLOGICAL: No headaches, memory loss, loss of strength, numbness, or tremors  SKIN: No itching, burning, rashes, or lesions   MUSCULOSKELETAL: No joint pain or swelling; No muscle, back, or extremity pain  PSYCHIATRIC: No depression, anxiety, mood swings, or difficulty sleeping      Medications:        HYDROmorphone  Injectable 0.5 milliGRAM(s) IV Push every 10 minutes PRN  ondansetron Injectable 4 milliGRAM(s) IV Push once PRN            hydrocortisone sodium succinate Injectable 100 milliGRAM(s) IV Push once    lactated ringers. 1000 milliLiter(s) IV Continuous <Continuous>    influenza  Vaccine (HIGH DOSE) 0.7 milliLiter(s) IntraMuscular once              ICU Vital Signs Last 24 Hrs  T(C): 36.6 (29 Feb 2024 14:30), Max: 36.7 (28 Feb 2024 21:00)  T(F): 97.9 (29 Feb 2024 14:30), Max: 98.1 (28 Feb 2024 21:00)  HR: 93 (29 Feb 2024 15:30) (93 - 103)  BP: 137/81 (29 Feb 2024 15:30) (106/62 - 141/75)  BP(mean): --  ABP: --  ABP(mean): --  RR: 15 (29 Feb 2024 15:30) (15 - 22)  SpO2: 98% (29 Feb 2024 15:30) (96% - 100%)    O2 Parameters below as of 29 Feb 2024 09:46  Patient On (Oxygen Delivery Method): nasal cannula  O2 Flow (L/min): 2        Vital Signs Last 24 Hrs  T(C): 36.6 (29 Feb 2024 14:30), Max: 36.7 (28 Feb 2024 21:00)  T(F): 97.9 (29 Feb 2024 14:30), Max: 98.1 (28 Feb 2024 21:00)  HR: 93 (29 Feb 2024 15:30) (93 - 103)  BP: 137/81 (29 Feb 2024 15:30) (106/62 - 141/75)  BP(mean): --  RR: 15 (29 Feb 2024 15:30) (15 - 22)  SpO2: 98% (29 Feb 2024 15:30) (96% - 100%)    Parameters below as of 29 Feb 2024 09:46  Patient On (Oxygen Delivery Method): nasal cannula  O2 Flow (L/min): 2          I&O's Detail    28 Feb 2024 07:01  -  29 Feb 2024 07:00  --------------------------------------------------------  IN:    Heparin Infusion: 45 mL    sodium chloride 0.9%: 1200 mL  Total IN: 1245 mL    OUT:  Total OUT: 0 mL    Total NET: 1245 mL            LABS:                        8.2    6.01  )-----------( 283      ( 29 Feb 2024 05:20 )             28.3     02-29    146<H>  |  108  |  9   ----------------------------<  93  3.6   |  31  |  0.72    Ca    9.1      29 Feb 2024 05:20  Mg     2.0     02-29            CAPILLARY BLOOD GLUCOSE      POCT Blood Glucose.: 212 mg/dL (29 Feb 2024 14:41)    PT/INR - ( 29 Feb 2024 05:20 )   PT: 11.8 sec;   INR: 1.01 ratio         PTT - ( 29 Feb 2024 05:20 )  PTT:31.7 sec  Urinalysis Basic - ( 29 Feb 2024 05:20 )    Color: x / Appearance: x / SG: x / pH: x  Gluc: 93 mg/dL / Ketone: x  / Bili: x / Urobili: x   Blood: x / Protein: x / Nitrite: x   Leuk Esterase: x / RBC: x / WBC x   Sq Epi: x / Non Sq Epi: x / Bacteria: x      CULTURES:  Culture Results:   >100,000 CFU/ml Escherichia coli  >100,000 CFU/ml Enterococcus faecalis (02-24 @ 21:00)  Culture Results:   No growth at 5 days (02-24 @ 11:05)  Culture Results:   No growth at 5 days (02-24 @ 09:45)      Physical Examination:    General: NAD     HEENT: Pupils equal, reactive to light.  Symmetric.    PULM: Clear to auscultation bilaterally, no significant sputum production    CVS: Regular rate and rhythm, no murmurs, rubs, or gallops    ABD: Abdomen appropriately tender. Incision site clean/dry/intact.     EXT: No edema, nontender    SKIN: Warm and well perfused, no rashes noted.    RADIOLOGY: < from: CT Abdomen and Pelvis w/ IV Cont (02.26.24 @ 18:57) >  IMPRESSION:  No evidence of acute thrombosis in the IVC, iliac veins and common   femoral veins bilaterally.    Redemonstration of a large rectosigmoid mass as described concerning for   neoplasm. No evidence of colonic obstruction.    Proximal to the rectosigmoid mass there is concern for developing acute,   noncomplicated sigmoid diverticulitis.    Numerous subcentimeter hepatic hypodensities are not characterized due to   small size, however for some, metastatic disease is not excluded.    < from: US Duplex Venous Lower Ext Complete, Bilateral (02.20.24 @ 20:29) >  2.5 cm Baker cyst.    IMPRESSION:  No evidence of deep venous thrombosis in either lower extremity.    < from: CT Chest w/ IV Cont (02.20.24 @ 17:26) >  IMPRESSION:  No evidence of metastatic disease. Acute pulmonary embolus in the right   middle lobe lobar and segmental branches.    < from: TTE W or WO Ultrasound Enhancing Agent (02.22.24 @ 07:42) >      1. Left ventricular systolic function is normal with an ejection fraction visually estimated at 60 to 65 %.   2. There is normal LV mass and concentric remodeling.   3. Normal right ventricular cavity size and normal systolic function.  4. The left atrium is normal.   5. Structurally normal mitral valve with normal leaflet excursion.   6. Trileaflet aortic valve with normal systolic excursion.   7. Structurally normal tricuspid valve with normal leaflet excursion. Pulmonary artery systolic pressure could not be estimated.   8. No pericardial effusion seen.

## 2024-02-29 NOTE — CONSULT NOTE ADULT - NS PANP COMMENT GEN_ALL_CORE FT
68F h/o COPD on 4L home O2 PRN, HTN, RA, anxiety who initially presented for BRBPR, subsequently found to have a partially obstructing sigmoid/rectal mass on CT and colonoscopy with pathology revealing adenocarcinoma. Course was further c/b acute PE in the right middle lobe lobar and segmental branches on full anticoagulation with heparin drip prior to OR. Pt now POD#0 s/p laparoscopic sigmoid colon resection w/ colorectal anastomosis, extubated post-operatively without complication. In PACU, pt reporting back pain but denies abd discomfort.      Neuro: pain control with ofirmev/dilaudid pain scale   CV: hemodynamically stable, holding home amlodipine for now   Pulm: successfully extubated to NC post-procedure and maintaining sat on home FiO2 requirements  - PE on heparin gtt prior to OR, now holding full AC at least until tomorrow  - continue supportive care with BDs  GI: POD #0 from laparoscopic sigmoidectomy with colorectal anastomosis, ok with sips and chips per surgery  - on PPI at home, can change to protonix IV while NPO  Renal: Renal function stable. Continue mIVF while NPO  ID: Completed course of zosyn today for UTI. Monitor off abx at this time   Heme: As per discussion with surgery team, plan to start DVT prophylactic dosing AC tonight, if H/H remains stable tomorrow may restart heparin gtt with low PTT goal range pending clinical course

## 2024-02-29 NOTE — PROGRESS NOTE ADULT - SUBJECTIVE AND OBJECTIVE BOX
Patient is a 68y old  Female who presents with a chief complaint of GIB bleed (29 Feb 2024 07:54)       INTERVAL HPI/OVERNIGHT EVENTS: Patient seen and examined at bedside. Denies chest pain, palpitations, sob    MEDICATIONS  (STANDING):  amLODIPine   Tablet 10 milliGRAM(s) Oral daily  bisacodyl Suppository 10 milliGRAM(s) Rectal once  budesonide 160 MICROgram(s)/formoterol 4.5 MICROgram(s) Inhaler 2 Puff(s) Inhalation two times a day  influenza  Vaccine (HIGH DOSE) 0.7 milliLiter(s) IntraMuscular once  pantoprazole    Tablet 40 milliGRAM(s) Oral before breakfast  piperacillin/tazobactam IVPB.. 3.375 Gram(s) IV Intermittent every 8 hours  predniSONE   Tablet 10 milliGRAM(s) Oral daily  saline laxative (FLEET) Rectal Enema 1 Enema Rectal once  senna 2 Tablet(s) Oral at bedtime  sodium chloride 0.9%. 1000 milliLiter(s) (100 mL/Hr) IV Continuous <Continuous>    MEDICATIONS  (PRN):  acetaminophen     Tablet .. 650 milliGRAM(s) Oral every 6 hours PRN Temp greater or equal to 38C (100.4F), Mild Pain (1 - 3)  albuterol/ipratropium for Nebulization 3 milliLiter(s) Nebulizer every 6 hours PRN Shortness of Breath and/or Wheezing  ALPRAZolam 0.25 milliGRAM(s) Oral daily PRN for anxiety  melatonin 3 milliGRAM(s) Oral at bedtime PRN Insomnia  ondansetron Injectable 4 milliGRAM(s) IV Push every 6 hours PRN Nausea and/or Vomiting  polyethylene glycol 3350 17 Gram(s) Oral daily PRN Constipation  traMADol 50 milliGRAM(s) Oral daily PRN Moderate Pain (4 - 6)      Allergies    No Known Allergies    Intolerances        REVIEW OF SYSTEMS:  Per HPI. All other ROS noted Negative     Vital Signs Last 24 Hrs  T(C): 36.7 (29 Feb 2024 06:15), Max: 36.7 (28 Feb 2024 13:11)  T(F): 98 (29 Feb 2024 06:15), Max: 98.1 (28 Feb 2024 21:00)  HR: 93 (29 Feb 2024 06:15) (93 - 103)  BP: 126/63 (29 Feb 2024 06:15) (116/66 - 141/75)  BP(mean): --  RR: 17 (29 Feb 2024 06:15) (17 - 18)  SpO2: 96% (29 Feb 2024 06:15) (95% - 96%)    Parameters below as of 29 Feb 2024 06:15  Patient On (Oxygen Delivery Method): nasal cannula        PHYSICAL EXAM:  General: NAD, Comfortable   Eyes: EOMI, PERRLA  ENT: Moist mucous membranes  Neck: Supple, No JVD  Lungs: Clear to auscultation bilaterally, normal respiratory effort  Cardio: RRR, S1/S2, No murmurs  Abdomen: Soft, Nontender, Nondistended; Bowel sounds present  Extremities: No calf tenderness, No pitting edema, no digital cyanosis  Skin: Warm, dry  Psych: A&O x 3, follows commands  LABS:                        8.2    6.01  )-----------( 283      ( 29 Feb 2024 05:20 )             28.3     29 Feb 2024 05:20    146    |  108    |  9      ----------------------------<  93     3.6     |  31     |  0.72     Ca    9.1        29 Feb 2024 05:20  Mg     2.0       29 Feb 2024 05:20      PT/INR - ( 29 Feb 2024 05:20 )   PT: 11.8 sec;   INR: 1.01 ratio         PTT - ( 29 Feb 2024 05:20 )  PTT:31.7 sec  CAPILLARY BLOOD GLUCOSE        BLOOD CULTURE    RADIOLOGY & ADDITIONAL TESTS:    Imaging Personally Reviewed:  [ ] YES     Consultant(s) Notes Reviewed:      Care Discussed with Consultants/Other Providers:

## 2024-02-29 NOTE — CONSULT NOTE ADULT - CONSULT REASON
S/p sigmoidectomy
Sigmoid colon mass
PE, cardiac clearance
r/o DVT
colon ca
rectal bleed
UTI and colonic mass, diverticulitis?

## 2024-02-29 NOTE — PROGRESS NOTE ADULT - ASSESSMENT
69 y/o female w/ PMHx of COPD (on 4-4.5 L home oxygen intermittently), HTN, RA, anxiety presenting w/ bloody stools, found to have 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 Plan for OR today. Heparin drip stopped at midnight. Patient counseled on importance of bowel prep cooperation, but was unable to tolerate all of oral prep. Will continue with enema and suppository.   - OR today for lap/possible open sigmoid colectomy vs Anterior resection. ICU Post-operatively  - Repeat enema & suppository after enema  - Per cardiology clearance, patient is high risk for procedure but benefit outweighs risk, will need to minimize the time off AC in setting of a PE  - Per pulm, in best possible condition for surgical procedure as the benefit outweighs the risks  - Case to be discussed with Dr. Rocha

## 2024-02-29 NOTE — PROGRESS NOTE ADULT - SUBJECTIVE AND OBJECTIVE BOX
[facility]  INFECTIOUS DISEASES   [address] [place] [address]  Tel: [phone]     Fax: [phone]  ========================================================  MD Velasquez Scott Kaushal, MD Cho, Michelle, MD Sunjit, Jaspal, MD  ========================================================    MRN-[id number]  KENDALL RIDER     Seen earlier  Follow up: Colonic mass, UTI    No pain, no fever, no urinary symptoms.     PAST MEDICAL & SURGICAL HISTORY:  COPD (chronic obstructive pulmonary disease)  Rheumatoid arthritis  HTN (hypertension)  Anxiety  No significant past surgical history    Social Hx: No current smoking, EtOH or drugs     FAMILY HISTORY:  FHx: lung cancer    Allergies  No Known Allergies    MEDICATIONS  (STANDING):  amLODIPine   Tablet 10 milliGRAM(s) Oral daily  budesonide 160 MICROgram(s)/formoterol 4.5 MICROgram(s) Inhaler 2 Puff(s) Inhalation two times a day  heparin  Infusion.  Unit(s)/Hr (11 mL/Hr) IV Continuous <Continuous>  influenza  Vaccine (HIGH DOSE) 0.7 milliLiter(s) IntraMuscular once  lactulose Syrup 20 Gram(s) Oral two times a day  pantoprazole    Tablet 40 milliGRAM(s) Oral before breakfast  piperacillin/tazobactam IVPB. 3.375 Gram(s) IV Intermittent once  piperacillin/tazobactam IVPB.- 3.375 Gram(s) IV Intermittent once  piperacillin/tazobactam IVPB.. 3.375 Gram(s) IV Intermittent every 8 hours  predniSONE   Tablet 10 milliGRAM(s) Oral daily  senna 2 Tablet(s) Oral at bedtime    MEDICATIONS  (PRN):  acetaminophen     Tablet .. 650 milliGRAM(s) Oral every 6 hours PRN Temp greater or equal to 38C (100.4F), Mild Pain (1 - 3)  albuterol/ipratropium for Nebulization 3 milliLiter(s) Nebulizer every 6 hours PRN Shortness of Breath and/or Wheezing  ALPRAZolam 0.25 milliGRAM(s) Oral daily PRN for anxiety  heparin   Injectable 4500 Unit(s) IV Push every 6 hours PRN For aPTT less than 40  heparin   Injectable 2000 Unit(s) IV Push every 6 hours PRN For aPTT between 40 - 57  melatonin 3 milliGRAM(s) Oral at bedtime PRN Insomnia  polyethylene glycol 3350 17 Gram(s) Oral daily PRN Constipation  traMADol 50 milliGRAM(s) Oral daily PRN Moderate Pain (4 - 6)     REVIEW OF SYSTEMS:  CONSTITUTIONAL:  No Fever or chills  HEENT:  No diplopia or blurred vision.  No sore throat or runny nose.  CARDIOVASCULAR:  No chest pain   RESPIRATORY:  No cough, shortness of breath, PND or orthopnea.  GASTROINTESTINAL:  No nausea, vomiting or diarrhea.  GENITOURINARY:  No dysuria, frequency or urgency. No Blood in urine  MUSCULOSKELETAL:  no joint aches, no muscle pain  SKIN:  No change in skin, hair or nails.    Physical Exam:  Vital Signs Last 24 Hrs  T(C): 36.7 (29 Feb 2024 11:07), Max: 36.7 (28 Feb 2024 21:00)  T(F): 98.1 (29 Feb 2024 11:07), Max: 98.1 (28 Feb 2024 21:00)  HR: 94 (29 Feb 2024 11:07) (93 - 103)  BP: 134/63 (29 Feb 2024 11:07) (126/63 - 141/75)  BP(mean): --  RR: 20 (29 Feb 2024 11:07) (17 - 20)  SpO2: 100% (29 Feb 2024 11:07) (96% - 100%)  Parameters below as of 29 Feb 2024 09:46  Patient On (Oxygen Delivery Method): nasal cannula  O2 Flow (L/min): 2  GEN: NAD  HEENT: normocephalic and atraumatic. EOMI. PERRL.    NECK: Supple.  No lymphadenopathy   LUNGS: Clear to auscultation.  HEART: Regular rate and rhythm without murmur.  ABDOMEN: Soft, nontender, and nondistended.  Positive bowel sounds.    : No CVA tenderness  EXTREMITIES: Without edema.  NEUROLOGIC: grossly intact.  PSYCHIATRIC: Appropriate affect .  SKIN: No rash     Labs:                        8.2    6.01  )-----------( 283      ( 29 Feb 2024 05:20 )             28.3     02-29    146<H>  |  108  |  9   ----------------------------<  93  3.6   |  31  |  0.72    Ca    9.1      29 Feb 2024 05:20  Mg     2.0     02-29    Culture - Urine (collected 02-24-24 @ 21:00)  Source: Clean Catch Clean Catch (Midstream)  Final Report (02-27-24 @ 20:41):    >100,000 CFU/ml Escherichia coli    >100,000 CFU/ml Enterococcus faecalis  Organism: Escherichia coli  Enterococcus faecalis (02-27-24 @ 20:41)  Organism: Enterococcus faecalis (02-27-24 @ 20:41)    Sensitivities:      Method Type: JULIA      -  Ampicillin: S <=2 Predicts results to ampicillin/sulbactam, amoxacillin-clavulanate and  piperacillin-tazobactam.      -  Ciprofloxacin: S <=1      -  Levofloxacin: S 1      -  Nitrofurantoin: S <=32 Should not be used to treat pyelonephritis.      -  Tetracycline: S <=1      -  Vancomycin: S 1  Organism: Escherichia coli (02-27-24 @ 20:41)    Sensitivities:      Method Type: JULIA      -  Amoxicillin/Clavulanic Acid: I 16/8      -  Ampicillin: R >16 These ampicillin results predict results for amoxicillin      -  Ampicillin/Sulbactam: R >16/8      -  Aztreonam: S <=4      -  Cefazolin: S 4 For uncomplicated UTI with K. pneumoniae, E. coli, or P. mirablis: JULIA <=16 is sensitive and JULIA >=32 is resistant. This also predicts results for oral agents cefaclor, cefdinir, cefpodoxime, cefprozil, cefuroxime axetil, cephalexin and locarbef for uncomplicated UTI. Note that some isolates may be susceptible to these agents while testing resistant to cefazolin.      -  Cefepime: S <=2      -  Cefoxitin: S <=8      -  Ceftriaxone: S <=1      -  Cefuroxime: S <=4      -  Ciprofloxacin: S <=0.25      -  Ertapenem: S <=0.5      -  Gentamicin: S <=2      -  Imipenem: S <=1      -  Levofloxacin: S <=0.5      -  Meropenem: S <=1      -  Nitrofurantoin: S <=32 Should not be used to treat pyelonephritis      -  Piperacillin/Tazobactam: S <=8      -  Tobramycin: S <=2      -  Trimethoprim/Sulfamethoxazole: R >2/38    Culture - Blood (collected 02-24-24 @ 11:05)  Source: .Blood Blood-Peripheral  Preliminary Report (02-28-24 @ 16:00):    No growth at 4 days    Culture - Blood (collected 02-24-24 @ 09:45)  Source: .Blood Blood-Peripheral  Preliminary Report (02-28-24 @ 16:00):    No growth at 4 days    WBC Count: 6.01 K/uL (02-29-24 @ 05:20)  WBC Count: 5.68 K/uL (02-28-24 @ 08:04)  WBC Count: 6.12 K/uL (02-27-24 @ 05:20)  WBC Count: 6.99 K/uL (02-26-24 @ 05:20)  WBC Count: 7.72 K/uL (02-25-24 @ 05:35)    Creatinine: 0.72 mg/dL (02-29-24 @ 05:20)  Creatinine: 0.91 mg/dL (02-28-24 @ 08:04)  Creatinine: 0.79 mg/dL (02-27-24 @ 05:20)  Creatinine: 0.73 mg/dL (02-26-24 @ 05:20)  Creatinine: 0.81 mg/dL (02-25-24 @ 05:35)    Ferritin: 32 ng/mL (02-16-24 @ 06:25)    Procalcitonin, Serum: 0.23 ng/mL (02-24-24 @ 17:15)     SARS-CoV-2: NotDetec (02-24-24 @ 14:17)  SARS-CoV-2: NotDetec (02-16-24 @ 01:10)    All imaging and other data have been reviewed.  < from: CT Abdomen and Pelvis w/ IV Cont (02.26.24 @ 18:57) >  IMPRESSION:  No evidence of acute thrombosis in the IVC, iliac veins and common   femoral veins bilaterally.  Redemonstration of a large rectosigmoid mass as described concerning for   neoplasm. No evidence of colonic obstruction.  Proximal to the rectosigmoid mass there is concern for developing acute,   noncomplicated sigmoid diverticulitis.  Numerous subcentimeter hepatic hypodensities are not characterized due to   small size, however for some, metastatic disease is not excluded.    Assessment and Plan:   69yo F with PMHx COPD (on 4-4.5 L home oxygen intermittently), HTN, RA, anxiety presenting with chief complaint of blood in stools.   CT showed a large colonic mass for which has colonoscopy and biopsy showing adenocarcinoma for which has been scheduled for surgery on 2/29.  CT also concerning for developing diverticulitis but she has no abdominal pain or fever.   Her UA was abnormal and UC is growing ecoli and enterococcus, she was on ceftriaxone and ID called for further recommendations.  Since she has a large mass in area and concern for diverticulitis will cover GI annetta and also UC until after surgery.      # UTI  # Large colon mass  # Diverticulitis?    - Blood cultures negative   - UA with large WBC and UC with ecoli and enterococcus faecalis  - Continue Zosyn 3.375gm q8  - Will treat for 3-5days total, if surgery goes well can stop tomorrow.     Will follow.    Lenora Mondragon MD  Division of Infectious Diseases   Please call ID service at [phone] with any question.    50 minutes spent on total encounter assessing patient, examination, chart review, counseling and coordinating care by the attending physician/nurse/care manager.

## 2024-02-29 NOTE — PROGRESS NOTE ADULT - ASSESSMENT
IMPRESSION    69yo F w hx COPD on 4-4.5L O2, HTN, RA, anxiety, adm w 2wk hx of intermittent blood mixed w stool  Never had colonoscopy  No abdomen pain anorexia, weight loss  Colonoscopy 2/20 here large partially obstructing mass at ~13cm from anal verge sig for adenocarcinoma, MMR status still pending  CEA 17.8  CT chest 2/20 and CT a/p 2/13 no mets but new RML PE, duplex legs negative now on IV Heparin    Recent COVID 19 infection and RSV last month.     No further bleeds since adm, reports does have some SOB since adm but thought was due to anxiety, episode leg pain 2months ago  Seen by Surgery, for planned lap colon resection 2/29, due to risk for pending obstruction  Iron studies c/w deficiency post IV Venofer    -adenocarcinoma of colon assoc w bleed, no distant mets by imaging studies, for definitive surgery. Will followup final path to see if needs adjuvant therapy  -RML PE-likely provoked etiology/ malignancy associated, continue IV heparin, can change to oral DOAC when ready for discharge.   -anemia-w/u sig for iron deficiency post IV Venofer also due to continued GI bleed, monitor serial CBC, rishi w pt on Heparin for PE. Discussed w pt potential need for transfuion PRBC if cont to decrease/in prep for surgery      d/w GI Dr Goodman, lesion in Sigmoid on colonoscopy evaluation.  s/p colonoscopy 2/20, large partially obstructing mass noted at approximately 13cm from anal verge  pathology noted, positive for adenocarcinoma  CEA elevated    s/p surgery eval    02/13 CT scan Abd neg for liver mets  Scattered too small to characterize hypodense foci    02/26 CT scan AP  LIVER: Numerous subcentimeter hepatic hypodensities measuring up to 9 mm are not characterized due to small size. Some are poorly delineated and metastatic disease is not excluded, for example image 2:15.  BOWEL: Redemonstration of a masslike thickening in the rectosigmoid junction which is at least 4.6 cm in length and extends into the mesorectal/mesosigmoid fat, the mass is concerning for neoplasm. Proximal   to the mass there is mild to moderate stool burden without evidence of colonic obstruction.  Sigmoid colonic diverticulosis is present proximal to the mass with a single inflamed diverticulum, image 2:81, and adjacent fat stranding suggesting mild and developing acute diverticulitis. No fluid collection   or extraluminal air to suspect abscess or perforation. No bowel obstruction. Appendix is normal  VESSELS: Aorta has normal caliber. Mild atherosclerotic changes. Hepatic veins, portal vein, SMV and splenic vein are patent.  There is adequate opacification of the entire IVC, common, external and internal iliac veins without evidence of filling defects or thrombosis.   No evidence of common femoral thrombosis bilaterally.  RETROPERITONEUM/LYMPH NODES: No lymphadenopathy. Numerous abnormal mesorectal lymph nodes along the superior rectal artery up to 1.0 x 1.1 mm, image 2:58.    2/29/2024 s/p laparoscopic sigmoid colon resection      RECOMMENDATIONS    on prophylatic heparin dosing  continue post op care    Await final pathology.

## 2024-02-29 NOTE — PROGRESS NOTE ADULT - SUBJECTIVE AND OBJECTIVE BOX
Patient is a 68y old  Female who presents with a chief complaint of GIB bleed (29 Feb 2024 22:58)      INTERVAL HPI/OVERNIGHT EVENTS:    doing well post-op in ICU. denies shortness of breath or abdominal pain    MEDICATIONS  (STANDING):  heparin   Injectable 5000 Unit(s) SubCutaneous every 8 hours  influenza  Vaccine (HIGH DOSE) 0.7 milliLiter(s) IntraMuscular once  pantoprazole  Injectable 40 milliGRAM(s) IV Push daily      MEDICATIONS  (PRN):  HYDROmorphone  Injectable 1 milliGRAM(s) IV Push every 4 hours PRN Severe Pain (7 - 10)  HYDROmorphone  Injectable 0.5 milliGRAM(s) IV Push every 10 minutes PRN Moderate Pain (4 - 6)      Allergies    No Known Allergies    Intolerances        PAST MEDICAL & SURGICAL HISTORY:  COPD (chronic obstructive pulmonary disease)      Rheumatoid arthritis      HTN (hypertension)      Anxiety      No significant past surgical history          Vital Signs Last 24 Hrs  T(C): 37.1 (29 Feb 2024 19:29), Max: 37.1 (29 Feb 2024 19:29)  T(F): 98.7 (29 Feb 2024 19:29), Max: 98.7 (29 Feb 2024 19:29)  HR: 93 (29 Feb 2024 22:00) (93 - 101)  BP: 127/60 (29 Feb 2024 22:00) (106/62 - 140/85)  BP(mean): 87 (29 Feb 2024 22:00) (87 - 93)  RR: 16 (29 Feb 2024 22:00) (13 - 26)  SpO2: 98% (29 Feb 2024 22:00) (96% - 100%)    Parameters below as of 29 Feb 2024 20:00  Patient On (Oxygen Delivery Method): nasal cannula  O2 Flow (L/min): 2      PHYSICAL EXAMINATION:    GENERAL: The patient is awake and alert in no apparent distress.     HEENT: Head is normocephalic and atraumatic.     NECK: no JVD    LUNGS: diminished air entry bilateral    HEART: Regular rate and rhythm without murmur.    ABDOMEN: Soft, nontender, and nondistended.      EXTREMITIES: Without any cyanosis, clubbing, rash, lesions or edema.    NEUROLOGIC: Grossly intact.    SKIN: No ulceration or induration present.      LABS:                        8.2    6.01  )-----------( 283      ( 29 Feb 2024 05:20 )             28.3     02-29    146<H>  |  108  |  9   ----------------------------<  93  3.6   |  31  |  0.72    Ca    9.1      29 Feb 2024 05:20  Mg     2.0     02-29      PT/INR - ( 29 Feb 2024 05:20 )   PT: 11.8 sec;   INR: 1.01 ratio         PTT - ( 29 Feb 2024 05:20 )  PTT:31.7 sec  Urinalysis Basic - ( 29 Feb 2024 05:20 )    Color: x / Appearance: x / SG: x / pH: x  Gluc: 93 mg/dL / Ketone: x  / Bili: x / Urobili: x   Blood: x / Protein: x / Nitrite: x   Leuk Esterase: x / RBC: x / WBC x   Sq Epi: x / Non Sq Epi: x / Bacteria: x        Assessment:    S/P Laparoscopic colorectal anastomosis + S/P laparoscopic mobilization of splenic flexure of sigmoid colon  Severe COPD  Acute on Chronic Hypoxic respiratory failure    Plan:    ICU monitoring  DVT prophylaxis  Pain control  Bronchodilators as needed

## 2024-02-29 NOTE — PROVIDER CONTACT NOTE (OTHER) - SITUATION
Pt unable to tolerate movie prep. Only able to consume 75% of 1 bottle despite encouragement and measures to make it tasteful. + BM with soft to loose stools noted. Enema also given as ordered.

## 2024-03-01 LAB
ALBUMIN SERPL ELPH-MCNC: 2.5 G/DL — LOW (ref 3.3–5)
ALBUMIN SERPL ELPH-MCNC: 2.6 G/DL — LOW (ref 3.3–5)
ALP SERPL-CCNC: 45 U/L — SIGNIFICANT CHANGE UP (ref 40–120)
ALP SERPL-CCNC: 47 U/L — SIGNIFICANT CHANGE UP (ref 40–120)
ALT FLD-CCNC: 14 U/L — SIGNIFICANT CHANGE UP (ref 12–78)
ALT FLD-CCNC: 14 U/L — SIGNIFICANT CHANGE UP (ref 12–78)
ANION GAP SERPL CALC-SCNC: 7 MMOL/L — SIGNIFICANT CHANGE UP (ref 5–17)
ANION GAP SERPL CALC-SCNC: 7 MMOL/L — SIGNIFICANT CHANGE UP (ref 5–17)
AST SERPL-CCNC: 14 U/L — LOW (ref 15–37)
AST SERPL-CCNC: 15 U/L — SIGNIFICANT CHANGE UP (ref 15–37)
BILIRUB SERPL-MCNC: 0.4 MG/DL — SIGNIFICANT CHANGE UP (ref 0.2–1.2)
BILIRUB SERPL-MCNC: 0.4 MG/DL — SIGNIFICANT CHANGE UP (ref 0.2–1.2)
BUN SERPL-MCNC: 6 MG/DL — LOW (ref 7–23)
BUN SERPL-MCNC: 6 MG/DL — LOW (ref 7–23)
CALCIUM SERPL-MCNC: 9.1 MG/DL — SIGNIFICANT CHANGE UP (ref 8.5–10.1)
CALCIUM SERPL-MCNC: 9.6 MG/DL — SIGNIFICANT CHANGE UP (ref 8.5–10.1)
CHLORIDE SERPL-SCNC: 103 MMOL/L — SIGNIFICANT CHANGE UP (ref 96–108)
CHLORIDE SERPL-SCNC: 104 MMOL/L — SIGNIFICANT CHANGE UP (ref 96–108)
CO2 SERPL-SCNC: 33 MMOL/L — HIGH (ref 22–31)
CO2 SERPL-SCNC: 33 MMOL/L — HIGH (ref 22–31)
CREAT SERPL-MCNC: 0.69 MG/DL — SIGNIFICANT CHANGE UP (ref 0.5–1.3)
CREAT SERPL-MCNC: 0.74 MG/DL — SIGNIFICANT CHANGE UP (ref 0.5–1.3)
EGFR: 89 ML/MIN/1.73M2 — SIGNIFICANT CHANGE UP
EGFR: 95 ML/MIN/1.73M2 — SIGNIFICANT CHANGE UP
GLUCOSE SERPL-MCNC: 100 MG/DL — HIGH (ref 70–99)
GLUCOSE SERPL-MCNC: 87 MG/DL — SIGNIFICANT CHANGE UP (ref 70–99)
HCT VFR BLD CALC: 31.2 % — LOW (ref 34.5–45)
HGB BLD-MCNC: 9.2 G/DL — LOW (ref 11.5–15.5)
MAGNESIUM SERPL-MCNC: 1.7 MG/DL — SIGNIFICANT CHANGE UP (ref 1.6–2.6)
MAGNESIUM SERPL-MCNC: 1.9 MG/DL — SIGNIFICANT CHANGE UP (ref 1.6–2.6)
MCHC RBC-ENTMCNC: 23.8 PG — LOW (ref 27–34)
MCHC RBC-ENTMCNC: 29.5 GM/DL — LOW (ref 32–36)
MCV RBC AUTO: 80.6 FL — SIGNIFICANT CHANGE UP (ref 80–100)
MRSA PCR RESULT.: SIGNIFICANT CHANGE UP
NRBC # BLD: 0 /100 WBCS — SIGNIFICANT CHANGE UP (ref 0–0)
PHOSPHATE SERPL-MCNC: 2.7 MG/DL — SIGNIFICANT CHANGE UP (ref 2.5–4.5)
PHOSPHATE SERPL-MCNC: 3.4 MG/DL — SIGNIFICANT CHANGE UP (ref 2.5–4.5)
PLATELET # BLD AUTO: 359 K/UL — SIGNIFICANT CHANGE UP (ref 150–400)
POTASSIUM SERPL-MCNC: 3.5 MMOL/L — SIGNIFICANT CHANGE UP (ref 3.5–5.3)
POTASSIUM SERPL-MCNC: 3.9 MMOL/L — SIGNIFICANT CHANGE UP (ref 3.5–5.3)
POTASSIUM SERPL-SCNC: 3.5 MMOL/L — SIGNIFICANT CHANGE UP (ref 3.5–5.3)
POTASSIUM SERPL-SCNC: 3.9 MMOL/L — SIGNIFICANT CHANGE UP (ref 3.5–5.3)
PROT SERPL-MCNC: 5.9 G/DL — LOW (ref 6–8.3)
PROT SERPL-MCNC: 6.1 G/DL — SIGNIFICANT CHANGE UP (ref 6–8.3)
RBC # BLD: 3.87 M/UL — SIGNIFICANT CHANGE UP (ref 3.8–5.2)
RBC # FLD: 22.4 % — HIGH (ref 10.3–14.5)
S AUREUS DNA NOSE QL NAA+PROBE: SIGNIFICANT CHANGE UP
SODIUM SERPL-SCNC: 143 MMOL/L — SIGNIFICANT CHANGE UP (ref 135–145)
SODIUM SERPL-SCNC: 144 MMOL/L — SIGNIFICANT CHANGE UP (ref 135–145)
WBC # BLD: 8.96 K/UL — SIGNIFICANT CHANGE UP (ref 3.8–10.5)
WBC # FLD AUTO: 8.96 K/UL — SIGNIFICANT CHANGE UP (ref 3.8–10.5)

## 2024-03-01 PROCEDURE — 99233 SBSQ HOSP IP/OBS HIGH 50: CPT

## 2024-03-01 PROCEDURE — 99232 SBSQ HOSP IP/OBS MODERATE 35: CPT

## 2024-03-01 PROCEDURE — 99233 SBSQ HOSP IP/OBS HIGH 50: CPT | Mod: GC

## 2024-03-01 RX ORDER — TIOTROPIUM BROMIDE 18 UG/1
2 CAPSULE ORAL; RESPIRATORY (INHALATION) DAILY
Refills: 0 | Status: DISCONTINUED | OUTPATIENT
Start: 2024-03-01 | End: 2024-03-07

## 2024-03-01 RX ORDER — ACETAMINOPHEN 500 MG
650 TABLET ORAL EVERY 6 HOURS
Refills: 0 | Status: DISCONTINUED | OUTPATIENT
Start: 2024-03-01 | End: 2024-03-07

## 2024-03-01 RX ORDER — PANTOPRAZOLE SODIUM 20 MG/1
40 TABLET, DELAYED RELEASE ORAL
Refills: 0 | Status: DISCONTINUED | OUTPATIENT
Start: 2024-03-01 | End: 2024-03-07

## 2024-03-01 RX ORDER — BUDESONIDE AND FORMOTEROL FUMARATE DIHYDRATE 160; 4.5 UG/1; UG/1
2 AEROSOL RESPIRATORY (INHALATION)
Refills: 0 | Status: DISCONTINUED | OUTPATIENT
Start: 2024-03-01 | End: 2024-03-07

## 2024-03-01 RX ORDER — HYDROMORPHONE HYDROCHLORIDE 2 MG/ML
0.5 INJECTION INTRAMUSCULAR; INTRAVENOUS; SUBCUTANEOUS EVERY 4 HOURS
Refills: 0 | Status: DISCONTINUED | OUTPATIENT
Start: 2024-03-01 | End: 2024-03-06

## 2024-03-01 RX ORDER — ALBUTEROL 90 UG/1
2 AEROSOL, METERED ORAL EVERY 6 HOURS
Refills: 0 | Status: DISCONTINUED | OUTPATIENT
Start: 2024-03-01 | End: 2024-03-07

## 2024-03-01 RX ADMIN — HEPARIN SODIUM 5000 UNIT(S): 5000 INJECTION INTRAVENOUS; SUBCUTANEOUS at 05:36

## 2024-03-01 RX ADMIN — HYDROMORPHONE HYDROCHLORIDE 0.5 MILLIGRAM(S): 2 INJECTION INTRAMUSCULAR; INTRAVENOUS; SUBCUTANEOUS at 07:40

## 2024-03-01 RX ADMIN — HYDROMORPHONE HYDROCHLORIDE 0.5 MILLIGRAM(S): 2 INJECTION INTRAMUSCULAR; INTRAVENOUS; SUBCUTANEOUS at 13:00

## 2024-03-01 RX ADMIN — HYDROMORPHONE HYDROCHLORIDE 0.5 MILLIGRAM(S): 2 INJECTION INTRAMUSCULAR; INTRAVENOUS; SUBCUTANEOUS at 12:42

## 2024-03-01 RX ADMIN — HYDROMORPHONE HYDROCHLORIDE 0.5 MILLIGRAM(S): 2 INJECTION INTRAMUSCULAR; INTRAVENOUS; SUBCUTANEOUS at 18:39

## 2024-03-01 RX ADMIN — HEPARIN SODIUM 5000 UNIT(S): 5000 INJECTION INTRAVENOUS; SUBCUTANEOUS at 22:04

## 2024-03-01 RX ADMIN — HYDROMORPHONE HYDROCHLORIDE 1 MILLIGRAM(S): 2 INJECTION INTRAMUSCULAR; INTRAVENOUS; SUBCUTANEOUS at 22:51

## 2024-03-01 RX ADMIN — HYDROMORPHONE HYDROCHLORIDE 0.5 MILLIGRAM(S): 2 INJECTION INTRAMUSCULAR; INTRAVENOUS; SUBCUTANEOUS at 18:57

## 2024-03-01 RX ADMIN — HEPARIN SODIUM 5000 UNIT(S): 5000 INJECTION INTRAVENOUS; SUBCUTANEOUS at 14:12

## 2024-03-01 RX ADMIN — HYDROMORPHONE HYDROCHLORIDE 1 MILLIGRAM(S): 2 INJECTION INTRAMUSCULAR; INTRAVENOUS; SUBCUTANEOUS at 22:16

## 2024-03-01 RX ADMIN — HYDROMORPHONE HYDROCHLORIDE 0.5 MILLIGRAM(S): 2 INJECTION INTRAMUSCULAR; INTRAVENOUS; SUBCUTANEOUS at 07:58

## 2024-03-01 RX ADMIN — BUDESONIDE AND FORMOTEROL FUMARATE DIHYDRATE 2 PUFF(S): 160; 4.5 AEROSOL RESPIRATORY (INHALATION) at 19:51

## 2024-03-01 RX ADMIN — Medication 0.25 MILLIGRAM(S): at 00:05

## 2024-03-01 NOTE — PROGRESS NOTE ADULT - SUBJECTIVE AND OBJECTIVE BOX
[facility] - Alexa Ramriez, Sarthak, Arnel, Gordon Collazo  Office Number:  [phone]    Patient resting comfortably in bed in NAD.  Laying flat with no respiratory distress.  No complaints of chest pain, dyspnea, palpitations, PND, or orthopnea.    ROS: negative unless otherwise mentioned.    Telemetry:  sr    MEDICATIONS  (STANDING):  budesonide 160 MICROgram(s)/formoterol 4.5 MICROgram(s) Inhaler 2 Puff(s) Inhalation two times a day  heparin   Injectable 5000 Unit(s) SubCutaneous every 8 hours  influenza  Vaccine (HIGH DOSE) 0.7 milliLiter(s) IntraMuscular once  pantoprazole    Tablet 40 milliGRAM(s) Oral before breakfast  tiotropium 2.5 MICROgram(s) Inhaler 2 Puff(s) Inhalation daily    MEDICATIONS  (PRN):  acetaminophen     Tablet .. 650 milliGRAM(s) Oral every 6 hours PRN Mild Pain (1 - 3), Moderate Pain (4 - 6)  albuterol    90 MICROgram(s) HFA Inhaler 2 Puff(s) Inhalation every 6 hours PRN Shortness of Breath and/or Wheezing  HYDROmorphone  Injectable 1 milliGRAM(s) IV Push every 4 hours PRN Severe Pain (7 - 10)      Allergies    No Known Allergies    Intolerances        Vital Signs Last 24 Hrs  T(C): 36.7 (01 Mar 2024 11:47), Max: 37.2 (01 Mar 2024 00:34)  T(F): 98 (01 Mar 2024 11:47), Max: 98.9 (01 Mar 2024 00:34)  HR: 102 (01 Mar 2024 11:00) (81 - 102)  BP: 139/62 (01 Mar 2024 11:00) (106/62 - 148/69)  BP(mean): 89 (01 Mar 2024 11:00) (79 - 99)  RR: 28 (01 Mar 2024 11:00) (13 - 32)  SpO2: 94% (01 Mar 2024 11:00) (94% - 100%)    Parameters below as of 01 Mar 2024 08:00  Patient On (Oxygen Delivery Method): nasal cannula  O2 Flow (L/min): 3      I&O's Summary    29 Feb 2024 07:01  -  01 Mar 2024 07:00  --------------------------------------------------------  IN: 600 mL / OUT: 1660 mL / NET: -1060 mL    01 Mar 2024 07:01  -  01 Mar 2024 11:50  --------------------------------------------------------  IN: 0 mL / OUT: 325 mL / NET: -325 mL        ON EXAM:    Constitutional: NAD, awake and alert, well-developed  HEENT: Moist Mucous Membranes, Anicteric  Pulmonary: Non-labored, breath sounds are clear bilaterally, No wheezing, crackles or rhonchi  Cardiovascular: Regular, S1 and S2 nl, No murmurs, rubs, gallops or clicks  Gastrointestinal: Bowel Sounds present, soft, nontender.   Lymph: No lymphadenopathy. No peripheral edema.  Skin: No visible rashes or ulcers.  Psych:  Mood & affect appropriate    LABS: All Labs Reviewed:                        9.2    8.96  )-----------( 359      ( 01 Mar 2024 05:45 )             31.2                         8.6    11.50 )-----------( 321      ( 29 Feb 2024 23:45 )             29.1                         8.2    6.01  )-----------( 283      ( 29 Feb 2024 05:20 )             28.3     01 Mar 2024 05:45    144    |  104    |  6      ----------------------------<  87     3.5     |  33     |  0.69   29 Feb 2024 23:45    143    |  103    |  6      ----------------------------<  100    3.9     |  33     |  0.74   29 Feb 2024 05:20    146    |  108    |  9      ----------------------------<  93     3.6     |  31     |  0.72     Ca    9.6        01 Mar 2024 05:45  Ca    9.1        29 Feb 2024 23:45  Ca    9.1        29 Feb 2024 05:20  Phos  2.7       01 Mar 2024 05:45  Phos  3.4       29 Feb 2024 23:45  Mg     1.9       01 Mar 2024 05:45  Mg     1.7       29 Feb 2024 23:45  Mg     2.0       29 Feb 2024 05:20    TPro  6.1    /  Alb  2.6    /  TBili  0.4    /  DBili  x      /  AST  15     /  ALT  14     /  AlkPhos  47     01 Mar 2024 05:45  TPro  5.9    /  Alb  2.5    /  TBili  0.4    /  DBili  x      /  AST  14     /  ALT  14     /  AlkPhos  45     29 Feb 2024 23:45    PT/INR - ( 29 Feb 2024 05:20 )   PT: 11.8 sec;   INR: 1.01 ratio         PTT - ( 29 Feb 2024 05:20 )  PTT:31.7 sec      Blood Culture:

## 2024-03-01 NOTE — PHYSICAL THERAPY INITIAL EVALUATION ADULT - DIAGNOSIS, PT EVAL
Patient presents with weakness, impaired balance, and decreased endurance impacting ability to complete functional mobility/transfers leading to increased risk for falls.

## 2024-03-01 NOTE — PROGRESS NOTE ADULT - SUBJECTIVE AND OBJECTIVE BOX
Patient is a 68y old  Female who presents with a chief complaint of GIB bleed (01 Mar 2024 09:28)       INTERVAL HPI/OVERNIGHT EVENTS: Patient seen and examined at bedside. Awake, alert. Mild abdominal pain. No chest pain, palpitations, sob     MEDICATIONS  (STANDING):  heparin   Injectable 5000 Unit(s) SubCutaneous every 8 hours  influenza  Vaccine (HIGH DOSE) 0.7 milliLiter(s) IntraMuscular once  pantoprazole  Injectable 40 milliGRAM(s) IV Push daily    MEDICATIONS  (PRN):  HYDROmorphone  Injectable 1 milliGRAM(s) IV Push every 4 hours PRN Severe Pain (7 - 10)  HYDROmorphone  Injectable 0.5 milliGRAM(s) IV Push every 10 minutes PRN Moderate Pain (4 - 6)      Allergies    No Known Allergies    Intolerances        REVIEW OF SYSTEMS:  Per HPI. All other ROS noted Negative   Vital Signs Last 24 Hrs  T(C): 37 (01 Mar 2024 07:46), Max: 37.2 (01 Mar 2024 00:34)  T(F): 98.6 (01 Mar 2024 07:46), Max: 98.9 (01 Mar 2024 00:34)  HR: 90 (01 Mar 2024 09:00) (81 - 101)  BP: 127/59 (01 Mar 2024 09:00) (106/62 - 148/69)  BP(mean): 85 (01 Mar 2024 09:00) (79 - 99)  RR: 32 (01 Mar 2024 09:00) (13 - 32)  SpO2: 94% (01 Mar 2024 09:00) (94% - 100%)    Parameters below as of 01 Mar 2024 08:00  Patient On (Oxygen Delivery Method): nasal cannula  O2 Flow (L/min): 3      PHYSICAL EXAM:  GENERAL: NAD  HEAD:  Atraumatic, Normocephalic  EYES: EOMI, PERRLA, conjunctiva and sclera clear  ENMT: No tonsillar erythema, exudates, or enlargement; Moist mucous membranes  NECK: Supple, No JVD, Normal thyroid  NERVOUS SYSTEM:  Alert & Oriented X3, no focal motor/sensory deficits noted   CHEST/LUNG: Clear to auscultation bilaterally; No rales, rhonchi, wheezing, or rubs  HEART: Regular rate and rhythm  ABDOMEN: S/p surgery   EXTREMITIES:  2+ Peripheral Pulses, No clubbing, cyanosis, or edema  LYMPH: No lymphadenopathy noted  SKIN: No rashes or lesions    LABS:                        9.2    8.96  )-----------( 359      ( 01 Mar 2024 05:45 )             31.2     01 Mar 2024 05:45    144    |  104    |  6      ----------------------------<  87     3.5     |  33     |  0.69     Ca    9.6        01 Mar 2024 05:45  Phos  2.7       01 Mar 2024 05:45  Mg     1.9       01 Mar 2024 05:45    TPro  6.1    /  Alb  2.6    /  TBili  0.4    /  DBili  x      /  AST  15     /  ALT  14     /  AlkPhos  47     01 Mar 2024 05:45    PT/INR - ( 29 Feb 2024 05:20 )   PT: 11.8 sec;   INR: 1.01 ratio         PTT - ( 29 Feb 2024 05:20 )  PTT:31.7 sec  CAPILLARY BLOOD GLUCOSE      POCT Blood Glucose.: 212 mg/dL (29 Feb 2024 14:41)  POCT Blood Glucose.: 132 mg/dL (29 Feb 2024 11:23)    BLOOD CULTURE    RADIOLOGY & ADDITIONAL TESTS:    Imaging Personally Reviewed:  [ ] YES     Consultant(s) Notes Reviewed:      Care Discussed with Consultants/Other Providers:

## 2024-03-01 NOTE — PROGRESS NOTE ADULT - ASSESSMENT
constipation  rectal bleed  abnormal CT    s/p colonoscopy 2/20, large partially obstructing mass noted at approximately 13cm from anal verge  pathology noted, positive for adenocarcinoma  CEA elevated  S/p lap sigmoid colon resection w/ colorectal anastomosis on 2/29  Monitor for GI function  Diet per surgery  Will follow    I reviewed the overnight course of events on the unit, re-confirming the patient history. I discussed the care with the patient  Differential diagnosis and plan of care discussed with patient after the evaluation  35 minutes spent on total encounter of which more than fifty percent of the encounter was spent counseling and/or coordinating care by the attending physician.

## 2024-03-01 NOTE — PROGRESS NOTE ADULT - ASSESSMENT
68 year old female with PMHx COPD (not on home oxygen), HTN, RA, anxiety admitted with GI bleed.    cardiac clearance, HTN, PE  - CT chest with acute pulmonary embolus in the right middle lobe lobar and segmental branches  - Pt a/w GIB, s/p colonoscopy 2/20, large partially obstructing mass noted at approximately 13cm from anal verge.   - now s/p Laparoscopic colorectal anastomosis on 2/29/24  - heparin gtt currently on hold, though need to restart when deemed safe from a CV perspective  - 02 supplementation as needed    - No sign of volume overload on exam   - No severe valvular abnormalities noted on examination  - TTE 2/22/23 shows normal LV and RV size and function, EF 60-65%     - EKG: NSR with nonspecific TWA  - No evidence of any active ischemia   - lipid profile reviewed, eventually start statin    - remains in sr on telemetry  - BP stable and controlled   - restart amlodipine if BP elevated    - Monitor and replete lytes, keep K>4, Mg>2.  - Will continue to follow.

## 2024-03-01 NOTE — PROGRESS NOTE ADULT - ASSESSMENT
Patient is a 67 y/o female with pmhx of COPD (not on home oxygen), HTN, RA and anxiety admitted for GIB, now POD#1 s/p lap sigmoid colon resection w/ colorectal anastomosis.       Neuro: Alert and interactive. Pain control with dilaudid PRN   Cardio: with acute PE. Hemodynamics intact. Normal LV and RV function noted on TTE. Per surgery team, remain on heparin subq today and can resume heparin gtt tomorrow without loading dose. Cardiology team following.   Resp: Successfully extubated to NC s/p procedure. Maintaining O2 sat > 90% on 2L NC (on home O2). Supportive care with ATC duonebs   GI: POD #1 from laparoscopic sigmoidectomy with colorectal anastomosis. Protonix for stress ulcer prophylaxis. Clear luqid diet. Serial abdominal exams.   Renal: Renal function stable. Continue with IVFs as tolerated. Maintain mag>2, phos>3 and potassium>4.   ID: Completed course of zosyn today for UTI. Will continue to monitor wbc and fever curve.   Endo: Maintain euglycemia to promote wound healing. Received dose of steroids intraop   Heme: plan to restart full AC 48 hours after procedure per surgery. On subQ heparin and SCDs for now    Patient is a 69 y/o female with pmhx of COPD (not on home oxygen), HTN, RA and anxiety admitted for GIB, now POD#1 s/p lap sigmoid colon resection w/ colorectal anastomosis.       Neuro: Alert and interactive. Pain control with dilaudid PRN   Cardio: with acute PE. Hemodynamics intact. Normal LV and RV function noted on TTE. Per surgery team, remain on heparin subq today and can resume heparin gtt tomorrow without loading dose. Cardiology team following.   Resp: Successfully extubated to NC s/p procedure. Maintaining O2 sat > 90% on 2L NC (on home O2). Supportive care with ATC duonebs   GI: POD #1 from laparoscopic sigmoidectomy with colorectal anastomosis. Protonix for stress ulcer prophylaxis. Clear luqid diet. Serial abdominal exams.   Renal: Renal function stable. Continue with IVFs as tolerated. Remove latif for TOV. Maintain mag>2, phos>3 and potassium>4.   ID: Completed course of zosyn today for UTI. Will continue to monitor wbc and fever curve.   Endo: Maintain euglycemia to promote wound healing. Received dose of steroids intraop   Heme: plan to restart full AC 48 hours after procedure per surgery. On subQ heparin and SCDs for now

## 2024-03-01 NOTE — PROGRESS NOTE ADULT - SUBJECTIVE AND OBJECTIVE BOX
SURGERY PA NOTE ON BEHALF OF DR. Rocha / General SURGERY:    S: Patient seen and examined at bedside.     Pain well controlled.   Denies fever, chills, n/v, chest pain, SOB.     MEDICATIONS:  heparin   Injectable 5000 Unit(s) SubCutaneous every 8 hours  HYDROmorphone  Injectable 1 milliGRAM(s) IV Push every 4 hours PRN  HYDROmorphone  Injectable 0.5 milliGRAM(s) IV Push every 10 minutes PRN  influenza  Vaccine (HIGH DOSE) 0.7 milliLiter(s) IntraMuscular once  pantoprazole  Injectable 40 milliGRAM(s) IV Push daily      O:  Vital Signs Last 24 Hrs  T(C): 37 (01 Mar 2024 07:46), Max: 37.2 (01 Mar 2024 00:34)  T(F): 98.6 (01 Mar 2024 07:46), Max: 98.9 (01 Mar 2024 00:34)  HR: 90 (01 Mar 2024 09:00) (81 - 101)  BP: 127/59 (01 Mar 2024 09:00) (106/62 - 148/69)  BP(mean): 85 (01 Mar 2024 09:00) (79 - 99)  RR: 32 (01 Mar 2024 09:00) (13 - 32)  SpO2: 94% (01 Mar 2024 09:00) (94% - 100%)    Parameters below as of 01 Mar 2024 08:00  Patient On (Oxygen Delivery Method): nasal cannula  O2 Flow (L/min): 3      I&O SUMMARY:    02-29-24 @ 07:01  -  03-01-24 @ 07:00  --------------------------------------------------------  IN: 600 mL / OUT: 1660 mL / NET: -1060 mL    03-01-24 @ 07:01  -  03-01-24 @ 09:28  --------------------------------------------------------  IN: 0 mL / OUT: 75 mL / NET: -75 mL        PHYSICAL EXAM:  Gen: AAO x3, NAD  Pulm: CTAB, Symmetrical chest rise  CV: RRR  Abd: Soft, appropriate ttp around incision sties. Derma cerna well approximated, c/d/i, No hematoma.  ND, -R/-G  Ext: No C/C/E  Vasc: 2+ Radial, DP pulses  Neuro: No focal neurological deficits  : latif with clear yellow urine in bag.     LABS:                        9.2    8.96  )-----------( 359      ( 01 Mar 2024 05:45 )             31.2     03-01    144  |  104  |  6<L>  ----------------------------<  87  3.5   |  33<H>  |  0.69    Ca    9.6      01 Mar 2024 05:45  Phos  2.7     03-01  Mg     1.9     03-01    TPro  6.1  /  Alb  2.6<L>  /  TBili  0.4  /  DBili  x   /  AST  15  /  ALT  14  /  AlkPhos  47  03-01    PT/INR - ( 29 Feb 2024 05:20 )   PT: 11.8 sec;   INR: 1.01 ratio         PTT - ( 29 Feb 2024 05:20 )  PTT:31.7 sec        Assessment and Plan:   · Assessment	  67 y/o female w/ PMHx of COPD (on 4-4.5 L home oxygen intermittently), HTN, RA, anxiety presenting w/ bloody stools, found to have 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   Heparin drip stopped at 2/28 midnight.   POD#1 lap sigmoid colon resection w/ colorectal anastomosis.  ICU care Post-operatively.   Recovering well, afebrile, hemodynamically stable.   Adequate UOP:  1460cc/1660cc.  Currently on SQH.      Plan:   -Advance to clears as tolerated.   - Monitor bowel functions.   - Per cardiology, patient is high risk for procedure but benefit outweighs risk, will need to minimize the time off AC in setting of a PE  - OOB to chair with assistance.   - Remove latif and start TOV  - Pain control  - Incentive spirometry  - Appreciate ICU care  - Case and plan discussed with Dr. Rocha

## 2024-03-01 NOTE — PHYSICAL THERAPY INITIAL EVALUATION ADULT - ADDITIONAL COMMENTS
Patient lives with her spouse, 2 sons, daughter-in-law and grandson in a private house, 3-4 steps to enter. Patient reports being independent in ADLs and ambulation prior to admission.

## 2024-03-01 NOTE — PROGRESS NOTE ADULT - ASSESSMENT
Surg. Att.  Pt seen and examined. Discussed with Surgical Team. I agree with assessment and plan  She is very comfortable. Minimal discomfort.  Had BM's , but no flatus yet.  Respiratory seems to be doing well.  She is on Heparin tid.  Tomorrow , if HGB stable initiate Heparin drip without a bolus.  Pt needs to be oob and PT needs to be initiated.

## 2024-03-01 NOTE — PHYSICAL THERAPY INITIAL EVALUATION ADULT - GAIT TRAINING, PT EVAL
Patient will ambulate >200 feet independently by 2 weeks to allow for increased independence in the community.

## 2024-03-01 NOTE — PROGRESS NOTE ADULT - ASSESSMENT
IMPRESSION    67yo F w hx COPD on 4-4.5L O2, HTN, RA, anxiety, adm w 2wk hx of intermittent blood mixed w stool  Never had colonoscopy  No abdomen pain anorexia, weight loss  Colonoscopy 2/20 here large partially obstructing mass at ~13cm from anal verge sig for adenocarcinoma, MMR status still pending  CEA 17.8  CT chest 2/20 and CT a/p 2/13 no mets but new RML PE, duplex legs negative now on IV Heparin    Recent COVID 19 infection and RSV last month.     No further bleeds since adm, reports does have some SOB since adm but thought was due to anxiety, episode leg pain 2months ago  Seen by Surgery, for planned lap colon resection 2/29, due to risk for pending obstruction  Iron studies c/w deficiency post IV Venofer    -adenocarcinoma of colon assoc w bleed, no distant mets by imaging studies, for definitive surgery. Will followup final path to see if needs adjuvant therapy  -RML PE-likely provoked etiology/ malignancy associated, continue IV heparin, can change to oral DOAC when ready for discharge.   -anemia-w/u sig for iron deficiency post IV Venofer also due to continued GI bleed, monitor serial CBC, rishi w pt on Heparin for PE. Discussed w pt potential need for transfuion PRBC if cont to decrease/in prep for surgery      d/w GI Dr Goodman, lesion in Sigmoid on colonoscopy evaluation.  s/p colonoscopy 2/20, large partially obstructing mass noted at approximately 13cm from anal verge  pathology noted, positive for adenocarcinoma  CEA elevated    s/p surgery eval    02/13 CT scan Abd neg for liver mets  Scattered too small to characterize hypodense foci    02/26 CT scan AP  LIVER: Numerous subcentimeter hepatic hypodensities measuring up to 9 mm are not characterized due to small size. Some are poorly delineated and metastatic disease is not excluded, for example image 2:15.  BOWEL: Redemonstration of a masslike thickening in the rectosigmoid junction which is at least 4.6 cm in length and extends into the mesorectal/mesosigmoid fat, the mass is concerning for neoplasm. Proximal   to the mass there is mild to moderate stool burden without evidence of colonic obstruction.  Sigmoid colonic diverticulosis is present proximal to the mass with a single inflamed diverticulum, image 2:81, and adjacent fat stranding suggesting mild and developing acute diverticulitis. No fluid collection   or extraluminal air to suspect abscess or perforation. No bowel obstruction. Appendix is normal  VESSELS: Aorta has normal caliber. Mild atherosclerotic changes. Hepatic veins, portal vein, SMV and splenic vein are patent.  There is adequate opacification of the entire IVC, common, external and internal iliac veins without evidence of filling defects or thrombosis.   No evidence of common femoral thrombosis bilaterally.  RETROPERITONEUM/LYMPH NODES: No lymphadenopathy. Numerous abnormal mesorectal lymph nodes along the superior rectal artery up to 1.0 x 1.1 mm, image 2:58.    2/29/2024 s/p laparoscopic sigmoid colon resection  doing well post op      RECOMMENDATIONS    on prophylatic heparin dosing  continue post op care    Await final pathology.   continue ICU care

## 2024-03-01 NOTE — PROGRESS NOTE ADULT - SUBJECTIVE AND OBJECTIVE BOX
Patient is a 68y old  Female who presents with a chief complaint of GIB bleed (01 Mar 2024 12:25)      INTERVAL HPI/OVERNIGHT EVENTS:    continues to do well post-op. Using incentive spirometry. Abdominal pain is mild    MEDICATIONS  (STANDING):  budesonide 160 MICROgram(s)/formoterol 4.5 MICROgram(s) Inhaler 2 Puff(s) Inhalation two times a day  heparin   Injectable 5000 Unit(s) SubCutaneous every 8 hours  influenza  Vaccine (HIGH DOSE) 0.7 milliLiter(s) IntraMuscular once  pantoprazole    Tablet 40 milliGRAM(s) Oral before breakfast  tiotropium 2.5 MICROgram(s) Inhaler 2 Puff(s) Inhalation daily      MEDICATIONS  (PRN):  acetaminophen     Tablet .. 650 milliGRAM(s) Oral every 6 hours PRN Mild Pain (1 - 3), Moderate Pain (4 - 6)  albuterol    90 MICROgram(s) HFA Inhaler 2 Puff(s) Inhalation every 6 hours PRN Shortness of Breath and/or Wheezing  HYDROmorphone  Injectable 0.5 milliGRAM(s) IV Push every 4 hours PRN Moderate Pain (4 - 6)  HYDROmorphone  Injectable 1 milliGRAM(s) IV Push every 4 hours PRN Severe Pain (7 - 10)      Allergies    No Known Allergies    Intolerances        PAST MEDICAL & SURGICAL HISTORY:  COPD (chronic obstructive pulmonary disease)      Rheumatoid arthritis      HTN (hypertension)      Anxiety      No significant past surgical history          Vital Signs Last 24 Hrs  T(C): 36.8 (01 Mar 2024 21:15), Max: 37.2 (01 Mar 2024 00:34)  T(F): 98.2 (01 Mar 2024 21:15), Max: 98.9 (01 Mar 2024 00:34)  HR: 98 (01 Mar 2024 21:00) (81 - 118)  BP: 158/67 (01 Mar 2024 21:00) (113/58 - 158/67)  BP(mean): 96 (01 Mar 2024 21:00) (79 - 99)  RR: 39 (01 Mar 2024 21:00) (14 - 41)  SpO2: 97% (01 Mar 2024 21:00) (92% - 99%)    Parameters below as of 01 Mar 2024 19:00  Patient On (Oxygen Delivery Method): nasal cannula  O2 Flow (L/min): 3      PHYSICAL EXAMINATION:    GENERAL: The patient is awake and alert in no apparent distress.     HEENT: Head is normocephalic and atraumatic.    NECK: no JVD    LUNGS: Clear to auscultation without wheezing, rales or rhonchi; respirations unlabored    HEART: Regular rate and rhythm without murmur.    ABDOMEN: Soft, nontender, and nondistended.      EXTREMITIES: Without any cyanosis, clubbing, rash, lesions or edema.    NEUROLOGIC: Grossly intact.    SKIN: No ulceration or induration present.      LABS:                        9.2    8.96  )-----------( 359      ( 01 Mar 2024 05:45 )             31.2     03-01    144  |  104  |  6<L>  ----------------------------<  87  3.5   |  33<H>  |  0.69    Ca    9.6      01 Mar 2024 05:45  Phos  2.7     03-01  Mg     1.9     03-01    TPro  6.1  /  Alb  2.6<L>  /  TBili  0.4  /  DBili  x   /  AST  15  /  ALT  14  /  AlkPhos  47  03-01    PT/INR - ( 29 Feb 2024 05:20 )   PT: 11.8 sec;   INR: 1.01 ratio         PTT - ( 29 Feb 2024 05:20 )  PTT:31.7 sec  Urinalysis Basic - ( 01 Mar 2024 05:45 )    Color: x / Appearance: x / SG: x / pH: x  Gluc: 87 mg/dL / Ketone: x  / Bili: x / Urobili: x   Blood: x / Protein: x / Nitrite: x   Leuk Esterase: x / RBC: x / WBC x   Sq Epi: x / Non Sq Epi: x / Bacteria: x      Assessment:    COPD  Acute on Chronic Hypoxic respiratory failure  S/P Sigmoid Colon Resection for adenocarcinoma  Recent Pulmonary emboli    Plan:    Supplemental oxygen  DVT prophyalxis  Full dose anticoagulation to resume as per surgery and hematology  Symbicort + Spiriva inhalers

## 2024-03-01 NOTE — PROGRESS NOTE ADULT - SUBJECTIVE AND OBJECTIVE BOX
Patient is a 68y old  Female who presents with a chief complaint of GIB bleed (01 Mar 2024 11:49)    24 hour events: ***    REVIEW OF SYSTEMS  Constitutional: No fever, chills, fatigue  Neuro: No headache, numbness, weakness  Resp: No cough, wheezing, shortness of breath  CVS: No chest pain, palpitations, leg swelling  GI: No abdominal pain, nausea, vomiting, diarrhea   : No dysuria, frequency, incontinence  Skin: No itching, burning, rashes, or lesions   Msk: No joint pain or swelling  Psych: No depression, anxiety, mood swings  Heme: No bleeding    T(F): 98 (03-01-24 @ 11:47), Max: 98.9 (03-01-24 @ 00:34)  HR: 102 (03-01-24 @ 11:00) (81 - 102)  BP: 139/62 (03-01-24 @ 11:00) (106/62 - 148/69)  RR: 28 (03-01-24 @ 11:00) (13 - 32)  SpO2: 94% (03-01-24 @ 11:00) (94% - 100%)  Wt(kg): --            I&O's Summary    02-29 @ 07:01  -  03-01 @ 07:00  --------------------------------------------------------  IN: 600 mL / OUT: 1660 mL / NET: -1060 mL    03-01 @ 07:01  -  03-01 @ 12:11  --------------------------------------------------------  IN: 0 mL / OUT: 325 mL / NET: -325 mL      PHYSICAL EXAM  General:   CNS:   HEENT:   Resp:   CVS:   Abd:   Ext:   Skin:     MEDICATIONS        albuterol    90 MICROgram(s) HFA Inhaler Inhalation PRN  budesonide 160 MICROgram(s)/formoterol 4.5 MICROgram(s) Inhaler Inhalation  tiotropium 2.5 MICROgram(s) Inhaler Inhalation    acetaminophen     Tablet .. Oral PRN  HYDROmorphone  Injectable IV Push PRN      heparin   Injectable SubCutaneous    pantoprazole    Tablet Oral        influenza  Vaccine (HIGH DOSE) IntraMuscular                              9.2    8.96  )-----------( 359      ( 01 Mar 2024 05:45 )             31.2       03-01    144  |  104  |  6<L>  ----------------------------<  87  3.5   |  33<H>  |  0.69    Ca    9.6      01 Mar 2024 05:45  Phos  2.7     03-01  Mg     1.9     03-01    TPro  6.1  /  Alb  2.6<L>  /  TBili  0.4  /  DBili  x   /  AST  15  /  ALT  14  /  AlkPhos  47  03-01          PT/INR - ( 29 Feb 2024 05:20 )   PT: 11.8 sec;   INR: 1.01 ratio         PTT - ( 29 Feb 2024 05:20 )  PTT:31.7 sec  Urinalysis Basic - ( 01 Mar 2024 05:45 )    Color: x / Appearance: x / SG: x / pH: x  Gluc: 87 mg/dL / Ketone: x  / Bili: x / Urobili: x   Blood: x / Protein: x / Nitrite: x   Leuk Esterase: x / RBC: x / WBC x   Sq Epi: x / Non Sq Epi: x / Bacteria: x            Radiology: ***  Bedside lung ultrasound: ***  Bedside ECHO: ***    CENTRAL LINE: Y/N          DATE INSERTED:              REMOVE: Y/N  KEANE: Y/N                        DATE INSERTED:              REMOVE: Y/N  A-LINE: Y/N                       DATE INSERTED:              REMOVE: Y/N    GLOBAL ISSUE/BEST PRACTICE  Analgesia:   Sedation:   CAM-ICU:   HOB elevation: yes  Stress ulcer prophylaxis:   VTE prophylaxis:   Glycemic control:   Nutrition:     CODE STATUS: ***  [facility] discussion: Y       Patient is a 68y old  Female who presents with a chief complaint of GIB bleed (01 Mar 2024 11:49)    24 hour events: Pt tolerated procedure well, did well overnight with no events.     REVIEW OF SYSTEMS  Constitutional: No fever, chills, fatigue  Neuro: No headache, numbness, weakness  Resp: No cough, wheezing, shortness of breath  CVS: No chest pain, palpitations, leg swelling  GI: No abdominal pain, nausea, vomiting, diarrhea   : No dysuria, frequency, incontinence  Skin: No itching, burning, rashes, or lesions   Msk: No joint pain or swelling  Psych: No depression, anxiety, mood swings  Heme: No bleeding    T(F): 98 (03-01-24 @ 11:47), Max: 98.9 (03-01-24 @ 00:34)  HR: 102 (03-01-24 @ 11:00) (81 - 102)  BP: 139/62 (03-01-24 @ 11:00) (106/62 - 148/69)  RR: 28 (03-01-24 @ 11:00) (13 - 32)  SpO2: 94% (03-01-24 @ 11:00) (94% - 100%)  Wt(kg): --            I&O's Summary    02-29 @ 07:01  -  03-01 @ 07:00  --------------------------------------------------------  IN: 600 mL / OUT: 1660 mL / NET: -1060 mL    03-01 @ 07:01  -  03-01 @ 12:11  --------------------------------------------------------  IN: 0 mL / OUT: 325 mL / NET: -325 mL      PHYSICAL EXAM  General:   CNS:   HEENT:   Resp:   CVS:   Abd:   Ext:   Skin:     MEDICATIONS        albuterol    90 MICROgram(s) HFA Inhaler Inhalation PRN  budesonide 160 MICROgram(s)/formoterol 4.5 MICROgram(s) Inhaler Inhalation  tiotropium 2.5 MICROgram(s) Inhaler Inhalation    acetaminophen     Tablet .. Oral PRN  HYDROmorphone  Injectable IV Push PRN      heparin   Injectable SubCutaneous    pantoprazole    Tablet Oral        influenza  Vaccine (HIGH DOSE) IntraMuscular                              9.2    8.96  )-----------( 359      ( 01 Mar 2024 05:45 )             31.2       03-01    144  |  104  |  6<L>  ----------------------------<  87  3.5   |  33<H>  |  0.69    Ca    9.6      01 Mar 2024 05:45  Phos  2.7     03-01  Mg     1.9     03-01    TPro  6.1  /  Alb  2.6<L>  /  TBili  0.4  /  DBili  x   /  AST  15  /  ALT  14  /  AlkPhos  47  03-01          PT/INR - ( 29 Feb 2024 05:20 )   PT: 11.8 sec;   INR: 1.01 ratio         PTT - ( 29 Feb 2024 05:20 )  PTT:31.7 sec  Urinalysis Basic - ( 01 Mar 2024 05:45 )    Color: x / Appearance: x / SG: x / pH: x  Gluc: 87 mg/dL / Ketone: x  / Bili: x / Urobili: x   Blood: x / Protein: x / Nitrite: x   Leuk Esterase: x / RBC: x / WBC x   Sq Epi: x / Non Sq Epi: x / Bacteria: x            Radiology: ***  Bedside lung ultrasound: ***  Bedside ECHO: ***    CENTRAL LINE: Y/N          DATE INSERTED:              REMOVE: Y/N  KEANE: Y/N                        DATE INSERTED:              REMOVE: Y/N  A-LINE: Y/N                       DATE INSERTED:              REMOVE: Y/N    GLOBAL ISSUE/BEST PRACTICE  Analgesia:   Sedation:   CAM-ICU:   HOB elevation: yes  Stress ulcer prophylaxis:   VTE prophylaxis:   Glycemic control:   Nutrition:     CODE STATUS: ***  [facility] discussion: Y       Patient is a 68y old  Female who presents with a chief complaint of GIB bleed (01 Mar 2024 11:49)    24 hour events: Pt tolerated procedure well, did well overnight with no events. States he has slight abdominal pain. Resting comfortably on 2L NC.     REVIEW OF SYSTEMS  Constitutional: No fever, chills, fatigue  Neuro: No headache, numbness, weakness  Resp: No cough, wheezing, shortness of breath  CVS: No chest pain, palpitations, leg swelling  GI: + slight abdominal pain, no nausea, vomiting, diarrhea   : No dysuria, frequency, incontinence  Skin: No itching, burning, rashes, or lesions   Msk: No joint pain or swelling  Psych: No depression, anxiety, mood swings  Heme: No bleeding    T(F): 98 (03-01-24 @ 11:47), Max: 98.9 (03-01-24 @ 00:34)  HR: 102 (03-01-24 @ 11:00) (81 - 102)  BP: 139/62 (03-01-24 @ 11:00) (106/62 - 148/69)  RR: 28 (03-01-24 @ 11:00) (13 - 32)  SpO2: 94% (03-01-24 @ 11:00) (94% - 100%)  Wt(kg): --            I&O's Summary    02-29 @ 07:01  -  03-01 @ 07:00  --------------------------------------------------------  IN: 600 mL / OUT: 1660 mL / NET: -1060 mL    03-01 @ 07:01  -  03-01 @ 12:11  --------------------------------------------------------  IN: 0 mL / OUT: 325 mL / NET: -325 mL      PHYSICAL EXAM  General: resting comfortably in bed in NAD  CNS: AOx3   HEENT: NCAT  Resp: lungs CTABL, breathing comfortably on 2L NC  CVS: RRR, no M/R/G  Abd: slight ttp throughout quadrants, surgical sites clean without erythema or dehiscence   Ext: ROM intact  Skin: no cyanosis, new rashes or lesions noted grossly    MEDICATIONS        albuterol    90 MICROgram(s) HFA Inhaler Inhalation PRN  budesonide 160 MICROgram(s)/formoterol 4.5 MICROgram(s) Inhaler Inhalation  tiotropium 2.5 MICROgram(s) Inhaler Inhalation    acetaminophen     Tablet .. Oral PRN  HYDROmorphone  Injectable IV Push PRN      heparin   Injectable SubCutaneous    pantoprazole    Tablet Oral        influenza  Vaccine (HIGH DOSE) IntraMuscular                              9.2    8.96  )-----------( 359      ( 01 Mar 2024 05:45 )             31.2       03-01    144  |  104  |  6<L>  ----------------------------<  87  3.5   |  33<H>  |  0.69    Ca    9.6      01 Mar 2024 05:45  Phos  2.7     03-01  Mg     1.9     03-01    TPro  6.1  /  Alb  2.6<L>  /  TBili  0.4  /  DBili  x   /  AST  15  /  ALT  14  /  AlkPhos  47  03-01          PT/INR - ( 29 Feb 2024 05:20 )   PT: 11.8 sec;   INR: 1.01 ratio         PTT - ( 29 Feb 2024 05:20 )  PTT:31.7 sec  Urinalysis Basic - ( 01 Mar 2024 05:45 )    Color: x / Appearance: x / SG: x / pH: x  Gluc: 87 mg/dL / Ketone: x  / Bili: x / Urobili: x   Blood: x / Protein: x / Nitrite: x   Leuk Esterase: x / RBC: x / WBC x   Sq Epi: x / Non Sq Epi: x / Bacteria: x      Lines/tubes: +Diaz    GLOBAL ISSUE/BEST PRACTICE  Analgesia: Y  Sedation: N  HOB elevation: yes  Stress ulcer prophylaxis: Y  VTE prophylaxis: Y  Glycemic control: N  Nutrition: Y    CODE STATUS: full code  [facility] discussion: Y       Patient is a 68y old  Female who presents with a chief complaint of GIB bleed (01 Mar 2024 11:49)    24 hour events: Pt tolerated procedure well, did well overnight with no events. States he has slight abdominal pain. Resting comfortably on 2L NC.     REVIEW OF SYSTEMS  Constitutional: No fever, chills, fatigue  Neuro: No headache, numbness, weakness  Resp: No cough, wheezing, shortness of breath  CVS: No chest pain, palpitations, leg swelling  GI: + slight abdominal pain, no nausea, vomiting, diarrhea   : No dysuria, frequency, incontinence  Skin: No itching, burning, rashes, or lesions   Msk: No joint pain or swelling  Psych: No depression, anxiety, mood swings  Heme: No bleeding    T(F): 98 (03-01-24 @ 11:47), Max: 98.9 (03-01-24 @ 00:34)  HR: 102 (03-01-24 @ 11:00) (81 - 102)  BP: 139/62 (03-01-24 @ 11:00) (106/62 - 148/69)  RR: 28 (03-01-24 @ 11:00) (13 - 32)  SpO2: 94% (03-01-24 @ 11:00) (94% - 100%)  Wt(kg): --            I&O's Summary    02-29 @ 07:01  -  03-01 @ 07:00  --------------------------------------------------------  IN: 600 mL / OUT: 1660 mL / NET: -1060 mL    03-01 @ 07:01  -  03-01 @ 12:11  --------------------------------------------------------  IN: 0 mL / OUT: 325 mL / NET: -325 mL      PHYSICAL EXAM  General: resting comfortably in bed in NAD  CNS: AOx3   HEENT: NCAT  Resp: lungs CTABL, breathing comfortably on 2L NC  CVS: RRR, no M/R/G  Abd: slight ttp throughout quadrants, surgical sites clean without erythema or dehiscence, BS present, slightly distended  Ext: ROM intact  Skin: no cyanosis, new rashes or lesions noted grossly    MEDICATIONS        albuterol    90 MICROgram(s) HFA Inhaler Inhalation PRN  budesonide 160 MICROgram(s)/formoterol 4.5 MICROgram(s) Inhaler Inhalation  tiotropium 2.5 MICROgram(s) Inhaler Inhalation    acetaminophen     Tablet .. Oral PRN  HYDROmorphone  Injectable IV Push PRN      heparin   Injectable SubCutaneous    pantoprazole    Tablet Oral        influenza  Vaccine (HIGH DOSE) IntraMuscular                              9.2    8.96  )-----------( 359      ( 01 Mar 2024 05:45 )             31.2       03-01    144  |  104  |  6<L>  ----------------------------<  87  3.5   |  33<H>  |  0.69    Ca    9.6      01 Mar 2024 05:45  Phos  2.7     03-01  Mg     1.9     03-01    TPro  6.1  /  Alb  2.6<L>  /  TBili  0.4  /  DBili  x   /  AST  15  /  ALT  14  /  AlkPhos  47  03-01          PT/INR - ( 29 Feb 2024 05:20 )   PT: 11.8 sec;   INR: 1.01 ratio         PTT - ( 29 Feb 2024 05:20 )  PTT:31.7 sec  Urinalysis Basic - ( 01 Mar 2024 05:45 )    Color: x / Appearance: x / SG: x / pH: x  Gluc: 87 mg/dL / Ketone: x  / Bili: x / Urobili: x   Blood: x / Protein: x / Nitrite: x   Leuk Esterase: x / RBC: x / WBC x   Sq Epi: x / Non Sq Epi: x / Bacteria: x      Lines/tubes: +Diaz    GLOBAL ISSUE/BEST PRACTICE  Analgesia: Y  Sedation: N  HOB elevation: yes  Stress ulcer prophylaxis: Y  VTE prophylaxis: Y  Glycemic control: N  Nutrition: Y    CODE STATUS: full code  [facility] discussion: Y

## 2024-03-01 NOTE — PHYSICAL THERAPY INITIAL EVALUATION ADULT - TRANSFER TRAINING, PT EVAL
Patient will perform sit<->stand transfer independently by 2 weeks to allow patient to get on/off toilet safely.

## 2024-03-01 NOTE — PROGRESS NOTE ADULT - SUBJECTIVE AND OBJECTIVE BOX
[facility]  INFECTIOUS DISEASES   [address] [place] [address]  Tel: [phone]     Fax: [phone]  ========================================================  MD Velasquez Scott Kaushal, MD Cho, Michelle, MD Sunjit, Jaspal, MD  ========================================================    MRN-[id number]  KENDALL RIDER     Seen earlier  Follow up: Colonic mass, UTI    No pain, no fever, no urinary symptoms. Had surgery yesterday and is in ICU.     PAST MEDICAL & SURGICAL HISTORY:  COPD (chronic obstructive pulmonary disease)  Rheumatoid arthritis  HTN (hypertension)  Anxiety  No significant past surgical history    Social Hx: No current smoking, EtOH or drugs     FAMILY HISTORY:  FHx: lung cancer    Allergies  No Known Allergies    MEDICATIONS  (STANDING):  budesonide 160 MICROgram(s)/formoterol 4.5 MICROgram(s) Inhaler 2 Puff(s) Inhalation two times a day  heparin   Injectable 5000 Unit(s) SubCutaneous every 8 hours  influenza  Vaccine (HIGH DOSE) 0.7 milliLiter(s) IntraMuscular once  pantoprazole    Tablet 40 milliGRAM(s) Oral before breakfast  tiotropium 2.5 MICROgram(s) Inhaler 2 Puff(s) Inhalation daily     REVIEW OF SYSTEMS:  CONSTITUTIONAL:  No Fever or chills  HEENT:  No diplopia or blurred vision.  No sore throat or runny nose.  CARDIOVASCULAR:  No chest pain   RESPIRATORY:  No cough, shortness of breath, PND or orthopnea.  GASTROINTESTINAL:  No nausea, vomiting or diarrhea.  GENITOURINARY:  No dysuria, frequency or urgency. No Blood in urine  MUSCULOSKELETAL:  no joint aches, no muscle pain  SKIN:  No change in skin, hair or nails.    Physical Exam:  Vital Signs Last 24 Hrs  T(C): 37 (01 Mar 2024 07:46), Max: 37.2 (01 Mar 2024 00:34)  T(F): 98.6 (01 Mar 2024 07:46), Max: 98.9 (01 Mar 2024 00:34)  HR: 98 (01 Mar 2024 10:00) (81 - 101)  BP: 141/67 (01 Mar 2024 10:00) (106/62 - 148/69)  BP(mean): 96 (01 Mar 2024 10:00) (79 - 99)  RR: 18 (01 Mar 2024 10:00) (13 - 32)  SpO2: 95% (01 Mar 2024 10:00) (94% - 100%)  Parameters below as of 01 Mar 2024 08:00  Patient On (Oxygen Delivery Method): nasal cannula  O2 Flow (L/min): 3  GEN: NAD  HEENT: normocephalic and atraumatic. EOMI. PERRL.    NECK: Supple.  No lymphadenopathy   LUNGS: Clear to auscultation.  HEART: Regular rate and rhythm without murmur.  ABDOMEN: Soft, nondistended.  Positive bowel sounds.    Lap surgery wounds+   : No CVA tenderness  EXTREMITIES: Without edema.  NEUROLOGIC: grossly intact.  PSYCHIATRIC: Appropriate affect .  SKIN: No rash       Labs:                        9.2    8.96  )-----------( 359      ( 01 Mar 2024 05:45 )             31.2     03-01    144  |  104  |  6<L>  ----------------------------<  87  3.5   |  33<H>  |  0.69    Ca    9.6      01 Mar 2024 05:45  Phos  2.7     03-01  Mg     1.9     03-01    TPro  6.1  /  Alb  2.6<L>  /  TBili  0.4  /  DBili  x   /  AST  15  /  ALT  14  /  AlkPhos  47  03-01      Culture - Urine (collected 02-24-24 @ 21:00)  Source: Clean Catch Clean Catch (Midstream)  Final Report (02-27-24 @ 20:41):    >100,000 CFU/ml Escherichia coli    >100,000 CFU/ml Enterococcus faecalis  Organism: Escherichia coli  Enterococcus faecalis (02-27-24 @ 20:41)  Organism: Enterococcus faecalis (02-27-24 @ 20:41)    Sensitivities:      Method Type: JULIA      -  Ampicillin: S <=2 Predicts results to ampicillin/sulbactam, amoxacillin-clavulanate and  piperacillin-tazobactam.      -  Ciprofloxacin: S <=1      -  Levofloxacin: S 1      -  Nitrofurantoin: S <=32 Should not be used to treat pyelonephritis.      -  Tetracycline: S <=1      -  Vancomycin: S 1  Organism: Escherichia coli (02-27-24 @ 20:41)    Sensitivities:      Method Type: JULIA      -  Amoxicillin/Clavulanic Acid: I 16/8      -  Ampicillin: R >16 These ampicillin results predict results for amoxicillin      -  Ampicillin/Sulbactam: R >16/8      -  Aztreonam: S <=4      -  Cefazolin: S 4 For uncomplicated UTI with K. pneumoniae, E. coli, or P. mirablis: JULIA <=16 is sensitive and JULIA >=32 is resistant. This also predicts results for oral agents cefaclor, cefdinir, cefpodoxime, cefprozil, cefuroxime axetil, cephalexin and locarbef for uncomplicated UTI. Note that some isolates may be susceptible to these agents while testing resistant to cefazolin.      -  Cefepime: S <=2      -  Cefoxitin: S <=8      -  Ceftriaxone: S <=1      -  Cefuroxime: S <=4      -  Ciprofloxacin: S <=0.25      -  Ertapenem: S <=0.5      -  Gentamicin: S <=2      -  Imipenem: S <=1      -  Levofloxacin: S <=0.5      -  Meropenem: S <=1      -  Nitrofurantoin: S <=32 Should not be used to treat pyelonephritis      -  Piperacillin/Tazobactam: S <=8      -  Tobramycin: S <=2      -  Trimethoprim/Sulfamethoxazole: R >2/38    Culture - Blood (collected 02-24-24 @ 11:05)  Source: .Blood Blood-Peripheral  Final Report (02-29-24 @ 16:01):    No growth at 5 days    Culture - Blood (collected 02-24-24 @ 09:45)  Source: .Blood Blood-Peripheral  Final Report (02-29-24 @ 16:01):    No growth at 5 days    WBC Count: 8.96 K/uL (03-01-24 @ 05:45)  WBC Count: 11.50 K/uL (02-29-24 @ 23:45)  WBC Count: 6.01 K/uL (02-29-24 @ 05:20)  WBC Count: 5.68 K/uL (02-28-24 @ 08:04)  WBC Count: 6.12 K/uL (02-27-24 @ 05:20)  WBC Count: 6.99 K/uL (02-26-24 @ 05:20)    Creatinine: 0.69 mg/dL (03-01-24 @ 05:45)  Creatinine: 0.74 mg/dL (02-29-24 @ 23:45)  Creatinine: 0.72 mg/dL (02-29-24 @ 05:20)  Creatinine: 0.91 mg/dL (02-28-24 @ 08:04)  Creatinine: 0.79 mg/dL (02-27-24 @ 05:20)  Creatinine: 0.73 mg/dL (02-26-24 @ 05:20)    Ferritin: 32 ng/mL (02-16-24 @ 06:25)    Procalcitonin, Serum: 0.23 ng/mL (02-24-24 @ 17:15)     SARS-CoV-2: NotDetec (02-24-24 @ 14:17)  SARS-CoV-2: NotDetec (02-16-24 @ 01:10)    All imaging and other data have been reviewed.  < from: CT Abdomen and Pelvis w/ IV Cont (02.26.24 @ 18:57) >  IMPRESSION:  No evidence of acute thrombosis in the IVC, iliac veins and common   femoral veins bilaterally.  Redemonstration of a large rectosigmoid mass as described concerning for   neoplasm. No evidence of colonic obstruction.  Proximal to the rectosigmoid mass there is concern for developing acute,   noncomplicated sigmoid diverticulitis.  Numerous subcentimeter hepatic hypodensities are not characterized due to   small size, however for some, metastatic disease is not excluded.    Assessment and Plan:   67yo F with PMHx COPD (on 4-4.5 L home oxygen intermittently), HTN, RA, anxiety presenting with chief complaint of blood in stools.   CT showed a large colonic mass for which has colonoscopy and biopsy showing adenocarcinoma for which has been scheduled for surgery on 2/29.  CT also concerning for developing diverticulitis but she has no abdominal pain or fever.   Her UA was abnormal and UC is growing ecoli and enterococcus, she was on ceftriaxone and ID called for further recommendations.    Since she had a large mass in area and concern for diverticulitis, had surgery yesterday and mass was removed with sigmoidectomy and colorectal anastomosis.   Had periop cefotetan. For UTI she was on zosyn until yesterday. Has completed more than 3 days. No symptoms. No fever, no leukocytosis.     # UTI  # Large colon mass  # Diverticulitis?    - Blood cultures negative   - UA with large WBC and UC with ecoli and enterococcus faecalis  - Completed 3 days of Zosyn can watch off antibiotics  - Will follow clinically for now.   - If any leukocytosis, fever or any complications, post op, please call me back.     Will sign off please call with any question.     Lenora Mondragon MD  Division of Infectious Diseases   Please call ID service at [phone] with any question.    50 minutes spent on total encounter assessing patient, examination, chart review, counseling and coordinating care by the attending physician/nurse/care manager.

## 2024-03-01 NOTE — PROGRESS NOTE ADULT - SUBJECTIVE AND OBJECTIVE BOX
Interval History:  continues with post op care  Chart reviewed and events noted;   Overnight events:    MEDICATIONS  (STANDING):  amLODIPine   Tablet 10 milliGRAM(s) Oral daily  budesonide 160 MICROgram(s)/formoterol 4.5 MICROgram(s) Inhaler 2 Puff(s) Inhalation two times a day  heparin  Infusion.  Unit(s)/Hr (10 mL/Hr) IV Continuous <Continuous>  influenza  Vaccine (HIGH DOSE) 0.7 milliLiter(s) IntraMuscular once  pantoprazole    Tablet 40 milliGRAM(s) Oral before breakfast  tiotropium 2.5 MICROgram(s) Inhaler 2 Puff(s) Inhalation daily    MEDICATIONS  (PRN):  acetaminophen     Tablet .. 650 milliGRAM(s) Oral every 6 hours PRN Mild Pain (1 - 3), Moderate Pain (4 - 6)  albuterol    90 MICROgram(s) HFA Inhaler 2 Puff(s) Inhalation every 6 hours PRN Shortness of Breath and/or Wheezing  heparin   Injectable 2000 Unit(s) IV Push every 6 hours PRN For aPTT between 40 - 57  heparin   Injectable 4000 Unit(s) IV Push every 6 hours PRN For aPTT less than 40  HYDROmorphone  Injectable 0.5 milliGRAM(s) IV Push every 4 hours PRN Moderate Pain (4 - 6)  HYDROmorphone  Injectable 1 milliGRAM(s) IV Push every 4 hours PRN Severe Pain (7 - 10)      Vital Signs Last 24 Hrs  T(C): 37.7 (02 Mar 2024 12:00), Max: 37.7 (02 Mar 2024 12:00)  T(F): 99.8 (02 Mar 2024 12:00), Max: 99.8 (02 Mar 2024 12:00)  HR: 97 (02 Mar 2024 15:00) (79 - 105)  BP: 125/64 (02 Mar 2024 15:00) (119/56 - 158/67)  BP(mean): 89 (02 Mar 2024 15:00) (81 - 99)  RR: 28 (02 Mar 2024 15:00) (19 - 41)  SpO2: 91% (02 Mar 2024 15:00) (91% - 99%)    Parameters below as of 02 Mar 2024 15:00  Patient On (Oxygen Delivery Method): nasal cannula  O2 Flow (L/min): 2      PHYSICAL EXAM  General: adult in NAD:  u p in chair family at bedside  HEENT: clear oropharynx, anicteric sclera, pink conjunctivae  Neck: supple  CV: normal S1S2 with no murmur rubs or gallops  Lungs: clear to auscultation, no wheezes, no rhales  Abdomen: soft non-tender non-distended, no hepato/splenomegaly  Ext: no clubbing cyanosis or edema  Skin: no rashes and no petichiae  Neuro: alert and oriented X3 no focal deficits      LABS:  CBC Full  -  ( 02 Mar 2024 05:35 )  WBC Count : 5.99 K/uL  RBC Count : 3.58 M/uL  Hemoglobin : 8.4 g/dL  Hematocrit : 29.1 %  Platelet Count - Automated : 312 K/uL  Mean Cell Volume : 81.3 fl  Mean Cell Hemoglobin : 23.5 pg  Mean Cell Hemoglobin Concentration : 28.9 gm/dL  Auto Neutrophil # : 3.81 K/uL  Auto Lymphocyte # : 1.29 K/uL  Auto Monocyte # : 0.75 K/uL  Auto Eosinophil # : 0.05 K/uL  Auto Basophil # : 0.03 K/uL  Auto Neutrophil % : 63.7 %  Auto Lymphocyte % : 21.5 %  Auto Monocyte % : 12.5 %  Auto Eosinophil % : 0.8 %  Auto Basophil % : 0.5 %    03-02    144  |  104  |  8   ----------------------------<  91  3.7   |  37<H>  |  0.64    Ca    9.7      02 Mar 2024 05:35  Phos  3.3     03-02  Mg     1.8     03-02    TPro  5.7<L>  /  Alb  2.3<L>  /  TBili  0.3  /  DBili  x   /  AST  8<L>  /  ALT  14  /  AlkPhos  40  03-02    PTT - ( 02 Mar 2024 09:30 )  PTT:34.7 sec    fe studies      WBC trend  5.99 K/uL (03-02-24 @ 05:35)  8.96 K/uL (03-01-24 @ 05:45)  11.50 K/uL (02-29-24 @ 23:45)  6.01 K/uL (02-29-24 @ 05:20)      Hgb trend  8.4 g/dL (03-02-24 @ 05:35)  9.2 g/dL (03-01-24 @ 05:45)  8.6 g/dL (02-29-24 @ 23:45)  8.2 g/dL (02-29-24 @ 05:20)      plt trend  312 K/uL (03-02-24 @ 05:35)  359 K/uL (03-01-24 @ 05:45)  321 K/uL (02-29-24 @ 23:45)  283 K/uL (02-29-24 @ 05:20)        RADIOLOGY & ADDITIONAL STUDIES:

## 2024-03-01 NOTE — PROGRESS NOTE ADULT - SUBJECTIVE AND OBJECTIVE BOX
[facility]  Bobby Mcdonald PA-C  [address] [place] [address]  [phone]      INTERVAL HPI/OVERNIGHT EVENTS:  Pt s/e in ICU  S/p lap sigmoid colon resection w/ colorectal anastomosis  Pt having abdominal soreness    MEDICATIONS  (STANDING):  budesonide 160 MICROgram(s)/formoterol 4.5 MICROgram(s) Inhaler 2 Puff(s) Inhalation two times a day  heparin   Injectable 5000 Unit(s) SubCutaneous every 8 hours  influenza  Vaccine (HIGH DOSE) 0.7 milliLiter(s) IntraMuscular once  pantoprazole    Tablet 40 milliGRAM(s) Oral before breakfast  tiotropium 2.5 MICROgram(s) Inhaler 2 Puff(s) Inhalation daily    MEDICATIONS  (PRN):  acetaminophen     Tablet .. 650 milliGRAM(s) Oral every 6 hours PRN Mild Pain (1 - 3), Moderate Pain (4 - 6)  albuterol    90 MICROgram(s) HFA Inhaler 2 Puff(s) Inhalation every 6 hours PRN Shortness of Breath and/or Wheezing  HYDROmorphone  Injectable 1 milliGRAM(s) IV Push every 4 hours PRN Severe Pain (7 - 10)      Allergies    No Known Allergies      PHYSICAL EXAM:   Vital Signs:  Vital Signs Last 24 Hrs  T(C): 36.7 (01 Mar 2024 11:47), Max: 37.2 (01 Mar 2024 00:34)  T(F): 98 (01 Mar 2024 11:47), Max: 98.9 (01 Mar 2024 00:34)  HR: 118 (01 Mar 2024 12:00) (81 - 118)  BP: 139/62 (01 Mar 2024 11:00) (106/62 - 148/69)  BP(mean): 89 (01 Mar 2024 11:00) (79 - 99)  RR: 41 (01 Mar 2024 12:00) (13 - 41)  SpO2: 97% (01 Mar 2024 12:00) (94% - 100%)    Parameters below as of 01 Mar 2024 08:00  Patient On (Oxygen Delivery Method): nasal cannula  O2 Flow (L/min): 3    Daily Height in cm: 165.1 (2024 17:00)    Daily Weight in k.5 (01 Mar 2024 05:00)    GENERAL:  Appears stated age  HEENT:  NC/AT  CHEST:  Full & symmetric excursion  HEART:  Regular rhythm  ABDOMEN:  Soft, +abdominal soreness, non-distended  EXTEREMITIES:  no cyanosis  SKIN:  No rash      LABS:                        9.2    8.96  )-----------( 359      ( 01 Mar 2024 05:45 )             31.2     03-    144  |  104  |  6<L>  ----------------------------<  87  3.5   |  33<H>  |  0.69    Ca    9.6      01 Mar 2024 05:45  Phos  2.7     03-01  Mg     1.9     03-    TPro  6.1  /  Alb  2.6<L>  /  TBili  0.4  /  DBili  x   /  AST  15  /  ALT  14  /  AlkPhos  47  03-01    PT/INR - ( 2024 05:20 )   PT: 11.8 sec;   INR: 1.01 ratio         PTT - ( 2024 05:20 )  PTT:31.7 sec  Urinalysis Basic - ( 01 Mar 2024 05:45 )    Color: x / Appearance: x / SG: x / pH: x  Gluc: 87 mg/dL / Ketone: x  / Bili: x / Urobili: x   Blood: x / Protein: x / Nitrite: x   Leuk Esterase: x / RBC: x / WBC x   Sq Epi: x / Non Sq Epi: x / Bacteria: x

## 2024-03-01 NOTE — PHYSICAL THERAPY INITIAL EVALUATION ADULT - PERTINENT HX OF CURRENT PROBLEM, REHAB EVAL
Per H&P, Patient is a 69 y/o female with pmhx of COPD (not on home oxygen), HTN, RA and anxiety who was admitted on 2/15 for GIB. Patient had underwent colonoscopy during admission where she was found to have a large partially obstructing mass in sigmoid colon. Pathology report confirming adenocarcinoma. Further CT imaging without evidence of metastatic disease but revealed acute pulmonary embolism in the right middle lobe lobar and segmental branches and patient was consequently placed on heparin infusion. Hospital course further complicated by E. Coli/Enterococcus faecalis UTI which was treated with IV zosyn. After obtaining cardiac/pulmonary clearance, patient was scheduled to undergo lap sigmoidectomy on 2/29.

## 2024-03-01 NOTE — PROGRESS NOTE ADULT - ASSESSMENT
69yo F with PMHx COPD (not on home oxygen), HTN, RA, anxiety presenting with chief complaint of blood in stools. Admitted for GI bleed, plan for colonoscopy 2/20.      Problem/Plan - 1:  ·  Problem: Colonic mass. POD# 02/29/2024 :  lap sigmoid colon resection w/ colorectal anastomosis.     -HGB currently stable  -Hold home aspirin  -Close monitoring in ICU   -Transfuse prn   -Colonoscopy 2/20 with: large partially obstructing mass noted at approximately 13cm from anal verge.  Pathology with ADENOCARCINOMA, AT LEAST INTRAMUCOSAL.  -Surgery recs CT chest with IV contrast, CEA and rectal MR for staging - per radiology unable to get staging MRI done at this facility as it requires a different magnet  -CT chest: No evidence of metastatic disease. Acute pulmonary embolus in the right middle lobe lobar and segmental branches.     Problem/Plan - 2:  ·  Problem: COPD (chronic obstructive pulmonary disease).   ·  Plan: Chronic, recently admitted for COPD exacerbation  - Duonebs q6 PRN  - Continue home inhalers   - Supplemental O2 PRN. At baseline: 4-4.5 L home oxygen intermittently. COPD patient will keep SpO2 goal 88-93%   - Monitor respiratory status   - 3 weeks ago had rsv, recent rvp negative  - Continuous pulse ox  - Pulmonary Dr. Dobson f/u     Problem/Plan - 3:  ·  Problem: HTN (hypertension).   ·  Plan: Chronic, stable  - Resume  home amlodipine 10mg qd with hold parameters  - Monitor hemodynamics  - On CLD, advance as tolerated      Problem/Plan - 4:  ·  Problem: Rheumatoid arthritis.   ·  Plan: - Chronic  - resume prednisone 10mg PO Daily. Takes tramadol 50mg Qd.     Problem/Plan - 5:  ·  Problem: Anxiety.   ·  Plan: - Chronic, SOB likely 2/2 anxiety  - Continue Xanax 0.25mg qhs PRN.     Problem/Plan - 6:  ·  Problem: Pulmonary embolism  ·  Plan: CT chest with No evidence of metastatic disease. Acute pulmonary embolus in the right middle lobe lobar and segmental branches.    - Echo:  Left ventricular systolic function is normal with an ejection fraction visually estimated at 60 to 65 %.    -  heparin drip held for surgery   -Hematology following      Problem/Plan - 7:  ·  Problem: Fever/UTI  ·  Plan: Blood cx NGTD.  Urine cx +E. coli and enterococcus faecalis.    - Completed course of antibiotic  -ID f/u noted      Problem/Plan - 8:  ·  Problem: Encounter for deep vein thrombosis (DVT) prophylaxis.   ·  Plan: - on heparin drip for pulmonary embolism held for surgery. Plan per surgery/ ICU

## 2024-03-01 NOTE — PROGRESS NOTE ADULT - ATTENDING COMMENTS
68 year old female with COPD on 4L home O2 PRN, HTN, RA, and anxiety who initially presented for BRBPR, subsequently found to have a partially obstructing sigmoid/rectal mass on CT and colonoscopy with pathology revealing adenocarcinoma. Course was further c/b acute PE in the right middle lobe lobar and segmental branches on full anticoagulation with heparin drip prior to OR. Pt now POD#1 s/p laparoscopic sigmoid colon resection w/ colorectal anastomosis, extubated post-operatively without complication. Feels well today.    Neuro: Pain control with ofirmev, dilaudid   CV: hemodynamically stable, holding home amlodipine for now. restart anticoagulation when able, discuss with surgery.  Pulm: Saturating well. Respiratory status stable.  GI: Started on clear liquid diet. Surgery follow up.  Renal: Renal function stable.   ID: Completed course of zosyn for UTI. Monitor off abx at this time   Heme: HSQ for DVT PPX. Restart AC when able.  Full Code 68 year old female with COPD on 4L home O2 PRN, HTN, RA, and anxiety who initially presented for BRBPR, subsequently found to have a partially obstructing sigmoid/rectal mass on CT and colonoscopy with pathology revealing adenocarcinoma. Course was further c/b acute PE in the right middle lobe lobar and segmental branches on full anticoagulation with heparin drip prior to OR. Pt now POD#1 s/p laparoscopic sigmoid colon resection w/ colorectal anastomosis, extubated post-operatively without complication. Feels well today.    Neuro: Pain control with ofirmev, dilaudid   CV: hemodynamically stable, holding home amlodipine for now. restart anticoagulation when able, discuss with surgery.  Pulm: Saturating well. Respiratory status stable. Bronchodilators.  GI: Started on clear liquid diet. Surgery and GI follow up.  Renal: Renal function stable.   ID: Completed course of zosyn for UTI. Monitor off abx at this time   Heme: HSQ for DVT PPX. Restart AC when able.  Full Code

## 2024-03-01 NOTE — PHARMACOTHERAPY INTERVENTION NOTE - COMMENTS
·	COPD- On home triple inhaler (Breztri)-  Recommended Symbicort/Spiriva alternative. (also prn albuterol HFA).  Added per discussion with Dr. Pollock  ·	GERD- On IV pantoprazole (home medication pantoprazole).  Recommended convert IV to PO.  Accepted and changed per discussion with Dr. Pollock during rounds.   ·	Acute pain- Had order for 1mg hydromorphone IV for severe pain.  Recommended something for mild/moderate - acetaminophen 650 q6h prn ordered per discussion with Dr. pollock.  Also discontinued PACU hydromorphone order.

## 2024-03-02 LAB
ALBUMIN SERPL ELPH-MCNC: 2.3 G/DL — LOW (ref 3.3–5)
ALP SERPL-CCNC: 40 U/L — SIGNIFICANT CHANGE UP (ref 40–120)
ALT FLD-CCNC: 14 U/L — SIGNIFICANT CHANGE UP (ref 12–78)
ANION GAP SERPL CALC-SCNC: 3 MMOL/L — LOW (ref 5–17)
APTT BLD: 34.7 SEC — SIGNIFICANT CHANGE UP (ref 24.5–35.6)
APTT BLD: 86.7 SEC — HIGH (ref 24.5–35.6)
AST SERPL-CCNC: 8 U/L — LOW (ref 15–37)
BASOPHILS # BLD AUTO: 0.03 K/UL — SIGNIFICANT CHANGE UP (ref 0–0.2)
BASOPHILS NFR BLD AUTO: 0.5 % — SIGNIFICANT CHANGE UP (ref 0–2)
BILIRUB SERPL-MCNC: 0.3 MG/DL — SIGNIFICANT CHANGE UP (ref 0.2–1.2)
BUN SERPL-MCNC: 8 MG/DL — SIGNIFICANT CHANGE UP (ref 7–23)
CALCIUM SERPL-MCNC: 9.7 MG/DL — SIGNIFICANT CHANGE UP (ref 8.5–10.1)
CHLORIDE SERPL-SCNC: 104 MMOL/L — SIGNIFICANT CHANGE UP (ref 96–108)
CO2 SERPL-SCNC: 37 MMOL/L — HIGH (ref 22–31)
CREAT SERPL-MCNC: 0.64 MG/DL — SIGNIFICANT CHANGE UP (ref 0.5–1.3)
EGFR: 97 ML/MIN/1.73M2 — SIGNIFICANT CHANGE UP
EOSINOPHIL # BLD AUTO: 0.05 K/UL — SIGNIFICANT CHANGE UP (ref 0–0.5)
EOSINOPHIL NFR BLD AUTO: 0.8 % — SIGNIFICANT CHANGE UP (ref 0–6)
GLUCOSE SERPL-MCNC: 91 MG/DL — SIGNIFICANT CHANGE UP (ref 70–99)
HCT VFR BLD CALC: 29.1 % — LOW (ref 34.5–45)
HCT VFR BLD CALC: 29.2 % — LOW (ref 34.5–45)
HCT VFR BLD CALC: 30.6 % — LOW (ref 34.5–45)
HGB BLD-MCNC: 8.4 G/DL — LOW (ref 11.5–15.5)
HGB BLD-MCNC: 8.5 G/DL — LOW (ref 11.5–15.5)
HGB BLD-MCNC: 8.8 G/DL — LOW (ref 11.5–15.5)
IMM GRANULOCYTES NFR BLD AUTO: 1 % — HIGH (ref 0–0.9)
LYMPHOCYTES # BLD AUTO: 1.29 K/UL — SIGNIFICANT CHANGE UP (ref 1–3.3)
LYMPHOCYTES # BLD AUTO: 21.5 % — SIGNIFICANT CHANGE UP (ref 13–44)
MAGNESIUM SERPL-MCNC: 1.8 MG/DL — SIGNIFICANT CHANGE UP (ref 1.6–2.6)
MCHC RBC-ENTMCNC: 23.1 PG — LOW (ref 27–34)
MCHC RBC-ENTMCNC: 23.5 PG — LOW (ref 27–34)
MCHC RBC-ENTMCNC: 23.5 PG — LOW (ref 27–34)
MCHC RBC-ENTMCNC: 28.8 GM/DL — LOW (ref 32–36)
MCHC RBC-ENTMCNC: 28.9 GM/DL — LOW (ref 32–36)
MCHC RBC-ENTMCNC: 29.1 GM/DL — LOW (ref 32–36)
MCV RBC AUTO: 80.3 FL — SIGNIFICANT CHANGE UP (ref 80–100)
MCV RBC AUTO: 80.9 FL — SIGNIFICANT CHANGE UP (ref 80–100)
MCV RBC AUTO: 81.3 FL — SIGNIFICANT CHANGE UP (ref 80–100)
MONOCYTES # BLD AUTO: 0.75 K/UL — SIGNIFICANT CHANGE UP (ref 0–0.9)
MONOCYTES NFR BLD AUTO: 12.5 % — SIGNIFICANT CHANGE UP (ref 2–14)
NEUTROPHILS # BLD AUTO: 3.81 K/UL — SIGNIFICANT CHANGE UP (ref 1.8–7.4)
NEUTROPHILS NFR BLD AUTO: 63.7 % — SIGNIFICANT CHANGE UP (ref 43–77)
NRBC # BLD: 0 /100 WBCS — SIGNIFICANT CHANGE UP (ref 0–0)
PHOSPHATE SERPL-MCNC: 3.3 MG/DL — SIGNIFICANT CHANGE UP (ref 2.5–4.5)
PLATELET # BLD AUTO: 312 K/UL — SIGNIFICANT CHANGE UP (ref 150–400)
PLATELET # BLD AUTO: 332 K/UL — SIGNIFICANT CHANGE UP (ref 150–400)
PLATELET # BLD AUTO: 357 K/UL — SIGNIFICANT CHANGE UP (ref 150–400)
POTASSIUM SERPL-MCNC: 3.7 MMOL/L — SIGNIFICANT CHANGE UP (ref 3.5–5.3)
POTASSIUM SERPL-SCNC: 3.7 MMOL/L — SIGNIFICANT CHANGE UP (ref 3.5–5.3)
PROT SERPL-MCNC: 5.7 G/DL — LOW (ref 6–8.3)
RBC # BLD: 3.58 M/UL — LOW (ref 3.8–5.2)
RBC # BLD: 3.61 M/UL — LOW (ref 3.8–5.2)
RBC # BLD: 3.81 M/UL — SIGNIFICANT CHANGE UP (ref 3.8–5.2)
RBC # FLD: 22 % — HIGH (ref 10.3–14.5)
RBC # FLD: 22.1 % — HIGH (ref 10.3–14.5)
RBC # FLD: 22.2 % — HIGH (ref 10.3–14.5)
SODIUM SERPL-SCNC: 144 MMOL/L — SIGNIFICANT CHANGE UP (ref 135–145)
WBC # BLD: 5.99 K/UL — SIGNIFICANT CHANGE UP (ref 3.8–10.5)
WBC # BLD: 7.38 K/UL — SIGNIFICANT CHANGE UP (ref 3.8–10.5)
WBC # BLD: 8.18 K/UL — SIGNIFICANT CHANGE UP (ref 3.8–10.5)
WBC # FLD AUTO: 5.99 K/UL — SIGNIFICANT CHANGE UP (ref 3.8–10.5)
WBC # FLD AUTO: 7.38 K/UL — SIGNIFICANT CHANGE UP (ref 3.8–10.5)
WBC # FLD AUTO: 8.18 K/UL — SIGNIFICANT CHANGE UP (ref 3.8–10.5)

## 2024-03-02 PROCEDURE — 99291 CRITICAL CARE FIRST HOUR: CPT

## 2024-03-02 PROCEDURE — 99233 SBSQ HOSP IP/OBS HIGH 50: CPT | Mod: GC

## 2024-03-02 PROCEDURE — 99232 SBSQ HOSP IP/OBS MODERATE 35: CPT

## 2024-03-02 RX ORDER — AMLODIPINE BESYLATE 2.5 MG/1
10 TABLET ORAL DAILY
Refills: 0 | Status: DISCONTINUED | OUTPATIENT
Start: 2024-03-02 | End: 2024-03-07

## 2024-03-02 RX ORDER — MAGNESIUM SULFATE 500 MG/ML
2 VIAL (ML) INJECTION ONCE
Refills: 0 | Status: COMPLETED | OUTPATIENT
Start: 2024-03-02 | End: 2024-03-02

## 2024-03-02 RX ORDER — HEPARIN SODIUM 5000 [USP'U]/ML
4000 INJECTION INTRAVENOUS; SUBCUTANEOUS EVERY 6 HOURS
Refills: 0 | Status: DISCONTINUED | OUTPATIENT
Start: 2024-03-02 | End: 2024-03-04

## 2024-03-02 RX ORDER — HEPARIN SODIUM 5000 [USP'U]/ML
INJECTION INTRAVENOUS; SUBCUTANEOUS
Qty: 25000 | Refills: 0 | Status: DISCONTINUED | OUTPATIENT
Start: 2024-03-02 | End: 2024-03-04

## 2024-03-02 RX ORDER — POTASSIUM CHLORIDE 20 MEQ
40 PACKET (EA) ORAL ONCE
Refills: 0 | Status: COMPLETED | OUTPATIENT
Start: 2024-03-02 | End: 2024-03-02

## 2024-03-02 RX ORDER — HEPARIN SODIUM 5000 [USP'U]/ML
2000 INJECTION INTRAVENOUS; SUBCUTANEOUS EVERY 6 HOURS
Refills: 0 | Status: DISCONTINUED | OUTPATIENT
Start: 2024-03-02 | End: 2024-03-04

## 2024-03-02 RX ADMIN — HYDROMORPHONE HYDROCHLORIDE 1 MILLIGRAM(S): 2 INJECTION INTRAMUSCULAR; INTRAVENOUS; SUBCUTANEOUS at 21:00

## 2024-03-02 RX ADMIN — BUDESONIDE AND FORMOTEROL FUMARATE DIHYDRATE 2 PUFF(S): 160; 4.5 AEROSOL RESPIRATORY (INHALATION) at 06:42

## 2024-03-02 RX ADMIN — Medication 25 GRAM(S): at 07:55

## 2024-03-02 RX ADMIN — HEPARIN SODIUM 1000 UNIT(S)/HR: 5000 INJECTION INTRAVENOUS; SUBCUTANEOUS at 18:56

## 2024-03-02 RX ADMIN — HYDROMORPHONE HYDROCHLORIDE 0.5 MILLIGRAM(S): 2 INJECTION INTRAMUSCULAR; INTRAVENOUS; SUBCUTANEOUS at 04:09

## 2024-03-02 RX ADMIN — HYDROMORPHONE HYDROCHLORIDE 0.5 MILLIGRAM(S): 2 INJECTION INTRAMUSCULAR; INTRAVENOUS; SUBCUTANEOUS at 08:25

## 2024-03-02 RX ADMIN — Medication 40 MILLIEQUIVALENT(S): at 07:55

## 2024-03-02 RX ADMIN — HYDROMORPHONE HYDROCHLORIDE 0.5 MILLIGRAM(S): 2 INJECTION INTRAMUSCULAR; INTRAVENOUS; SUBCUTANEOUS at 08:07

## 2024-03-02 RX ADMIN — HEPARIN SODIUM 5000 UNIT(S): 5000 INJECTION INTRAVENOUS; SUBCUTANEOUS at 06:06

## 2024-03-02 RX ADMIN — HEPARIN SODIUM 1000 UNIT(S)/HR: 5000 INJECTION INTRAVENOUS; SUBCUTANEOUS at 17:38

## 2024-03-02 RX ADMIN — HYDROMORPHONE HYDROCHLORIDE 1 MILLIGRAM(S): 2 INJECTION INTRAMUSCULAR; INTRAVENOUS; SUBCUTANEOUS at 15:05

## 2024-03-02 RX ADMIN — HYDROMORPHONE HYDROCHLORIDE 0.5 MILLIGRAM(S): 2 INJECTION INTRAMUSCULAR; INTRAVENOUS; SUBCUTANEOUS at 04:36

## 2024-03-02 RX ADMIN — AMLODIPINE BESYLATE 10 MILLIGRAM(S): 2.5 TABLET ORAL at 11:20

## 2024-03-02 RX ADMIN — HYDROMORPHONE HYDROCHLORIDE 1 MILLIGRAM(S): 2 INJECTION INTRAMUSCULAR; INTRAVENOUS; SUBCUTANEOUS at 20:31

## 2024-03-02 RX ADMIN — HYDROMORPHONE HYDROCHLORIDE 1 MILLIGRAM(S): 2 INJECTION INTRAMUSCULAR; INTRAVENOUS; SUBCUTANEOUS at 14:47

## 2024-03-02 RX ADMIN — HEPARIN SODIUM 1000 UNIT(S)/HR: 5000 INJECTION INTRAVENOUS; SUBCUTANEOUS at 10:54

## 2024-03-02 RX ADMIN — BUDESONIDE AND FORMOTEROL FUMARATE DIHYDRATE 2 PUFF(S): 160; 4.5 AEROSOL RESPIRATORY (INHALATION) at 18:52

## 2024-03-02 RX ADMIN — PANTOPRAZOLE SODIUM 40 MILLIGRAM(S): 20 TABLET, DELAYED RELEASE ORAL at 06:06

## 2024-03-02 NOTE — PROGRESS NOTE ADULT - SUBJECTIVE AND OBJECTIVE BOX
Patient is a 68y old  Female who presents with a chief complaint of GIB bleed (02 Mar 2024 10:21)      INTERVAL HPI/OVERNIGHT EVENTS:    continues to do well post-op. Currently on heparin drip and using supplemental oxygen. Denies shortness of breath or chest pain    MEDICATIONS  (STANDING):  amLODIPine   Tablet 10 milliGRAM(s) Oral daily  budesonide 160 MICROgram(s)/formoterol 4.5 MICROgram(s) Inhaler 2 Puff(s) Inhalation two times a day  heparin  Infusion.  Unit(s)/Hr (10 mL/Hr) IV Continuous <Continuous>  influenza  Vaccine (HIGH DOSE) 0.7 milliLiter(s) IntraMuscular once  pantoprazole    Tablet 40 milliGRAM(s) Oral before breakfast  tiotropium 2.5 MICROgram(s) Inhaler 2 Puff(s) Inhalation daily      MEDICATIONS  (PRN):  acetaminophen     Tablet .. 650 milliGRAM(s) Oral every 6 hours PRN Mild Pain (1 - 3), Moderate Pain (4 - 6)  albuterol    90 MICROgram(s) HFA Inhaler 2 Puff(s) Inhalation every 6 hours PRN Shortness of Breath and/or Wheezing  heparin   Injectable 2000 Unit(s) IV Push every 6 hours PRN For aPTT between 40 - 57  heparin   Injectable 4000 Unit(s) IV Push every 6 hours PRN For aPTT less than 40  HYDROmorphone  Injectable 0.5 milliGRAM(s) IV Push every 4 hours PRN Moderate Pain (4 - 6)  HYDROmorphone  Injectable 1 milliGRAM(s) IV Push every 4 hours PRN Severe Pain (7 - 10)      Allergies    No Known Allergies    Intolerances        PAST MEDICAL & SURGICAL HISTORY:  COPD (chronic obstructive pulmonary disease)      Rheumatoid arthritis      HTN (hypertension)      Anxiety      No significant past surgical history          Vital Signs Last 24 Hrs  T(C): 37.7 (02 Mar 2024 12:00), Max: 37.7 (02 Mar 2024 12:00)  T(F): 99.8 (02 Mar 2024 12:00), Max: 99.8 (02 Mar 2024 12:00)  HR: 101 (02 Mar 2024 13:00) (79 - 105)  BP: 138/63 (02 Mar 2024 13:00) (119/71 - 158/67)  BP(mean): 90 (02 Mar 2024 13:00) (82 - 99)  RR: 28 (02 Mar 2024 13:00) (19 - 41)  SpO2: 94% (02 Mar 2024 13:00) (93% - 99%)    Parameters below as of 02 Mar 2024 11:00  Patient On (Oxygen Delivery Method): nasal cannula  O2 Flow (L/min): 2      PHYSICAL EXAMINATION:    GENERAL: The patient is awake and alert in no apparent distress.     HEENT: Head is normocephalic and atraumatic.    NECK: no JVD    LUNGS: Clear to auscultation without wheezing, rales or rhonchi; respirations unlabored    HEART: Regular rate and rhythm without murmur.    ABDOMEN: Soft, nontender, and nondistended.      EXTREMITIES: Without any cyanosis, clubbing, rash, lesions or edema.    NEUROLOGIC: Grossly intact.    SKIN: No ulceration or induration present.      LABS:                        8.4    5.99  )-----------( 312      ( 02 Mar 2024 05:35 )             29.1     03-02    144  |  104  |  8   ----------------------------<  91  3.7   |  37<H>  |  0.64    Ca    9.7      02 Mar 2024 05:35  Phos  3.3     03-02  Mg     1.8     03-02    TPro  5.7<L>  /  Alb  2.3<L>  /  TBili  0.3  /  DBili  x   /  AST  8<L>  /  ALT  14  /  AlkPhos  40  03-02    PTT - ( 02 Mar 2024 09:30 )  PTT:34.7 sec  Urinalysis Basic - ( 02 Mar 2024 05:35 )    Color: x / Appearance: x / SG: x / pH: x  Gluc: 91 mg/dL / Ketone: x  / Bili: x / Urobili: x   Blood: x / Protein: x / Nitrite: x   Leuk Esterase: x / RBC: x / WBC x   Sq Epi: x / Non Sq Epi: x / Bacteria: x                        Assessment:    Severe COPD  Adenocarcinoma of Colon  Acute Hypoxic respiratory failure  Recent Pulmonary emboli    Plan:    Anticoagulation with heparin  Nasal oxygen and follow oximetry  Pain control  Symbicort + Spiriva inhalers  Albuterol PRN

## 2024-03-02 NOTE — PROGRESS NOTE ADULT - ASSESSMENT
67yo F with PMHx COPD (not on home oxygen), HTN, RA, anxiety presenting with chief complaint of blood in stools. Admitted for GI bleed, plan for colonoscopy 2/20.      Problem/Plan - 1:  ·  Problem: Colonic mass. S/p surgery on  02/29/2024 :  lap sigmoid colon resection w/ colorectal anastomosis.     -HGB currently stable  -Hold home aspirin  -IV Heparin drip resumed for PE   -Close monitoring in ICU   -Transfuse prn   -Colonoscopy 2/20 with: large partially obstructing mass noted at approximately 13cm from anal verge.  Pathology with ADENOCARCINOMA, AT LEAST INTRAMUCOSAL.  -Surgery recs CT chest with IV contrast, CEA and rectal MR for staging - per radiology unable to get staging MRI done at this facility as it requires a different magnet  -CT chest: No evidence of metastatic disease. Acute pulmonary embolus in the right middle lobe lobar and segmental branches.     Problem/Plan - 2:  ·  Problem: COPD (chronic obstructive pulmonary disease).   ·  Plan: Chronic, recently admitted for COPD exacerbation  - Duonebs q6 PRN  - Continue home inhalers   - Supplemental O2 PRN. At baseline: 4-4.5 L home oxygen intermittently. COPD patient will keep SpO2 goal 88-93%   - Monitor respiratory status   - 3 weeks ago had rsv, recent rvp negative  - Continuous pulse ox  - Pulmonary Dr. Dobson f/u     Problem/Plan - 3:  ·  Problem: HTN (hypertension).   ·  Plan: Chronic, stable  - Resume  home amlodipine 10mg qd with hold parameters  - Monitor hemodynamics  - On CLD, advance as tolerated      Problem/Plan - 4:  ·  Problem: Rheumatoid arthritis.   ·  Plan: - Chronic  - resume prednisone 10mg PO Daily. Takes tramadol 50mg Qd.     Problem/Plan - 5:  ·  Problem: Anxiety.   ·  Plan: - Chronic, SOB likely 2/2 anxiety  - Continue Xanax 0.25mg qhs PRN.     Problem/Plan - 6:  ·  Problem: Pulmonary embolism  ·  Plan: CT chest with No evidence of metastatic disease. Acute pulmonary embolus in the right middle lobe lobar and segmental branches.    - Echo:  Left ventricular systolic function is normal with an ejection fraction visually estimated at 60 to 65 %.    -  heparin drip resumed now. Will eventually transition to oral AC as recommended by Hematology        Problem/Plan - 7:  ·  Problem: Fever/UTI: Resolved   ·  Plan: Blood cx NGTD.  Urine cx +E. coli and enterococcus faecalis.    - Completed course of antibiotic  -ID f/u noted      Problem/Plan - 8:  ·  Problem: Encounter for deep vein thrombosis (DVT) prophylaxis.   ·  Plan: - Heparin Drip resumed

## 2024-03-02 NOTE — PROGRESS NOTE ADULT - ATTENDING COMMENTS
68 year old female with COPD on 4L home O2 PRN, HTN, RA, and anxiety who initially presented for BRBPR, subsequently found to have a partially obstructing sigmoid/rectal mass on CT and colonoscopy with pathology revealing adenocarcinoma. Course was further c/b acute PE in the right middle lobe lobar and segmental branches on full anticoagulation with heparin drip prior to OR. Pt now POD#1 s/p laparoscopic sigmoid colon resection w/ colorectal anastomosis, extubated post-operatively without complication. Feels well today.    Neuro: Pain control with ofirmev, dilaudid   CV: hemodynamically stable, restart home dose amlodipine. restart anticoagulation.  Pulm: Saturating well. Respiratory status stable. Bronchodilators.  GI: Started on clear liquid diet. Advance to full liquid. Surgery and GI follow up.  Renal: Renal function stable.   ID: Completed course of zosyn for UTI. Monitor off abx at this time   Heme: Therapeutic AC for PE  Full Code

## 2024-03-02 NOTE — PROGRESS NOTE ADULT - SUBJECTIVE AND OBJECTIVE BOX
[facility]  Bobby Mcdonald PA-C  [address] [place], [place] [id number] [phone]      INTERVAL HPI/OVERNIGHT EVENTS:  Pt s/e in ICU, Family at bedside   S/p lap sigmoid colon resection w/ colorectal anastomosis  Pt reports feeling well today   Tolerating clear diet      GENERAL:  Appears stated age  HEENT:  NC/AT  CHEST:  Full & symmetric excursion  HEART:  Regular rhythm  ABDOMEN:  Soft, +abdominal soreness, non-distended  EXTEREMITIES:  no cyanosis  SKIN:  No rash        MEDICATIONS  (STANDING):  amLODIPine   Tablet 10 milliGRAM(s) Oral daily  budesonide 160 MICROgram(s)/formoterol 4.5 MICROgram(s) Inhaler 2 Puff(s) Inhalation two times a day  heparin  Infusion.  Unit(s)/Hr (10 mL/Hr) IV Continuous <Continuous>  influenza  Vaccine (HIGH DOSE) 0.7 milliLiter(s) IntraMuscular once  pantoprazole    Tablet 40 milliGRAM(s) Oral before breakfast  tiotropium 2.5 MICROgram(s) Inhaler 2 Puff(s) Inhalation daily    MEDICATIONS  (PRN):  acetaminophen     Tablet .. 650 milliGRAM(s) Oral every 6 hours PRN Mild Pain (1 - 3), Moderate Pain (4 - 6)  albuterol    90 MICROgram(s) HFA Inhaler 2 Puff(s) Inhalation every 6 hours PRN Shortness of Breath and/or Wheezing  heparin   Injectable 4000 Unit(s) IV Push every 6 hours PRN For aPTT less than 40  heparin   Injectable 2000 Unit(s) IV Push every 6 hours PRN For aPTT between 40 - 57  HYDROmorphone  Injectable 1 milliGRAM(s) IV Push every 4 hours PRN Severe Pain (7 - 10)  HYDROmorphone  Injectable 0.5 milliGRAM(s) IV Push every 4 hours PRN Moderate Pain (4 - 6)      Allergies  No Known Allergies    Intolerances      Vital Signs Last 24 Hrs  T(C): 37.7 (02 Mar 2024 12:00), Max: 37.7 (02 Mar 2024 12:00)  T(F): 99.8 (02 Mar 2024 12:00), Max: 99.8 (02 Mar 2024 12:00)  HR: 101 (02 Mar 2024 13:00) (79 - 105)  BP: 138/63 (02 Mar 2024 13:00) (119/71 - 158/67)  BP(mean): 90 (02 Mar 2024 13:00) (82 - 99)  RR: 28 (02 Mar 2024 13:00) (19 - 41)  SpO2: 94% (02 Mar 2024 13:00) (93% - 99%)    Parameters below as of 02 Mar 2024 11:00  Patient On (Oxygen Delivery Method): nasal cannula  O2 Flow (L/min): 2      03-01-24 @ 07:01  -  03-02-24 @ 07:00  --------------------------------------------------------  IN: 220 mL / OUT: 525 mL / NET: -305 mL    03-02-24 @ 07:01  -  03-02-24 @ 14:37  --------------------------------------------------------  IN: 620 mL / OUT: 0 mL / NET: 620 mL          LABS:                        8.4    5.99  )-----------( 312      ( 02 Mar 2024 05:35 )             29.1     03-02    144  |  104  |  8   ----------------------------<  91  3.7   |  37<H>  |  0.64    Ca    9.7      02 Mar 2024 05:35  Phos  3.3     03-02  Mg     1.8     03-02    TPro  5.7<L>  /  Alb  2.3<L>  /  TBili  0.3  /  DBili  x   /  AST  8<L>  /  ALT  14  /  AlkPhos  40  03-02    PTT - ( 02 Mar 2024 09:30 )  PTT:34.7 sec

## 2024-03-02 NOTE — PROGRESS NOTE ADULT - SUBJECTIVE AND OBJECTIVE BOX
[facility] - Alexa Ramirez, Arnel De León [place], [place]  Office Number: [phone]    Patient resting comfortably in bed in NAD.  Laying flat with no respiratory distress.  No complaints of chest pain, dyspnea, palpitations, PND, or orthopnea.    ROS: negative unless otherwise mentioned.    Telemetry:  sr    PAST MEDICAL & SURGICAL HISTORY:  COPD (chronic obstructive pulmonary disease)      Rheumatoid arthritis      HTN (hypertension)      Anxiety      No significant past surgical history          SOCIAL HISTORY:  No tobacco, ethanol, or drug abuse.    FAMILY HISTORY:  FHx: lung cancer      No family history of acute MI or sudden cardiac death.    MEDICATIONS  (STANDING):  budesonide 160 MICROgram(s)/formoterol 4.5 MICROgram(s) Inhaler 2 Puff(s) Inhalation two times a day  heparin   Injectable 5000 Unit(s) SubCutaneous every 8 hours  influenza  Vaccine (HIGH DOSE) 0.7 milliLiter(s) IntraMuscular once  pantoprazole    Tablet 40 milliGRAM(s) Oral before breakfast  tiotropium 2.5 MICROgram(s) Inhaler 2 Puff(s) Inhalation daily    MEDICATIONS  (PRN):  acetaminophen     Tablet .. 650 milliGRAM(s) Oral every 6 hours PRN Mild Pain (1 - 3), Moderate Pain (4 - 6)  albuterol    90 MICROgram(s) HFA Inhaler 2 Puff(s) Inhalation every 6 hours PRN Shortness of Breath and/or Wheezing  HYDROmorphone  Injectable 0.5 milliGRAM(s) IV Push every 4 hours PRN Moderate Pain (4 - 6)  HYDROmorphone  Injectable 1 milliGRAM(s) IV Push every 4 hours PRN Severe Pain (7 - 10)      Allergies    No Known Allergies    Intolerances        VITAL SIGNS:   Vital Signs Last 24 Hrs  T(C): 37 (02 Mar 2024 04:19), Max: 37.1 (01 Mar 2024 16:33)  T(F): 98.6 (02 Mar 2024 04:19), Max: 98.7 (01 Mar 2024 16:33)  HR: 93 (02 Mar 2024 08:00) (79 - 118)  BP: 139/70 (02 Mar 2024 08:00) (113/58 - 158/67)  BP(mean): 98 (02 Mar 2024 08:00) (81 - 99)  RR: 26 (02 Mar 2024 08:00) (18 - 41)  SpO2: 95% (02 Mar 2024 08:00) (92% - 99%)    Parameters below as of 02 Mar 2024 07:00  Patient On (Oxygen Delivery Method): nasal cannula  O2 Flow (L/min): 2      I&O's Summary    01 Mar 2024 07:01  -  02 Mar 2024 07:00  --------------------------------------------------------  IN: 220 mL / OUT: 525 mL / NET: -305 mL        On Exam:  Constitutional: NAD, awake and alert, well-developed  HEENT: Moist Mucous Membranes, Anicteric  Pulmonary: Non-labored, breath sounds are clear bilaterally, No wheezing, rales or rhonchi  Cardiovascular: Regular, S1 and S2, No murmurs, rubs, gallops or clicks  Gastrointestinal: Bowel Sounds present, soft, nontender.   Lymph: No peripheral edema. No lymphadenopathy.  Skin: No visible rashes or ulcers.  Psych:  Mood & affect appropriate    LABS: All Labs Reviewed:                        8.4    5.99  )-----------( 312      ( 02 Mar 2024 05:35 )             29.1                         9.2    8.96  )-----------( 359      ( 01 Mar 2024 05:45 )             31.2                         8.6    11.50 )-----------( 321      ( 29 Feb 2024 23:45 )             29.1     02 Mar 2024 05:35    144    |  104    |  8      ----------------------------<  91     3.7     |  37     |  0.64   01 Mar 2024 05:45    144    |  104    |  6      ----------------------------<  87     3.5     |  33     |  0.69   29 Feb 2024 23:45    143    |  103    |  6      ----------------------------<  100    3.9     |  33     |  0.74     Ca    9.7        02 Mar 2024 05:35  Ca    9.6        01 Mar 2024 05:45  Ca    9.1        29 Feb 2024 23:45  Phos  3.3       02 Mar 2024 05:35  Phos  2.7       01 Mar 2024 05:45  Phos  3.4       29 Feb 2024 23:45  Mg     1.8       02 Mar 2024 05:35  Mg     1.9       01 Mar 2024 05:45  Mg     1.7       29 Feb 2024 23:45    TPro  5.7    /  Alb  2.3    /  TBili  0.3    /  DBili  x      /  AST  8      /  ALT  14     /  AlkPhos  40     02 Mar 2024 05:35  TPro  6.1    /  Alb  2.6    /  TBili  0.4    /  DBili  x      /  AST  15     /  ALT  14     /  AlkPhos  47     01 Mar 2024 05:45  TPro  5.9    /  Alb  2.5    /  TBili  0.4    /  DBili  x      /  AST  14     /  ALT  14     /  AlkPhos  45     29 Feb 2024 23:45          Blood Culture:         RADIOLOGY:    ECG:

## 2024-03-02 NOTE — CHART NOTE - NSCHARTNOTEFT_GEN_A_CORE
Assessment: patient seen for follow up now in ICU   69 y/o female w/ PMHx of COPD (on 4-4.5 L home oxygen intermittently), HTN, RA, anxiety presenting w/ bloody stools, found to have 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   Heparin drip stopped at 2/28 midnight.    status post  lap sigmoid colon resection w/ colorectal anastomosis.  patient seen sitting in chair, states does not like ices and does not like ensure clear reports to sweet.   3/1 BM  reports UBW ~ 130# noted admit wt 120#  patient seen with mild temple and moderate clavicle muscle mass loss (visually)       Factors impacting intake: [ ] none [ ] nausea  [ ] vomiting [ ] diarrhea [ ] constipation  [ ]chewing problems [ ] swallowing issues  [ x] other: post op clear liquids    Diet Prescription: Diet, Clear Liquid (03-01-24 @ 09:29)    Intake: poor PO this AM on clear liquids    Current Weight: Weight (kg): 54.8 (02-29 @ 17:00)  59kg  UBW , with ~ 7.6% weight loss based on reported UBW no edema noted    Pertinent Medications: MEDICATIONS  (STANDING):  amLODIPine   Tablet 10 milliGRAM(s) Oral daily  budesonide 160 MICROgram(s)/formoterol 4.5 MICROgram(s) Inhaler 2 Puff(s) Inhalation two times a day  heparin  Infusion.  Unit(s)/Hr (10 mL/Hr) IV Continuous <Continuous>  influenza  Vaccine (HIGH DOSE) 0.7 milliLiter(s) IntraMuscular once  pantoprazole    Tablet 40 milliGRAM(s) Oral before breakfast  tiotropium 2.5 MICROgram(s) Inhaler 2 Puff(s) Inhalation daily    MEDICATIONS  (PRN):  acetaminophen     Tablet .. 650 milliGRAM(s) Oral every 6 hours PRN Mild Pain (1 - 3), Moderate Pain (4 - 6)  albuterol    90 MICROgram(s) HFA Inhaler 2 Puff(s) Inhalation every 6 hours PRN Shortness of Breath and/or Wheezing  heparin   Injectable 4000 Unit(s) IV Push every 6 hours PRN For aPTT less than 40  heparin   Injectable 2000 Unit(s) IV Push every 6 hours PRN For aPTT between 40 - 57  HYDROmorphone  Injectable 1 milliGRAM(s) IV Push every 4 hours PRN Severe Pain (7 - 10)  HYDROmorphone  Injectable 0.5 milliGRAM(s) IV Push every 4 hours PRN Moderate Pain (4 - 6)    Pertinent Labs: Hgb 8.4 Hct 29.1  Skin: no pressure injury     Estimated Needs:   [x ] no change since previous assessment based on # 30-35kcals/kg 1767-2061kcals and 1.2-1.5gms protein/kg 70-88 gms protein   [ ] recalculated:     Previous Nutrition Diagnosis:   [ ] Inadequate Energy Intake [ ]Inadequate Oral Intake [ ] Excessive Energy Intake   [ ] Underweight [x ] Increased Nutrient Needs [ ] Overweight/Obesity   [x ] Altered GI Function [ ] Unintended Weight Loss [ ] Food & Nutrition Related Knowledge Deficit [ ] Malnutrition     Nutrition Diagnosis is [x ] ongoing  [ ] resolved [ ] not applicable     New Nutrition Diagnosis: [x ] acute mild malnutrition related to inadequate oral intake in setting of Cancer dx/surgery as evidenced by mild/moderate muscle loss, 7.6% weight loss       Interventions:   Recommend  [ ] Change Diet To:  [ ] Nutrition Supplement  [ ] Nutrition Support  [x ] Other: advance diet to full fluids to low fiber diet per surgery/medical team, recommend MVI , trend weights     Monitoring and Evaluation:   [x ] PO intake [ x ] Tolerance to diet prescription [ x ] weights [ x ] labs[ x ] follow up per protocol  [ ] other:

## 2024-03-02 NOTE — PROGRESS NOTE ADULT - SUBJECTIVE AND OBJECTIVE BOX
Date of Service is equal to the date of entry    HPI: 67 y/o F w/ pmh of COPD on 4L of home o2, htn, RA, anxiety, presented to [facility] for GIB was found to have obstructing sigmoid/rectal mass on CT concerning for adenocarcinoma. Hospital course c/b acute PE requiring full dose AC. Pt now s/p OR and POD#1 for laparoscopic sigmoid resection w/ colorectal anastomosis. Post-op pt admitted to the MICU for close HD monitoring.    24 hour events: tonight repeat CBC reviewed, currently w/out any complains    Review of Systems:  Constitutional: No fever, chills, fatigue  Neuro: No headache, numbness, weakness  Resp: No cough, wheezing, shortness of breath  CVS: No chest pain, palpitations, leg swelling  GI: No abdominal pain, nausea, vomiting, diarrhea   : No dysuria, frequency, incontinence  Skin: No itching, burning, rashes, or lesions   Msk: No joint pain or swelling  Psych: No depression, anxiety, mood swings    T(F): 98.8 (03-02-24 @ 17:15), Max: 99.8 (03-02-24 @ 12:00)  HR: 87 (03-02-24 @ 20:00) (79 - 102)  BP: 140/64 (03-02-24 @ 20:00) (94/73 - 158/67)  RR: 22 (03-02-24 @ 20:00) (19 - 41)  SpO2: 98% (03-02-24 @ 20:00) (91% - 99%)  Wt(kg): --      03-01-24 @ 07:01  -  03-02-24 @ 07:00  --------------------------------------------------------  IN: 220 mL / OUT: 525 mL / NET: -305 mL    03-02-24 @ 07:01  -  03-02-24 @ 20:24  --------------------------------------------------------  IN: 1030 mL / OUT: 0 mL / NET: 1030 mL        CAPILLARY BLOOD GLUCOSE          I&O's Summary    01 Mar 2024 07:01  -  02 Mar 2024 07:00  --------------------------------------------------------  IN: 220 mL / OUT: 525 mL / NET: -305 mL    02 Mar 2024 07:01  -  02 Mar 2024 20:24  --------------------------------------------------------  IN: 1030 mL / OUT: 0 mL / NET: 1030 mL        Physical Exam:   Gen: Comfortable in bed in NAD  Neuro: awake, alert and following commands  HEENT: NC/AT  Resp: good air entry b/l  CVS: +RRR  Abd: Abd soft, ND, incision site c/d/i  Ext: no edema   Skin: warm/dry    Meds:    amLODIPine   Tablet Oral      albuterol    90 MICROgram(s) HFA Inhaler Inhalation PRN  budesonide 160 MICROgram(s)/formoterol 4.5 MICROgram(s) Inhaler Inhalation  tiotropium 2.5 MICROgram(s) Inhaler Inhalation    acetaminophen     Tablet .. Oral PRN  HYDROmorphone  Injectable IV Push PRN  HYDROmorphone  Injectable IV Push PRN      heparin   Injectable IV Push PRN  heparin   Injectable IV Push PRN  heparin  Infusion. IV Continuous    pantoprazole    Tablet Oral        influenza  Vaccine (HIGH DOSE) IntraMuscular                                8.8    8.18  )-----------( 357      ( 02 Mar 2024 16:50 )             30.6       03-02    144  |  104  |  8   ----------------------------<  91  3.7   |  37<H>  |  0.64    Ca    9.7      02 Mar 2024 05:35  Phos  3.3     03-02  Mg     1.8     03-02    TPro  5.7<L>  /  Alb  2.3<L>  /  TBili  0.3  /  DBili  x   /  AST  8<L>  /  ALT  14  /  AlkPhos  40  03-02          PTT - ( 02 Mar 2024 16:50 )  PTT:86.7 sec  Urinalysis Basic - ( 02 Mar 2024 05:35 )    Color: x / Appearance: x / SG: x / pH: x  Gluc: 91 mg/dL / Ketone: x  / Bili: x / Urobili: x   Blood: x / Protein: x / Nitrite: x   Leuk Esterase: x / RBC: x / WBC x   Sq Epi: x / Non Sq Epi: x / Bacteria: x        Radiology:     < from: CT Abdomen and Pelvis w/ IV Cont (02.26.24 @ 18:57) >    ACC: [id number] EXAM:  CT ABDOMEN AND PELVIS IC   ORDERED BY: INDU LAUREN     PROCEDURE DATE:  02/26/2024          INTERPRETATION:  CLINICAL INFORMATION: CT abdomen pelvis the venogram,   status post PE, assess for clots in the abdomen or pelvis    COMPARISON: CT abdomen pelvis 2/13/2024.    CONTRAST/COMPLICATIONS:  IV Contrast: Omnipaque 350  90 cc administered   10 cc discarded  Oral Contrast: NONE  Complications: None reported at time of study completion    PROCEDURE:  CT of the Abdomen and Pelvis was performed.  Sagittal and coronal reformats were performed.    FINDINGS:  LOWER CHEST: Centrilobular emphysema.    LIVER: Numerous subcentimeter hepatic hypodensities measuring up to 9 mm   are not characterized due to small size. Some are poorly delineated and   metastatic disease is not excluded, for example image 2:15.  BILE DUCTS: Normal caliber.  GALLBLADDER: Contracted.  SPLEEN: Normal-sized  PANCREAS: Within normal limits.  ADRENALS: Within normal limits.  KIDNEYS/URETERS: Right renal cysts and numerous additional bilateral   subcentimeter hypodensities which are not fully characterized due to   small size. No hydronephrosis, hydroureter or nephroureterolithiasis.    BLADDER: Pelvic floor insufficiency with cystocele approximately 2 cm   below the PC-line. Otherwise normal appearance of the urinary bladder.  REPRODUCTIVE ORGANS: Uterus and adnexa within normal limits.    BOWEL: Redemonstration of a masslike thickening in the rectosigmoid   junction which is at least4.6 cm in length and extends into the   mesorectal/mesosigmoid fat, the mass is concerning for neoplasm. Proximal   to the mass there is mild to moderate stool burden without evidence of   colonic obstruction.  Sigmoid colonic diverticulosis is present proximal to the mass with a   single inflamed diverticulum, image 2:81, and adjacent fat stranding   suggesting mild and developing acute diverticulitis. No fluid collection   or extraluminal air to suspect abscess or perforation. No bowel   obstruction. Appendix is normal.  PERITONEUM: No ascites. No pneumoperitoneum, no abscess.  VESSELS: Aorta has normal caliber. Mild atherosclerotic changes. Hepatic   veins, portal vein, SMV and splenic vein are patent.  There is adequate opacification of the entire IVC, common, external and   internal iliac veins without evidence of filling defects or thrombosis.   No evidence of common femoral thrombosis bilaterally.  RETROPERITONEUM/LYMPH NODES: No lymphadenopathy. Numerous abnormal   mesorectal lymph nodes along the superior rectal artery up to 1.0 x 1.1   mm, image 2:58.  ABDOMINAL WALL: Within normal limits.  BONES: Minimal discogenic degenerative changes. No lytic or blastic   osteodestructive bony lesions.    IMPRESSION:  No evidence of acute thrombosis in the IVC, iliac veins and common   femoral veins bilaterally.    Redemonstration of a large rectosigmoid mass as described concerning for   neoplasm. No evidence of colonic obstruction.    Proximal to the rectosigmoid mass there is concern for developing acute,   noncomplicated sigmoid diverticulitis.    Numerous subcentimeter hepatic hypodensities are not characterized due to   small size, however for some, metastatic disease is not excluded.        --- End of Report ---        LISBETH VICTORIA MD; Attending Radiologist  This document has been electronically signed. Feb 26 2024  8:57PM    < end of copied text >    < from: CT Chest w/ IV Cont (02.20.24 @ 17:26) >    ACC: [id number] EXAM:  CT CHEST IC   ORDERED BY:  DILLAN VEGA     PROCEDURE DATE:  02/20/2024          INTERPRETATION:  CLINICAL INFORMATION: Suspected rectal cancer, staging,   history of COPD    COMPARISON: 2/13/2024    CONTRAST/COMPLICATIONS:  IV Contrast: Omnipaque 350  90 cc administered   10 cc discarded  Oral Contrast: NONE  Complications: None reported at time of study completion    PROCEDURE:  CT of the Chest was performed.  Sagittal and coronal reformats were performed.    FINDINGS:    LUNGS AND AIRWAYS: Patent central airways.  Emphysematous changes of the   lungs without evidence of nodule  PLEURA: No pleural effusion.  MEDIASTINUM AND AYAH: No lymphadenopathy.  VESSELS: Thrombus in the right middle lobe pulmonary artery which does   not appear to have been present on prior examination  HEART: Heart size is normal. No pericardial effusion. No evidence of   right heart strain  CHEST WALL AND LOWER NECK: Within normal limits.  VISUALIZED UPPER ABDOMEN: Again seen are small hypodensities in the liver   too small to characterize.  BONES: Within normal limits.    IMPRESSION:  No evidence of metastatic disease. Acute pulmonary embolus in the right   middle lobe lobar and segmental branches.    Findings were discussed with Dr.JUSTIN VEGA [phone] 2/20/2024 7:24   PM by Dr. Wheat with read back confirmation.    --- End of Report ---      PARTH WHEAT MD; Attending Radiologist  This document has been electronically signed. Feb 20 2024  7:30PM    < end of copied text >      CODE STATUS: FULL CODE    Time spent on this patient encounter, which includes documenting this note in the electronic medical record, was 55 minutes including assessing the presenting problems with associated risk, reviewing the medical record to prepare for the encounter, and meeting face to face with patient to obtain additional history. I have also performed an appropriate physical exam, made interventions listed and ordered and interpreted appropriate diagnostic studies as documented. To improve communication and patient safety. I have coordinated care with the multidisciplinary team including the bedside nurse, appropriate attending of record and consultant as needed.

## 2024-03-02 NOTE — PROGRESS NOTE ADULT - SUBJECTIVE AND OBJECTIVE BOX
Patient is a 68y old  Female who presents with a chief complaint of GIB bleed (02 Mar 2024 08:46)    24 hour events: ***    REVIEW OF SYSTEMS  Constitutional: No fever, chills, fatigue  Neuro: No headache, numbness, weakness  Resp: No cough, wheezing, shortness of breath  CVS: No chest pain, palpitations, leg swelling  GI: No abdominal pain, nausea, vomiting, diarrhea   : No dysuria, frequency, incontinence  Skin: No itching, burning, rashes, or lesions   Msk: No joint pain or swelling  Psych: No depression, anxiety, mood swings  Heme: No bleeding    T(F): 98.6 (03-02-24 @ 04:19), Max: 98.7 (03-01-24 @ 16:33)  HR: 93 (03-02-24 @ 08:00) (79 - 118)  BP: 139/70 (03-02-24 @ 08:00) (113/58 - 158/67)  RR: 26 (03-02-24 @ 08:00) (19 - 41)  SpO2: 95% (03-02-24 @ 08:00) (92% - 99%)  Wt(kg): --            I&O's Summary    03-01 @ 07:01  -  03-02 @ 07:00  --------------------------------------------------------  IN: 220 mL / OUT: 525 mL / NET: -305 mL      PHYSICAL EXAM  General:   CNS:   HEENT:   Resp:   CVS:   Abd:   Ext:   Skin:     MEDICATIONS    amLODIPine   Tablet Oral      albuterol    90 MICROgram(s) HFA Inhaler Inhalation PRN  budesonide 160 MICROgram(s)/formoterol 4.5 MICROgram(s) Inhaler Inhalation  tiotropium 2.5 MICROgram(s) Inhaler Inhalation    acetaminophen     Tablet .. Oral PRN  HYDROmorphone  Injectable IV Push PRN  HYDROmorphone  Injectable IV Push PRN      heparin   Injectable IV Push PRN  heparin   Injectable IV Push PRN  heparin  Infusion. IV Continuous    pantoprazole    Tablet Oral        influenza  Vaccine (HIGH DOSE) IntraMuscular                              8.4    5.99  )-----------( 312      ( 02 Mar 2024 05:35 )             29.1       03-02    144  |  104  |  8   ----------------------------<  91  3.7   |  37<H>  |  0.64    Ca    9.7      02 Mar 2024 05:35  Phos  3.3     03-02  Mg     1.8     03-02    TPro  5.7<L>  /  Alb  2.3<L>  /  TBili  0.3  /  DBili  x   /  AST  8<L>  /  ALT  14  /  AlkPhos  40  03-02            Urinalysis Basic - ( 02 Mar 2024 05:35 )    Color: x / Appearance: x / SG: x / pH: x  Gluc: 91 mg/dL / Ketone: x  / Bili: x / Urobili: x   Blood: x / Protein: x / Nitrite: x   Leuk Esterase: x / RBC: x / WBC x   Sq Epi: x / Non Sq Epi: x / Bacteria: x            Radiology: ***  Bedside lung ultrasound: ***  Bedside ECHO: ***    CENTRAL LINE: Y/N          DATE INSERTED:              REMOVE: Y/N  KEANE: Y/N                        DATE INSERTED:              REMOVE: Y/N  A-LINE: Y/N                       DATE INSERTED:              REMOVE: Y/N    GLOBAL ISSUE/BEST PRACTICE  Analgesia:   Sedation:   CAM-ICU:   HOB elevation: yes  Stress ulcer prophylaxis:   VTE prophylaxis:   Glycemic control:   Nutrition:     CODE STATUS: ***  [facility] discussion: Y       Patient is a 68y old  Female who presents with a chief complaint of GIB bleed (02 Mar 2024 08:46)    24 hour events: No acute events overnight. Pt states pain is controlled on current regimen. Tolerating the clear liquid diet well. Has been getting out of bed to chair with no issues.     REVIEW OF SYSTEMS  Constitutional: No fever, chills, fatigue  Neuro: No headache, numbness, weakness  Resp: No cough, wheezing, shortness of breath  CVS: No chest pain, palpitations, leg swelling  GI: + mild abdominal pain, no nausea, vomiting, diarrhea   : No dysuria, frequency, incontinence  Skin: No itching, burning, rashes, or lesions   Msk: No joint pain or swelling  Psych: +anxiety, No depression or mood swings  Heme: No bleeding    T(F): 98.6 (03-02-24 @ 04:19), Max: 98.7 (03-01-24 @ 16:33)  HR: 93 (03-02-24 @ 08:00) (79 - 118)  BP: 139/70 (03-02-24 @ 08:00) (113/58 - 158/67)  RR: 26 (03-02-24 @ 08:00) (19 - 41)  SpO2: 95% (03-02-24 @ 08:00) (92% - 99%)  Wt(kg): --            I&O's Summary    03-01 @ 07:01  -  03-02 @ 07:00  --------------------------------------------------------  IN: 220 mL / OUT: 525 mL / NET: -305 mL      PHYSICAL EXAM  General: resting comfortably in chair in NAD  CNS: AOx3   HEENT: NCAT  Resp: lungs CTABL, breathing comfortably on 2L NC  CVS: RRR, no M/R/G  Abd: slight ttp throughout quadrants, surgical sites clean without erythema or dehiscence, BS present, slightly distended  Ext: ROM intact  Skin: no cyanosis, new rashes or lesions noted grossly      MEDICATIONS    amLODIPine   Tablet Oral      albuterol    90 MICROgram(s) HFA Inhaler Inhalation PRN  budesonide 160 MICROgram(s)/formoterol 4.5 MICROgram(s) Inhaler Inhalation  tiotropium 2.5 MICROgram(s) Inhaler Inhalation    acetaminophen     Tablet .. Oral PRN  HYDROmorphone  Injectable IV Push PRN  HYDROmorphone  Injectable IV Push PRN      heparin   Injectable IV Push PRN  heparin   Injectable IV Push PRN  heparin  Infusion. IV Continuous    pantoprazole    Tablet Oral        influenza  Vaccine (HIGH DOSE) IntraMuscular                              8.4    5.99  )-----------( 312      ( 02 Mar 2024 05:35 )             29.1       03-02    144  |  104  |  8   ----------------------------<  91  3.7   |  37<H>  |  0.64    Ca    9.7      02 Mar 2024 05:35  Phos  3.3     03-02  Mg     1.8     03-02    TPro  5.7<L>  /  Alb  2.3<L>  /  TBili  0.3  /  DBili  x   /  AST  8<L>  /  ALT  14  /  AlkPhos  40  03-02            Urinalysis Basic - ( 02 Mar 2024 05:35 )    Color: x / Appearance: x / SG: x / pH: x  Gluc: 91 mg/dL / Ketone: x  / Bili: x / Urobili: x   Blood: x / Protein: x / Nitrite: x   Leuk Esterase: x / RBC: x / WBC x   Sq Epi: x / Non Sq Epi: x / Bacteria: x        GLOBAL ISSUE/BEST PRACTICE  Analgesia: Y  Sedation: N  HOB elevation: yes  Stress ulcer prophylaxis: Y  VTE prophylaxis: Y  Glycemic control: N  Nutrition: Y    CODE STATUS: full code  GO discussion: Y

## 2024-03-02 NOTE — PROGRESS NOTE ADULT - ASSESSMENT
Patient is a 67 y/o female with pmhx of COPD (not on home oxygen), HTN, RA and anxiety admitted for GIB, now POD#2 s/p lap sigmoid colon resection w/ colorectal anastomosis.       Neuro: Alert and interactive. Pain control with dilaudid PRN   Cardio: with acute PE. Hemodynamics intact. Normal LV and RV function noted on TTE. resume heparin gtt today without loading dose. Cardiology team following.   Resp: Successfully extubated to NC s/p procedure. Maintaining O2 sat > 90% on 2L NC (on home O2 PRN). Supportive care with ATC duonebs   GI: POD #2 from laparoscopic sigmoidectomy with colorectal anastomosis. Protonix for stress ulcer prophylaxis. Diet advanced to  full liquid diet. Serial abdominal exams.   Renal: Renal function stable. Continue with IVFs as tolerated. Diaz out, good UOP. Maintain mag>2, phos>3 and potassium>4.   ID: Completed course of zosyn today for UTI. Will continue to monitor wbc and fever curve.   Endo: Maintain euglycemia to promote wound healing. Received dose of steroids intraop   Heme: now on heparin gtt for PE

## 2024-03-02 NOTE — PHARMACOTHERAPY INTERVENTION NOTE - COMMENTS
Patient is a 67 yo F with with SBPs >130. Discussed with Dr. Pollock and recommeded re-starting home medication of amlodipine 10 mg daily (also per cardiology recommendation). Recommendation accepeted and order entered.   Patient is a 67 yo F with with SBPs >130. Discussed with Dr. Pollock and recommended re-starting home medication of amlodipine 10 mg daily (also per cardiology recommendation). Recommendation accepted and order entered.

## 2024-03-02 NOTE — PROGRESS NOTE ADULT - ASSESSMENT
68 year old female with PMHx COPD (not on home oxygen), HTN, RA, anxiety admitted with GI bleed.    cardiac clearance, HTN, PE  - CT chest with acute pulmonary embolus in the right middle lobe lobar and segmental branches  - Pt a/w GIB, s/p colonoscopy 2/20, large partially obstructing mass noted at approximately 13cm from anal verge.   - now s/p Laparoscopic colorectal anastomosis on 2/29/24, pathology positive for adenocarcinoma  - heparin gtt currently on hold, though need to restart when deemed safe from a CV perspective, plan to start today without loading dose  - 02 supplementation as needed    - No sign of volume overload on exam   - No severe valvular abnormalities noted on examination  - TTE 2/22/23 shows normal LV and RV size and function, EF 60-65%     - EKG: NSR with nonspecific TWA  - No evidence of any active ischemia   - lipid profile reviewed, eventually start statin    - remains in sr on telemetry  - BP stable and controlled   - restart amlodipine if BP elevated    - Monitor and replete lytes, keep K>4, Mg>2.  - Will continue to follow.

## 2024-03-02 NOTE — PROGRESS NOTE ADULT - SUBJECTIVE AND OBJECTIVE BOX
Interval History:  started on prednisone 10mg po daily for arthritis flare  started on heparin infusion  no visible bleeding    Chart reviewed and events noted;   Overnight events:    MEDICATIONS  (STANDING):  amLODIPine   Tablet 10 milliGRAM(s) Oral daily  budesonide 160 MICROgram(s)/formoterol 4.5 MICROgram(s) Inhaler 2 Puff(s) Inhalation two times a day  heparin  Infusion.  Unit(s)/Hr (10 mL/Hr) IV Continuous <Continuous>  influenza  Vaccine (HIGH DOSE) 0.7 milliLiter(s) IntraMuscular once  pantoprazole    Tablet 40 milliGRAM(s) Oral before breakfast  tiotropium 2.5 MICROgram(s) Inhaler 2 Puff(s) Inhalation daily    MEDICATIONS  (PRN):  acetaminophen     Tablet .. 650 milliGRAM(s) Oral every 6 hours PRN Mild Pain (1 - 3), Moderate Pain (4 - 6)  albuterol    90 MICROgram(s) HFA Inhaler 2 Puff(s) Inhalation every 6 hours PRN Shortness of Breath and/or Wheezing  heparin   Injectable 4000 Unit(s) IV Push every 6 hours PRN For aPTT less than 40  heparin   Injectable 2000 Unit(s) IV Push every 6 hours PRN For aPTT between 40 - 57  HYDROmorphone  Injectable 1 milliGRAM(s) IV Push every 4 hours PRN Severe Pain (7 - 10)  HYDROmorphone  Injectable 0.5 milliGRAM(s) IV Push every 4 hours PRN Moderate Pain (4 - 6)      Vital Signs Last 24 Hrs  T(C): 37.7 (02 Mar 2024 12:00), Max: 37.7 (02 Mar 2024 12:00)  T(F): 99.8 (02 Mar 2024 12:00), Max: 99.8 (02 Mar 2024 12:00)  HR: 97 (02 Mar 2024 15:00) (79 - 105)  BP: 125/64 (02 Mar 2024 15:00) (119/56 - 158/67)  BP(mean): 89 (02 Mar 2024 15:00) (81 - 99)  RR: 28 (02 Mar 2024 15:00) (19 - 41)  SpO2: 91% (02 Mar 2024 15:00) (91% - 99%)    Parameters below as of 02 Mar 2024 15:00  Patient On (Oxygen Delivery Method): nasal cannula  O2 Flow (L/min): 2      PHYSICAL EXAM  General: adult in NAD  HEENT: clear oropharynx, anicteric sclera, pink conjunctivae  Neck: supple  CV: normal S1S2 with no murmur rubs or gallops  Lungs: clear to auscultation, no wheezes, no rhales  Abdomen: soft non-tender non-distended, no hepato/splenomegaly  Ext: no clubbing cyanosis or edema  Skin: no rashes and no petichiae  Neuro: alert and oriented X3 no focal deficits      LABS:  CBC Full  -  ( 02 Mar 2024 05:35 )  WBC Count : 5.99 K/uL  RBC Count : 3.58 M/uL  Hemoglobin : 8.4 g/dL  Hematocrit : 29.1 %  Platelet Count - Automated : 312 K/uL  Mean Cell Volume : 81.3 fl  Mean Cell Hemoglobin : 23.5 pg  Mean Cell Hemoglobin Concentration : 28.9 gm/dL  Auto Neutrophil # : 3.81 K/uL  Auto Lymphocyte # : 1.29 K/uL  Auto Monocyte # : 0.75 K/uL  Auto Eosinophil # : 0.05 K/uL  Auto Basophil # : 0.03 K/uL  Auto Neutrophil % : 63.7 %  Auto Lymphocyte % : 21.5 %  Auto Monocyte % : 12.5 %  Auto Eosinophil % : 0.8 %  Auto Basophil % : 0.5 %    03-02    144  |  104  |  8   ----------------------------<  91  3.7   |  37<H>  |  0.64    Ca    9.7      02 Mar 2024 05:35  Phos  3.3     03-02  Mg     1.8     03-02    TPro  5.7<L>  /  Alb  2.3<L>  /  TBili  0.3  /  DBili  x   /  AST  8<L>  /  ALT  14  /  AlkPhos  40  03-02    PTT - ( 02 Mar 2024 09:30 )  PTT:34.7 sec    fe studies      WBC trend  5.99 K/uL (03-02-24 @ 05:35)  8.96 K/uL (03-01-24 @ 05:45)  11.50 K/uL (02-29-24 @ 23:45)  6.01 K/uL (02-29-24 @ 05:20)      Hgb trend  8.4 g/dL (03-02-24 @ 05:35)  9.2 g/dL (03-01-24 @ 05:45)  8.6 g/dL (02-29-24 @ 23:45)  8.2 g/dL (02-29-24 @ 05:20)      plt trend  312 K/uL (03-02-24 @ 05:35)  359 K/uL (03-01-24 @ 05:45)  321 K/uL (02-29-24 @ 23:45)  283 K/uL (02-29-24 @ 05:20)        RADIOLOGY & ADDITIONAL STUDIES:

## 2024-03-02 NOTE — PROGRESS NOTE ADULT - ASSESSMENT
69 y/o F w/ pmh of COPD on 4L of home o2, htn, RA, anxiety, presented to [facility] for GIB was found to have obstructing sigmoid/rectal mass on CT concerning for adenocarcinoma. Hospital course c/b acute PE requiring full dose AC. Pt now s/p OR and POD#1 for laparoscopic sigmoid resection w/ colorectal anastomosis. Post-op pt admitted to the MICU for close HD monitoring.      -Neuro: No acute issues, post-op pain control w/ tylenol and dilaudid  -Cardiac: HDS maintain MAP >65, htn on amlodipine   -Resp: No acute issues o2 stable  -GI/Surgery: cont diet as ordered/tolerated, GIB likely resoled h/h now stable, Sigmoid bowel obstruction 2/2 to mass now s/p bowel resection post-op care per surgical team  -Renal: Renal function stable cont to monitor along w/ lytes  -ID: monitoring off IVAB s/p courser of IV zosyn for UTI  -Endo: No acute issues  -Heme: PE on full dose heparin gtt cont to monitor CBC and coags per heparin gtt protocol  -Dispo: Pt remains in the MICU, currently full code, dispo pending ICU course

## 2024-03-02 NOTE — PROGRESS NOTE ADULT - ASSESSMENT
IMPRESSION    69yo F w hx COPD on 4-4.5L O2, HTN, RA, anxiety, adm w 2wk hx of intermittent blood mixed w stool  Never had colonoscopy  No abdomen pain anorexia, weight loss  Colonoscopy 2/20 here large partially obstructing mass at ~13cm from anal verge sig for adenocarcinoma, MMR status still pending  CEA 17.8  CT chest 2/20 and CT a/p 2/13 no mets but new RML PE, duplex legs negative now on IV Heparin    Recent COVID 19 infection and RSV last month.     No further bleeds since adm, reports does have some SOB since adm but thought was due to anxiety, episode leg pain 2months ago  Seen by Surgery, for planned lap colon resection 2/29, due to risk for pending obstruction  Iron studies c/w deficiency post IV Venofer    -adenocarcinoma of colon assoc w bleed, no distant mets by imaging studies, for definitive surgery. Will followup final path to see if needs adjuvant therapy  -RML PE-likely provoked etiology/ malignancy associated, continue IV heparin, can change to oral DOAC when ready for discharge.   -anemia-w/u sig for iron deficiency post IV Venofer also due to continued GI bleed, monitor serial CBC, rishi w pt on Heparin for PE. Discussed w pt potential need for transfuion PRBC if cont to decrease/in prep for surgery      d/w GI Dr Goodman, lesion in Sigmoid on colonoscopy evaluation.  s/p colonoscopy 2/20, large partially obstructing mass noted at approximately 13cm from anal verge  pathology noted, positive for adenocarcinoma  CEA elevated    s/p surgery eval    02/13 CT scan Abd neg for liver mets  Scattered too small to characterize hypodense foci    02/26 CT scan AP  LIVER: Numerous subcentimeter hepatic hypodensities measuring up to 9 mm are not characterized due to small size. Some are poorly delineated and metastatic disease is not excluded, for example image 2:15.  BOWEL: Redemonstration of a masslike thickening in the rectosigmoid junction which is at least 4.6 cm in length and extends into the mesorectal/mesosigmoid fat, the mass is concerning for neoplasm. Proximal   to the mass there is mild to moderate stool burden without evidence of colonic obstruction.  Sigmoid colonic diverticulosis is present proximal to the mass with a single inflamed diverticulum, image 2:81, and adjacent fat stranding suggesting mild and developing acute diverticulitis. No fluid collection   or extraluminal air to suspect abscess or perforation. No bowel obstruction. Appendix is normal  VESSELS: Aorta has normal caliber. Mild atherosclerotic changes. Hepatic veins, portal vein, SMV and splenic vein are patent.  There is adequate opacification of the entire IVC, common, external and internal iliac veins without evidence of filling defects or thrombosis.   No evidence of common femoral thrombosis bilaterally.  RETROPERITONEUM/LYMPH NODES: No lymphadenopathy. Numerous abnormal mesorectal lymph nodes along the superior rectal artery up to 1.0 x 1.1 mm, image 2:58.    2/29/2024 s/p laparoscopic sigmoid colon resection  doing well post op      RECOMMENDATIONS    has been resumed on heparin infusion  started on prednisone for arthritis flare  continue post op care    Await final pathology.   continue ICU care

## 2024-03-02 NOTE — PROGRESS NOTE ADULT - SUBJECTIVE AND OBJECTIVE BOX
pt seen  feeling good  in chair  +F-  ICU Vital Signs Last 24 Hrs  T(C): 37.7 (02 Mar 2024 12:00), Max: 37.7 (02 Mar 2024 12:00)  T(F): 99.8 (02 Mar 2024 12:00), Max: 99.8 (02 Mar 2024 12:00)  HR: 101 (02 Mar 2024 13:00) (79 - 105)  BP: 138/63 (02 Mar 2024 13:00) (119/71 - 158/67)  BP(mean): 90 (02 Mar 2024 13:00) (82 - 99)  ABP: --  ABP(mean): --  RR: 28 (02 Mar 2024 13:00) (19 - 41)  SpO2: 94% (02 Mar 2024 13:00) (93% - 99%)    O2 Parameters below as of 02 Mar 2024 11:00  Patient On (Oxygen Delivery Method): nasal cannula  O2 Flow (L/min): 2      NAD  soft NT/ND  incisoin c/d/i                          8.4    5.99  )-----------( 312      ( 02 Mar 2024 05:35 )             29.1   03-02    144  |  104  |  8   ----------------------------<  91  3.7   |  37<H>  |  0.64    Ca    9.7      02 Mar 2024 05:35  Phos  3.3     03-02  Mg     1.8     03-02    TPro  5.7<L>  /  Alb  2.3<L>  /  TBili  0.3  /  DBili  x   /  AST  8<L>  /  ALT  14  /  AlkPhos  40  03-02

## 2024-03-03 LAB
ANION GAP SERPL CALC-SCNC: 5 MMOL/L — SIGNIFICANT CHANGE UP (ref 5–17)
APTT BLD: 91 SEC — HIGH (ref 24.5–35.6)
BASOPHILS # BLD AUTO: 0.02 K/UL — SIGNIFICANT CHANGE UP (ref 0–0.2)
BASOPHILS NFR BLD AUTO: 0.3 % — SIGNIFICANT CHANGE UP (ref 0–2)
BUN SERPL-MCNC: 8 MG/DL — SIGNIFICANT CHANGE UP (ref 7–23)
CALCIUM SERPL-MCNC: 9.3 MG/DL — SIGNIFICANT CHANGE UP (ref 8.5–10.1)
CHLORIDE SERPL-SCNC: 104 MMOL/L — SIGNIFICANT CHANGE UP (ref 96–108)
CO2 SERPL-SCNC: 36 MMOL/L — HIGH (ref 22–31)
CREAT SERPL-MCNC: 0.62 MG/DL — SIGNIFICANT CHANGE UP (ref 0.5–1.3)
EGFR: 98 ML/MIN/1.73M2 — SIGNIFICANT CHANGE UP
EOSINOPHIL # BLD AUTO: 0.06 K/UL — SIGNIFICANT CHANGE UP (ref 0–0.5)
EOSINOPHIL NFR BLD AUTO: 0.9 % — SIGNIFICANT CHANGE UP (ref 0–6)
GLUCOSE SERPL-MCNC: 103 MG/DL — HIGH (ref 70–99)
HCT VFR BLD CALC: 28.5 % — LOW (ref 34.5–45)
HCT VFR BLD CALC: 29.1 % — LOW (ref 34.5–45)
HGB BLD-MCNC: 8.2 G/DL — LOW (ref 11.5–15.5)
HGB BLD-MCNC: 8.3 G/DL — LOW (ref 11.5–15.5)
IMM GRANULOCYTES NFR BLD AUTO: 1.4 % — HIGH (ref 0–0.9)
LYMPHOCYTES # BLD AUTO: 1.23 K/UL — SIGNIFICANT CHANGE UP (ref 1–3.3)
LYMPHOCYTES # BLD AUTO: 18.6 % — SIGNIFICANT CHANGE UP (ref 13–44)
MAGNESIUM SERPL-MCNC: 2 MG/DL — SIGNIFICANT CHANGE UP (ref 1.6–2.6)
MCHC RBC-ENTMCNC: 23.1 PG — LOW (ref 27–34)
MCHC RBC-ENTMCNC: 23.4 PG — LOW (ref 27–34)
MCHC RBC-ENTMCNC: 28.5 GM/DL — LOW (ref 32–36)
MCHC RBC-ENTMCNC: 28.8 GM/DL — LOW (ref 32–36)
MCV RBC AUTO: 81.1 FL — SIGNIFICANT CHANGE UP (ref 80–100)
MCV RBC AUTO: 81.2 FL — SIGNIFICANT CHANGE UP (ref 80–100)
MONOCYTES # BLD AUTO: 0.69 K/UL — SIGNIFICANT CHANGE UP (ref 0–0.9)
MONOCYTES NFR BLD AUTO: 10.4 % — SIGNIFICANT CHANGE UP (ref 2–14)
NEUTROPHILS # BLD AUTO: 4.53 K/UL — SIGNIFICANT CHANGE UP (ref 1.8–7.4)
NEUTROPHILS NFR BLD AUTO: 68.4 % — SIGNIFICANT CHANGE UP (ref 43–77)
NRBC # BLD: 0 /100 WBCS — SIGNIFICANT CHANGE UP (ref 0–0)
NRBC # BLD: 0 /100 WBCS — SIGNIFICANT CHANGE UP (ref 0–0)
PHOSPHATE SERPL-MCNC: 2.4 MG/DL — LOW (ref 2.5–4.5)
PLATELET # BLD AUTO: 323 K/UL — SIGNIFICANT CHANGE UP (ref 150–400)
PLATELET # BLD AUTO: 323 K/UL — SIGNIFICANT CHANGE UP (ref 150–400)
POTASSIUM SERPL-MCNC: 4 MMOL/L — SIGNIFICANT CHANGE UP (ref 3.5–5.3)
POTASSIUM SERPL-SCNC: 4 MMOL/L — SIGNIFICANT CHANGE UP (ref 3.5–5.3)
RBC # BLD: 3.51 M/UL — LOW (ref 3.8–5.2)
RBC # BLD: 3.59 M/UL — LOW (ref 3.8–5.2)
RBC # FLD: 22 % — HIGH (ref 10.3–14.5)
RBC # FLD: 22.2 % — HIGH (ref 10.3–14.5)
SODIUM SERPL-SCNC: 145 MMOL/L — SIGNIFICANT CHANGE UP (ref 135–145)
WBC # BLD: 6.62 K/UL — SIGNIFICANT CHANGE UP (ref 3.8–10.5)
WBC # BLD: 7.51 K/UL — SIGNIFICANT CHANGE UP (ref 3.8–10.5)
WBC # FLD AUTO: 6.62 K/UL — SIGNIFICANT CHANGE UP (ref 3.8–10.5)
WBC # FLD AUTO: 7.51 K/UL — SIGNIFICANT CHANGE UP (ref 3.8–10.5)

## 2024-03-03 PROCEDURE — 99232 SBSQ HOSP IP/OBS MODERATE 35: CPT | Mod: GC

## 2024-03-03 PROCEDURE — 99291 CRITICAL CARE FIRST HOUR: CPT

## 2024-03-03 PROCEDURE — 99233 SBSQ HOSP IP/OBS HIGH 50: CPT

## 2024-03-03 RX ORDER — POTASSIUM PHOSPHATE, MONOBASIC POTASSIUM PHOSPHATE, DIBASIC 236; 224 MG/ML; MG/ML
30 INJECTION, SOLUTION INTRAVENOUS ONCE
Refills: 0 | Status: COMPLETED | OUTPATIENT
Start: 2024-03-03 | End: 2024-03-03

## 2024-03-03 RX ADMIN — HYDROMORPHONE HYDROCHLORIDE 1 MILLIGRAM(S): 2 INJECTION INTRAMUSCULAR; INTRAVENOUS; SUBCUTANEOUS at 02:57

## 2024-03-03 RX ADMIN — HEPARIN SODIUM 1000 UNIT(S)/HR: 5000 INJECTION INTRAVENOUS; SUBCUTANEOUS at 19:20

## 2024-03-03 RX ADMIN — HYDROMORPHONE HYDROCHLORIDE 0.5 MILLIGRAM(S): 2 INJECTION INTRAMUSCULAR; INTRAVENOUS; SUBCUTANEOUS at 16:13

## 2024-03-03 RX ADMIN — TIOTROPIUM BROMIDE 2 PUFF(S): 18 CAPSULE ORAL; RESPIRATORY (INHALATION) at 06:51

## 2024-03-03 RX ADMIN — BUDESONIDE AND FORMOTEROL FUMARATE DIHYDRATE 2 PUFF(S): 160; 4.5 AEROSOL RESPIRATORY (INHALATION) at 18:38

## 2024-03-03 RX ADMIN — HYDROMORPHONE HYDROCHLORIDE 0.5 MILLIGRAM(S): 2 INJECTION INTRAMUSCULAR; INTRAVENOUS; SUBCUTANEOUS at 21:36

## 2024-03-03 RX ADMIN — HYDROMORPHONE HYDROCHLORIDE 1 MILLIGRAM(S): 2 INJECTION INTRAMUSCULAR; INTRAVENOUS; SUBCUTANEOUS at 02:26

## 2024-03-03 RX ADMIN — HYDROMORPHONE HYDROCHLORIDE 0.5 MILLIGRAM(S): 2 INJECTION INTRAMUSCULAR; INTRAVENOUS; SUBCUTANEOUS at 16:30

## 2024-03-03 RX ADMIN — PANTOPRAZOLE SODIUM 40 MILLIGRAM(S): 20 TABLET, DELAYED RELEASE ORAL at 06:28

## 2024-03-03 RX ADMIN — HEPARIN SODIUM 1000 UNIT(S)/HR: 5000 INJECTION INTRAVENOUS; SUBCUTANEOUS at 11:33

## 2024-03-03 RX ADMIN — HEPARIN SODIUM 1000 UNIT(S)/HR: 5000 INJECTION INTRAVENOUS; SUBCUTANEOUS at 00:28

## 2024-03-03 RX ADMIN — HEPARIN SODIUM 1000 UNIT(S)/HR: 5000 INJECTION INTRAVENOUS; SUBCUTANEOUS at 07:02

## 2024-03-03 RX ADMIN — POTASSIUM PHOSPHATE, MONOBASIC POTASSIUM PHOSPHATE, DIBASIC 83.33 MILLIMOLE(S): 236; 224 INJECTION, SOLUTION INTRAVENOUS at 10:23

## 2024-03-03 RX ADMIN — HYDROMORPHONE HYDROCHLORIDE 0.5 MILLIGRAM(S): 2 INJECTION INTRAMUSCULAR; INTRAVENOUS; SUBCUTANEOUS at 10:23

## 2024-03-03 RX ADMIN — HYDROMORPHONE HYDROCHLORIDE 0.5 MILLIGRAM(S): 2 INJECTION INTRAMUSCULAR; INTRAVENOUS; SUBCUTANEOUS at 10:40

## 2024-03-03 RX ADMIN — AMLODIPINE BESYLATE 10 MILLIGRAM(S): 2.5 TABLET ORAL at 06:27

## 2024-03-03 RX ADMIN — BUDESONIDE AND FORMOTEROL FUMARATE DIHYDRATE 2 PUFF(S): 160; 4.5 AEROSOL RESPIRATORY (INHALATION) at 06:51

## 2024-03-03 NOTE — PROGRESS NOTE ADULT - ASSESSMENT
68 year old female with PMHx COPD (not on home oxygen), HTN, RA, anxiety admitted with GI bleed.    cardiac clearance, HTN, PE  - CT chest with acute pulmonary embolus in the right middle lobe lobar and segmental branches  - Pt a/w GIB, s/p colonoscopy 2/20, large partially obstructing mass noted at approximately 13cm from anal verge.   - now s/p Laparoscopic colorectal anastomosis on 2/29/24, pathology positive for adenocarcinoma  - heparin gtt has been restarted  - 02 supplementation as needed    - No sign of volume overload on exam   - No severe valvular abnormalities noted on examination  - TTE 2/22/23 shows normal LV and RV size and function, EF 60-65%     - EKG: NSR with nonspecific TWA  - No evidence of any active ischemia   - lipid profile reviewed, eventually start statin    - remains in sr on telemetry  - BP stable and controlled   - restart amlodipine if BP elevated    - Monitor and replete lytes, keep K>4, Mg>2.  - Will continue to follow.

## 2024-03-03 NOTE — PROGRESS NOTE ADULT - SUBJECTIVE AND OBJECTIVE BOX
[facility] - Alexa Ramirez, Arnel De León Savella, [place]  Office Number: [phone]    Patient sitting comfortably NAD, no respiratory distress.  No complaints of chest pain, dyspnea, palpitations, PND, or orthopnea.    ROS: negative unless otherwise mentioned.    Telemetry:  sr    PAST MEDICAL & SURGICAL HISTORY:  COPD (chronic obstructive pulmonary disease)      Rheumatoid arthritis      HTN (hypertension)      Anxiety      No significant past surgical history          SOCIAL HISTORY:  No tobacco, ethanol, or drug abuse.    FAMILY HISTORY:  FHx: lung cancer      No family history of acute MI or sudden cardiac death.    MEDICATIONS  (STANDING):  amLODIPine   Tablet 10 milliGRAM(s) Oral daily  budesonide 160 MICROgram(s)/formoterol 4.5 MICROgram(s) Inhaler 2 Puff(s) Inhalation two times a day  heparin  Infusion.  Unit(s)/Hr (10 mL/Hr) IV Continuous <Continuous>  influenza  Vaccine (HIGH DOSE) 0.7 milliLiter(s) IntraMuscular once  pantoprazole    Tablet 40 milliGRAM(s) Oral before breakfast  tiotropium 2.5 MICROgram(s) Inhaler 2 Puff(s) Inhalation daily    MEDICATIONS  (PRN):  acetaminophen     Tablet .. 650 milliGRAM(s) Oral every 6 hours PRN Mild Pain (1 - 3), Moderate Pain (4 - 6)  albuterol    90 MICROgram(s) HFA Inhaler 2 Puff(s) Inhalation every 6 hours PRN Shortness of Breath and/or Wheezing  heparin   Injectable 4000 Unit(s) IV Push every 6 hours PRN For aPTT less than 40  heparin   Injectable 2000 Unit(s) IV Push every 6 hours PRN For aPTT between 40 - 57  HYDROmorphone  Injectable 0.5 milliGRAM(s) IV Push every 4 hours PRN Moderate Pain (4 - 6)      Allergies    No Known Allergies    Intolerances        VITAL SIGNS:   Vital Signs Last 24 Hrs  T(C): 36.7 (03 Mar 2024 08:08), Max: 37.1 (02 Mar 2024 17:15)  T(F): 98.1 (03 Mar 2024 08:08), Max: 98.8 (02 Mar 2024 17:15)  HR: 80 (03 Mar 2024 11:00) (80 - 101)  BP: 135/61 (03 Mar 2024 11:00) (94/73 - 154/64)  BP(mean): 88 (03 Mar 2024 11:00) (79 - 97)  RR: 23 (03 Mar 2024 11:00) (12 - 34)  SpO2: 97% (03 Mar 2024 11:00) (91% - 99%)    Parameters below as of 03 Mar 2024 11:00  Patient On (Oxygen Delivery Method): nasal cannula  O2 Flow (L/min): 2      I&O's Summary    02 Mar 2024 07:01  -  03 Mar 2024 07:00  --------------------------------------------------------  IN: 1130 mL / OUT: 0 mL / NET: 1130 mL    03 Mar 2024 07:01  -  03 Mar 2024 12:09  --------------------------------------------------------  IN: 506.6 mL / OUT: 250 mL / NET: 256.6 mL        On Exam:  Constitutional: NAD, awake and alert, well-developed  HEENT: Moist Mucous Membranes, Anicteric  Pulmonary: Non-labored, breath sounds are clear bilaterally, No wheezing, rales or rhonchi  Cardiovascular: Regular, S1 and S2, No murmurs, rubs, gallops or clicks  Gastrointestinal: Bowel Sounds present, soft, nontender.   Lymph: No peripheral edema. No lymphadenopathy.  Skin: No visible rashes or ulcers.  Psych:  Mood & affect appropriate    LABS: All Labs Reviewed:                        8.3    6.62  )-----------( 323      ( 03 Mar 2024 05:41 )             29.1                         8.2    7.51  )-----------( 323      ( 02 Mar 2024 23:37 )             28.5                         8.5    7.38  )-----------( 332      ( 02 Mar 2024 20:40 )             29.2     03 Mar 2024 05:41    145    |  104    |  8      ----------------------------<  103    4.0     |  36     |  0.62   02 Mar 2024 05:35    144    |  104    |  8      ----------------------------<  91     3.7     |  37     |  0.64   01 Mar 2024 05:45    144    |  104    |  6      ----------------------------<  87     3.5     |  33     |  0.69     Ca    9.3        03 Mar 2024 05:41  Ca    9.7        02 Mar 2024 05:35  Ca    9.6        01 Mar 2024 05:45  Phos  2.4       03 Mar 2024 05:41  Phos  3.3       02 Mar 2024 05:35  Phos  2.7       01 Mar 2024 05:45  Mg     2.0       03 Mar 2024 05:41  Mg     1.8       02 Mar 2024 05:35  Mg     1.9       01 Mar 2024 05:45    TPro  5.7    /  Alb  2.3    /  TBili  0.3    /  DBili  x      /  AST  8      /  ALT  14     /  AlkPhos  40     02 Mar 2024 05:35  TPro  6.1    /  Alb  2.6    /  TBili  0.4    /  DBili  x      /  AST  15     /  ALT  14     /  AlkPhos  47     01 Mar 2024 05:45  TPro  5.9    /  Alb  2.5    /  TBili  0.4    /  DBili  x      /  AST  14     /  ALT  14     /  AlkPhos  45     29 Feb 2024 23:45    PTT - ( 02 Mar 2024 23:37 )  PTT:91.0 sec      Blood Culture:         RADIOLOGY:    ECG:

## 2024-03-03 NOTE — PROGRESS NOTE ADULT - SUBJECTIVE AND OBJECTIVE BOX
pt seen  feeling good  +F+BM  heparin drip started  ICU Vital Signs Last 24 Hrs  T(C): 36.7 (03 Mar 2024 08:08), Max: 37.7 (02 Mar 2024 12:00)  T(F): 98.1 (03 Mar 2024 08:08), Max: 99.8 (02 Mar 2024 12:00)  HR: 80 (03 Mar 2024 11:00) (80 - 101)  BP: 135/61 (03 Mar 2024 11:00) (94/73 - 154/64)  BP(mean): 88 (03 Mar 2024 11:00) (79 - 97)  ABP: --  ABP(mean): --  RR: 23 (03 Mar 2024 11:00) (12 - 34)  SpO2: 97% (03 Mar 2024 11:00) (91% - 99%)    O2 Parameters below as of 03 Mar 2024 11:00  Patient On (Oxygen Delivery Method): nasal cannula  O2 Flow (L/min): 2      gen-NAD  resp-clear  abd-soft NT/ND  incision c/d/i                          8.3    6.62  )-----------( 323      ( 03 Mar 2024 05:41 )             29.1

## 2024-03-03 NOTE — PROGRESS NOTE ADULT - ASSESSMENT
IMPRESSION    67yo F w hx COPD on 4-4.5L O2, HTN, RA, anxiety, adm w 2wk hx of intermittent blood mixed w stool  Never had colonoscopy  No abdomen pain anorexia, weight loss  Colonoscopy 2/20 here large partially obstructing mass at ~13cm from anal verge sig for adenocarcinoma, MMR status still pending  CEA 17.8  CT chest 2/20 and CT a/p 2/13 no mets but new RML PE, duplex legs negative now on IV Heparin    Recent COVID 19 infection and RSV last month.     No further bleeds since adm, reports does have some SOB since adm but thought was due to anxiety, episode leg pain 2months ago  Seen by Surgery, for planned lap colon resection 2/29, due to risk for pending obstruction  Iron studies c/w deficiency post IV Venofer    -adenocarcinoma of colon assoc w bleed, no distant mets by imaging studies, for definitive surgery. Will followup final path to see if needs adjuvant therapy  -RML PE-likely provoked etiology/ malignancy associated, continue IV heparin, can change to oral DOAC when ready for discharge.   -anemia-w/u sig for iron deficiency post IV Venofer also due to continued GI bleed, monitor serial CBC, rishi w pt on Heparin for PE. Discussed w pt potential need for transfuion PRBC if cont to decrease/in prep for surgery      d/w GI Dr Goodman, lesion in Sigmoid on colonoscopy evaluation.  s/p colonoscopy 2/20, large partially obstructing mass noted at approximately 13cm from anal verge  pathology noted, positive for adenocarcinoma  CEA elevated    s/p surgery eval    02/13 CT scan Abd neg for liver mets  Scattered too small to characterize hypodense foci    02/26 CT scan AP  LIVER: Numerous subcentimeter hepatic hypodensities measuring up to 9 mm are not characterized due to small size. Some are poorly delineated and metastatic disease is not excluded, for example image 2:15.  BOWEL: Redemonstration of a masslike thickening in the rectosigmoid junction which is at least 4.6 cm in length and extends into the mesorectal/mesosigmoid fat, the mass is concerning for neoplasm. Proximal   to the mass there is mild to moderate stool burden without evidence of colonic obstruction.  Sigmoid colonic diverticulosis is present proximal to the mass with a single inflamed diverticulum, image 2:81, and adjacent fat stranding suggesting mild and developing acute diverticulitis. No fluid collection   or extraluminal air to suspect abscess or perforation. No bowel obstruction. Appendix is normal  VESSELS: Aorta has normal caliber. Mild atherosclerotic changes. Hepatic veins, portal vein, SMV and splenic vein are patent.  There is adequate opacification of the entire IVC, common, external and internal iliac veins without evidence of filling defects or thrombosis.   No evidence of common femoral thrombosis bilaterally.  RETROPERITONEUM/LYMPH NODES: No lymphadenopathy. Numerous abnormal mesorectal lymph nodes along the superior rectal artery up to 1.0 x 1.1 mm, image 2:58.    2/29/2024 s/p laparoscopic sigmoid colon resection  doing well post op  await final path      RECOMMENDATIONS    has been resumed on heparin infusion  started on prednisone for arthritis flare  continue post op care    Await final pathology. further oncologic recommendations await review of pathology  continue ICU care. ?potential transfer to floor  discussed with patient and family

## 2024-03-03 NOTE — PROGRESS NOTE ADULT - SUBJECTIVE AND OBJECTIVE BOX
[facility]  Bobby Mcdonald PA-C  [address] [place] [address]  [phone]      INTERVAL HPI/OVERNIGHT EVENTS:  Pt s/e in ICU, Family at bedside   S/p lap sigmoid colon resection w/ colorectal anastomosis  Pt reports feeling well today   Tolerating regular diet       MEDICATIONS  (STANDING):  amLODIPine   Tablet 10 milliGRAM(s) Oral daily  budesonide 160 MICROgram(s)/formoterol 4.5 MICROgram(s) Inhaler 2 Puff(s) Inhalation two times a day  heparin  Infusion.  Unit(s)/Hr (10 mL/Hr) IV Continuous <Continuous>  influenza  Vaccine (HIGH DOSE) 0.7 milliLiter(s) IntraMuscular once  pantoprazole    Tablet 40 milliGRAM(s) Oral before breakfast  tiotropium 2.5 MICROgram(s) Inhaler 2 Puff(s) Inhalation daily    MEDICATIONS  (PRN):  acetaminophen     Tablet .. 650 milliGRAM(s) Oral every 6 hours PRN Mild Pain (1 - 3), Moderate Pain (4 - 6)  albuterol    90 MICROgram(s) HFA Inhaler 2 Puff(s) Inhalation every 6 hours PRN Shortness of Breath and/or Wheezing  heparin   Injectable 4000 Unit(s) IV Push every 6 hours PRN For aPTT less than 40  heparin   Injectable 2000 Unit(s) IV Push every 6 hours PRN For aPTT between 40 - 57  HYDROmorphone  Injectable 0.5 milliGRAM(s) IV Push every 4 hours PRN Moderate Pain (4 - 6)      Allergies  No Known Allergies    Intolerances      Vital Signs Last 24 Hrs  T(C): 37 (03 Mar 2024 12:10), Max: 37.1 (02 Mar 2024 17:15)  T(F): 98.6 (03 Mar 2024 12:10), Max: 98.8 (02 Mar 2024 17:15)  HR: 97 (03 Mar 2024 13:00) (80 - 100)  BP: 129/58 (03 Mar 2024 13:00) (94/73 - 154/64)  BP(mean): 87 (03 Mar 2024 13:00) (79 - 105)  RR: 28 (03 Mar 2024 13:00) (12 - 31)  SpO2: 95% (03 Mar 2024 13:00) (91% - 99%)    Parameters below as of 03 Mar 2024 13:00  Patient On (Oxygen Delivery Method): nasal cannula  O2 Flow (L/min): 2    03-02-24 @ 07:01  -  03-03-24 @ 07:00  --------------------------------------------------------  IN: 1130 mL / OUT: 0 mL / NET: 1130 mL    03-03-24 @ 07:01  -  03-03-24 @ 14:10  --------------------------------------------------------  IN: 1013.2 mL / OUT: 250 mL / NET: 763.2 mL    GENERAL:  Appears stated age  HEENT:  NC/AT  CHEST:  Full & symmetric excursion  HEART:  Regular rhythm  ABDOMEN:  Soft, +abdominal soreness, non-distended  EXTEREMITIES:  no cyanosis  SKIN:  No rash      LABS:                        8.3    6.62  )-----------( 323      ( 03 Mar 2024 05:41 )             29.1     03-03    145  |  104  |  8   ----------------------------<  103<H>  4.0   |  36<H>  |  0.62    Ca    9.3      03 Mar 2024 05:41  Phos  2.4     03-03  Mg     2.0     03-03    TPro  5.7<L>  /  Alb  2.3<L>  /  TBili  0.3  /  DBili  x   /  AST  8<L>  /  ALT  14  /  AlkPhos  40  03-02    PTT - ( 02 Mar 2024 23:37 )  PTT:91.0 sec

## 2024-03-03 NOTE — PROGRESS NOTE ADULT - SUBJECTIVE AND OBJECTIVE BOX
Patient is a 68y old  Female who presents with a chief complaint of GIB bleed (02 Mar 2024 20:23)       INTERVAL HPI/OVERNIGHT EVENTS: Patient seen and examined at bedside. Denies any new symptoms/complaints. Denies chest pain, palpitations, sob    MEDICATIONS  (STANDING):  amLODIPine   Tablet 10 milliGRAM(s) Oral daily  budesonide 160 MICROgram(s)/formoterol 4.5 MICROgram(s) Inhaler 2 Puff(s) Inhalation two times a day  heparin  Infusion.  Unit(s)/Hr (10 mL/Hr) IV Continuous <Continuous>  influenza  Vaccine (HIGH DOSE) 0.7 milliLiter(s) IntraMuscular once  pantoprazole    Tablet 40 milliGRAM(s) Oral before breakfast  potassium phosphate IVPB 30 milliMole(s) IV Intermittent once  tiotropium 2.5 MICROgram(s) Inhaler 2 Puff(s) Inhalation daily    MEDICATIONS  (PRN):  acetaminophen     Tablet .. 650 milliGRAM(s) Oral every 6 hours PRN Mild Pain (1 - 3), Moderate Pain (4 - 6)  albuterol    90 MICROgram(s) HFA Inhaler 2 Puff(s) Inhalation every 6 hours PRN Shortness of Breath and/or Wheezing  heparin   Injectable 4000 Unit(s) IV Push every 6 hours PRN For aPTT less than 40  heparin   Injectable 2000 Unit(s) IV Push every 6 hours PRN For aPTT between 40 - 57  HYDROmorphone  Injectable 0.5 milliGRAM(s) IV Push every 4 hours PRN Moderate Pain (4 - 6)      Allergies    No Known Allergies    Intolerances        REVIEW OF SYSTEMS:  Per HPI. All other ROS noted Negative   Vital Signs Last 24 Hrs  T(C): 36.7 (03 Mar 2024 08:08), Max: 37.7 (02 Mar 2024 12:00)  T(F): 98.1 (03 Mar 2024 08:08), Max: 99.8 (02 Mar 2024 12:00)  HR: 92 (03 Mar 2024 08:00) (83 - 101)  BP: 136/63 (03 Mar 2024 08:00) (94/73 - 154/64)  BP(mean): 91 (03 Mar 2024 08:00) (79 - 97)  RR: 23 (03 Mar 2024 08:00) (12 - 34)  SpO2: 98% (03 Mar 2024 08:00) (91% - 99%)    Parameters below as of 03 Mar 2024 07:00  Patient On (Oxygen Delivery Method): nasal cannula  O2 Flow (L/min): 2      PHYSICAL EXAM:  GENERAL: NAD, awake, alert   HEAD:  Atraumatic, Normocephalic  EYES: EOMI, PERRLA, conjunctiva and sclera clear  ENMT: No tonsillar erythema, exudates, or enlargement; Moist mucous membranes  NECK: Supple, No JVD, Normal thyroid  NERVOUS SYSTEM:  Alert & Oriented X3, no focal motor/sensory deficits noted   CHEST/LUNG: Clear to auscultation bilaterally; No rales, rhonchi, wheezing, or rubs  HEART: Regular rate and rhythm  ABDOMEN: S/p surgery   EXTREMITIES:  2+ Peripheral Pulses, No clubbing, cyanosis, or edema  LYMPH: No lymphadenopathy noted  SKIN: No rashes or lesions  LABS:                        8.3    6.62  )-----------( 323      ( 03 Mar 2024 05:41 )             29.1     03 Mar 2024 05:41    145    |  104    |  8      ----------------------------<  103    4.0     |  36     |  0.62     Ca    9.3        03 Mar 2024 05:41  Phos  2.4       03 Mar 2024 05:41  Mg     2.0       03 Mar 2024 05:41      PTT - ( 02 Mar 2024 23:37 )  PTT:91.0 sec  CAPILLARY BLOOD GLUCOSE        BLOOD CULTURE    RADIOLOGY & ADDITIONAL TESTS:    Imaging Personally Reviewed:  [ ] YES     Consultant(s) Notes Reviewed:      Care Discussed with Consultants/Other Providers:

## 2024-03-03 NOTE — PROGRESS NOTE ADULT - ASSESSMENT
69yo F with PMHx COPD (not on home oxygen), HTN, RA, anxiety presenting with chief complaint of blood in stools. Admitted for GI bleed, plan for colonoscopy 2/20.      Problem/Plan - 1:  ·  Problem: Colonic mass. S/p surgery on  02/29/2024 :  lap sigmoid colon resection w/ colorectal anastomosis.     -HGB currently stable  - On Full liquid diet, tolerating   - Hem/Onco following   -IV Heparin drip resumed for PE   -Close monitoring in ICU   -Transfuse prn   -CT chest: No evidence of metastatic disease. Acute pulmonary embolus in the right middle lobe lobar and segmental branches.     Problem/Plan - 2:  ·  Problem: COPD (chronic obstructive pulmonary disease).   ·  Plan: Chronic, recently admitted for COPD exacerbation  - Duonebs q6 PRN  - Continue home inhalers   - Supplemental O2 PRN. At baseline: 4-4.5 L home oxygen intermittently. COPD patient will keep SpO2 goal 88-93%   - Monitor respiratory status   - 3 weeks ago had rsv, recent rvp negative  - Continuous pulse ox  - Pulmonary DrJulien Dobson f/u     Problem/Plan - 3:  ·  Problem: HTN (hypertension).   ·  Plan: Chronic, stable  - Resume  home amlodipine 10mg qd with hold parameters  - Monitor hemodynamics  - On CLD, advance as tolerated      Problem/Plan - 4:  ·  Problem: Rheumatoid arthritis.   ·  Plan: - Chronic  - resume prednisone 10mg PO Daily. Takes tramadol 50mg Qd.     Problem/Plan - 5:  ·  Problem: Anxiety.   ·  Plan: - Chronic, SOB likely 2/2 anxiety  - Continue Xanax 0.25mg qhs PRN.     Problem/Plan - 6:  ·  Problem: Pulmonary embolism  ·  Plan: CT chest with No evidence of metastatic disease. Acute pulmonary embolus in the right middle lobe lobar and segmental branches.    - Echo:  Left ventricular systolic function is normal with an ejection fraction visually estimated at 60 to 65 %.    -  heparin drip resumed now. Will eventually transition to oral AC as recommended by Hematology        Problem/Plan - 7:  ·  Problem: Fever/UTI: Resolved   ·  Plan: Blood cx NGTD.  Urine cx +E. coli and enterococcus faecalis.    - Completed course of antibiotic  -ID f/u noted      Problem/Plan - 8:  ·  Problem: Encounter for deep vein thrombosis (DVT) prophylaxis.   ·  Plan: - Heparin Drip resumed

## 2024-03-03 NOTE — PROGRESS NOTE ADULT - SUBJECTIVE AND OBJECTIVE BOX
Patient is a 68y old  Female who presents with a chief complaint of GIB bleed (03 Mar 2024 16:08)      INTERVAL HPI/OVERNIGHT EVENTS:    sleeping on rounds. no respiratory distress. receiving IV heparin drip    MEDICATIONS  (STANDING):  amLODIPine   Tablet 10 milliGRAM(s) Oral daily  budesonide 160 MICROgram(s)/formoterol 4.5 MICROgram(s) Inhaler 2 Puff(s) Inhalation two times a day  heparin  Infusion.  Unit(s)/Hr (10 mL/Hr) IV Continuous <Continuous>  influenza  Vaccine (HIGH DOSE) 0.7 milliLiter(s) IntraMuscular once  pantoprazole    Tablet 40 milliGRAM(s) Oral before breakfast  tiotropium 2.5 MICROgram(s) Inhaler 2 Puff(s) Inhalation daily      MEDICATIONS  (PRN):  acetaminophen     Tablet .. 650 milliGRAM(s) Oral every 6 hours PRN Mild Pain (1 - 3), Moderate Pain (4 - 6)  albuterol    90 MICROgram(s) HFA Inhaler 2 Puff(s) Inhalation every 6 hours PRN Shortness of Breath and/or Wheezing  heparin   Injectable 2000 Unit(s) IV Push every 6 hours PRN For aPTT between 40 - 57  heparin   Injectable 4000 Unit(s) IV Push every 6 hours PRN For aPTT less than 40  HYDROmorphone  Injectable 0.5 milliGRAM(s) IV Push every 4 hours PRN Moderate Pain (4 - 6)      Allergies    No Known Allergies    Intolerances        PAST MEDICAL & SURGICAL HISTORY:  COPD (chronic obstructive pulmonary disease)      Rheumatoid arthritis      HTN (hypertension)      Anxiety      No significant past surgical history          Vital Signs Last 24 Hrs  T(C): 37.1 (03 Mar 2024 21:33), Max: 37.5 (03 Mar 2024 16:42)  T(F): 98.7 (03 Mar 2024 21:33), Max: 99.5 (03 Mar 2024 16:42)  HR: 102 (03 Mar 2024 20:00) (80 - 110)  BP: 160/69 (03 Mar 2024 20:00) (124/58 - 160/69)  BP(mean): 101 (03 Mar 2024 20:00) (83 - 105)  RR: 19 (03 Mar 2024 20:00) (12 - 32)  SpO2: 100% (03 Mar 2024 20:00) (95% - 100%)    Parameters below as of 03 Mar 2024 18:00  Patient On (Oxygen Delivery Method): nasal cannula  O2 Flow (L/min): 2      PHYSICAL EXAMINATION:    GENERAL: The patient is in no apparent distress.     HEENT: Head is normocephalic and atraumatic.     NECK: no JVD    LUNGS: diminished air entry bilateral    HEART: Regular rate and rhythm without murmur.    ABDOMEN: Soft, nontender, and nondistended.      EXTREMITIES: Without any cyanosis, clubbing, rash, lesions or edema.    NEUROLOGIC: Grossly intact.    SKIN: No ulceration or induration present.      LABS:                        8.3    6.62  )-----------( 323      ( 03 Mar 2024 05:41 )             29.1     03-03    145  |  104  |  8   ----------------------------<  103<H>  4.0   |  36<H>  |  0.62    Ca    9.3      03 Mar 2024 05:41  Phos  2.4     03-03  Mg     2.0     03-03    TPro  5.7<L>  /  Alb  2.3<L>  /  TBili  0.3  /  DBili  x   /  AST  8<L>  /  ALT  14  /  AlkPhos  40  03-02    PTT - ( 02 Mar 2024 23:37 )  PTT:91.0 sec  Urinalysis Basic - ( 03 Mar 2024 05:41 )    Color: x / Appearance: x / SG: x / pH: x  Gluc: 103 mg/dL / Ketone: x  / Bili: x / Urobili: x   Blood: x / Protein: x / Nitrite: x   Leuk Esterase: x / RBC: x / WBC x   Sq Epi: x / Non Sq Epi: x / Bacteria: x            Assessment:    COPD  Pulmonary emboli  S/P Colon resection for adenocarcinoma  Acute Hypoxic respiratory failure      Plan:    Anticoagulation with heparin  Supplemental oxygen  Pain control  Symbicort + Spiriva inhalers

## 2024-03-03 NOTE — PROGRESS NOTE ADULT - ASSESSMENT
Patient is a 67 y/o female with pmhx of COPD (not on home oxygen), HTN, RA and anxiety admitted for GIB, now POD#2 s/p lap sigmoid colon resection w/ colorectal anastomosis.       Neuro: Alert and interactive. Pain control with dilaudid PRN   Cardio: with acute PE. Hemodynamics intact. Normal LV and RV function noted on TTE. resume heparin gtt today without loading dose. Cardiology team following.   Resp: Successfully extubated to NC s/p procedure. Maintaining O2 sat > 90% on 2L NC (on home O2 PRN). Supportive care with ATC duonebs   GI: POD #2 from laparoscopic sigmoidectomy with colorectal anastomosis. Protonix for stress ulcer prophylaxis. Diet advanced to  full liquid diet. Serial abdominal exams.   Renal: Renal function stable. Continue with IVFs as tolerated. Diaz out, good UOP. Maintain mag>2, phos>3 and potassium>4.   ID: Completed course of zosyn today for UTI. Will continue to monitor wbc and fever curve.   Endo: Maintain euglycemia to promote wound healing. Received dose of steroids intraop   Heme: now on heparin gtt for PE    Patient is a 69 y/o female with pmhx of COPD (not on home oxygen), HTN, RA and anxiety admitted for GIB, now POD#2 s/p lap sigmoid colon resection w/ colorectal anastomosis.       Neuro: no active issues.   - Pain control with dilaudid PRN - will start to ween, only on dilaudid 0.5mg    Cardio:   - with acute PE - c/w heparin gtt.  - TTE w/ normal LV, RV function  - c/w home amlodipine for HTN  - Cardiology team following.     Resp:   - s/p extubation post-op. on 2L NC satting appropriately   - COPD on home O2 PRN, c/w home regimen.    GI:   - POD #3 from laparoscopic sigmoidectomy with colorectal anastomosis.   - c/w Protonix for now as Pt on home prednisone.   - advance to regular diet today.  - serial abdominal exams  - surgery following     Renal: no active issues.  - Replete lytes PRN    ID: no active issues   - finished course of zosyn for UTI  - monitor WBC and fever curves.    MSK:  - reports taking prednisone for arthritis flares.  - Pt unsure of dosage, multiple dosages found on sure-scripts  - to call her PCP (Dr. Carlos Beal) on Monday to confirm prednisone dosage, and to restart once confirmed.    Endo: no active issues.     Heme:   - now on heparin gtt for PE, consider switching to Eliquis pending surgery.

## 2024-03-03 NOTE — PROGRESS NOTE ADULT - SUBJECTIVE AND OBJECTIVE BOX
Patient is a 68y old  Female who presents with a chief complaint of GIB bleed (03 Mar 2024 09:22)    24 hour events: ***    REVIEW OF SYSTEMS  Constitutional: No fever, chills, fatigue  Neuro: No headache, numbness, weakness  Resp: No cough, wheezing, shortness of breath  CVS: No chest pain, palpitations, leg swelling  GI: No abdominal pain, nausea, vomiting, diarrhea   : No dysuria, frequency, incontinence  Skin: No itching, burning, rashes, or lesions   Msk: No joint pain or swelling  Psych: No depression, anxiety, mood swings  Heme: No bleeding    T(F): 98.1 (03-03-24 @ 08:08), Max: 99.8 (03-02-24 @ 12:00)  HR: 92 (03-03-24 @ 08:00) (83 - 101)  BP: 136/63 (03-03-24 @ 08:00) (94/73 - 154/64)  RR: 23 (03-03-24 @ 08:00) (12 - 34)  SpO2: 98% (03-03-24 @ 08:00) (91% - 99%)  Wt(kg): --            I&O's Summary    03-02 @ 07:01  -  03-03 @ 07:00  --------------------------------------------------------  IN: 1130 mL / OUT: 0 mL / NET: 1130 mL    03-03 @ 07:01  -  03-03 @ 10:34  --------------------------------------------------------  IN: 10 mL / OUT: 150 mL / NET: -140 mL      PHYSICAL EXAM  General:   CNS:   HEENT:   Resp:   CVS:   Abd:   Ext:   Skin:     MEDICATIONS    amLODIPine   Tablet Oral      albuterol    90 MICROgram(s) HFA Inhaler Inhalation PRN  budesonide 160 MICROgram(s)/formoterol 4.5 MICROgram(s) Inhaler Inhalation  tiotropium 2.5 MICROgram(s) Inhaler Inhalation    acetaminophen     Tablet .. Oral PRN  HYDROmorphone  Injectable IV Push PRN      heparin   Injectable IV Push PRN  heparin   Injectable IV Push PRN  heparin  Infusion. IV Continuous    pantoprazole    Tablet Oral        influenza  Vaccine (HIGH DOSE) IntraMuscular                              8.3    6.62  )-----------( 323      ( 03 Mar 2024 05:41 )             29.1       03-03    145  |  104  |  8   ----------------------------<  103<H>  4.0   |  36<H>  |  0.62    Ca    9.3      03 Mar 2024 05:41  Phos  2.4     03-03  Mg     2.0     03-03    TPro  5.7<L>  /  Alb  2.3<L>  /  TBili  0.3  /  DBili  x   /  AST  8<L>  /  ALT  14  /  AlkPhos  40  03-02          PTT - ( 02 Mar 2024 23:37 )  PTT:91.0 sec  Urinalysis Basic - ( 03 Mar 2024 05:41 )    Color: x / Appearance: x / SG: x / pH: x  Gluc: 103 mg/dL / Ketone: x  / Bili: x / Urobili: x   Blood: x / Protein: x / Nitrite: x   Leuk Esterase: x / RBC: x / WBC x   Sq Epi: x / Non Sq Epi: x / Bacteria: x            Radiology: ***  Bedside lung ultrasound: ***  Bedside ECHO: ***    CENTRAL LINE: Y/N          DATE INSERTED:              REMOVE: Y/N  KEANE: Y/N                        DATE INSERTED:              REMOVE: Y/N  A-LINE: Y/N                       DATE INSERTED:              REMOVE: Y/N    GLOBAL ISSUE/BEST PRACTICE  Analgesia:   Sedation:   CAM-ICU:   HOB elevation: yes  Stress ulcer prophylaxis:   VTE prophylaxis:   Glycemic control:   Nutrition:     CODE STATUS: ***  [facility] discussion: Y       Patient is a 68y old  Female who presents with a chief complaint of GIB bleed (03 Mar 2024 09:22)    24 hour events: Patient was seen and examined at bedside. No overnight events. Pt states pain is controlled on current regimen. Tolerating diet well.    REVIEW OF SYSTEMS  Constitutional: No fever, chills, fatigue  Neuro: No headache, numbness, weakness  Resp: No cough, wheezing, shortness of breath  CVS: No chest pain, palpitations, leg swelling  GI: + mild abdominal pain, no nausea, vomiting, diarrhea   : No dysuria, frequency, incontinence  Skin: No itching, burning, rashes, or lesions   Msk: No joint pain or swelling  Psych: +anxiety, No depression or mood swings  Heme: No bleeding    T(F): 98.1 (03-03-24 @ 08:08), Max: 99.8 (03-02-24 @ 12:00)  HR: 92 (03-03-24 @ 08:00) (83 - 101)  BP: 136/63 (03-03-24 @ 08:00) (94/73 - 154/64)  RR: 23 (03-03-24 @ 08:00) (12 - 34)  SpO2: 98% (03-03-24 @ 08:00) (91% - 99%)  Wt(kg): --            I&O's Summary    03-02 @ 07:01  -  03-03 @ 07:00  --------------------------------------------------------  IN: 1130 mL / OUT: 0 mL / NET: 1130 mL    03-03 @ 07:01  -  03-03 @ 10:34  --------------------------------------------------------  IN: 10 mL / OUT: 150 mL / NET: -140 mL      PHYSICAL EXAM  General: resting comfortably in chair in NAD  CNS: AOx3   HEENT: NCAT  Resp: lungs CTABL, breathing comfortably on 2L NC  CVS: RRR, no M/R/G  Abd: slight ttp throughout quadrants, surgical sites clean without erythema or dehiscence, BS present, slightly distended  Ext: ROM intact  Skin: no cyanosis, new rashes or lesions noted grossly     MEDICATIONS    amLODIPine   Tablet Oral      albuterol    90 MICROgram(s) HFA Inhaler Inhalation PRN  budesonide 160 MICROgram(s)/formoterol 4.5 MICROgram(s) Inhaler Inhalation  tiotropium 2.5 MICROgram(s) Inhaler Inhalation    acetaminophen     Tablet .. Oral PRN  HYDROmorphone  Injectable IV Push PRN      heparin   Injectable IV Push PRN  heparin   Injectable IV Push PRN  heparin  Infusion. IV Continuous    pantoprazole    Tablet Oral        influenza  Vaccine (HIGH DOSE) IntraMuscular                              8.3    6.62  )-----------( 323      ( 03 Mar 2024 05:41 )             29.1       03-03    145  |  104  |  8   ----------------------------<  103<H>  4.0   |  36<H>  |  0.62    Ca    9.3      03 Mar 2024 05:41  Phos  2.4     03-03  Mg     2.0     03-03    TPro  5.7<L>  /  Alb  2.3<L>  /  TBili  0.3  /  DBili  x   /  AST  8<L>  /  ALT  14  /  AlkPhos  40  03-02          PTT - ( 02 Mar 2024 23:37 )  PTT:91.0 sec  Urinalysis Basic - ( 03 Mar 2024 05:41 )    Color: x / Appearance: x / SG: x / pH: x  Gluc: 103 mg/dL / Ketone: x  / Bili: x / Urobili: x   Blood: x / Protein: x / Nitrite: x   Leuk Esterase: x / RBC: x / WBC x   Sq Epi: x / Non Sq Epi: x / Bacteria: x            Analgesia: Y  Sedation: N  HOB elevation: yes  Stress ulcer prophylaxis: Y  VTE prophylaxis: Y  Glycemic control: N  Nutrition: Y    CODE STATUS: full code  GOC discussion: Y

## 2024-03-03 NOTE — PROGRESS NOTE ADULT - SUBJECTIVE AND OBJECTIVE BOX
Interval History:  continues to do well post op  Chart reviewed and events noted;   Overnight events:    MEDICATIONS  (STANDING):  amLODIPine   Tablet 10 milliGRAM(s) Oral daily  budesonide 160 MICROgram(s)/formoterol 4.5 MICROgram(s) Inhaler 2 Puff(s) Inhalation two times a day  heparin  Infusion.  Unit(s)/Hr (10 mL/Hr) IV Continuous <Continuous>  influenza  Vaccine (HIGH DOSE) 0.7 milliLiter(s) IntraMuscular once  pantoprazole    Tablet 40 milliGRAM(s) Oral before breakfast  tiotropium 2.5 MICROgram(s) Inhaler 2 Puff(s) Inhalation daily    MEDICATIONS  (PRN):  acetaminophen     Tablet .. 650 milliGRAM(s) Oral every 6 hours PRN Mild Pain (1 - 3), Moderate Pain (4 - 6)  albuterol    90 MICROgram(s) HFA Inhaler 2 Puff(s) Inhalation every 6 hours PRN Shortness of Breath and/or Wheezing  heparin   Injectable 4000 Unit(s) IV Push every 6 hours PRN For aPTT less than 40  heparin   Injectable 2000 Unit(s) IV Push every 6 hours PRN For aPTT between 40 - 57  HYDROmorphone  Injectable 0.5 milliGRAM(s) IV Push every 4 hours PRN Moderate Pain (4 - 6)      Vital Signs Last 24 Hrs  T(C): 37 (03 Mar 2024 12:10), Max: 37.1 (02 Mar 2024 17:15)  T(F): 98.6 (03 Mar 2024 12:10), Max: 98.8 (02 Mar 2024 17:15)  HR: 103 (03 Mar 2024 15:00) (80 - 103)  BP: 124/70 (03 Mar 2024 15:00) (94/73 - 154/64)  BP(mean): 89 (03 Mar 2024 15:00) (79 - 105)  RR: 27 (03 Mar 2024 15:00) (12 - 32)  SpO2: 97% (03 Mar 2024 15:00) (93% - 99%)    Parameters below as of 03 Mar 2024 13:00  Patient On (Oxygen Delivery Method): nasal cannula  O2 Flow (L/min): 2      PHYSICAL EXAM  General: adult in NAD  HEENT: clear oropharynx, anicteric sclera, pink conjunctivae  Neck: supple  CV: normal S1S2 with no murmur rubs or gallops  Lungs: clear to auscultation, no wheezes, no rhales  Abdomen: soft non-tender non-distended, no hepato/splenomegaly  Ext: no clubbing cyanosis or edema  Skin: no rashes and no petichiae  Neuro: alert and oriented X3 no focal deficits      LABS:  CBC Full  -  ( 03 Mar 2024 05:41 )  WBC Count : 6.62 K/uL  RBC Count : 3.59 M/uL  Hemoglobin : 8.3 g/dL  Hematocrit : 29.1 %  Platelet Count - Automated : 323 K/uL  Mean Cell Volume : 81.1 fl  Mean Cell Hemoglobin : 23.1 pg  Mean Cell Hemoglobin Concentration : 28.5 gm/dL  Auto Neutrophil # : 4.53 K/uL  Auto Lymphocyte # : 1.23 K/uL  Auto Monocyte # : 0.69 K/uL  Auto Eosinophil # : 0.06 K/uL  Auto Basophil # : 0.02 K/uL  Auto Neutrophil % : 68.4 %  Auto Lymphocyte % : 18.6 %  Auto Monocyte % : 10.4 %  Auto Eosinophil % : 0.9 %  Auto Basophil % : 0.3 %    03-03    145  |  104  |  8   ----------------------------<  103<H>  4.0   |  36<H>  |  0.62    Ca    9.3      03 Mar 2024 05:41  Phos  2.4     03-03  Mg     2.0     03-03    TPro  5.7<L>  /  Alb  2.3<L>  /  TBili  0.3  /  DBili  x   /  AST  8<L>  /  ALT  14  /  AlkPhos  40  03-02    PTT - ( 02 Mar 2024 23:37 )  PTT:91.0 sec    fe studies      WBC trend  6.62 K/uL (03-03-24 @ 05:41)  7.51 K/uL (03-02-24 @ 23:37)  7.38 K/uL (03-02-24 @ 20:40)  8.18 K/uL (03-02-24 @ 16:50)  5.99 K/uL (03-02-24 @ 05:35)  8.96 K/uL (03-01-24 @ 05:45)  11.50 K/uL (02-29-24 @ 23:45)      Hgb trend  8.3 g/dL (03-03-24 @ 05:41)  8.2 g/dL (03-02-24 @ 23:37)  8.5 g/dL (03-02-24 @ 20:40)  8.8 g/dL (03-02-24 @ 16:50)  8.4 g/dL (03-02-24 @ 05:35)  9.2 g/dL (03-01-24 @ 05:45)  8.6 g/dL (02-29-24 @ 23:45)      plt trend  323 K/uL (03-03-24 @ 05:41)  323 K/uL (03-02-24 @ 23:37)  332 K/uL (03-02-24 @ 20:40)  357 K/uL (03-02-24 @ 16:50)  312 K/uL (03-02-24 @ 05:35)  359 K/uL (03-01-24 @ 05:45)  321 K/uL (02-29-24 @ 23:45)        RADIOLOGY & ADDITIONAL STUDIES:

## 2024-03-03 NOTE — PROGRESS NOTE ADULT - ATTENDING COMMENTS
68 year old female with COPD on 4L home O2 PRN, HTN, RA, and anxiety who initially presented for BRBPR, subsequently found to have a partially obstructing sigmoid/rectal mass on CT and colonoscopy with pathology revealing adenocarcinoma. Course was further c/b acute PE in the right middle lobe lobar and segmental branches on full anticoagulation with heparin drip prior to OR. Underwent laparoscopic sigmoid colon resection w/ colorectal anastomosis 2/29, extubated post-operatively without complication. Feels well today.    Neuro: Pain control with ofirmev, dilaudid. titrating down on dilaudid.   CV: hemodynamically stable, continue amlodipine for hypertension. In heparin gtt for anticoagulation.  Pulm: Saturating well. Respiratory status stable. Bronchodilators.  GI: Advance to regular diet. Surgery and GI follow up.  Renal: Renal function stable.   ID: Completed course of zosyn for UTI. Monitor off abx at this time   Heme: Therapeutic AC for PE.  Full Code.

## 2024-03-04 LAB
ANION GAP SERPL CALC-SCNC: 4 MMOL/L — LOW (ref 5–17)
APTT BLD: 76.3 SEC — HIGH (ref 24.5–35.6)
BASOPHILS # BLD AUTO: 0.02 K/UL — SIGNIFICANT CHANGE UP (ref 0–0.2)
BASOPHILS NFR BLD AUTO: 0.4 % — SIGNIFICANT CHANGE UP (ref 0–2)
BUN SERPL-MCNC: 5 MG/DL — LOW (ref 7–23)
CALCIUM SERPL-MCNC: 9.4 MG/DL — SIGNIFICANT CHANGE UP (ref 8.5–10.1)
CHLORIDE SERPL-SCNC: 103 MMOL/L — SIGNIFICANT CHANGE UP (ref 96–108)
CO2 SERPL-SCNC: 36 MMOL/L — HIGH (ref 22–31)
CREAT SERPL-MCNC: 0.5 MG/DL — SIGNIFICANT CHANGE UP (ref 0.5–1.3)
EGFR: 103 ML/MIN/1.73M2 — SIGNIFICANT CHANGE UP
EOSINOPHIL # BLD AUTO: 0.09 K/UL — SIGNIFICANT CHANGE UP (ref 0–0.5)
EOSINOPHIL NFR BLD AUTO: 1.6 % — SIGNIFICANT CHANGE UP (ref 0–6)
GLUCOSE SERPL-MCNC: 114 MG/DL — HIGH (ref 70–99)
HCT VFR BLD CALC: 30.3 % — LOW (ref 34.5–45)
HGB BLD-MCNC: 8.6 G/DL — LOW (ref 11.5–15.5)
IMM GRANULOCYTES NFR BLD AUTO: 1.2 % — HIGH (ref 0–0.9)
LYMPHOCYTES # BLD AUTO: 1 K/UL — SIGNIFICANT CHANGE UP (ref 1–3.3)
LYMPHOCYTES # BLD AUTO: 17.7 % — SIGNIFICANT CHANGE UP (ref 13–44)
MAGNESIUM SERPL-MCNC: 1.9 MG/DL — SIGNIFICANT CHANGE UP (ref 1.6–2.6)
MCHC RBC-ENTMCNC: 23.8 PG — LOW (ref 27–34)
MCHC RBC-ENTMCNC: 28.4 GM/DL — LOW (ref 32–36)
MCV RBC AUTO: 83.9 FL — SIGNIFICANT CHANGE UP (ref 80–100)
MONOCYTES # BLD AUTO: 0.59 K/UL — SIGNIFICANT CHANGE UP (ref 0–0.9)
MONOCYTES NFR BLD AUTO: 10.5 % — SIGNIFICANT CHANGE UP (ref 2–14)
NEUTROPHILS # BLD AUTO: 3.87 K/UL — SIGNIFICANT CHANGE UP (ref 1.8–7.4)
NEUTROPHILS NFR BLD AUTO: 68.6 % — SIGNIFICANT CHANGE UP (ref 43–77)
NRBC # BLD: 0 /100 WBCS — SIGNIFICANT CHANGE UP (ref 0–0)
PHOSPHATE SERPL-MCNC: 2.8 MG/DL — SIGNIFICANT CHANGE UP (ref 2.5–4.5)
PLATELET # BLD AUTO: 315 K/UL — SIGNIFICANT CHANGE UP (ref 150–400)
POTASSIUM SERPL-MCNC: 3.9 MMOL/L — SIGNIFICANT CHANGE UP (ref 3.5–5.3)
POTASSIUM SERPL-SCNC: 3.9 MMOL/L — SIGNIFICANT CHANGE UP (ref 3.5–5.3)
RBC # BLD: 3.61 M/UL — LOW (ref 3.8–5.2)
RBC # FLD: 22.2 % — HIGH (ref 10.3–14.5)
SODIUM SERPL-SCNC: 143 MMOL/L — SIGNIFICANT CHANGE UP (ref 135–145)
WBC # BLD: 5.64 K/UL — SIGNIFICANT CHANGE UP (ref 3.8–10.5)
WBC # FLD AUTO: 5.64 K/UL — SIGNIFICANT CHANGE UP (ref 3.8–10.5)

## 2024-03-04 PROCEDURE — 99233 SBSQ HOSP IP/OBS HIGH 50: CPT

## 2024-03-04 PROCEDURE — 99232 SBSQ HOSP IP/OBS MODERATE 35: CPT | Mod: GC

## 2024-03-04 RX ORDER — APIXABAN 2.5 MG/1
10 TABLET, FILM COATED ORAL EVERY 12 HOURS
Refills: 0 | Status: DISCONTINUED | OUTPATIENT
Start: 2024-03-04 | End: 2024-03-05

## 2024-03-04 RX ADMIN — BUDESONIDE AND FORMOTEROL FUMARATE DIHYDRATE 2 PUFF(S): 160; 4.5 AEROSOL RESPIRATORY (INHALATION) at 07:13

## 2024-03-04 RX ADMIN — HEPARIN SODIUM 1000 UNIT(S)/HR: 5000 INJECTION INTRAVENOUS; SUBCUTANEOUS at 07:07

## 2024-03-04 RX ADMIN — APIXABAN 10 MILLIGRAM(S): 2.5 TABLET, FILM COATED ORAL at 09:53

## 2024-03-04 RX ADMIN — HYDROMORPHONE HYDROCHLORIDE 0.5 MILLIGRAM(S): 2 INJECTION INTRAMUSCULAR; INTRAVENOUS; SUBCUTANEOUS at 09:53

## 2024-03-04 RX ADMIN — APIXABAN 10 MILLIGRAM(S): 2.5 TABLET, FILM COATED ORAL at 22:08

## 2024-03-04 RX ADMIN — HYDROMORPHONE HYDROCHLORIDE 0.5 MILLIGRAM(S): 2 INJECTION INTRAMUSCULAR; INTRAVENOUS; SUBCUTANEOUS at 15:33

## 2024-03-04 RX ADMIN — HYDROMORPHONE HYDROCHLORIDE 0.5 MILLIGRAM(S): 2 INJECTION INTRAMUSCULAR; INTRAVENOUS; SUBCUTANEOUS at 23:09

## 2024-03-04 RX ADMIN — HYDROMORPHONE HYDROCHLORIDE 0.5 MILLIGRAM(S): 2 INJECTION INTRAMUSCULAR; INTRAVENOUS; SUBCUTANEOUS at 16:14

## 2024-03-04 RX ADMIN — BUDESONIDE AND FORMOTEROL FUMARATE DIHYDRATE 2 PUFF(S): 160; 4.5 AEROSOL RESPIRATORY (INHALATION) at 17:02

## 2024-03-04 RX ADMIN — HYDROMORPHONE HYDROCHLORIDE 0.5 MILLIGRAM(S): 2 INJECTION INTRAMUSCULAR; INTRAVENOUS; SUBCUTANEOUS at 22:09

## 2024-03-04 RX ADMIN — AMLODIPINE BESYLATE 10 MILLIGRAM(S): 2.5 TABLET ORAL at 06:03

## 2024-03-04 RX ADMIN — HYDROMORPHONE HYDROCHLORIDE 0.5 MILLIGRAM(S): 2 INJECTION INTRAMUSCULAR; INTRAVENOUS; SUBCUTANEOUS at 10:53

## 2024-03-04 RX ADMIN — HYDROMORPHONE HYDROCHLORIDE 0.5 MILLIGRAM(S): 2 INJECTION INTRAMUSCULAR; INTRAVENOUS; SUBCUTANEOUS at 04:11

## 2024-03-04 RX ADMIN — PANTOPRAZOLE SODIUM 40 MILLIGRAM(S): 20 TABLET, DELAYED RELEASE ORAL at 06:03

## 2024-03-04 NOTE — PROGRESS NOTE ADULT - ATTENDING COMMENTS
68 year old female with COPD on 4L home O2 PRN, HTN, RA, and anxiety who initially presented for BRBPR, subsequently found to have a partially obstructing sigmoid/rectal mass on CT and colonoscopy with pathology revealing adenocarcinoma. Course was further c/b acute PE in the right middle lobe lobar and segmental branches on full anticoagulation with heparin drip prior to OR. Underwent laparoscopic sigmoid colon resection w/ colorectal anastomosis 2/29, extubated post-operatively without complication. Feels well today.    Neuro: Pain control with ofirmev, dilaudid. titrating down on dilaudid.   CV: hemodynamically stable, continue amlodipine for hypertension. Transition heparin gtt to Eliquis.  Pulm: Saturating well. Respiratory status stable. Bronchodilators.  GI: Advanced to regular diet. Tolerating. Surgery and GI follow up.  Renal: Renal function stable.   ID: Completed course of zosyn for UTI. Monitor off abx at this time   Heme: Therapeutic AC for PE.  MSK: Patient takes prednisone at home for RA. Will need to determine dose and can resume.  Follow up with surgery re: discharge planning   Full Code.

## 2024-03-04 NOTE — PROGRESS NOTE ADULT - SUBJECTIVE AND OBJECTIVE BOX
Patient is a 68y old  Female who presents with a chief complaint of GIB bleed (04 Mar 2024 09:17)    24 hour events: ***    REVIEW OF SYSTEMS  Constitutional: No fever, chills, fatigue  Neuro: No headache, numbness, weakness  Resp: No cough, wheezing, shortness of breath  CVS: No chest pain, palpitations, leg swelling  GI: No abdominal pain, nausea, vomiting, diarrhea   : No dysuria, frequency, incontinence  Skin: No itching, burning, rashes, or lesions   Msk: No joint pain or swelling  Psych: No depression, anxiety, mood swings  Heme: No bleeding    T(F): 98.9 (03-04-24 @ 08:08), Max: 99.5 (03-03-24 @ 16:42)  HR: 103 (03-04-24 @ 10:00) (78 - 122)  BP: 93/57 (03-04-24 @ 10:00) (93/57 - 160/69)  RR: 26 (03-04-24 @ 10:00) (14 - 34)  SpO2: 95% (03-04-24 @ 10:00) (93% - 100%)  Wt(kg): --            I&O's Summary    03-03 @ 07:01  -  03-04 @ 07:00  --------------------------------------------------------  IN: 1800 mL / OUT: 1050 mL / NET: 750 mL    03-04 @ 07:01  -  03-04 @ 10:20  --------------------------------------------------------  IN: 20 mL / OUT: 300 mL / NET: -280 mL      PHYSICAL EXAM  General:   CNS:   HEENT:   Resp:   CVS:   Abd:   Ext:   Skin:     MEDICATIONS    amLODIPine   Tablet Oral      albuterol    90 MICROgram(s) HFA Inhaler Inhalation PRN  budesonide 160 MICROgram(s)/formoterol 4.5 MICROgram(s) Inhaler Inhalation  tiotropium 2.5 MICROgram(s) Inhaler Inhalation    acetaminophen     Tablet .. Oral PRN  HYDROmorphone  Injectable IV Push PRN      apixaban Oral    pantoprazole    Tablet Oral        influenza  Vaccine (HIGH DOSE) IntraMuscular                              8.6    5.64  )-----------( 315      ( 04 Mar 2024 05:23 )             30.3       03-04    143  |  103  |  5<L>  ----------------------------<  114<H>  3.9   |  36<H>  |  0.50    Ca    9.4      04 Mar 2024 05:23  Phos  2.8     03-04  Mg     1.9     03-04            PTT - ( 04 Mar 2024 05:23 )  PTT:76.3 sec  Urinalysis Basic - ( 04 Mar 2024 05:23 )    Color: x / Appearance: x / SG: x / pH: x  Gluc: 114 mg/dL / Ketone: x  / Bili: x / Urobili: x   Blood: x / Protein: x / Nitrite: x   Leuk Esterase: x / RBC: x / WBC x   Sq Epi: x / Non Sq Epi: x / Bacteria: x            Radiology: ***  Bedside lung ultrasound: ***  Bedside ECHO: ***    CENTRAL LINE: Y/N          DATE INSERTED:              REMOVE: Y/N  KEANE: Y/N                        DATE INSERTED:              REMOVE: Y/N  A-LINE: Y/N                       DATE INSERTED:              REMOVE: Y/N    GLOBAL ISSUE/BEST PRACTICE  Analgesia:   Sedation:   CAM-ICU:   HOB elevation: yes  Stress ulcer prophylaxis:   VTE prophylaxis:   Glycemic control:   Nutrition:     CODE STATUS: ***  [facility] discussion: Y       Patient is a 68y old  Female who presents with a chief complaint of GIB bleed (04 Mar 2024 09:17)    24 hour events: No acute events overnight. Pt is feeling well without pain. Is tolerating regular diet well. Has been having BMs. No other concerns.     REVIEW OF SYSTEMS  Constitutional: No fever, chills, fatigue  Neuro: No headache, numbness, weakness  Resp: No cough, wheezing, shortness of breath  CVS: No chest pain, palpitations, leg swelling  GI: No abdominal pain, nausea, vomiting, diarrhea   : No dysuria, frequency, incontinence  Skin: No itching, burning, rashes, or lesions   Msk: No joint pain or swelling  Psych: No depression, anxiety, mood swings  Heme: No bleeding    T(F): 98.9 (03-04-24 @ 08:08), Max: 99.5 (03-03-24 @ 16:42)  HR: 103 (03-04-24 @ 10:00) (78 - 122)  BP: 93/57 (03-04-24 @ 10:00) (93/57 - 160/69)  RR: 26 (03-04-24 @ 10:00) (14 - 34)  SpO2: 95% (03-04-24 @ 10:00) (93% - 100%)  Wt(kg): --            I&O's Summary    03-03 @ 07:01  -  03-04 @ 07:00  --------------------------------------------------------  IN: 1800 mL / OUT: 1050 mL / NET: 750 mL    03-04 @ 07:01  -  03-04 @ 10:20  --------------------------------------------------------  IN: 20 mL / OUT: 300 mL / NET: -280 mL      PHYSICAL EXAM  General: NAD, resting comfortably in bed  CNS: AOx3   HEENT: NCAT  Resp: lungs CTABL, breathing comfortably on 2L NC  CVS: RRR, no M/R/G  Abd: slight ttp throughout quadrants, surgical sites clean without erythema or dehiscence, BS present, slightly distended  Ext: ROM intact  Skin: no cyanosis, new rashes or lesions noted grossly     MEDICATIONS    amLODIPine   Tablet Oral      albuterol    90 MICROgram(s) HFA Inhaler Inhalation PRN  budesonide 160 MICROgram(s)/formoterol 4.5 MICROgram(s) Inhaler Inhalation  tiotropium 2.5 MICROgram(s) Inhaler Inhalation    acetaminophen     Tablet .. Oral PRN  HYDROmorphone  Injectable IV Push PRN      apixaban Oral    pantoprazole    Tablet Oral        influenza  Vaccine (HIGH DOSE) IntraMuscular                              8.6    5.64  )-----------( 315      ( 04 Mar 2024 05:23 )             30.3       03-04    143  |  103  |  5<L>  ----------------------------<  114<H>  3.9   |  36<H>  |  0.50    Ca    9.4      04 Mar 2024 05:23  Phos  2.8     03-04  Mg     1.9     03-04            PTT - ( 04 Mar 2024 05:23 )  PTT:76.3 sec  Urinalysis Basic - ( 04 Mar 2024 05:23 )    Color: x / Appearance: x / SG: x / pH: x  Gluc: 114 mg/dL / Ketone: x  / Bili: x / Urobili: x   Blood: x / Protein: x / Nitrite: x   Leuk Esterase: x / RBC: x / WBC x   Sq Epi: x / Non Sq Epi: x / Bacteria: x          Analgesia: Y  Sedation: N  HOB elevation: yes  Stress ulcer prophylaxis: Y  VTE prophylaxis: Y  Glycemic control: N  Nutrition: Y    CODE STATUS: full code  GOC discussion: Y

## 2024-03-04 NOTE — PROGRESS NOTE ADULT - SUBJECTIVE AND OBJECTIVE BOX
INTERVAL HPI/OVERNIGHT EVENTS:  TAURUS WATSON    STATUS POST:  laparoscopic sigmoid resection   POST OPERATIVE DAY #: 4    SUBJECTIVE:  Patient examined bedside observed resting comfortably. No new complaints. Tolerating diet, having bowel function.     Denies fever, chills, cough, chest pain, palpitations, SOB    MEDICATIONS  (STANDING):  amLODIPine   Tablet 10 milliGRAM(s) Oral daily  budesonide 160 MICROgram(s)/formoterol 4.5 MICROgram(s) Inhaler 2 Puff(s) Inhalation two times a day  heparin  Infusion.  Unit(s)/Hr (10 mL/Hr) IV Continuous <Continuous>  influenza  Vaccine (HIGH DOSE) 0.7 milliLiter(s) IntraMuscular once  pantoprazole    Tablet 40 milliGRAM(s) Oral before breakfast  tiotropium 2.5 MICROgram(s) Inhaler 2 Puff(s) Inhalation daily    MEDICATIONS  (PRN):  acetaminophen     Tablet .. 650 milliGRAM(s) Oral every 6 hours PRN Mild Pain (1 - 3), Moderate Pain (4 - 6)  albuterol    90 MICROgram(s) HFA Inhaler 2 Puff(s) Inhalation every 6 hours PRN Shortness of Breath and/or Wheezing  heparin   Injectable 2000 Unit(s) IV Push every 6 hours PRN For aPTT between 40 - 57  heparin   Injectable 4000 Unit(s) IV Push every 6 hours PRN For aPTT less than 40  HYDROmorphone  Injectable 0.5 milliGRAM(s) IV Push every 4 hours PRN Moderate Pain (4 - 6)      Vital Signs Last 24 Hrs  T(C): 36.9 (04 Mar 2024 04:02), Max: 37.5 (03 Mar 2024 16:42)  T(F): 98.5 (04 Mar 2024 04:02), Max: 99.5 (03 Mar 2024 16:42)  HR: 92 (04 Mar 2024 07:15) (78 - 122)  BP: 134/63 (04 Mar 2024 07:15) (119/58 - 160/69)  BP(mean): 91 (04 Mar 2024 07:15) (83 - 105)  RR: 18 (04 Mar 2024 07:15) (14 - 34)  SpO2: 98% (04 Mar 2024 07:15) (93% - 100%)    Parameters below as of 04 Mar 2024 07:15  Patient On (Oxygen Delivery Method): nasal cannula  O2 Flow (L/min): 2      PHYSICAL EXAM:  GENERAL: NAD, lying in bed comfortably  HEAD:  Atraumatic, Normocephalic  EYES: EOMI, PERRLA, conjunctiva and sclera clear  CHEST/LUNG: Normal work of breathing on room air. No dyspnea  HEART: Regular rate and rhythm. No tachycardia  ABDOMEN: Soft, Nontender, Nondistended. No rebound, no guarding, no signs of peritonitis. Incisions c/d/i  EXTREMITIES:  Calves nonswollen nontender. ROM grossly intact  NERVOUS SYSTEM:  Alert & Oriented X3, speech clear. No deficits     I&O's Detail    03 Mar 2024 07:01  -  04 Mar 2024 07:00  --------------------------------------------------------  IN:    Heparin Infusion: 240 mL    IV PiggyBack: 500 mL    Oral Fluid: 1060 mL  Total IN: 1800 mL    OUT:    Voided (mL): 1050 mL  Total OUT: 1050 mL    Total NET: 750 mL          LABS:                        8.3    6.62  )-----------( 323      ( 03 Mar 2024 05:41 )             29.1     03-04    143  |  103  |  5<L>  ----------------------------<  114<H>  3.9   |  36<H>  |  0.50    Ca    9.4      04 Mar 2024 05:23  Phos  2.8     03-04  Mg     1.9     03-04      PTT - ( 04 Mar 2024 05:23 )  PTT:76.3 sec  Urinalysis Basic - ( 04 Mar 2024 05:23 )    Color: x / Appearance: x / SG: x / pH: x  Gluc: 114 mg/dL / Ketone: x  / Bili: x / Urobili: x   Blood: x / Protein: x / Nitrite: x   Leuk Esterase: x / RBC: x / WBC x   Sq Epi: x / Non Sq Epi: x / Bacteria: x        RADIOLOGY & ADDITIONAL STUDIES:    ASSESSMENT AND PLAN:  67 y/o female w/ PMHx of COPD (on 4-4.5 L home oxygen intermittently), HTN, RA, anxiety presenting w/ bloody stools, found to have 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   POD#4 lap sigmoid colon resection w/ colorectal anastomosis.   Recovering well, afebrile, hemodynamically stable. Having bowel function and tolerating diet.   On heparin drip for AC.     - Continue regular diet  - Downgrade per ICU  - OOB to chair with assistance.   - Pain control  - Incentive spirometry    D/W Dr. Rocha INTERVAL HPI/OVERNIGHT EVENTS:  TAURUS WATSON    STATUS POST:  laparoscopic sigmoid resection   POST OPERATIVE DAY #: 4    SUBJECTIVE:  Patient examined bedside observed resting comfortably. No new complaints. Tolerating diet, having bowel function.     Denies fever, chills, cough, chest pain, palpitations, SOB    MEDICATIONS  (STANDING):  amLODIPine   Tablet 10 milliGRAM(s) Oral daily  budesonide 160 MICROgram(s)/formoterol 4.5 MICROgram(s) Inhaler 2 Puff(s) Inhalation two times a day  heparin  Infusion.  Unit(s)/Hr (10 mL/Hr) IV Continuous <Continuous>  influenza  Vaccine (HIGH DOSE) 0.7 milliLiter(s) IntraMuscular once  pantoprazole    Tablet 40 milliGRAM(s) Oral before breakfast  tiotropium 2.5 MICROgram(s) Inhaler 2 Puff(s) Inhalation daily    MEDICATIONS  (PRN):  acetaminophen     Tablet .. 650 milliGRAM(s) Oral every 6 hours PRN Mild Pain (1 - 3), Moderate Pain (4 - 6)  albuterol    90 MICROgram(s) HFA Inhaler 2 Puff(s) Inhalation every 6 hours PRN Shortness of Breath and/or Wheezing  heparin   Injectable 2000 Unit(s) IV Push every 6 hours PRN For aPTT between 40 - 57  heparin   Injectable 4000 Unit(s) IV Push every 6 hours PRN For aPTT less than 40  HYDROmorphone  Injectable 0.5 milliGRAM(s) IV Push every 4 hours PRN Moderate Pain (4 - 6)      Vital Signs Last 24 Hrs  T(C): 36.9 (04 Mar 2024 04:02), Max: 37.5 (03 Mar 2024 16:42)  T(F): 98.5 (04 Mar 2024 04:02), Max: 99.5 (03 Mar 2024 16:42)  HR: 92 (04 Mar 2024 07:15) (78 - 122)  BP: 134/63 (04 Mar 2024 07:15) (119/58 - 160/69)  BP(mean): 91 (04 Mar 2024 07:15) (83 - 105)  RR: 18 (04 Mar 2024 07:15) (14 - 34)  SpO2: 98% (04 Mar 2024 07:15) (93% - 100%)    Parameters below as of 04 Mar 2024 07:15  Patient On (Oxygen Delivery Method): nasal cannula  O2 Flow (L/min): 2      PHYSICAL EXAM:  GENERAL: NAD, lying in bed comfortably  HEAD:  Atraumatic, Normocephalic  EYES: EOMI, PERRLA, conjunctiva and sclera clear  CHEST/LUNG: Normal work of breathing on room air. No dyspnea  HEART: Regular rate and rhythm. No tachycardia  ABDOMEN: Soft, Nontender, Nondistended. No rebound, no guarding, no signs of peritonitis. Incisions c/d/i  EXTREMITIES:  Calves nonswollen nontender. ROM grossly intact  NERVOUS SYSTEM:  Alert & Oriented X3, speech clear. No deficits     I&O's Detail    03 Mar 2024 07:01  -  04 Mar 2024 07:00  --------------------------------------------------------  IN:    Heparin Infusion: 240 mL    IV PiggyBack: 500 mL    Oral Fluid: 1060 mL  Total IN: 1800 mL    OUT:    Voided (mL): 1050 mL  Total OUT: 1050 mL    Total NET: 750 mL          LABS:                        8.3    6.62  )-----------( 323      ( 03 Mar 2024 05:41 )             29.1     03-04    143  |  103  |  5<L>  ----------------------------<  114<H>  3.9   |  36<H>  |  0.50    Ca    9.4      04 Mar 2024 05:23  Phos  2.8     03-04  Mg     1.9     03-04      PTT - ( 04 Mar 2024 05:23 )  PTT:76.3 sec  Urinalysis Basic - ( 04 Mar 2024 05:23 )    Color: x / Appearance: x / SG: x / pH: x  Gluc: 114 mg/dL / Ketone: x  / Bili: x / Urobili: x   Blood: x / Protein: x / Nitrite: x   Leuk Esterase: x / RBC: x / WBC x   Sq Epi: x / Non Sq Epi: x / Bacteria: x        RADIOLOGY & ADDITIONAL STUDIES:    ASSESSMENT AND PLAN:  67 y/o female w/ PMHx of COPD (on 4-4.5 L home oxygen intermittently), HTN, RA, anxiety presenting w/ bloody stools, found to have 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   POD#4 lap sigmoid colon resection w/ colorectal anastomosis.   Recovering well, afebrile, hemodynamically stable. Having bowel function and tolerating diet.   On heparin drip for AC.     - Continue regular diet  - Transition from heparin GGT to oral AC  - Downgrade per ICU  - OOB to chair with assistance.   - Pain control  - Incentive spirometry    D/W Dr. Rocha

## 2024-03-04 NOTE — PROGRESS NOTE ADULT - ASSESSMENT
IMPRESSION    69yo F w hx COPD on 4-4.5L O2, HTN, RA, anxiety, adm w 2wk hx of intermittent blood mixed w stool  Never had colonoscopy  No abdomen pain anorexia, weight loss  Colonoscopy 2/20 here large partially obstructing mass at ~13cm from anal verge sig for adenocarcinoma, MMR status still pending  CEA 17.8  CT chest 2/20 and CT a/p 2/13 no mets but new RML PE, duplex legs negative now on IV Heparin    Recent COVID 19 infection and RSV last month.     No further bleeds since adm, reports does have some SOB since adm but thought was due to anxiety, episode leg pain 2months ago  Seen by Surgery, for planned lap colon resection 2/29, due to risk for pending obstruction  Iron studies c/w deficiency post IV Venofer    -adenocarcinoma of colon assoc w bleed, no distant mets by imaging studies, for definitive surgery. Will followup final path to see if needs adjuvant therapy  -RML PE-likely provoked etiology/ malignancy associated, continue IV heparin, can change to oral DOAC when ready for discharge.   -anemia-w/u sig for iron deficiency post IV Venofer also due to continued GI bleed, monitor serial CBC, rishi w pt on Heparin for PE. Discussed w pt potential need for transfuion PRBC if cont to decrease/in prep for surgery      d/w GI Dr Goodman, lesion in Sigmoid on colonoscopy evaluation.  s/p colonoscopy 2/20, large partially obstructing mass noted at approximately 13cm from anal verge  pathology noted, positive for adenocarcinoma  CEA elevated    s/p surgery eval    02/13 CT scan Abd neg for liver mets  Scattered too small to characterize hypodense foci    02/26 CT scan AP  LIVER: Numerous subcentimeter hepatic hypodensities measuring up to 9 mm are not characterized due to small size. Some are poorly delineated and metastatic disease is not excluded, for example image 2:15.  BOWEL: Redemonstration of a masslike thickening in the rectosigmoid junction which is at least 4.6 cm in length and extends into the mesorectal/mesosigmoid fat, the mass is concerning for neoplasm. Proximal   to the mass there is mild to moderate stool burden without evidence of colonic obstruction.  Sigmoid colonic diverticulosis is present proximal to the mass with a single inflamed diverticulum, image 2:81, and adjacent fat stranding suggesting mild and developing acute diverticulitis. No fluid collection   or extraluminal air to suspect abscess or perforation. No bowel obstruction. Appendix is normal  VESSELS: Aorta has normal caliber. Mild atherosclerotic changes. Hepatic veins, portal vein, SMV and splenic vein are patent.  There is adequate opacification of the entire IVC, common, external and internal iliac veins without evidence of filling defects or thrombosis.   No evidence of common femoral thrombosis bilaterally.  RETROPERITONEUM/LYMPH NODES: No lymphadenopathy. Numerous abnormal mesorectal lymph nodes along the superior rectal artery up to 1.0 x 1.1 mm, image 2:58.    2/29/2024 s/p laparoscopic sigmoid colon resection  doing well post op  await final path      RECOMMENDATIONS    has been resumed on heparin infusion    started on prednisone for arthritis flare  continue post op care    Await final pathology.   further oncologic recommendations await review of pathology  potential transfer to floor    discussed with patient and family

## 2024-03-04 NOTE — PROGRESS NOTE ADULT - ASSESSMENT
68 year old female with PMHx COPD (not on home oxygen), HTN, RA, anxiety admitted with GI bleed.    cardiac clearance, HTN, PE  - CT chest with acute pulmonary embolus in the right middle lobe lobar and segmental branches  - Pt a/w GIB, s/p colonoscopy 2/20, large partially obstructing mass noted at approximately 13cm from anal verge.   - now s/p Laparoscopic colorectal anastomosis on 2/29/24, pathology positive for adenocarcinoma  - cont heparin gtt, change to po ac with eliquis when no surgical contraindication  - 02 supplementation as needed    - No sign of volume overload on exam   - No severe valvular abnormalities noted on examination  - TTE 2/22/23 shows normal LV and RV size and function, EF 60-65%     - EKG: NSR with nonspecific TWA  - No evidence of any active ischemia   - lipid profile reviewed, eventually start statin    - remains in sr on telemetry  - BP stable and controlled   - restart amlodipine if BP elevated    - Monitor and replete lytes, keep K>4, Mg>2.  - Will continue to follow.

## 2024-03-04 NOTE — PROGRESS NOTE ADULT - SUBJECTIVE AND OBJECTIVE BOX
Patient is a 68y old  Female who presents with a chief complaint of GIB bleed (04 Mar 2024 07:33)      INTERVAL HPI/OVERNIGHT EVENTS: Patient seen and examined at bedside. No new complaints noted. Denies chest pain, palpitations, sob, abdominal pain, n/v/d/c. + BM  today     MEDICATIONS  (STANDING):  amLODIPine   Tablet 10 milliGRAM(s) Oral daily  budesonide 160 MICROgram(s)/formoterol 4.5 MICROgram(s) Inhaler 2 Puff(s) Inhalation two times a day  heparin  Infusion.  Unit(s)/Hr (10 mL/Hr) IV Continuous <Continuous>  influenza  Vaccine (HIGH DOSE) 0.7 milliLiter(s) IntraMuscular once  pantoprazole    Tablet 40 milliGRAM(s) Oral before breakfast  tiotropium 2.5 MICROgram(s) Inhaler 2 Puff(s) Inhalation daily    MEDICATIONS  (PRN):  acetaminophen     Tablet .. 650 milliGRAM(s) Oral every 6 hours PRN Mild Pain (1 - 3), Moderate Pain (4 - 6)  albuterol    90 MICROgram(s) HFA Inhaler 2 Puff(s) Inhalation every 6 hours PRN Shortness of Breath and/or Wheezing  heparin   Injectable 2000 Unit(s) IV Push every 6 hours PRN For aPTT between 40 - 57  heparin   Injectable 4000 Unit(s) IV Push every 6 hours PRN For aPTT less than 40  HYDROmorphone  Injectable 0.5 milliGRAM(s) IV Push every 4 hours PRN Moderate Pain (4 - 6)      Allergies    No Known Allergies    Intolerances        REVIEW OF SYSTEMS:  Per HPI. All other ROS noted negative   Vital Signs Last 24 Hrs  T(C): 37.2 (04 Mar 2024 08:08), Max: 37.5 (03 Mar 2024 16:42)  T(F): 98.9 (04 Mar 2024 08:08), Max: 99.5 (03 Mar 2024 16:42)  HR: 103 (04 Mar 2024 09:00) (78 - 122)  BP: 126/60 (04 Mar 2024 09:00) (119/58 - 160/69)  BP(mean): 87 (04 Mar 2024 09:00) (83 - 105)  RR: 29 (04 Mar 2024 09:00) (14 - 34)  SpO2: 99% (04 Mar 2024 09:00) (93% - 100%)    Parameters below as of 04 Mar 2024 07:15  Patient On (Oxygen Delivery Method): nasal cannula  O2 Flow (L/min): 2      PHYSICAL EXAM:  GENERAL: NAD, awake, alert   HEAD:  Atraumatic, Normocephalic  EYES: EOMI, PERRLA, conjunctiva and sclera clear  ENMT: No tonsillar erythema, exudates, or enlargement; Moist mucous membranes  NECK: Supple, No JVD, Normal thyroid  NERVOUS SYSTEM:  Alert & Oriented X3, no focal motor/sensory deficits noted   CHEST/LUNG: Clear to auscultation bilaterally; No rales, rhonchi, wheezing, or rubs  HEART: Regular rate and rhythm  ABDOMEN: S/p surgery   EXTREMITIES:  2+ Peripheral Pulses, No clubbing, cyanosis, or edema  LYMPH: No lymphadenopathy noted  SKIN: No rashes or lesions    LABS:                        8.6    5.64  )-----------( 315      ( 04 Mar 2024 05:23 )             30.3     04 Mar 2024 05:23    143    |  103    |  5      ----------------------------<  114    3.9     |  36     |  0.50     Ca    9.4        04 Mar 2024 05:23  Phos  2.8       04 Mar 2024 05:23  Mg     1.9       04 Mar 2024 05:23      PTT - ( 04 Mar 2024 05:23 )  PTT:76.3 sec  CAPILLARY BLOOD GLUCOSE        BLOOD CULTURE    RADIOLOGY & ADDITIONAL TESTS:    Imaging Personally Reviewed:  [ ] YES     Consultant(s) Notes Reviewed:      Care Discussed with Consultants/Other Providers:

## 2024-03-04 NOTE — PROGRESS NOTE ADULT - ASSESSMENT
Surg. Att.  Pt. seen on rounds with surgical team. I agree with above note.  She is recovering well from recent surgery. She is comfortable with no discomfort. She has full GI function and tolerates diet.  Would consider switching to oral anticoagulants.

## 2024-03-04 NOTE — PROGRESS NOTE ADULT - ASSESSMENT
69yo F with PMHx COPD (not on home oxygen), HTN, RA, anxiety presenting with chief complaint of blood in stools. Admitted for GI bleed, plan for colonoscopy 2/20.      Problem/Plan - 1:  ·  Problem: Colonic mass. S/p surgery on  02/29/2024 :  lap sigmoid colon resection w/ colorectal anastomosis. Pathology Pending.   -HGB currently stable  - On regular  diet, tolerating   - Hem/Onco following   -IV Heparin drip resumed for PE   -Close monitoring in ICU   -Transfuse prn   -CT chest: No evidence of metastatic disease. Acute pulmonary embolus in the right middle lobe lobar and segmental branches.     Problem/Plan - 2:  ·  Problem: COPD (chronic obstructive pulmonary disease).   ·  Plan: Chronic, recently admitted for COPD exacerbation  - Duonebs q6 PRN  - Continue home inhalers   - Supplemental O2 PRN. At baseline: 4-4.5 L home oxygen intermittently. COPD patient will keep SpO2 goal 88-93%   - Monitor respiratory status   - 3 weeks ago had rsv, recent rvp negative  - Continuous pulse ox  - Pulmonary Dr. Dobson f/u     Problem/Plan - 3:  ·  Problem: HTN (hypertension).   ·  Plan: Chronic, stable  - Resume  home amlodipine 10mg qd with hold parameters  - Monitor hemodynamics  - On CLD, advance as tolerated      Problem/Plan - 4:  ·  Problem: Rheumatoid arthritis.   ·  Plan: - Chronic  - resume prednisone 10mg PO Daily. Takes tramadol 50mg Qd.     Problem/Plan - 5:  ·  Problem: Anxiety.   ·  Plan: - Chronic, SOB likely 2/2 anxiety  - Continue Xanax 0.25mg qhs PRN.     Problem/Plan - 6:  ·  Problem: Pulmonary embolism  ·  Plan: CT chest with No evidence of metastatic disease. Acute pulmonary embolus in the right middle lobe lobar and segmental branches.    - Echo:  Left ventricular systolic function is normal with an ejection fraction visually estimated at 60 to 65 %.    -  heparin drip resumed now. Will eventually transition to oral AC as recommended by Hematology        Problem/Plan - 7:  ·  Problem: Fever/UTI: Resolved   ·  Plan: Blood cx NGTD.  Urine cx +E. coli and enterococcus faecalis.    - Completed course of antibiotic  -ID f/u noted      Problem/Plan - 8:  ·  Problem: Encounter for deep vein thrombosis (DVT) prophylaxis.   ·  Plan: - Heparin Drip resumed

## 2024-03-04 NOTE — PROGRESS NOTE ADULT - ASSESSMENT
68 year old female with COPD on 4L home O2 PRN, HTN, RA, and anxiety who initially presented for BRBPR, subsequently found to have a partially obstructing sigmoid/rectal mass on CT and colonoscopy with pathology revealing adenocarcinoma. Course was further c/b acute PE in the right middle lobe lobar and segmental branches on full anticoagulation with heparin drip prior to OR. Underwent laparoscopic sigmoid colon resection w/ colorectal anastomosis 2/29, extubated post-operatively without complication. Now POD #4    Neuro: Pain control with ofirmev, dilaudid. titrating down on dilaudid.   CV: hemodynamically stable, continue amlodipine for HTN, for PE will transition from heparin gtt to Eliquis with loading dose for anticoagulation  Pulm: Saturating well. Respiratory status stable. Bronchodilators. On 2L NC for comfort   GI: Tolerating regular diet. Surgery and GI follow up.  Renal: Renal function stable.   ID: Completed course of zosyn for UTI. Monitor off abx at this time   Heme: Therapeutic AC for PE  Dispo: full code   68 year old female with COPD on 4L home O2 PRN, HTN, RA, and anxiety who initially presented for BRBPR, subsequently found to have a partially obstructing sigmoid/rectal mass on CT and colonoscopy with pathology revealing adenocarcinoma. Course was further c/b acute PE in the right middle lobe lobar and segmental branches on full anticoagulation with heparin drip prior to OR. Underwent laparoscopic sigmoid colon resection w/ colorectal anastomosis 2/29, extubated post-operatively without complication. Now POD #4    Neuro: Pain control with ofirmev, dilaudid. titrating down on dilaudid.   CV: hemodynamically stable, continue amlodipine for HTN, for PE will transition from heparin gtt to Eliquis with loading dose for anticoagulation  Pulm: Saturating well. Respiratory status stable. Bronchodilators. On 2L NC for comfort   GI: Tolerating regular diet. Surgery and GI follow up  Renal: Renal function stable.   ID: Completed course of zosyn for UTI. Monitor off abx at this time   Heme: Therapeutic AC for PE  Rhem: on prednisone for RA, will f/u with surgery on when to resume  Dispo: full code   68 year old female with COPD on 4L home O2 PRN, HTN, RA, and anxiety who initially presented for BRBPR, subsequently found to have a partially obstructing sigmoid/rectal mass on CT and colonoscopy with pathology revealing adenocarcinoma. Course was further c/b acute PE in the right middle lobe lobar and segmental branches on full anticoagulation with heparin drip prior to OR. Underwent laparoscopic sigmoid colon resection w/ colorectal anastomosis 2/29, extubated post-operatively without complication. Now POD #4    Neuro: Pain control with ofirmev, dilaudid. titrating down on dilaudid.   CV: hemodynamically stable, continue amlodipine for HTN, for PE will transition from heparin gtt to Eliquis with loading dose for anticoagulation  Pulm: Saturating well. Respiratory status stable. Bronchodilators. On 2L NC for comfort   GI: Tolerating regular diet. Surgery and GI follow up  Renal: Renal function stable.   ID: Completed course of zosyn for UTI. Monitor off abx at this time   Heme: Therapeutic AC for PE  Rhem: on prednisone for RA, will f/u with surgery on when to resume  Dispo: full code, d/c to home once cleared by surgery, no skilled PT needs per PT recs   68 year old female with COPD on 4L home O2 PRN, HTN, RA, and anxiety who initially presented for BRBPR, subsequently found to have a partially obstructing sigmoid/rectal mass on CT and colonoscopy with pathology revealing adenocarcinoma. Course was further c/b acute PE in the right middle lobe lobar and segmental branches on full anticoagulation with heparin drip prior to OR. Underwent laparoscopic sigmoid colon resection w/ colorectal anastomosis 2/29, extubated post-operatively without complication. Now POD #4    Neuro: Pain control with ofirmev, dilaudid. titrating down on dilaudid.   CV: hemodynamically stable, continue amlodipine for HTN, for PE will transition from heparin gtt to Eliquis with loading dose for anticoagulation  Pulm: Saturating well. Respiratory status stable. Bronchodilators. On 2L NC for comfort   GI: Tolerating regular diet. Surgery and GI follow up  Renal: Renal function stable.   ID: Completed course of zosyn for UTI. Monitor off abx at this time   Heme: Therapeutic AC for PE  Rhem: on prednisone for RA, per surgery hold for at least 10 days post-op   Dispo: full code, d/c to home once cleared by surgery, no skilled PT needs per PT recs

## 2024-03-04 NOTE — PROGRESS NOTE ADULT - SUBJECTIVE AND OBJECTIVE BOX
[facility]  Bobby Mcdonald PA-C  [address] [place] [address]  [phone]      INTERVAL HPI/OVERNIGHT EVENTS:  Pt s/e in ICU  No new GI events    MEDICATIONS  (STANDING):  amLODIPine   Tablet 10 milliGRAM(s) Oral daily  apixaban 10 milliGRAM(s) Oral every 12 hours  budesonide 160 MICROgram(s)/formoterol 4.5 MICROgram(s) Inhaler 2 Puff(s) Inhalation two times a day  influenza  Vaccine (HIGH DOSE) 0.7 milliLiter(s) IntraMuscular once  pantoprazole    Tablet 40 milliGRAM(s) Oral before breakfast  tiotropium 2.5 MICROgram(s) Inhaler 2 Puff(s) Inhalation daily    MEDICATIONS  (PRN):  acetaminophen     Tablet .. 650 milliGRAM(s) Oral every 6 hours PRN Mild Pain (1 - 3), Moderate Pain (4 - 6)  albuterol    90 MICROgram(s) HFA Inhaler 2 Puff(s) Inhalation every 6 hours PRN Shortness of Breath and/or Wheezing  HYDROmorphone  Injectable 0.5 milliGRAM(s) IV Push every 4 hours PRN Moderate Pain (4 - 6)      Allergies  No Known Allergies      PHYSICAL EXAM:   Vital Signs:  Vital Signs Last 24 Hrs  T(C): 37.2 (04 Mar 2024 11:49), Max: 37.5 (03 Mar 2024 16:42)  T(F): 98.9 (04 Mar 2024 11:49), Max: 99.5 (03 Mar 2024 16:42)  HR: 96 (04 Mar 2024 12:00) (78 - 122)  BP: 119/55 (04 Mar 2024 12:00) (93/57 - 160/69)  BP(mean): 77 (04 Mar 2024 12:00) (72 - 101)  RR: 22 (04 Mar 2024 12:00) (14 - 34)  SpO2: 96% (04 Mar 2024 12:00) (93% - 100%)    Parameters below as of 04 Mar 2024 07:15  Patient On (Oxygen Delivery Method): nasal cannula  O2 Flow (L/min): 2    GENERAL:  Appears stated age  HEENT:  NC/AT  CHEST:  Full & symmetric excursion  HEART:  Regular rhythm  ABDOMEN:  Soft, non-tender, non-distended  EXTEREMITIES:  no cyanosis  SKIN:  No rash  NEURO:  Alert      LABS:                        8.6    5.64  )-----------( 315      ( 04 Mar 2024 05:23 )             30.3     03-04    143  |  103  |  5<L>  ----------------------------<  114<H>  3.9   |  36<H>  |  0.50    Ca    9.4      04 Mar 2024 05:23  Phos  2.8     03-04  Mg     1.9     03-04      PTT - ( 04 Mar 2024 05:23 )  PTT:76.3 sec  Urinalysis Basic - ( 04 Mar 2024 05:23 )    Color: x / Appearance: x / SG: x / pH: x  Gluc: 114 mg/dL / Ketone: x  / Bili: x / Urobili: x   Blood: x / Protein: x / Nitrite: x   Leuk Esterase: x / RBC: x / WBC x   Sq Epi: x / Non Sq Epi: x / Bacteria: x

## 2024-03-04 NOTE — PROGRESS NOTE ADULT - SUBJECTIVE AND OBJECTIVE BOX
[INTERVAL HX: ]  Patient seen and examined;  Chart reviewed and events noted;     [MEDICATIONS]  MEDICATIONS  (STANDING):  amLODIPine   Tablet 10 milliGRAM(s) Oral daily  apixaban 10 milliGRAM(s) Oral every 12 hours  budesonide 160 MICROgram(s)/formoterol 4.5 MICROgram(s) Inhaler 2 Puff(s) Inhalation two times a day  influenza  Vaccine (HIGH DOSE) 0.7 milliLiter(s) IntraMuscular once  pantoprazole    Tablet 40 milliGRAM(s) Oral before breakfast  tiotropium 2.5 MICROgram(s) Inhaler 2 Puff(s) Inhalation daily    MEDICATIONS  (PRN):  acetaminophen     Tablet .. 650 milliGRAM(s) Oral every 6 hours PRN Mild Pain (1 - 3), Moderate Pain (4 - 6)  albuterol    90 MICROgram(s) HFA Inhaler 2 Puff(s) Inhalation every 6 hours PRN Shortness of Breath and/or Wheezing  HYDROmorphone  Injectable 0.5 milliGRAM(s) IV Push every 4 hours PRN Moderate Pain (4 - 6)      [VITALS]  Vital Signs Last 24 Hrs  T(C): 37.5 (04 Mar 2024 19:49), Max: 37.5 (04 Mar 2024 19:49)  T(F): 99.5 (04 Mar 2024 19:49), Max: 99.5 (04 Mar 2024 19:49)  HR: 104 (04 Mar 2024 20:00) (78 - 122)  BP: 135/61 (04 Mar 2024 20:00) (93/57 - 145/70)  BP(mean): 86 (04 Mar 2024 20:00) (72 - 101)  RR: 35 (04 Mar 2024 20:00) (14 - 41)  SpO2: 98% (04 Mar 2024 20:00) (93% - 100%)    Parameters below as of 04 Mar 2024 20:00  Patient On (Oxygen Delivery Method): nasal cannula  O2 Flow (L/min): 2    [WT/HT]  Daily     Daily   [VENT]      [PHYSICAL EXAM]  GEN: NAD  HEENT: normocephalic and atraumatic. EOMI. PERRL.    NECK: Supple.  No lymphadenopathy   LUNGS: Clear to auscultation.  HEART: Regular rate and rhythm,  no MRG  ABDOMEN: Soft, nontender, and nondistended.  Positive bowel sounds.    : No CVA tenderness  EXTREMITIES: Without edema.  NEUROLOGIC: grossly intact.  PSYCHIATRIC: Appropriate affect .  SKIN: No rash     [LABS:]                        8.6    5.64  )-----------( 315      ( 04 Mar 2024 05:23 )             30.3     03-04    143  |  103  |  5<L>  ----------------------------<  114<H>  3.9   |  36<H>  |  0.50    Ca    9.4      04 Mar 2024 05:23  Phos  2.8     03-04  Mg     1.9     03-04      PTT - ( 04 Mar 2024 05:23 )  PTT:76.3 sec      Reticulocyte Count (02-17-24 @ 06:45)  Reticulocyte Percent: 2.5 % [0.5 - 2.5]  Absolute Reticulocytes: 106.8 K/uL [25.0 - 125.0]    Iron - Total Binding Capacity.: 278 ug/dL [220 - 430] (02-16-24 @ 06:25)    Ferritin: 32 ng/mL [13 - 330] (02-16-24 @ 06:25)      Urinalysis Basic - ( 04 Mar 2024 05:23 )    Color: x / Appearance: x / SG: x / pH: x  Gluc: 114 mg/dL / Ketone: x  / Bili: x / Urobili: x   Blood: x / Protein: x / Nitrite: x   Leuk Esterase: x / RBC: x / WBC x   Sq Epi: x / Non Sq Epi: x / Bacteria: x        SARS-CoV-2: NotDetec (24 Feb 2024 14:17)  SARS-CoV-2: NotDetec (16 Feb 2024 01:10)  SARS-CoV-2: NotDetec (18 Jan 2024 10:48)          [RADIOLOGY STUDIES:]

## 2024-03-04 NOTE — PROGRESS NOTE ADULT - SUBJECTIVE AND OBJECTIVE BOX
[facility] - Alexa Ramirez, Sarthak, Arnel, Gordon Collazo  Office Number:  [phone]    Patient resting comfortably in bed in NAD.  Laying flat with no respiratory distress.  No complaints of chest pain, dyspnea, palpitations, PND, or orthopnea.    ROS: negative unless otherwise mentioned.    Telemetry:  sr    MEDICATIONS  (STANDING):  amLODIPine   Tablet 10 milliGRAM(s) Oral daily  apixaban 10 milliGRAM(s) Oral every 12 hours  budesonide 160 MICROgram(s)/formoterol 4.5 MICROgram(s) Inhaler 2 Puff(s) Inhalation two times a day  influenza  Vaccine (HIGH DOSE) 0.7 milliLiter(s) IntraMuscular once  pantoprazole    Tablet 40 milliGRAM(s) Oral before breakfast  tiotropium 2.5 MICROgram(s) Inhaler 2 Puff(s) Inhalation daily    MEDICATIONS  (PRN):  acetaminophen     Tablet .. 650 milliGRAM(s) Oral every 6 hours PRN Mild Pain (1 - 3), Moderate Pain (4 - 6)  albuterol    90 MICROgram(s) HFA Inhaler 2 Puff(s) Inhalation every 6 hours PRN Shortness of Breath and/or Wheezing  HYDROmorphone  Injectable 0.5 milliGRAM(s) IV Push every 4 hours PRN Moderate Pain (4 - 6)      Allergies    No Known Allergies    Intolerances        Vital Signs Last 24 Hrs  T(C): 37.2 (04 Mar 2024 11:49), Max: 37.5 (03 Mar 2024 16:42)  T(F): 98.9 (04 Mar 2024 11:49), Max: 99.5 (03 Mar 2024 16:42)  HR: 96 (04 Mar 2024 12:00) (78 - 122)  BP: 119/55 (04 Mar 2024 12:00) (93/57 - 160/69)  BP(mean): 77 (04 Mar 2024 12:00) (72 - 101)  RR: 22 (04 Mar 2024 12:00) (14 - 34)  SpO2: 96% (04 Mar 2024 12:00) (93% - 100%)    Parameters below as of 04 Mar 2024 07:15  Patient On (Oxygen Delivery Method): nasal cannula  O2 Flow (L/min): 2      I&O's Summary    03 Mar 2024 07:01  -  04 Mar 2024 07:00  --------------------------------------------------------  IN: 1800 mL / OUT: 1050 mL / NET: 750 mL    04 Mar 2024 07:01  -  04 Mar 2024 12:21  --------------------------------------------------------  IN: 20 mL / OUT: 300 mL / NET: -280 mL        ON EXAM:    Constitutional: NAD, awake and alert, well-developed  HEENT: Moist Mucous Membranes, Anicteric  Pulmonary: Non-labored, breath sounds are clear bilaterally, No wheezing, rales or rhonchi  Cardiovascular: Regular, S1 and S2, No murmurs, rubs, gallops or clicks  Gastrointestinal: Bowel Sounds present, soft, nontender.   Lymph: No peripheral edema. No lymphadenopathy.  Skin: No visible rashes or ulcers.  Psych:  Mood & affect appropriate  LABS: All Labs Reviewed:                        8.6    5.64  )-----------( 315      ( 04 Mar 2024 05:23 )             30.3                         8.3    6.62  )-----------( 323      ( 03 Mar 2024 05:41 )             29.1                         8.2    7.51  )-----------( 323      ( 02 Mar 2024 23:37 )             28.5     04 Mar 2024 05:23    143    |  103    |  5      ----------------------------<  114    3.9     |  36     |  0.50   03 Mar 2024 05:41    145    |  104    |  8      ----------------------------<  103    4.0     |  36     |  0.62   02 Mar 2024 05:35    144    |  104    |  8      ----------------------------<  91     3.7     |  37     |  0.64     Ca    9.4        04 Mar 2024 05:23  Ca    9.3        03 Mar 2024 05:41  Ca    9.7        02 Mar 2024 05:35  Phos  2.8       04 Mar 2024 05:23  Phos  2.4       03 Mar 2024 05:41  Phos  3.3       02 Mar 2024 05:35  Mg     1.9       04 Mar 2024 05:23  Mg     2.0       03 Mar 2024 05:41  Mg     1.8       02 Mar 2024 05:35    TPro  5.7    /  Alb  2.3    /  TBili  0.3    /  DBili  x      /  AST  8      /  ALT  14     /  AlkPhos  40     02 Mar 2024 05:35    PTT - ( 04 Mar 2024 05:23 )  PTT:76.3 sec      Blood Culture:

## 2024-03-04 NOTE — PROGRESS NOTE ADULT - SUBJECTIVE AND OBJECTIVE BOX
Patient is a 68y old  Female who presents with a chief complaint of GIB bleed (04 Mar 2024 12:34)      INTERVAL HPI/OVERNIGHT EVENTS:    continues to do well post-op    MEDICATIONS  (STANDING):  amLODIPine   Tablet 10 milliGRAM(s) Oral daily  apixaban 10 milliGRAM(s) Oral every 12 hours  budesonide 160 MICROgram(s)/formoterol 4.5 MICROgram(s) Inhaler 2 Puff(s) Inhalation two times a day  influenza  Vaccine (HIGH DOSE) 0.7 milliLiter(s) IntraMuscular once  pantoprazole    Tablet 40 milliGRAM(s) Oral before breakfast  tiotropium 2.5 MICROgram(s) Inhaler 2 Puff(s) Inhalation daily      MEDICATIONS  (PRN):  acetaminophen     Tablet .. 650 milliGRAM(s) Oral every 6 hours PRN Mild Pain (1 - 3), Moderate Pain (4 - 6)  albuterol    90 MICROgram(s) HFA Inhaler 2 Puff(s) Inhalation every 6 hours PRN Shortness of Breath and/or Wheezing  HYDROmorphone  Injectable 0.5 milliGRAM(s) IV Push every 4 hours PRN Moderate Pain (4 - 6)      Allergies    No Known Allergies    Intolerances        PAST MEDICAL & SURGICAL HISTORY:  COPD (chronic obstructive pulmonary disease)      Rheumatoid arthritis      HTN (hypertension)      Anxiety      No significant past surgical history          Vital Signs Last 24 Hrs  T(C): 37.5 (04 Mar 2024 19:49), Max: 37.5 (04 Mar 2024 19:49)  T(F): 99.5 (04 Mar 2024 19:49), Max: 99.5 (04 Mar 2024 19:49)  HR: 104 (04 Mar 2024 20:00) (78 - 122)  BP: 135/61 (04 Mar 2024 20:00) (93/57 - 145/70)  BP(mean): 86 (04 Mar 2024 20:00) (72 - 101)  RR: 35 (04 Mar 2024 20:00) (14 - 41)  SpO2: 98% (04 Mar 2024 20:00) (93% - 100%)    Parameters below as of 04 Mar 2024 20:00  Patient On (Oxygen Delivery Method): nasal cannula  O2 Flow (L/min): 2      PHYSICAL EXAMINATION:    GENERAL: The patient is awake and alert in no apparent distress.     HEENT: Head is normocephalic and atraumatic.    NECK: no JVD    LUNGS: Clear to auscultation without wheezing, rales or rhonchi; respirations unlabored    HEART: Regular rate and rhythm without murmur.    ABDOMEN: Soft, nontender, and nondistended.      EXTREMITIES: Without any cyanosis, clubbing, rash, lesions or edema.    NEUROLOGIC: Grossly intact.    SKIN: No ulceration or induration present.      LABS:                        8.6    5.64  )-----------( 315      ( 04 Mar 2024 05:23 )             30.3     03-04    143  |  103  |  5<L>  ----------------------------<  114<H>  3.9   |  36<H>  |  0.50    Ca    9.4      04 Mar 2024 05:23  Phos  2.8     03-04  Mg     1.9     03-04      PTT - ( 04 Mar 2024 05:23 )  PTT:76.3 sec  Urinalysis Basic - ( 04 Mar 2024 05:23 )    Color: x / Appearance: x / SG: x / pH: x  Gluc: 114 mg/dL / Ketone: x  / Bili: x / Urobili: x   Blood: x / Protein: x / Nitrite: x   Leuk Esterase: x / RBC: x / WBC x   Sq Epi: x / Non Sq Epi: x / Bacteria: x            Assessment:    COPD - stable  S/P Pulmonary emboli  Acute Hypoxic respiratory failure  S/P colon resection for adenocarcinoma    Plan:    Supplemental oxygen  Incentive Spirometry  Monitor pulse oximetry  Anticoagulation  Symbicort + Spiriva inhalers

## 2024-03-05 LAB
ALBUMIN SERPL ELPH-MCNC: 2.5 G/DL — LOW (ref 3.3–5)
ALP SERPL-CCNC: 42 U/L — SIGNIFICANT CHANGE UP (ref 40–120)
ALT FLD-CCNC: 11 U/L — LOW (ref 12–78)
ANION GAP SERPL CALC-SCNC: 4 MMOL/L — LOW (ref 5–17)
APTT BLD: 40.9 SEC — HIGH (ref 24.5–35.6)
APTT BLD: 44.9 SEC — HIGH (ref 24.5–35.6)
APTT BLD: 47.1 SEC — HIGH (ref 24.5–35.6)
AST SERPL-CCNC: 9 U/L — LOW (ref 15–37)
BASOPHILS # BLD AUTO: 0.02 K/UL — SIGNIFICANT CHANGE UP (ref 0–0.2)
BASOPHILS NFR BLD AUTO: 0.4 % — SIGNIFICANT CHANGE UP (ref 0–2)
BILIRUB SERPL-MCNC: 0.4 MG/DL — SIGNIFICANT CHANGE UP (ref 0.2–1.2)
BUN SERPL-MCNC: 5 MG/DL — LOW (ref 7–23)
CALCIUM SERPL-MCNC: 9.4 MG/DL — SIGNIFICANT CHANGE UP (ref 8.5–10.1)
CHLORIDE SERPL-SCNC: 103 MMOL/L — SIGNIFICANT CHANGE UP (ref 96–108)
CO2 SERPL-SCNC: 37 MMOL/L — HIGH (ref 22–31)
CREAT SERPL-MCNC: 0.61 MG/DL — SIGNIFICANT CHANGE UP (ref 0.5–1.3)
EGFR: 98 ML/MIN/1.73M2 — SIGNIFICANT CHANGE UP
EOSINOPHIL # BLD AUTO: 0.11 K/UL — SIGNIFICANT CHANGE UP (ref 0–0.5)
EOSINOPHIL NFR BLD AUTO: 2.1 % — SIGNIFICANT CHANGE UP (ref 0–6)
GLUCOSE SERPL-MCNC: 124 MG/DL — HIGH (ref 70–99)
HCT VFR BLD CALC: 28.2 % — LOW (ref 34.5–45)
HGB BLD-MCNC: 8 G/DL — LOW (ref 11.5–15.5)
IMM GRANULOCYTES NFR BLD AUTO: 1.3 % — HIGH (ref 0–0.9)
INR BLD: 2.33 RATIO — HIGH (ref 0.85–1.18)
LYMPHOCYTES # BLD AUTO: 1.17 K/UL — SIGNIFICANT CHANGE UP (ref 1–3.3)
LYMPHOCYTES # BLD AUTO: 22.4 % — SIGNIFICANT CHANGE UP (ref 13–44)
MAGNESIUM SERPL-MCNC: 1.8 MG/DL — SIGNIFICANT CHANGE UP (ref 1.6–2.6)
MCHC RBC-ENTMCNC: 23.1 PG — LOW (ref 27–34)
MCHC RBC-ENTMCNC: 28.4 GM/DL — LOW (ref 32–36)
MCV RBC AUTO: 81.5 FL — SIGNIFICANT CHANGE UP (ref 80–100)
MONOCYTES # BLD AUTO: 0.62 K/UL — SIGNIFICANT CHANGE UP (ref 0–0.9)
MONOCYTES NFR BLD AUTO: 11.9 % — SIGNIFICANT CHANGE UP (ref 2–14)
NEUTROPHILS # BLD AUTO: 3.23 K/UL — SIGNIFICANT CHANGE UP (ref 1.8–7.4)
NEUTROPHILS NFR BLD AUTO: 61.9 % — SIGNIFICANT CHANGE UP (ref 43–77)
NRBC # BLD: 0 /100 WBCS — SIGNIFICANT CHANGE UP (ref 0–0)
PHOSPHATE SERPL-MCNC: 2.6 MG/DL — SIGNIFICANT CHANGE UP (ref 2.5–4.5)
PLATELET # BLD AUTO: 311 K/UL — SIGNIFICANT CHANGE UP (ref 150–400)
POTASSIUM SERPL-MCNC: 3.8 MMOL/L — SIGNIFICANT CHANGE UP (ref 3.5–5.3)
POTASSIUM SERPL-SCNC: 3.8 MMOL/L — SIGNIFICANT CHANGE UP (ref 3.5–5.3)
PROT SERPL-MCNC: 5.6 G/DL — LOW (ref 6–8.3)
PROTHROM AB SERPL-ACNC: 26.6 SEC — HIGH (ref 9.5–13)
RBC # BLD: 3.46 M/UL — LOW (ref 3.8–5.2)
RBC # FLD: 22.1 % — HIGH (ref 10.3–14.5)
SODIUM SERPL-SCNC: 144 MMOL/L — SIGNIFICANT CHANGE UP (ref 135–145)
WBC # BLD: 5.22 K/UL — SIGNIFICANT CHANGE UP (ref 3.8–10.5)
WBC # FLD AUTO: 5.22 K/UL — SIGNIFICANT CHANGE UP (ref 3.8–10.5)

## 2024-03-05 PROCEDURE — 99232 SBSQ HOSP IP/OBS MODERATE 35: CPT

## 2024-03-05 PROCEDURE — 99233 SBSQ HOSP IP/OBS HIGH 50: CPT

## 2024-03-05 RX ORDER — WARFARIN SODIUM 2.5 MG/1
5 TABLET ORAL AT BEDTIME
Refills: 0 | Status: COMPLETED | OUTPATIENT
Start: 2024-03-05 | End: 2024-03-05

## 2024-03-05 RX ORDER — HEPARIN SODIUM 5000 [USP'U]/ML
INJECTION INTRAVENOUS; SUBCUTANEOUS
Qty: 25000 | Refills: 0 | Status: DISCONTINUED | OUTPATIENT
Start: 2024-03-05 | End: 2024-03-06

## 2024-03-05 RX ORDER — WARFARIN SODIUM 2.5 MG/1
5 TABLET ORAL AT BEDTIME
Refills: 0 | Status: DISCONTINUED | OUTPATIENT
Start: 2024-03-05 | End: 2024-03-05

## 2024-03-05 RX ORDER — ALPRAZOLAM 0.25 MG
0.25 TABLET ORAL DAILY
Refills: 0 | Status: DISCONTINUED | OUTPATIENT
Start: 2024-03-05 | End: 2024-03-07

## 2024-03-05 RX ORDER — HEPARIN SODIUM 5000 [USP'U]/ML
2000 INJECTION INTRAVENOUS; SUBCUTANEOUS EVERY 6 HOURS
Refills: 0 | Status: DISCONTINUED | OUTPATIENT
Start: 2024-03-05 | End: 2024-03-06

## 2024-03-05 RX ORDER — HEPARIN SODIUM 5000 [USP'U]/ML
INJECTION INTRAVENOUS; SUBCUTANEOUS
Qty: 25000 | Refills: 0 | Status: DISCONTINUED | OUTPATIENT
Start: 2024-03-05 | End: 2024-03-05

## 2024-03-05 RX ORDER — WARFARIN SODIUM 2.5 MG/1
2.5 TABLET ORAL ONCE
Refills: 0 | Status: DISCONTINUED | OUTPATIENT
Start: 2024-03-05 | End: 2024-03-05

## 2024-03-05 RX ORDER — HEPARIN SODIUM 5000 [USP'U]/ML
4000 INJECTION INTRAVENOUS; SUBCUTANEOUS EVERY 6 HOURS
Refills: 0 | Status: DISCONTINUED | OUTPATIENT
Start: 2024-03-05 | End: 2024-03-05

## 2024-03-05 RX ORDER — HEPARIN SODIUM 5000 [USP'U]/ML
4000 INJECTION INTRAVENOUS; SUBCUTANEOUS EVERY 6 HOURS
Refills: 0 | Status: DISCONTINUED | OUTPATIENT
Start: 2024-03-05 | End: 2024-03-06

## 2024-03-05 RX ORDER — HEPARIN SODIUM 5000 [USP'U]/ML
2000 INJECTION INTRAVENOUS; SUBCUTANEOUS EVERY 6 HOURS
Refills: 0 | Status: DISCONTINUED | OUTPATIENT
Start: 2024-03-05 | End: 2024-03-05

## 2024-03-05 RX ORDER — HEPARIN SODIUM 5000 [USP'U]/ML
4000 INJECTION INTRAVENOUS; SUBCUTANEOUS ONCE
Refills: 0 | Status: DISCONTINUED | OUTPATIENT
Start: 2024-03-05 | End: 2024-03-05

## 2024-03-05 RX ADMIN — HYDROMORPHONE HYDROCHLORIDE 0.5 MILLIGRAM(S): 2 INJECTION INTRAMUSCULAR; INTRAVENOUS; SUBCUTANEOUS at 06:48

## 2024-03-05 RX ADMIN — WARFARIN SODIUM 5 MILLIGRAM(S): 2.5 TABLET ORAL at 22:01

## 2024-03-05 RX ADMIN — BUDESONIDE AND FORMOTEROL FUMARATE DIHYDRATE 2 PUFF(S): 160; 4.5 AEROSOL RESPIRATORY (INHALATION) at 19:05

## 2024-03-05 RX ADMIN — Medication 0.25 MILLIGRAM(S): at 15:15

## 2024-03-05 RX ADMIN — TIOTROPIUM BROMIDE 2 PUFF(S): 18 CAPSULE ORAL; RESPIRATORY (INHALATION) at 05:27

## 2024-03-05 RX ADMIN — HYDROMORPHONE HYDROCHLORIDE 0.5 MILLIGRAM(S): 2 INJECTION INTRAMUSCULAR; INTRAVENOUS; SUBCUTANEOUS at 22:31

## 2024-03-05 RX ADMIN — APIXABAN 10 MILLIGRAM(S): 2.5 TABLET, FILM COATED ORAL at 09:27

## 2024-03-05 RX ADMIN — PANTOPRAZOLE SODIUM 40 MILLIGRAM(S): 20 TABLET, DELAYED RELEASE ORAL at 05:25

## 2024-03-05 RX ADMIN — BUDESONIDE AND FORMOTEROL FUMARATE DIHYDRATE 2 PUFF(S): 160; 4.5 AEROSOL RESPIRATORY (INHALATION) at 05:29

## 2024-03-05 RX ADMIN — HYDROMORPHONE HYDROCHLORIDE 0.5 MILLIGRAM(S): 2 INJECTION INTRAMUSCULAR; INTRAVENOUS; SUBCUTANEOUS at 05:48

## 2024-03-05 RX ADMIN — HYDROMORPHONE HYDROCHLORIDE 0.5 MILLIGRAM(S): 2 INJECTION INTRAMUSCULAR; INTRAVENOUS; SUBCUTANEOUS at 22:01

## 2024-03-05 RX ADMIN — HEPARIN SODIUM 1000 UNIT(S)/HR: 5000 INJECTION INTRAVENOUS; SUBCUTANEOUS at 21:56

## 2024-03-05 RX ADMIN — AMLODIPINE BESYLATE 10 MILLIGRAM(S): 2.5 TABLET ORAL at 05:25

## 2024-03-05 NOTE — PROGRESS NOTE ADULT - ASSESSMENT
68 year old female with PMHx COPD (not on home oxygen), HTN, RA, anxiety admitted with GI bleed.    cardiac clearance, HTN, PE  - CT chest with acute pulmonary embolus in the right middle lobe lobar and segmental branches  - Pt a/w GIB, s/p colonoscopy 2/20, large partially obstructing mass noted at approximately 13cm from anal verge.   - now s/p Laparoscopic colorectal anastomosis on 2/29/24, pathology positive for adenocarcinoma  - -fu hem onc recs  -unable to afford eliquis Dw  primary to strart heparin- would recommend Lovenox or Xarelto if able   - 02 supplementation as needed    - No sign of volume overload on exam   - No severe valvular abnormalities noted on examination  - TTE 2/22/23 shows normal LV and RV size and function, EF 60-65%     - EKG: NSR with nonspecific TWA  - lipid profile reviewed start statin     -bp stable back on home norvasc     - Monitor and replete lytes, keep K>4, Mg>2.  - Will continue to follow.   68 year old female with PMHx COPD (not on home oxygen), HTN, RA, anxiety admitted with GI bleed.    cardiac clearance, HTN, PE  - CT chest with acute pulmonary embolus in the right middle lobe lobar and segmental branches  - Pt a/w GIB, s/p colonoscopy 2/20, large partially obstructing mass noted at approximately 13cm from anal verge.   - now s/p Laparoscopic colorectal anastomosis on 2/29/24, pathology positive for adenocarcinoma  - -fu hem onc recs  -unable to afford eliquis Dw  primary to start heparin- would recommend Lovenox or Xarelto if able   - 02 supplementation as needed    - No sign of volume overload on exam   - No severe valvular abnormalities noted on examination  - TTE 2/22/23 shows normal LV and RV size and function, EF 60-65%     - EKG: NSR with nonspecific TWA  - lipid profile reviewed start statin     -bp stable back on home norvasc     - Monitor and replete lytes, keep K>4, Mg>2.  - Will continue to follow.

## 2024-03-05 NOTE — PROGRESS NOTE ADULT - SUBJECTIVE AND OBJECTIVE BOX
INTERVAL HPI/OVERNIGHT EVENTS:  TAURUS WATSON    STATUS POST:  Laparoscopic sigmoid resesction  POST OPERATIVE DAY #: 5    SUBJECTIVE:  Patient examined bedside observed resting comfortably. No new complaints. Patient states that she does not have insurance and is not able to pay for Eliquis after discharge.     Denies fever, chills, cough, chest pain, palpitations, SOB    MEDICATIONS  (STANDING):  amLODIPine   Tablet 10 milliGRAM(s) Oral daily  apixaban 10 milliGRAM(s) Oral every 12 hours  budesonide 160 MICROgram(s)/formoterol 4.5 MICROgram(s) Inhaler 2 Puff(s) Inhalation two times a day  influenza  Vaccine (HIGH DOSE) 0.7 milliLiter(s) IntraMuscular once  pantoprazole    Tablet 40 milliGRAM(s) Oral before breakfast  tiotropium 2.5 MICROgram(s) Inhaler 2 Puff(s) Inhalation daily    MEDICATIONS  (PRN):  acetaminophen     Tablet .. 650 milliGRAM(s) Oral every 6 hours PRN Mild Pain (1 - 3), Moderate Pain (4 - 6)  albuterol    90 MICROgram(s) HFA Inhaler 2 Puff(s) Inhalation every 6 hours PRN Shortness of Breath and/or Wheezing  HYDROmorphone  Injectable 0.5 milliGRAM(s) IV Push every 4 hours PRN Moderate Pain (4 - 6)      Vital Signs Last 24 Hrs  T(C): 36.8 (05 Mar 2024 04:13), Max: 37.5 (04 Mar 2024 19:49)  T(F): 98.2 (05 Mar 2024 04:13), Max: 99.5 (04 Mar 2024 19:49)  HR: 94 (05 Mar 2024 04:13) (84 - 107)  BP: 137/69 (05 Mar 2024 04:13) (93/57 - 139/64)  BP(mean): 86 (04 Mar 2024 20:00) (72 - 96)  RR: 18 (05 Mar 2024 04:13) (18 - 41)  SpO2: 98% (05 Mar 2024 04:13) (94% - 100%)    Parameters below as of 05 Mar 2024 04:13  Patient On (Oxygen Delivery Method): nasal cannula  O2 Flow (L/min): 2      PHYSICAL EXAM:  GENERAL: NAD, lying in bed comfortably  HEAD:  Atraumatic, Normocephalic  EYES: EOMI, PERRLA, conjunctiva and sclera clear  CHEST/LUNG: Normal work of breathing on room air. No dyspnea  HEART: Regular rate and rhythm. No tachycardia  ABDOMEN: Soft, Nontender, Nondistended. No rebound, no guarding, no signs of peritonitis. Incisions c/d/i  EXTREMITIES:  Calves nonswollen nontender. ROM grossly intact  NERVOUS SYSTEM:  Alert & Oriented X3, speech clear. No deficits     I&O's Detail    04 Mar 2024 07:01  -  05 Mar 2024 07:00  --------------------------------------------------------  IN:    Heparin Infusion: 20 mL    Oral Fluid: 240 mL  Total IN: 260 mL    OUT:    Voided (mL): 400 mL  Total OUT: 400 mL    Total NET: -140 mL          LABS:                        8.0    5.22  )-----------( 311      ( 05 Mar 2024 06:00 )             28.2     03-05    144  |  103  |  5<L>  ----------------------------<  124<H>  3.8   |  37<H>  |  0.61    Ca    9.4      05 Mar 2024 06:00  Phos  2.6     03-05  Mg     1.8     03-05    TPro  5.6<L>  /  Alb  2.5<L>  /  TBili  0.4  /  DBili  x   /  AST  9<L>  /  ALT  11<L>  /  AlkPhos  42  03-05    PTT - ( 05 Mar 2024 06:00 )  PTT:40.9 sec  Urinalysis Basic - ( 05 Mar 2024 06:00 )    Color: x / Appearance: x / SG: x / pH: x  Gluc: 124 mg/dL / Ketone: x  / Bili: x / Urobili: x   Blood: x / Protein: x / Nitrite: x   Leuk Esterase: x / RBC: x / WBC x   Sq Epi: x / Non Sq Epi: x / Bacteria: x        RADIOLOGY & ADDITIONAL STUDIES:    ASSESSMENT AND PLAN:  67 y/o female w/ PMHx of COPD (on 4-4.5 L home oxygen intermittently), HTN, RA, anxiety presenting w/ bloody stools, found to have 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   POD#5 lap sigmoid colon resection w/ colorectal anastomosis.   Recovering well, afebrile, hemodynamically stable. Having bowel function and tolerating diet.   Transitioned to eliquis on 3/4 but patient is not able to afford it. Downgraded to floor     - Continue regular diet  - Recommend alternate AC that is more affordable for patient  - OOB to chair with assistance  - Pain control PRN  - Incentive spirometry    D/W Dr. Rocha INTERVAL HPI/OVERNIGHT EVENTS:  TAURUS WATSON    STATUS POST:  Laparoscopic sigmoid resesction  POST OPERATIVE DAY #: 5    SUBJECTIVE:  Patient examined bedside observed resting comfortably. No new complaints. Patient states that she does not have insurance and is not able to pay for Eliquis after discharge.     Denies fever, chills, cough, chest pain, palpitations, SOB    MEDICATIONS  (STANDING):  amLODIPine   Tablet 10 milliGRAM(s) Oral daily  apixaban 10 milliGRAM(s) Oral every 12 hours  budesonide 160 MICROgram(s)/formoterol 4.5 MICROgram(s) Inhaler 2 Puff(s) Inhalation two times a day  influenza  Vaccine (HIGH DOSE) 0.7 milliLiter(s) IntraMuscular once  pantoprazole    Tablet 40 milliGRAM(s) Oral before breakfast  tiotropium 2.5 MICROgram(s) Inhaler 2 Puff(s) Inhalation daily    MEDICATIONS  (PRN):  acetaminophen     Tablet .. 650 milliGRAM(s) Oral every 6 hours PRN Mild Pain (1 - 3), Moderate Pain (4 - 6)  albuterol    90 MICROgram(s) HFA Inhaler 2 Puff(s) Inhalation every 6 hours PRN Shortness of Breath and/or Wheezing  HYDROmorphone  Injectable 0.5 milliGRAM(s) IV Push every 4 hours PRN Moderate Pain (4 - 6)      Vital Signs Last 24 Hrs  T(C): 36.8 (05 Mar 2024 04:13), Max: 37.5 (04 Mar 2024 19:49)  T(F): 98.2 (05 Mar 2024 04:13), Max: 99.5 (04 Mar 2024 19:49)  HR: 94 (05 Mar 2024 04:13) (84 - 107)  BP: 137/69 (05 Mar 2024 04:13) (93/57 - 139/64)  BP(mean): 86 (04 Mar 2024 20:00) (72 - 96)  RR: 18 (05 Mar 2024 04:13) (18 - 41)  SpO2: 98% (05 Mar 2024 04:13) (94% - 100%)    Parameters below as of 05 Mar 2024 04:13  Patient On (Oxygen Delivery Method): nasal cannula  O2 Flow (L/min): 2      PHYSICAL EXAM:  GENERAL: NAD, lying in bed comfortably  HEAD:  Atraumatic, Normocephalic  EYES: EOMI, PERRLA, conjunctiva and sclera clear  CHEST/LUNG: Normal work of breathing on room air. No dyspnea  HEART: Regular rate and rhythm. No tachycardia  ABDOMEN: Soft, Nontender, Nondistended. No rebound, no guarding, no signs of peritonitis. Incisions c/d/i  EXTREMITIES:  Calves nonswollen nontender. ROM grossly intact  NERVOUS SYSTEM:  Alert & Oriented X3, speech clear. No deficits     I&O's Detail    04 Mar 2024 07:01  -  05 Mar 2024 07:00  --------------------------------------------------------  IN:    Heparin Infusion: 20 mL    Oral Fluid: 240 mL  Total IN: 260 mL    OUT:    Voided (mL): 400 mL  Total OUT: 400 mL    Total NET: -140 mL          LABS:                        8.0    5.22  )-----------( 311      ( 05 Mar 2024 06:00 )             28.2     03-05    144  |  103  |  5<L>  ----------------------------<  124<H>  3.8   |  37<H>  |  0.61    Ca    9.4      05 Mar 2024 06:00  Phos  2.6     03-05  Mg     1.8     03-05    TPro  5.6<L>  /  Alb  2.5<L>  /  TBili  0.4  /  DBili  x   /  AST  9<L>  /  ALT  11<L>  /  AlkPhos  42  03-05    PTT - ( 05 Mar 2024 06:00 )  PTT:40.9 sec  Urinalysis Basic - ( 05 Mar 2024 06:00 )    Color: x / Appearance: x / SG: x / pH: x  Gluc: 124 mg/dL / Ketone: x  / Bili: x / Urobili: x   Blood: x / Protein: x / Nitrite: x   Leuk Esterase: x / RBC: x / WBC x   Sq Epi: x / Non Sq Epi: x / Bacteria: x        RADIOLOGY & ADDITIONAL STUDIES:    ASSESSMENT AND PLAN:  69 y/o female w/ PMHx of COPD (on 4-4.5 L home oxygen intermittently), HTN, RA, anxiety presenting w/ bloody stools, found to have 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   POD#5 lap sigmoid colon resection w/ colorectal anastomosis.   Recovering well, afebrile, hemodynamically stable. Having bowel function and tolerating diet.   Transitioned to eliquis on 3/4 but patient is not able to afford it. Downgraded to floor     - Continue regular diet  - Discuss alternate anticoagulation with heme/onc and primary team  - OOB to chair with assistance  - Pain control PRN  - Incentive spirometry    D/W Dr. Rocha INTERVAL HPI/OVERNIGHT EVENTS:  TAURUS WATSON    STATUS POST:  Laparoscopic sigmoid resesction  POST OPERATIVE DAY #: 5    SUBJECTIVE:  Patient examined bedside observed resting comfortably. No new complaints. Patient states that she does not have insurance and is not able to pay for Eliquis after discharge.     Denies fever, chills, cough, chest pain, palpitations, SOB    MEDICATIONS  (STANDING):  amLODIPine   Tablet 10 milliGRAM(s) Oral daily  apixaban 10 milliGRAM(s) Oral every 12 hours  budesonide 160 MICROgram(s)/formoterol 4.5 MICROgram(s) Inhaler 2 Puff(s) Inhalation two times a day  influenza  Vaccine (HIGH DOSE) 0.7 milliLiter(s) IntraMuscular once  pantoprazole    Tablet 40 milliGRAM(s) Oral before breakfast  tiotropium 2.5 MICROgram(s) Inhaler 2 Puff(s) Inhalation daily    MEDICATIONS  (PRN):  acetaminophen     Tablet .. 650 milliGRAM(s) Oral every 6 hours PRN Mild Pain (1 - 3), Moderate Pain (4 - 6)  albuterol    90 MICROgram(s) HFA Inhaler 2 Puff(s) Inhalation every 6 hours PRN Shortness of Breath and/or Wheezing  HYDROmorphone  Injectable 0.5 milliGRAM(s) IV Push every 4 hours PRN Moderate Pain (4 - 6)      Vital Signs Last 24 Hrs  T(C): 36.8 (05 Mar 2024 04:13), Max: 37.5 (04 Mar 2024 19:49)  T(F): 98.2 (05 Mar 2024 04:13), Max: 99.5 (04 Mar 2024 19:49)  HR: 94 (05 Mar 2024 04:13) (84 - 107)  BP: 137/69 (05 Mar 2024 04:13) (93/57 - 139/64)  BP(mean): 86 (04 Mar 2024 20:00) (72 - 96)  RR: 18 (05 Mar 2024 04:13) (18 - 41)  SpO2: 98% (05 Mar 2024 04:13) (94% - 100%)    Parameters below as of 05 Mar 2024 04:13  Patient On (Oxygen Delivery Method): nasal cannula  O2 Flow (L/min): 2      PHYSICAL EXAM:  GENERAL: NAD, lying in bed comfortably  HEAD:  Atraumatic, Normocephalic  EYES: EOMI, PERRLA, conjunctiva and sclera clear  CHEST/LUNG: Normal work of breathing on room air. No dyspnea  HEART: Regular rate and rhythm. No tachycardia  ABDOMEN: Soft, Nontender, Nondistended. No rebound, no guarding, no signs of peritonitis. Incisions c/d/i  EXTREMITIES:  Calves nonswollen nontender. ROM grossly intact  NERVOUS SYSTEM:  Alert & Oriented X3, speech clear. No deficits     I&O's Detail    04 Mar 2024 07:01  -  05 Mar 2024 07:00  --------------------------------------------------------  IN:    Heparin Infusion: 20 mL    Oral Fluid: 240 mL  Total IN: 260 mL    OUT:    Voided (mL): 400 mL  Total OUT: 400 mL    Total NET: -140 mL          LABS:                        8.0    5.22  )-----------( 311      ( 05 Mar 2024 06:00 )             28.2     03-05    144  |  103  |  5<L>  ----------------------------<  124<H>  3.8   |  37<H>  |  0.61    Ca    9.4      05 Mar 2024 06:00  Phos  2.6     03-05  Mg     1.8     03-05    TPro  5.6<L>  /  Alb  2.5<L>  /  TBili  0.4  /  DBili  x   /  AST  9<L>  /  ALT  11<L>  /  AlkPhos  42  03-05    PTT - ( 05 Mar 2024 06:00 )  PTT:40.9 sec  Urinalysis Basic - ( 05 Mar 2024 06:00 )    Color: x / Appearance: x / SG: x / pH: x  Gluc: 124 mg/dL / Ketone: x  / Bili: x / Urobili: x   Blood: x / Protein: x / Nitrite: x   Leuk Esterase: x / RBC: x / WBC x   Sq Epi: x / Non Sq Epi: x / Bacteria: x        RADIOLOGY & ADDITIONAL STUDIES:    ASSESSMENT AND PLAN:  69 y/o female w/ PMHx of COPD (on 4-4.5 L home oxygen intermittently), HTN, RA, anxiety presenting w/ bloody stools, found to have 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   POD#5 lap sigmoid colon resection w/ colorectal anastomosis.   Recovering well, afebrile, hemodynamically stable. Having bowel function and tolerating diet.   Transitioned to eliquis on 3/4 but patient is not able to afford it. Downgraded to floor     - Continue regular diet  - Discussed AC with Dr. Alonso from Heme/onc. Recommend heparin bridging to warfarin  - OOB to chair with assistance  - Pain control PRN  - Incentive spirometry    D/W Dr. Rocha

## 2024-03-05 NOTE — PHARMACOTHERAPY INTERVENTION NOTE - COMMENTS
Patient is a 67 yo female being treated for PE (2/20/24), currently ordered for warfarin + heparin full anticoagulation for bridging to therapeutic INR 2-3. Patient previously on Eliquis and transitioned to warfarin due to cost/insurance issues. Discussed with Dr. Cooley, it is safe to start warfarin tonight as the INR is not expected to be therapeutic for a few days. Additionally, recommended starting warfarin at 5 mg x 1 given the patient's age and no current interactions. Recommendation was accepted and order was entered per discussion.  Patient is a 67 yo female being treated for PE (2/20/24), currently ordered for warfarin + heparin full anticoagulation for bridging to therapeutic INR 2-3. Patient previously on Eliquis and transitioned to warfarin due to cost/insurance issues. Discussed with Dr. Cooley, it is safe to start warfarin tonight as the INR is not expected to be therapeutic for a few days.    Additionally, recommended starting warfarin at 5 mg x 1 given the patient's age and no current interactions. Recommendation was accepted and order was entered per discussion.

## 2024-03-05 NOTE — PROGRESS NOTE ADULT - SUBJECTIVE AND OBJECTIVE BOX
[facility] -- Alexa Ramirez Pannella, Patel, Savella Goodger, Cohen  Office # [phone]      Follow Up:    cardiac optimization     Subjective/Observations:     Seen at bedside, remains on nc, denies chest pain dizziness palpitations dyspnea    REVIEW OF SYSTEMS: All other review of systems is negative unless indicated above    PAST MEDICAL & SURGICAL HISTORY:  COPD (chronic obstructive pulmonary disease)      Rheumatoid arthritis      HTN (hypertension)      Anxiety      No significant past surgical history          MEDICATIONS  (STANDING):  amLODIPine   Tablet 10 milliGRAM(s) Oral daily  budesonide 160 MICROgram(s)/formoterol 4.5 MICROgram(s) Inhaler 2 Puff(s) Inhalation two times a day  heparin  Infusion.  Unit(s)/Hr (10 mL/Hr) IV Continuous <Continuous>  influenza  Vaccine (HIGH DOSE) 0.7 milliLiter(s) IntraMuscular once  pantoprazole    Tablet 40 milliGRAM(s) Oral before breakfast  tiotropium 2.5 MICROgram(s) Inhaler 2 Puff(s) Inhalation daily    MEDICATIONS  (PRN):  acetaminophen     Tablet .. 650 milliGRAM(s) Oral every 6 hours PRN Mild Pain (1 - 3), Moderate Pain (4 - 6)  albuterol    90 MICROgram(s) HFA Inhaler 2 Puff(s) Inhalation every 6 hours PRN Shortness of Breath and/or Wheezing  heparin   Injectable 4000 Unit(s) IV Push every 6 hours PRN For aPTT less than 40  heparin   Injectable 2000 Unit(s) IV Push every 6 hours PRN For aPTT between 40 - 57  HYDROmorphone  Injectable 0.5 milliGRAM(s) IV Push every 4 hours PRN Moderate Pain (4 - 6)      Allergies    No Known Allergies    Intolerances        Vital Signs Last 24 Hrs  T(C): 36.8 (05 Mar 2024 04:13), Max: 37.5 (04 Mar 2024 19:49)  T(F): 98.2 (05 Mar 2024 04:13), Max: 99.5 (04 Mar 2024 19:49)  HR: 94 (05 Mar 2024 04:13) (84 - 107)  BP: 137/69 (05 Mar 2024 04:13) (97/72 - 139/64)  BP(mean): 86 (04 Mar 2024 20:00) (77 - 96)  RR: 18 (05 Mar 2024 04:13) (18 - 41)  SpO2: 98% (05 Mar 2024 04:13) (94% - 100%)    Parameters below as of 05 Mar 2024 04:13  Patient On (Oxygen Delivery Method): nasal cannula  O2 Flow (L/min): 2      I&O's Summary    04 Mar 2024 07:01  -  05 Mar 2024 07:00  --------------------------------------------------------  IN: 260 mL / OUT: 400 mL / NET: -140 mL          PHYSICAL EXAM:  TELE: not on tele   Constitutional: NAD, awake and alert, frail   HEENT: Moist Mucous Membranes, Anicteric  Pulmonary: Non-labored, breath sounds are clear bilaterally, No wheezing, crackles or rhonchi  Cardiovascular: Regular, S1 and S2 nl, No murmurs, rubs, gallops or clicks  Gastrointestinal: Bowel Sounds present, soft, nontender.   Lymph: No lymphadenopathy. No peripheral edema.  Skin: No visible rashes or ulcers.  Psych:  Mood & affect appropriate    LABS: All Labs Reviewed:                        8.0    5.22  )-----------( 311      ( 05 Mar 2024 06:00 )             28.2                         8.6    5.64  )-----------( 315      ( 04 Mar 2024 05:23 )             30.3                         8.3    6.62  )-----------( 323      ( 03 Mar 2024 05:41 )             29.1     05 Mar 2024 06:00    144    |  103    |  5      ----------------------------<  124    3.8     |  37     |  0.61   04 Mar 2024 05:23    143    |  103    |  5      ----------------------------<  114    3.9     |  36     |  0.50   03 Mar 2024 05:41    145    |  104    |  8      ----------------------------<  103    4.0     |  36     |  0.62     Ca    9.4        05 Mar 2024 06:00  Ca    9.4        04 Mar 2024 05:23  Ca    9.3        03 Mar 2024 05:41  Phos  2.6       05 Mar 2024 06:00  Phos  2.8       04 Mar 2024 05:23  Phos  2.4       03 Mar 2024 05:41  Mg     1.8       05 Mar 2024 06:00  Mg     1.9       04 Mar 2024 05:23  Mg     2.0       03 Mar 2024 05:41    TPro  5.6    /  Alb  2.5    /  TBili  0.4    /  DBili  x      /  AST  9      /  ALT  11     /  AlkPhos  42     05 Mar 2024 06:00    PTT - ( 05 Mar 2024 10:30 )  PTT:47.1 sec         EC Lead ECG:   Ventricular Rate 98 BPM    Atrial Rate 98 BPM    P-R Interval 154 ms    QRS Duration 76 ms    Q-T Interval 340 ms    QTC Calculation(Bazett) 434 ms    P Axis 88 degrees    R Axis 51 degrees    T Axis 96 degrees    Diagnosis Line Normal sinus rhythm  Minimal voltage criteria for LVH, may be normal variant ( Sokolow-Jason )  Nonspecific T wave abnormality  Abnormal ECG  When compared with ECG of 2024 11:54,  No significant change was found  Confirmed by ANETTE SEALS ([id number]) on 2024 6:48:17 PM (02-15-24 @ 23:33)      TRANSTHORACIC ECHOCARDIOGRAM REPORT  ________________________________________________________________________________                                      _______       Pt. Name:       KENDALL RIDER Study Date:    2024  MRN:            [id number]             YOB: 1955  Accession #:    [id number]            Age:           68 years  Account#:       [id number]           Gender:        F  Heart Rate:                          Height:        64.96 in (165.00 cm)  Rhythm:                      Weight:        130.07 lb (59.00 kg)  Blood Pressure: 129/71 mmHg          BSA/BMI:       1.65 m² / 21.67 kg/m²  ________________________________________________________________________________________  Referring Physician:    [id number] Robbie Boone  Interpreting Physician: Anette Seals  Primary Sonographer:    Judy Braga RDCS    CPT:               ECHO TTE WO CON COMP W DOPP - 97372.m  Indication(s):     Dyspnea, unspecified - R06.00  Procedure:         Transthoracic echocardiogram with 2-D, M-mode and complete                     spectral and color flow Doppler.  Ordering Location: [facility]  Admission Status:  Inpatient    _______________________________________________________________________________________     CONCLUSIONS:      1. Left ventricular systolic function is normal with an ejection fraction visually estimated at 60 to 65 %.   2. There is normal LV mass and concentric remodeling.   3. Normal right ventricular cavity size and normal systolic function.  4. The left atrium is normal.   5. Structurally normal mitral valve with normal leaflet excursion.   6. Trileaflet aortic valve with normal systolic excursion.   7. Structurally normal tricuspid valve with normal leaflet excursion. Pulmonary artery systolic pressure could not be estimated.   8. No pericardial effusion seen.    ________________________________________________________________________________________  FINDINGS:     Left Ventricle:  Left ventricular systolic function is normal with an ejection fraction visually estimated at 60 to 65%. There is normal LV mass and concentric remodeling.     Right Ventricle:  The right ventricular cavity is normal in size and normal systolic function.     Left Atrium:  The left atrium is normal.     Right Atrium:  The right atrium is normal in size.     Aortic Valve:  The aortic valve appears trileaflet with normal systolic excursion.     Mitral Valve:  Structurally normal mitral valve with normal leaflet excursion.     Tricuspid Valve:  Structurally normal tricuspid valve with normal leaflet excursion. There is insufficient tricuspid regurgitation detected to calculate pulmonary artery systolic pressure.     Pulmonic Valve:  The pulmonic valve was not well visualized.     Pericardium:  No pericardial effusion seen.     Systemic Veins:  The inferior vena cava is dilated (dilated >2.1cm) with normal inspiratory collapse (normal >50%) consistent with mildly elevated right atrial pressure (~8, range 5-10mmHg).  ____________________________________________________________________  QUANTITATIVE DATA:  Left Ventricle Measurements: (Indexed to BSA)     IVSd (2D):   1.4 cm  LVPWd (2D):  1.1 cm  LVIDd (2D):  3.1 cm  LVIDs (2D):  2.1 cm  LV Mass:     122 g  74.0 g/m²  Visualized LV EF%: 60 to 65%     MV E Vmax:    0.75 m/s  MV A Vmax:    0.79 m/s  MV E/A:       0.95  e' lateral:   15.70 cm/s  e' medial:    10.90 cm/s  E/e' lateral: 4.77  E/e' medial:  6.87  E/e' Average: 5.63  MV DT:        211 msec    Aorta Measurements: (Normal range) (Indexed to BSA)     Sinuses of Valsalva: 2.80 cm (2.7 - 3.3 cm)       Left Atrium Measurements: (Indexed to BSA)  LA Diam 2D: 2.80 cm    Mitral Valve Measurements:     MV E Vmax: 0.7 m/s  MV A Vmax: 0.8 m/s  MV E/A:    1.0       Tricuspid Valve Measurements:     RA Pressure: 8 mmHg    ________________________________________________________________________________________  Electronically signed on 2024 at 1:42:01 PM by Anette Seals         *** Final ***      Radiology:         [facility] -- Alexa Ramirez Pannella, Patel, Savella Goodger, Cohen  Office # [phone]      Follow Up:    cardiac optimization     Subjective/Observations:     Seen at bedside, remains on nc, denies chest pain dizziness palpitations dyspnea    REVIEW OF SYSTEMS: All other review of systems is negative unless indicated above    PAST MEDICAL & SURGICAL HISTORY:  COPD (chronic obstructive pulmonary disease)      Rheumatoid arthritis      HTN (hypertension)      Anxiety      No significant past surgical history          MEDICATIONS  (STANDING):  amLODIPine   Tablet 10 milliGRAM(s) Oral daily  budesonide 160 MICROgram(s)/formoterol 4.5 MICROgram(s) Inhaler 2 Puff(s) Inhalation two times a day  heparin  Infusion.  Unit(s)/Hr (10 mL/Hr) IV Continuous <Continuous>  influenza  Vaccine (HIGH DOSE) 0.7 milliLiter(s) IntraMuscular once  pantoprazole    Tablet 40 milliGRAM(s) Oral before breakfast  tiotropium 2.5 MICROgram(s) Inhaler 2 Puff(s) Inhalation daily    MEDICATIONS  (PRN):  acetaminophen     Tablet .. 650 milliGRAM(s) Oral every 6 hours PRN Mild Pain (1 - 3), Moderate Pain (4 - 6)  albuterol    90 MICROgram(s) HFA Inhaler 2 Puff(s) Inhalation every 6 hours PRN Shortness of Breath and/or Wheezing  heparin   Injectable 4000 Unit(s) IV Push every 6 hours PRN For aPTT less than 40  heparin   Injectable 2000 Unit(s) IV Push every 6 hours PRN For aPTT between 40 - 57  HYDROmorphone  Injectable 0.5 milliGRAM(s) IV Push every 4 hours PRN Moderate Pain (4 - 6)      Allergies    No Known Allergies    Intolerances        Vital Signs Last 24 Hrs  T(C): 36.8 (05 Mar 2024 04:13), Max: 37.5 (04 Mar 2024 19:49)  T(F): 98.2 (05 Mar 2024 04:13), Max: 99.5 (04 Mar 2024 19:49)  HR: 94 (05 Mar 2024 04:13) (84 - 107)  BP: 137/69 (05 Mar 2024 04:13) (97/72 - 139/64)  BP(mean): 86 (04 Mar 2024 20:00) (77 - 96)  RR: 18 (05 Mar 2024 04:13) (18 - 41)  SpO2: 98% (05 Mar 2024 04:13) (94% - 100%)    Parameters below as of 05 Mar 2024 04:13  Patient On (Oxygen Delivery Method): nasal cannula  O2 Flow (L/min): 2      I&O's Summary    04 Mar 2024 07:01  -  05 Mar 2024 07:00  --------------------------------------------------------  IN: 260 mL / OUT: 400 mL / NET: -140 mL          PHYSICAL EXAM:  TELE: not on tele   Constitutional: NAD, awake and alert, frail   HEENT: Moist Mucous Membranes, Anicteric  Pulmonary: Non-labored, breath sounds are clear bilaterally, No wheezing, crackles or rhonchi  Cardiovascular: Regular, S1 and S2 nl, No murmurs, rubs, gallops or clicks  Gastrointestinal: Bowel Sounds present, soft, nontender.   Lymph: No lymphadenopathy. No peripheral edema.  Skin: No visible rashes or ulcers.  Psych:  Mood & affect appropriate    LABS: All Labs Reviewed:                        8.0    5.22  )-----------( 311      ( 05 Mar 2024 06:00 )             28.2                         8.6    5.64  )-----------( 315      ( 04 Mar 2024 05:23 )             30.3                         8.3    6.62  )-----------( 323      ( 03 Mar 2024 05:41 )             29.1     05 Mar 2024 06:00    144    |  103    |  5      ----------------------------<  124    3.8     |  37     |  0.61   04 Mar 2024 05:23    143    |  103    |  5      ----------------------------<  114    3.9     |  36     |  0.50   03 Mar 2024 05:41    145    |  104    |  8      ----------------------------<  103    4.0     |  36     |  0.62     Ca    9.4        05 Mar 2024 06:00  Ca    9.4        04 Mar 2024 05:23  Ca    9.3        03 Mar 2024 05:41  Phos  2.6       05 Mar 2024 06:00  Phos  2.8       04 Mar 2024 05:23  Phos  2.4       03 Mar 2024 05:41  Mg     1.8       05 Mar 2024 06:00  Mg     1.9       04 Mar 2024 05:23  Mg     2.0       03 Mar 2024 05:41    TPro  5.6    /  Alb  2.5    /  TBili  0.4    /  DBili  x      /  AST  9      /  ALT  11     /  AlkPhos  42     05 Mar 2024 06:00    PTT - ( 05 Mar 2024 10:30 )  PTT:47.1 sec         EC Lead ECG:   Ventricular Rate 98 BPM    Atrial Rate 98 BPM    P-R Interval 154 ms    QRS Duration 76 ms    Q-T Interval 340 ms    QTC Calculation(Bazett) 434 ms    P Axis 88 degrees    R Axis 51 degrees    T Axis 96 degrees    Diagnosis Line Normal sinus rhythm  Minimal voltage criteria for LVH, may be normal variant ( Sokolow-Jason )  Nonspecific T wave abnormality  Abnormal ECG  When compared with ECG of 2024 11:54,  No significant change was found  Confirmed by ANETTE SEALS ([id number]) on 2024 6:48:17 PM (02-15-24 @ 23:33)      TRANSTHORACIC ECHOCARDIOGRAM REPORT  ________________________________________________________________________________                                      _______       Pt. Name:       KENDALL RIDER Study Date:    2024  MRN:            [id number]             YOB: 1955  Accession #:    [id number]            Age:           68 years  Account#:       [id number]           Gender:        F  Heart Rate:                          Height:        64.96 in (165.00 cm)  Rhythm:                      Weight:        130.07 lb (59.00 kg)  Blood Pressure: 129/71 mmHg          BSA/BMI:       1.65 m² / 21.67 kg/m²  ________________________________________________________________________________________  Referring Physician:    [id number] Robbie Boone  Interpreting Physician: Anette Seals  Primary Sonographer:    Judy Braga RDCS    CPT:               ECHO TTE WO CON COMP W DOPP - 95763.m  Indication(s):     Dyspnea, unspecified - R06.00  Procedure:         Transthoracic echocardiogram with 2-D, M-mode and complete                     spectral and color flow Doppler.  Ordering Location: [facility]  Admission Status:  Inpatient    _______________________________________________________________________________________     CONCLUSIONS:      1. Left ventricular systolic function is normal with an ejection fraction visually estimated at 60 to 65 %.   2. There is normal LV mass and concentric remodeling.   3. Normal right ventricular cavity size and normal systolic function.  4. The left atrium is normal.   5. Structurally normal mitral valve with normal leaflet excursion.   6. Trileaflet aortic valve with normal systolic excursion.   7. Structurally normal tricuspid valve with normal leaflet excursion. Pulmonary artery systolic pressure could not be estimated.   8. No pericardial effusion seen.    ________________________________________________________________________________________  FINDINGS:     Left Ventricle:  Left ventricular systolic function is normal with an ejection fraction visually estimated at 60 to 65%. There is normal LV mass and concentric remodeling.     Right Ventricle:  The right ventricular cavity is normal in size and normal systolic function.     Left Atrium:  The left atrium is normal.     Right Atrium:  The right atrium is normal in size.     Aortic Valve:  The aortic valve appears trileaflet with normal systolic excursion.     Mitral Valve:  Structurally normal mitral valve with normal leaflet excursion.     Tricuspid Valve:  Structurally normal tricuspid valve with normal leaflet excursion. There is insufficient tricuspid regurgitation detected to calculate pulmonary artery systolic pressure.     Pulmonic Valve:  The pulmonic valve was not well visualized.     Pericardium:  No pericardial effusion seen.     Systemic Veins:  The inferior vena cava is dilated (dilated >2.1cm) with normal inspiratory collapse (normal >50%) consistent with mildly elevated right atrial pressure (~8, range 5-10mmHg).  ____________________________________________________________________  QUANTITATIVE DATA:  Left Ventricle Measurements: (Indexed to BSA)     IVSd (2D):   1.4 cm  LVPWd (2D):  1.1 cm  LVIDd (2D):  3.1 cm  LVIDs (2D):  2.1 cm  LV Mass:     122 g  74.0 g/m²  Visualized LV EF%: 60 to 65%     MV E Vmax:    0.75 m/s  MV A Vmax:    0.79 m/s  MV E/A:       0.95  e' lateral:   15.70 cm/s  e' medial:    10.90 cm/s  E/e' lateral: 4.77  E/e' medial:  6.87  E/e' Average: 5.63  MV DT:        211 msec    Aorta Measurements: (Normal range) (Indexed to BSA)     Sinuses of Valsalva: 2.80 cm (2.7 - 3.3 cm)       Left Atrium Measurements: (Indexed to BSA)  LA Diam 2D: 2.80 cm    Mitral Valve Measurements:     MV E Vmax: 0.7 m/s  MV A Vmax: 0.8 m/s  MV E/A:    1.0       Tricuspid Valve Measurements:     RA Pressure: 8 mmHg    ________________________________________________________________________________________  Electronically signed on 2024 at 1:42:01 PM by Anette Seals         *** Final ***      Radiology:

## 2024-03-05 NOTE — PROGRESS NOTE ADULT - ASSESSMENT
Surg. Att.  Pt seen and examined. Doing well.  Discharge depends on pt. switch to oral anticoagulants.

## 2024-03-05 NOTE — CHART NOTE - NSCHARTNOTEFT_GEN_A_CORE
Assessment: Pt seen for nutrition follow-up. Chart reviewed, hospital course noted.    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 POD#5 lap sigmoid colon resection w/ colorectal anastomosis."    Visited pt at bedside this afternoon. Pt's sister present during visit. Pt reports fair appetite/intake. Ate fairly well for lunch meal today. PO intakes % per nursing documentation. Tolerating diet. Denies N/V. +BM 3/4. Pt currently on regular diet as per surgery. Food preferences obtained to optimize po intake/tolerance. RD remains available and will continue to follow-up.     Factors impacting intake: [ ] none [ ] nausea  [ ] vomiting [ ] diarrhea [ ] constipation  [ ]chewing problems [ ] swallowing issues  [ ] other:     Diet Prescription: Diet, Regular (03-03-24 @ 11:31)    Intake: fair    Current Weight: Weight (kg): 54.8 (02-29 @ 17:00), 2/18 124.1#, 3/5 119.2# (no edema noted)  % Weight Change    Pertinent Medications: MEDICATIONS  (STANDING):  amLODIPine   Tablet 10 milliGRAM(s) Oral daily  budesonide 160 MICROgram(s)/formoterol 4.5 MICROgram(s) Inhaler 2 Puff(s) Inhalation two times a day  heparin  Infusion.  Unit(s)/Hr (10 mL/Hr) IV Continuous <Continuous>  influenza  Vaccine (HIGH DOSE) 0.7 milliLiter(s) IntraMuscular once  pantoprazole    Tablet 40 milliGRAM(s) Oral before breakfast  tiotropium 2.5 MICROgram(s) Inhaler 2 Puff(s) Inhalation daily  warfarin 5 milliGRAM(s) Oral at bedtime    MEDICATIONS  (PRN):  acetaminophen     Tablet .. 650 milliGRAM(s) Oral every 6 hours PRN Mild Pain (1 - 3), Moderate Pain (4 - 6)  albuterol    90 MICROgram(s) HFA Inhaler 2 Puff(s) Inhalation every 6 hours PRN Shortness of Breath and/or Wheezing  ALPRAZolam 0.25 milliGRAM(s) Oral daily PRN for anxiety  heparin   Injectable 4000 Unit(s) IV Push every 6 hours PRN For aPTT less than 40  heparin   Injectable 2000 Unit(s) IV Push every 6 hours PRN For aPTT between 40 - 57  HYDROmorphone  Injectable 0.5 milliGRAM(s) IV Push every 4 hours PRN Moderate Pain (4 - 6)    Pertinent Labs: 03-05 Na144 mmol/L Glu 124 mg/dL<H> K+ 3.8 mmol/L Cr  0.61 mg/dL BUN 5 mg/dL<L> 03-05 Phos 2.6 mg/dL 03-05 Alb 2.5 g/dL<L> 02-17 Chol 213 mg/dL<H> LDL --    HDL 64 mg/dL Trig 84 mg/dL    Skin: no pressure injuries    Estimated Needs:   [X] no change since previous assessment: based on # 30-35kcals/kg 1767-2061kcals and 1.2-1.5gms protein/kg 70-88 gms protein   [ ] recalculated:     Previous Nutrition Diagnosis:   [X] Increased Nutrient Needs  [X] Altered GI function  [X] Malnutrition     Nutrition Diagnosis is [X] ongoing  [ ] resolved [ ] not applicable     New Nutrition Diagnosis: [X] not applicable     Interventions:   Recommend  [ ] Change Diet To:  [ ] Nutrition Supplement  [ ] Nutrition Support  [X] Other: Recommend regular, low-fiber diet as tolerated  Recommend MVI daily    Monitoring and Evaluation:   [X] PO intake [ x ] Tolerance to diet prescription [ x ] weights [ x ] labs[ x ] follow up per protocol  [X] other: s/s GI distress, bowel function, skin integrity/ edema

## 2024-03-05 NOTE — PROGRESS NOTE ADULT - ASSESSMENT
69yo woman w hx COPD on 4-4.5L O2, HTN, RA, anxiety, adm w 2wk hx of intermittent blood mixed w stool  Never had colonoscopy  No abdomen pain anorexia, weight loss  Colonoscopy 2/20 here large partially obstructing mass at ~13cm from anal verge sig for adenocarcinoma, MMR status still pending  CEA 17.8  CT chest 2/20 and CT a/p 2/13 no mets but new RML PE, duplex legs negative now on IV Heparin  Iron studies c/w deficiency post IV Venofer    -adenocarcinoma of colon assoc w bleed, no distant mets by imaging studies.Post resection 2/29, fred followup final path and adjuvant treatment determination    -RML PE-likely provoked etiology/ malignancy associated, IV Heparin was change to Eliquis yesterday but pt unable to obtain this med as outpatinet, can change to oral coumadin. Discussed w Medicine--restart IV Heparing, start first dose Coumadin at time due for next dose Eliquis, and continue usual Coumadin conversion protocol. Follow serial INR/PTT/CBC    -anemia-w/u sig for iron deficiency post IV Venofer also due to GI blood loss,resent surgery. Hgb stable a 8s, continue serial monitor

## 2024-03-05 NOTE — PROGRESS NOTE ADULT - SUBJECTIVE AND OBJECTIVE BOX
Patient is a 68y old  Female who presents with a chief complaint of GIB bleed (05 Mar 2024 11:42)      INTERVAL HPI/OVERNIGHT EVENTS:    offers no complaints.     MEDICATIONS  (STANDING):  amLODIPine   Tablet 10 milliGRAM(s) Oral daily  budesonide 160 MICROgram(s)/formoterol 4.5 MICROgram(s) Inhaler 2 Puff(s) Inhalation two times a day  heparin  Infusion.  Unit(s)/Hr (10 mL/Hr) IV Continuous <Continuous>  influenza  Vaccine (HIGH DOSE) 0.7 milliLiter(s) IntraMuscular once  pantoprazole    Tablet 40 milliGRAM(s) Oral before breakfast  tiotropium 2.5 MICROgram(s) Inhaler 2 Puff(s) Inhalation daily  warfarin 5 milliGRAM(s) Oral at bedtime      MEDICATIONS  (PRN):  acetaminophen     Tablet .. 650 milliGRAM(s) Oral every 6 hours PRN Mild Pain (1 - 3), Moderate Pain (4 - 6)  albuterol    90 MICROgram(s) HFA Inhaler 2 Puff(s) Inhalation every 6 hours PRN Shortness of Breath and/or Wheezing  ALPRAZolam 0.25 milliGRAM(s) Oral daily PRN for anxiety  heparin   Injectable 4000 Unit(s) IV Push every 6 hours PRN For aPTT less than 40  heparin   Injectable 2000 Unit(s) IV Push every 6 hours PRN For aPTT between 40 - 57  HYDROmorphone  Injectable 0.5 milliGRAM(s) IV Push every 4 hours PRN Moderate Pain (4 - 6)      Allergies    No Known Allergies    Intolerances        PAST MEDICAL & SURGICAL HISTORY:  COPD (chronic obstructive pulmonary disease)      Rheumatoid arthritis      HTN (hypertension)      Anxiety      No significant past surgical history          Vital Signs Last 24 Hrs  T(C): 36.7 (05 Mar 2024 19:57), Max: 37.3 (05 Mar 2024 12:21)  T(F): 98.1 (05 Mar 2024 19:57), Max: 99.2 (05 Mar 2024 12:21)  HR: 88 (05 Mar 2024 19:57) (84 - 94)  BP: 119/73 (05 Mar 2024 19:57) (119/73 - 139/64)  BP(mean): --  RR: 17 (05 Mar 2024 19:57) (17 - 24)  SpO2: 97% (05 Mar 2024 19:57) (93% - 98%)    Parameters below as of 05 Mar 2024 19:57  Patient On (Oxygen Delivery Method): nasal cannula  O2 Flow (L/min): 2      PHYSICAL EXAMINATION:    GENERAL: The patient is awake and alert in no apparent distress.     HEENT: Head is normocephalic and atraumatic    NECK: no JVD    LUNGS: diminished air entry bilateral    HEART: Regular rate and rhythm without murmur.    ABDOMEN: Soft, nontender, and nondistended.      EXTREMITIES: Without any cyanosis, clubbing, rash, lesions or edema.    NEUROLOGIC: Grossly intact.    SKIN: No ulceration or induration present.      LABS:                        8.0    5.22  )-----------( 311      ( 05 Mar 2024 06:00 )             28.2     03-05    144  |  103  |  5<L>  ----------------------------<  124<H>  3.8   |  37<H>  |  0.61    Ca    9.4      05 Mar 2024 06:00  Phos  2.6     03-05  Mg     1.8     03-05    TPro  5.6<L>  /  Alb  2.5<L>  /  TBili  0.4  /  DBili  x   /  AST  9<L>  /  ALT  11<L>  /  AlkPhos  42  03-05    PT/INR - ( 05 Mar 2024 14:00 )   PT: 26.6 sec;   INR: 2.33 ratio         PTT - ( 05 Mar 2024 14:00 )  PTT:44.9 sec  Urinalysis Basic - ( 05 Mar 2024 06:00 )    Color: x / Appearance: x / SG: x / pH: x  Gluc: 124 mg/dL / Ketone: x  / Bili: x / Urobili: x   Blood: x / Protein: x / Nitrite: x   Leuk Esterase: x / RBC: x / WBC x   Sq Epi: x / Non Sq Epi: x / Bacteria: x                          Assessment:    COPD  Acute on Chronic Hypoxic respiratory failure  S/P Pulmonary emboli  S/P Colon resection - adenocarcinoma    Plan:    Supplemental oxygen  Incentive spirometry  Anticoagulation  Symbicort + spiriva inhalers

## 2024-03-05 NOTE — PHARMACOTHERAPY INTERVENTION NOTE - COMMENTS
Patient is a 67 yo female currently ordered for hydromorphone IV push as needed for moderate pain (4-6). Patient has been on current pain regimen with IV hydromorphone since 2/29. Discussed with Attending Dr. Cooley and recommended switch to PO as patient is able to tolerate other PO medications. MD agreed and will enter order for PO pain regimen.

## 2024-03-05 NOTE — CHART NOTE - NSCHARTNOTEFT_GEN_A_CORE
Called by RN for pt wanting to discuss switching to Eliquis for AC. Pt w provoked/malignancy associated RML PE. Was on IV Heparin, changed to Eliquis yesterday, but because of outpatient insurance reasons, changed to oral coumadin. Was restarted on IV heparin and will start coumadin. Pt is now requesting to be switched to Eliquis. Discussed with pt extensively, however am unable to change primary teams orders overnight. Pt will be covered for AC tonight regardless with coumadin. No changes to medications made.    Primary team and heme to follow up in AM and discuss AC regimen and possible transition to eliquis

## 2024-03-05 NOTE — PROGRESS NOTE ADULT - SUBJECTIVE AND OBJECTIVE BOX
Patient is a 68y old  Female who presents with a chief complaint of GIB bleed (04 Mar 2024 21:17)       INTERVAL HPI/OVERNIGHT EVENTS: Patient seen and examined at bedside. Denies any new symptoms, complaints. Denies chest pain, palpitations, sob, abdominal pain, n/v/d/c    MEDICATIONS  (STANDING):  amLODIPine   Tablet 10 milliGRAM(s) Oral daily  apixaban 10 milliGRAM(s) Oral every 12 hours  budesonide 160 MICROgram(s)/formoterol 4.5 MICROgram(s) Inhaler 2 Puff(s) Inhalation two times a day  influenza  Vaccine (HIGH DOSE) 0.7 milliLiter(s) IntraMuscular once  pantoprazole    Tablet 40 milliGRAM(s) Oral before breakfast  tiotropium 2.5 MICROgram(s) Inhaler 2 Puff(s) Inhalation daily    MEDICATIONS  (PRN):  acetaminophen     Tablet .. 650 milliGRAM(s) Oral every 6 hours PRN Mild Pain (1 - 3), Moderate Pain (4 - 6)  albuterol    90 MICROgram(s) HFA Inhaler 2 Puff(s) Inhalation every 6 hours PRN Shortness of Breath and/or Wheezing  HYDROmorphone  Injectable 0.5 milliGRAM(s) IV Push every 4 hours PRN Moderate Pain (4 - 6)      Allergies    No Known Allergies    Intolerances        REVIEW OF SYSTEMS:  Per HPI. All other ROS noted Negative   Vital Signs Last 24 Hrs  T(C): 36.8 (05 Mar 2024 04:13), Max: 37.5 (04 Mar 2024 19:49)  T(F): 98.2 (05 Mar 2024 04:13), Max: 99.5 (04 Mar 2024 19:49)  HR: 94 (05 Mar 2024 04:13) (84 - 107)  BP: 137/69 (05 Mar 2024 04:13) (93/57 - 139/64)  BP(mean): 86 (04 Mar 2024 20:00) (72 - 96)  RR: 18 (05 Mar 2024 04:13) (18 - 41)  SpO2: 98% (05 Mar 2024 04:13) (94% - 100%)    Parameters below as of 05 Mar 2024 04:13  Patient On (Oxygen Delivery Method): nasal cannula  O2 Flow (L/min): 2      PHYSICAL EXAM:  GENERAL: NAD, awake, alert   HEAD:  Atraumatic, Normocephalic  EYES: EOMI, PERRLA, conjunctiva and sclera clear  ENMT: No tonsillar erythema, exudates, or enlargement; Moist mucous membranes  NECK: Supple, No JVD, Normal thyroid  NERVOUS SYSTEM:  Alert & Oriented X3, no focal motor/sensory deficits noted   CHEST/LUNG: Clear to auscultation bilaterally; No rales, rhonchi, wheezing, or rubs  HEART: Regular rate and rhythm  ABDOMEN: S/p surgery   EXTREMITIES:  2+ Peripheral Pulses, No clubbing, cyanosis, or edema  LYMPH: No lymphadenopathy noted  SKIN: No rashes or lesions    LABS:                        8.0    5.22  )-----------( 311      ( 05 Mar 2024 06:00 )             28.2     05 Mar 2024 06:00    144    |  103    |  5      ----------------------------<  124    3.8     |  37     |  0.61     Ca    9.4        05 Mar 2024 06:00  Phos  2.6       05 Mar 2024 06:00  Mg     1.8       05 Mar 2024 06:00    TPro  5.6    /  Alb  2.5    /  TBili  0.4    /  DBili  x      /  AST  9      /  ALT  11     /  AlkPhos  42     05 Mar 2024 06:00    PTT - ( 05 Mar 2024 06:00 )  PTT:40.9 sec  CAPILLARY BLOOD GLUCOSE        BLOOD CULTURE    RADIOLOGY & ADDITIONAL TESTS:    Imaging Personally Reviewed:  [ ] YES     Consultant(s) Notes Reviewed:      Care Discussed with Consultants/Other Providers:

## 2024-03-05 NOTE — CASE MANAGEMENT PROGRESS NOTE - NSCMPROGRESSNOTE_GEN_ALL_CORE
Downgraded from ICU overnight. Patient has no PT needs. Abdominal incision open to air. Skilled need unclear at present. CM will continue to follow.

## 2024-03-05 NOTE — PROGRESS NOTE ADULT - ASSESSMENT
69yo F with PMHx COPD (not on home oxygen), HTN, RA, anxiety presenting with chief complaint of blood in stools. Admitted for GI bleed, plan for colonoscopy 2/20.      Problem/Plan - 1:  ·  Problem: Colonic mass. S/p surgery on  02/29/2024 :  lap sigmoid colon resection w/ colorectal anastomosis. Pathology noted for adenocarcinoma   -HGB currently stable  - On regular  diet, tolerating   - Hem/Onco follow up.   - On Eliquis now  for PE   -Transfuse prn   -CT chest: No evidence of metastatic disease. Acute pulmonary embolus in the right middle lobe lobar and segmental branches.     Problem/Plan - 2:  ·  Problem: COPD (chronic obstructive pulmonary disease).   ·  Plan: Chronic, recently admitted for COPD exacerbation  - Duonebs q6 PRN  - Continue home inhalers   - Supplemental O2 PRN. At baseline: 4-4.5 L home oxygen intermittently. COPD patient will keep SpO2 goal 88-93%   - Monitor respiratory status   - Continuous pulse ox  - Pulmonary Dr. Dobson f/u     Problem/Plan - 3:  ·  Problem: HTN (hypertension).   ·  Plan: Chronic, stable  - Resume  home amlodipine 10mg qd with hold parameters  - Monitor hemodynamics       Problem/Plan - 4:  ·  Problem: Rheumatoid arthritis.   ·  Plan: - Chronic  - resume prednisone 10mg PO Daily. Takes tramadol 50mg Qd.     Problem/Plan - 5:  ·  Problem: Anxiety.   ·  Plan: - Chronic, SOB likely 2/2 anxiety  - Continue Xanax 0.25mg qhs PRN.     Problem/Plan - 6:  ·  Problem: Pulmonary embolism  ·  Plan: CT chest with No evidence of metastatic disease. Acute pulmonary embolus in the right middle lobe lobar and segmental branches.    - Echo:  Left ventricular systolic function is normal with an ejection fraction visually estimated at 60 to 65 %.    -  On Eliquis now        Problem/Plan - 7:  ·  Problem: Fever/UTI: Resolved   ·  Plan: Blood cx NGTD.  Urine cx +E. coli and enterococcus faecalis.    - Completed course of antibiotic  -ID f/u noted      Problem/Plan - 8:  ·  Problem: Encounter for deep vein thrombosis (DVT) prophylaxis.   ·  Plan: - Eliquis     Disposition: Pending recommendations from Oncology for further plan of care now that Pathology is back.  69yo F with PMHx COPD (not on home oxygen), HTN, RA, anxiety presenting with chief complaint of blood in stools. Admitted for GI bleed, plan for colonoscopy 2/20.      Problem/Plan - 1:  ·  Problem: Colonic mass. S/p surgery on  02/29/2024 :  lap sigmoid colon resection w/ colorectal anastomosis. Pathology noted for adenocarcinoma   -HGB currently stable  - On regular  diet, tolerating   - Hem/Onco follow up.   - Started On Eliquis for PE but can not afford because of Insurance Issue. D/w Hem Dr. Alonso will start IV Heparin and bridge with Coumadin. D/w patient, agreed   -Transfuse prn   -CT chest: No evidence of metastatic disease. Acute pulmonary embolus in the right middle lobe lobar and segmental branches.     Problem/Plan - 2:  ·  Problem: COPD (chronic obstructive pulmonary disease).   ·  Plan: Chronic, recently admitted for COPD exacerbation  - Duonebs q6 PRN  - Continue home inhalers   - Supplemental O2 PRN. At baseline: 4-4.5 L home oxygen intermittently. COPD patient will keep SpO2 goal 88-93%   - Monitor respiratory status   - Continuous pulse ox  - Pulmonary Dr. Dobson f/u     Problem/Plan - 3:  ·  Problem: HTN (hypertension).   ·  Plan: Chronic, stable  - Resume  home amlodipine 10mg qd with hold parameters  - Monitor hemodynamics       Problem/Plan - 4:  ·  Problem: Rheumatoid arthritis.   ·  Plan: - Chronic  - resume prednisone 10mg PO Daily. Takes tramadol 50mg Qd.     Problem/Plan - 5:  ·  Problem: Anxiety.   ·  Plan: - Chronic, SOB likely 2/2 anxiety  - Continue Xanax 0.25mg qhs PRN.     Problem/Plan - 6:  ·  Problem: Pulmonary embolism  ·  Plan: CT chest with No evidence of metastatic disease. Acute pulmonary embolus in the right middle lobe lobar and segmental branches.    - Echo:  Left ventricular systolic function is normal with an ejection fraction visually estimated at 60 to 65 %.    -  On Eliquis now        Problem/Plan - 7:  ·  Problem: Fever/UTI: Resolved   ·  Plan: Blood cx NGTD.  Urine cx +E. coli and enterococcus faecalis.    - Completed course of antibiotic  -ID f/u noted      Problem/Plan - 8:  ·  Problem: Encounter for deep vein thrombosis (DVT) prophylaxis.   ·  Plan: - IV Heparin/ Coumadin     Disposition: D/c Once INR Therapeutic  69yo F with PMHx COPD (not on home oxygen), HTN, RA, anxiety presenting with chief complaint of blood in stools. Admitted for GI bleed, plan for colonoscopy 2/20.      Problem/Plan - 1:  ·  Problem: Colonic mass. S/p surgery on  02/29/2024 :  lap sigmoid colon resection w/ colorectal anastomosis. Pathology noted for adenocarcinoma   -HGB currently stable  - On regular  diet, tolerating   - Hem/Onco follow up.   - Started On Eliquis for PE but can not afford because does not have insurance.  D/w Hem Dr. Alonso will start IV Heparin and bridge with Coumadin. D/w patient, agreed   -Transfuse prn   -CT chest: No evidence of metastatic disease. Acute pulmonary embolus in the right middle lobe lobar and segmental branches.     Problem/Plan - 2:  ·  Problem: COPD (chronic obstructive pulmonary disease).   ·  Plan: Chronic, recently admitted for COPD exacerbation  - Duonebs q6 PRN  - Continue home inhalers   - Supplemental O2 PRN. At baseline: 4-4.5 L home oxygen intermittently. COPD patient will keep SpO2 goal 88-93%   - Monitor respiratory status   - Continuous pulse ox  - Pulmonary Dr. Dobson f/u     Problem/Plan - 3:  ·  Problem: HTN (hypertension).   ·  Plan: Chronic, stable  - Resume  home amlodipine 10mg qd with hold parameters  - Monitor hemodynamics       Problem/Plan - 4:  ·  Problem: Rheumatoid arthritis.   ·  Plan: - Chronic  - resume prednisone 10mg PO Daily. Takes tramadol 50mg Qd.     Problem/Plan - 5:  ·  Problem: Anxiety.   ·  Plan: - Chronic, SOB likely 2/2 anxiety  - Continue Xanax 0.25mg qhs PRN.     Problem/Plan - 6:  ·  Problem: Pulmonary embolism  ·  Plan: CT chest with No evidence of metastatic disease. Acute pulmonary embolus in the right middle lobe lobar and segmental branches.    - Echo:  Left ventricular systolic function is normal with an ejection fraction visually estimated at 60 to 65 %.    -  On Eliquis now        Problem/Plan - 7:  ·  Problem: Fever/UTI: Resolved   ·  Plan: Blood cx NGTD.  Urine cx +E. coli and enterococcus faecalis.    - Completed course of antibiotic  -ID f/u noted      Problem/Plan - 8:  ·  Problem: Encounter for deep vein thrombosis (DVT) prophylaxis.   ·  Plan: - IV Heparin/ Coumadin     Disposition: D/c Once INR Therapeutic

## 2024-03-05 NOTE — PROGRESS NOTE ADULT - SUBJECTIVE AND OBJECTIVE BOX
All interim records and events noted.    sitting up at edge of bed, eating regular meals, BM "mushy"      MEDICATIONS  (STANDING):  amLODIPine   Tablet 10 milliGRAM(s) Oral daily  budesonide 160 MICROgram(s)/formoterol 4.5 MICROgram(s) Inhaler 2 Puff(s) Inhalation two times a day  heparin  Infusion.  Unit(s)/Hr (10 mL/Hr) IV Continuous <Continuous>  influenza  Vaccine (HIGH DOSE) 0.7 milliLiter(s) IntraMuscular once  pantoprazole    Tablet 40 milliGRAM(s) Oral before breakfast  tiotropium 2.5 MICROgram(s) Inhaler 2 Puff(s) Inhalation daily    MEDICATIONS  (PRN):  acetaminophen     Tablet .. 650 milliGRAM(s) Oral every 6 hours PRN Mild Pain (1 - 3), Moderate Pain (4 - 6)  albuterol    90 MICROgram(s) HFA Inhaler 2 Puff(s) Inhalation every 6 hours PRN Shortness of Breath and/or Wheezing  heparin   Injectable 4000 Unit(s) IV Push every 6 hours PRN For aPTT less than 40  heparin   Injectable 2000 Unit(s) IV Push every 6 hours PRN For aPTT between 40 - 57  HYDROmorphone  Injectable 0.5 milliGRAM(s) IV Push every 4 hours PRN Moderate Pain (4 - 6)      Vital Signs Last 24 Hrs  T(C): 36.8 (05 Mar 2024 04:13), Max: 37.5 (04 Mar 2024 19:49)  T(F): 98.2 (05 Mar 2024 04:13), Max: 99.5 (04 Mar 2024 19:49)  HR: 94 (05 Mar 2024 04:13) (84 - 107)  BP: 137/69 (05 Mar 2024 04:13) (97/72 - 139/64)  BP(mean): 86 (04 Mar 2024 20:00) (77 - 96)  RR: 18 (05 Mar 2024 04:13) (18 - 41)  SpO2: 98% (05 Mar 2024 04:13) (94% - 100%)    Parameters below as of 05 Mar 2024 04:13  Patient On (Oxygen Delivery Method): nasal cannula  O2 Flow (L/min): 2      PHYSICAL EXAM  General: in no acute distress  Head: atraumatic, normocephalic  ENT: sclera anicteric, buccal mucosa moist  Neck: supple, trachea midline  CV: S1 S2, regular rate and rhythm  Lungs: clear to auscultation, no wheezes/rhonchi  Abdomen: soft, nontender, bowel sounds present, no palpable masses  Extrem: no clubbing/cyanosis/edema  Skin: no significant increased ecchymosis/petechiae  Neuro: alert and oriented X3,  no focal deficits      LABS:             8.0    5.22  )-----------( 311      ( 03-05 @ 06:00 )             28.2                8.6    5.64  )-----------( 315      ( 03-04 @ 05:23 )             30.3                8.3    6.62  )-----------( 323      ( 03-03 @ 05:41 )             29.1                8.2    7.51  )-----------( 323      ( 03-02 @ 23:37 )             28.5                8.5    7.38  )-----------( 332      ( 03-02 @ 20:40 )             29.2                8.8    8.18  )-----------( 357      ( 03-02 @ 16:50 )             30.6       03-05    144  |  103  |  5<L>  ----------------------------<  124<H>  3.8   |  37<H>  |  0.61    Ca    9.4      05 Mar 2024 06:00  Phos  2.6     03-05  Mg     1.8     03-05    TPro  5.6<L>  /  Alb  2.5<L>  /  TBili  0.4  /  DBili  x   /  AST  9<L>  /  ALT  11<L>  /  AlkPhos  42  03-05 03-05 @ 10:30  PT-- INR--  PTT47.1  03-05 @ 06:00  PT-- INR--  PTT40.9  03-04 @ 05:23  PT-- INR--  PTT76.3  03-02 @ 23:37  PT-- INR--  PTT91.0  03-02 @ 16:50  PT-- INR--  PTT86.7  03-02 @ 09:30  PT-- INR--  PTT34.7      RADIOLOGY & ADDITIONAL STUDIES:    IMPRESSION/RECOMMENDATIONS:

## 2024-03-05 NOTE — PROGRESS NOTE ADULT - SUBJECTIVE AND OBJECTIVE BOX
[facility]  Bobby Mcdonald PA-C  [address] [place] [address]  [phone]      INTERVAL HPI/OVERNIGHT EVENTS:  Pt s/e  Pt feels well with no GI complaints    MEDICATIONS  (STANDING):  amLODIPine   Tablet 10 milliGRAM(s) Oral daily  budesonide 160 MICROgram(s)/formoterol 4.5 MICROgram(s) Inhaler 2 Puff(s) Inhalation two times a day  heparin  Infusion.  Unit(s)/Hr (10 mL/Hr) IV Continuous <Continuous>  influenza  Vaccine (HIGH DOSE) 0.7 milliLiter(s) IntraMuscular once  pantoprazole    Tablet 40 milliGRAM(s) Oral before breakfast  tiotropium 2.5 MICROgram(s) Inhaler 2 Puff(s) Inhalation daily    MEDICATIONS  (PRN):  acetaminophen     Tablet .. 650 milliGRAM(s) Oral every 6 hours PRN Mild Pain (1 - 3), Moderate Pain (4 - 6)  albuterol    90 MICROgram(s) HFA Inhaler 2 Puff(s) Inhalation every 6 hours PRN Shortness of Breath and/or Wheezing  heparin   Injectable 4000 Unit(s) IV Push every 6 hours PRN For aPTT less than 40  heparin   Injectable 2000 Unit(s) IV Push every 6 hours PRN For aPTT between 40 - 57  HYDROmorphone  Injectable 0.5 milliGRAM(s) IV Push every 4 hours PRN Moderate Pain (4 - 6)      Allergies    No Known Allergies      PHYSICAL EXAM:   Vital Signs:  Vital Signs Last 24 Hrs  T(C): 36.8 (05 Mar 2024 04:13), Max: 37.5 (04 Mar 2024 19:49)  T(F): 98.2 (05 Mar 2024 04:13), Max: 99.5 (04 Mar 2024 19:49)  HR: 94 (05 Mar 2024 04:13) (84 - 107)  BP: 137/69 (05 Mar 2024 04:13) (97/72 - 139/64)  BP(mean): 86 (04 Mar 2024 20:00) (77 - 96)  RR: 18 (05 Mar 2024 04:13) (18 - 41)  SpO2: 98% (05 Mar 2024 04:13) (94% - 100%)    Parameters below as of 05 Mar 2024 04:13  Patient On (Oxygen Delivery Method): nasal cannula  O2 Flow (L/min): 2    Daily     Daily Weight in k.1 (05 Mar 2024 04:13)    GENERAL:  Appears stated age  HEENT:  NC/AT  CHEST:  Full & symmetric excursion  HEART:  Regular rhythm  ABDOMEN:  Soft, non-tender, non-distended  EXTEREMITIES:  no cyanosis  SKIN:  No rash  NEURO:  Alert      LABS:                        8.0    5.22  )-----------( 311      ( 05 Mar 2024 06:00 )             28.2     03-05    144  |  103  |  5<L>  ----------------------------<  124<H>  3.8   |  37<H>  |  0.61    Ca    9.4      05 Mar 2024 06:00  Phos  2.6     03-05  Mg     1.8     03-05    TPro  5.6<L>  /  Alb  2.5<L>  /  TBili  0.4  /  DBili  x   /  AST  9<L>  /  ALT  11<L>  /  AlkPhos  42  03-05    PTT - ( 05 Mar 2024 10:30 )  PTT:47.1 sec  Urinalysis Basic - ( 05 Mar 2024 06:00 )    Color: x / Appearance: x / SG: x / pH: x  Gluc: 124 mg/dL / Ketone: x  / Bili: x / Urobili: x   Blood: x / Protein: x / Nitrite: x   Leuk Esterase: x / RBC: x / WBC x   Sq Epi: x / Non Sq Epi: x / Bacteria: x

## 2024-03-06 LAB
ANION GAP SERPL CALC-SCNC: 4 MMOL/L — LOW (ref 5–17)
APTT BLD: 44 SEC — HIGH (ref 24.5–35.6)
APTT BLD: 92 SEC — HIGH (ref 24.5–35.6)
BUN SERPL-MCNC: 5 MG/DL — LOW (ref 7–23)
CALCIUM SERPL-MCNC: 9.9 MG/DL — SIGNIFICANT CHANGE UP (ref 8.5–10.1)
CHLORIDE SERPL-SCNC: 106 MMOL/L — SIGNIFICANT CHANGE UP (ref 96–108)
CO2 SERPL-SCNC: 37 MMOL/L — HIGH (ref 22–31)
CREAT SERPL-MCNC: 0.72 MG/DL — SIGNIFICANT CHANGE UP (ref 0.5–1.3)
EGFR: 92 ML/MIN/1.73M2 — SIGNIFICANT CHANGE UP
GLUCOSE SERPL-MCNC: 104 MG/DL — HIGH (ref 70–99)
HCT VFR BLD CALC: 30.2 % — LOW (ref 34.5–45)
HGB BLD-MCNC: 8.8 G/DL — LOW (ref 11.5–15.5)
INR BLD: 1.51 RATIO — HIGH (ref 0.85–1.18)
MAGNESIUM SERPL-MCNC: 1.8 MG/DL — SIGNIFICANT CHANGE UP (ref 1.6–2.6)
MCHC RBC-ENTMCNC: 23.7 PG — LOW (ref 27–34)
MCHC RBC-ENTMCNC: 29.1 GM/DL — LOW (ref 32–36)
MCV RBC AUTO: 81.4 FL — SIGNIFICANT CHANGE UP (ref 80–100)
NRBC # BLD: 0 /100 WBCS — SIGNIFICANT CHANGE UP (ref 0–0)
PHOSPHATE SERPL-MCNC: 3 MG/DL — SIGNIFICANT CHANGE UP (ref 2.5–4.5)
PLATELET # BLD AUTO: 293 K/UL — SIGNIFICANT CHANGE UP (ref 150–400)
POTASSIUM SERPL-MCNC: 4.1 MMOL/L — SIGNIFICANT CHANGE UP (ref 3.5–5.3)
POTASSIUM SERPL-SCNC: 4.1 MMOL/L — SIGNIFICANT CHANGE UP (ref 3.5–5.3)
PROTHROM AB SERPL-ACNC: 17.5 SEC — HIGH (ref 9.5–13)
RBC # BLD: 3.71 M/UL — LOW (ref 3.8–5.2)
RBC # FLD: 21.8 % — HIGH (ref 10.3–14.5)
SODIUM SERPL-SCNC: 147 MMOL/L — HIGH (ref 135–145)
SURGICAL PATHOLOGY STUDY: SIGNIFICANT CHANGE UP
WBC # BLD: 5.13 K/UL — SIGNIFICANT CHANGE UP (ref 3.8–10.5)
WBC # FLD AUTO: 5.13 K/UL — SIGNIFICANT CHANGE UP (ref 3.8–10.5)

## 2024-03-06 PROCEDURE — 99233 SBSQ HOSP IP/OBS HIGH 50: CPT | Mod: GC

## 2024-03-06 PROCEDURE — 99232 SBSQ HOSP IP/OBS MODERATE 35: CPT

## 2024-03-06 RX ORDER — APIXABAN 2.5 MG/1
10 TABLET, FILM COATED ORAL EVERY 12 HOURS
Refills: 0 | Status: DISCONTINUED | OUTPATIENT
Start: 2024-03-06 | End: 2024-03-06

## 2024-03-06 RX ORDER — APIXABAN 2.5 MG/1
5 TABLET, FILM COATED ORAL EVERY 12 HOURS
Refills: 0 | Status: CANCELLED | OUTPATIENT
Start: 2024-03-12 | End: 2024-03-07

## 2024-03-06 RX ORDER — APIXABAN 2.5 MG/1
10 TABLET, FILM COATED ORAL ONCE
Refills: 0 | Status: COMPLETED | OUTPATIENT
Start: 2024-03-06 | End: 2024-03-06

## 2024-03-06 RX ORDER — APIXABAN 2.5 MG/1
10 TABLET, FILM COATED ORAL EVERY 12 HOURS
Refills: 0 | Status: DISCONTINUED | OUTPATIENT
Start: 2024-03-06 | End: 2024-03-07

## 2024-03-06 RX ORDER — SODIUM CHLORIDE 9 MG/ML
1000 INJECTION, SOLUTION INTRAVENOUS
Refills: 0 | Status: DISCONTINUED | OUTPATIENT
Start: 2024-03-06 | End: 2024-03-07

## 2024-03-06 RX ORDER — APIXABAN 2.5 MG/1
1 TABLET, FILM COATED ORAL
Qty: 40 | Refills: 0
Start: 2024-03-06 | End: 2024-03-10

## 2024-03-06 RX ORDER — LANOLIN ALCOHOL/MO/W.PET/CERES
5 CREAM (GRAM) TOPICAL ONCE
Refills: 0 | Status: COMPLETED | OUTPATIENT
Start: 2024-03-06 | End: 2024-03-06

## 2024-03-06 RX ADMIN — PANTOPRAZOLE SODIUM 40 MILLIGRAM(S): 20 TABLET, DELAYED RELEASE ORAL at 05:52

## 2024-03-06 RX ADMIN — BUDESONIDE AND FORMOTEROL FUMARATE DIHYDRATE 2 PUFF(S): 160; 4.5 AEROSOL RESPIRATORY (INHALATION) at 18:57

## 2024-03-06 RX ADMIN — APIXABAN 10 MILLIGRAM(S): 2.5 TABLET, FILM COATED ORAL at 20:09

## 2024-03-06 RX ADMIN — Medication 0.25 MILLIGRAM(S): at 18:57

## 2024-03-06 RX ADMIN — BUDESONIDE AND FORMOTEROL FUMARATE DIHYDRATE 2 PUFF(S): 160; 4.5 AEROSOL RESPIRATORY (INHALATION) at 05:52

## 2024-03-06 RX ADMIN — HEPARIN SODIUM 1000 UNIT(S)/HR: 5000 INJECTION INTRAVENOUS; SUBCUTANEOUS at 05:46

## 2024-03-06 RX ADMIN — Medication 5 MILLIGRAM(S): at 22:05

## 2024-03-06 RX ADMIN — AMLODIPINE BESYLATE 10 MILLIGRAM(S): 2.5 TABLET ORAL at 05:51

## 2024-03-06 RX ADMIN — Medication 650 MILLIGRAM(S): at 15:16

## 2024-03-06 RX ADMIN — APIXABAN 10 MILLIGRAM(S): 2.5 TABLET, FILM COATED ORAL at 09:11

## 2024-03-06 RX ADMIN — TIOTROPIUM BROMIDE 2 PUFF(S): 18 CAPSULE ORAL; RESPIRATORY (INHALATION) at 05:52

## 2024-03-06 RX ADMIN — SODIUM CHLORIDE 70 MILLILITER(S): 9 INJECTION, SOLUTION INTRAVENOUS at 15:12

## 2024-03-06 RX ADMIN — HEPARIN SODIUM 1000 UNIT(S)/HR: 5000 INJECTION INTRAVENOUS; SUBCUTANEOUS at 07:32

## 2024-03-06 RX ADMIN — Medication 650 MILLIGRAM(S): at 16:16

## 2024-03-06 NOTE — PROGRESS NOTE ADULT - SUBJECTIVE AND OBJECTIVE BOX
[facility] -- Alexa Ramirez Pannella, Patel, Savella Goodger, Cohen  Office # [phone]      Follow Up:    Cardiac optimization   Subjective/Observations:   No events overnight resting comfortably in bed.  No complaints of chest pain, dyspnea, or palpitations reported. No signs of orthopnea or PND.  remains on nc wants to keep on feels anxious at times     REVIEW OF SYSTEMS: All other review of systems is negative unless indicated above    PAST MEDICAL & SURGICAL HISTORY:  COPD (chronic obstructive pulmonary disease)      Rheumatoid arthritis      HTN (hypertension)      Anxiety      No significant past surgical history          MEDICATIONS  (STANDING):  amLODIPine   Tablet 10 milliGRAM(s) Oral daily  apixaban 10 milliGRAM(s) Oral every 12 hours  budesonide 160 MICROgram(s)/formoterol 4.5 MICROgram(s) Inhaler 2 Puff(s) Inhalation two times a day  influenza  Vaccine (HIGH DOSE) 0.7 milliLiter(s) IntraMuscular once  pantoprazole    Tablet 40 milliGRAM(s) Oral before breakfast  tiotropium 2.5 MICROgram(s) Inhaler 2 Puff(s) Inhalation daily    MEDICATIONS  (PRN):  acetaminophen     Tablet .. 650 milliGRAM(s) Oral every 6 hours PRN Mild Pain (1 - 3), Moderate Pain (4 - 6)  albuterol    90 MICROgram(s) HFA Inhaler 2 Puff(s) Inhalation every 6 hours PRN Shortness of Breath and/or Wheezing  ALPRAZolam 0.25 milliGRAM(s) Oral daily PRN for anxiety      Allergies    No Known Allergies    Intolerances        Vital Signs Last 24 Hrs  T(C): 36.6 (06 Mar 2024 04:41), Max: 37.3 (05 Mar 2024 12:21)  T(F): 97.8 (06 Mar 2024 04:41), Max: 99.2 (05 Mar 2024 12:21)  HR: 97 (06 Mar 2024 04:41) (87 - 97)  BP: 155/79 (06 Mar 2024 04:41) (119/73 - 155/79)  BP(mean): --  RR: 18 (06 Mar 2024 04:41) (17 - 18)  SpO2: 99% (06 Mar 2024 04:41) (93% - 99%)    Parameters below as of 06 Mar 2024 04:41  Patient On (Oxygen Delivery Method): nasal cannula  O2 Flow (L/min): 2      I&O's Summary    05 Mar 2024 07:01  -  06 Mar 2024 07:00  --------------------------------------------------------  IN: 110 mL / OUT: 0 mL / NET: 110 mL          PHYSICAL EXAM:  TELE: Not on tele   Constitutional: NAD, awake and alert, frail   HEENT: Moist Mucous Membranes, Anicteric  Pulmonary: Non-labored, breath sounds are clear bilaterally, No wheezing, crackles or rhonchi  Cardiovascular: Regular, S1 and S2 nl, No murmurs, rubs, gallops or clicks  Gastrointestinal: Bowel Sounds present, soft, nontender.   Lymph: No lymphadenopathy. No peripheral edema.  Skin: No visible rashes or ulcers.  Psych:  Mood & affect appropriate    LABS: All Labs Reviewed:                        8.8    5.13  )-----------( 293      ( 06 Mar 2024 04:50 )             30.2                         8.0    5.22  )-----------( 311      ( 05 Mar 2024 06:00 )             28.2                         8.6    5.64  )-----------( 315      ( 04 Mar 2024 05:23 )             30.3     06 Mar 2024 04:50    147    |  106    |  5      ----------------------------<  104    4.1     |  37     |  0.72   05 Mar 2024 06:00    144    |  103    |  5      ----------------------------<  124    3.8     |  37     |  0.61   04 Mar 2024 05:23    143    |  103    |  5      ----------------------------<  114    3.9     |  36     |  0.50     Ca    9.9        06 Mar 2024 04:50  Ca    9.4        05 Mar 2024 06:00  Ca    9.4        04 Mar 2024 05:23  Phos  3.0       06 Mar 2024 04:50  Phos  2.6       05 Mar 2024 06:00  Phos  2.8       04 Mar 2024 05:23  Mg     1.8       06 Mar 2024 04:50  Mg     1.8       05 Mar 2024 06:00  Mg     1.9       04 Mar 2024 05:23    TPro  5.6    /  Alb  2.5    /  TBili  0.4    /  DBili  x      /  AST  9      /  ALT  11     /  AlkPhos  42     05 Mar 2024 06:00    PT/INR - ( 06 Mar 2024 04:50 )   PT: 17.5 sec;   INR: 1.51 ratio         PTT - ( 06 Mar 2024 04:50 )  PTT:92.0 sec         EC Lead ECG:   Ventricular Rate 98 BPM    Atrial Rate 98 BPM    P-R Interval 154 ms    QRS Duration 76 ms    Q-T Interval 340 ms    QTC Calculation(Bazett) 434 ms    P Axis 88 degrees    R Axis 51 degrees    T Axis 96 degrees    Diagnosis Line Normal sinus rhythm  Minimal voltage criteria for LVH, may be normal variant ( Sokolow-Jason )  Nonspecific T wave abnormality  Abnormal ECG  When compared with ECG of 2024 11:54,  No significant change was found  Confirmed by ANETTE SEALS ([id number]) on 2024 6:48:17 PM (02-15-24 @ 23:33)      TRANSTHORACIC ECHOCARDIOGRAM REPORT  ________________________________________________________________________________                                      _______       Pt. Name:       KENDALL RIDER Study Date:    2024  MRN:            [id number]             YOB: 1955  Accession #:    [id number]            Age:           68 years  Account#:       [id number]           Gender:        F  Heart Rate:                          Height:        64.96 in (165.00 cm)  Rhythm:                      Weight:        130.07 lb (59.00 kg)  Blood Pressure: 129/71 mmHg          BSA/BMI:       1.65 m² / 21.67 kg/m²  ________________________________________________________________________________________  Referring Physician:    [id number] Robbie Boone  Interpreting Physician: Anette Seals  Primary Sonographer:    Judy Braga ASHLEY    CPT:               ECHO TTE WO CON COMP W DOPP - 01404.m  Indication(s):     Dyspnea, unspecified - R06.00  Procedure:         Transthoracic echocardiogram with 2-D, M-mode and complete                     spectral and color flow Doppler.  Ordering Location: [facility]  Admission Status:  Inpatient    _______________________________________________________________________________________     CONCLUSIONS:      1. Left ventricular systolic function is normal with an ejection fraction visually estimated at 60 to 65 %.   2. There is normal LV mass and concentric remodeling.   3. Normal right ventricular cavity size and normal systolic function.  4. The left atrium is normal.   5. Structurally normal mitral valve with normal leaflet excursion.   6. Trileaflet aortic valve with normal systolic excursion.   7. Structurally normal tricuspid valve with normal leaflet excursion. Pulmonary artery systolic pressure could not be estimated.   8. No pericardial effusion seen.    ________________________________________________________________________________________  FINDINGS:     Left Ventricle:  Left ventricular systolic function is normal with an ejection fraction visually estimated at 60 to 65%. There is normal LV mass and concentric remodeling.     Right Ventricle:  The right ventricular cavity is normal in size and normal systolic function.     Left Atrium:  The left atrium is normal.     Right Atrium:  The right atrium is normal in size.     Aortic Valve:  The aortic valve appears trileaflet with normal systolic excursion.     Mitral Valve:  Structurally normal mitral valve with normal leaflet excursion.     Tricuspid Valve:  Structurally normal tricuspid valve with normal leaflet excursion. There is insufficient tricuspid regurgitation detected to calculate pulmonary artery systolic pressure.     Pulmonic Valve:  The pulmonic valve was not well visualized.     Pericardium:  No pericardial effusion seen.     Systemic Veins:  The inferior vena cava is dilated (dilated >2.1cm) with normal inspiratory collapse (normal >50%) consistent with mildly elevated right atrial pressure (~8, range 5-10mmHg).  ____________________________________________________________________  QUANTITATIVE DATA:  Left Ventricle Measurements: (Indexed to BSA)     IVSd (2D):   1.4 cm  LVPWd (2D):  1.1 cm  LVIDd (2D):  3.1 cm  LVIDs (2D):  2.1 cm  LV Mass:     122 g  74.0 g/m²  Visualized LV EF%: 60 to 65%     MV E Vmax:    0.75 m/s  MV A Vmax:    0.79 m/s  MV E/A:       0.95  e' lateral:   15.70 cm/s  e' medial:    10.90 cm/s  E/e' lateral: 4.77  E/e' medial:  6.87  E/e' Average: 5.63  MV DT:        211 msec    Aorta Measurements: (Normal range) (Indexed to BSA)     Sinuses of Valsalva: 2.80 cm (2.7 - 3.3 cm)       Left Atrium Measurements: (Indexed to BSA)  LA Diam 2D: 2.80 cm    Mitral Valve Measurements:     MV E Vmax: 0.7 m/s  MV A Vmax: 0.8 m/s  MV E/A:    1.0       Tricuspid Valve Measurements:     RA Pressure: 8 mmHg    ________________________________________________________________________________________

## 2024-03-06 NOTE — PROGRESS NOTE ADULT - SUBJECTIVE AND OBJECTIVE BOX
Patient is a 68y old  Female who presents with a chief complaint of GIB bleed (06 Mar 2024 19:36)      INTERVAL HPI/OVERNIGHT EVENTS:    denies shortness of breath. Using oxygen.    MEDICATIONS  (STANDING):  amLODIPine   Tablet 10 milliGRAM(s) Oral daily  apixaban 10 milliGRAM(s) Oral every 12 hours  budesonide 160 MICROgram(s)/formoterol 4.5 MICROgram(s) Inhaler 2 Puff(s) Inhalation two times a day  dextrose 5%. 1000 milliLiter(s) (70 mL/Hr) IV Continuous <Continuous>  influenza  Vaccine (HIGH DOSE) 0.7 milliLiter(s) IntraMuscular once  pantoprazole    Tablet 40 milliGRAM(s) Oral before breakfast  tiotropium 2.5 MICROgram(s) Inhaler 2 Puff(s) Inhalation daily      MEDICATIONS  (PRN):  acetaminophen     Tablet .. 650 milliGRAM(s) Oral every 6 hours PRN Mild Pain (1 - 3), Moderate Pain (4 - 6)  albuterol    90 MICROgram(s) HFA Inhaler 2 Puff(s) Inhalation every 6 hours PRN Shortness of Breath and/or Wheezing  ALPRAZolam 0.25 milliGRAM(s) Oral daily PRN for anxiety      Allergies    No Known Allergies    Intolerances        PAST MEDICAL & SURGICAL HISTORY:  COPD (chronic obstructive pulmonary disease)      Rheumatoid arthritis      HTN (hypertension)      Anxiety      No significant past surgical history          Vital Signs Last 24 Hrs  T(C): 36.6 (06 Mar 2024 20:25), Max: 37.2 (06 Mar 2024 13:46)  T(F): 97.8 (06 Mar 2024 20:25), Max: 98.9 (06 Mar 2024 13:46)  HR: 92 (06 Mar 2024 20:25) (92 - 114)  BP: 121/72 (06 Mar 2024 20:25) (121/72 - 155/79)  BP(mean): --  RR: 18 (06 Mar 2024 20:25) (18 - 20)  SpO2: 94% (06 Mar 2024 20:25) (86% - 99%)    Parameters below as of 06 Mar 2024 20:25  Patient On (Oxygen Delivery Method): room air        PHYSICAL EXAMINATION:    GENERAL: The patient is awake and alert in no apparent distress.     HEENT: Head is normocephalic and atraumatic.    NECK: no JVD    LUNGS: diminished air entry bilateral    HEART: Regular rate and rhythm without murmur.    ABDOMEN: Soft, nontender, and nondistended.      EXTREMITIES: Without any cyanosis, clubbing, rash, lesions or edema.    NEUROLOGIC: Grossly intact.    SKIN: No ulceration or induration present.      LABS:                        8.8    5.13  )-----------( 293      ( 06 Mar 2024 04:50 )             30.2     03-06    147<H>  |  106  |  5<L>  ----------------------------<  104<H>  4.1   |  37<H>  |  0.72    Ca    9.9      06 Mar 2024 04:50  Phos  3.0     03-06  Mg     1.8     03-06    TPro  5.6<L>  /  Alb  2.5<L>  /  TBili  0.4  /  DBili  x   /  AST  9<L>  /  ALT  11<L>  /  AlkPhos  42  03-05    PT/INR - ( 06 Mar 2024 04:50 )   PT: 17.5 sec;   INR: 1.51 ratio         PTT - ( 06 Mar 2024 11:35 )  PTT:44.0 sec  Urinalysis Basic - ( 06 Mar 2024 04:50 )    Color: x / Appearance: x / SG: x / pH: x  Gluc: 104 mg/dL / Ketone: x  / Bili: x / Urobili: x   Blood: x / Protein: x / Nitrite: x   Leuk Esterase: x / RBC: x / WBC x   Sq Epi: x / Non Sq Epi: x / Bacteria: x                Assessment:    Severe COPD  S/P Pulmonary emboli  Acute on Chronic Hypoxic respiratory failure  Colon CA    Plan:    Anticoagulation with Eliquis  Supplemental oxygen  Discharge planning  Oncology follow-up   Symbicort + Spiriva inhalers

## 2024-03-06 NOTE — PROGRESS NOTE ADULT - ASSESSMENT
68 year old female with PMHx COPD (not on home oxygen), HTN, RA, anxiety admitted with GI bleed.    cardiac clearance, HTN, PE  - CT chest with acute pulmonary embolus in the right middle lobe lobar and segmental branches  - Pt a/w GIB, s/p colonoscopy 2/20, large partially obstructing mass noted at approximately 13cm from anal verge.   - now s/p Laparoscopic colorectal anastomosis on 2/29/24, pathology positive for adenocarcinoma  - -fu hem onc recs  -does not have insurance was discussed yesterday and plan was to switch back to IV heparin , however now patient dw family and is able to afford the Eliquis   - 02 supplementation as needed- would wean off but pt is requesting it that it helps her anxiety     - No sign of volume overload on exam   - No severe valvular abnormalities noted on examination  - TTE 2/22/23 shows normal LV and RV size and function, EF 60-65%     - EKG: NSR with nonspecific TWA  - lipid profile reviewed start statin     -bp stable back on home norvasc     - Monitor and replete lytes, keep K>4, Mg>2.  - Will continue to follow.

## 2024-03-06 NOTE — CHART NOTE - NSCHARTNOTEFT_GEN_A_CORE
69yo F with PMHx COPD (not on home oxygen), HTN, RA, anxiety presenting with chief complaint of blood in stools. Admitted for GI bleed, undewent colonoscopy 2/20. Patient was found with Colonic mass. S/p surgery on  02/29/2024 :  lap sigmoid colon resection w/ colorectal anastomosis. Pathology noted for adenocarcinoma, heme/onc following. HGB currently stable- On regular  diet, tolerating well  . later -CT chest: No evidence of metastatic disease. Acute pulmonary embolus in the right middle lobe lobar and segmental branches, started On Eliquis for PE. Patient also requiring Oxygen therapy, CM following for home oxygen.     Patient was started on Eliquis 3/4, then changed to heparin/coumadin bridge since pt/family stated Eliquis too expensive. This am, patient's sister called and determined they will pay for Eliquis ($500-600) since patient does not have Medicare part B (already applied for part B). After further discussion with pt/sister and Dr. Rodríguez, patient agrees to pay for Eliquis for treatment of PE. Clinical pharmacist Marie sawyer. [facility] meds to beds to be arranged.    Patient to be discharged when oxygen, Eliquis has been arranged.

## 2024-03-06 NOTE — PROGRESS NOTE ADULT - ASSESSMENT
Surg. Att.  Pt. is awaiting discharge to home. Surg. Att.  Pt. is awaiting discharge to home.  Path report reviewed.Oncology team is involved in pt's care. Pt is aware of cancer diagnosis. Details of post op onc. treatment will be discussed by Onc team.

## 2024-03-06 NOTE — PROGRESS NOTE ADULT - SUBJECTIVE AND OBJECTIVE BOX
Patient is a 69 yo F s/p laparoscopic sigmoid resection on 02/29/24, a/w PE and UA+. Patient was seen at bedside and reports feeling well and ready to go home. She reports passing gas, tolerating her diet. Denies chills, N/V/D, significant pain except for touching the belly.     T(C): 36.6 (03-06-24 @ 04:41), Max: 37.3 (03-05-24 @ 12:21)  HR: 97 (03-06-24 @ 04:41) (87 - 97)  BP: 155/79 (03-06-24 @ 04:41) (119/73 - 155/79)  RR: 18 (03-06-24 @ 04:41) (17 - 18)  SpO2: 99% (03-06-24 @ 04:41) (93% - 99%)      PHYSICAL EXAM:  General: no acute distress, appears comfortable  HEENT: normocephalic, anicteric  Pulm: non labored respirations  Cardio: RRR  Abdomen: Tender to light and deep palpation at the midline. 3 incisions are healing appropriately with Dermabond. No erythema, calor or skin eruptions present.   Extremities: no calf tenderness noted    I&O's Detail    05 Mar 2024 07:01  -  06 Mar 2024 07:00  --------------------------------------------------------  IN:    Heparin Infusion: 110 mL  Total IN: 110 mL    OUT:  Total OUT: 0 mL    Total NET: 110 mL          LABS:                        8.8    5.13  )-----------( 293      ( 06 Mar 2024 04:50 )             30.2     03-06    147<H>  |  106  |  5<L>  ----------------------------<  104<H>  4.1   |  37<H>  |  0.72    Ca    9.9      06 Mar 2024 04:50  Phos  3.0     03-06  Mg     1.8     03-06    TPro  5.6<L>  /  Alb  2.5<L>  /  TBili  0.4  /  DBili  x   /  AST  9<L>  /  ALT  11<L>  /  AlkPhos  42  03-05    PT/INR - ( 06 Mar 2024 04:50 )   PT: 17.5 sec;   INR: 1.51 ratio         PTT - ( 06 Mar 2024 04:50 )  PTT:92.0 sec  Urinalysis Basic - ( 06 Mar 2024 04:50 )    Color: x / Appearance: x / SG: x / pH: x  Gluc: 104 mg/dL / Ketone: x  / Bili: x / Urobili: x   Blood: x / Protein: x / Nitrite: x   Leuk Esterase: x / RBC: x / WBC x   Sq Epi: x / Non Sq Epi: x / Bacteria: x      MEDICATIONS:  acetaminophen     Tablet .. 650 milliGRAM(s) Oral every 6 hours PRN  albuterol    90 MICROgram(s) HFA Inhaler 2 Puff(s) Inhalation every 6 hours PRN  ALPRAZolam 0.25 milliGRAM(s) Oral daily PRN  amLODIPine   Tablet 10 milliGRAM(s) Oral daily  budesonide 160 MICROgram(s)/formoterol 4.5 MICROgram(s) Inhaler 2 Puff(s) Inhalation two times a day  heparin   Injectable 4000 Unit(s) IV Push every 6 hours PRN  heparin   Injectable 2000 Unit(s) IV Push every 6 hours PRN  heparin  Infusion.  Unit(s)/Hr IV Continuous <Continuous>  HYDROmorphone  Injectable 0.5 milliGRAM(s) IV Push every 4 hours PRN  influenza  Vaccine (HIGH DOSE) 0.7 milliLiter(s) IntraMuscular once  pantoprazole    Tablet 40 milliGRAM(s) Oral before breakfast  tiotropium 2.5 MICROgram(s) Inhaler 2 Puff(s) Inhalation daily              67 y/o F POD # 6 s/p lap sigmoid resection     -Discharge pending bridge to oral anticoagulation for PE  - Cont reg diet  - Pain control as needed  - Encourage OOB/ambulation & incentive spirometry  - Monitor bowel function/serial abdominal exams  - Trend daily labs  - DVT Prophylaxis with SCDs  - Above to be discussed with Dr. Rocha    Surgical Team  Spectralink: [id number] Patient is a 67 yo F s/p laparoscopic sigmoid resection on 02/29/24, a/w PE and UA+. Patient was seen at bedside and reports feeling well and ready to go home. She reports passing gas, tolerating her diet. Denies chills, N/V/D, no significant pain except for touching the belly.     T(C): 36.6 (03-06-24 @ 04:41), Max: 37.3 (03-05-24 @ 12:21)  HR: 97 (03-06-24 @ 04:41) (87 - 97)  BP: 155/79 (03-06-24 @ 04:41) (119/73 - 155/79)  RR: 18 (03-06-24 @ 04:41) (17 - 18)  SpO2: 99% (03-06-24 @ 04:41) (93% - 99%)      PHYSICAL EXAM:  General: no acute distress, appears comfortable  HEENT: normocephalic, anicteric  Pulm: non labored respirations  Cardio: RRR  Abdomen: Tender to light and deep palpation at the midline. 3 incisions are healing appropriately with Dermabond. No erythema, calor or skin eruptions present.   Extremities: no calf tenderness noted    I&O's Detail    05 Mar 2024 07:01  -  06 Mar 2024 07:00  --------------------------------------------------------  IN:    Heparin Infusion: 110 mL  Total IN: 110 mL    OUT:  Total OUT: 0 mL    Total NET: 110 mL          LABS:                        8.8    5.13  )-----------( 293      ( 06 Mar 2024 04:50 )             30.2     03-06    147<H>  |  106  |  5<L>  ----------------------------<  104<H>  4.1   |  37<H>  |  0.72    Ca    9.9      06 Mar 2024 04:50  Phos  3.0     03-06  Mg     1.8     03-06    TPro  5.6<L>  /  Alb  2.5<L>  /  TBili  0.4  /  DBili  x   /  AST  9<L>  /  ALT  11<L>  /  AlkPhos  42  03-05    PT/INR - ( 06 Mar 2024 04:50 )   PT: 17.5 sec;   INR: 1.51 ratio         PTT - ( 06 Mar 2024 04:50 )  PTT:92.0 sec  Urinalysis Basic - ( 06 Mar 2024 04:50 )    Color: x / Appearance: x / SG: x / pH: x  Gluc: 104 mg/dL / Ketone: x  / Bili: x / Urobili: x   Blood: x / Protein: x / Nitrite: x   Leuk Esterase: x / RBC: x / WBC x   Sq Epi: x / Non Sq Epi: x / Bacteria: x      MEDICATIONS:  acetaminophen     Tablet .. 650 milliGRAM(s) Oral every 6 hours PRN  albuterol    90 MICROgram(s) HFA Inhaler 2 Puff(s) Inhalation every 6 hours PRN  ALPRAZolam 0.25 milliGRAM(s) Oral daily PRN  amLODIPine   Tablet 10 milliGRAM(s) Oral daily  budesonide 160 MICROgram(s)/formoterol 4.5 MICROgram(s) Inhaler 2 Puff(s) Inhalation two times a day  heparin   Injectable 4000 Unit(s) IV Push every 6 hours PRN  heparin   Injectable 2000 Unit(s) IV Push every 6 hours PRN  heparin  Infusion.  Unit(s)/Hr IV Continuous <Continuous>  HYDROmorphone  Injectable 0.5 milliGRAM(s) IV Push every 4 hours PRN  influenza  Vaccine (HIGH DOSE) 0.7 milliLiter(s) IntraMuscular once  pantoprazole    Tablet 40 milliGRAM(s) Oral before breakfast  tiotropium 2.5 MICROgram(s) Inhaler 2 Puff(s) Inhalation daily              69 y/o F POD # 6 s/p lap sigmoid resection     -Discharge pending bridge to oral anticoagulation for PE  - Cont reg diet  - Pain control as needed  - Encourage OOB/ambulation & incentive spirometry  - Monitor bowel function/serial abdominal exams  - Trend daily labs  - DVT Prophylaxis with SCDs  - Above to be discussed with Dr. Rocha    Surgical Team  Spectralink: [id number]

## 2024-03-06 NOTE — PROGRESS NOTE ADULT - SUBJECTIVE AND OBJECTIVE BOX
[facility]  Bobby Mcdonald PA-C  [address] [place] [address]  [phone]      INTERVAL HPI/OVERNIGHT EVENTS:  Pt s/e  No GI events    MEDICATIONS  (STANDING):  amLODIPine   Tablet 10 milliGRAM(s) Oral daily  apixaban 10 milliGRAM(s) Oral every 12 hours  budesonide 160 MICROgram(s)/formoterol 4.5 MICROgram(s) Inhaler 2 Puff(s) Inhalation two times a day  influenza  Vaccine (HIGH DOSE) 0.7 milliLiter(s) IntraMuscular once  pantoprazole    Tablet 40 milliGRAM(s) Oral before breakfast  tiotropium 2.5 MICROgram(s) Inhaler 2 Puff(s) Inhalation daily    MEDICATIONS  (PRN):  acetaminophen     Tablet .. 650 milliGRAM(s) Oral every 6 hours PRN Mild Pain (1 - 3), Moderate Pain (4 - 6)  albuterol    90 MICROgram(s) HFA Inhaler 2 Puff(s) Inhalation every 6 hours PRN Shortness of Breath and/or Wheezing  ALPRAZolam 0.25 milliGRAM(s) Oral daily PRN for anxiety      Allergies    No Known Allergies    Intolerances        PHYSICAL EXAM:   Vital Signs:  Vital Signs Last 24 Hrs  T(C): 36.6 (06 Mar 2024 04:41), Max: 37.3 (05 Mar 2024 12:21)  T(F): 97.8 (06 Mar 2024 04:41), Max: 99.2 (05 Mar 2024 12:21)  HR: 97 (06 Mar 2024 04:41) (87 - 97)  BP: 155/79 (06 Mar 2024 04:41) (119/73 - 155/79)  BP(mean): --  RR: 18 (06 Mar 2024 04:41) (17 - 18)  SpO2: 99% (06 Mar 2024 04:41) (93% - 99%)    Parameters below as of 06 Mar 2024 04:41  Patient On (Oxygen Delivery Method): nasal cannula  O2 Flow (L/min): 2    Daily     Daily Weight in k.4 (06 Mar 2024 04:41)    GENERAL:  Appears stated age  HEENT:  NC/AT  CHEST:  Full & symmetric excursion  HEART:  Regular rhythm  ABDOMEN:  Soft, non-tender, non-distended  EXTEREMITIES:  no cyanosis  SKIN:  No rash  NEURO:  Alert      LABS:                        8.8    5.13  )-----------( 293      ( 06 Mar 2024 04:50 )             30.2     03-06    147<H>  |  106  |  5<L>  ----------------------------<  104<H>  4.1   |  37<H>  |  0.72    Ca    9.9      06 Mar 2024 04:50  Phos  3.0     03-06  Mg     1.8     03-06    TPro  5.6<L>  /  Alb  2.5<L>  /  TBili  0.4  /  DBili  x   /  AST  9<L>  /  ALT  11<L>  /  AlkPhos  42  03-05    PT/INR - ( 06 Mar 2024 04:50 )   PT: 17.5 sec;   INR: 1.51 ratio         PTT - ( 06 Mar 2024 04:50 )  PTT:92.0 sec  Urinalysis Basic - ( 06 Mar 2024 04:50 )    Color: x / Appearance: x / SG: x / pH: x  Gluc: 104 mg/dL / Ketone: x  / Bili: x / Urobili: x   Blood: x / Protein: x / Nitrite: x   Leuk Esterase: x / RBC: x / WBC x   Sq Epi: x / Non Sq Epi: x / Bacteria: x        RADIOLOGY & ADDITIONAL TESTS:

## 2024-03-06 NOTE — PROGRESS NOTE ADULT - TIME BILLING
Note written by attending. Meds, labs, vitals, chart reviewed
activities including direct patient care, counseling and/or coordinating care, reviewing notes/lab data/imaging, and discussion with multidisciplinary team (excluding time spent on resident teaching)
Note written by attending. Meds, labs, vitals, chart reviewed
Note written by attending. Meds, labs, vitals, chart reviewed. d/w patient
Reviewed with patient in detail, family at bedside, Surgical PA's and Residents as well as today's RN team.
Note written by attending. Meds, labs, vitals, chart reviewed. d/w patient
chart and data review, clinical assessment, and coordination of care. This excludes any time spent on separate procedures or teaching.
chart and data review, clinical assessment, and coordination of care. This excludes any time spent on separate procedures or teaching.
Note written by attending. Meds, labs, vitals, chart reviewed. d/w patient

## 2024-03-06 NOTE — PROGRESS NOTE ADULT - SUBJECTIVE AND OBJECTIVE BOX
Patient is a 68y old  Female who presents with a chief complaint of GIB bleed (06 Mar 2024 10:58)    pt seen and examine  today awake , no fever , tolerating reg diet . pulse ox 2lit  nc 99   INTERVAL HPI/OVERNIGHT EVENTS:     T(C): 36.6 (03-06-24 @ 04:41), Max: 37.3 (03-05-24 @ 12:21)  HR: 97 (03-06-24 @ 04:41) (87 - 97)  BP: 155/79 (03-06-24 @ 04:41) (119/73 - 155/79)  RR: 18 (03-06-24 @ 04:41) (17 - 18)  SpO2: 99% (03-06-24 @ 04:41) (93% - 99%)  Wt(kg): --  I&O's Summary    05 Mar 2024 07:01  -  06 Mar 2024 07:00  --------------------------------------------------------  IN: 110 mL / OUT: 0 mL / NET: 110 mL        REVIEW OF SYSTEMS:  CONSTITUTIONAL: No fever, weight loss, or fatigue  EYES: No eye pain, visual disturbances, or discharge  ENMT:  No difficulty hearing, tinnitus, vertigo; No sinus or throat pain  NECK: No pain or stiffness  BREASTS: No pain, no masses  RESPIRATORY: No cough, wheezing, chills   No shortness of breath  CARDIOVASCULAR: No chest pain, palpitations, dizziness, or leg swelling  GASTROINTESTINAL: No abdominal or epigastric pain. No nausea, vomiting,   No diarrhea or constipation. No melena or hematochezia.  GENITOURINARY: No dysuria, frequency, hematuria, or incontinence  NEUROLOGICAL: No headaches, memory loss, loss of strength, numbness, or tremors  SKIN: No itching, burning, rashes, or lesions   MUSCULOSKELETAL: No joint pain or swelling; No muscle, back, or extremity pain    PHYSICAL EXAM:  GENERAL: NAD,   HEAD:  Atraumatic, Normocephalic  EYES: EOMI, PERRLA, conjunctiva and sclera clear  ENMT:   Moist mucous membranes  NECK: Supple, No JVD  NERVOUS SYSTEM:  Alert & Oriented X3; Motor Strength 5/5 B/L upper and lower extremities; DTRs 2+ intact and symmetric  CHEST/LUNG:   percussion bilaterally; No rales, rhonchi, wheezing   HEART: Regular rate and rhythm; No murmurs,   ABDOMEN: Soft, Nontender, Nondistended; Bowel sounds present surg site dry / clean   EXTREMITIES:  2+ Peripheral Pulses, No clubbing, cyanosis, or edema  SKIN: No rashes or lesions    MEDICATIONS  (STANDING):  amLODIPine   Tablet 10 milliGRAM(s) Oral daily  apixaban 10 milliGRAM(s) Oral every 12 hours  budesonide 160 MICROgram(s)/formoterol 4.5 MICROgram(s) Inhaler 2 Puff(s) Inhalation two times a day  influenza  Vaccine (HIGH DOSE) 0.7 milliLiter(s) IntraMuscular once  pantoprazole    Tablet 40 milliGRAM(s) Oral before breakfast  tiotropium 2.5 MICROgram(s) Inhaler 2 Puff(s) Inhalation daily    MEDICATIONS  (PRN):  acetaminophen     Tablet .. 650 milliGRAM(s) Oral every 6 hours PRN Mild Pain (1 - 3), Moderate Pain (4 - 6)  albuterol    90 MICROgram(s) HFA Inhaler 2 Puff(s) Inhalation every 6 hours PRN Shortness of Breath and/or Wheezing  ALPRAZolam 0.25 milliGRAM(s) Oral daily PRN for anxiety      LABS:                        8.8    5.13  )-----------( 293      ( 06 Mar 2024 04:50 )             30.2     03-06    147<H>  |  106  |  5<L>  ----------------------------<  104<H>  4.1   |  37<H>  |  0.72    Ca    9.9      06 Mar 2024 04:50  Phos  3.0     03-06  Mg     1.8     03-06    TPro  5.6<L>  /  Alb  2.5<L>  /  TBili  0.4  /  DBili  x   /  AST  9<L>  /  ALT  11<L>  /  AlkPhos  42  03-05    PT/INR - ( 06 Mar 2024 04:50 )   PT: 17.5 sec;   INR: 1.51 ratio         PTT - ( 06 Mar 2024 04:50 )  PTT:92.0 sec  Urinalysis Basic - ( 06 Mar 2024 04:50 )    Color: x / Appearance: x / SG: x / pH: x  Gluc: 104 mg/dL / Ketone: x  / Bili: x / Urobili: x   Blood: x / Protein: x / Nitrite: x   Leuk Esterase: x / RBC: x / WBC x   Sq Epi: x / Non Sq Epi: x / Bacteria: x                        RADIOLOGY & ADDITIONAL TESTS:    Imaging Personally Reviewed:     no new test  Advance Directives:    full code  Palliative Care:  Appropriate

## 2024-03-06 NOTE — PROGRESS NOTE ADULT - ASSESSMENT
IMPRESSION    67yo F w hx COPD on 4-4.5L O2, HTN, RA, anxiety, adm w 2wk hx of intermittent blood mixed w stool  Never had colonoscopy  No abdomen pain anorexia, weight loss  Colonoscopy 2/20 here large partially obstructing mass at ~13cm from anal verge sig for adenocarcinoma, MMR status still pending  CEA 17.8  CT chest 2/20 and CT a/p 2/13 no mets but new RML PE, duplex legs negative now on IV Heparin    Recent COVID 19 infection and RSV last month.     No further bleeds since adm, reports does have some SOB since adm but thought was due to anxiety, episode leg pain 2months ago  Seen by Surgery, for planned lap colon resection 2/29, due to risk for pending obstruction  Iron studies c/w deficiency post IV Venofer    -adenocarcinoma of colon assoc w bleed, no distant mets by imaging studies, for definitive surgery. Will followup final path to see if needs adjuvant therapy  -RML PE-likely provoked etiology/ malignancy associated, continue IV heparin, can change to oral DOAC when ready for discharge.   -anemia-w/u sig for iron deficiency post IV Venofer also due to continued GI bleed, monitor serial CBC, rishi w pt on Heparin for PE. Discussed w pt potential need for transfuion PRBC if cont to decrease/in prep for surgery    d/w GI Dr Goodman, lesion in Sigmoid on colonoscopy evaluation.  s/p colonoscopy 2/20, large partially obstructing mass noted at approximately 13cm from anal verge  pathology noted, positive for adenocarcinoma  CEA elevated    s/p surgery eval    02/13 CT scan Abd neg for liver mets  Scattered too small to characterize hypodense foci    02/26 CT scan AP  LIVER: Numerous subcentimeter hepatic hypodensities measuring up to 9 mm are not characterized due to small size. Some are poorly delineated and metastatic disease is not excluded, for example image 2:15.  BOWEL: Redemonstration of a masslike thickening in the rectosigmoid junction which is at least 4.6 cm in length and extends into the mesorectal/mesosigmoid fat, the mass is concerning for neoplasm. Proximal   to the mass there is mild to moderate stool burden without evidence of colonic obstruction.  Sigmoid colonic diverticulosis is present proximal to the mass with a single inflamed diverticulum, image 2:81, and adjacent fat stranding suggesting mild and developing acute diverticulitis. No fluid collection   or extraluminal air to suspect abscess or perforation. No bowel obstruction. Appendix is normal  VESSELS: Aorta has normal caliber. Mild atherosclerotic changes. Hepatic veins, portal vein, SMV and splenic vein are patent.  There is adequate opacification of the entire IVC, common, external and internal iliac veins without evidence of filling defects or thrombosis.   No evidence of common femoral thrombosis bilaterally.  RETROPERITONEUM/LYMPH NODES: No lymphadenopathy. Numerous abnormal mesorectal lymph nodes along the superior rectal artery up to 1.0 x 1.1 mm, image 2:58.    2/29/2024 s/p laparoscopic sigmoid colon resection  doing well post op    Pathology showed adenoca. 5/18LN positive.   Tumor encompassing 90% of diameter of lumen  pT3 pN2a Mx    MISMATCH REPAIR (MMR) BY IHC   Interpretation:  No loss of nuclear expression of MMR proteins: Low probability of MSI-H.   Results:   MLH1: Intact nuclear expression   MSH2: Intact nuclear expression   MSH6: Intact nuclear expression   PMS2: Intact nuclear expression       RECOMMENDATIONS    Cont Apixiban 5mg q12h  discussed with patient and family  Recommend outpt followup for adjuvant chemotx    started on prednisone for arthritis flare  continue post op care    outpt patient followup with oncology  within 4wk post DC    discussed with social work  discussed with pt and sister.

## 2024-03-06 NOTE — PROGRESS NOTE ADULT - ASSESSMENT
67yo F with PMHx COPD (not on home oxygen), HTN, RA, anxiety presenting with chief complaint of blood in stools. Admitted for GI bleed, plan for colonoscopy 2/20.      Problem/Plan - 1:  ·  Problem: Colonic mass. S/p surgery on  02/29/2024 :  lap sigmoid colon resection w/ colorectal anastomosis. Pathology noted for adenocarcinoma   -HGB currently stable- On regular  diet, tolerating well  . later -CT chest: No evidence of metastatic disease. Acute pulmonary embolus in the right middle lobe lobar and segmental branches.  - Started On Eliquis for PE but can not afford because does not have insurance   but later family  state can  afford it   so dc on Eliquis .  D/w Hem Dr. Alonso .         Problem/Plan - 2:  ·  Problem: COPD (chronic obstructive pulmonary disease).   ·  Plan: Chronic, recently admitted for COPD exacerbation  - Duonebs q6 PRN  - Continue home inhalers   - Supplemental O2 PRN. At baseline: 4-4.5 L home oxygen intermittently. COPD patient will keep SpO2 goal 88-93%   - Monitor respiratory status   - Continuous pulse ox  - Pulmonary Dr. Dobson      Problem/Plan - 3:  ·  Problem: HTN (hypertension).   ·  Plan: Chronic, stable  - Resume  home amlodipine 10mg qd with hold parameters  - bp stable        Problem/Plan - 4:  ·  Problem: Rheumatoid arthritis.   ·  Plan: - Chronic  - resume prednisone 10mg PO Daily. Takes tramadol 50mg Qd.     Problem/Plan - 5:  ·  Problem: Anxiety.   ·  Plan: - Chronic, SOB likely 2/2 anxiety  - Continue Xanax 0.25mg qhs PRN.     Problem/Plan - 6:  ·  Problem: Pulmonary embolism  ·  Plan: CT chest with No evidence of metastatic disease. Acute pulmonary embolus in the right middle lobe lobar and segmental branches.    - Echo:  Left ventricular systolic function is normal with an ejection fraction visually estimated at 60 to 65 %.    -  On Eliquis        Problem/Plan - 7:  ·  Problem: Fever/UTI: Resolved   ·  Plan: Blood cx NGTD.  Urine cx +E. coli and enterococcus faecalis.    - Completed course of antibiotic  -ID  dr foss      Problem/Plan - 8:  ·  Problem: Encounter for deep vein thrombosis (DVT) prophylaxis.   ·  Plan: - s/p IV Heparin/ Coumadin - now on Eliquis   problem /plan 9 - mild hypernatremia - iv fluid d570 cc/hr - fu bmp in am   Disposition: D/c  home once home meds full ac arrangement  / home o2 ?

## 2024-03-07 ENCOUNTER — TRANSCRIPTION ENCOUNTER (OUTPATIENT)
Age: 69
End: 2024-03-07

## 2024-03-07 VITALS
DIASTOLIC BLOOD PRESSURE: 63 MMHG | SYSTOLIC BLOOD PRESSURE: 120 MMHG | HEART RATE: 100 BPM | TEMPERATURE: 99 F | RESPIRATION RATE: 16 BRPM | OXYGEN SATURATION: 99 %

## 2024-03-07 LAB
ANION GAP SERPL CALC-SCNC: 6 MMOL/L — SIGNIFICANT CHANGE UP (ref 5–17)
BUN SERPL-MCNC: 5 MG/DL — LOW (ref 7–23)
CALCIUM SERPL-MCNC: 9.7 MG/DL — SIGNIFICANT CHANGE UP (ref 8.5–10.1)
CHLORIDE SERPL-SCNC: 106 MMOL/L — SIGNIFICANT CHANGE UP (ref 96–108)
CO2 SERPL-SCNC: 32 MMOL/L — HIGH (ref 22–31)
CREAT SERPL-MCNC: 0.68 MG/DL — SIGNIFICANT CHANGE UP (ref 0.5–1.3)
EGFR: 95 ML/MIN/1.73M2 — SIGNIFICANT CHANGE UP
GLUCOSE SERPL-MCNC: 130 MG/DL — HIGH (ref 70–99)
HCT VFR BLD CALC: 30.9 % — LOW (ref 34.5–45)
HGB BLD-MCNC: 9.2 G/DL — LOW (ref 11.5–15.5)
MCHC RBC-ENTMCNC: 23.8 PG — LOW (ref 27–34)
MCHC RBC-ENTMCNC: 29.8 GM/DL — LOW (ref 32–36)
MCV RBC AUTO: 80.1 FL — SIGNIFICANT CHANGE UP (ref 80–100)
NRBC # BLD: 0 /100 WBCS — SIGNIFICANT CHANGE UP (ref 0–0)
PLATELET # BLD AUTO: 339 K/UL — SIGNIFICANT CHANGE UP (ref 150–400)
POTASSIUM SERPL-MCNC: 3.6 MMOL/L — SIGNIFICANT CHANGE UP (ref 3.5–5.3)
POTASSIUM SERPL-SCNC: 3.6 MMOL/L — SIGNIFICANT CHANGE UP (ref 3.5–5.3)
RBC # BLD: 3.86 M/UL — SIGNIFICANT CHANGE UP (ref 3.8–5.2)
RBC # FLD: 22.1 % — HIGH (ref 10.3–14.5)
SODIUM SERPL-SCNC: 144 MMOL/L — SIGNIFICANT CHANGE UP (ref 135–145)
WBC # BLD: 5.78 K/UL — SIGNIFICANT CHANGE UP (ref 3.8–10.5)
WBC # FLD AUTO: 5.78 K/UL — SIGNIFICANT CHANGE UP (ref 3.8–10.5)

## 2024-03-07 PROCEDURE — 97116 GAIT TRAINING THERAPY: CPT

## 2024-03-07 PROCEDURE — 83880 ASSAY OF NATRIURETIC PEPTIDE: CPT

## 2024-03-07 PROCEDURE — 99284 EMERGENCY DEPT VISIT MOD MDM: CPT | Mod: 25

## 2024-03-07 PROCEDURE — 82728 ASSAY OF FERRITIN: CPT

## 2024-03-07 PROCEDURE — 99285 EMERGENCY DEPT VISIT HI MDM: CPT | Mod: 25

## 2024-03-07 PROCEDURE — 85730 THROMBOPLASTIN TIME PARTIAL: CPT

## 2024-03-07 PROCEDURE — 83735 ASSAY OF MAGNESIUM: CPT

## 2024-03-07 PROCEDURE — 84100 ASSAY OF PHOSPHORUS: CPT

## 2024-03-07 PROCEDURE — 86923 COMPATIBILITY TEST ELECTRIC: CPT

## 2024-03-07 PROCEDURE — 0225U NFCT DS DNA&RNA 21 SARSCOV2: CPT

## 2024-03-07 PROCEDURE — 84145 PROCALCITONIN (PCT): CPT

## 2024-03-07 PROCEDURE — C1889: CPT

## 2024-03-07 PROCEDURE — 83615 LACTATE (LD) (LDH) ENZYME: CPT

## 2024-03-07 PROCEDURE — 87186 SC STD MICRODIL/AGAR DIL: CPT

## 2024-03-07 PROCEDURE — 82553 CREATINE MB FRACTION: CPT

## 2024-03-07 PROCEDURE — 82962 GLUCOSE BLOOD TEST: CPT

## 2024-03-07 PROCEDURE — 36415 COLL VENOUS BLD VENIPUNCTURE: CPT

## 2024-03-07 PROCEDURE — 99239 HOSP IP/OBS DSCHRG MGMT >30: CPT | Mod: GC

## 2024-03-07 PROCEDURE — 93005 ELECTROCARDIOGRAM TRACING: CPT

## 2024-03-07 PROCEDURE — 80061 LIPID PANEL: CPT

## 2024-03-07 PROCEDURE — 94640 AIRWAY INHALATION TREATMENT: CPT

## 2024-03-07 PROCEDURE — 74177 CT ABD & PELVIS W/CONTRAST: CPT | Mod: MC

## 2024-03-07 PROCEDURE — 96374 THER/PROPH/DIAG INJ IV PUSH: CPT

## 2024-03-07 PROCEDURE — 82378 CARCINOEMBRYONIC ANTIGEN: CPT

## 2024-03-07 PROCEDURE — 82550 ASSAY OF CK (CPK): CPT

## 2024-03-07 PROCEDURE — 87086 URINE CULTURE/COLONY COUNT: CPT

## 2024-03-07 PROCEDURE — 71260 CT THORAX DX C+: CPT

## 2024-03-07 PROCEDURE — 85025 COMPLETE CBC W/AUTO DIFF WBC: CPT

## 2024-03-07 PROCEDURE — 83540 ASSAY OF IRON: CPT

## 2024-03-07 PROCEDURE — 81001 URINALYSIS AUTO W/SCOPE: CPT

## 2024-03-07 PROCEDURE — 97162 PT EVAL MOD COMPLEX 30 MIN: CPT

## 2024-03-07 PROCEDURE — 88305 TISSUE EXAM BY PATHOLOGIST: CPT

## 2024-03-07 PROCEDURE — 71045 X-RAY EXAM CHEST 1 VIEW: CPT

## 2024-03-07 PROCEDURE — 85027 COMPLETE CBC AUTOMATED: CPT

## 2024-03-07 PROCEDURE — 85610 PROTHROMBIN TIME: CPT

## 2024-03-07 PROCEDURE — 80048 BASIC METABOLIC PNL TOTAL CA: CPT

## 2024-03-07 PROCEDURE — 83690 ASSAY OF LIPASE: CPT

## 2024-03-07 PROCEDURE — 94010 BREATHING CAPACITY TEST: CPT

## 2024-03-07 PROCEDURE — 87040 BLOOD CULTURE FOR BACTERIA: CPT

## 2024-03-07 PROCEDURE — 80053 COMPREHEN METABOLIC PANEL: CPT

## 2024-03-07 PROCEDURE — 93306 TTE W/DOPPLER COMPLETE: CPT

## 2024-03-07 PROCEDURE — 87641 MR-STAPH DNA AMP PROBE: CPT

## 2024-03-07 PROCEDURE — 82272 OCCULT BLD FECES 1-3 TESTS: CPT

## 2024-03-07 PROCEDURE — 87077 CULTURE AEROBIC IDENTIFY: CPT

## 2024-03-07 PROCEDURE — 84466 ASSAY OF TRANSFERRIN: CPT

## 2024-03-07 PROCEDURE — 88304 TISSUE EXAM BY PATHOLOGIST: CPT

## 2024-03-07 PROCEDURE — 87640 STAPH A DNA AMP PROBE: CPT

## 2024-03-07 PROCEDURE — 84484 ASSAY OF TROPONIN QUANT: CPT

## 2024-03-07 PROCEDURE — 83550 IRON BINDING TEST: CPT

## 2024-03-07 PROCEDURE — 86901 BLOOD TYPING SEROLOGIC RH(D): CPT

## 2024-03-07 PROCEDURE — 83605 ASSAY OF LACTIC ACID: CPT

## 2024-03-07 PROCEDURE — 93970 EXTREMITY STUDY: CPT

## 2024-03-07 PROCEDURE — 99232 SBSQ HOSP IP/OBS MODERATE 35: CPT

## 2024-03-07 PROCEDURE — 86900 BLOOD TYPING SEROLOGIC ABO: CPT

## 2024-03-07 PROCEDURE — 88309 TISSUE EXAM BY PATHOLOGIST: CPT

## 2024-03-07 PROCEDURE — 86850 RBC ANTIBODY SCREEN: CPT

## 2024-03-07 PROCEDURE — 85045 AUTOMATED RETICULOCYTE COUNT: CPT

## 2024-03-07 PROCEDURE — 97530 THERAPEUTIC ACTIVITIES: CPT

## 2024-03-07 PROCEDURE — 83010 ASSAY OF HAPTOGLOBIN QUANT: CPT

## 2024-03-07 RX ADMIN — BUDESONIDE AND FORMOTEROL FUMARATE DIHYDRATE 2 PUFF(S): 160; 4.5 AEROSOL RESPIRATORY (INHALATION) at 05:32

## 2024-03-07 RX ADMIN — Medication 0.25 MILLIGRAM(S): at 13:07

## 2024-03-07 RX ADMIN — AMLODIPINE BESYLATE 10 MILLIGRAM(S): 2.5 TABLET ORAL at 05:31

## 2024-03-07 RX ADMIN — TIOTROPIUM BROMIDE 2 PUFF(S): 18 CAPSULE ORAL; RESPIRATORY (INHALATION) at 05:32

## 2024-03-07 RX ADMIN — PANTOPRAZOLE SODIUM 40 MILLIGRAM(S): 20 TABLET, DELAYED RELEASE ORAL at 05:31

## 2024-03-07 RX ADMIN — APIXABAN 10 MILLIGRAM(S): 2.5 TABLET, FILM COATED ORAL at 08:17

## 2024-03-07 NOTE — PROGRESS NOTE ADULT - REASON FOR ADMISSION
GIB bleed
GIB bleed, colon mass and pulmonary embolism
GIB bleed

## 2024-03-07 NOTE — PROGRESS NOTE ADULT - SUBJECTIVE AND OBJECTIVE BOX
[facility] -- Alexa Ramirez, Arnel De León Savella, , Nolvia Leyva  Office # [phone]    Follow Up:      Subjective/Observations:     REVIEW OF SYSTEMS: All other review of systems is negative unless indicated above  PAST MEDICAL & SURGICAL HISTORY:  COPD (chronic obstructive pulmonary disease)      Rheumatoid arthritis      HTN (hypertension)      Anxiety      No significant past surgical history        MEDICATIONS  (STANDING):  amLODIPine   Tablet 10 milliGRAM(s) Oral daily  apixaban 10 milliGRAM(s) Oral every 12 hours  budesonide 160 MICROgram(s)/formoterol 4.5 MICROgram(s) Inhaler 2 Puff(s) Inhalation two times a day  dextrose 5%. 1000 milliLiter(s) (70 mL/Hr) IV Continuous <Continuous>  influenza  Vaccine (HIGH DOSE) 0.7 milliLiter(s) IntraMuscular once  pantoprazole    Tablet 40 milliGRAM(s) Oral before breakfast  tiotropium 2.5 MICROgram(s) Inhaler 2 Puff(s) Inhalation daily    MEDICATIONS  (PRN):  acetaminophen     Tablet .. 650 milliGRAM(s) Oral every 6 hours PRN Mild Pain (1 - 3), Moderate Pain (4 - 6)  albuterol    90 MICROgram(s) HFA Inhaler 2 Puff(s) Inhalation every 6 hours PRN Shortness of Breath and/or Wheezing  ALPRAZolam 0.25 milliGRAM(s) Oral daily PRN for anxiety    Allergies    No Known Allergies    Intolerances      Vital Signs Last 24 Hrs  T(C): 36.6 (07 Mar 2024 05:30), Max: 37.2 (06 Mar 2024 13:46)  T(F): 97.9 (07 Mar 2024 05:30), Max: 98.9 (06 Mar 2024 13:46)  HR: 94 (07 Mar 2024 05:30) (92 - 114)  BP: 127/68 (07 Mar 2024 05:30) (121/72 - 127/68)  BP(mean): --  RR: 19 (07 Mar 2024 05:30) (18 - 20)  SpO2: 95% (07 Mar 2024 05:30) (86% - 95%)    Parameters below as of 07 Mar 2024 05:30  Patient On (Oxygen Delivery Method): nasal cannula  O2 Flow (L/min): 2    I&O's Summary    06 Mar 2024 07:01  -  07 Mar 2024 07:00  --------------------------------------------------------  IN: 1190 mL / OUT: 0 mL / NET: 1190 mL        PHYSICAL EXAM:  TELE:   Constitutional: NAD, awake and alert, well-developed  HEENT: Moist Mucous Membranes, Anicteric  Pulmonary: Non-labored, breath sounds are clear bilaterally, No wheezing, rales or rhonchi  Cardiovascular: Regular, S1 and S2, No murmurs, rubs, gallops or clicks  Gastrointestinal: Bowel Sounds present, soft, nontender.   Lymph: No peripheral edema. No lymphadenopathy.  Skin: No visible rashes or ulcers.  Psych:  Mood & affect appropriate  LABS: All Labs Reviewed:                        8.8    5.13  )-----------( 293      ( 06 Mar 2024 04:50 )             30.2                         8.0    5.22  )-----------( 311      ( 05 Mar 2024 06:00 )             28.2     06 Mar 2024 04:50    147    |  106    |  5      ----------------------------<  104    4.1     |  37     |  0.72   05 Mar 2024 06:00    144    |  103    |  5      ----------------------------<  124    3.8     |  37     |  0.61     Ca    9.9        06 Mar 2024 04:50  Ca    9.4        05 Mar 2024 06:00  Phos  3.0       06 Mar 2024 04:50  Phos  2.6       05 Mar 2024 06:00  Mg     1.8       06 Mar 2024 04:50  Mg     1.8       05 Mar 2024 06:00    TPro  5.6    /  Alb  2.5    /  TBili  0.4    /  DBili  x      /  AST  9      /  ALT  11     /  AlkPhos  42     05 Mar 2024 06:00    PT/INR - ( 06 Mar 2024 04:50 )   PT: 17.5 sec;   INR: 1.51 ratio         PTT - ( 06 Mar 2024 11:35 )  PTT:44.0 sec      12 Lead ECG:   Ventricular Rate 98 BPM    Atrial Rate 98 BPM    P-R Interval 154 ms    QRS Duration 76 ms    Q-T Interval 340 ms    QTC Calculation(Bazett) 434 ms    P Axis 88 degrees    R Axis 51 degrees    T Axis 96 degrees    Diagnosis Line Normal sinus rhythm  Minimal voltage criteria for LVH, may be normal variant ( Sokolow-Jason )  Nonspecific T wave abnormality  Abnormal ECG  When compared with ECG of 19-JAN-2024 11:54,  No significant change was found  Confirmed by ANETTE SEALS ([id number]) on 2/16/2024 6:48:17 PM (02-15-24 @ 23:33)      TRANSTHORACIC ECHOCARDIOGRAM REPORT  ________________________________________________________________________________                                      _______       Pt. Name:       KENDALL RIDER Study Date:    2/22/2024  MRN:            [id number]             YOB: 1955  Accession #:    [id number]            Age:           68 years  Account#:       [id number]           Gender:        F  Heart Rate:                          Height:        64.96 in (165.00 cm)  Rhythm:                      Weight:        130.07 lb (59.00 kg)  Blood Pressure: 129/71 mmHg          BSA/BMI:       1.65 m² / 21.67 kg/m²  ________________________________________________________________________________________  Referring Physician:    [id number] Robbie Boone  Interpreting Physician: Anette Seals  Primary Sonographer:    Judy Braga RDCS    CPT:               ECHO TTE WO CON COMP W DOPP - 22018.m  Indication(s):     Dyspnea, unspecified - R06.00  Procedure:         Transthoracic echocardiogram with 2-D, M-mode and complete                     spectral and color flow Doppler.  Ordering Location: [facility]  Admission Status:  Inpatient    _______________________________________________________________________________________     CONCLUSIONS:      1. Left ventricular systolic function is normal with an ejection fraction visually estimated at 60 to 65 %.   2. There is normal LV mass and concentric remodeling.   3. Normal right ventricular cavity size and normal systolic function.  4. The left atrium is normal.   5. Structurally normal mitral valve with normal leaflet excursion.   6. Trileaflet aortic valve with normal systolic excursion.   7. Structurally normal tricuspid valve with normal leaflet excursion. Pulmonary artery systolic pressure could not be estimated.   8. No pericardial effusion seen.    ________________________________________________________________________________________  FINDINGS:     Left Ventricle:  Left ventricular systolic function is normal with an ejection fraction visually estimated at 60 to 65%. There is normal LV mass and concentric remodeling.     Right Ventricle:  The right ventricular cavity is normal in size and normal systolic function.     Left Atrium:  The left atrium is normal.     Right Atrium:  The right atrium is normal in size.     Aortic Valve:  The aortic valve appears trileaflet with normal systolic excursion.     Mitral Valve:  Structurally normal mitral valve with normal leaflet excursion.     Tricuspid Valve:  Structurally normal tricuspid valve with normal leaflet excursion. There is insufficient tricuspid regurgitation detected to calculate pulmonary artery systolic pressure.     Pulmonic Valve:  The pulmonic valve was not well visualized.     Pericardium:  No pericardial effusion seen.     Systemic Veins:  The inferior vena cava is dilated (dilated >2.1cm) with normal inspiratory collapse (normal >50%) consistent with mildly elevated right atrial pressure (~8, range 5-10mmHg).  ____________________________________________________________________  QUANTITATIVE DATA:  Left Ventricle Measurements: (Indexed to BSA)     IVSd (2D):   1.4 cm  LVPWd (2D):  1.1 cm  LVIDd (2D):  3.1 cm  LVIDs (2D):  2.1 cm  LV Mass:     122 g  74.0 g/m²  Visualized LV EF%: 60 to 65%     MV E Vmax:    0.75 m/s  MV A Vmax:    0.79 m/s  MV E/A:       0.95  e' lateral:   15.70 cm/s  e' medial:    10.90 cm/s  E/e' lateral: 4.77  E/e' medial:  6.87  E/e' Average: 5.63  MV DT:        211 msec    Aorta Measurements: (Normal range) (Indexed to BSA)     Sinuses of Valsalva: 2.80 cm (2.7 - 3.3 cm)    Left Atrium Measurements: (Indexed to BSA)  LA Diam 2D: 2.80 cm    Mitral Valve Measurements:     MV E Vmax: 0.7 m/s  MV A Vmax: 0.8 m/s  MV E/A:    1.0       Tricuspid Valve Measurements:     RA Pressure: 8 mmHg    ________________________________________________________________________________________  Electronically signed on 2/22/2024 at 1:42:01 PM by Anette Seals         *** Final ***      [facility] -- Alexa Ramirez, Sarthak, Zay Seals, , Nolvia Leyva  Office # [phone]    Follow Up:  Cardiac optimization, Acute PE    Subjective/Observations: Denies any SOB, CALDERA, Chest or pleuritic pain.  States, she is ambulating with no discomfort.  Tolerating RA.  Denies surgical pain    REVIEW OF SYSTEMS: All other review of systems is negative unless indicated above  PAST MEDICAL & SURGICAL HISTORY:    COPD (chronic obstructive pulmonary disease)  Rheumatoid arthritis  HTN (hypertension)  Anxiety  No significant past surgical history    MEDICATIONS  (STANDING):  amLODIPine   Tablet 10 milliGRAM(s) Oral daily  apixaban 10 milliGRAM(s) Oral every 12 hours  budesonide 160 MICROgram(s)/formoterol 4.5 MICROgram(s) Inhaler 2 Puff(s) Inhalation two times a day  dextrose 5%. 1000 milliLiter(s) (70 mL/Hr) IV Continuous <Continuous>  influenza  Vaccine (HIGH DOSE) 0.7 milliLiter(s) IntraMuscular once  pantoprazole    Tablet 40 milliGRAM(s) Oral before breakfast  tiotropium 2.5 MICROgram(s) Inhaler 2 Puff(s) Inhalation daily    MEDICATIONS  (PRN):  acetaminophen     Tablet .. 650 milliGRAM(s) Oral every 6 hours PRN Mild Pain (1 - 3), Moderate Pain (4 - 6)  albuterol    90 MICROgram(s) HFA Inhaler 2 Puff(s) Inhalation every 6 hours PRN Shortness of Breath and/or Wheezing  ALPRAZolam 0.25 milliGRAM(s) Oral daily PRN for anxiety    Allergies    No Known Allergies    Intolerances    Vital Signs Last 24 Hrs  T(C): 36.6 (07 Mar 2024 05:30), Max: 37.2 (06 Mar 2024 13:46)  T(F): 97.9 (07 Mar 2024 05:30), Max: 98.9 (06 Mar 2024 13:46)  HR: 94 (07 Mar 2024 05:30) (92 - 114)  BP: 127/68 (07 Mar 2024 05:30) (121/72 - 127/68)  BP(mean): --  RR: 19 (07 Mar 2024 05:30) (18 - 20)  SpO2: 95% (07 Mar 2024 05:30) (86% - 95%)    Parameters below as of 07 Mar 2024 05:30  Patient On (Oxygen Delivery Method): nasal cannula  O2 Flow (L/min): 2    I&O's Summary    06 Mar 2024 07:01  -  07 Mar 2024 07:00  --------------------------------------------------------  IN: 1190 mL / OUT: 0 mL / NET: 1190 mL    PHYSICAL EXAM:  TELE: Not on tele  Constitutional: NAD, awake and alert  HEENT: Moist Mucous Membranes, Anicteric  Pulmonary: Non-labored, breath sounds are clear but diminished bilaterally, No wheezing, rales or rhonchi  Cardiovascular: Regular, S1 and S2, No murmurs, rubs, gallops or clicks  Gastrointestinal: Bowel Sounds present, soft, nontender.   Lymph: No peripheral edema. No lymphadenopathy.  Skin: No visible rashes or ulcers.  Psych:  Mood & affect appropriate  LABS: All Labs Reviewed:                        8.8    5.13  )-----------( 293      ( 06 Mar 2024 04:50 )             30.2                         8.0    5.22  )-----------( 311      ( 05 Mar 2024 06:00 )             28.2     06 Mar 2024 04:50    147    |  106    |  5      ----------------------------<  104    4.1     |  37     |  0.72   05 Mar 2024 06:00    144    |  103    |  5      ----------------------------<  124    3.8     |  37     |  0.61     Ca    9.9        06 Mar 2024 04:50  Ca    9.4        05 Mar 2024 06:00  Phos  3.0       06 Mar 2024 04:50  Phos  2.6       05 Mar 2024 06:00  Mg     1.8       06 Mar 2024 04:50  Mg     1.8       05 Mar 2024 06:00    TPro  5.6    /  Alb  2.5    /  TBili  0.4    /  DBili  x      /  AST  9      /  ALT  11     /  AlkPhos  42     05 Mar 2024 06:00    PT/INR - ( 06 Mar 2024 04:50 )   PT: 17.5 sec;   INR: 1.51 ratio         PTT - ( 06 Mar 2024 11:35 )  PTT:44.0 sec      12 Lead ECG:   Ventricular Rate 98 BPM    Atrial Rate 98 BPM    P-R Interval 154 ms    QRS Duration 76 ms    Q-T Interval 340 ms    QTC Calculation(Bazett) 434 ms    P Axis 88 degrees    R Axis 51 degrees    T Axis 96 degrees    Diagnosis Line Normal sinus rhythm  Minimal voltage criteria for LVH, may be normal variant ( Sokolow-Jason )  Nonspecific T wave abnormality  Abnormal ECG  When compared with ECG of 19-JAN-2024 11:54,  No significant change was found  Confirmed by ANETTE SEALS ([id number]) on 2/16/2024 6:48:17 PM (02-15-24 @ 23:33)      TRANSTHORACIC ECHOCARDIOGRAM REPORT  ________________________________________________________________________________                                      _______       Pt. Name:       KENDALL RIDER Study Date:    2/22/2024  MRN:            [id number]             YOB: 1955  Accession #:    [id number]            Age:           68 years  Account#:       [id number]           Gender:        F  Heart Rate:                          Height:        64.96 in (165.00 cm)  Rhythm:                      Weight:        130.07 lb (59.00 kg)  Blood Pressure: 129/71 mmHg          BSA/BMI:       1.65 m² / 21.67 kg/m²  ________________________________________________________________________________________  Referring Physician:    [id number] Robbie Boone  Interpreting Physician: Anette Seals  Primary Sonographer:    Judy Braga [facility]    CPT:               ECHO TTE WO CON COMP W DOPP - 08225.m  Indication(s):     Dyspnea, unspecified - R06.00  Procedure:         Transthoracic echocardiogram with 2-D, M-mode and complete                     spectral and color flow Doppler.  Ordering Location: [facility]  Admission Status:  Inpatient    _______________________________________________________________________________________     CONCLUSIONS:      1. Left ventricular systolic function is normal with an ejection fraction visually estimated at 60 to 65 %.   2. There is normal LV mass and concentric remodeling.   3. Normal right ventricular cavity size and normal systolic function.  4. The left atrium is normal.   5. Structurally normal mitral valve with normal leaflet excursion.   6. Trileaflet aortic valve with normal systolic excursion.   7. Structurally normal tricuspid valve with normal leaflet excursion. Pulmonary artery systolic pressure could not be estimated.   8. No pericardial effusion seen.    ________________________________________________________________________________________  FINDINGS:     Left Ventricle:  Left ventricular systolic function is normal with an ejection fraction visually estimated at 60 to 65%. There is normal LV mass and concentric remodeling.     Right Ventricle:  The right ventricular cavity is normal in size and normal systolic function.     Left Atrium:  The left atrium is normal.     Right Atrium:  The right atrium is normal in size.     Aortic Valve:  The aortic valve appears trileaflet with normal systolic excursion.     Mitral Valve:  Structurally normal mitral valve with normal leaflet excursion.     Tricuspid Valve:  Structurally normal tricuspid valve with normal leaflet excursion. There is insufficient tricuspid regurgitation detected to calculate pulmonary artery systolic pressure.     Pulmonic Valve:  The pulmonic valve was not well visualized.     Pericardium:  No pericardial effusion seen.     Systemic Veins:  The inferior vena cava is dilated (dilated >2.1cm) with normal inspiratory collapse (normal >50%) consistent with mildly elevated right atrial pressure (~8, range 5-10mmHg).  ____________________________________________________________________  QUANTITATIVE DATA:  Left Ventricle Measurements: (Indexed to BSA)     IVSd (2D):   1.4 cm  LVPWd (2D):  1.1 cm  LVIDd (2D):  3.1 cm  LVIDs (2D):  2.1 cm  LV Mass:     122 g  74.0 g/m²  Visualized LV EF%: 60 to 65%     MV E Vmax:    0.75 m/s  MV A Vmax:    0.79 m/s  MV E/A:       0.95  e' lateral:   15.70 cm/s  e' medial:    10.90 cm/s  E/e' lateral: 4.77  E/e' medial:  6.87  E/e' Average: 5.63  MV DT:        211 msec    Aorta Measurements: (Normal range) (Indexed to BSA)     Sinuses of Valsalva: 2.80 cm (2.7 - 3.3 cm)    Left Atrium Measurements: (Indexed to BSA)  LA Diam 2D: 2.80 cm    Mitral Valve Measurements:     MV E Vmax: 0.7 m/s  MV A Vmax: 0.8 m/s  MV E/A:    1.0       Tricuspid Valve Measurements:     RA Pressure: 8 mmHg    ________________________________________________________________________________________  Electronically signed on 2/22/2024 at 1:42:01 PM by Anette Seals         *** Final ***

## 2024-03-07 NOTE — PROGRESS NOTE ADULT - ASSESSMENT
IMPRESSION    67yo F w hx COPD on 4-4.5L O2, HTN, RA, anxiety, adm w 2wk hx of intermittent blood mixed w stool  Never had colonoscopy  No abdomen pain anorexia, weight loss  Colonoscopy 2/20 here large partially obstructing mass at ~13cm from anal verge sig for adenocarcinoma, MMR status still pending  CEA 17.8  CT chest 2/20 and CT a/p 2/13 no mets but new RML PE, duplex legs negative now on IV Heparin    Recent COVID 19 infection and RSV last month.     No further bleeds since adm, reports does have some SOB since adm but thought was due to anxiety, episode leg pain 2months ago  Seen by Surgery, for planned lap colon resection 2/29, due to risk for pending obstruction  Iron studies c/w deficiency post IV Venofer    -adenocarcinoma of colon assoc w bleed, no distant mets by imaging studies, for definitive surgery. Will followup final path to see if needs adjuvant therapy  -RML PE-likely provoked etiology/ malignancy associated, continue IV heparin, can change to oral DOAC when ready for discharge.   -anemia-w/u sig for iron deficiency post IV Venofer also due to continued GI bleed, monitor serial CBC, rishi w pt on Heparin for PE. Discussed w pt potential need for transfuion PRBC if cont to decrease/in prep for surgery    d/w GI Dr Goodman, lesion in Sigmoid on colonoscopy evaluation.  s/p colonoscopy 2/20, large partially obstructing mass noted at approximately 13cm from anal verge  pathology noted, positive for adenocarcinoma  CEA elevated    s/p surgery eval    02/13 CT scan Abd neg for liver mets  Scattered too small to characterize hypodense foci    02/26 CT scan AP  LIVER: Numerous subcentimeter hepatic hypodensities measuring up to 9 mm are not characterized due to small size. Some are poorly delineated and metastatic disease is not excluded, for example image 2:15.  BOWEL: Redemonstration of a masslike thickening in the rectosigmoid junction which is at least 4.6 cm in length and extends into the mesorectal/mesosigmoid fat, the mass is concerning for neoplasm. Proximal   to the mass there is mild to moderate stool burden without evidence of colonic obstruction.  Sigmoid colonic diverticulosis is present proximal to the mass with a single inflamed diverticulum, image 2:81, and adjacent fat stranding suggesting mild and developing acute diverticulitis. No fluid collection   or extraluminal air to suspect abscess or perforation. No bowel obstruction. Appendix is normal  VESSELS: Aorta has normal caliber. Mild atherosclerotic changes. Hepatic veins, portal vein, SMV and splenic vein are patent.  There is adequate opacification of the entire IVC, common, external and internal iliac veins without evidence of filling defects or thrombosis.   No evidence of common femoral thrombosis bilaterally.  RETROPERITONEUM/LYMPH NODES: No lymphadenopathy. Numerous abnormal mesorectal lymph nodes along the superior rectal artery up to 1.0 x 1.1 mm, image 2:58.    2/29/2024 s/p laparoscopic sigmoid colon resection  doing well post op    Pathology showed adenoca. 5/18LN positive.   Tumor encompassing 90% of diameter of lumen  pT3 pN2a Mx    MISMATCH REPAIR (MMR) BY IHC   Interpretation:  No loss of nuclear expression of MMR proteins: Low probability of MSI-H.   Results:   MLH1: Intact nuclear expression   MSH2: Intact nuclear expression   MSH6: Intact nuclear expression   PMS2: Intact nuclear expression     continues to do well post op  heme/onc stable for discharge    RECOMMENDATIONS    Cont Apixiban 5mg q12h  discussed with patient and family  Recommend outpt followup for adjuvant chemotx    started on prednisone for arthritis flare  continue post op care    outpt patient followup with oncology  within 4wk post DC    discussed with social work  discussed with pt and sister.   will followup as outpatient

## 2024-03-07 NOTE — CASE MANAGEMENT PROGRESS NOTE - NSCMPROGRESSNOTE_GEN_ALL_CORE
Patient O2 is approved by [place]/ to cover the cost for the RW and h ome Oxygen, as pt is pending part B Medicare.  DME to be delivered today at bedside.      Patient O2 is approved by [place]/ to cover the cost for the RW and home Oxygen, as pt is pending part B Medicare.  DME to be delivered today at bedside.

## 2024-03-07 NOTE — PROGRESS NOTE ADULT - ASSESSMENT
constipation  rectal bleed  abnormal CT    s/p colonoscopy 2/20, large partially obstructing mass noted at approximately 13cm from anal verge  pathology from surgery noted, oncology follow up  CEA elevated  S/p lap sigmoid colon resection w/ colorectal anastomosis on 2/29  Monitor for GI function  Diet per surgery  Will follow    I reviewed the overnight course of events on the unit, re-confirming the patient history. I discussed the care with the patient  Differential diagnosis and plan of care discussed with patient after the evaluation  35 minutes spent on total encounter of which more than fifty percent of the encounter was spent counseling and/or coordinating care by the attending physician.

## 2024-03-07 NOTE — DISCHARGE NOTE NURSING/CASE MANAGEMENT/SOCIAL WORK - PATIENT PORTAL LINK FT
You can access the FollowMyHealth Patient Portal offered by [facility] by registering at the following website: [email or web address]. By joining [employer]’s FollowMyHealth portal, you will also be able to view your health information using other applications (apps) compatible with our system.

## 2024-03-07 NOTE — PROGRESS NOTE ADULT - PROVIDER SPECIALTY LIST ADULT
Heme/Onc
Hospitalist
Pulmonology
Pulmonology
Surgery
Surgery
Cardiology
Critical Care
Critical Care
Gastroenterology
Heme/Onc
Hospitalist
Infectious Disease
Infectious Disease
Pulmonology
Surgery
Cardiology
Critical Care
Critical Care
Gastroenterology
Heme/Onc
Heme/Onc
Hospitalist
Hospitalist
Infectious Disease
Pulmonology
Surgery
Cardiology
Critical Care
Gastroenterology
Heme/Onc
Hospitalist
Surgery
Hospitalist
Cardiology
Gastroenterology
Hospitalist

## 2024-03-07 NOTE — PROGRESS NOTE ADULT - SUBJECTIVE AND OBJECTIVE BOX
Interval History:  feeling better  Chart reviewed and events noted;   Overnight events:    MEDICATIONS  (STANDING):  amLODIPine   Tablet 10 milliGRAM(s) Oral daily  apixaban 10 milliGRAM(s) Oral every 12 hours  budesonide 160 MICROgram(s)/formoterol 4.5 MICROgram(s) Inhaler 2 Puff(s) Inhalation two times a day  influenza  Vaccine (HIGH DOSE) 0.7 milliLiter(s) IntraMuscular once  pantoprazole    Tablet 40 milliGRAM(s) Oral before breakfast  tiotropium 2.5 MICROgram(s) Inhaler 2 Puff(s) Inhalation daily    MEDICATIONS  (PRN):  acetaminophen     Tablet .. 650 milliGRAM(s) Oral every 6 hours PRN Mild Pain (1 - 3), Moderate Pain (4 - 6)  albuterol    90 MICROgram(s) HFA Inhaler 2 Puff(s) Inhalation every 6 hours PRN Shortness of Breath and/or Wheezing  ALPRAZolam 0.25 milliGRAM(s) Oral daily PRN for anxiety      Vital Signs Last 24 Hrs  T(C): 37.2 (07 Mar 2024 11:57), Max: 37.2 (06 Mar 2024 13:46)  T(F): 98.9 (07 Mar 2024 11:57), Max: 98.9 (06 Mar 2024 13:46)  HR: 100 (07 Mar 2024 11:57) (92 - 100)  BP: 120/63 (07 Mar 2024 11:57) (120/63 - 127/68)  BP(mean): --  RR: 16 (07 Mar 2024 11:57) (16 - 19)  SpO2: 99% (07 Mar 2024 11:57) (89% - 99%)    Parameters below as of 07 Mar 2024 11:57  Patient On (Oxygen Delivery Method): nasal cannula  O2 Flow (L/min): 2      PHYSICAL EXAM  General: adult in NAD  HEENT: clear oropharynx, anicteric sclera, pink conjunctivae  Neck: supple  CV: normal S1S2 with no murmur rubs or gallops  Lungs: clear to auscultation, no wheezes, no rhales  Abdomen: soft non-tender non-distended, no hepato/splenomegaly  Ext: no clubbing cyanosis or edema  Skin: no rashes and no petichiae  Neuro: alert and oriented X3 no focal deficits      LABS:  CBC Full  -  ( 07 Mar 2024 07:33 )  WBC Count : 5.78 K/uL  RBC Count : 3.86 M/uL  Hemoglobin : 9.2 g/dL  Hematocrit : 30.9 %  Platelet Count - Automated : 339 K/uL  Mean Cell Volume : 80.1 fl  Mean Cell Hemoglobin : 23.8 pg  Mean Cell Hemoglobin Concentration : 29.8 gm/dL  Auto Neutrophil # : x  Auto Lymphocyte # : x  Auto Monocyte # : x  Auto Eosinophil # : x  Auto Basophil # : x  Auto Neutrophil % : x  Auto Lymphocyte % : x  Auto Monocyte % : x  Auto Eosinophil % : x  Auto Basophil % : x    03-07    144  |  106  |  5<L>  ----------------------------<  130<H>  3.6   |  32<H>  |  0.68    Ca    9.7      07 Mar 2024 07:33  Phos  3.0     03-06  Mg     1.8     03-06      PT/INR - ( 06 Mar 2024 04:50 )   PT: 17.5 sec;   INR: 1.51 ratio         PTT - ( 06 Mar 2024 11:35 )  PTT:44.0 sec    fe studies      WBC trend  5.78 K/uL (03-07-24 @ 07:33)  5.13 K/uL (03-06-24 @ 04:50)  5.22 K/uL (03-05-24 @ 06:00)      Hgb trend  9.2 g/dL (03-07-24 @ 07:33)  8.8 g/dL (03-06-24 @ 04:50)  8.0 g/dL (03-05-24 @ 06:00)      plt trend  339 K/uL (03-07-24 @ 07:33)  293 K/uL (03-06-24 @ 04:50)  311 K/uL (03-05-24 @ 06:00)        RADIOLOGY & ADDITIONAL STUDIES:

## 2024-03-07 NOTE — CASE MANAGEMENT PROGRESS NOTE - NSCMPROGRESSNOTE_GEN_ALL_CORE
Pt is requiring home O2 w/ RA sat=86%. Pt also newly dx w/ colon CA today with Onc Dr. Foreman who plans to start chemo in approx 2 weeks. Pt also on Eliquis which is approx $600/mth,Only Medicare A active. Pt applied for B,D approx 2 weeks ago. L/M for Medicaid office as possible option for this pt at this time w/ medical expenses. Met with pt and sister at bedside w/ Dr. Foreman. CM will follow.

## 2024-03-07 NOTE — DISCHARGE NOTE NURSING/CASE MANAGEMENT/SOCIAL WORK - NSDCPEFALRISK_GEN_ALL_CORE
For information on Fall & Injury Prevention, visit: [email or web address] OR  [email or web address] OR  https://www.cdc.gov/steadi/patient.html

## 2024-03-07 NOTE — PROGRESS NOTE ADULT - ASSESSMENT
68 year old female with PMHx COPD (not on home oxygen), HTN, RA, anxiety admitted with GI bleed.    Cardiac Optimization, HTN, Acute PE  - CT chest with acute pulmonary embolus in the right middle lobe lobar and segmental branches, likely, provoked in the setting of recent surgery  - Now on Eliquis load followed by maintenance  - s/p colonoscopy 2/20 for GIB, found to have large partially obstructing mass noted at approximately 13cm from anal verge.   - s/p Laparoscopic colorectal anastomosis on 2/29/24, pathology positive for adenocarcinoma  - F/U Hem/Onc recs    - No sign of volume overload on exam.  No O2 requirement  - TTE 2/22/23 shows normal LV and RV size and function, EF 60-65%     - EKG: NSR with nonspecific TWA  - LDL not within goal, recommend to start statin     - BP stable and controlled, continue home Norvasc   - Monitor and replete lytes, keep K>4, Mg>2.  - Will continue to follow.  Stable from cardiac standpoint    Estefani Huffman DNP, NP-C, AGACNP-C  Cardiology   Call TEAMS

## 2024-03-07 NOTE — PROGRESS NOTE ADULT - SUBJECTIVE AND OBJECTIVE BOX
INTERVAL HPI/OVERNIGHT EVENTS: No acute events overnight    SUBJECTIVE: Denies pain this AM. Afebrile. Tolerating diet. Ambulating without assistance. +Urination. + Flatus. +BM. Denies F/C/N/V/CP/SOB.     Anticoagulation team discussed options with patient and she is electing to go home with Eliquis. Heparin bridge to coumadin stopped.     Pathology showing high grade, moderately differentiated invasive adenocarcinoma with 5/18 positive nodes. Negative surgical margins.       MEDICATIONS  (STANDING):  amLODIPine   Tablet 10 milliGRAM(s) Oral daily  apixaban 10 milliGRAM(s) Oral every 12 hours  budesonide 160 MICROgram(s)/formoterol 4.5 MICROgram(s) Inhaler 2 Puff(s) Inhalation two times a day  dextrose 5%. 1000 milliLiter(s) (70 mL/Hr) IV Continuous <Continuous>  influenza  Vaccine (HIGH DOSE) 0.7 milliLiter(s) IntraMuscular once  pantoprazole    Tablet 40 milliGRAM(s) Oral before breakfast  tiotropium 2.5 MICROgram(s) Inhaler 2 Puff(s) Inhalation daily    MEDICATIONS  (PRN):  acetaminophen     Tablet .. 650 milliGRAM(s) Oral every 6 hours PRN Mild Pain (1 - 3), Moderate Pain (4 - 6)  albuterol    90 MICROgram(s) HFA Inhaler 2 Puff(s) Inhalation every 6 hours PRN Shortness of Breath and/or Wheezing  ALPRAZolam 0.25 milliGRAM(s) Oral daily PRN for anxiety      Vital Signs Last 24 Hrs  T(C): 36.6 (07 Mar 2024 05:30), Max: 37.2 (06 Mar 2024 13:46)  T(F): 97.9 (07 Mar 2024 05:30), Max: 98.9 (06 Mar 2024 13:46)  HR: 94 (07 Mar 2024 05:30) (92 - 114)  BP: 127/68 (07 Mar 2024 05:30) (121/72 - 127/68)  BP(mean): --  RR: 19 (07 Mar 2024 05:30) (18 - 20)  SpO2: 95% (07 Mar 2024 05:30) (86% - 95%)    Parameters below as of 07 Mar 2024 05:30  Patient On (Oxygen Delivery Method): nasal cannula  O2 Flow (L/min): 2      PE  Gen: AAO x3, NAD  Pulm: CTAB, Symmetrical chest rise  CV: RRR  Abd: Soft, NT, ND, -R/-G  Ext: No C/C/E  Vasc: 2+ Radial, DP pulses  Neuro: No focal neurological deficits      I&O's Detail    06 Mar 2024 07:01  -  07 Mar 2024 07:00  --------------------------------------------------------  IN:    dextrose 5%: 1190 mL  Total IN: 1190 mL    OUT:  Total OUT: 0 mL    Total NET: 1190 mL          LABS:                        9.2    5.78  )-----------( 339      ( 07 Mar 2024 07:33 )             30.9     03-07    144  |  106  |  5<L>  ----------------------------<  130<H>  3.6   |  32<H>  |  0.68    Ca    9.7      07 Mar 2024 07:33  Phos  3.0     03-06  Mg     1.8     03-06      PT/INR - ( 06 Mar 2024 04:50 )   PT: 17.5 sec;   INR: 1.51 ratio         PTT - ( 06 Mar 2024 11:35 )  PTT:44.0 sec  Urinalysis Basic - ( 07 Mar 2024 07:33 )    Color: x / Appearance: x / SG: x / pH: x  Gluc: 130 mg/dL / Ketone: x  / Bili: x / Urobili: x   Blood: x / Protein: x / Nitrite: x   Leuk Esterase: x / RBC: x / WBC x   Sq Epi: x / Non Sq Epi: x / Bacteria: x      SURGICAL PATHOLOGY  1. Sigmoid colon upper rectum:   - Invasive adenocarcinoma, moderately differentiated, high nuclear   grade, 5.5 x 5.0 cm, infiltrating muscular coat with involvement of   subserosal adipose tissue. See note.   - Diverticulosis.   - Metastatic adenocarcinoma to 5/18 lymph nodes with complete   replacement of 2 nodes.   Note: Proximal, distal and mesenteric surgical margins appear free of   tumor. Tumor is noted <1 high-power field from the serosal surface.

## 2024-03-07 NOTE — PROGRESS NOTE ADULT - NUTRITIONAL ASSESSMENT
This patient has been assessed with a concern for Malnutrition and has been determined to have a diagnosis/diagnoses of Mild protein-calorie malnutrition.    This patient is being managed with:   Diet Regular-  Entered: Mar  3 2024 11:30AM  
This patient has been assessed with a concern for Malnutrition and has been determined to have a diagnosis/diagnoses of Mild protein-calorie malnutrition.    This patient is being managed with:   Diet Regular-  Entered: Mar  3 2024 11:30AM  
This patient has been assessed with a concern for Malnutrition and has been determined to have a diagnosis/diagnoses of Mild protein-calorie malnutrition.    This patient is being managed with:   Diet Clear Liquid-  Entered: Mar  1 2024  9:29AM  
This patient has been assessed with a concern for Malnutrition and has been determined to have a diagnosis/diagnoses of Mild protein-calorie malnutrition.    This patient is being managed with:   Diet Regular-  Entered: Mar  3 2024 11:30AM  
This patient has been assessed with a concern for Malnutrition and has been determined to have a diagnosis/diagnoses of Mild protein-calorie malnutrition.    This patient is being managed with:   Diet Regular-  Entered: Mar  3 2024 11:30AM  
Preop
This patient has been assessed with a concern for Malnutrition and has been determined to have a diagnosis/diagnoses of Mild protein-calorie malnutrition.    This patient is being managed with:   Diet Full Liquid-  Entered: Mar  2 2024 11:13AM  
This patient has been assessed with a concern for Malnutrition and has been determined to have a diagnosis/diagnoses of Mild protein-calorie malnutrition.    This patient is being managed with:   Diet Regular-  Entered: Mar  3 2024 11:30AM  
This patient has been assessed with a concern for Malnutrition and has been determined to have a diagnosis/diagnoses of Mild protein-calorie malnutrition.    This patient is being managed with:   Diet Clear Liquid-  Entered: Mar  1 2024  9:29AM  
This patient has been assessed with a concern for Malnutrition and has been determined to have a diagnosis/diagnoses of Mild protein-calorie malnutrition.    This patient is being managed with:   Diet Full Liquid-  Entered: Mar  2 2024 11:13AM  
This patient has been assessed with a concern for Malnutrition and has been determined to have a diagnosis/diagnoses of Mild protein-calorie malnutrition.    This patient is being managed with:   Diet Regular-  Entered: Mar  3 2024 11:30AM

## 2024-03-07 NOTE — PHARMACOTHERAPY INTERVENTION NOTE - COMMENTS
Meds to beds requested by provider.    The following prescriptions were filled at [facility]: Eliquis starter pack      Medications delivered by pharmacist at bedside and counseled on indication, directions, and side effects.  Patient verbalized understanding and had no further questions Meds to beds requested by provider.    The following prescriptions were filled at [facility]: Eliquis starter pack    Medications delivered by pharmacist at bedside and counseled on indication, directions, and side effects.  Patient verbalized understanding and had no further questions

## 2024-03-19 ENCOUNTER — APPOINTMENT (OUTPATIENT)
Dept: SURGERY | Facility: CLINIC | Age: 69
End: 2024-03-19
Payer: MEDICARE

## 2024-03-19 VITALS
SYSTOLIC BLOOD PRESSURE: 146 MMHG | WEIGHT: 124 LBS | OXYGEN SATURATION: 96 % | DIASTOLIC BLOOD PRESSURE: 75 MMHG | HEIGHT: 65 IN | RESPIRATION RATE: 18 BRPM | BODY MASS INDEX: 20.66 KG/M2 | HEART RATE: 109 BPM

## 2024-03-19 PROCEDURE — 99024 POSTOP FOLLOW-UP VISIT: CPT

## 2024-04-10 ENCOUNTER — NON-APPOINTMENT (OUTPATIENT)
Age: 69
End: 2024-04-10

## 2024-04-10 NOTE — ASU PREOP CHECKLIST - AICD PRESENT
"Goal Outcome Evaluation:     Plan of Care Reviewed With: patient       April was up ad janie, slept in until approximately 09:15 am. Pt got up, ate breakfast and took her scheduled meds. When meeting with April, she said she does not feel any different than when she admitted. Pt reported she still feels hopeless, \"I don't think anything is going to get better\" \"I just want to get out of here, but I don't want to go back to [place]\"    April has attended groups and transitioned appropriately. Pt has been interactive with peers. Pt's food and fluid intake were WNL. Pt has been med adherent and has not requested or required any PRN medication.            " no

## 2024-04-12 ENCOUNTER — NON-APPOINTMENT (OUTPATIENT)
Age: 69
End: 2024-04-12

## 2024-04-12 ENCOUNTER — APPOINTMENT (OUTPATIENT)
Dept: CARDIOLOGY | Facility: CLINIC | Age: 69
End: 2024-04-12
Payer: MEDICARE

## 2024-04-12 VITALS
WEIGHT: 124 LBS | HEART RATE: 83 BPM | SYSTOLIC BLOOD PRESSURE: 146 MMHG | DIASTOLIC BLOOD PRESSURE: 75 MMHG | OXYGEN SATURATION: 97 % | HEIGHT: 65 IN | BODY MASS INDEX: 20.66 KG/M2

## 2024-04-12 DIAGNOSIS — J44.9 CHRONIC OBSTRUCTIVE PULMONARY DISEASE, UNSPECIFIED: ICD-10-CM

## 2024-04-12 DIAGNOSIS — I26.99 OTHER PULMONARY EMBOLISM W/OUT ACUTE COR PULMONALE: ICD-10-CM

## 2024-04-12 DIAGNOSIS — F41.9 ANXIETY DISORDER, UNSPECIFIED: ICD-10-CM

## 2024-04-12 DIAGNOSIS — K21.9 GASTRO-ESOPHAGEAL REFLUX DISEASE W/OUT ESOPHAGITIS: ICD-10-CM

## 2024-04-12 DIAGNOSIS — I10 ESSENTIAL (PRIMARY) HYPERTENSION: ICD-10-CM

## 2024-04-12 PROCEDURE — G2211 COMPLEX E/M VISIT ADD ON: CPT

## 2024-04-12 PROCEDURE — 93000 ELECTROCARDIOGRAM COMPLETE: CPT

## 2024-04-12 PROCEDURE — 99215 OFFICE O/P EST HI 40 MIN: CPT

## 2024-04-12 RX ORDER — PREDNISONE 10 MG/1
10 TABLET ORAL
Refills: 0 | Status: ACTIVE | COMMUNITY
Start: 2024-04-12

## 2024-04-12 RX ORDER — APIXABAN 5 MG/1
5 TABLET, FILM COATED ORAL
Qty: 60 | Refills: 3 | Status: ACTIVE | COMMUNITY
Start: 2024-04-12

## 2024-04-12 RX ORDER — ALPRAZOLAM 0.25 MG/1
0.25 TABLET ORAL DAILY
Qty: 7 | Refills: 0 | Status: ACTIVE | COMMUNITY
Start: 2024-04-12

## 2024-04-12 RX ORDER — FAMOTIDINE 20 MG/1
20 TABLET, FILM COATED ORAL
Qty: 30 | Refills: 3 | Status: ACTIVE | COMMUNITY
Start: 2024-04-12

## 2024-04-12 RX ORDER — AMLODIPINE BESYLATE 10 MG/1
10 TABLET ORAL DAILY
Refills: 3 | Status: ACTIVE | COMMUNITY
Start: 2024-04-12

## 2024-04-12 NOTE — DISCUSSION/SUMMARY
[FreeTextEntry1] : This is a 68 year old woman with COPD and hypertension who presents to the office for a follow up cardiac evaluation.  In the winter of 2024 she presented to  with a GI bleed.  She was noted to have a partially obstructive sigmoid mass diagnosed as adenocarcinoma.  She underwent sigmoid resection, colorectal anastomosis.  She had a a right middle lobe acute pulmonary embolism post op.  She had an echocardiogram that showed normal LV function with no significant RV dysfunction, and no significant valvular heart disease.  Her pressure was controlled, she was started on AC, and discharged with follow up.  She is no scheduled for port placement so that she can start chemotherapy.  The patient is optimized for the upcoming procedure with no cardiac contraindications.  There is no evidence of active ischemic heart disease, decompensated heart failure, uncontrolled arrhythmia, or severe obstructive valvular disease.  Routine hemodynamic monitoring will be sufficient.  I will discuss with hematology when she should stop AC, and be in contact with her.  She will continue the remainder of her medications as written.  She will follow with me one month post procedure.  [EKG obtained to assist in diagnosis and management of assessed problem(s)] : EKG obtained to assist in diagnosis and management of assessed problem(s)

## 2024-04-12 NOTE — HISTORY OF PRESENT ILLNESS
[FreeTextEntry1] : This is a 68 year old woman with COPD and hypertension who presents to the office for a follow up cardiac evaluation.  In the winter of 2024 she presented to [facility] with a GI bleed.  She was noted to have a partially obstructive sigmoid mass diagnosed as adenocarcinoma.  She underwent sigmoid resection, colorectal anastomosis.  She had a a right middle lobe acute pulmonary embolism post op.  She had an echocardiogram that showed normal LV function with no significant RV dysfunction, and no significant valvular heart disease.  Her pressure was controlled, she was started on AC, and discharged with follow up.  She is no scheduled for port placement so that she can start chemotherapy.  She denies any new chest pains, increased dyspnea, PND, orthopnea, LE swelling, dizziness, or syncope.  No abnormal bleeding.  She was not told how long to hold AC prior to port placement.

## 2024-04-16 ENCOUNTER — APPOINTMENT (OUTPATIENT)
Dept: SURGERY | Facility: CLINIC | Age: 69
End: 2024-04-16
Payer: MEDICARE

## 2024-04-16 VITALS
OXYGEN SATURATION: 97 % | SYSTOLIC BLOOD PRESSURE: 132 MMHG | DIASTOLIC BLOOD PRESSURE: 68 MMHG | WEIGHT: 124 LBS | BODY MASS INDEX: 20.66 KG/M2 | HEIGHT: 65 IN | HEART RATE: 96 BPM | RESPIRATION RATE: 18 BRPM

## 2024-04-16 PROCEDURE — 99024 POSTOP FOLLOW-UP VISIT: CPT

## 2024-04-18 ENCOUNTER — NON-APPOINTMENT (OUTPATIENT)
Age: 69
End: 2024-04-18

## 2024-04-26 RX ORDER — AMLODIPINE BESYLATE 2.5 MG/1
1 TABLET ORAL
Qty: 0 | Refills: 0 | DISCHARGE

## 2024-04-26 RX ORDER — TRAMADOL HYDROCHLORIDE 50 MG/1
1 TABLET ORAL
Refills: 0 | DISCHARGE

## 2024-04-26 RX ORDER — BUDESONIDE, GLYCOPYRROLATE, AND FORMOTEROL FUMARATE 160; 9; 4.8 UG/1; UG/1; UG/1
2 AEROSOL, METERED RESPIRATORY (INHALATION)
Refills: 0 | DISCHARGE

## 2024-04-26 RX ORDER — ASPIRIN/CALCIUM CARB/MAGNESIUM 324 MG
1 TABLET ORAL
Qty: 0 | Refills: 0 | DISCHARGE

## 2024-04-26 RX ORDER — ALBUTEROL 90 UG/1
2 AEROSOL, METERED ORAL
Refills: 0 | DISCHARGE

## 2024-04-26 RX ORDER — PANTOPRAZOLE SODIUM 20 MG/1
1 TABLET, DELAYED RELEASE ORAL
Refills: 0 | DISCHARGE

## 2024-04-26 RX ORDER — ALPRAZOLAM 0.25 MG
1 TABLET ORAL
Refills: 0 | DISCHARGE

## 2024-06-18 ENCOUNTER — APPOINTMENT (OUTPATIENT)
Dept: SURGERY | Facility: CLINIC | Age: 69
End: 2024-06-18
Payer: MEDICARE

## 2024-06-18 VITALS
OXYGEN SATURATION: 94 % | SYSTOLIC BLOOD PRESSURE: 150 MMHG | WEIGHT: 128 LBS | HEIGHT: 65 IN | DIASTOLIC BLOOD PRESSURE: 71 MMHG | BODY MASS INDEX: 21.33 KG/M2 | HEART RATE: 110 BPM

## 2024-06-18 PROCEDURE — 99213 OFFICE O/P EST LOW 20 MIN: CPT

## 2024-06-20 ENCOUNTER — APPOINTMENT (OUTPATIENT)
Dept: CARDIOLOGY | Facility: CLINIC | Age: 69
End: 2024-06-20

## 2024-07-29 NOTE — PROGRESS NOTE ADULT - NS ATTEND AMEND GEN_ALL_CORE FT
----- Message from TEX Orozco sent at 12/14/2017  5:19 PM CST -----  Please let patient know results of the lab tests:  Glucose is slightly increased, now 103. Please add A1c on the lab work that is ordered for March 2018. Focus efforts at decrease carbohydrate intake.  Kidney function is stable.  White blood cells and red blood cells remains slightly low. No significant change over the last 3 months.  
[FreeTextEntry1] :  Patient is Hpylori positive   will treat with prevpac
Leora is a pleasant 69yo F with COPD (not on home oxygen), HTN, RA, anxiety admitted with GI bleed.  She is now s/p colonoscopy 2/20, large partially obstructing mass noted at approximately 13cm from anal verge.  also found to have an acute PE, now on heparin    - no prior cardiac history, though does report some dyspnea on exertion, which has been blamed on her COPD  - remains with 02 requirement, on the basis of a PE and COPD  - titrate down 02 as able  - no sign of volume overload on exam  - no sign of acute ischemia  - echo with normal LV function  - troponin and bnp minimal  - Currently on heparin gtt. She is planned for the OR on 2/28, would consider a switch to Lovenox so PTTs do not have to be monitored, until prior to OR date.   - pulmonary follow up.  - can proceed to or next week. Goal to minimize time off ac.
67yo F with PMHx COPD (not on home oxygen), HTN, RA, anxiety admitted with GI bleed.      - CT chest with  Acute pulmonary embolus in the right middle lobe lobar and segmental branches, on heparin gtt for AC. PTT is subtherapeutic, would adjust accordingly. would consider a switch to Lovenox so PTTs do not have to be monitored, until prior to OR date.   - on 3L NC for supplemental 02,  on the basis of COPD and PE.  - wean as tolerated, on prn at home  - Patient euvolemic on examination with no overt signs of heart failure or cardiac ischemia.   - No severe valvular abnormalities noted on examination  - TTE 2/22 shows normal LV and RV size and function, EF 60-65%   - Pt has no active ischemia, decompensated heart failure, unstable arrythmia, or severe stenotic valvular disease. In the setting of a PE, will need to minimize the time off AC.  Patient at high risk for procedure given PE but benefit of procedure outweighs the risk. OK to proceed with the planned procedure  - BP controlled  - continue amlodipine 10mg qd with hold parameters
68 year old female with PMHx COPD (not on home oxygen), HTN, RA, anxiety admitted with GI bleed.    Found to have obstructing mass in the colon now s/p surgery. Path + for adenocarcinoma  CT PE positive for acute PE, was on Eliquis   on eliquis   TTE 2/22/23 shows normal LV and RV size and function, EF 60-65%   Statin when able  Amlodipine for BP control
67yo F with PMHx COPD (not on home oxygen), HTN, RA, anxiety admitted with GI bleed.      - CT chest with  Acute pulmonary embolus in the right middle lobe lobar and segmental branches, on heparin gtt for AC. PTT is subtherapeutic, would adjust accordingly. would consider a switch to Lovenox so PTTs do not have to be monitored, until prior to OR date.   - on 3L NC for supplemental 02,  on the basis of COPD and PE.  - wean as tolerated, on prn at home  - Patient euvolemic on examination with no overt signs of heart failure or cardiac ischemia.   - No severe valvular abnormalities noted on examination  - TTE 2/22 shows normal LV and RV size and function, EF 60-65%   - Pt has no active ischemia, decompensated heart failure, unstable arrythmia, or severe stenotic valvular disease. In the setting of a PE, will need to minimize the time off AC.  Patient at high risk for procedure given PE but benefit of procedure outweighs the risk. OK to proceed with the planned procedure  - BP controlled  - continue amlodipine 10mg qd with hold parameters
68 year old female with PMHx COPD (not on home oxygen), HTN, RA, anxiety admitted with GI bleed.    Found to have obstructing mass in the colon now s/p surgery. Path + for adenocarcinoma  CT PE positive for acute PE, was on Eliquis  Due to insurance issues, has been switched to heparin gtt to Coumadin  Would repeat PTT, they have been subtherapeutic x 2.   TTE 2/22/23 shows normal LV and RV size and function, EF 60-65%   Statin when able  Amlodipine for BP control
67yo F with PMHx COPD (not on home oxygen), HTN, RA, anxiety admitted with GI bleed.    Acute PE on heparin gtt  no clear vol ol  severe copd  colon cancer planned for the OR today  She is high risk but optimized from a cardiac standpoint for her upcoming surgery. TTE with normal RV and LV function.   minimize time off AC as able.
68 year old female with PMHx COPD (not on home oxygen), HTN, RA, anxiety admitted with GI bleed.    - CT chest with acute pulmonary embolus in the right middle lobe lobar and segmental branches, likely, provoked in the setting of recent surgery  - Now on Eliquis load followed by maintenance  - s/p colonoscopy 2/20 for GIB, found to have large partially obstructing mass noted at approximately 13cm from anal verge.   - s/p Laparoscopic colorectal anastomosis on 2/29/24, pathology positive for adenocarcinoma  - F/U Hem/Onc recs  - No sign of volume overload on exam.  No O2 requirement  - LDL not within goal, recommend to start statin   - BP stable and controlled, continue home Norvasc
Distal sigmoid/Prox Rectal mass, bx proven AdenoCa.  Multiple comorbid conditions being addressed by specialty services.  Cardiology following for hx of HTN.  Cardiac workup appears unremarkable however remains High Risk due to acute PE.  Pulmonary following for hx of PE and Home O2 use.  Plan for PFT's prior to surgery.  Pt off oral anticoagulation and on Heparin drip.  Pt reports LGI bleeding has stopped. Labs stable (H/H 9/31)  Dr Rocha tentatively planning for surgical resection this Thursday.  Continue medical optimization.
Leora is a pleasant 69yo F with COPD (not on home oxygen), HTN, RA, anxiety admitted with GI bleed.  She is now s/p colonoscopy 2/20, large partially obstructing mass noted at approximately 13cm from anal verge.  also found to have an acute PE, now on heparin    - no prior cardiac history, though does report some dyspnea on exertion, which has been blamed on her COPD  - remains with 02 requirement, on the basis of a PE and COPD  - titrate down 02 as able  - no sign of volume overload on exam  - no sign of acute ischemia  - needs an echocardiogram to evaluate LV function and pulmonary pressures  - troponin and bnp minimal  - in the setting of a PE, will need to minimize the time off AC  - Currently on heparin gtt, but PTTs are subtherapeutic, please adjust accordingly. She is planned for the OR on 2/28, would consider a switch to Lovenox so PTTs do not have to be monitored, until prior to OR date.   - pulmonary follow up.
No acute events overnight.  Tolerating diet.  Plan for OR later this week with Dr Rocha for Partial colectomy +/- Colostomy for AdencoCA distal sigmoid/Prox rectum.  Deemed high risk by cardiology and Pulmonary give severe COPD.  Pending PFT's, Dr Dobson coordinating.    Continues on Heparin drip for Acute PE.  Continue supportive care in anticipation for surgery later this week.
Leora is a pleasant 67yo F with COPD (not on home oxygen), HTN, RA, anxiety admitted with GI bleed.  She is now s/p colonoscopy 2/20, large partially obstructing mass noted at approximately 13cm from anal verge.  also found to have an acute PE, now on heparin    - no prior cardiac history, though does report some dyspnea on exertion, which has been blamed on her COPD  - remains with 02 requirement, on the basis of a PE and COPD  - titrate down 02 as able  - no sign of volume overload on exam  - no sign of acute ischemia  - echo with normal LV function  - troponin and bnp minimal  - Currently on heparin gtt. She is planned for the OR on 2/28, would consider a switch to Lovenox so PTTs do not have to be monitored, until prior to OR date.   - pulmonary follow up.  - can proceed to or next week. Goal to minimize time off ac.
Leora is a pleasant 69yo F with COPD (not on home oxygen), HTN, RA, anxiety admitted with GI bleed.  She is now s/p colonoscopy 2/20, large partially obstructing mass noted at approximately 13cm from anal verge.  also found to have an acute PE, now on heparin    - no prior cardiac history, though does report some dyspnea on exertion, which has been blamed on her COPD  - remains with 02 requirement, on the basis of a PE and COPD  - titrate down 02 as able  - no sign of volume overload on exam  - no sign of acute ischemia  - echo with normal LV function  - troponin and bnp minimal  - Currently on heparin gtt. She is planned for the OR on 2/28, would consider a switch to Lovenox so PTTs do not have to be monitored, until prior to OR date.   - pulmonary follow up.  - can proceed to or next week. Goal to minimize time off ac.
69yo F with PMHx COPD (not on home oxygen), HTN, RA, anxiety admitted with GI bleed.    acute pe now on ac  no clear vol ol  severe copd  colon ca planned for surgery  dicsussion ongoing as to facility, but is considered optimized from a cv persp for planned procedure   minimize time off ac

## 2024-09-03 NOTE — H&P ADULT - PATIENT'S PREFERRED PRONOUN
0920 am - Patient received 1L NS bolus over 1 hr as ordered. Patient tolerated well without difficulty.     Next appointment scheduled and future appointments made till October 2024. Patient made aware.     Limited head-to-toe assessment due to privacy issues and visit reason though the opportunity was given for patient to express any concerns.                                                                                                                                                                                                          
Withheld/Decline to Answer

## 2024-09-17 ENCOUNTER — APPOINTMENT (OUTPATIENT)
Dept: GASTROENTEROLOGY | Facility: HOSPITAL | Age: 69
End: 2024-09-17

## 2024-11-01 ENCOUNTER — TRANSCRIPTION ENCOUNTER (OUTPATIENT)
Age: 69
End: 2024-11-01

## 2025-02-03 ENCOUNTER — INPATIENT (INPATIENT)
Facility: HOSPITAL | Age: 70
LOS: 6 days | Discharge: HOME | DRG: 195 | End: 2025-02-10
Attending: STUDENT IN AN ORGANIZED HEALTH CARE EDUCATION/TRAINING PROGRAM | Admitting: INTERNAL MEDICINE
Payer: MEDICARE

## 2025-02-03 VITALS
TEMPERATURE: 97 F | OXYGEN SATURATION: 84 % | DIASTOLIC BLOOD PRESSURE: 124 MMHG | HEART RATE: 140 BPM | RESPIRATION RATE: 28 BRPM | WEIGHT: 149.91 LBS | SYSTOLIC BLOOD PRESSURE: 200 MMHG | HEIGHT: 62 IN

## 2025-02-03 DIAGNOSIS — J18.9 PNEUMONIA, UNSPECIFIED ORGANISM: ICD-10-CM

## 2025-02-03 DIAGNOSIS — M06.9 RHEUMATOID ARTHRITIS, UNSPECIFIED: ICD-10-CM

## 2025-02-03 DIAGNOSIS — S22.39XA FRACTURE OF ONE RIB, UNSPECIFIED SIDE, INITIAL ENCOUNTER FOR CLOSED FRACTURE: ICD-10-CM

## 2025-02-03 DIAGNOSIS — I10 ESSENTIAL (PRIMARY) HYPERTENSION: ICD-10-CM

## 2025-02-03 DIAGNOSIS — Z29.9 ENCOUNTER FOR PROPHYLACTIC MEASURES, UNSPECIFIED: ICD-10-CM

## 2025-02-03 DIAGNOSIS — J44.0 CHRONIC OBSTRUCTIVE PULMONARY DISEASE WITH (ACUTE) LOWER RESPIRATORY INFECTION: ICD-10-CM

## 2025-02-03 DIAGNOSIS — J44.1 CHRONIC OBSTRUCTIVE PULMONARY DISEASE WITH (ACUTE) EXACERBATION: ICD-10-CM

## 2025-02-03 DIAGNOSIS — F41.9 ANXIETY DISORDER, UNSPECIFIED: ICD-10-CM

## 2025-02-03 LAB
ALBUMIN SERPL ELPH-MCNC: 2.8 G/DL — LOW (ref 3.3–5)
ALP SERPL-CCNC: 122 U/L — HIGH (ref 40–120)
ALT FLD-CCNC: 12 U/L — SIGNIFICANT CHANGE UP (ref 12–78)
ANION GAP SERPL CALC-SCNC: 12 MMOL/L — SIGNIFICANT CHANGE UP (ref 5–17)
APTT BLD: 31.3 SEC — SIGNIFICANT CHANGE UP (ref 24.5–35.6)
AST SERPL-CCNC: 22 U/L — SIGNIFICANT CHANGE UP (ref 15–37)
BASE EXCESS BLDA CALC-SCNC: 5.7 MMOL/L — HIGH (ref -2–3)
BASOPHILS # BLD AUTO: 0.03 K/UL — SIGNIFICANT CHANGE UP (ref 0–0.2)
BASOPHILS NFR BLD AUTO: 0.2 % — SIGNIFICANT CHANGE UP (ref 0–2)
BILIRUB SERPL-MCNC: 1 MG/DL — SIGNIFICANT CHANGE UP (ref 0.2–1.2)
BLOOD GAS COMMENTS ARTERIAL: SIGNIFICANT CHANGE UP
BUN SERPL-MCNC: 19 MG/DL — SIGNIFICANT CHANGE UP (ref 7–23)
CALCIUM SERPL-MCNC: 9.7 MG/DL — SIGNIFICANT CHANGE UP (ref 8.5–10.1)
CHLORIDE SERPL-SCNC: 100 MMOL/L — SIGNIFICANT CHANGE UP (ref 96–108)
CK SERPL-CCNC: 211 U/L — HIGH (ref 26–192)
CK SERPL-CCNC: 255 U/L — HIGH (ref 26–192)
CO2 SERPL-SCNC: 27 MMOL/L — SIGNIFICANT CHANGE UP (ref 22–31)
CREAT SERPL-MCNC: 0.95 MG/DL — SIGNIFICANT CHANGE UP (ref 0.5–1.3)
D DIMER BLD IA.RAPID-MCNC: 1074 NG/ML DDU — HIGH
EGFR: 65 ML/MIN/1.73M2 — SIGNIFICANT CHANGE UP
EOSINOPHIL # BLD AUTO: 0.03 K/UL — SIGNIFICANT CHANGE UP (ref 0–0.5)
EOSINOPHIL NFR BLD AUTO: 0.2 % — SIGNIFICANT CHANGE UP (ref 0–6)
FLUAV AG NPH QL: SIGNIFICANT CHANGE UP
FLUBV AG NPH QL: SIGNIFICANT CHANGE UP
GAS PNL BLDA: SIGNIFICANT CHANGE UP
GLUCOSE SERPL-MCNC: 125 MG/DL — HIGH (ref 70–99)
HCO3 BLDA-SCNC: 30 MMOL/L — HIGH (ref 21–28)
HCT VFR BLD CALC: 38.5 % — SIGNIFICANT CHANGE UP (ref 34.5–45)
HGB BLD-MCNC: 11.6 G/DL — SIGNIFICANT CHANGE UP (ref 11.5–15.5)
IMM GRANULOCYTES NFR BLD AUTO: 0.8 % — SIGNIFICANT CHANGE UP (ref 0–0.9)
INR BLD: 1.11 RATIO — SIGNIFICANT CHANGE UP (ref 0.85–1.16)
LACTATE SERPL-SCNC: 2.3 MMOL/L — HIGH (ref 0.7–2)
LACTATE SERPL-SCNC: 2.4 MMOL/L — HIGH (ref 0.7–2)
LYMPHOCYTES # BLD AUTO: 1.4 K/UL — SIGNIFICANT CHANGE UP (ref 1–3.3)
LYMPHOCYTES # BLD AUTO: 7 % — LOW (ref 13–44)
MCHC RBC-ENTMCNC: 24.2 PG — LOW (ref 27–34)
MCHC RBC-ENTMCNC: 30.1 G/DL — LOW (ref 32–36)
MCV RBC AUTO: 80.4 FL — SIGNIFICANT CHANGE UP (ref 80–100)
MONOCYTES # BLD AUTO: 1.13 K/UL — HIGH (ref 0–0.9)
MONOCYTES NFR BLD AUTO: 5.7 % — SIGNIFICANT CHANGE UP (ref 2–14)
NEUTROPHILS # BLD AUTO: 17.17 K/UL — HIGH (ref 1.8–7.4)
NEUTROPHILS NFR BLD AUTO: 86.1 % — HIGH (ref 43–77)
NRBC # BLD: 0 /100 WBCS — SIGNIFICANT CHANGE UP (ref 0–0)
NRBC BLD-RTO: 0 /100 WBCS — SIGNIFICANT CHANGE UP (ref 0–0)
NT-PROBNP SERPL-SCNC: 320 PG/ML — HIGH (ref 0–125)
PCO2 BLDA: 44 MMHG — HIGH (ref 32–35)
PH BLDA: 7.44 — SIGNIFICANT CHANGE UP (ref 7.35–7.45)
PLATELET # BLD AUTO: 297 K/UL — SIGNIFICANT CHANGE UP (ref 150–400)
PO2 BLDA: 112 MMHG — HIGH (ref 83–108)
POTASSIUM SERPL-MCNC: 3.5 MMOL/L — SIGNIFICANT CHANGE UP (ref 3.5–5.3)
POTASSIUM SERPL-SCNC: 3.5 MMOL/L — SIGNIFICANT CHANGE UP (ref 3.5–5.3)
PROT SERPL-MCNC: 6.6 G/DL — SIGNIFICANT CHANGE UP (ref 6–8.3)
PROTHROM AB SERPL-ACNC: 13.1 SEC — SIGNIFICANT CHANGE UP (ref 9.9–13.4)
RBC # BLD: 4.79 M/UL — SIGNIFICANT CHANGE UP (ref 3.8–5.2)
RBC # FLD: 17.2 % — HIGH (ref 10.3–14.5)
RSV RNA NPH QL NAA+NON-PROBE: SIGNIFICANT CHANGE UP
SAO2 % BLDA: 99.4 % — HIGH (ref 94–98)
SARS-COV-2 RNA SPEC QL NAA+PROBE: SIGNIFICANT CHANGE UP
SODIUM SERPL-SCNC: 139 MMOL/L — SIGNIFICANT CHANGE UP (ref 135–145)
TROPONIN I, HIGH SENSITIVITY RESULT: 12.1 NG/L — SIGNIFICANT CHANGE UP
TROPONIN I, HIGH SENSITIVITY RESULT: 12.3 NG/L — SIGNIFICANT CHANGE UP
WBC # BLD: 19.92 K/UL — HIGH (ref 3.8–10.5)
WBC # FLD AUTO: 19.92 K/UL — HIGH (ref 3.8–10.5)

## 2025-02-03 PROCEDURE — 71275 CT ANGIOGRAPHY CHEST: CPT | Mod: 26

## 2025-02-03 PROCEDURE — 99233 SBSQ HOSP IP/OBS HIGH 50: CPT | Mod: GC

## 2025-02-03 PROCEDURE — 99291 CRITICAL CARE FIRST HOUR: CPT

## 2025-02-03 PROCEDURE — 99222 1ST HOSP IP/OBS MODERATE 55: CPT

## 2025-02-03 PROCEDURE — 93010 ELECTROCARDIOGRAM REPORT: CPT

## 2025-02-03 PROCEDURE — 71045 X-RAY EXAM CHEST 1 VIEW: CPT | Mod: 26

## 2025-02-03 RX ORDER — TRAMADOL HYDROCHLORIDE 100 MG/1
25 TABLET, EXTENDED RELEASE ORAL EVERY 6 HOURS
Refills: 0 | Status: DISCONTINUED | OUTPATIENT
Start: 2025-02-03 | End: 2025-02-03

## 2025-02-03 RX ORDER — IPRATROPIUM BROMIDE AND ALBUTEROL SULFATE .5; 2.5 MG/3ML; MG/3ML
3 SOLUTION RESPIRATORY (INHALATION) ONCE
Refills: 0 | Status: COMPLETED | OUTPATIENT
Start: 2025-02-03 | End: 2025-02-03

## 2025-02-03 RX ORDER — AZITHROMYCIN DIHYDRATE 500 MG/1
500 TABLET, FILM COATED ORAL EVERY 24 HOURS
Refills: 0 | Status: DISCONTINUED | OUTPATIENT
Start: 2025-02-04 | End: 2025-02-06

## 2025-02-03 RX ORDER — AMLODIPINE BESYLATE 5 MG
10 TABLET ORAL DAILY
Refills: 0 | Status: DISCONTINUED | OUTPATIENT
Start: 2025-02-03 | End: 2025-02-10

## 2025-02-03 RX ORDER — CEFTRIAXONE 250 MG/1
1000 INJECTION, POWDER, FOR SOLUTION INTRAMUSCULAR; INTRAVENOUS EVERY 24 HOURS
Refills: 0 | Status: DISCONTINUED | OUTPATIENT
Start: 2025-02-04 | End: 2025-02-10

## 2025-02-03 RX ORDER — PREDNISONE 5 MG/1
10 TABLET ORAL DAILY
Refills: 0 | Status: DISCONTINUED | OUTPATIENT
Start: 2025-02-03 | End: 2025-02-04

## 2025-02-03 RX ORDER — TRAMADOL HYDROCHLORIDE 100 MG/1
50 TABLET, EXTENDED RELEASE ORAL EVERY 6 HOURS
Refills: 0 | Status: DISCONTINUED | OUTPATIENT
Start: 2025-02-03 | End: 2025-02-05

## 2025-02-03 RX ORDER — ALPRAZOLAM 2 MG
0.25 TABLET ORAL DAILY
Refills: 0 | Status: DISCONTINUED | OUTPATIENT
Start: 2025-02-03 | End: 2025-02-10

## 2025-02-03 RX ORDER — ACETAMINOPHEN, DIPHENHYDRAMINE HCL, PHENYLEPHRINE HCL 325; 25; 5 MG/1; MG/1; MG/1
3 TABLET ORAL AT BEDTIME
Refills: 0 | Status: DISCONTINUED | OUTPATIENT
Start: 2025-02-03 | End: 2025-02-10

## 2025-02-03 RX ORDER — BACTERIOSTATIC SODIUM CHLORIDE 0.9 %
1000 VIAL (ML) INJECTION
Refills: 0 | Status: DISCONTINUED | OUTPATIENT
Start: 2025-02-03 | End: 2025-02-06

## 2025-02-03 RX ORDER — MOMETASONE FUROATE 220 UG/1
2 INHALANT RESPIRATORY (INHALATION) DAILY
Refills: 0 | Status: DISCONTINUED | OUTPATIENT
Start: 2025-02-03 | End: 2025-02-06

## 2025-02-03 RX ORDER — MAGNESIUM, ALUMINUM HYDROXIDE 200-225/5
30 SUSPENSION, ORAL (FINAL DOSE FORM) ORAL EVERY 4 HOURS
Refills: 0 | Status: DISCONTINUED | OUTPATIENT
Start: 2025-02-03 | End: 2025-02-10

## 2025-02-03 RX ORDER — ONDANSETRON 4 MG/1
4 TABLET, ORALLY DISINTEGRATING ORAL EVERY 8 HOURS
Refills: 0 | Status: DISCONTINUED | OUTPATIENT
Start: 2025-02-03 | End: 2025-02-03

## 2025-02-03 RX ORDER — CEFTRIAXONE 250 MG/1
1000 INJECTION, POWDER, FOR SOLUTION INTRAMUSCULAR; INTRAVENOUS ONCE
Refills: 0 | Status: COMPLETED | OUTPATIENT
Start: 2025-02-03 | End: 2025-02-03

## 2025-02-03 RX ORDER — ENOXAPARIN SODIUM 100 MG/ML
40 INJECTION SUBCUTANEOUS EVERY 24 HOURS
Refills: 0 | Status: DISCONTINUED | OUTPATIENT
Start: 2025-02-03 | End: 2025-02-10

## 2025-02-03 RX ORDER — BACTERIOSTATIC SODIUM CHLORIDE 0.9 %
1000 VIAL (ML) INJECTION ONCE
Refills: 0 | Status: COMPLETED | OUTPATIENT
Start: 2025-02-03 | End: 2025-02-03

## 2025-02-03 RX ORDER — IPRATROPIUM BROMIDE AND ALBUTEROL SULFATE .5; 2.5 MG/3ML; MG/3ML
3 SOLUTION RESPIRATORY (INHALATION) EVERY 4 HOURS
Refills: 0 | Status: DISCONTINUED | OUTPATIENT
Start: 2025-02-03 | End: 2025-02-05

## 2025-02-03 RX ORDER — AZITHROMYCIN DIHYDRATE 500 MG/1
500 TABLET, FILM COATED ORAL ONCE
Refills: 0 | Status: COMPLETED | OUTPATIENT
Start: 2025-02-03 | End: 2025-02-03

## 2025-02-03 RX ORDER — ACETAMINOPHEN 160 MG/5ML
650 SUSPENSION ORAL EVERY 6 HOURS
Refills: 0 | Status: DISCONTINUED | OUTPATIENT
Start: 2025-02-03 | End: 2025-02-10

## 2025-02-03 RX ADMIN — AZITHROMYCIN DIHYDRATE 255 MILLIGRAM(S): 500 TABLET, FILM COATED ORAL at 12:28

## 2025-02-03 RX ADMIN — IPRATROPIUM BROMIDE AND ALBUTEROL SULFATE 3 MILLILITER(S): .5; 2.5 SOLUTION RESPIRATORY (INHALATION) at 11:33

## 2025-02-03 RX ADMIN — Medication 1000 MILLILITER(S): at 11:58

## 2025-02-03 RX ADMIN — IPRATROPIUM BROMIDE AND ALBUTEROL SULFATE 3 MILLILITER(S): .5; 2.5 SOLUTION RESPIRATORY (INHALATION) at 21:11

## 2025-02-03 RX ADMIN — CEFTRIAXONE 100 MILLIGRAM(S): 250 INJECTION, POWDER, FOR SOLUTION INTRAMUSCULAR; INTRAVENOUS at 11:58

## 2025-02-03 RX ADMIN — IPRATROPIUM BROMIDE AND ALBUTEROL SULFATE 3 MILLILITER(S): .5; 2.5 SOLUTION RESPIRATORY (INHALATION) at 11:34

## 2025-02-03 RX ADMIN — Medication 100 MILLIGRAM(S): at 21:38

## 2025-02-03 NOTE — H&P ADULT - ATTENDING COMMENTS
69 year old female with past medical history of colon cancer in remission, rheumatoid arthritis, hypertension and COPD presents to the ER with shortness of breath found to have pneumonia.    Pt started on ceftriaxone and azithromycin for abx coverage of PNA, f/u strep, legionella, sputum cx, and trend WBC and fever curves.  Cont duonebs, and supp O2.  ID and Pulm consulted, will cont to f/u recs.    Donny Sanabria, Attending Physician

## 2025-02-03 NOTE — ED PROVIDER NOTE - DIFFERENTIAL DIAGNOSIS
Rule out acute pneumonia, flu/COVID, leukocytosis, electrolyte abnormality, respiratory failure, other acute pathology Differential Diagnosis

## 2025-02-03 NOTE — H&P ADULT - PROBLEM SELECTOR PLAN 4
Chronic, stable on admission  - Continue home medications amlodipine with hold parameters  - Monitor routine hemodynamics

## 2025-02-03 NOTE — ED ADULT NURSE NOTE - NSFALLRISKINTERV_ED_ALL_ED

## 2025-02-03 NOTE — H&P ADULT - NSHPPHYSICALEXAM_GEN_ALL_CORE
PHYSICAL EXAM:  GENERAL: NAD  HEENT:  sclera clear, anicteric, NC and NT  CHEST/LUNG:  + wheezing with some rhonchi b/l  HEART:  RRR, S1, S2  ABDOMEN:  soft, nontender  EXTREMITIES: no c/c/e in bilateral lower extremities  NERVOUS SYSTEM: answers questions and follows commands appropriately

## 2025-02-03 NOTE — H&P ADULT - PROBLEM SELECTOR PLAN 3
New rib fracture right side 6th and 7th rib  - Consulted pulmonology Dr. Dobson by ER   - Ambulate with assistance   - Fall precautions  - Patient not reporting pain at this time, tramadol 25 and 50 PRN for mild to moderate pain q6 New rib fracture right side 6th and 7th rib  - Consulted pulmonology Dr. Dobson by ER   - Consult ortho  - Ambulate with assistance   - Fall precautions  - Patient not reporting pain at this time, tramadol 25 and 50 PRN for mild to moderate pain q6 New rib fracture right side 6th and 7th rib  - Consulted pulmonology Dr. Dobson by ER   - Day team to consult ortho, overnight PA not picking up at this time and matter non-urgent as patient is not in pain  - Ambulate with assistance   - Fall precautions  - Patient not reporting pain at this time, tramadol 25 and 50 PRN for mild to moderate pain q6 in case pain develops

## 2025-02-03 NOTE — H&P ADULT - HISTORY OF PRESENT ILLNESS
69 year old female with past medical history of rheumatoid arthritis, colon cancer (now in remission), COPD and hypertension presents to the ER with shortness of breath. She states that for the past 3 days she has had felt sick, coughing and congestion. She has been coughing up sputum, unsure what color. She states that 3-4 weeks ago she fell and had a compression fracture of the spine, [facility] group saw her and put her on pain management of oxy (per pt). She has history of rheumatoid arthritis and takes 10mg prednisone daily for that. She is no longer on anticoagulation because her cancer is in remission, she was on Eliquis 5mg BID last year during her admission because at the time she was on chemo? per pt. Currently not on anticoagulation. She states that that she came in for worsening shortness of breath and cough. She denies any fever or chills in the last few days. patient is visibly coughing while providing history. She used her inhaler which helped dyspnea past few days (home inhaler for COPD). Also tried symbicort which helped yesterday.     Denies fever, chills, chest pain, palpitations,abdominal pain, nausea, vomiting, diarrhea, constipation, urinary frequency, headaches.  ED Course: Given rocephin and azitho ER.   CT angio was negative for PE, found new rib fracture. Patient had spinal fracture from 3-4 weeks ago.   Vitals: BP: , HR: , Temp: , RR: , SpO2: % on   Labs:    ABG: pH: , PO2: , PCO2: , HCO3: , SpO2: %  UA:   CXR: as per personal read, official read pending   CT:  EKG:   Received in the ED    69 year old female with past medical history of rheumatoid arthritis, colon cancer (now in remission), COPD and hypertension presents to the ER with shortness of breath. She states that for the past 3 days she has had felt sick, coughing and congestion. She has been coughing up sputum, unsure what color. She states that 3-4 weeks ago she fell and had a compression fracture of the spine, [facility] group saw her and put her on pain management of oxy (per pt). She has history of rheumatoid arthritis and takes 10mg prednisone daily for that. She is no longer on anticoagulation because her cancer is in remission, she was on Eliquis 5mg BID last year during her admission because at the time she was on chemo? per pt. Currently not on anticoagulation. She states that that she came in for worsening shortness of breath and cough. She denies any fever or chills in the last few days. patient is visibly coughing while providing history. She used her inhaler which helped dyspnea past few days (home inhaler for COPD). Also tried symbicort which helped yesterday.     Denies fever, chills, chest pain, palpitations,abdominal pain, nausea, vomiting, diarrhea, constipation, urinary frequency, headaches.  ED Course: Given rocephin and azitho ER.     CT angio was negative for PE, found new rib fracture. Patient had spinal fracture from 3-4 weeks ago and has new left 6th and 7th rib fracture.       WBC: 19k, neutrophilic.  Lactate 2.3   EKG: NSR      /67   RR 20  Satting well on 4L NC

## 2025-02-03 NOTE — CONSULT NOTE ADULT - SUBJECTIVE AND OBJECTIVE BOX
[facility]  INFECTIOUS DISEASES   [address] [place] [address]  Tel: [phone]     Fax: [phone]  ========================================================  MD Armando Scott Michelle, MD Shah, Kaushal, MD Sunjit, Jaspal, MD Sehrish Shahid, MD   ========================================================    [id number]  KENDALL RIDER     CC: Patient is a 69y old  Female who presents with a chief complaint of Pneumonia (03 Feb 2025 16:28)    HPI:  69 year old female with past medical history of rheumatoid arthritis, colon cancer (now in remission), COPD and hypertension presents to the ER with shortness of breath. She states that for the past 3 days she has had felt sick, coughing and congestion. She has been coughing up sputum, unsure what color. She states that 3-4 weeks ago she fell and had a compression fracture of the spine, [facility] ortho group saw her and put her on pain management of oxy (per pt). She has history of rheumatoid arthritis and takes 10mg prednisone daily for that. She is no longer on anticoagulation because her cancer is in remission, she was on Eliquis 5mg BID last year during her admission because at the time she was on chemo? per pt. Currently not on anticoagulation. She states that that she came in for worsening shortness of breath and cough. She denies any fever or chills in the last few days. patient is visibly coughing while providing history. She used her inhaler which helped dyspnea past few days (home inhaler for COPD). Also tried symbicort which helped yesterday.     Denies fever, chills, chest pain, palpitations,abdominal pain, nausea, vomiting, diarrhea, constipation, urinary frequency, headaches.  ED Course: Given rocephin and azitho ER.   CT angio was negative for PE, found new rib fracture. Patient had spinal fracture from 3-4 weeks ago.   Vitals: BP: , HR: , Temp: , RR: , SpO2: % on   Labs:    ABG: pH: , PO2: , PCO2: , HCO3: , SpO2: %  UA:   CXR: as per personal read, official read pending   CT:  EKG:   Received in the ED    (03 Feb 2025 16:28)    PAST MEDICAL & SURGICAL HISTORY:  COPD (chronic obstructive pulmonary disease)  Rheumatoid arthritis  HTN (hypertension)  Anxiety  No significant past surgical history  colon resection  02.2024    Social Hx: No current smoking, EtOH or drugs     FAMILY HISTORY:  FHx: lung cancer    Allergies  No Known Allergies    MEDICATIONS  (STANDING):  albuterol/ipratropium for Nebulization 3 milliLiter(s) Nebulizer every 4 hours  azithromycin  IVPB 500 milliGRAM(s) IV Intermittent every 24 hours  cefTRIAXone   IVPB 1000 milliGRAM(s) IV Intermittent every 24 hours    MEDICATIONS  (PRN):  traMADol 50 milliGRAM(s) Oral every 6 hours PRN Moderate Pain (4 - 6)  traMADol 25 milliGRAM(s) Oral every 6 hours PRN Mild Pain (1 - 3)     REVIEW OF SYSTEMS:  CONSTITUTIONAL:  No Fever or chills  HEENT:  No diplopia or blurred vision.  No sore throat or runny nose.  CARDIOVASCULAR:  No chest pain   RESPIRATORY:  No cough, shortness of breath, PND or orthopnea.  GASTROINTESTINAL:  No nausea, vomiting or diarrhea.  GENITOURINARY:  No dysuria, frequency or urgency. No Blood in urine  MUSCULOSKELETAL:  no joint aches, no muscle pain  SKIN:  No change in skin, hair or nails.    Physical Exam:  Vital Signs Last 24 Hrs  T(C): 36.1 (03 Feb 2025 11:08), Max: 36.1 (03 Feb 2025 11:08)  T(F): 97 (03 Feb 2025 11:08), Max: 97 (03 Feb 2025 11:08)  HR: 86 (03 Feb 2025 16:44) (86 - 140)  BP: 102/59 (03 Feb 2025 16:44) (102/59 - 200/124)  BP(mean): --  RR: 20 (03 Feb 2025 16:44) (20 - 28)  SpO2: 96% (03 Feb 2025 16:44) (84% - 99%)  Parameters below as of 03 Feb 2025 16:44  Patient On (Oxygen Delivery Method): nasal cannula  O2 Flow (L/min): 4  Height (cm): 157.5 (02-03 @ 11:08)  Weight (kg): 68 (02-03 @ 11:08)  BMI (kg/m2): 27.4 (02-03 @ 11:08)  BSA (m2): 1.69 (02-03 @ 11:08)  GEN: NAD  HEENT: normocephalic and atraumatic. EOMI. PERRL.    NECK: Supple.  No lymphadenopathy   LUNGS: Crackles in left lung  HEART: Regular rate and rhythm   ABDOMEN: Soft, nontender, and nondistended.   EXTREMITIES: Without edema, joints with ulnar deviation and RA changes   NEUROLOGIC: grossly intact.  PSYCHIATRIC: Appropriate affect .  SKIN: No rash     Labs:  02-03    139  |  100  |  19  ----------------------------<  125[H]  3.5   |  27  |  0.95    Ca    9.7      03 Feb 2025 11:30    TPro  6.6  /  Alb  2.8[L]  /  TBili  1.0  /  DBili  x   /  AST  22  /  ALT  12  /  AlkPhos  122[H]  02-03                        11.6   19.92 )-----------( 297      ( 03 Feb 2025 11:30 )             38.5     PT/INR - ( 03 Feb 2025 11:30 )   PT: 13.1 sec;   INR: 1.11 ratio    PTT - ( 03 Feb 2025 11:30 )  PTT:31.3 sec  Urinalysis Basic - ( 03 Feb 2025 11:30 )    Color: x / Appearance: x / SG: x / pH: x  Gluc: 125 mg/dL / Ketone: x  / Bili: x / Urobili: x   Blood: x / Protein: x / Nitrite: x   Leuk Esterase: x / RBC: x / WBC x   Sq Epi: x / Non Sq Epi: x / Bacteria: x    LIVER FUNCTIONS - ( 03 Feb 2025 11:30 )  Alb: 2.8 g/dL / Pro: 6.6 g/dL / ALK PHOS: 122 U/L / ALT: 12 U/L / AST: 22 U/L / GGT: x           ABG - ( 03 Feb 2025 11:20 )  pH, Arterial: 7.44  pH, Blood: x     /  pCO2: 44    /  pO2: 112   / HCO3: 30    / Base Excess: 5.7   /  SaO2: 99.4      SARS-CoV-2 Result: NotDetec (02-03-25 @ 11:30)    All imaging and other data have been reviewed.  < from: CT Angio Chest PE Protocol w/ IV Cont (02.03.25 @ 12:45) >  IMPRESSION:  No acute pulmonary embolus  Acute left sixth and seventh rib fractures as described. No pneumothorax  Left upper lobe mixed airspace and interstitial infiltrate compatible   with pneumonia  Complete lingular atelectasis    Assessment and Plan:   70 yo woman with PMH of HTN, COPD, rheumatoid arthritis, and colon cancer (now in remission), presents to the ER with shortness of breath.   She states that for the past 3 days she has had felt sick, coughing and congestion.   In ED labs showed leukocytosis of 19 and CT with FEROZ pneumonia    # PNA    - Will follow cultures   - Montior Tmax and WBC  - Start ceftriaxone and azithromycin   - Send MRSA PCR   - Send Legionella and strep U ag    Thank you for courtesy of this consult.     Will follow.  Discussed with the primary team.     Lenora Mondragon MD  Division of Infectious Diseases   Please call ID service at [phone] with any question.    75 minutes spent on total encounter assessing patient, examination, chart review, counseling and coordinating care by the attending physician/nurse/care manager.

## 2025-02-03 NOTE — ED ADULT TRIAGE NOTE - TEMPERATURE IN CELSIUS (DEGREES C)
I N T E R N A L  M E D I C I N E   HISTORY AND PHYSICAL EXAM    Reason for Admission:  Hypokalemia [E87.6]  Generalized abdominal pain [R10.84]  Acute kidney injury (CMS/HCC) [N17.9]  Nausea and vomiting, unspecified vomiting type [R11.2]      HPI:     Alisa Layton is a 70 year old female with a PMHx of HTN, sarcoidosis, and endometrial cancer s/p total abdominal hysterectomy 2021 and chemoradiotherapy (finished 2022), with recent admission for malignant ascites -2022 w/ paracentesis presenting with abdominal distension, constipation, and nausea. A couple of days after discharge from previous admission, the patient noted onset of abdominal distention and generalized abdominal pain with nausea/vomiting. She also notes severe constipation and decreased appetite over the last two weeks. She denies fever, chills, chest pain, SOB, or dysuria.     On , she visited Dr. Ardon, her oncologist, which recommended starting Pembrolizumab/lenvatinib on 22 due to occurrence of malignant ascites. PleurX catheter placement was also considered if ascites recurs in short interval.      ED course:  Vitals: Temp 97.7, /70  RR 18 SpO2 99% on RA  Labs:  - WBC 10.9  - K+ 3.3, BUN 53, Cr 2.15   Imaging:  - CT A/P: Large amount of low density in peritoneum w/ displacement of bowel loops   Intervention:   - 1 L bolus LR, Ketorolac 15 mg, Morphine 4 mg x 2, Zofran 4 mg x 2    Review of Systems:  A 11 point ROS was performed and was negative except as mentioned in the HPI.    PMH:  Past Medical History:   Diagnosis Date    Abnormal Pap smear of cervix     Fibroids     Hypertension     Malignant neoplasm (CMS/HCC)     Sarcoidosis      Past Surgical History:   Procedure Laterality Date    Breast biopsy       section, classic      Dilation and curettage      per patient previous missed ab    Hysterectomy  2021    Lung biopsy       ALLERGIES:  No Known Allergies    Family History:  Family  History   Problem Relation Age of Onset    Diabetes Mother     Hypertension Mother     Heart disease Mother     Heart disease Father     Diabetes Sister     Hypertension Sister     Heart disease Maternal Grandmother     Diabetes Maternal Grandmother     Patient is unaware of any medical problems Maternal Grandfather     Patient is unaware of any medical problems Paternal Grandmother     Patient is unaware of any medical problems Paternal Grandfather     Cancer, Breast Sister         per patient in remission    Cancer Sister         per patient throat in remission    Hypertension Sister     Patient is unaware of any medical problems Sister     Patient is unaware of any medical problems Sister     Heart disease Brother         per patient confirmed hrough autopsy        Social History:  She reports that she has never smoked. She has never used smokeless tobacco. She reports that she does not drink alcohol and does not use drugs. She reports previously being sexually active.    Prior to Admission Medications:  Medications Prior to Admission   Medication Sig Dispense Refill    [START ON 8/4/2022] Lenvatinib, 20 MG Daily Dose, (LENVIMA) 2 x 10 MG Capsule Therapy Pack Take 20 mg by mouth daily. Do not start before August 4, 2022. 1 each 0    prochlorperazine (COMPAZINE) 10 MG tablet Take 1 tablet by mouth every 6 hours as needed for Nausea or Vomiting. 30 tablet 5    ondansetron (ZOFRAN) 8 MG tablet Take 1 tablet by mouth every 8 hours as needed for Nausea. Take 1 tablet by mouth every day and every 8 hours as needed.  Take 30 minutes prior to lenvatinib. 90 tablet 5    polyethylene glycol (MIRALAX) 17 g packet Take 17 g by mouth in the morning and 17 g in the evening. Stir and dissolve powder in any 4 to 8 ounces of beverage, then drink. 72 packet 1    docusate sodium-sennosides (SENOKOT S) 50-8.6 MG per tablet Take 2 tablets by mouth nightly. 30 tablet 0    famotidine (PEPCID) 10 MG tablet Take 1 tablet by mouth every  12 hours. 30 tablet 1    HYDROcodone-acetaminophen (NORCO) 5-325 MG per tablet Take 1 tablet by mouth every 6 hours as needed for Pain. 10 tablet 0       Active Medications:  Current Facility-Administered Medications   Medication Dose Route Frequency Provider Last Rate Last Admin    morphine injection 4 mg  4 mg Intravenous Q4H PRN Sharifa Ferguson MD        ondansetron (ZOFRAN) injection 4 mg  4 mg Intravenous Q8H PRN Sharifa Ferguson MD        docusate sodium-sennosides (SENOKOT S) 50-8.6 MG 2 tablet  2 tablet Oral Nightly Sharifa Ferguson MD        famotidine (PEPCID) tablet 10 mg  10 mg Oral 2 times per day Sharifa Ferguson MD   10 mg at 08/03/22 0953    HYDROcodone-acetaminophen (NORCO) 5-325 MG per tablet 1 tablet  1 tablet Oral Q6H PRN Sharifa Ferguson MD        polyethylene glycol (MIRALAX) packet 17 g  17 g Oral BID Sharifa Ferguson MD   17 g at 08/03/22 0953    bisacodyl (DULCOLAX) suppository 10 mg  10 mg Rectal Daily PRN Sharifa Ferguson MD              OBJECTIVE        Vital Last Value 24 Hour Range   Temperature 97.3 °F (36.3 °C) (08/03/22 0825) Temp  Min: 96.4 °F (35.8 °C)  Max: 97.5 °F (36.4 °C)   Pulse 86 (08/03/22 0900) Pulse  Min: 85  Max: 118   Respiratory 13 (08/03/22 0900) Resp  Min: 9  Max: 20   Non-Invasive  Blood Pressure 97/73 (08/03/22 0900) BP  Min: 93/69  Max: 109/84   Pulse Oximetry 98 % (08/03/22 0900) SpO2  Min: 97 %  Max: 100 %   Arterial   Blood Pressure   No data recorded        Physical Exam:  Vitals:  /71 (BP Location: RUE - Right upper extremity, Patient Position: Supine)   Pulse 92   Temp 97.7 °F (36.5 °C) (Oral)   Resp 12   Ht 5' 4\" (1.626 m)   Wt 76.6 kg (168 lb 14 oz)   LMP  (LMP Unknown)   BMI 28.99 kg/m²   BSA 1.82 m²   General: Well-developed, well-nourished female.  In no acute distress.  Head & Neck:  Normocephalic and atraumatic.   No carotid bruits.   EENT: Conjunctiva without hemorrhage or exudate. No nasal drainage. Normal appearance to ear structures.  Heart: Normal rate,  regular rhythm without murmur. S1, S2, no S3, S4.   Lungs:  Clear to auscultation bilaterally.  No rales, rhonchi or rubs.  Abdomen: Abdomen distended, with diffuse tenderness to palpation.   Extremities:  No edema in lower extremities. No amputations.  Musculoskeletal:  No bony tenderness or deformities.   Skin:  Warm and dry.  No obvious rashes noted.  Psychiatric:  Affect was appropriate to situation and mood was normal.  Neurologic:  Alert and awake. Oriented to person and place. Speech is fluent without any anomia or dysarthria.  Patient is able to provide adequate history with a good fund of knowledge. No evidence of obvious cognitive impairment.          DIAGNOSTIC STUDIES      Diagnostic data:  US KIDNEY BILATERAL   Final Result      Echogenic kidneys suggest nonspecific medical renal disease.  Correlation   advised.         Electronically Signed by: PEGGY PARRY M.D.    Signed on: 8/3/2022 12:43 PM          IR PARACENTESIS   Final Result      Successful paracentesis as discussed above.      Electronically Signed by: AISHA SPICER M.D.    Signed on: 8/3/2022 12:47 PM          CT ABDOMEN PELVIS WO CONTRAST   Final Result       Stable large amount of loculated low density fluid and omental nodularity,   likely malignant ascites.      Electronically Signed by: GENOVEVA BRANDON M.D.    Signed on: 8/2/2022 4:21 PM                Cardiac studies:   Encounter Date: 07/18/22   Electrocardiogram 12-Lead   Result Value    Ventricular Rate EKG/Min (BPM) 81    Atrial Rate (BPM) 81    ND-Interval (MSEC) 174    QRS-Interval (MSEC) 94    QT-Interval (MSEC) 394    QTc 457    P Axis (Degrees) 47    R Axis (Degrees) 3    T Axis (Degrees) 3    REPORT TEXT      Normal sinus rhythm  Inferior infarct  , age undetermined  Abnormal ECG  No previous ECGs available  Confirmed by LOS HUGHES MD ([id number]) on 7/19/2022 5:14:45 PM       IR PARACENTESIS  Narrative: CLINICAL HISTORY:  Malignant ascites, history of uterine  malignancy.    PROCEDURE PERFORMED:  Paracentesis    INTERVENTIONAL RADIOLOGIST:  Dr. Reyes  RESIDENT:  Dr. Leung    CONSENT:  The risks, benefits and alternatives to the procedure were  explained to the patient, who understood and agreed.  Informed written  consent was obtained.     SEDATION:  None.     PROCEDURE/FINDINGS:    All elements of maximal sterile barrier technique were followed including  cap, mask, sterile gown, large sterile sheet, hand hygiene, and 2%  chlorhexidine for cutaneous antisepsis (or acceptable alternative  antiseptic such as an iodophor, tincture or iodine, or 70% alcohol).    A 5-Polish centesis catheter was introduced into the largest pocket of  right sided peritoneal fluid using ultrasound guidance, after infiltration  of the skin and deep tissues with local anesthetic.      A total of 4100-ml of clear amanda fluid was removed.    The catheter was removed, and manual pressure was applied to the puncture  site, followed by application of a sterile dressing.    The patient tolerated the procedure well with no immediate complications.    This procedure was performed using ultrasound.    Blood loss < 5 mL.  No blood was administered.  No grafts or implants.    Dr. Reyes was present for the entire procedure and supervised the  resident during all key components of the procedure.  I personally dictated  the final report.  Impression: Successful paracentesis as discussed above.    Electronically Signed by: AISHA REYES M.D.   Signed on: 8/3/2022 12:47 PM   US KIDNEY BILATERAL  Narrative: EXAMINATION: ULTRASOUND KIDNEYS    INDICATION: Acute renal insufficiency.    COMPARISON: CT abdomen pelvis without contrast 08/02/2022.    FINDINGS:    Both kidneys measure approximately 9 cm in length with normal cortical  thickness.  There appears to be increased echogenicity of both kidneys.  No  hydronephrosis, stones or solid masses.    Bladder is decompressed.  Impression: Echogenic  kidneys suggest nonspecific medical renal disease.  Correlation  advised.    Electronically Signed by: PEGGY PARRY M.D.   Signed on: 8/3/2022 12:43 PM           ASSESSMENT & PLAN      Alisa Layton is a 70 year old female with a PMHx of HTN, sarcoidosis, and endometrial cancer s/p total abdominal hysterectomy 7/2021 and chemoradiotherapy (finished 2/2022), with recent admission for malignant ascites 7/18-7/24/2022 w/ paracentesis presenting with abdominal distension, constipation, and nausea/vomiting.     #Abdominal pain, constipation 2/2 malignant ascites s/p paracentesis 8/3/22   - Abdominal distention, generalized abdominal pain, nausea/vomiting, severe constipation over last two weeks after first occurrence of malignant ascites.  Labs:  - WBC 10.9 -> 9.4  - Lipase WNL, Mg 2.3  Imaging:  - CT A/P 8/3/22: Large amount of low density fluid in peritoneum w/ displacement of bowel loops.  Procedures:  - IR Paracentesis 8/3/22: 4.1 L clear amanda fluid removed  Plan:  - Oncology consulted, recommended PleurX catheter placement if patient is re-admitted for ascites  - Senokot at bedtime, Miralax BID, Dulcolax suppository PRN as bowel regimen  - Zofran 4 mg q8 PRN, Pepcid 10 mg BID for nausea, vomiting  - Morphine 4 mg q4 PRN, Norco q6 PRN for pain management    #Endometrial cancer  - High-grade serous endometrial cancer  - Stage III C1 (pT1a N1a cM0, +LVI) HER-2 negative  - s/p AYAD, BSO, bilateral sentinel pelvic lymph node dissection  - Cycle 6 and radiation completed on 2/2/2022  - Oncology consult 8/3/2022 -  Recommended starting pembrolizumab/lenvatinib therapy 8/12/22 once stable and AMALIA resolved.     #AMALIA  - BUN 53->62  Cr 2.15 -> 2.96  - US Kidney 8/3/22: Increased echogenicity of both kidneys, suggestive of nonspecific medical renal disease  - Consulted nephrology, appreciate recommendations  - F/U with Urine electrolytes, CTM     #HTN  - Continue home amlodipine 5 mg qd and losartan 25 mg qd      #Sarcoidosis  - Hx sarcoidosis per chart review. Not currently taking any medications related to this diagnosis      #Class 1 obesity with BMI 28.99  - Counseled patient on lifestyle modifications    # Checklist    IP Consult Orders (From admission, onward)                 Start     Ordered    08/03/22 1213  Inpatient consult to Palliative Care  ONE TIME        Provider:  Ankush Mack NP    08/03/22 1212    08/03/22 1020  Inpatient consult to Nephrology  ONE TIME        Provider:  Grayson Ponce MD    08/03/22 1020    08/03/22 0819  Inpatient consult to Oncology  ONE TIME        Provider:  Nae Gresham MD    08/03/22 0819                  PCP: Ayde Kumar MD    Code Status: Prior  Dietary Orders (From admission, onward)                 Start     Ordered    08/03/22 1123  2 Times/Day w Lunch & Dinner; Ensure Clear Apple/Clear Liquid Supplement, Apple Oral Nutrition Supplement  As Directed        Question Answer Comment   Frequency 2 Times/Day w Lunch & Dinner    Oral Supplement Ensure Clear Apple/Clear Liquid Supplement, Apple        08/03/22 1123    08/03/22 1023  Liquid Clear Diet  DIET EFFECTIVE NOW        Question:  Diet Modifiers  Answer:  Liquid Clear    08/03/22 1022                    Dinh Darnell, M3  Internal Medicine   [employer]  [facility]     36.1

## 2025-02-03 NOTE — ED ADULT NURSE NOTE - NURSING ED PRESSURE ULCER 2 STAGING
Detail Level: Zone
Otc Regimen: Cerave sunscreen (titanium and zinc)
Otc Regimen: Cerave moisturizer
stage I

## 2025-02-03 NOTE — ED ADULT NURSE NOTE - OBJECTIVE STATEMENT
Pt presents to the ED via ambulance s/p SOB, currently on 100% NRB mask, P has #20 IV access on the left a/c, flushes with ease. EKG done, pt placed on cardiac monitor, continuous pulse ox.

## 2025-02-03 NOTE — ED PROVIDER NOTE - PROGRESS NOTE DETAILS
Patient doing well, her blood pressure is improved from initial blood pressure.  ABG is not showing any signs of hypercapnia.  We will continue to monitor. Patient doing well, some improvement.  No acute changes.  Discussed with patients family regarding the nature of her findings, need for admission. Patient doing well, no acute change at this time.  Awaiting CT angio results. Discussed with Dr. Sanabria, will see patient to admit. Patient doing well, no acute complaints at this time.  Discussed with patient regarding CT findings.  Patient did have a recent fall, hitting her ribs.

## 2025-02-03 NOTE — CONSULT NOTE ADULT - PROVIDER SPECIALTY LIST ADULT
PATIENT INSTRUCTIONS    Treatment:  Post Injection Instructions Post Injection English   You have been given an injection (shot) at the [facility] department.  Injections are given into joints, tendons, or soft tissue for the treatment of pain and inflammation.  The medicine is a corticosteroid, but is often called a steroid or cortisone shot.  The steroid used was Kenalog or Depo-Medrol.  A fast acting pain killer such as Lidocaine may be injected with the steroid to dull the pain for a few hours.  A long acting anesthetic, such as Marcaine, may also be injected with the steroid.  It may last up to 30 hours or wear off as soon as 6 hours.     The steroid begins to work in 24 to 48 hours and may take 2 weeks to reach full effect.     Take all of your regular medications, including pain medicine (unless instructed otherwise).     Some increased pain may occur in the first 24 hours after the injection.  You may apply a cold pack or ice to the injected area for 15 to 20 minutes every 1 to 2 hours for 24 hours to lessen the pain.     For people with diabetes, your blood sugar may be increased for 1 to 2 days. If you have any questions regarding your blood sugar, please contact your primary care physician.       Call your Orthopaedic physician if you develop any of the following symptoms:  Fever  Increased redness of injection site  If it is not better in 2 weeks     As any other injection, intra-articular hip injections carry their own risk profile.  One of the uncommon but potential undesired effects after the injection would be a temporary numbness in parts of the leg and foot. This generally resolves by itself over a few hours and does not leave any lasting effect.  Due to the increased fall risk associated with this, we recommend that you should rest after the injection and not drive yourself for the rest of the day.  Should you experience this and the symptoms do not improve over the course  of the day or get worse, please contact us, urgent care, EMS.      Dr. Gomez's team will seek authorization for visco supplementation.    Follow-Up:  Dr. Dameon Gomez's office will call you once they receive authorization for injections            Please note: 24 hour notice for cancellation of appointment is required.    You may receive a survey in the mail, or via the e-mail address that you have provided.  We would appreciate if you could fill out the survey and provide us with any feedback on your experience regarding your visit today. Thank you for allowing us to provide you with your health care needs.     Do not hesitate to call if you are experiencing severe pain, worsening or change in your pain, have symptoms of infection (fever, warmth, redness, increased drainage), or have any other problem that concerns you ~ [phone] (or [phone] after hours).    Please remember when requesting refills on pain medication that the request should be made by Thursday at the latest. [facility] is open Monday-Friday, 8am-5pm, and closed on the weekends.  No narcotic refills will be filled after hours.    Additional Educational Resources:  For additional resources regarding your symptoms, diagnosis, or further health information, please visit the Health Resources section on [email or web address] or the Online Health Resources section in [employer].       Infectious Disease

## 2025-02-03 NOTE — H&P ADULT - PROBLEM SELECTOR PLAN 2
History of COPD, well controlled, on home inhaler does not use due to lack of insurance. Diffuse wheezing on exam. On 4L NC  - Given duonebs in ER   - Duonebs q4h  - Zithromax + Rocephin   - Continue home medications: prednisone 10mg daily   - Supplemental O2 PRN. on 4L NC now  - Continue home inhaler budesonide, given therapeutic interchange   - ABG/VBG as needed, f/u results  - RVP negative  - Pulmonary (Dr. Dobson) consulted, f/u recs History of COPD, well controlled, on home inhaler does not use due to lack of insurance. Diffuse wheezing on exam. On 4L NC  - Given duonebs in ER   - Duonebs q4h  - Zithromax + Rocephin   - Continue home medications: prednisone 10mg daily   - Supplemental O2 PRN. on 4L NC now  - Continue inhalers, patient does not use her budesonide because of lack of insurance  - ABG/VBG as needed, f/u results  - RVP negative  - Pulmonary (Dr. Dobson) consulted, f/u recs

## 2025-02-03 NOTE — ED PROVIDER NOTE - CARE PLAN
Principal Discharge DX:	Pneumonia involving left lung  Secondary Diagnosis:	Leukocytosis, unspecified type  Secondary Diagnosis:	Acute respiratory distress   1 Principal Discharge DX:	Pneumonia involving left lung  Secondary Diagnosis:	Leukocytosis, unspecified type  Secondary Diagnosis:	Acute respiratory distress  Secondary Diagnosis:	Multiple fractures of ribs of left side

## 2025-02-03 NOTE — H&P ADULT - ASSESSMENT
69 year old female with past medical history of colon cancer in remission, rheumatoid arthritis, hypertension and COPD presents to the ER with shortness of breath found to have pneumonia.

## 2025-02-03 NOTE — H&P ADULT - NSHPREVIEWOFSYSTEMS_GEN_ALL_CORE
REVIEW OF SYSTEMS:  CONSTITUTIONAL: No fever   HEENT:  No headache  RESPIRATORY: + cough, shortness of breath  CARDIOVASCULAR: No chest pain, palpitations  GASTROINTESTINAL: No n/v/d/c  NEUROLOGICAL: no focal weakness   MUSCULOSKELETAL: no myalgias

## 2025-02-03 NOTE — ED PROVIDER NOTE - CLINICAL SUMMARY MEDICAL DECISION MAKING FREE TEXT BOX
Acute shortness of breath, URI symptoms over the past few days with hypoxia and wheezing.  Will check labs, x-ray, IV, nebs/steroids, admission

## 2025-02-03 NOTE — ED PROVIDER NOTE - OBJECTIVE STATEMENT
69-year-old female with a history of hypertension, COPD on home oxygen, anxiety, rheumatoid arthritis presents with has been having cough, URI over the past several days.  There are multiple members of the household with similar URI symptoms.  Patient had increased shortness of breath over the past couple of days.  No acute chest pain.  No abdominal pain.  No vomiting or diarrhea.  No neck or back pain.  No aggravating or alleviating factors otherwise noted.  No other acute complaints.  Patient brought in by EMS, the patient was given DuoNeb x 1, dexamethasone 10 mg IV x 1. Patient reportedly hypoxic, in the 80s on cannula.

## 2025-02-03 NOTE — H&P ADULT - PROBLEM SELECTOR PLAN 1
Meets sepsis criteria of HR >90, source of infection present, lactate 2.3  - Patient on 4L NC due to hypoxia  - S/p azithro and rocephin in ED  - Leukocytosis with WBC 19k, afebrile  - F/u BCx2 and UA/UC  - F/u urine strept and legionella  - Tylenol prn for fever, albuterol prn for SOB/wheezing, tessalon perles for cough   - COVID19 pcr negative  - Trend fever curve and WBC  - Infectious disease Dr. Mondragon consulted, f/u recs  - Pulmonology Dr. Dobson consulted, f/u recs  - Continue on rocephin and azithro, adjust per ID recs

## 2025-02-03 NOTE — ED PROVIDER NOTE - RESPIRATORY, MLM
Breath sounds equal bilaterally. Diffuse expiratory wheezing bilaterally, decreased air exchange with tight breath sounds bilaterally. Mild accessory muscle use.

## 2025-02-04 LAB
ALBUMIN SERPL ELPH-MCNC: 2.5 G/DL — LOW (ref 3.3–5)
ALP SERPL-CCNC: 101 U/L — SIGNIFICANT CHANGE UP (ref 40–120)
ALT FLD-CCNC: 11 U/L — LOW (ref 12–78)
ANION GAP SERPL CALC-SCNC: 4 MMOL/L — LOW (ref 5–17)
AST SERPL-CCNC: 15 U/L — SIGNIFICANT CHANGE UP (ref 15–37)
BASOPHILS # BLD AUTO: 0.01 K/UL — SIGNIFICANT CHANGE UP (ref 0–0.2)
BASOPHILS NFR BLD AUTO: 0.1 % — SIGNIFICANT CHANGE UP (ref 0–2)
BILIRUB SERPL-MCNC: 0.3 MG/DL — SIGNIFICANT CHANGE UP (ref 0.2–1.2)
BUN SERPL-MCNC: 20 MG/DL — SIGNIFICANT CHANGE UP (ref 7–23)
CALCIUM SERPL-MCNC: 10.1 MG/DL — SIGNIFICANT CHANGE UP (ref 8.5–10.1)
CHLORIDE SERPL-SCNC: 104 MMOL/L — SIGNIFICANT CHANGE UP (ref 96–108)
CO2 SERPL-SCNC: 31 MMOL/L — SIGNIFICANT CHANGE UP (ref 22–31)
CREAT SERPL-MCNC: 0.86 MG/DL — SIGNIFICANT CHANGE UP (ref 0.5–1.3)
EGFR: 74 ML/MIN/1.73M2 — SIGNIFICANT CHANGE UP
EOSINOPHIL # BLD AUTO: 0 K/UL — SIGNIFICANT CHANGE UP (ref 0–0.5)
EOSINOPHIL NFR BLD AUTO: 0 % — SIGNIFICANT CHANGE UP (ref 0–6)
GLUCOSE SERPL-MCNC: 203 MG/DL — HIGH (ref 70–99)
HCT VFR BLD CALC: 33.3 % — LOW (ref 34.5–45)
HGB BLD-MCNC: 10.2 G/DL — LOW (ref 11.5–15.5)
IMM GRANULOCYTES NFR BLD AUTO: 0.6 % — SIGNIFICANT CHANGE UP (ref 0–0.9)
LYMPHOCYTES # BLD AUTO: 0.21 K/UL — LOW (ref 1–3.3)
LYMPHOCYTES # BLD AUTO: 1.5 % — LOW (ref 13–44)
MCHC RBC-ENTMCNC: 24 PG — LOW (ref 27–34)
MCHC RBC-ENTMCNC: 30.6 G/DL — LOW (ref 32–36)
MCV RBC AUTO: 78.4 FL — LOW (ref 80–100)
MONOCYTES # BLD AUTO: 0.4 K/UL — SIGNIFICANT CHANGE UP (ref 0–0.9)
MONOCYTES NFR BLD AUTO: 2.8 % — SIGNIFICANT CHANGE UP (ref 2–14)
NEUTROPHILS # BLD AUTO: 13.74 K/UL — HIGH (ref 1.8–7.4)
NEUTROPHILS NFR BLD AUTO: 95 % — HIGH (ref 43–77)
NRBC # BLD: 0 /100 WBCS — SIGNIFICANT CHANGE UP (ref 0–0)
NRBC BLD-RTO: 0 /100 WBCS — SIGNIFICANT CHANGE UP (ref 0–0)
PLATELET # BLD AUTO: 251 K/UL — SIGNIFICANT CHANGE UP (ref 150–400)
POTASSIUM SERPL-MCNC: 3.6 MMOL/L — SIGNIFICANT CHANGE UP (ref 3.5–5.3)
POTASSIUM SERPL-SCNC: 3.6 MMOL/L — SIGNIFICANT CHANGE UP (ref 3.5–5.3)
PROT SERPL-MCNC: 6 G/DL — SIGNIFICANT CHANGE UP (ref 6–8.3)
RBC # BLD: 4.25 M/UL — SIGNIFICANT CHANGE UP (ref 3.8–5.2)
RBC # FLD: 16.7 % — HIGH (ref 10.3–14.5)
SODIUM SERPL-SCNC: 139 MMOL/L — SIGNIFICANT CHANGE UP (ref 135–145)
WBC # BLD: 14.45 K/UL — HIGH (ref 3.8–10.5)
WBC # FLD AUTO: 14.45 K/UL — HIGH (ref 3.8–10.5)

## 2025-02-04 PROCEDURE — 99232 SBSQ HOSP IP/OBS MODERATE 35: CPT

## 2025-02-04 PROCEDURE — 99233 SBSQ HOSP IP/OBS HIGH 50: CPT

## 2025-02-04 PROCEDURE — 99223 1ST HOSP IP/OBS HIGH 75: CPT | Mod: GC

## 2025-02-04 PROCEDURE — G0545: CPT

## 2025-02-04 RX ORDER — METHYLPREDNISOLONE ACETATE 40 MG/ML
40 VIAL (ML) INJECTION
Refills: 0 | Status: DISCONTINUED | OUTPATIENT
Start: 2025-02-04 | End: 2025-02-04

## 2025-02-04 RX ORDER — METHYLPREDNISOLONE ACETATE 40 MG/ML
40 VIAL (ML) INJECTION
Refills: 0 | Status: DISCONTINUED | OUTPATIENT
Start: 2025-02-04 | End: 2025-02-06

## 2025-02-04 RX ADMIN — Medication 10 MILLIGRAM(S): at 05:38

## 2025-02-04 RX ADMIN — Medication 0.25 MILLIGRAM(S): at 09:03

## 2025-02-04 RX ADMIN — IPRATROPIUM BROMIDE AND ALBUTEROL SULFATE 3 MILLILITER(S): .5; 2.5 SOLUTION RESPIRATORY (INHALATION) at 23:18

## 2025-02-04 RX ADMIN — Medication 100 MILLIGRAM(S): at 13:36

## 2025-02-04 RX ADMIN — TRAMADOL HYDROCHLORIDE 50 MILLIGRAM(S): 100 TABLET, EXTENDED RELEASE ORAL at 01:52

## 2025-02-04 RX ADMIN — IPRATROPIUM BROMIDE AND ALBUTEROL SULFATE 3 MILLILITER(S): .5; 2.5 SOLUTION RESPIRATORY (INHALATION) at 08:00

## 2025-02-04 RX ADMIN — Medication 100 MILLIGRAM(S): at 05:39

## 2025-02-04 RX ADMIN — PREDNISONE 10 MILLIGRAM(S): 5 TABLET ORAL at 05:38

## 2025-02-04 RX ADMIN — IPRATROPIUM BROMIDE AND ALBUTEROL SULFATE 3 MILLILITER(S): .5; 2.5 SOLUTION RESPIRATORY (INHALATION) at 15:45

## 2025-02-04 RX ADMIN — CEFTRIAXONE 100 MILLIGRAM(S): 250 INJECTION, POWDER, FOR SOLUTION INTRAMUSCULAR; INTRAVENOUS at 12:34

## 2025-02-04 RX ADMIN — ENOXAPARIN SODIUM 40 MILLIGRAM(S): 100 INJECTION SUBCUTANEOUS at 17:50

## 2025-02-04 RX ADMIN — TRAMADOL HYDROCHLORIDE 50 MILLIGRAM(S): 100 TABLET, EXTENDED RELEASE ORAL at 02:52

## 2025-02-04 RX ADMIN — Medication 100 MILLIGRAM(S): at 21:48

## 2025-02-04 RX ADMIN — IPRATROPIUM BROMIDE AND ALBUTEROL SULFATE 3 MILLILITER(S): .5; 2.5 SOLUTION RESPIRATORY (INHALATION) at 01:20

## 2025-02-04 RX ADMIN — IPRATROPIUM BROMIDE AND ALBUTEROL SULFATE 3 MILLILITER(S): .5; 2.5 SOLUTION RESPIRATORY (INHALATION) at 19:43

## 2025-02-04 RX ADMIN — AZITHROMYCIN DIHYDRATE 255 MILLIGRAM(S): 500 TABLET, FILM COATED ORAL at 13:36

## 2025-02-04 RX ADMIN — IPRATROPIUM BROMIDE AND ALBUTEROL SULFATE 3 MILLILITER(S): .5; 2.5 SOLUTION RESPIRATORY (INHALATION) at 12:28

## 2025-02-04 NOTE — PROGRESS NOTE ADULT - PROBLEM SELECTOR PLAN 1
Meets sepsis criteria of HR >90, source of infection present, lactate 2.3  - Patient on 4L NC due to hypoxia  - Leukocytosis improved, afebrile  - F/u BCx2 and UA/UC  - F/u urine strept and legionella  - Tylenol prn for fever, albuterol prn for SOB/wheezing, tessalon perles for cough   - COVID19 pcr negative  - Trend fever curve and WBC  - Infectious disease Dr. Mondragon consult noted  - Pulmonology Dr. Dobson consulted, f/u recs  - Continue on rocephin and azithro, adjust per ID recs

## 2025-02-04 NOTE — PROGRESS NOTE ADULT - SUBJECTIVE AND OBJECTIVE BOX
Patient is a 69y old  Female who presents with a chief complaint of Pneumonia (04 Feb 2025 11:27)      INTERVAL HPI/OVERNIGHT EVENTS: Pt seen and examined bedside,  in ED. Pt c/o cough, denies any CP, SOB, or fever.     MEDICATIONS  (STANDING):  albuterol/ipratropium for Nebulization 3 milliLiter(s) Nebulizer every 4 hours  amLODIPine   Tablet 10 milliGRAM(s) Oral daily  azithromycin  IVPB 500 milliGRAM(s) IV Intermittent every 24 hours  benzonatate 100 milliGRAM(s) Oral every 8 hours  cefTRIAXone   IVPB 1000 milliGRAM(s) IV Intermittent every 24 hours  enoxaparin Injectable 40 milliGRAM(s) SubCutaneous every 24 hours  influenza  Vaccine (HIGH DOSE) 0.5 milliLiter(s) IntraMuscular once  mometasone 220 MICROgram(s) Inhaler 2 Puff(s) Inhalation daily  predniSONE   Tablet 10 milliGRAM(s) Oral daily  sodium chloride 0.9%. 1000 milliLiter(s) (75 mL/Hr) IV Continuous <Continuous>    MEDICATIONS  (PRN):  acetaminophen     Tablet .. 650 milliGRAM(s) Oral every 6 hours PRN Temp greater or equal to 38C (100.4F), Mild Pain (1 - 3)  ALPRAZolam 0.25 milliGRAM(s) Oral daily PRN for anxiety  aluminum hydroxide/magnesium hydroxide/simethicone Suspension 30 milliLiter(s) Oral every 4 hours PRN Dyspepsia  melatonin 3 milliGRAM(s) Oral at bedtime PRN Insomnia  traMADol 50 milliGRAM(s) Oral every 6 hours PRN Moderate Pain (4 - 6)  traMADol 25 milliGRAM(s) Oral every 6 hours PRN Mild Pain (1 - 3)      Allergies    No Known Allergies    Intolerances        REVIEW OF SYSTEMS:  CONSTITUTIONAL: No fever or chills  HEENT:  No headache, no sore throat  RESPIRATORY: No cough, wheezing, or shortness of breath  CARDIOVASCULAR: No chest pain, palpitations, or leg swelling  GASTROINTESTINAL: No nausea, vomiting, or diarrhea  GENITOURINARY: No dysuria, frequency, or hematuria  NEUROLOGICAL: no focal weakness or dizziness  SKIN:  No rashes or lesions   MUSCULOSKELETAL: no myalgias   PSYCHIATRIC: No depression or anxiety  ROS Unable to obtain due to - [ ] Dementia  [ ] Lethargy  REST OF REVIEW Of SYSTEM - [ ] Normal     Vital Signs Last 24 Hrs  T(C): 36.3 (04 Feb 2025 08:32), Max: 36.9 (04 Feb 2025 01:03)  T(F): 97.3 (04 Feb 2025 08:32), Max: 98.4 (04 Feb 2025 01:03)  HR: 102 (04 Feb 2025 12:50) (82 - 109)  BP: 131/77 (04 Feb 2025 08:32) (102/59 - 148/74)  BP(mean): --  RR: 16 (04 Feb 2025 08:32) (16 - 20)  SpO2: 92% (04 Feb 2025 12:50) (89% - 99%)    Parameters below as of 04 Feb 2025 12:50  Patient On (Oxygen Delivery Method): nasal cannula, 5 LPM        PHYSICAL EXAM:  GENERAL: NAD  HEENT:  EOMI, moist mucous membranes  CHEST/LUNG:  CTA b/l, no rales, wheezes, or rhonchi  HEART:  RRR, S1, S2, []murmur  ABDOMEN:  BS+, soft, nontender, nondistended  EXTREMITIES: no edema or calf tenderness  SKIN:  no rash  NERVOUS SYSTEM: AA&Ox3    LABS:                        10.2   14.45 )-----------( 251      ( 04 Feb 2025 05:40 )             33.3     CBC Full  -  ( 04 Feb 2025 05:40 )  WBC Count : 14.45 K/uL  Hemoglobin : 10.2 g/dL  Hematocrit : 33.3 %  Platelet Count - Automated : 251 K/uL  Mean Cell Volume : 78.4 fl  Mean Cell Hemoglobin : 24.0 pg  Mean Cell Hemoglobin Concentration : 30.6 g/dL  Auto Neutrophil # : 13.74 K/uL  Auto Lymphocyte # : 0.21 K/uL  Auto Monocyte # : 0.40 K/uL  Auto Eosinophil # : 0.00 K/uL  Auto Basophil # : 0.01 K/uL  Auto Neutrophil % : 95.0 %  Auto Lymphocyte % : 1.5 %  Auto Monocyte % : 2.8 %  Auto Eosinophil % : 0.0 %  Auto Basophil % : 0.1 %    04 Feb 2025 05:40    139    |  104    |  20     ----------------------------<  203    3.6     |  31     |  0.86     Ca    10.1       04 Feb 2025 05:40    TPro  6.0    /  Alb  2.5    /  TBili  0.3    /  DBili  x      /  AST  15     /  ALT  11     /  AlkPhos  101    04 Feb 2025 05:40    PT/INR - ( 03 Feb 2025 11:30 )   PT: 13.1 sec;   INR: 1.11 ratio         PTT - ( 03 Feb 2025 11:30 )  PTT:31.3 sec  Urinalysis Basic - ( 04 Feb 2025 05:40 )    Color: x / Appearance: x / SG: x / pH: x  Gluc: 203 mg/dL / Ketone: x  / Bili: x / Urobili: x   Blood: x / Protein: x / Nitrite: x   Leuk Esterase: x / RBC: x / WBC x   Sq Epi: x / Non Sq Epi: x / Bacteria: x      CAPILLARY BLOOD GLUCOSE              RADIOLOGY & ADDITIONAL TESTS:    Personally reviewed.     Consultant(s) Notes Reviewed:  [x] YES  [ ] NO    Care Discussed with [x] Consultants  [x] Patient  [ ] Family  [ ]      [ ] Other; RN  DVT ppx   Patient is a 69y old  Female who presents with a chief complaint of Pneumonia (04 Feb 2025 11:27)      INTERVAL HPI/OVERNIGHT EVENTS: Pt seen and examined bedside,  in ED. Pt c/o cough, denies any CP, SOB, or fever.     MEDICATIONS  (STANDING):  albuterol/ipratropium for Nebulization 3 milliLiter(s) Nebulizer every 4 hours  amLODIPine   Tablet 10 milliGRAM(s) Oral daily  azithromycin  IVPB 500 milliGRAM(s) IV Intermittent every 24 hours  benzonatate 100 milliGRAM(s) Oral every 8 hours  cefTRIAXone   IVPB 1000 milliGRAM(s) IV Intermittent every 24 hours  enoxaparin Injectable 40 milliGRAM(s) SubCutaneous every 24 hours  influenza  Vaccine (HIGH DOSE) 0.5 milliLiter(s) IntraMuscular once  mometasone 220 MICROgram(s) Inhaler 2 Puff(s) Inhalation daily  predniSONE   Tablet 10 milliGRAM(s) Oral daily  sodium chloride 0.9%. 1000 milliLiter(s) (75 mL/Hr) IV Continuous <Continuous>    MEDICATIONS  (PRN):  acetaminophen     Tablet .. 650 milliGRAM(s) Oral every 6 hours PRN Temp greater or equal to 38C (100.4F), Mild Pain (1 - 3)  ALPRAZolam 0.25 milliGRAM(s) Oral daily PRN for anxiety  aluminum hydroxide/magnesium hydroxide/simethicone Suspension 30 milliLiter(s) Oral every 4 hours PRN Dyspepsia  melatonin 3 milliGRAM(s) Oral at bedtime PRN Insomnia  traMADol 50 milliGRAM(s) Oral every 6 hours PRN Moderate Pain (4 - 6)  traMADol 25 milliGRAM(s) Oral every 6 hours PRN Mild Pain (1 - 3)      Allergies    No Known Allergies    Intolerances        REVIEW OF SYSTEMS:  CONSTITUTIONAL: No fever or chills  HEENT:  No headache, no sore throat  RESPIRATORY: + cough, no wheezing, or shortness of breath  CARDIOVASCULAR: No chest pain, palpitations, or leg swelling  GASTROINTESTINAL: No nausea, vomiting, or diarrhea  GENITOURINARY: No dysuria, frequency, or hematuria  NEUROLOGICAL: no focal weakness or dizziness  SKIN:  No rashes or lesions   MUSCULOSKELETAL: no myalgias   PSYCHIATRIC: No depression or anxiety    Vital Signs Last 24 Hrs  T(C): 36.3 (04 Feb 2025 08:32), Max: 36.9 (04 Feb 2025 01:03)  T(F): 97.3 (04 Feb 2025 08:32), Max: 98.4 (04 Feb 2025 01:03)  HR: 102 (04 Feb 2025 12:50) (82 - 109)  BP: 131/77 (04 Feb 2025 08:32) (102/59 - 148/74)  BP(mean): --  RR: 16 (04 Feb 2025 08:32) (16 - 20)  SpO2: 92% (04 Feb 2025 12:50) (89% - 99%)    Parameters below as of 04 Feb 2025 12:50  Patient On (Oxygen Delivery Method): nasal cannula, 5 LPM        PHYSICAL EXAM:  GENERAL: NAD  HEENT:  EOMI, moist mucous membranes  CHEST/LUNG:  CTA b/l, no rales, wheezes, or rhonchi  HEART:  RRR, S1, S2, []murmur  ABDOMEN:  BS+, soft, nontender, nondistended  EXTREMITIES: no edema or calf tenderness  SKIN:  no rash  NERVOUS SYSTEM: AA&Ox3    LABS:                        10.2   14.45 )-----------( 251      ( 04 Feb 2025 05:40 )             33.3     CBC Full  -  ( 04 Feb 2025 05:40 )  WBC Count : 14.45 K/uL  Hemoglobin : 10.2 g/dL  Hematocrit : 33.3 %  Platelet Count - Automated : 251 K/uL  Mean Cell Volume : 78.4 fl  Mean Cell Hemoglobin : 24.0 pg  Mean Cell Hemoglobin Concentration : 30.6 g/dL  Auto Neutrophil # : 13.74 K/uL  Auto Lymphocyte # : 0.21 K/uL  Auto Monocyte # : 0.40 K/uL  Auto Eosinophil # : 0.00 K/uL  Auto Basophil # : 0.01 K/uL  Auto Neutrophil % : 95.0 %  Auto Lymphocyte % : 1.5 %  Auto Monocyte % : 2.8 %  Auto Eosinophil % : 0.0 %  Auto Basophil % : 0.1 %    04 Feb 2025 05:40    139    |  104    |  20     ----------------------------<  203    3.6     |  31     |  0.86     Ca    10.1       04 Feb 2025 05:40    TPro  6.0    /  Alb  2.5    /  TBili  0.3    /  DBili  x      /  AST  15     /  ALT  11     /  AlkPhos  101    04 Feb 2025 05:40    PT/INR - ( 03 Feb 2025 11:30 )   PT: 13.1 sec;   INR: 1.11 ratio         PTT - ( 03 Feb 2025 11:30 )  PTT:31.3 sec  Urinalysis Basic - ( 04 Feb 2025 05:40 )    Color: x / Appearance: x / SG: x / pH: x  Gluc: 203 mg/dL / Ketone: x  / Bili: x / Urobili: x   Blood: x / Protein: x / Nitrite: x   Leuk Esterase: x / RBC: x / WBC x   Sq Epi: x / Non Sq Epi: x / Bacteria: x      CAPILLARY BLOOD GLUCOSE              RADIOLOGY & ADDITIONAL TESTS:    Personally reviewed.     Consultant(s) Notes Reviewed:  [x] YES  [ ] NO    Care Discussed with [x] Consultants  [x] Patient  [ ] Family  [ ]      [ ] Other; RN  DVT ppx

## 2025-02-04 NOTE — CARE COORDINATION ASSESSMENT. - OTHER PERTINENT REFERRAL INFORMATION
Sw met with Pt at bedside. Pt is A & O x4 and able to make her needs known. Sw introduced self and role and pt expressed verbal understanding. Pt lives in a pvt home with family and has 2STE and 12 to basement which pt does not use. 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 Pt has no hx of HC or DME and has O2 at home but stated that she only uses when needed. There was a sw consult for pt that states ran out of food but pt stated that she just reapplied for SNAP and declined additional resources at this time. PCP Carlos Mac [phone].

## 2025-02-04 NOTE — PATIENT PROFILE ADULT - FUNCTIONAL SCREEN CURRENT LEVEL: SWALLOWING (IF SCORE 2 OR MORE FOR ANY ITEM, CONSULT REHAB SERVICES), MLM)
Called pt advised of Dr. Vasquez's response. Pt gave a verbal understanding.    0 = swallows foods/liquids without difficulty

## 2025-02-04 NOTE — PROGRESS NOTE ADULT - SUBJECTIVE AND OBJECTIVE BOX
Patient is a 69y old  Female who presents with a chief complaint of Pneumonia (04 Feb 2025 14:10)      INTERVAL HPI/OVERNIGHT EVENTS:    70 YO female former smoker 1 pack per day times 40 years with history of COPD, rheumatoid arthritis, hypertension, hyperlipidemia, and colon CA, who presented to ER with cough, shortness of breath, and wheezing. Multiple family members had similar symptoms. The patient has been followed in my office for many years. At home she was taking Breztri inhaler 2 puffs bid, Albuterol by nebulizer PRN, Prednisone 10 mg daily, Pepcid 20 mg daily, Eliquis 5 mg daily, Amlodipine 10 mg daily, Xanax 0.25 mg PRN, and Tramadol 50 mg PRN. There are no allergies. She denies ETOH or drug abuse. Surgical history is positive for colon resection for CA. Family history is positive for the mother having had lung cancer; the father had COPD, a sister had lung CA and another sister had hypothyroidism.    MEDICATIONS  (STANDING):  albuterol/ipratropium for Nebulization 3 milliLiter(s) Nebulizer every 4 hours  amLODIPine   Tablet 10 milliGRAM(s) Oral daily  azithromycin  IVPB 500 milliGRAM(s) IV Intermittent every 24 hours  benzonatate 100 milliGRAM(s) Oral every 8 hours  cefTRIAXone   IVPB 1000 milliGRAM(s) IV Intermittent every 24 hours  enoxaparin Injectable 40 milliGRAM(s) SubCutaneous every 24 hours  influenza  Vaccine (HIGH DOSE) 0.5 milliLiter(s) IntraMuscular once  mometasone 220 MICROgram(s) Inhaler 2 Puff(s) Inhalation daily  predniSONE   Tablet 10 milliGRAM(s) Oral daily  sodium chloride 0.9%. 1000 milliLiter(s) (75 mL/Hr) IV Continuous <Continuous>      MEDICATIONS  (PRN):  acetaminophen     Tablet .. 650 milliGRAM(s) Oral every 6 hours PRN Temp greater or equal to 38C (100.4F), Mild Pain (1 - 3)  ALPRAZolam 0.25 milliGRAM(s) Oral daily PRN for anxiety  aluminum hydroxide/magnesium hydroxide/simethicone Suspension 30 milliLiter(s) Oral every 4 hours PRN Dyspepsia  melatonin 3 milliGRAM(s) Oral at bedtime PRN Insomnia  traMADol 50 milliGRAM(s) Oral every 6 hours PRN Moderate Pain (4 - 6)  traMADol 25 milliGRAM(s) Oral every 6 hours PRN Mild Pain (1 - 3)      Allergies    No Known Allergies    Intolerances        PAST MEDICAL & SURGICAL HISTORY:  COPD (chronic obstructive pulmonary disease)      Rheumatoid arthritis      HTN (hypertension)      Anxiety      No significant past surgical history  colon resection  02.2024          Vital Signs Last 24 Hrs  T(C): 37 (04 Feb 2025 20:00), Max: 37 (04 Feb 2025 20:00)  T(F): 98.6 (04 Feb 2025 20:00), Max: 98.6 (04 Feb 2025 20:00)  HR: 108 (04 Feb 2025 20:00) (87 - 120)  BP: 119/59 (04 Feb 2025 20:00) (117/66 - 148/74)  BP(mean): --  RR: 18 (04 Feb 2025 20:00) (16 - 18)  SpO2: 91% (04 Feb 2025 20:00) (89% - 99%)    Parameters below as of 04 Feb 2025 20:00  Patient On (Oxygen Delivery Method): nasal cannula        PHYSICAL EXAMINATION:    GENERAL: The patient is awake and alert in no apparent distress.     HEENT: Head is normocephalic and atraumatic.    NECK: no JVD    LUNGS: bilateral expiratory wheezes    HEART: Regular rate and rhythm without murmur.    ABDOMEN: Soft, nontender, and nondistended.      EXTREMITIES: Without any cyanosis, clubbing, rash, lesions or edema.    NEUROLOGIC: Grossly intact.    SKIN: No ulceration or induration present.      LABS:                        10.2   14.45 )-----------( 251      ( 04 Feb 2025 05:40 )             33.3     02-04    139  |  104  |  20  ----------------------------<  203[H]  3.6   |  31  |  0.86    Ca    10.1      04 Feb 2025 05:40    TPro  6.0  /  Alb  2.5[L]  /  TBili  0.3  /  DBili  x   /  AST  15  /  ALT  11[L]  /  AlkPhos  101  02-04    PT/INR - ( 03 Feb 2025 11:30 )   PT: 13.1 sec;   INR: 1.11 ratio         PTT - ( 03 Feb 2025 11:30 )  PTT:31.3 sec  Urinalysis Basic - ( 04 Feb 2025 05:40 )    Color: x / Appearance: x / SG: x / pH: x  Gluc: 203 mg/dL / Ketone: x  / Bili: x / Urobili: x   Blood: x / Protein: x / Nitrite: x   Leuk Esterase: x / RBC: x / WBC x   Sq Epi: x / Non Sq Epi: x / Bacteria: x      ABG - ( 03 Feb 2025 11:20 )  pH, Arterial: 7.44  pH, Blood: x     /  pCO2: 44    /  pO2: 112   / HCO3: 30    / Base Excess: 5.7   /  SaO2: 99.4        CT chest reveals left upper lobe infiltrate and extensive bilateral bullous emphysema                    MICROBIOLOGY:  Culture Results:   No growth at 24 hours (02-03 @ 11:10)  Culture Results:   No growth at 24 hours (02-03 @ 11:05)          Assessment:    COPD with exacerbation  Left upper lobe pneumonia  Acute on Chronic Hypoxic respiratory failure  Hx Colon CA - S/P colon resection  Hx Rheumatoid Arthritis  Hx Hypertension  Hx Hyperlipidemia      Plan:    Supplemental oxygen  IV Solumedrol  IV antibiotics pending cultures  Check urinary antigen for legionella  Monitor pulse oximetry  Duoneb QID

## 2025-02-04 NOTE — CARE COORDINATION ASSESSMENT. - NSPASTMEDSURGHISTORY_GEN_ALL_CORE_FT
PAST MEDICAL & SURGICAL HISTORY:  Rheumatoid arthritis      COPD (chronic obstructive pulmonary disease)      Anxiety      HTN (hypertension)      No significant past surgical history  colon resection  02.2024

## 2025-02-04 NOTE — PHYSICAL THERAPY INITIAL EVALUATION ADULT - PERTINENT HX OF CURRENT PROBLEM, REHAB EVAL
69 year old female adm 2/3 with past medical history of colon cancer in remission, rheumatoid arthritis, hypertension and COPD presents to the ER with shortness of breath found to have pneumonia.  CTA; no PE, +acute L 5th and 6th rib fx's

## 2025-02-04 NOTE — PROGRESS NOTE ADULT - PROBLEM SELECTOR PLAN 2
History of COPD, well controlled, on home inhaler does not use due to lack of insurance. Diffuse wheezing on exam. On 4L NC  - Duonebs q4h  - Zithromax + Rocephin   - Continue home medications: prednisone 10mg daily   - Supplemental O2 PRN. on 4L NC now  - Continue inhalers, patient does not use her budesonide because of lack of insurance  - RVP negative  - Pulmonary (Dr. Dobson) consulted, f/u recs

## 2025-02-04 NOTE — CARE COORDINATION ASSESSMENT. - NSCAREPROVIDERS_GEN_ALL_CORE_FT
CARE PROVIDERS:  Accepting Physician: Donny Sanabria  Administration: Oscar Jones  Administration: Warren Shepherd  Administration: Ching Zaldivar  Admitting: Donny Sanabria  Attending: Gorge Reese  Consultant: Lenora Mondragon  ED Attending: Adán García  ED Nurse: Luzmaria Greer  Nurse: Concepcion Roca  Nurse: Adonis Jackson  Nurse: Alyssa Cruz  Ordered: ServiceAccount, [facility]  Ordered: Doctor, Unknown  Outpatient Provider: Gregory Rizvi  Outpatient Provider: Jose De León  Override: Loly Alves  Override: Concepcion Roca  PCA/Nursing Assistant: tomasz Ramirez  Physical Therapy: Karly Gutierrez  Primary Team: Mirlande Christianson  Primary Team: Jeff Vela  Respiratory Therapy: Lizzie Griffin  : Lorena Roper  : Jj Wall  Student: Manuel Peng  Team: [facility], Team  UR// Supp. Assoc.: Nick Pressley

## 2025-02-04 NOTE — PROGRESS NOTE ADULT - SUBJECTIVE AND OBJECTIVE BOX
[facility]  INFECTIOUS DISEASES   [address] [place] [address]  Tel: [phone]     Fax: [phone]  ========================================================  MD Armando Scott Michelle, MD Shah, Kaushal, MD Sunjit, Jaspal, MD Sehrish Shahid, MD   ========================================================    MRN-[id number]  KENDALL TORTORELLA     Follow up: Pneumonia    Doing better, but still has cough and sputum. Still on o2 with NC.   No fever.     PAST MEDICAL & SURGICAL HISTORY:  COPD (chronic obstructive pulmonary disease)  Rheumatoid arthritis  HTN (hypertension)  Anxiety  No significant past surgical history  colon resection  02.2024    Social Hx: No current smoking, EtOH or drugs     FAMILY HISTORY:  FHx: lung cancer    Allergies  No Known Allergies    MEDICATIONS  (STANDING):  albuterol/ipratropium for Nebulization 3 milliLiter(s) Nebulizer every 4 hours  azithromycin  IVPB 500 milliGRAM(s) IV Intermittent every 24 hours  cefTRIAXone   IVPB 1000 milliGRAM(s) IV Intermittent every 24 hours    MEDICATIONS  (PRN):  traMADol 50 milliGRAM(s) Oral every 6 hours PRN Moderate Pain (4 - 6)  traMADol 25 milliGRAM(s) Oral every 6 hours PRN Mild Pain (1 - 3)     REVIEW OF SYSTEMS:  CONSTITUTIONAL:  No Fever or chills  HEENT:  No diplopia or blurred vision.  No sore throat or runny nose.  CARDIOVASCULAR:  No chest pain   RESPIRATORY:  No cough, shortness of breath, PND or orthopnea.  GASTROINTESTINAL:  No nausea, vomiting or diarrhea.  GENITOURINARY:  No dysuria, frequency or urgency. No Blood in urine  MUSCULOSKELETAL:  no joint aches, no muscle pain  SKIN:  No change in skin, hair or nails.    Physical Exam:  Vital Signs Last 24 Hrs  T(C): 36.3 (04 Feb 2025 08:32), Max: 36.9 (04 Feb 2025 01:03)  T(F): 97.3 (04 Feb 2025 08:32), Max: 98.4 (04 Feb 2025 01:03)  HR: 102 (04 Feb 2025 08:32) (82 - 140)  BP: 131/77 (04 Feb 2025 08:32) (102/59 - 148/74)  BP(mean): --  RR: 16 (04 Feb 2025 08:32) (16 - 26)  SpO2: 91% (04 Feb 2025 08:32) (89% - 99%)  Parameters below as of 04 Feb 2025 08:32  Patient On (Oxygen Delivery Method): nasal cannula  O2 Flow (L/min): 4  GEN: NAD  HEENT: normocephalic and atraumatic. EOMI. PERRL.    NECK: Supple.  No lymphadenopathy   LUNGS: Crackles in left lung  HEART: Regular rate and rhythm   ABDOMEN: Soft, nontender, and nondistended.   EXTREMITIES: Without edema, joints with ulnar deviation and RA changes   NEUROLOGIC: grossly intact.  PSYCHIATRIC: Appropriate affect .  SKIN: No rash     Labs:                        10.2   14.45 )-----------( 251      ( 04 Feb 2025 05:40 )             33.3     02-04    139  |  104  |  20  ----------------------------<  203[H]  3.6   |  31  |  0.86    Ca    10.1      04 Feb 2025 05:40    TPro  6.0  /  Alb  2.5[L]  /  TBili  0.3  /  DBili  x   /  AST  15  /  ALT  11[L]  /  AlkPhos  101  02-04    WBC Count: 14.45 K/uL (02-04-25 @ 05:40)  WBC Count: 19.92 K/uL (02-03-25 @ 11:30)    Creatinine: 0.86 mg/dL (02-04-25 @ 05:40)  Creatinine: 0.95 mg/dL (02-03-25 @ 11:30)     SARS-CoV-2 Result: NotDetec (02-03-25 @ 11:30)    All imaging and other data have been reviewed.  < from: CT Angio Chest PE Protocol w/ IV Cont (02.03.25 @ 12:45) >  IMPRESSION:  No acute pulmonary embolus  Acute left sixth and seventh rib fractures as described. No pneumothorax  Left upper lobe mixed airspace and interstitial infiltrate compatible   with pneumonia  Complete lingular atelectasis    Assessment and Plan:   70 yo woman with PMH of HTN, COPD, rheumatoid arthritis, and colon cancer (now in remission), presents to the ER with shortness of breath.   She states that for the past 3 days she has had felt sick, coughing and congestion.   In ED labs showed leukocytosis of 19 and CT with FEROZ pneumonia    # PNA    - Will follow cultures   - Monitor Tmax and WBC  - Continue ceftriaxone and azithromycin   - Follow MRSA PCR   - Follow Legionella if negative can stop azithromycin   - Pulmonary follow up    Will follow.    Lenora Mondragon MD  Division of Infectious Diseases   Please call ID service at [phone] with any question.    50 Minutes spent on total encounter assessing patient, examination, chart review, counseling and coordinating care by the attending physician/nurse/care manager.

## 2025-02-04 NOTE — CARE COORDINATION ASSESSMENT. - NSMILSERVHIS_GEN_ALL_CORE
MRN:[id number]                      After Visit Summary   1/24/2018    Tegan Slade    MRN: [id number]           Visit Information        Provider Department      1/24/2018 1:00 PM Carlos Curtis [facility] Generic      Your next 10 appointments already scheduled     Feb 06, 2018  1:00 PM CST   Return Visit with Curtis Gonzalez [facility] ([facility])    [facility]  [address]  [facility] [id number] [phone]            Feb 21, 2018  1:00 PM CST   Return Visit with Curtis Gonzalez [facility] ([facility])    [facility]  [address]  [facility] [id number] [phone]              MyChart Information     [employer] gives you secure access to your electronic health record. If you see a primary care provider, you can also send messages to your care team and make appointments. If you have questions, please call your primary care clinic.  If you do not have a primary care provider, please call [phone] and they will assist you.        Care EveryWhere ID     This is your Care EveryWhere ID. This could be used by other organizations to access your [facility] medical records  [id number]        Equal Access to Services     KRISSY MCKNIGTH : Alec wing Sofady, waaxda luqadaha, qaybta kaalmada adeafshanyada, merna bolton. So [facility] [phone].    ATENCIÓN: Si habla español, tiene a greco disposición servicios gratuitos de asistencia lingüística. Llrachelle al [phone].    We comply with applicable federal civil rights laws and [place] laws. We do not discriminate on the basis of race, color, national origin, age, disability, sex, sexual orientation, or gender identity.            
No

## 2025-02-04 NOTE — PROGRESS NOTE ADULT - PROBLEM SELECTOR PLAN 3
Old rib fracture 2 months ago, right side 6th and 7th rib  - Consulted pulmonology Dr. Dobson by ER   - consult ortho, overnight PA not picking up at this time and matter non-urgent as patient is not in pain  - Ambulate with assistance   - Fall precautions  - Patient not reporting pain at this time, tramadol 25 and 50 PRN for mild to moderate pain q6 in case pain develops

## 2025-02-04 NOTE — PATIENT PROFILE ADULT - FALL HARM RISK - HARM RISK INTERVENTIONS

## 2025-02-04 NOTE — PHYSICAL THERAPY INITIAL EVALUATION ADULT - ADDITIONAL COMMENTS
Pt lives w/ her spouse in a house, no steps. Pt is an independent ambulator with RW and ind w/ ADLs. Pt receives chemo for cancer and spouse works. Pt stated that her son comes around to assist as well.

## 2025-02-05 ENCOUNTER — TRANSCRIPTION ENCOUNTER (OUTPATIENT)
Age: 70
End: 2025-02-05

## 2025-02-05 LAB
ANION GAP SERPL CALC-SCNC: 8 MMOL/L — SIGNIFICANT CHANGE UP (ref 5–17)
APPEARANCE UR: ABNORMAL
BILIRUB UR-MCNC: NEGATIVE — SIGNIFICANT CHANGE UP
BUN SERPL-MCNC: 22 MG/DL — SIGNIFICANT CHANGE UP (ref 7–23)
CALCIUM SERPL-MCNC: 9.9 MG/DL — SIGNIFICANT CHANGE UP (ref 8.5–10.1)
CHLORIDE SERPL-SCNC: 104 MMOL/L — SIGNIFICANT CHANGE UP (ref 96–108)
CO2 SERPL-SCNC: 32 MMOL/L — HIGH (ref 22–31)
COLOR SPEC: YELLOW — SIGNIFICANT CHANGE UP
CREAT SERPL-MCNC: 0.67 MG/DL — SIGNIFICANT CHANGE UP (ref 0.5–1.3)
DIFF PNL FLD: NEGATIVE — SIGNIFICANT CHANGE UP
EGFR: 95 ML/MIN/1.73M2 — SIGNIFICANT CHANGE UP
GLUCOSE SERPL-MCNC: 127 MG/DL — HIGH (ref 70–99)
GLUCOSE UR QL: 100 MG/DL
HCT VFR BLD CALC: 31.5 % — LOW (ref 34.5–45)
HGB BLD-MCNC: 9.7 G/DL — LOW (ref 11.5–15.5)
KETONES UR-MCNC: NEGATIVE MG/DL — SIGNIFICANT CHANGE UP
LEUKOCYTE ESTERASE UR-ACNC: ABNORMAL
MCHC RBC-ENTMCNC: 24.4 PG — LOW (ref 27–34)
MCHC RBC-ENTMCNC: 30.8 G/DL — LOW (ref 32–36)
MCV RBC AUTO: 79.3 FL — LOW (ref 80–100)
MRSA PCR RESULT.: DETECTED
NITRITE UR-MCNC: NEGATIVE — SIGNIFICANT CHANGE UP
NRBC # BLD: 0 /100 WBCS — SIGNIFICANT CHANGE UP (ref 0–0)
NRBC BLD-RTO: 0 /100 WBCS — SIGNIFICANT CHANGE UP (ref 0–0)
PH UR: 5.5 — SIGNIFICANT CHANGE UP (ref 5–8)
PLATELET # BLD AUTO: 245 K/UL — SIGNIFICANT CHANGE UP (ref 150–400)
POTASSIUM SERPL-MCNC: 3.8 MMOL/L — SIGNIFICANT CHANGE UP (ref 3.5–5.3)
POTASSIUM SERPL-SCNC: 3.8 MMOL/L — SIGNIFICANT CHANGE UP (ref 3.5–5.3)
PROT UR-MCNC: 30 MG/DL
RBC # BLD: 3.97 M/UL — SIGNIFICANT CHANGE UP (ref 3.8–5.2)
RBC # FLD: 16.6 % — HIGH (ref 10.3–14.5)
S AUREUS DNA NOSE QL NAA+PROBE: DETECTED
SODIUM SERPL-SCNC: 144 MMOL/L — SIGNIFICANT CHANGE UP (ref 135–145)
SP GR SPEC: 1.04 — HIGH (ref 1–1.03)
UROBILINOGEN FLD QL: 1 MG/DL — SIGNIFICANT CHANGE UP (ref 0.2–1)
WBC # BLD: 11.6 K/UL — HIGH (ref 3.8–10.5)
WBC # FLD AUTO: 11.6 K/UL — HIGH (ref 3.8–10.5)

## 2025-02-05 PROCEDURE — 99232 SBSQ HOSP IP/OBS MODERATE 35: CPT

## 2025-02-05 PROCEDURE — G0545: CPT

## 2025-02-05 PROCEDURE — 99233 SBSQ HOSP IP/OBS HIGH 50: CPT | Mod: GC

## 2025-02-05 RX ORDER — IPRATROPIUM BROMIDE AND ALBUTEROL SULFATE .5; 2.5 MG/3ML; MG/3ML
3 SOLUTION RESPIRATORY (INHALATION) EVERY 8 HOURS
Refills: 0 | Status: DISCONTINUED | OUTPATIENT
Start: 2025-02-05 | End: 2025-02-08

## 2025-02-05 RX ORDER — ALPRAZOLAM 2 MG
0.25 TABLET ORAL ONCE
Refills: 0 | Status: DISCONTINUED | OUTPATIENT
Start: 2025-02-05 | End: 2025-02-05

## 2025-02-05 RX ORDER — KETOROLAC TROMETHAMINE 10 MG
30 TABLET ORAL
Refills: 0 | Status: DISCONTINUED | OUTPATIENT
Start: 2025-02-05 | End: 2025-02-05

## 2025-02-05 RX ADMIN — CEFTRIAXONE 100 MILLIGRAM(S): 250 INJECTION, POWDER, FOR SOLUTION INTRAMUSCULAR; INTRAVENOUS at 11:54

## 2025-02-05 RX ADMIN — IPRATROPIUM BROMIDE AND ALBUTEROL SULFATE 3 MILLILITER(S): .5; 2.5 SOLUTION RESPIRATORY (INHALATION) at 03:28

## 2025-02-05 RX ADMIN — Medication 40 MILLIGRAM(S): at 06:27

## 2025-02-05 RX ADMIN — AZITHROMYCIN DIHYDRATE 255 MILLIGRAM(S): 500 TABLET, FILM COATED ORAL at 11:53

## 2025-02-05 RX ADMIN — Medication 100 MILLIGRAM(S): at 13:23

## 2025-02-05 RX ADMIN — Medication 40 MILLIGRAM(S): at 17:37

## 2025-02-05 RX ADMIN — IPRATROPIUM BROMIDE AND ALBUTEROL SULFATE 3 MILLILITER(S): .5; 2.5 SOLUTION RESPIRATORY (INHALATION) at 16:04

## 2025-02-05 RX ADMIN — Medication 10 MILLIGRAM(S): at 06:29

## 2025-02-05 RX ADMIN — ENOXAPARIN SODIUM 40 MILLIGRAM(S): 100 INJECTION SUBCUTANEOUS at 17:37

## 2025-02-05 RX ADMIN — Medication 0.25 MILLIGRAM(S): at 21:53

## 2025-02-05 RX ADMIN — Medication 100 MILLIGRAM(S): at 21:36

## 2025-02-05 RX ADMIN — Medication 30 MILLIGRAM(S): at 13:30

## 2025-02-05 RX ADMIN — Medication 0.25 MILLIGRAM(S): at 08:53

## 2025-02-05 RX ADMIN — IPRATROPIUM BROMIDE AND ALBUTEROL SULFATE 3 MILLILITER(S): .5; 2.5 SOLUTION RESPIRATORY (INHALATION) at 07:33

## 2025-02-05 RX ADMIN — MOMETASONE FUROATE 2 PUFF(S): 220 INHALANT RESPIRATORY (INHALATION) at 08:53

## 2025-02-05 RX ADMIN — Medication 100 MILLIGRAM(S): at 06:29

## 2025-02-05 RX ADMIN — Medication 30 MILLIGRAM(S): at 12:42

## 2025-02-05 NOTE — PROGRESS NOTE ADULT - PROBLEM SELECTOR PLAN 5
History of rheumatoid arthritis, well controlled  - Home med 10mg prednisone continue History of rheumatoid arthritis, well controlled  - Home med 10mg prednisone holding since on solumedrol

## 2025-02-05 NOTE — PROGRESS NOTE ADULT - SUBJECTIVE AND OBJECTIVE BOX
[facility]  INFECTIOUS DISEASES   [address] [place] [address]  Tel: [phone]     Fax: [phone]  ========================================================  MD Armando Scott Michelle, MD Shah, Kaushal, MD Sunjit, Jaspal, MD Sehrish Shahid, MD   ========================================================    [id number]  KENDALL ADRY     Follow up: Pneumonia    Doing better, but still has cough and sputum, feels congested. Still on o2 with NC 6L.   No fever.     PAST MEDICAL & SURGICAL HISTORY:  COPD (chronic obstructive pulmonary disease)  Rheumatoid arthritis  HTN (hypertension)  Anxiety  No significant past surgical history  colon resection  02.2024    Social Hx: No current smoking, EtOH or drugs     FAMILY HISTORY:  FHx: lung cancer    Allergies  No Known Allergies    MEDICATIONS  (STANDING):  albuterol/ipratropium for Nebulization 3 milliLiter(s) Nebulizer every 4 hours  azithromycin  IVPB 500 milliGRAM(s) IV Intermittent every 24 hours  cefTRIAXone   IVPB 1000 milliGRAM(s) IV Intermittent every 24 hours    MEDICATIONS  (PRN):  traMADol 50 milliGRAM(s) Oral every 6 hours PRN Moderate Pain (4 - 6)  traMADol 25 milliGRAM(s) Oral every 6 hours PRN Mild Pain (1 - 3)     REVIEW OF SYSTEMS:  CONSTITUTIONAL:  No Fever or chills  HEENT:  No diplopia or blurred vision.  No sore throat or runny nose.  CARDIOVASCULAR:  No chest pain   RESPIRATORY:  No cough, shortness of breath, PND or orthopnea.  GASTROINTESTINAL:  No nausea, vomiting or diarrhea.  GENITOURINARY:  No dysuria, frequency or urgency. No Blood in urine  MUSCULOSKELETAL:  no joint aches, no muscle pain  SKIN:  No change in skin, hair or nails.    Physical Exam:  Vital Signs Last 24 Hrs  T(C): 36.8 (05 Feb 2025 05:10), Max: 37 (04 Feb 2025 20:00)  T(F): 98.3 (05 Feb 2025 05:10), Max: 98.6 (04 Feb 2025 20:00)  HR: 108 (05 Feb 2025 07:33) (102 - 120)  BP: 148/72 (05 Feb 2025 05:10) (117/66 - 148/72)  BP(mean): --  RR: 19 (05 Feb 2025 05:10) (17 - 19)  SpO2: 99% (05 Feb 2025 07:33) (91% - 99%)  Parameters below as of 05 Feb 2025 07:33  Patient On (Oxygen Delivery Method): nasal cannula, 6 L  GEN: NAD  HEENT: normocephalic and atraumatic. EOMI. PERRL.    NECK: Supple.  No lymphadenopathy   LUNGS: Crackles in left lung  HEART: Regular rate and rhythm   ABDOMEN: Soft, nontender, and nondistended.   EXTREMITIES: Without edema, joints with ulnar deviation and RA changes   NEUROLOGIC: grossly intact.  PSYCHIATRIC: Appropriate affect .  SKIN: No rash     Labs:                        9.7    11.60 )-----------( 245      ( 05 Feb 2025 06:15 )             31.5     02-05    144  |  104  |  22  ----------------------------<  127[H]  3.8   |  32[H]  |  0.67    Ca    9.9      05 Feb 2025 06:15    TPro  6.0  /  Alb  2.5[L]  /  TBili  0.3  /  DBili  x   /  AST  15  /  ALT  11[L]  /  AlkPhos  101  02-04    Urinalysis with Rflx Culture (collected 02-05-25 @ 05:00)    Culture - Blood (collected 02-03-25 @ 11:10)  Source: .Blood BLOOD  Preliminary Report (02-04-25 @ 17:01):    No growth at 24 hours    Culture - Blood (collected 02-03-25 @ 11:05)  Source: .Blood BLOOD  Preliminary Report (02-04-25 @ 17:01):    No growth at 24 hours    WBC Count: 11.60 K/uL (02-05-25 @ 06:15)  WBC Count: 14.45 K/uL (02-04-25 @ 05:40)  WBC Count: 19.92 K/uL (02-03-25 @ 11:30)    Creatinine: 0.67 mg/dL (02-05-25 @ 06:15)  Creatinine: 0.86 mg/dL (02-04-25 @ 05:40)  Creatinine: 0.95 mg/dL (02-03-25 @ 11:30)    SARS-CoV-2 Result: NotDetec (02-03-25 @ 11:30)    All imaging and other data have been reviewed.  < from: CT Angio Chest PE Protocol w/ IV Cont (02.03.25 @ 12:45) >  IMPRESSION:  No acute pulmonary embolus  Acute left sixth and seventh rib fractures as described. No pneumothorax  Left upper lobe mixed airspace and interstitial infiltrate compatible   with pneumonia  Complete lingular atelectasis    Assessment and Plan:   68 yo woman with PMH of HTN, COPD, rheumatoid arthritis, and colon cancer (now in remission), presents to the ER with shortness of breath.   She states that for the past 3 days she has had felt sick, coughing and congestion.   In ED labs showed leukocytosis of 19 and CT with FEROZ pneumonia    # PNA    - Blood cultures NGTD   - WBC is going down 19-->11, no fever   - Continue ceftriaxone and azithromycin   - Follow MRSA PCR   - Follow Legionella if negative can stop azithromycin   - Pulmonary follow up    Will follow.    Lenora Mondragon MD  Division of Infectious Diseases   Please call ID service at [phone] with any question.    50 Minutes spent on total encounter assessing patient, examination, chart review, counseling and coordinating care by the attending physician/nurse/care manager.

## 2025-02-05 NOTE — PROGRESS NOTE ADULT - PROBLEM SELECTOR PLAN 3
Old rib fracture 2 months ago, right side 6th and 7th rib  - Consulted pulmonology Dr. Dobson by ER   - consult ortho, overnight PA not picking up at this time and matter non-urgent as patient is not in pain  - Ambulate with assistance   - Fall precautions  - Patient not reporting pain at this time, tramadol 25 and 50 PRN for mild to moderate pain q6 in case pain develops Old rib fracture 2 months ago, right side 6th and 7th rib  - Consulted pulmonology Dr. Dobson by ER   - consult ortho, overnight PA not picking up at this time and matter non-urgent as patient is not in pain  - Ambulate with assistance   - Fall precautions  - starting IV toradol for moderate pain at this time, states tramadol does not work. Can consider oxy since patient was on it 3-4 weeks ago from [facility] for compression fx

## 2025-02-05 NOTE — CASE MANAGEMENT PROGRESS NOTE - NSCMPROGRESSNOTE_GEN_ALL_CORE
Discussed patient on rounds this morning and chart reviewed. Patient remains acute on IV Zithromax, IV Rocephin, IV Solumedrol 40 mg BID and is on 6LNC. Transition needs are pending clinical course. CM remains available.

## 2025-02-05 NOTE — DISCHARGE NOTE PROVIDER - NSDCHHPTASSISTHOME_GEN_ALL_CORE
Patient Needs Assistance to Leave Residence... consistent carbohydrate (no snacks)/DASH/TLC (sodium and cholesterol restricted diet)

## 2025-02-05 NOTE — DISCHARGE NOTE PROVIDER - CARE PROVIDER_API CALL
GEM RAZO  Phone: [phone]  Fax: [phone]  Follow Up Time: 1 month   GEM RAZO  Phone: [phone]  Fax: [phone]  Follow Up Time: 1 month    Tres Dobson  Pulmonary Disease  [address] [place] [id number]  Phone: [phone]  Fax: [phone]  Follow Up Time:

## 2025-02-05 NOTE — PROGRESS NOTE ADULT - ATTENDING COMMENTS
69F with past medical history of colon cancer in remission, rheumatoid arthritis, hypertension and COPD presents to the ER with shortness of breath found to have pneumonia. Sepsis and acute hypoxic respiratory failure 2/2 CAP and COPD exacerbation. Continue IV Rocephin and Azithromycin. Continue IV steroids, duoneb. Taper off supplemental O2 with appropriate SpO2.

## 2025-02-05 NOTE — DISCHARGE NOTE PROVIDER - PROVIDER TOKENS
PROVIDER:[TOKEN:[[id number]],FOLLOWUP:[1 month]] PROVIDER:[TOKEN:[[id number]],FOLLOWUP:[1 month]],PROVIDER:[TOKEN:[[id number]]]

## 2025-02-05 NOTE — PROGRESS NOTE ADULT - SUBJECTIVE AND OBJECTIVE BOX
Patient is a 69y old  Female who presents with a chief complaint of Pneumonia (04 Feb 2025 23:31)      INTERVAL HPI/OVERNIGHT EVENTS: Patient seen and examined at bedside. No overnight events occurred. Patient has no complaints at this time. Denies fevers, chills, headache, lightheadedness, chest pain, dyspnea, abdominal pain, n/v/d/c.    MEDICATIONS  (STANDING):  albuterol/ipratropium for Nebulization 3 milliLiter(s) Nebulizer every 4 hours  amLODIPine   Tablet 10 milliGRAM(s) Oral daily  azithromycin  IVPB 500 milliGRAM(s) IV Intermittent every 24 hours  benzonatate 100 milliGRAM(s) Oral every 8 hours  cefTRIAXone   IVPB 1000 milliGRAM(s) IV Intermittent every 24 hours  enoxaparin Injectable 40 milliGRAM(s) SubCutaneous every 24 hours  influenza  Vaccine (HIGH DOSE) 0.5 milliLiter(s) IntraMuscular once  methylPREDNISolone sodium succinate Injectable 40 milliGRAM(s) IV Push two times a day  mometasone 220 MICROgram(s) Inhaler 2 Puff(s) Inhalation daily  sodium chloride 0.9%. 1000 milliLiter(s) (75 mL/Hr) IV Continuous <Continuous>    MEDICATIONS  (PRN):  acetaminophen     Tablet .. 650 milliGRAM(s) Oral every 6 hours PRN Temp greater or equal to 38C (100.4F), Mild Pain (1 - 3)  ALPRAZolam 0.25 milliGRAM(s) Oral daily PRN for anxiety  aluminum hydroxide/magnesium hydroxide/simethicone Suspension 30 milliLiter(s) Oral every 4 hours PRN Dyspepsia  melatonin 3 milliGRAM(s) Oral at bedtime PRN Insomnia  traMADol 50 milliGRAM(s) Oral every 6 hours PRN Moderate Pain (4 - 6)  traMADol 25 milliGRAM(s) Oral every 6 hours PRN Mild Pain (1 - 3)      Allergies    No Known Allergies    Intolerances        REVIEW OF SYSTEMS:  CONSTITUTIONAL: No fever or chills  HEENT:  No headache, no sore throat  RESPIRATORY: No cough, wheezing, or shortness of breath  CARDIOVASCULAR: No chest pain, palpitations  GASTROINTESTINAL: No abd pain, nausea, vomiting, or diarrhea  GENITOURINARY: No dysuria, frequency, or hematuria  NEUROLOGICAL: no focal weakness or dizziness  MUSCULOSKELETAL: no myalgias     Vital Signs Last 24 Hrs  T(C): 36.8 (05 Feb 2025 05:10), Max: 37 (04 Feb 2025 20:00)  T(F): 98.3 (05 Feb 2025 05:10), Max: 98.6 (04 Feb 2025 20:00)  HR: 118 (05 Feb 2025 05:10) (102 - 120)  BP: 148/72 (05 Feb 2025 05:10) (117/66 - 148/72)  BP(mean): --  RR: 19 (05 Feb 2025 05:10) (17 - 19)  SpO2: 93% (05 Feb 2025 05:10) (91% - 94%)    Parameters below as of 05 Feb 2025 05:10  Patient On (Oxygen Delivery Method): nasal cannula        PHYSICAL EXAM:  GENERAL: NAD  HEENT:  anicteric, moist mucous membranes  CHEST/LUNG:  CTA b/l, no rales, wheezes, or rhonchi  HEART:  RRR, S1, S2  ABDOMEN:  BS+, soft, nontender, nondistended  EXTREMITIES: no edema, cyanosis, or calf tenderness  NERVOUS SYSTEM: answers questions and follows commands appropriately    LABS:                        9.7    11.60 )-----------( 245      ( 05 Feb 2025 06:15 )             31.5     CBC Full  -  ( 05 Feb 2025 06:15 )  WBC Count : 11.60 K/uL  Hemoglobin : 9.7 g/dL  Hematocrit : 31.5 %  Platelet Count - Automated : 245 K/uL  Mean Cell Volume : 79.3 fl  Mean Cell Hemoglobin : 24.4 pg  Mean Cell Hemoglobin Concentration : 30.8 g/dL  Auto Neutrophil # : x  Auto Lymphocyte # : x  Auto Monocyte # : x  Auto Eosinophil # : x  Auto Basophil # : x  Auto Neutrophil % : x  Auto Lymphocyte % : x  Auto Monocyte % : x  Auto Eosinophil % : x  Auto Basophil % : x    05 Feb 2025 06:15    144    |  104    |  22     ----------------------------<  127    3.8     |  32     |  0.67     Ca    9.9        05 Feb 2025 06:15      PT/INR - ( 03 Feb 2025 11:30 )   PT: 13.1 sec;   INR: 1.11 ratio         PTT - ( 03 Feb 2025 11:30 )  PTT:31.3 sec  Urinalysis Basic - ( 05 Feb 2025 06:15 )    Color: x / Appearance: x / SG: x / pH: x  Gluc: 127 mg/dL / Ketone: x  / Bili: x / Urobili: x   Blood: x / Protein: x / Nitrite: x   Leuk Esterase: x / RBC: x / WBC x   Sq Epi: x / Non Sq Epi: x / Bacteria: x      CAPILLARY BLOOD GLUCOSE            Culture - Blood (collected 02-03-25 @ 11:10)  Source: .Blood BLOOD  Preliminary Report (02-04-25 @ 17:01):    No growth at 24 hours    Culture - Blood (collected 02-03-25 @ 11:05)  Source: .Blood BLOOD  Preliminary Report (02-04-25 @ 17:01):    No growth at 24 hours        RADIOLOGY & ADDITIONAL TESTS:    Personally reviewed.     Consultant(s) Notes Reviewed:  [x] YES  [ ] NO     Patient is a 69y old  Female who presents with a chief complaint of Pneumonia (04 Feb 2025 23:31)      INTERVAL HPI/OVERNIGHT EVENTS: Patient seen and examined at bedside. Patient seen on 5L NC states that she is no longer short of breath and duonebs are helping. Follows Dr. Bronson borjas outpatient. Denies fever, chills, chest pain or palpitations. Wbc downtrending at this time. 19 to 11k today. Not on home oxygen    MEDICATIONS  (STANDING):  albuterol/ipratropium for Nebulization 3 milliLiter(s) Nebulizer every 4 hours  amLODIPine   Tablet 10 milliGRAM(s) Oral daily  azithromycin  IVPB 500 milliGRAM(s) IV Intermittent every 24 hours  benzonatate 100 milliGRAM(s) Oral every 8 hours  cefTRIAXone   IVPB 1000 milliGRAM(s) IV Intermittent every 24 hours  enoxaparin Injectable 40 milliGRAM(s) SubCutaneous every 24 hours  influenza  Vaccine (HIGH DOSE) 0.5 milliLiter(s) IntraMuscular once  methylPREDNISolone sodium succinate Injectable 40 milliGRAM(s) IV Push two times a day  mometasone 220 MICROgram(s) Inhaler 2 Puff(s) Inhalation daily  sodium chloride 0.9%. 1000 milliLiter(s) (75 mL/Hr) IV Continuous <Continuous>    MEDICATIONS  (PRN):  acetaminophen     Tablet .. 650 milliGRAM(s) Oral every 6 hours PRN Temp greater or equal to 38C (100.4F), Mild Pain (1 - 3)  ALPRAZolam 0.25 milliGRAM(s) Oral daily PRN for anxiety  aluminum hydroxide/magnesium hydroxide/simethicone Suspension 30 milliLiter(s) Oral every 4 hours PRN Dyspepsia  melatonin 3 milliGRAM(s) Oral at bedtime PRN Insomnia  traMADol 50 milliGRAM(s) Oral every 6 hours PRN Moderate Pain (4 - 6)  traMADol 25 milliGRAM(s) Oral every 6 hours PRN Mild Pain (1 - 3)      Allergies    No Known Allergies    Intolerances        REVIEW OF SYSTEMS:  CONSTITUTIONAL: No fever or chills  HEENT:  No headache, no sore throat  RESPIRATORY: No cough, wheezing, or shortness of breath  CARDIOVASCULAR: No chest pain, palpitations  GASTROINTESTINAL: No abd pain, nausea, vomiting, or diarrhea  GENITOURINARY: No dysuria, frequency, or hematuria  NEUROLOGICAL: no focal weakness or dizziness  MUSCULOSKELETAL: no myalgias     Vital Signs Last 24 Hrs  T(C): 36.8 (05 Feb 2025 05:10), Max: 37 (04 Feb 2025 20:00)  T(F): 98.3 (05 Feb 2025 05:10), Max: 98.6 (04 Feb 2025 20:00)  HR: 118 (05 Feb 2025 05:10) (102 - 120)  BP: 148/72 (05 Feb 2025 05:10) (117/66 - 148/72)  BP(mean): --  RR: 19 (05 Feb 2025 05:10) (17 - 19)  SpO2: 93% (05 Feb 2025 05:10) (91% - 94%)    Parameters below as of 05 Feb 2025 05:10  Patient On (Oxygen Delivery Method): nasal cannula        PHYSICAL EXAM:  GENERAL: NAD on 5L NC (not on home o2)  HEENT:  anicteric, moist mucous membranes  CHEST/LUNG:  + wheezing  HEART:  RRR, S1, S2  ABDOMEN:  BS+, soft, nontender, nondistended  EXTREMITIES: hands unable to  or perform finger flexion, no edema  NERVOUS SYSTEM: answers questions and follows commands appropriately    LABS:                        9.7    11.60 )-----------( 245      ( 05 Feb 2025 06:15 )             31.5     CBC Full  -  ( 05 Feb 2025 06:15 )  WBC Count : 11.60 K/uL  Hemoglobin : 9.7 g/dL  Hematocrit : 31.5 %  Platelet Count - Automated : 245 K/uL  Mean Cell Volume : 79.3 fl  Mean Cell Hemoglobin : 24.4 pg  Mean Cell Hemoglobin Concentration : 30.8 g/dL  Auto Neutrophil # : x  Auto Lymphocyte # : x  Auto Monocyte # : x  Auto Eosinophil # : x  Auto Basophil # : x  Auto Neutrophil % : x  Auto Lymphocyte % : x  Auto Monocyte % : x  Auto Eosinophil % : x  Auto Basophil % : x    05 Feb 2025 06:15    144    |  104    |  22     ----------------------------<  127    3.8     |  32     |  0.67     Ca    9.9        05 Feb 2025 06:15      PT/INR - ( 03 Feb 2025 11:30 )   PT: 13.1 sec;   INR: 1.11 ratio         PTT - ( 03 Feb 2025 11:30 )  PTT:31.3 sec  Urinalysis Basic - ( 05 Feb 2025 06:15 )    Color: x / Appearance: x / SG: x / pH: x  Gluc: 127 mg/dL / Ketone: x  / Bili: x / Urobili: x   Blood: x / Protein: x / Nitrite: x   Leuk Esterase: x / RBC: x / WBC x   Sq Epi: x / Non Sq Epi: x / Bacteria: x      CAPILLARY BLOOD GLUCOSE            Culture - Blood (collected 02-03-25 @ 11:10)  Source: .Blood BLOOD  Preliminary Report (02-04-25 @ 17:01):    No growth at 24 hours    Culture - Blood (collected 02-03-25 @ 11:05)  Source: .Blood BLOOD  Preliminary Report (02-04-25 @ 17:01):    No growth at 24 hours        RADIOLOGY & ADDITIONAL TESTS:    Personally reviewed.     Consultant(s) Notes Reviewed:  [x] YES  [ ] NO

## 2025-02-05 NOTE — PROGRESS NOTE ADULT - PROBLEM SELECTOR PLAN 1
Meets sepsis criteria of HR >90, source of infection present, lactate 2.3  - Patient on 4L NC due to hypoxia  - Leukocytosis improved, afebrile  - F/u BCx2 and UA/UC  - F/u urine strept and legionella  - Tylenol prn for fever, albuterol prn for SOB/wheezing, tessalon perles for cough   - COVID19 pcr negative  - Trend fever curve and WBC  - Infectious disease Dr. Mondragon consult noted  - Pulmonology Dr. Dobson consulted, f/u recs  - Continue on rocephin and azithro, adjust per ID recs Meets sepsis criteria of HR >90, source of infection present, lactate 2.3  - Patient on 4L NC due to hypoxia  - Leukocytosis improved, afebrile  - F/u BCx2 and UA/UC  - F/u urine strept and legionella  - Tylenol prn for fever, albuterol prn for SOB/wheezing, tessalon perles for cough   - COVID19 pcr negative  - Trend fever curve and WBC  - Infectious disease Dr. Mondragon consult noted  - Pulmonology Dr. Dobson consulted, f/u recs  - Continue on rocephin and azithro, adjust per ID recs  - On 5L NC, wean as tolerated Meets sepsis criteria of HR >90, source of infection present, lactate 2.3  - Patient on 4L NC due to hypoxia  - Leukocytosis improved, afebrile  - F/u BCx2 and UA/UC  - F/u urine strept and legionella  - Tylenol prn for fever, albuterol prn for SOB/wheezing, tessalon perles for cough   - COVID19 pcr negative  - Trend fever curve and WBC  - Infectious disease Dr. Mondragon consult noted  - Pulmonology Dr. Dobson following  - Continue on rocephin and azithro, adjust per ID recs  - On 5L NC, wean as tolerated  - Started on IV solumedrol

## 2025-02-05 NOTE — PROGRESS NOTE ADULT - PROBLEM SELECTOR PLAN 2
History of COPD, well controlled, on home inhaler does not use due to lack of insurance. Diffuse wheezing on exam. On 4L NC  - Duonebs q4h  - Zithromax + Rocephin   - Continue home medications: prednisone 10mg daily   - Supplemental O2 PRN. on 4L NC now  - Continue inhalers, patient does not use her budesonide because of lack of insurance  - RVP negative  - Pulmonary (Dr. Dobson) consulted, f/u recs History of COPD, well controlled, on home inhaler does not use due to lack of insurance. Diffuse wheezing on exam. On 4L NC  - Duonebs q4h  - Zithromax + Rocephin   - Continue home medications: prednisone 10mg daily   - Supplemental O2 PRN. on 5L NC now  - Continue inhalers, patient does not use her budesonide because of lack of insurance  - Decreasing duonebs  - RVP negative  - Pulmonary (Dr. Dobson) consulted, f/u recs

## 2025-02-05 NOTE — DISCHARGE NOTE PROVIDER - NSDCCPCAREPLAN_GEN_ALL_CORE_FT
PRINCIPAL DISCHARGE DIAGNOSIS  Diagnosis: Pneumonia involving left lung  Assessment and Plan of Treatment: You were found to have pneumonia on CT. You were originally started on ceftriaxone and azithro, and later switched to ceftriaxone and vancomycin based off your lab findings.   Please CONTINUE **      SECONDARY DISCHARGE DIAGNOSES  Diagnosis: Leukocytosis, unspecified type  Assessment and Plan of Treatment:     Diagnosis: Acute respiratory distress  Assessment and Plan of Treatment:     Diagnosis: Multiple fractures of ribs of left side  Assessment and Plan of Treatment: You were found to have rib fractures that were not symptomatic during your stay. You were given toradol, which did not help and low dose oxycodone during your stay. Please FOLLOW UP if you begin to have pain in your ribs again.    Diagnosis: COPD with pneumonia  Assessment and Plan of Treatment: You have a history of COPD that does not require oxygen at home.   Please CONTINUE home 10mg oral prednisone as you were taking it before.    Diagnosis: Rheumatoid disease  Assessment and Plan of Treatment: You have a history of rheumatoid arthritis. You take 10mg oral prednisone daily. You were receiving intravenous steroids for your exacerbation of COPD and respiratory distress, so we held your prednisone. Please CONTINUE your 10mg prednisone as prescribed. Please FOLLOW UP with your primary care provider.    Diagnosis: Anxiety  Assessment and Plan of Treatment: You have a history of anxiety. You were given xanax as needed during your stay.  Please CONTINUE home medications.     PRINCIPAL DISCHARGE DIAGNOSIS  Diagnosis: Pneumonia involving left lung  Assessment and Plan of Treatment: You were found to have pneumonia on CT. You were originally started on ceftriaxone and azithro, and later switched to ceftriaxone and vancomycin based off your lab findings. You received a 7 day course of vancomycin.  Please CONTINUE **      SECONDARY DISCHARGE DIAGNOSES  Diagnosis: Leukocytosis, unspecified type  Assessment and Plan of Treatment:     Diagnosis: Acute respiratory distress  Assessment and Plan of Treatment:     Diagnosis: Multiple fractures of ribs of left side  Assessment and Plan of Treatment: You were found to have rib fractures that were not symptomatic during your stay. You were given toradol, which did not help and low dose oxycodone during your stay. Please FOLLOW UP if you begin to have pain in your ribs again.    Diagnosis: COPD with pneumonia  Assessment and Plan of Treatment: You have a history of COPD that does not require oxygen at home.   Please CONTINUE home 10mg oral prednisone as you were taking it before.    Diagnosis: Rheumatoid disease  Assessment and Plan of Treatment: You have a history of rheumatoid arthritis. You take 10mg oral prednisone daily. You were receiving intravenous steroids for your exacerbation of COPD and respiratory distress, so we held your prednisone. Please CONTINUE your 10mg prednisone as prescribed. Please FOLLOW UP with your primary care provider.    Diagnosis: Anxiety  Assessment and Plan of Treatment: You have a history of anxiety. You were given xanax as needed during your stay.  Please CONTINUE home medications.     PRINCIPAL DISCHARGE DIAGNOSIS  Diagnosis: Pneumonia involving left lung  Assessment and Plan of Treatment: You were found to have pneumonia on CT. You were originally started on ceftriaxone and azithro, and later switched to ceftriaxone and vancomycin based off your lab findings. You received a 4 day course of vancomycin. You are being discharged on Bactrim to complete a 10 day course of antibiotics for your pneumonia.  Please CONTINUE Bactrim 160/800mg twice a day for 6 days starting today.  Please CONTINUE 3L oxygen through the nasal cannula at home  Please follow up with your pulmonologist Dr. Dobson within 1-2 weeks of discharge from the hospital for close follow up      SECONDARY DISCHARGE DIAGNOSES  Diagnosis: COPD with pneumonia  Assessment and Plan of Treatment: You have a history of COPD that requires oxygen therapy at home. You were titrated down to 3L oxygen through the nasal cannula while in the hospital. You were started on 40mg prednisone daily to help with your breathing. You are going to continue the prednisone on discharge and taper the dose by 5 mg every day until you are back at your home dose of 10mg daily.   Please CONTINUE  oral prednisone, decreasing dose by 5mg every day until back at home dose - tomorrow take 35mg prednisone, next day take 30mg, next day take 25mg, next day take 20mg, next day take 15mg, next day take 10mg and then continue home dose of 10mg daily from there.   Please CONTINUE advair inhaler one puff daily and albuterol inhaler as needed.   Please follow up with your pulmonologist Dr. Dobson within 1-2 weeks of discharge from the hospital for close follow up    Diagnosis: Multiple fractures of ribs of left side  Assessment and Plan of Treatment: You were found to have rib fractures that were not symptomatic during your stay. You were given toradol, which did not help and low dose oxycodone during your stay. Please FOLLOW UP if you begin to have pain in your ribs again.    Diagnosis: Rheumatoid disease  Assessment and Plan of Treatment: You have a history of rheumatoid arthritis. You take 10mg oral prednisone daily. You were receiving intravenous steroids for your exacerbation of COPD and respiratory distress, so we changed your prednisone dose.  Please taper your prednisone as above. Please FOLLOW UP with your primary care provider.    Diagnosis: Anxiety  Assessment and Plan of Treatment: You have a history of anxiety. You were given xanax as needed during your stay.  Please CONTINUE home medications.     PRINCIPAL DISCHARGE DIAGNOSIS  Diagnosis: Pneumonia involving left lung  Assessment and Plan of Treatment: You were found to have pneumonia on CT. Likely due to MRSA (which was positive from the nasal swab). You were on IV antibiotics Vancomycin (total 4 days of IV) and switched to oral for discharge.   Please CONTINUE Bactrim 160/800mg twice a day for 6 days starting today. - to complete a 10 day course   Please CONTINUE 3L oxygen through the nasal cannula at home  Please follow up with your pulmonologist Dr. Dobson within 1-2 weeks of discharge from the hospital for close follow up      SECONDARY DISCHARGE DIAGNOSES  Diagnosis: Multiple fractures of ribs of left side  Assessment and Plan of Treatment: You were found to have rib fractures that were not symptomatic during your stay. You were given toradol, which did not help and low dose oxycodone during your stay. Please FOLLOW UP if you begin to have pain in your ribs again.    Diagnosis: COPD with pneumonia  Assessment and Plan of Treatment: You have a history of COPD that requires oxygen therapy at home. You were titrated down to 3L oxygen through the nasal cannula while in the hospital. You were started on 40mg prednisone daily to help with your breathing. You are going to continue the prednisone on discharge and taper the dose by 10mg every 5 days until you are back at your home dose of 10mg daily.   Please CONTINUE  oral prednisone, decreasing dose by 10mg every 5 days. Continue 40mg daily x 5 days -> 30mg daily (take as a 20mg tablet +10mg tablet) x 5 days -> 20mg daily x 5 days -> 10mg daily   Please CONTINUE advair inhaler one puff daily and albuterol inhaler as needed.   Please follow up with your pulmonologist Dr. Dobson within 1-2 weeks of discharge from the hospital for close follow up    Diagnosis: Rheumatoid disease  Assessment and Plan of Treatment: You have a history of rheumatoid arthritis. You take 10mg oral prednisone daily. You were receiving intravenous steroids for your exacerbation of COPD and respiratory distress, so we changed your prednisone dose.  Please taper your prednisone as above. Please FOLLOW UP with your primary care provider.    Diagnosis: Anxiety  Assessment and Plan of Treatment: You have a history of anxiety. You were given xanax as needed during your stay.  Please CONTINUE home medications.

## 2025-02-05 NOTE — DISCHARGE NOTE PROVIDER - ATTENDING DISCHARGE PHYSICAL EXAMINATION:
gen: NAD on 3L NC  neuro: Alert follows commands grossly moves all extremities  lungs: diminished throughout, no wheezing

## 2025-02-05 NOTE — DISCHARGE NOTE PROVIDER - NSDCMRMEDTOKEN_GEN_ALL_CORE_FT
Albuterol (Eqv-ProAir HFA) 90 mcg/inh inhalation aerosol: 2 puff(s) inhaled every 6 hours as needed for  shortness of breath and/or wheezing  ALPRAZolam 0.25 mg oral tablet: 1 tab(s) orally once a day as needed for  anxiety  amLODIPine 10 mg oral tablet: 1 tab(s) orally once a day  apixaban 5 mg oral tablet: 1 tab(s) orally every 12 hours after 5 days 1 tab 2 times day / refill by pcp  budesonide/glycopyrrolate/formoterol 160 mcg-9 mcg-4.8 mcg/inh inhalation aerosol: 2 inhaler(s) inhaled 2 times a day  predniSONE 10 mg oral tablet: 1 tab(s) orally once a day Please take daily for rheumatoid arthritis  Protonix 40 mg oral delayed release tablet: 1 tab(s) orally once a day  traMADol 50 mg oral tablet: 1 tab(s) orally once a day   Albuterol (Eqv-ProAir HFA) 90 mcg/inh inhalation aerosol: 2 puff(s) inhaled every 6 hours as needed for  shortness of breath and/or wheezing  ALPRAZolam 0.25 mg oral tablet: 1 tab(s) orally once a day as needed for  anxiety  amLODIPine 10 mg oral tablet: 1 tab(s) orally once a day  Bactrim  mg-160 mg oral tablet: 1 tab(s) orally every 12 hours Take 1 tab twice a day for 5 days to complete 10 day total course  fluticasone-salmeterol 250 mcg-50 mcg inhalation powder: 1 puff(s) inhaled 2 times a day  Metoprolol Tartrate 25 mg oral tablet: 0.5 tab(s) orally 2 times a day  predniSONE 5 mg oral tablet: 1 tab(s) orally once a day Taper dose by 5mg (1 tablet) every day until back at home dose of 10mg daily. Take 7 tablets (35mg) 2/11, take 6 tablets (30mg) 2/12. take 5 tablets (25mg) 2/13, take 4 tablets (20mg) 2/14, take 3 tablets (15mg) 2/15, take 2 tablets (10mg) 2/16 - then take 10mg daily as previous home medication  Protonix 40 mg oral delayed release tablet: 1 tab(s) orally once a day  traMADol 50 mg oral tablet: 1 tab(s) orally once a day   Albuterol (Eqv-ProAir HFA) 90 mcg/inh inhalation aerosol: 2 puff(s) inhaled every 6 hours as needed for  shortness of breath and/or wheezing  ALPRAZolam 0.25 mg oral tablet: 1 tab(s) orally once a day as needed for  anxiety  amLODIPine 10 mg oral tablet: 1 tab(s) orally once a day  Bactrim  mg-160 mg oral tablet: 1 tab(s) orally every 12 hours Take 1 tab twice a day for 5 days to complete 10 day total course  fluticasone-salmeterol 250 mcg-50 mcg inhalation powder: 1 puff(s) inhaled 2 times a day  Metoprolol Tartrate 25 mg oral tablet: 0.5 tab(s) orally 2 times a day  predniSONE 10 mg oral tablet: 1 tab(s) orally once a day 40mg daily x 5 days -&gt; 30mg daily x 5 days -&gt; 20mg daily x 5 days -&gt; 10mg daily  predniSONE 20 mg oral tablet: 2 tab(s) orally once a day 40mg daily x 5 days -&gt; 30mg daily x 5 days (take as 20mg +10mg tablet) -&gt; 20mg daily x 5 days -&gt; 10mg daily  Protonix 40 mg oral delayed release tablet: 1 tab(s) orally once a day  traMADol 50 mg oral tablet: 1 tab(s) orally once a day

## 2025-02-05 NOTE — PROGRESS NOTE ADULT - SUBJECTIVE AND OBJECTIVE BOX
Patient is a 69y old  Female who presents with a chief complaint of Pneumonia (05 Feb 2025 12:23)      INTERVAL HPI/OVERNIGHT EVENTS:    Complains of cough and wheezing    MEDICATIONS  (STANDING):  albuterol/ipratropium for Nebulization 3 milliLiter(s) Nebulizer every 8 hours  amLODIPine   Tablet 10 milliGRAM(s) Oral daily  azithromycin  IVPB 500 milliGRAM(s) IV Intermittent every 24 hours  benzonatate 100 milliGRAM(s) Oral every 8 hours  cefTRIAXone   IVPB 1000 milliGRAM(s) IV Intermittent every 24 hours  enoxaparin Injectable 40 milliGRAM(s) SubCutaneous every 24 hours  influenza  Vaccine (HIGH DOSE) 0.5 milliLiter(s) IntraMuscular once  methylPREDNISolone sodium succinate Injectable 40 milliGRAM(s) IV Push two times a day  mometasone 220 MICROgram(s) Inhaler 2 Puff(s) Inhalation daily  sodium chloride 0.9%. 1000 milliLiter(s) (75 mL/Hr) IV Continuous <Continuous>      MEDICATIONS  (PRN):  acetaminophen     Tablet .. 650 milliGRAM(s) Oral every 6 hours PRN Temp greater or equal to 38C (100.4F), Mild Pain (1 - 3)  ALPRAZolam 0.25 milliGRAM(s) Oral daily PRN for anxiety  aluminum hydroxide/magnesium hydroxide/simethicone Suspension 30 milliLiter(s) Oral every 4 hours PRN Dyspepsia  ketorolac   Injectable 30 milliGRAM(s) IV Push two times a day PRN Moderate Pain (4 - 6)  melatonin 3 milliGRAM(s) Oral at bedtime PRN Insomnia  traMADol 25 milliGRAM(s) Oral every 6 hours PRN Mild Pain (1 - 3)      Allergies    No Known Allergies    Intolerances        PAST MEDICAL & SURGICAL HISTORY:  COPD (chronic obstructive pulmonary disease)      Rheumatoid arthritis      HTN (hypertension)      Anxiety      No significant past surgical history  colon resection  02.2024          Vital Signs Last 24 Hrs  T(C): 37 (05 Feb 2025 19:57), Max: 37.3 (05 Feb 2025 11:06)  T(F): 98.6 (05 Feb 2025 19:57), Max: 99.1 (05 Feb 2025 11:06)  HR: 100 (05 Feb 2025 19:57) (100 - 118)  BP: 132/68 (05 Feb 2025 19:57) (132/68 - 148/72)  BP(mean): --  RR: 18 (05 Feb 2025 19:57) (18 - 21)  SpO2: 95% (05 Feb 2025 19:57) (91% - 99%)    Parameters below as of 05 Feb 2025 19:57  Patient On (Oxygen Delivery Method): nasal cannula  O2 Flow (L/min): 5      PHYSICAL EXAMINATION:    GENERAL: The patient is awake and alert in no apparent distress.     HEENT: Head is normocephalic and atraumatic    NECK: no JVD    LUNGS: rales both bases    HEART: Regular rate and rhythm without murmur.    ABDOMEN: Soft, nontender, and nondistended.      EXTREMITIES: Without any cyanosis, clubbing, rash, lesions or edema.    NEUROLOGIC: Grossly intact.    SKIN: No ulceration or induration present.      LABS:                        9.7    11.60 )-----------( 245      ( 05 Feb 2025 06:15 )             31.5     02-05    144  |  104  |  22  ----------------------------<  127[H]  3.8   |  32[H]  |  0.67    Ca    9.9      05 Feb 2025 06:15    TPro  6.0  /  Alb  2.5[L]  /  TBili  0.3  /  DBili  x   /  AST  15  /  ALT  11[L]  /  AlkPhos  101  02-04      Urinalysis Basic - ( 05 Feb 2025 06:15 )    Color: x / Appearance: x / SG: x / pH: x  Gluc: 127 mg/dL / Ketone: x  / Bili: x / Urobili: x   Blood: x / Protein: x / Nitrite: x   Leuk Esterase: x / RBC: x / WBC x   Sq Epi: x / Non Sq Epi: x / Bacteria: x                      MICROBIOLOGY:  Culture Results:   No growth at 48 Hours (02-03 @ 11:10)  Culture Results:   No growth at 48 Hours (02-03 @ 11:05    Assessment:    Left upper lobe pneumonia  Acute on Chronic Hypoxic respiratory failure  COPD with exacerbation  Rheumatoid Arthritis    Plan:    IV Rocephin + IV Zithromax  IV Solumedrol  Supplemental oxygen  Duoneb QID

## 2025-02-05 NOTE — CHART NOTE - NSCHARTNOTEFT_GEN_A_CORE
Called by RN for anxiety. Pt with PMHx of anxiety and on xanax prn (only once a day). Pt received a dose of Xanax this AM around 8. Pt notes to have increased anxiety tonight and is requesting something. Will order a one time small dose of Xanax (0.25mg) for tonight. RN to call with changes and monitor for side effects.

## 2025-02-05 NOTE — DISCHARGE NOTE PROVIDER - DETAILS OF MALNUTRITION DIAGNOSIS/DIAGNOSES
This patient has been assessed with a concern for Malnutrition and was treated during this hospitalization for the following Nutrition diagnosis/diagnoses:     -  02/06/2025: Moderate protein-calorie malnutrition

## 2025-02-05 NOTE — DISCHARGE NOTE PROVIDER - CARE PROVIDERS DIRECT ADDRESSES
[email or web address] [email or web address]

## 2025-02-06 LAB
ALBUMIN SERPL ELPH-MCNC: 2.8 G/DL — LOW (ref 3.3–5)
ALP SERPL-CCNC: 94 U/L — SIGNIFICANT CHANGE UP (ref 40–120)
ALT FLD-CCNC: 12 U/L — SIGNIFICANT CHANGE UP (ref 12–78)
ANION GAP SERPL CALC-SCNC: 3 MMOL/L — LOW (ref 5–17)
AST SERPL-CCNC: 11 U/L — LOW (ref 15–37)
BASOPHILS # BLD AUTO: 0.01 K/UL — SIGNIFICANT CHANGE UP (ref 0–0.2)
BASOPHILS NFR BLD AUTO: 0.1 % — SIGNIFICANT CHANGE UP (ref 0–2)
BILIRUB SERPL-MCNC: 0.3 MG/DL — SIGNIFICANT CHANGE UP (ref 0.2–1.2)
BUN SERPL-MCNC: 34 MG/DL — HIGH (ref 7–23)
CALCIUM SERPL-MCNC: 10.1 MG/DL — SIGNIFICANT CHANGE UP (ref 8.5–10.1)
CHLORIDE SERPL-SCNC: 103 MMOL/L — SIGNIFICANT CHANGE UP (ref 96–108)
CO2 SERPL-SCNC: 35 MMOL/L — HIGH (ref 22–31)
CREAT SERPL-MCNC: 0.78 MG/DL — SIGNIFICANT CHANGE UP (ref 0.5–1.3)
EGFR: 83 ML/MIN/1.73M2 — SIGNIFICANT CHANGE UP
EOSINOPHIL # BLD AUTO: 0 K/UL — SIGNIFICANT CHANGE UP (ref 0–0.5)
EOSINOPHIL NFR BLD AUTO: 0 % — SIGNIFICANT CHANGE UP (ref 0–6)
GLUCOSE SERPL-MCNC: 152 MG/DL — HIGH (ref 70–99)
GRAM STN FLD: ABNORMAL
HCT VFR BLD CALC: 32 % — LOW (ref 34.5–45)
HGB BLD-MCNC: 9.5 G/DL — LOW (ref 11.5–15.5)
IMM GRANULOCYTES NFR BLD AUTO: 1.1 % — HIGH (ref 0–0.9)
LEGIONELLA AG UR QL: NEGATIVE — SIGNIFICANT CHANGE UP
LYMPHOCYTES # BLD AUTO: 0.39 K/UL — LOW (ref 1–3.3)
LYMPHOCYTES # BLD AUTO: 4.6 % — LOW (ref 13–44)
MAGNESIUM SERPL-MCNC: 2.7 MG/DL — HIGH (ref 1.6–2.6)
MCHC RBC-ENTMCNC: 23.9 PG — LOW (ref 27–34)
MCHC RBC-ENTMCNC: 29.7 G/DL — LOW (ref 32–36)
MCV RBC AUTO: 80.6 FL — SIGNIFICANT CHANGE UP (ref 80–100)
MONOCYTES # BLD AUTO: 0.26 K/UL — SIGNIFICANT CHANGE UP (ref 0–0.9)
MONOCYTES NFR BLD AUTO: 3.1 % — SIGNIFICANT CHANGE UP (ref 2–14)
NEUTROPHILS # BLD AUTO: 7.69 K/UL — HIGH (ref 1.8–7.4)
NEUTROPHILS NFR BLD AUTO: 91.1 % — HIGH (ref 43–77)
NRBC # BLD: 0 /100 WBCS — SIGNIFICANT CHANGE UP (ref 0–0)
NRBC BLD-RTO: 0 /100 WBCS — SIGNIFICANT CHANGE UP (ref 0–0)
PHOSPHATE SERPL-MCNC: 3.6 MG/DL — SIGNIFICANT CHANGE UP (ref 2.5–4.5)
PLATELET # BLD AUTO: 272 K/UL — SIGNIFICANT CHANGE UP (ref 150–400)
POTASSIUM SERPL-MCNC: 4.4 MMOL/L — SIGNIFICANT CHANGE UP (ref 3.5–5.3)
POTASSIUM SERPL-SCNC: 4.4 MMOL/L — SIGNIFICANT CHANGE UP (ref 3.5–5.3)
PROT SERPL-MCNC: 6.2 G/DL — SIGNIFICANT CHANGE UP (ref 6–8.3)
RBC # BLD: 3.97 M/UL — SIGNIFICANT CHANGE UP (ref 3.8–5.2)
RBC # FLD: 16.5 % — HIGH (ref 10.3–14.5)
SODIUM SERPL-SCNC: 141 MMOL/L — SIGNIFICANT CHANGE UP (ref 135–145)
SPECIMEN SOURCE: SIGNIFICANT CHANGE UP
WBC # BLD: 8.44 K/UL — SIGNIFICANT CHANGE UP (ref 3.8–10.5)
WBC # FLD AUTO: 8.44 K/UL — SIGNIFICANT CHANGE UP (ref 3.8–10.5)

## 2025-02-06 PROCEDURE — G0545: CPT

## 2025-02-06 PROCEDURE — 99233 SBSQ HOSP IP/OBS HIGH 50: CPT | Mod: GC

## 2025-02-06 PROCEDURE — 99233 SBSQ HOSP IP/OBS HIGH 50: CPT

## 2025-02-06 RX ORDER — FLUTICASONE PROPIONATE AND SALMETEROL 113; 14 UG/1; UG/1
1 POWDER, METERED RESPIRATORY (INHALATION)
Refills: 0 | Status: DISCONTINUED | OUTPATIENT
Start: 2025-02-06 | End: 2025-02-10

## 2025-02-06 RX ORDER — OXYCODONE HYDROCHLORIDE 30 MG/1
2.5 TABLET ORAL EVERY 6 HOURS
Refills: 0 | Status: DISCONTINUED | OUTPATIENT
Start: 2025-02-06 | End: 2025-02-10

## 2025-02-06 RX ORDER — SODIUM CHLORIDE 9 G/ML
1000 INJECTION, SOLUTION INTRAVENOUS
Refills: 0 | Status: DISCONTINUED | OUTPATIENT
Start: 2025-02-06 | End: 2025-02-10

## 2025-02-06 RX ORDER — VANCOMYCIN HYDROCHLORIDE 50 MG/ML
1000 KIT ORAL EVERY 12 HOURS
Refills: 0 | Status: DISCONTINUED | OUTPATIENT
Start: 2025-02-06 | End: 2025-02-08

## 2025-02-06 RX ORDER — MUPIROCIN 2 G/100G
1 CREAM TOPICAL
Refills: 0 | Status: DISCONTINUED | OUTPATIENT
Start: 2025-02-06 | End: 2025-02-10

## 2025-02-06 RX ORDER — METHYLPREDNISOLONE ACETATE 40 MG/ML
40 VIAL (ML) INJECTION DAILY
Refills: 0 | Status: DISCONTINUED | OUTPATIENT
Start: 2025-02-07 | End: 2025-02-08

## 2025-02-06 RX ORDER — VANCOMYCIN HYDROCHLORIDE 50 MG/ML
1250 KIT ORAL EVERY 12 HOURS
Refills: 0 | Status: DISCONTINUED | OUTPATIENT
Start: 2025-02-06 | End: 2025-02-06

## 2025-02-06 RX ADMIN — IPRATROPIUM BROMIDE AND ALBUTEROL SULFATE 3 MILLILITER(S): .5; 2.5 SOLUTION RESPIRATORY (INHALATION) at 00:27

## 2025-02-06 RX ADMIN — Medication 100 MILLIGRAM(S): at 11:24

## 2025-02-06 RX ADMIN — Medication 40 MILLIGRAM(S): at 05:13

## 2025-02-06 RX ADMIN — MOMETASONE FUROATE 2 PUFF(S): 220 INHALANT RESPIRATORY (INHALATION) at 11:05

## 2025-02-06 RX ADMIN — MUPIROCIN 1 APPLICATION(S): 2 CREAM TOPICAL at 17:15

## 2025-02-06 RX ADMIN — CEFTRIAXONE 100 MILLIGRAM(S): 250 INJECTION, POWDER, FOR SOLUTION INTRAMUSCULAR; INTRAVENOUS at 11:04

## 2025-02-06 RX ADMIN — Medication 10 MILLIGRAM(S): at 05:13

## 2025-02-06 RX ADMIN — OXYCODONE HYDROCHLORIDE 2.5 MILLIGRAM(S): 30 TABLET ORAL at 11:25

## 2025-02-06 RX ADMIN — Medication 2000 UNIT(S): at 11:25

## 2025-02-06 RX ADMIN — IPRATROPIUM BROMIDE AND ALBUTEROL SULFATE 3 MILLILITER(S): .5; 2.5 SOLUTION RESPIRATORY (INHALATION) at 19:31

## 2025-02-06 RX ADMIN — ENOXAPARIN SODIUM 40 MILLIGRAM(S): 100 INJECTION SUBCUTANEOUS at 17:15

## 2025-02-06 RX ADMIN — Medication 100 MILLIGRAM(S): at 05:13

## 2025-02-06 RX ADMIN — Medication 100 MILLIGRAM(S): at 23:46

## 2025-02-06 RX ADMIN — VANCOMYCIN HYDROCHLORIDE 250 MILLIGRAM(S): KIT at 16:48

## 2025-02-06 RX ADMIN — IPRATROPIUM BROMIDE AND ALBUTEROL SULFATE 3 MILLILITER(S): .5; 2.5 SOLUTION RESPIRATORY (INHALATION) at 07:06

## 2025-02-06 RX ADMIN — Medication 40 MILLIGRAM(S): at 17:14

## 2025-02-06 RX ADMIN — SODIUM CHLORIDE 75 MILLILITER(S): 9 INJECTION, SOLUTION INTRAVENOUS at 11:05

## 2025-02-06 RX ADMIN — IPRATROPIUM BROMIDE AND ALBUTEROL SULFATE 3 MILLILITER(S): .5; 2.5 SOLUTION RESPIRATORY (INHALATION) at 14:23

## 2025-02-06 RX ADMIN — OXYCODONE HYDROCHLORIDE 2.5 MILLIGRAM(S): 30 TABLET ORAL at 12:20

## 2025-02-06 NOTE — PROGRESS NOTE ADULT - PROBLEM SELECTOR PLAN 2
History of COPD, well controlled, on home inhaler does not use due to lack of insurance. Diffuse wheezing on exam. On 4L NC  - Duonebs q4h  - Zithromax + Rocephin   - Continue home medications: prednisone 10mg daily   - Supplemental O2 PRN. on 5L NC now  - Continue inhalers, patient does not use her budesonide because of lack of insurance  - Decreasing duonebs  - RVP negative  - Pulmonary (Dr. Dobson) consulted, f/u recs History of COPD, well controlled, on home inhaler does not use due to lack of insurance. Diffuse wheezing on exam. On 4L NC  - Duonebs q4h  - Continue home medications: prednisone 10mg daily   - Supplemental O2 PRN. on 5L NC now  - Continue inhalers  - Decreasing duonebs  - RVP negative  - Pulm Dr. Dobson following History of COPD, well controlled, on home inhaler does not use due to lack of insurance. Diffuse wheezing on exam. On 4L NC  - Duonebs q4h  - holding home medications: prednisone 10mg daily   - Supplemental O2 PRN. on 5L NC now  - Continue inhalers  - Decreasing duonebs  - RVP negative  - Pulm Dr. Dobson following  - PRN tessalon perles   -

## 2025-02-06 NOTE — DIETITIAN INITIAL EVALUATION ADULT - PERTINENT LABORATORY DATA
02-06    141  |  103  |  34[H]  ----------------------------<  152[H]  4.4   |  35[H]  |  0.78    Ca    10.1      06 Feb 2025 07:16  Phos  3.6     02-06  Mg     2.7     02-06    TPro  6.2  /  Alb  2.8[L]  /  TBili  0.3  /  DBili  x   /  AST  11[L]  /  ALT  12  /  AlkPhos  94  02-06  A1C with Estimated Average Glucose Result: 6.4 % (02-04-25 @ 05:40)

## 2025-02-06 NOTE — PROGRESS NOTE ADULT - SUBJECTIVE AND OBJECTIVE BOX
Patient is a 69y old  Female who presents with a chief complaint of Pneumonia (05 Feb 2025 22:19)      INTERVAL HPI/OVERNIGHT EVENTS: Patient seen and examined at bedside. No overnight events occurred. Patient has no complaints at this time. Denies fevers, chills, headache, lightheadedness, chest pain, dyspnea, abdominal pain, n/v/d/c.    MEDICATIONS  (STANDING):  albuterol/ipratropium for Nebulization 3 milliLiter(s) Nebulizer every 8 hours  amLODIPine   Tablet 10 milliGRAM(s) Oral daily  azithromycin  IVPB 500 milliGRAM(s) IV Intermittent every 24 hours  benzonatate 100 milliGRAM(s) Oral every 8 hours  cefTRIAXone   IVPB 1000 milliGRAM(s) IV Intermittent every 24 hours  enoxaparin Injectable 40 milliGRAM(s) SubCutaneous every 24 hours  influenza  Vaccine (HIGH DOSE) 0.5 milliLiter(s) IntraMuscular once  methylPREDNISolone sodium succinate Injectable 40 milliGRAM(s) IV Push two times a day  mometasone 220 MICROgram(s) Inhaler 2 Puff(s) Inhalation daily  sodium chloride 0.9%. 1000 milliLiter(s) (75 mL/Hr) IV Continuous <Continuous>    MEDICATIONS  (PRN):  acetaminophen     Tablet .. 650 milliGRAM(s) Oral every 6 hours PRN Temp greater or equal to 38C (100.4F), Mild Pain (1 - 3)  ALPRAZolam 0.25 milliGRAM(s) Oral daily PRN for anxiety  aluminum hydroxide/magnesium hydroxide/simethicone Suspension 30 milliLiter(s) Oral every 4 hours PRN Dyspepsia  ketorolac   Injectable 30 milliGRAM(s) IV Push two times a day PRN Moderate Pain (4 - 6)  melatonin 3 milliGRAM(s) Oral at bedtime PRN Insomnia  traMADol 25 milliGRAM(s) Oral every 6 hours PRN Mild Pain (1 - 3)      Allergies    No Known Allergies    Intolerances        REVIEW OF SYSTEMS:  CONSTITUTIONAL: No fever or chills  HEENT:  No headache, no sore throat  RESPIRATORY: No cough, wheezing, or shortness of breath  CARDIOVASCULAR: No chest pain, palpitations  GASTROINTESTINAL: No abd pain, nausea, vomiting, or diarrhea  GENITOURINARY: No dysuria, frequency, or hematuria  NEUROLOGICAL: no focal weakness or dizziness  MUSCULOSKELETAL: no myalgias     Vital Signs Last 24 Hrs  T(C): 37.1 (06 Feb 2025 04:17), Max: 37.3 (05 Feb 2025 11:06)  T(F): 98.7 (06 Feb 2025 04:17), Max: 99.1 (05 Feb 2025 11:06)  HR: 108 (06 Feb 2025 04:17) (100 - 109)  BP: 154/83 (06 Feb 2025 04:17) (132/68 - 154/83)  BP(mean): --  RR: 19 (06 Feb 2025 04:17) (18 - 21)  SpO2: 95% (06 Feb 2025 04:17) (92% - 99%)    Parameters below as of 06 Feb 2025 04:17  Patient On (Oxygen Delivery Method): nasal cannula        PHYSICAL EXAM:  GENERAL: NAD  HEENT:  anicteric, moist mucous membranes  CHEST/LUNG:  CTA b/l, no rales, wheezes, or rhonchi  HEART:  RRR, S1, S2  ABDOMEN:  BS+, soft, nontender, nondistended  EXTREMITIES: no edema, cyanosis, or calf tenderness  NERVOUS SYSTEM: answers questions and follows commands appropriately    LABS:    CBC Full  -  ( 05 Feb 2025 06:15 )  WBC Count : 11.60 K/uL  Hemoglobin : 9.7 g/dL  Hematocrit : 31.5 %  Platelet Count - Automated : 245 K/uL  Mean Cell Volume : 79.3 fl  Mean Cell Hemoglobin : 24.4 pg  Mean Cell Hemoglobin Concentration : 30.8 g/dL  Auto Neutrophil # : x  Auto Lymphocyte # : x  Auto Monocyte # : x  Auto Eosinophil # : x  Auto Basophil # : x  Auto Neutrophil % : x  Auto Lymphocyte % : x  Auto Monocyte % : x  Auto Eosinophil % : x  Auto Basophil % : x      Ca    9.9        05 Feb 2025 06:15        Urinalysis Basic - ( 05 Feb 2025 06:15 )    Color: x / Appearance: x / SG: x / pH: x  Gluc: 127 mg/dL / Ketone: x  / Bili: x / Urobili: x   Blood: x / Protein: x / Nitrite: x   Leuk Esterase: x / RBC: x / WBC x   Sq Epi: x / Non Sq Epi: x / Bacteria: x      CAPILLARY BLOOD GLUCOSE            Urinalysis with Rflx Culture (collected 02-05-25 @ 05:00)    Culture - Blood (collected 02-03-25 @ 11:10)  Source: .Blood BLOOD  Preliminary Report (02-05-25 @ 17:01):    No growth at 48 Hours    Culture - Blood (collected 02-03-25 @ 11:05)  Source: .Blood BLOOD  Preliminary Report (02-05-25 @ 17:01):    No growth at 48 Hours        RADIOLOGY & ADDITIONAL TESTS:    Personally reviewed.     Consultant(s) Notes Reviewed:  [x] YES  [ ] NO     Patient is a 69y old  Female who presents with a chief complaint of Pneumonia (05 Feb 2025 22:19)      INTERVAL HPI/OVERNIGHT EVENTS: Patient seen and examined at bedside. Patient is on 5L NC resting comfortably. She states her thoracic pain is still present after her compression fx about a month ago and rates it as an 8-9/10. Attempted toradol didn't work. Denies fevers, chills, headache, lightheadedness, chest pain, dyspnea, abdominal pain, n/v/d/c.    MEDICATIONS  (STANDING):  albuterol/ipratropium for Nebulization 3 milliLiter(s) Nebulizer every 8 hours  amLODIPine   Tablet 10 milliGRAM(s) Oral daily  azithromycin  IVPB 500 milliGRAM(s) IV Intermittent every 24 hours  benzonatate 100 milliGRAM(s) Oral every 8 hours  cefTRIAXone   IVPB 1000 milliGRAM(s) IV Intermittent every 24 hours  enoxaparin Injectable 40 milliGRAM(s) SubCutaneous every 24 hours  influenza  Vaccine (HIGH DOSE) 0.5 milliLiter(s) IntraMuscular once  methylPREDNISolone sodium succinate Injectable 40 milliGRAM(s) IV Push two times a day  mometasone 220 MICROgram(s) Inhaler 2 Puff(s) Inhalation daily  sodium chloride 0.9%. 1000 milliLiter(s) (75 mL/Hr) IV Continuous <Continuous>    MEDICATIONS  (PRN):  acetaminophen     Tablet .. 650 milliGRAM(s) Oral every 6 hours PRN Temp greater or equal to 38C (100.4F), Mild Pain (1 - 3)  ALPRAZolam 0.25 milliGRAM(s) Oral daily PRN for anxiety  aluminum hydroxide/magnesium hydroxide/simethicone Suspension 30 milliLiter(s) Oral every 4 hours PRN Dyspepsia  ketorolac   Injectable 30 milliGRAM(s) IV Push two times a day PRN Moderate Pain (4 - 6)  melatonin 3 milliGRAM(s) Oral at bedtime PRN Insomnia  traMADol 25 milliGRAM(s) Oral every 6 hours PRN Mild Pain (1 - 3)      Allergies    No Known Allergies    Intolerances        REVIEW OF SYSTEMS:  CONSTITUTIONAL: No fever or chills  HEENT:  No headache, no sore throat  RESPIRATORY: No cough, wheezing, or shortness of breath  CARDIOVASCULAR: No chest pain, palpitations  GASTROINTESTINAL: No abd pain, nausea, vomiting, or diarrhea  NEUROLOGICAL: no focal weakness or dizziness  MUSCULOSKELETAL: no myalgias     Vital Signs Last 24 Hrs  T(C): 37.1 (06 Feb 2025 04:17), Max: 37.3 (05 Feb 2025 11:06)  T(F): 98.7 (06 Feb 2025 04:17), Max: 99.1 (05 Feb 2025 11:06)  HR: 108 (06 Feb 2025 04:17) (100 - 109)  BP: 154/83 (06 Feb 2025 04:17) (132/68 - 154/83)  BP(mean): --  RR: 19 (06 Feb 2025 04:17) (18 - 21)  SpO2: 95% (06 Feb 2025 04:17) (92% - 99%)    Parameters below as of 06 Feb 2025 04:17  Patient On (Oxygen Delivery Method): nasal cannula        PHYSICAL EXAM:  GENERAL: NAD  HEENT:  anicteric, moist mucous membranes  CHEST/LUNG: + wheezing bilaterally  HEART:  RRR, S1, S2  ABDOMEN:  Soft and NT  EXTREMITIES: hands and feet deformed, no edema  NERVOUS SYSTEM: answers questions and follows commands appropriately    LABS:    CBC Full  -  ( 05 Feb 2025 06:15 )  WBC Count : 11.60 K/uL  Hemoglobin : 9.7 g/dL  Hematocrit : 31.5 %  Platelet Count - Automated : 245 K/uL  Mean Cell Volume : 79.3 fl  Mean Cell Hemoglobin : 24.4 pg  Mean Cell Hemoglobin Concentration : 30.8 g/dL  Auto Neutrophil # : x  Auto Lymphocyte # : x  Auto Monocyte # : x  Auto Eosinophil # : x  Auto Basophil # : x  Auto Neutrophil % : x  Auto Lymphocyte % : x  Auto Monocyte % : x  Auto Eosinophil % : x  Auto Basophil % : x      Ca    9.9        05 Feb 2025 06:15        Urinalysis Basic - ( 05 Feb 2025 06:15 )    Color: x / Appearance: x / SG: x / pH: x  Gluc: 127 mg/dL / Ketone: x  / Bili: x / Urobili: x   Blood: x / Protein: x / Nitrite: x   Leuk Esterase: x / RBC: x / WBC x   Sq Epi: x / Non Sq Epi: x / Bacteria: x      CAPILLARY BLOOD GLUCOSE            Urinalysis with Rflx Culture (collected 02-05-25 @ 05:00)    Culture - Blood (collected 02-03-25 @ 11:10)  Source: .Blood BLOOD  Preliminary Report (02-05-25 @ 17:01):    No growth at 48 Hours    Culture - Blood (collected 02-03-25 @ 11:05)  Source: .Blood BLOOD  Preliminary Report (02-05-25 @ 17:01):    No growth at 48 Hours        RADIOLOGY & ADDITIONAL TESTS:    Personally reviewed.     Consultant(s) Notes Reviewed:  [x] YES  [ ] NO

## 2025-02-06 NOTE — DIETITIAN INITIAL EVALUATION ADULT - ORAL INTAKE PTA/DIET HISTORY
Pt reports decreased appetite/intake over the past few months. Pt reports consuming 3 small portion meals/day.  does grocery shopping. Regular diet. Does consume protein fruit smoothies occasionally at home.

## 2025-02-06 NOTE — PROGRESS NOTE ADULT - ATTENDING COMMENTS
69F with past medical history of colon cancer in remission, rheumatoid arthritis, hypertension and COPD presents to the ER with shortness of breath found to have pneumonia. Sepsis and acute hypoxic respiratory failure 2/2 CAP and COPD exacerbation. MSRA le positive - switched to IV Vancomycin. Continue IV Rocephin, stop Azithromycin - discussed with ID Dr Mondragon. Continue IV steroids, duoneb. Taper off supplemental O2 with appropriate SpO2.      Severe sepsis present on admission - with lactic acidosis

## 2025-02-06 NOTE — PROGRESS NOTE ADULT - PROBLEM SELECTOR PLAN 7
Lovenox Lovenox    Tried calling spouse, number does not work. Tried calling emergency contact unable to reach

## 2025-02-06 NOTE — DIETITIAN INITIAL EVALUATION ADULT - PROBLEM SELECTOR PLAN 3
New rib fracture right side 6th and 7th rib  - Consulted pulmonology Dr. Dobson by ER   - Day team to consult ortho, overnight PA not picking up at this time and matter non-urgent as patient is not in pain  - Ambulate with assistance   - Fall precautions  - Patient not reporting pain at this time, tramadol 25 and 50 PRN for mild to moderate pain q6 in case pain develops

## 2025-02-06 NOTE — PROGRESS NOTE ADULT - SUBJECTIVE AND OBJECTIVE BOX
Patient is a 69y old  Female who presents with a chief complaint of Pneumonia (06 Feb 2025 14:18)      INTERVAL HPI/OVERNIGHT EVENTS:    Sitting in chair. Cough and dyspnea have improved    MEDICATIONS  (STANDING):  albuterol/ipratropium for Nebulization 3 milliLiter(s) Nebulizer every 8 hours  amLODIPine   Tablet 10 milliGRAM(s) Oral daily  cefTRIAXone   IVPB 1000 milliGRAM(s) IV Intermittent every 24 hours  cholecalciferol 2000 Unit(s) Oral daily  enoxaparin Injectable 40 milliGRAM(s) SubCutaneous every 24 hours  influenza  Vaccine (HIGH DOSE) 0.5 milliLiter(s) IntraMuscular once  lactated ringers. 1000 milliLiter(s) (75 mL/Hr) IV Continuous <Continuous>  methylPREDNISolone sodium succinate Injectable 40 milliGRAM(s) IV Push two times a day  mometasone 220 MICROgram(s) Inhaler 2 Puff(s) Inhalation daily  mupirocin 2% Nasal 1 Application(s) Both Nostrils two times a day  vancomycin  IVPB 1000 milliGRAM(s) IV Intermittent every 12 hours      MEDICATIONS  (PRN):  acetaminophen     Tablet .. 650 milliGRAM(s) Oral every 6 hours PRN Temp greater or equal to 38C (100.4F), Mild Pain (1 - 3)  ALPRAZolam 0.25 milliGRAM(s) Oral daily PRN for anxiety  aluminum hydroxide/magnesium hydroxide/simethicone Suspension 30 milliLiter(s) Oral every 4 hours PRN Dyspepsia  benzonatate 100 milliGRAM(s) Oral every 8 hours PRN Cough  ketorolac   Injectable 30 milliGRAM(s) IV Push two times a day PRN Moderate Pain (4 - 6)  melatonin 3 milliGRAM(s) Oral at bedtime PRN Insomnia  oxyCODONE    IR 2.5 milliGRAM(s) Oral every 6 hours PRN Severe Pain (7 - 10)  traMADol 25 milliGRAM(s) Oral every 6 hours PRN Mild Pain (1 - 3)      Allergies    No Known Allergies    Intolerances        PAST MEDICAL & SURGICAL HISTORY:  COPD (chronic obstructive pulmonary disease)      Rheumatoid arthritis      HTN (hypertension)      Anxiety      No significant past surgical history  colon resection  02.2024          Vital Signs Last 24 Hrs  T(C): 36.8 (06 Feb 2025 10:23), Max: 37.1 (06 Feb 2025 04:17)  T(F): 98.3 (06 Feb 2025 10:23), Max: 98.7 (06 Feb 2025 04:17)  HR: 84 (06 Feb 2025 19:31) (84 - 111)  BP: 141/71 (06 Feb 2025 10:23) (141/71 - 154/83)  BP(mean): --  RR: 18 (06 Feb 2025 10:23) (18 - 20)  SpO2: 95% (06 Feb 2025 19:31) (92% - 95%)    Parameters below as of 06 Feb 2025 19:31  Patient On (Oxygen Delivery Method): nasal cannula, 5LPM        PHYSICAL EXAMINATION:    GENERAL: The patient is awake and alert in no apparent distress.     HEENT: Head is normocephalic and atraumatic    NECK: no JVD    LUNGS: scattered bilateral rhonchi    HEART: Regular rate and rhythm without murmur.    ABDOMEN: Soft, nontender, and nondistended.      EXTREMITIES: Without any cyanosis, clubbing, rash, lesions or edema.    NEUROLOGIC: Grossly intact.    SKIN: No ulceration or induration present.      LABS:                        9.5    8.44  )-----------( 272      ( 06 Feb 2025 07:16 )             32.0     02-06    141  |  103  |  34[H]  ----------------------------<  152[H]  4.4   |  35[H]  |  0.78    Ca    10.1      06 Feb 2025 07:16  Phos  3.6     02-06  Mg     2.7     02-06    TPro  6.2  /  Alb  2.8[L]  /  TBili  0.3  /  DBili  x   /  AST  11[L]  /  ALT  12  /  AlkPhos  94  02-06      Urinalysis Basic - ( 06 Feb 2025 07:16 )    Color: x / Appearance: x / SG: x / pH: x  Gluc: 152 mg/dL / Ketone: x  / Bili: x / Urobili: x   Blood: x / Protein: x / Nitrite: x   Leuk Esterase: x / RBC: x / WBC x   Sq Epi: x / Non Sq Epi: x / Bacteria: x                      MICROBIOLOGY:  Culture Results:   No growth at 72 Hours (02-03 @ 11:10)  Culture Results:   No growth at 72 Hours (02-03 @ 11:05)        Assessment:    COPD with exacerbation  Left upper lobe pneumonia  Acute on Chronic Hypoxic respiratory failure  Rheumatoid Arthritis    Plan:    Taper steroids  Complete course of Rocephin  Supplemental oxygen  Duoneb QID  Lovenox for DVT prophylaxis     Patient is a 69y old  Female who presents with a chief complaint of Pneumonia (06 Feb 2025 14:18)      INTERVAL HPI/OVERNIGHT EVENTS:    Sitting in chair. Cough and dyspnea have improved    MEDICATIONS  (STANDING):  albuterol/ipratropium for Nebulization 3 milliLiter(s) Nebulizer every 8 hours  amLODIPine   Tablet 10 milliGRAM(s) Oral daily  cefTRIAXone   IVPB 1000 milliGRAM(s) IV Intermittent every 24 hours  cholecalciferol 2000 Unit(s) Oral daily  enoxaparin Injectable 40 milliGRAM(s) SubCutaneous every 24 hours  influenza  Vaccine (HIGH DOSE) 0.5 milliLiter(s) IntraMuscular once  lactated ringers. 1000 milliLiter(s) (75 mL/Hr) IV Continuous <Continuous>  methylPREDNISolone sodium succinate Injectable 40 milliGRAM(s) IV Push two times a day  mometasone 220 MICROgram(s) Inhaler 2 Puff(s) Inhalation daily  mupirocin 2% Nasal 1 Application(s) Both Nostrils two times a day  vancomycin  IVPB 1000 milliGRAM(s) IV Intermittent every 12 hours      MEDICATIONS  (PRN):  acetaminophen     Tablet .. 650 milliGRAM(s) Oral every 6 hours PRN Temp greater or equal to 38C (100.4F), Mild Pain (1 - 3)  ALPRAZolam 0.25 milliGRAM(s) Oral daily PRN for anxiety  aluminum hydroxide/magnesium hydroxide/simethicone Suspension 30 milliLiter(s) Oral every 4 hours PRN Dyspepsia  benzonatate 100 milliGRAM(s) Oral every 8 hours PRN Cough  ketorolac   Injectable 30 milliGRAM(s) IV Push two times a day PRN Moderate Pain (4 - 6)  melatonin 3 milliGRAM(s) Oral at bedtime PRN Insomnia  oxyCODONE    IR 2.5 milliGRAM(s) Oral every 6 hours PRN Severe Pain (7 - 10)  traMADol 25 milliGRAM(s) Oral every 6 hours PRN Mild Pain (1 - 3)      Allergies    No Known Allergies    Intolerances        PAST MEDICAL & SURGICAL HISTORY:  COPD (chronic obstructive pulmonary disease)      Rheumatoid arthritis      HTN (hypertension)      Anxiety      No significant past surgical history  colon resection  02.2024          Vital Signs Last 24 Hrs  T(C): 36.8 (06 Feb 2025 10:23), Max: 37.1 (06 Feb 2025 04:17)  T(F): 98.3 (06 Feb 2025 10:23), Max: 98.7 (06 Feb 2025 04:17)  HR: 84 (06 Feb 2025 19:31) (84 - 111)  BP: 141/71 (06 Feb 2025 10:23) (141/71 - 154/83)  BP(mean): --  RR: 18 (06 Feb 2025 10:23) (18 - 20)  SpO2: 95% (06 Feb 2025 19:31) (92% - 95%)    Parameters below as of 06 Feb 2025 19:31  Patient On (Oxygen Delivery Method): nasal cannula, 5LPM        PHYSICAL EXAMINATION:    GENERAL: The patient is awake and alert in no apparent distress.     HEENT: Head is normocephalic and atraumatic    NECK: no JVD    LUNGS: scattered bilateral rhonchi    HEART: Regular rate and rhythm without murmur.    ABDOMEN: Soft, nontender, and nondistended.      EXTREMITIES: Without any cyanosis, clubbing, rash, lesions or edema.    NEUROLOGIC: Grossly intact.    SKIN: No ulceration or induration present.      LABS:                        9.5    8.44  )-----------( 272      ( 06 Feb 2025 07:16 )             32.0     02-06    141  |  103  |  34[H]  ----------------------------<  152[H]  4.4   |  35[H]  |  0.78    Ca    10.1      06 Feb 2025 07:16  Phos  3.6     02-06  Mg     2.7     02-06    TPro  6.2  /  Alb  2.8[L]  /  TBili  0.3  /  DBili  x   /  AST  11[L]  /  ALT  12  /  AlkPhos  94  02-06      Urinalysis Basic - ( 06 Feb 2025 07:16 )    Color: x / Appearance: x / SG: x / pH: x  Gluc: 152 mg/dL / Ketone: x  / Bili: x / Urobili: x   Blood: x / Protein: x / Nitrite: x   Leuk Esterase: x / RBC: x / WBC x   Sq Epi: x / Non Sq Epi: x / Bacteria: x                      MICROBIOLOGY:  Culture Results:   No growth at 72 Hours (02-03 @ 11:10)  Culture Results:   No growth at 72 Hours (02-03 @ 11:05)        Assessment:    COPD with exacerbation  Left upper lobe pneumonia  MRSA positive  Acute on Chronic Hypoxic respiratory failure  Rheumatoid Arthritis  Hx Colon CA    Plan:    Taper steroids  Complete course of Rocephin + Vancomycin  Supplemental oxygen  Duoneb QID  Lovenox for DVT prophylaxis

## 2025-02-06 NOTE — PROGRESS NOTE ADULT - PROBLEM SELECTOR PLAN 3
Old rib fracture 2 months ago, right side 6th and 7th rib  - Consulted pulmonology Dr. Dobson by ER   - consult ortho, overnight PA not picking up at this time and matter non-urgent as patient is not in pain  - Ambulate with assistance   - Fall precautions  - starting IV toradol for moderate pain at this time, states tramadol does not work. Can consider oxy since patient was on it 3-4 weeks ago from [facility] for compression fx Old rib fracture 2 months ago, right side 6th and 7th rib  - Consulted pulmonology Dr. Dobson by ER   - consult ortho, overnight PA not picking up at this time and matter non-urgent as patient is not in pain  - Ambulate with assistance   - Fall precautions  - IV toradol did not help, started oxycodone 2.5 q6 IR, patient was on it in October 2024 for compression fx (still present on imaging) via surescripts Old rib fracture 2 months ago, right side 6th and 7th rib  - Consulted pulmonology Dr. Dobson by ER   - consult ortho, overnight PA not picking up at this time and matter non-urgent as patient is not in pain  - Ambulate with assistance   - Fall precautions  - IV toradol did not help, started oxycodone 2.5 q6 IR, patient was on it in October 2024 for compression fx (still present on imaging) via surescripts  - Starting vitamin D3

## 2025-02-06 NOTE — DIETITIAN INITIAL EVALUATION ADULT - PROBLEM SELECTOR PROBLEM 7
Fax received from [facility] regarding Pt  Pt location at [facility]: [facility]  Fax sent by: RN    Fax regarding concern: pharm review    Assessment:         Any recommendations or new orders?    
MARCE woodard  
Noted. Routed through [facility] to [facility]    
Need for prophylactic measure

## 2025-02-06 NOTE — DIETITIAN INITIAL EVALUATION ADULT - PROBLEM SELECTOR PLAN 2
History of COPD, well controlled, on home inhaler does not use due to lack of insurance. Diffuse wheezing on exam. On 4L NC  - Given duonebs in ER   - Duonebs q4h  - Zithromax + Rocephin   - Continue home medications: prednisone 10mg daily   - Supplemental O2 PRN. on 4L NC now  - Continue inhalers, patient does not use her budesonide because of lack of insurance  - ABG/VBG as needed, f/u results  - RVP negative  - Pulmonary (Dr. Dobson) consulted, f/u recs

## 2025-02-06 NOTE — PROGRESS NOTE ADULT - PROBLEM SELECTOR PLAN 5
History of rheumatoid arthritis, well controlled  - Home med 10mg prednisone holding since on solumedrol

## 2025-02-06 NOTE — DIETITIAN INITIAL EVALUATION ADULT - ADD RECOMMEND
1) Continue liberalized diet in setting of poor po intake/malnutrition dx; follow BS closely and add consistent carbohydrate restriction prn  2) Will provide in-house protein smoothie daily (~227kcal, 26gm protein per serving)  3) Recommend MVI daily and Vit C 500mg BID  4) Monitor po intake, diet tolerance, weight trends, labs, GI function, skin integrity

## 2025-02-06 NOTE — DIETITIAN INITIAL EVALUATION ADULT - OTHER INFO
Pt is a "69 year old female with past medical history of colon cancer in remission, rheumatoid arthritis, hypertension and COPD presents to the ER with shortness of breath found to have pneumonia."    Visited pt at bedside this am. Pt reports decreased appetite/intake. Picked on breakfast meal this am. PO intakes 50-75% per nursing documentation. No food allergies. No chewing/swallowing difficulties. Denies N/V. +BM 2/5. CBW on admission 149#? Pt reports UBW of 145#. Reports recent wt of 100#. Pt reports wt loss over the past year 2/2 chemo. Will continue to monitor wts. No edema noted. Skin: stage II L buttocks, stage I to R buttocks. Pt currently on DASH/TLC diet. Pt agreeable to receive in-house protein smoothie daily for additional kcal/protein. Additional food preferences obtained to optimize po intake/tolerance. Declined diet education at this time. RD remains available and will continue to follow-up.

## 2025-02-06 NOTE — PROGRESS NOTE ADULT - SUBJECTIVE AND OBJECTIVE BOX
INFECTIOUS DISEASES   [address] [place] [address]  Tel: [phone]     Fax: [phone]  ========================================================  MD Armando Scott Michelle, MD Shah, Kaushal, MD Sunjit, Jaspal, MD Sehrish Shahid, MD   ========================================================    MRN-[id number]  KENDALL TORTORELLA     Follow up: Pneumonia    Doing better, but still has cough and sputum, Still on o2 with NC.   No fever.     PAST MEDICAL & SURGICAL HISTORY:  COPD (chronic obstructive pulmonary disease)  Rheumatoid arthritis  HTN (hypertension)  Anxiety  No significant past surgical history  colon resection  02.2024    Social Hx: No current smoking, EtOH or drugs     FAMILY HISTORY:  FHx: lung cancer    Allergies  No Known Allergies    MEDICATIONS  (STANDING):  albuterol/ipratropium for Nebulization 3 milliLiter(s) Nebulizer every 4 hours  azithromycin  IVPB 500 milliGRAM(s) IV Intermittent every 24 hours  cefTRIAXone   IVPB 1000 milliGRAM(s) IV Intermittent every 24 hours    MEDICATIONS  (PRN):  traMADol 50 milliGRAM(s) Oral every 6 hours PRN Moderate Pain (4 - 6)  traMADol 25 milliGRAM(s) Oral every 6 hours PRN Mild Pain (1 - 3)     REVIEW OF SYSTEMS:  CONSTITUTIONAL:  No Fever or chills  HEENT:  No diplopia or blurred vision.  No sore throat or runny nose.  CARDIOVASCULAR:  No chest pain   RESPIRATORY:  No cough, shortness of breath, PND or orthopnea.  GASTROINTESTINAL:  No nausea, vomiting or diarrhea.  GENITOURINARY:  No dysuria, frequency or urgency. No Blood in urine  MUSCULOSKELETAL:  no joint aches, no muscle pain  SKIN:  No change in skin, hair or nails.    Physical Exam:  Vital Signs Last 24 Hrs  T(C): 36.8 (06 Feb 2025 10:23), Max: 37.1 (06 Feb 2025 04:17)  T(F): 98.3 (06 Feb 2025 10:23), Max: 98.7 (06 Feb 2025 04:17)  HR: 111 (06 Feb 2025 10:23) (100 - 111)  BP: 141/71 (06 Feb 2025 10:23) (132/68 - 154/83)  BP(mean): --  RR: 18 (06 Feb 2025 10:23) (18 - 20)  SpO2: 92% (06 Feb 2025 10:23) (92% - 95%)  Parameters below as of 06 Feb 2025 10:23  Patient On (Oxygen Delivery Method): nasal cannula  O2 Flow (L/min): 4  GEN: NAD  HEENT: normocephalic and atraumatic. EOMI. PERRL.    NECK: Supple.  No lymphadenopathy   LUNGS: Crackles in left lung  HEART: Regular rate and rhythm   ABDOMEN: Soft, nontender, and nondistended.   EXTREMITIES: Without edema, joints with ulnar deviation and RA changes   NEUROLOGIC: grossly intact.  PSYCHIATRIC: Appropriate affect .  SKIN: No rash     Labs:                        9.5    8.44  )-----------( 272      ( 06 Feb 2025 07:16 )             32.0     02-06    141  |  103  |  34[H]  ----------------------------<  152[H]  4.4   |  35[H]  |  0.78    Ca    10.1      06 Feb 2025 07:16  Phos  3.6     02-06  Mg     2.7     02-06    TPro  6.2  /  Alb  2.8[L]  /  TBili  0.3  /  DBili  x   /  AST  11[L]  /  ALT  12  /  AlkPhos  94  02-06    Urinalysis with Rflx Culture (collected 02-05-25 @ 05:00)    Culture - Blood (collected 02-03-25 @ 11:10)  Source: .Blood BLOOD  Preliminary Report (02-05-25 @ 17:01):    No growth at 48 Hours    Culture - Blood (collected 02-03-25 @ 11:05)  Source: .Blood BLOOD  Preliminary Report (02-05-25 @ 17:01):    No growth at 48 Hours    Culture - Urine (collected 02-24-24 @ 21:00)  Source: Clean Catch Clean Catch (Midstream)  Final Report (02-27-24 @ 20:41):    >100,000 CFU/ml Escherichia coli    >100,000 CFU/ml Enterococcus faecalis  Organism: Escherichia coli  Enterococcus faecalis (02-27-24 @ 20:41)  Organism: Enterococcus faecalis (02-27-24 @ 20:41)    Sensitivities:      Method Type: JULIA      -  Ampicillin: S <=2 Predicts results to ampicillin/sulbactam, amoxacillin-clavulanate and  piperacillin-tazobactam.      -  Ciprofloxacin: S <=1      -  Levofloxacin: S 1      -  Nitrofurantoin: S <=32 Should not be used to treat pyelonephritis.      -  Tetracycline: S <=1      -  Vancomycin: S 1  Organism: Escherichia coli (02-27-24 @ 20:41)    Sensitivities:      Method Type: JULIA      -  Amoxicillin/Clavulanic Acid: I 16/8      -  Ampicillin: R >16 These ampicillin results predict results for amoxicillin      -  Ampicillin/Sulbactam: R >16/8      -  Aztreonam: S <=4      -  Cefazolin: S 4 For uncomplicated UTI with K. pneumoniae, E. coli, or P. mirablis: JULIA <=16 is sensitive and JULIA >=32 is resistant. This also predicts results for oral agents cefaclor, cefdinir, cefpodoxime, cefprozil, cefuroxime axetil, cephalexin and locarbef for uncomplicated UTI. Note that some isolates may be susceptible to these agents while testing resistant to cefazolin.      -  Cefepime: S <=2      -  Cefoxitin: S <=8      -  Ceftriaxone: S <=1      -  Cefuroxime: S <=4      -  Ciprofloxacin: S <=0.25      -  Ertapenem: S <=0.5      -  Gentamicin: S <=2      -  Imipenem: S <=1      -  Levofloxacin: S <=0.5      -  Meropenem: S <=1      -  Nitrofurantoin: S <=32 Should not be used to treat pyelonephritis      -  Piperacillin/Tazobactam: S <=8      -  Tobramycin: S <=2      -  Trimethoprim/Sulfamethoxazole: R >2/38    Culture - Blood (collected 02-24-24 @ 11:05)  Source: .Blood Blood-Peripheral  Final Report (02-29-24 @ 16:01):    No growth at 5 days    Culture - Blood (collected 02-24-24 @ 09:45)  Source: .Blood Blood-Peripheral  Final Report (02-29-24 @ 16:01):    No growth at 5 days    WBC Count: 8.44 K/uL (02-06-25 @ 07:16)  WBC Count: 11.60 K/uL (02-05-25 @ 06:15)  WBC Count: 14.45 K/uL (02-04-25 @ 05:40)  WBC Count: 19.92 K/uL (02-03-25 @ 11:30)    Creatinine: 0.78 mg/dL (02-06-25 @ 07:16)  Creatinine: 0.67 mg/dL (02-05-25 @ 06:15)  Creatinine: 0.86 mg/dL (02-04-25 @ 05:40)  Creatinine: 0.95 mg/dL (02-03-25 @ 11:30)    SARS-CoV-2 Result: NotDetec (02-03-25 @ 11:30)    All imaging and other data have been reviewed.  < from: CT Angio Chest PE Protocol w/ IV Cont (02.03.25 @ 12:45) >  IMPRESSION:  No acute pulmonary embolus  Acute left sixth and seventh rib fractures as described. No pneumothorax  Left upper lobe mixed airspace and interstitial infiltrate compatible   with pneumonia  Complete lingular atelectasis    Assessment and Plan:   70 yo woman with PMH of HTN, COPD, rheumatoid arthritis, and colon cancer (now in remission), presents to the ER with shortness of breath.   She states that for the past 3 days she has had felt sick, coughing and congestion.   In ED labs showed leukocytosis of 19 and CT with FEROZ pneumonia    # PNA    - Blood cultures NGTD   - WBC is going down 19-->8, no fever   - Continue ceftriaxone   - Stop azithromycin   - Start vancomycin   - MRSA PCR is positive   - Follow Legionella Ag  - Pulmonary follow up   - Can start mupirocin for nares     Will follow.    Lenora Mondragon MD  Division of Infectious Diseases   Please call ID service at [phone] with any question.    50 Minutes spent on total encounter assessing patient, examination, chart review, counseling and coordinating care by the attending physician/nurse/care manager.

## 2025-02-06 NOTE — DIETITIAN INITIAL EVALUATION ADULT - PERTINENT MEDS FT
MEDICATIONS  (STANDING):  albuterol/ipratropium for Nebulization 3 milliLiter(s) Nebulizer every 8 hours  amLODIPine   Tablet 10 milliGRAM(s) Oral daily  cefTRIAXone   IVPB 1000 milliGRAM(s) IV Intermittent every 24 hours  cholecalciferol 2000 Unit(s) Oral daily  enoxaparin Injectable 40 milliGRAM(s) SubCutaneous every 24 hours  influenza  Vaccine (HIGH DOSE) 0.5 milliLiter(s) IntraMuscular once  lactated ringers. 1000 milliLiter(s) (75 mL/Hr) IV Continuous <Continuous>  methylPREDNISolone sodium succinate Injectable 40 milliGRAM(s) IV Push two times a day  mometasone 220 MICROgram(s) Inhaler 2 Puff(s) Inhalation daily  vancomycin  IVPB 1250 milliGRAM(s) IV Intermittent every 12 hours    MEDICATIONS  (PRN):  acetaminophen     Tablet .. 650 milliGRAM(s) Oral every 6 hours PRN Temp greater or equal to 38C (100.4F), Mild Pain (1 - 3)  ALPRAZolam 0.25 milliGRAM(s) Oral daily PRN for anxiety  aluminum hydroxide/magnesium hydroxide/simethicone Suspension 30 milliLiter(s) Oral every 4 hours PRN Dyspepsia  benzonatate 100 milliGRAM(s) Oral every 8 hours PRN Cough  guaiFENesin Oral Liquid (Sugar-Free) 200 milliGRAM(s) Oral every 6 hours PRN Cough  ketorolac   Injectable 30 milliGRAM(s) IV Push two times a day PRN Moderate Pain (4 - 6)  melatonin 3 milliGRAM(s) Oral at bedtime PRN Insomnia  oxyCODONE    IR 2.5 milliGRAM(s) Oral every 6 hours PRN Severe Pain (7 - 10)  traMADol 25 milliGRAM(s) Oral every 6 hours PRN Mild Pain (1 - 3)

## 2025-02-06 NOTE — DIETITIAN INITIAL EVALUATION ADULT - SIGNS/SYMPTOMS
as evidenced by <75% of EER >1 month, NFPE findings- moderate muscle depletion/fat loss.  as evidenced by stage II pressure ulcer to L buttocks.

## 2025-02-06 NOTE — PROGRESS NOTE ADULT - PROBLEM SELECTOR PLAN 1
Meets sepsis criteria of HR >90, source of infection present, lactate 2.3  - Patient on 4L NC due to hypoxia  - Leukocytosis improved, afebrile  - F/u BCx2 and UA/UC  - F/u urine strept and legionella  - Tylenol prn for fever, albuterol prn for SOB/wheezing, tessalon perles for cough   - COVID19 pcr negative  - Trend fever curve and WBC  - Infectious disease Dr. Mondragon consult noted  - Pulmonology Dr. Dobson following  - Continue on rocephin and azithro, adjust per ID recs  - On 5L NC, wean as tolerated  - Started on IV solumedrol Meets sepsis criteria of HR >90, source of infection present, lactate 2.3  - Patient on 4L NC due to hypoxia  - Leukocytosis improved, afebrile  - F/u BCx2 and UA/UC  - F/u urine strept and legionella  - Tylenol prn for fever, albuterol prn for SOB/wheezing, tessalon perles for cough   - Infectious disease Dr. Mondragon consult noted  - Pulmonology Dr. Dobson following  - C/w rocephin  - On 5L NC, wean as tolerated  -C/w IV solumedrol  - MRSA +, stopped azithro started vanco per ID recs Meets sepsis criteria of HR >90, source of infection present, lactate 2.3  - Patient on 4L NC due to hypoxia  - Leukocytosis improved, afebrile  - F/u BCx2 and UA/UC  - F/u urine strept and legionella  - Tylenol prn for fever, albuterol prn for SOB/wheezing, tessalon perles for cough   - Infectious disease Dr. Mondragon consult noted  - Pulmonology Dr. Dobson following  - C/w rocephin  - On 5L NC, wean as tolerated  -C/w IV solumedrol  - MRSA +, stopped azithro started vanco per ID recs  - Starting LR

## 2025-02-07 ENCOUNTER — TRANSCRIPTION ENCOUNTER (OUTPATIENT)
Age: 70
End: 2025-02-07

## 2025-02-07 LAB
ALBUMIN SERPL ELPH-MCNC: 2.7 G/DL — LOW (ref 3.3–5)
ALP SERPL-CCNC: 88 U/L — SIGNIFICANT CHANGE UP (ref 40–120)
ALT FLD-CCNC: 14 U/L — SIGNIFICANT CHANGE UP (ref 12–78)
ANION GAP SERPL CALC-SCNC: 8 MMOL/L — SIGNIFICANT CHANGE UP (ref 5–17)
AST SERPL-CCNC: 11 U/L — LOW (ref 15–37)
BASOPHILS # BLD AUTO: 0 K/UL — SIGNIFICANT CHANGE UP (ref 0–0.2)
BASOPHILS NFR BLD AUTO: 0 % — SIGNIFICANT CHANGE UP (ref 0–2)
BILIRUB SERPL-MCNC: 0.2 MG/DL — SIGNIFICANT CHANGE UP (ref 0.2–1.2)
BUN SERPL-MCNC: 31 MG/DL — HIGH (ref 7–23)
CALCIUM SERPL-MCNC: 9.8 MG/DL — SIGNIFICANT CHANGE UP (ref 8.5–10.1)
CHLORIDE SERPL-SCNC: 101 MMOL/L — SIGNIFICANT CHANGE UP (ref 96–108)
CO2 SERPL-SCNC: 33 MMOL/L — HIGH (ref 22–31)
CREAT SERPL-MCNC: 0.67 MG/DL — SIGNIFICANT CHANGE UP (ref 0.5–1.3)
EGFR: 95 ML/MIN/1.73M2 — SIGNIFICANT CHANGE UP
EOSINOPHIL # BLD AUTO: 0 K/UL — SIGNIFICANT CHANGE UP (ref 0–0.5)
EOSINOPHIL NFR BLD AUTO: 0 % — SIGNIFICANT CHANGE UP (ref 0–6)
GLUCOSE SERPL-MCNC: 141 MG/DL — HIGH (ref 70–99)
HCT VFR BLD CALC: 30.2 % — LOW (ref 34.5–45)
HGB BLD-MCNC: 9 G/DL — LOW (ref 11.5–15.5)
LYMPHOCYTES # BLD AUTO: 0.43 K/UL — LOW (ref 1–3.3)
LYMPHOCYTES # BLD AUTO: 6 % — LOW (ref 13–44)
MAGNESIUM SERPL-MCNC: 2.3 MG/DL — SIGNIFICANT CHANGE UP (ref 1.6–2.6)
MCHC RBC-ENTMCNC: 23.8 PG — LOW (ref 27–34)
MCHC RBC-ENTMCNC: 29.8 G/DL — LOW (ref 32–36)
MCV RBC AUTO: 79.9 FL — LOW (ref 80–100)
MONOCYTES # BLD AUTO: 0.29 K/UL — SIGNIFICANT CHANGE UP (ref 0–0.9)
MONOCYTES NFR BLD AUTO: 4 % — SIGNIFICANT CHANGE UP (ref 2–14)
NEUTROPHILS # BLD AUTO: 6.1 K/UL — SIGNIFICANT CHANGE UP (ref 1.8–7.4)
NEUTROPHILS NFR BLD AUTO: 84 % — HIGH (ref 43–77)
NRBC # BLD: SIGNIFICANT CHANGE UP /100 WBCS (ref 0–0)
NRBC BLD-RTO: SIGNIFICANT CHANGE UP /100 WBCS (ref 0–0)
PLATELET # BLD AUTO: 272 K/UL — SIGNIFICANT CHANGE UP (ref 150–400)
POTASSIUM SERPL-MCNC: 4.7 MMOL/L — SIGNIFICANT CHANGE UP (ref 3.5–5.3)
POTASSIUM SERPL-SCNC: 4.7 MMOL/L — SIGNIFICANT CHANGE UP (ref 3.5–5.3)
PROT SERPL-MCNC: 6.2 G/DL — SIGNIFICANT CHANGE UP (ref 6–8.3)
RBC # BLD: 3.78 M/UL — LOW (ref 3.8–5.2)
RBC # FLD: 16.4 % — HIGH (ref 10.3–14.5)
SODIUM SERPL-SCNC: 142 MMOL/L — SIGNIFICANT CHANGE UP (ref 135–145)
VANCOMYCIN TROUGH SERPL-MCNC: 9 UG/ML — LOW (ref 10–20)
WBC # BLD: 7.18 K/UL — SIGNIFICANT CHANGE UP (ref 3.8–10.5)
WBC # FLD AUTO: 7.18 K/UL — SIGNIFICANT CHANGE UP (ref 3.8–10.5)

## 2025-02-07 PROCEDURE — 99233 SBSQ HOSP IP/OBS HIGH 50: CPT | Mod: GC

## 2025-02-07 RX ADMIN — OXYCODONE HYDROCHLORIDE 2.5 MILLIGRAM(S): 30 TABLET ORAL at 21:55

## 2025-02-07 RX ADMIN — IPRATROPIUM BROMIDE AND ALBUTEROL SULFATE 3 MILLILITER(S): .5; 2.5 SOLUTION RESPIRATORY (INHALATION) at 13:32

## 2025-02-07 RX ADMIN — SODIUM CHLORIDE 75 MILLILITER(S): 9 INJECTION, SOLUTION INTRAVENOUS at 04:32

## 2025-02-07 RX ADMIN — ENOXAPARIN SODIUM 40 MILLIGRAM(S): 100 INJECTION SUBCUTANEOUS at 17:49

## 2025-02-07 RX ADMIN — CEFTRIAXONE 100 MILLIGRAM(S): 250 INJECTION, POWDER, FOR SOLUTION INTRAMUSCULAR; INTRAVENOUS at 11:24

## 2025-02-07 RX ADMIN — FLUTICASONE PROPIONATE AND SALMETEROL 1 DOSE(S): 113; 14 POWDER, METERED RESPIRATORY (INHALATION) at 18:42

## 2025-02-07 RX ADMIN — Medication 10 MILLIGRAM(S): at 05:16

## 2025-02-07 RX ADMIN — VANCOMYCIN HYDROCHLORIDE 250 MILLIGRAM(S): KIT at 06:47

## 2025-02-07 RX ADMIN — FLUTICASONE PROPIONATE AND SALMETEROL 1 DOSE(S): 113; 14 POWDER, METERED RESPIRATORY (INHALATION) at 08:54

## 2025-02-07 RX ADMIN — Medication 100 MILLIGRAM(S): at 14:10

## 2025-02-07 RX ADMIN — MUPIROCIN 1 APPLICATION(S): 2 CREAM TOPICAL at 17:49

## 2025-02-07 RX ADMIN — Medication 100 MILLIGRAM(S): at 21:55

## 2025-02-07 RX ADMIN — MUPIROCIN 1 APPLICATION(S): 2 CREAM TOPICAL at 05:16

## 2025-02-07 RX ADMIN — IPRATROPIUM BROMIDE AND ALBUTEROL SULFATE 3 MILLILITER(S): .5; 2.5 SOLUTION RESPIRATORY (INHALATION) at 07:40

## 2025-02-07 RX ADMIN — Medication 600 MILLIGRAM(S): at 17:49

## 2025-02-07 RX ADMIN — Medication 2000 UNIT(S): at 11:24

## 2025-02-07 RX ADMIN — Medication 0.25 MILLIGRAM(S): at 06:50

## 2025-02-07 RX ADMIN — VANCOMYCIN HYDROCHLORIDE 250 MILLIGRAM(S): KIT at 17:49

## 2025-02-07 RX ADMIN — Medication 40 MILLIGRAM(S): at 05:16

## 2025-02-07 NOTE — DISCHARGE NOTE NURSING/CASE MANAGEMENT/SOCIAL WORK - FINANCIAL ASSISTANCE
[facility] provides services at a reduced cost to those who are determined to be eligible through [facility]’s financial assistance program. Information regarding [facility]’s financial assistance program can be found by going to [email or web address] or by calling [phone].

## 2025-02-07 NOTE — PROGRESS NOTE ADULT - SUBJECTIVE AND OBJECTIVE BOX
Patient is a 69y old  Female who presents with a chief complaint of Pneumonia (06 Feb 2025 20:35)    INTERVAL HPI/OVERNIGHT EVENTS: Patient seen and examined at bedside. No overnight events occurred. Now on 4LNC, states her breathing feels slightly improved. On IV solumedrol taper, home Advair inhaler resumed. She reports needing to temporarily go home on oxygen when she has been hospitalized in the past and usually comes off it. Denies fevers, chills, headache, lightheadedness, chest pain, abdominal pain, n/v/d/c.    MEDICATIONS  (STANDING):  albuterol/ipratropium for Nebulization 3 milliLiter(s) Nebulizer every 8 hours  amLODIPine   Tablet 10 milliGRAM(s) Oral daily  cefTRIAXone   IVPB 1000 milliGRAM(s) IV Intermittent every 24 hours  cholecalciferol 2000 Unit(s) Oral daily  enoxaparin Injectable 40 milliGRAM(s) SubCutaneous every 24 hours  fluticasone propionate/ salmeterol 250-50 MICROgram(s) Diskus 1 Dose(s) Inhalation two times a day  influenza  Vaccine (HIGH DOSE) 0.5 milliLiter(s) IntraMuscular once  lactated ringers. 1000 milliLiter(s) (75 mL/Hr) IV Continuous <Continuous>  methylPREDNISolone sodium succinate Injectable 40 milliGRAM(s) IV Push daily  mupirocin 2% Nasal 1 Application(s) Both Nostrils two times a day  vancomycin  IVPB 1000 milliGRAM(s) IV Intermittent every 12 hours    MEDICATIONS  (PRN):  acetaminophen     Tablet .. 650 milliGRAM(s) Oral every 6 hours PRN Temp greater or equal to 38C (100.4F), Mild Pain (1 - 3)  ALPRAZolam 0.25 milliGRAM(s) Oral daily PRN for anxiety  aluminum hydroxide/magnesium hydroxide/simethicone Suspension 30 milliLiter(s) Oral every 4 hours PRN Dyspepsia  benzonatate 100 milliGRAM(s) Oral every 8 hours PRN Cough  ketorolac   Injectable 30 milliGRAM(s) IV Push two times a day PRN Moderate Pain (4 - 6)  melatonin 3 milliGRAM(s) Oral at bedtime PRN Insomnia  oxyCODONE    IR 2.5 milliGRAM(s) Oral every 6 hours PRN Severe Pain (7 - 10)  traMADol 25 milliGRAM(s) Oral every 6 hours PRN Mild Pain (1 - 3)      Allergies    No Known Allergies    Intolerances        REVIEW OF SYSTEMS:  CONSTITUTIONAL: No fever or chills  HEENT:  No headache, no sore throat  RESPIRATORY: +cugh, wheezing, and dyspnea  CARDIOVASCULAR: No chest pain, palpitations  GASTROINTESTINAL: No abd pain, nausea, vomiting, or diarrhea  GENITOURINARY: No dysuria, frequency, or hematuria  NEUROLOGICAL: no focal weakness or dizziness  MUSCULOSKELETAL: no myalgias     Vital Signs Last 24 Hrs  T(C): 36.7 (07 Feb 2025 11:36), Max: 36.8 (07 Feb 2025 04:32)  T(F): 98.1 (07 Feb 2025 11:36), Max: 98.2 (07 Feb 2025 04:32)  HR: 110 (07 Feb 2025 11:36) (75 - 110)  BP: 151/72 (07 Feb 2025 11:36) (151/72 - 163/88)  BP(mean): --  RR: 20 (07 Feb 2025 11:36) (18 - 20)  SpO2: 92% (07 Feb 2025 11:36) (92% - 97%)    Parameters below as of 07 Feb 2025 11:36  Patient On (Oxygen Delivery Method): nasal cannula  O2 Flow (L/min): 4      PHYSICAL EXAM:  GENERAL: NAD  HEENT:  anicteric, moist mucous membranes  CHEST/LUNG:  +b/l rhonchi, no rales or wheezing noted  HEART:  RRR, S1, S2, no murmurs/rubs/gallops  ABDOMEN:  BS+, soft, nontender, nondistended  EXTREMITIES: no edema, cyanosis, or calf tenderness  NERVOUS SYSTEM: answers questions and follows commands appropriately    LABS:                        9.0    7.18  )-----------( 272      ( 07 Feb 2025 05:00 )             30.2     CBC Full  -  ( 07 Feb 2025 05:00 )  WBC Count : 7.18 K/uL  Hemoglobin : 9.0 g/dL  Hematocrit : 30.2 %  Platelet Count - Automated : 272 K/uL  Mean Cell Volume : 79.9 fl  Mean Cell Hemoglobin : 23.8 pg  Mean Cell Hemoglobin Concentration : 29.8 g/dL  Auto Neutrophil # : 6.10 K/uL  Auto Lymphocyte # : 0.43 K/uL  Auto Monocyte # : 0.29 K/uL  Auto Eosinophil # : 0.00 K/uL  Auto Basophil # : 0.00 K/uL  Auto Neutrophil % : 84.0 %  Auto Lymphocyte % : 6.0 %  Auto Monocyte % : 4.0 %  Auto Eosinophil % : 0.0 %  Auto Basophil % : 0.0 %    07 Feb 2025 05:00    142    |  101    |  31     ----------------------------<  141    4.7     |  33     |  0.67     Ca    9.8        07 Feb 2025 05:00  Mg     2.3       07 Feb 2025 05:00    TPro  6.2    /  Alb  2.7    /  TBili  0.2    /  DBili  x      /  AST  11     /  ALT  14     /  AlkPhos  88     07 Feb 2025 05:00      Urinalysis Basic - ( 07 Feb 2025 05:00 )    Color: x / Appearance: x / SG: x / pH: x  Gluc: 141 mg/dL / Ketone: x  / Bili: x / Urobili: x   Blood: x / Protein: x / Nitrite: x   Leuk Esterase: x / RBC: x / WBC x   Sq Epi: x / Non Sq Epi: x / Bacteria: x      CAPILLARY BLOOD GLUCOSE            Culture - Sputum (collected 02-06-25 @ 05:19)  Source: .Sputum Sputum  Gram Stain (02-06-25 @ 21:38):    No polymorphonuclear leukocytes per low power field    Rare Squamous epithelial cells per low power field    Rare Yeast like cells per oil power field    Culture - Urine (collected 02-05-25 @ 05:00)  Source: Clean Catch  Preliminary Report (02-06-25 @ 22:18):    50,000 - 99,000 CFU/mL Enterococcus faecalis    Urinalysis with Rflx Culture (collected 02-05-25 @ 05:00)    Culture - Blood (collected 02-03-25 @ 11:10)  Source: .Blood BLOOD  Preliminary Report (02-06-25 @ 17:01):    No growth at 72 Hours    Culture - Blood (collected 02-03-25 @ 11:05)  Source: .Blood BLOOD  Preliminary Report (02-06-25 @ 17:01):    No growth at 72 Hours        RADIOLOGY & ADDITIONAL TESTS: No new imaging    Personally reviewed.     Consultant(s) Notes Reviewed:  [x] YES  [ ] NO

## 2025-02-07 NOTE — DISCHARGE NOTE NURSING/CASE MANAGEMENT/SOCIAL WORK - NSSCTYPOFSERV_GEN_ALL_CORE
Visiting Nurse/Physical Therapy/Occupational Therapy - you should receive a call within 24-48 hours to schedule the first visit. If you have not received a call please contact the agency at the number listed above.

## 2025-02-07 NOTE — PROGRESS NOTE ADULT - PROBLEM SELECTOR PLAN 2
History of COPD, well controlled, on home inhaler does not use due to lack of insurance. Diffuse wheezing on exam. On 4L NC  - Duonebs q8h  - Advair BID  - on prednisone 10mg daily at home, currently on IV Solumedrol taper  - Supplemental O2 PRN. on 4L NC now  - Continue inhalers  - RVP negative  - Pulm Dr. Dobson following  - PRN alba iraheta

## 2025-02-07 NOTE — DISCHARGE NOTE NURSING/CASE MANAGEMENT/SOCIAL WORK - PATIENT PORTAL LINK FT
You can access the FollowMyHealth Patient Portal offered by [facility] by registering at the following website: [email or web address]. By joining [employer]’s FollowMyHealth portal, you will also be able to view your health information using other applications (apps) compatible with our system.

## 2025-02-07 NOTE — CASE MANAGEMENT PROGRESS NOTE - NSCMPROGRESSNOTE_GEN_ALL_CORE
Discussed patient on rounds this morning and spoke to MD. Per MD, no weekend discharge anticipated. Patient remains acute on IV Vanco, IV Rocephin, IV Solumedrol and is on 4LNC. CM remains available.  Discussed patient on rounds this morning and spoke to MD. Per MD, possible discharge on Sunday 2/9/25. Patient remains acute on IV Vanco, IV Rocephin, IV Solumedrol and is on 4LNC. CM met with the patient at the bedside to discuss transition planning. Patient declining MELISSA. Patient confirmed she has home O2 stationary only- referral sent to [employer] to inquire on portable oxygen. Patient stated she has an old RW. Patient requesting a wheelchair and RW. PT recommended transport wheelchair. CM explained that insurance will not cover both, however will send for eligibility. CM advised that if insurance only covers one that she can use her old RW or purchase a new one out of pocket and CM can order a transport wheelchair. Patient is in agreement. Referral sent to [employer] for a transport wheelchair with request for delivery to the home upon approval. CM discussed home care services for a visiting nurse/PT. Home care choice list provided. Patient is in agreement for CM to send a referral to [facility]. Referral sent to [facility] for SOC 2/10/25. Family to transport the patient home. Weekend CM remains available.

## 2025-02-07 NOTE — PROGRESS NOTE ADULT - PROBLEM SELECTOR PLAN 3
Old rib fracture 2 months ago, right side 6th and 7th rib  - Ambulate with assistance   - Fall precautions  - IV toradol did not help, started oxycodone 2.5 q6 IR, patient was on it in October 2024 for compression fx (still present on imaging) via surescripts  - continue vitamin D3

## 2025-02-07 NOTE — PROGRESS NOTE ADULT - ATTENDING COMMENTS
69F with past medical history of colon cancer in remission, rheumatoid arthritis, hypertension and COPD presents to the ER with shortness of breath found to have pneumonia. Severe sepsis (POA) with lactic acidosis and acute hypoxic respiratory failure 2/2 PNA (MRSA PNA) and COPD exacerbation. MSRA nares positive - likely MRSA PNA. Continue IV Vancomycin and Rocephin. Vanco trough low - discussed with pharmacy will repeat trough in AM pre-4th trough and adjust dose based on that as 9 was after 1 dose. Discussed with ID Dr Mondragon switch to PO Linezolid or Bactrim to complete 7-10 days at dc. Continue IV steroids, duoneb - taper per pulm. Will need home O2 - patient in agreement. Declining MELISSA at this time. Currently on 4L NC - taper to lower dose as tolerated. Started on mucinex and tesselon perrle per patient request. Discussed with son at bedside, aware and in agreement with above.

## 2025-02-07 NOTE — PROGRESS NOTE ADULT - PROBLEM SELECTOR PLAN 1
- Patient on 4L NC due to hypoxia, wean as tolerated  - Leukocytosis improved, afebrile  - BCx NGTD, UCx + E faecalis, sputum cx negative  - MRSA/MSSA +  - C/w Rocephin (Day 5) and Vancomycin (Day 2), bactroban BID x5 days for MSSA  - Urine strep and legionella negative, azithro discontinued  - C/w IV solumedrol taper  - Tylenol prn for fever, albuterol prn for SOB/wheezing, tessalon perles for cough   - Infectious disease Dr. Mondragon consult noted  - Pulmonology Dr. Dobson following

## 2025-02-08 LAB
ANION GAP SERPL CALC-SCNC: 3 MMOL/L — LOW (ref 5–17)
BASOPHILS # BLD AUTO: 0.07 K/UL — SIGNIFICANT CHANGE UP (ref 0–0.2)
BASOPHILS NFR BLD AUTO: 0.7 % — SIGNIFICANT CHANGE UP (ref 0–2)
BUN SERPL-MCNC: 24 MG/DL — HIGH (ref 7–23)
CALCIUM SERPL-MCNC: 10.6 MG/DL — HIGH (ref 8.5–10.1)
CHLORIDE SERPL-SCNC: 99 MMOL/L — SIGNIFICANT CHANGE UP (ref 96–108)
CO2 SERPL-SCNC: 36 MMOL/L — HIGH (ref 22–31)
CREAT SERPL-MCNC: 0.61 MG/DL — SIGNIFICANT CHANGE UP (ref 0.5–1.3)
CULTURE RESULTS: ABNORMAL
CULTURE RESULTS: SIGNIFICANT CHANGE UP
CULTURE RESULTS: SIGNIFICANT CHANGE UP
EGFR: 97 ML/MIN/1.73M2 — SIGNIFICANT CHANGE UP
EOSINOPHIL # BLD AUTO: 0 K/UL — SIGNIFICANT CHANGE UP (ref 0–0.5)
EOSINOPHIL NFR BLD AUTO: 0 % — SIGNIFICANT CHANGE UP (ref 0–6)
GLUCOSE SERPL-MCNC: 114 MG/DL — HIGH (ref 70–99)
HCT VFR BLD CALC: 31.9 % — LOW (ref 34.5–45)
HGB BLD-MCNC: 9.5 G/DL — LOW (ref 11.5–15.5)
IMM GRANULOCYTES NFR BLD AUTO: 7.5 % — HIGH (ref 0–0.9)
LYMPHOCYTES # BLD AUTO: 0.65 K/UL — LOW (ref 1–3.3)
LYMPHOCYTES # BLD AUTO: 6.6 % — LOW (ref 13–44)
MCHC RBC-ENTMCNC: 23.8 PG — LOW (ref 27–34)
MCHC RBC-ENTMCNC: 29.8 G/DL — LOW (ref 32–36)
MCV RBC AUTO: 79.8 FL — LOW (ref 80–100)
MONOCYTES # BLD AUTO: 0.64 K/UL — SIGNIFICANT CHANGE UP (ref 0–0.9)
MONOCYTES NFR BLD AUTO: 6.5 % — SIGNIFICANT CHANGE UP (ref 2–14)
NEUTROPHILS # BLD AUTO: 7.72 K/UL — HIGH (ref 1.8–7.4)
NEUTROPHILS NFR BLD AUTO: 78.7 % — HIGH (ref 43–77)
NRBC # BLD: 0 /100 WBCS — SIGNIFICANT CHANGE UP (ref 0–0)
NRBC BLD-RTO: 0 /100 WBCS — SIGNIFICANT CHANGE UP (ref 0–0)
PLATELET # BLD AUTO: 288 K/UL — SIGNIFICANT CHANGE UP (ref 150–400)
POTASSIUM SERPL-MCNC: 4.5 MMOL/L — SIGNIFICANT CHANGE UP (ref 3.5–5.3)
POTASSIUM SERPL-SCNC: 4.5 MMOL/L — SIGNIFICANT CHANGE UP (ref 3.5–5.3)
RBC # BLD: 4 M/UL — SIGNIFICANT CHANGE UP (ref 3.8–5.2)
RBC # FLD: 16.4 % — HIGH (ref 10.3–14.5)
SODIUM SERPL-SCNC: 138 MMOL/L — SIGNIFICANT CHANGE UP (ref 135–145)
SPECIMEN SOURCE: SIGNIFICANT CHANGE UP
VANCOMYCIN TROUGH SERPL-MCNC: 20.9 UG/ML — HIGH (ref 10–20)
VANCOMYCIN TROUGH SERPL-MCNC: 50 UG/ML — CRITICAL HIGH (ref 10–20)
WBC # BLD: 9.82 K/UL — SIGNIFICANT CHANGE UP (ref 3.8–10.5)
WBC # FLD AUTO: 9.82 K/UL — SIGNIFICANT CHANGE UP (ref 3.8–10.5)

## 2025-02-08 PROCEDURE — 99233 SBSQ HOSP IP/OBS HIGH 50: CPT | Mod: GC

## 2025-02-08 RX ORDER — PREDNISONE 5 MG/1
40 TABLET ORAL DAILY
Refills: 0 | Status: DISCONTINUED | OUTPATIENT
Start: 2025-02-09 | End: 2025-02-10

## 2025-02-08 RX ORDER — VANCOMYCIN HYDROCHLORIDE 50 MG/ML
750 KIT ORAL EVERY 12 HOURS
Refills: 0 | Status: DISCONTINUED | OUTPATIENT
Start: 2025-02-08 | End: 2025-02-10

## 2025-02-08 RX ORDER — ALBUTEROL 90 MCG
2 AEROSOL REFILL (GRAM) INHALATION EVERY 6 HOURS
Refills: 0 | Status: DISCONTINUED | OUTPATIENT
Start: 2025-02-08 | End: 2025-02-10

## 2025-02-08 RX ADMIN — Medication 10 MILLIGRAM(S): at 05:33

## 2025-02-08 RX ADMIN — Medication 100 MILLIGRAM(S): at 05:33

## 2025-02-08 RX ADMIN — Medication 100 MILLIGRAM(S): at 14:12

## 2025-02-08 RX ADMIN — Medication 0.25 MILLIGRAM(S): at 10:06

## 2025-02-08 RX ADMIN — Medication 2000 UNIT(S): at 11:59

## 2025-02-08 RX ADMIN — Medication 600 MILLIGRAM(S): at 05:32

## 2025-02-08 RX ADMIN — VANCOMYCIN HYDROCHLORIDE 250 MILLIGRAM(S): KIT at 18:20

## 2025-02-08 RX ADMIN — ENOXAPARIN SODIUM 40 MILLIGRAM(S): 100 INJECTION SUBCUTANEOUS at 17:41

## 2025-02-08 RX ADMIN — CEFTRIAXONE 100 MILLIGRAM(S): 250 INJECTION, POWDER, FOR SOLUTION INTRAMUSCULAR; INTRAVENOUS at 11:59

## 2025-02-08 RX ADMIN — FLUTICASONE PROPIONATE AND SALMETEROL 1 DOSE(S): 113; 14 POWDER, METERED RESPIRATORY (INHALATION) at 09:03

## 2025-02-08 RX ADMIN — Medication 1200 MILLIGRAM(S): at 17:41

## 2025-02-08 RX ADMIN — Medication 40 MILLIGRAM(S): at 05:33

## 2025-02-08 RX ADMIN — MUPIROCIN 1 APPLICATION(S): 2 CREAM TOPICAL at 17:42

## 2025-02-08 RX ADMIN — VANCOMYCIN HYDROCHLORIDE 250 MILLIGRAM(S): KIT at 05:33

## 2025-02-08 RX ADMIN — MUPIROCIN 1 APPLICATION(S): 2 CREAM TOPICAL at 05:32

## 2025-02-08 NOTE — PROGRESS NOTE ADULT - SUBJECTIVE AND OBJECTIVE BOX
Patient is a 69y old  Female who presents with a chief complaint of Pneumonia (08 Feb 2025 09:06)      INTERVAL HPI/OVERNIGHT EVENTS:    Admits to cough but no sputum production. Claims that nebulizer treatments are not effective.    MEDICATIONS  (STANDING):  albuterol/ipratropium for Nebulization 3 milliLiter(s) Nebulizer every 8 hours  amLODIPine   Tablet 10 milliGRAM(s) Oral daily  benzonatate 100 milliGRAM(s) Oral every 8 hours  cefTRIAXone   IVPB 1000 milliGRAM(s) IV Intermittent every 24 hours  cholecalciferol 2000 Unit(s) Oral daily  enoxaparin Injectable 40 milliGRAM(s) SubCutaneous every 24 hours  fluticasone propionate/ salmeterol 250-50 MICROgram(s) Diskus 1 Dose(s) Inhalation two times a day  guaiFENesin ER 1200 milliGRAM(s) Oral every 12 hours  influenza  Vaccine (HIGH DOSE) 0.5 milliLiter(s) IntraMuscular once  lactated ringers. 1000 milliLiter(s) (75 mL/Hr) IV Continuous <Continuous>  methylPREDNISolone sodium succinate Injectable 40 milliGRAM(s) IV Push daily  mupirocin 2% Nasal 1 Application(s) Both Nostrils two times a day  vancomycin  IVPB 1000 milliGRAM(s) IV Intermittent every 12 hours      MEDICATIONS  (PRN):  acetaminophen     Tablet .. 650 milliGRAM(s) Oral every 6 hours PRN Temp greater or equal to 38C (100.4F), Mild Pain (1 - 3)  ALPRAZolam 0.25 milliGRAM(s) Oral daily PRN for anxiety  aluminum hydroxide/magnesium hydroxide/simethicone Suspension 30 milliLiter(s) Oral every 4 hours PRN Dyspepsia  ketorolac   Injectable 30 milliGRAM(s) IV Push two times a day PRN Moderate Pain (4 - 6)  melatonin 3 milliGRAM(s) Oral at bedtime PRN Insomnia  oxyCODONE    IR 2.5 milliGRAM(s) Oral every 6 hours PRN Severe Pain (7 - 10)  traMADol 25 milliGRAM(s) Oral every 6 hours PRN Mild Pain (1 - 3)      Allergies    No Known Allergies    Intolerances        PAST MEDICAL & SURGICAL HISTORY:  COPD (chronic obstructive pulmonary disease)      Rheumatoid arthritis      HTN (hypertension)      Anxiety      No significant past surgical history  colon resection  02.2024          Vital Signs Last 24 Hrs  T(C): 36.9 (08 Feb 2025 11:47), Max: 36.9 (08 Feb 2025 11:47)  T(F): 98.5 (08 Feb 2025 11:47), Max: 98.5 (08 Feb 2025 11:47)  HR: 109 (08 Feb 2025 11:47) (102 - 109)  BP: 156/74 (08 Feb 2025 11:47) (141/66 - 166/88)  BP(mean): --  RR: 17 (08 Feb 2025 11:47) (17 - 18)  SpO2: 98% (08 Feb 2025 11:47) (93% - 100%)    Parameters below as of 08 Feb 2025 11:47  Patient On (Oxygen Delivery Method): nasal cannula        PHYSICAL EXAMINATION:    GENERAL: The patient is awake and alert in no apparent distress.     HEENT: Head is normocephalic and atraumatic    NECK: no JVD    LUNGS: rales both bases with scattered rhonchi    HEART: Regular rate and rhythm without murmur.    ABDOMEN: Soft, nontender, and nondistended.      EXTREMITIES: Without any cyanosis, clubbing, rash, lesions or edema.    NEUROLOGIC: Grossly intact.    SKIN: No ulceration or induration present.      LABS:                        9.5    9.82  )-----------( 288      ( 08 Feb 2025 08:00 )             31.9     02-08    138  |  99  |  24[H]  ----------------------------<  114[H]  4.5   |  36[H]  |  0.61    Ca    10.6[H]      08 Feb 2025 08:00  Mg     2.3     02-07    TPro  6.2  /  Alb  2.7[L]  /  TBili  0.2  /  DBili  x   /  AST  11[L]  /  ALT  14  /  AlkPhos  88  02-07      Urinalysis Basic - ( 08 Feb 2025 08:00 )    Color: x / Appearance: x / SG: x / pH: x  Gluc: 114 mg/dL / Ketone: x  / Bili: x / Urobili: x   Blood: x / Protein: x / Nitrite: x   Leuk Esterase: x / RBC: x / WBC x   Sq Epi: x / Non Sq Epi: x / Bacteria: x                      MICROBIOLOGY:  Culture Results:   Commensal annetta consistent with body site (02-06 @ 05:19)  Culture Results:   50,000 - 99,000 CFU/mL Enterococcus faecalis (02-05 @ 05:00)        Assessment:    Resolving Pneumonia  COPD with exacerbation  Acute on Chronic Hypoxic respiratory failure  MRSA Positive  Hx Colon CA    Plan:    Change to oral steroids  Complete course of IV antibiotics  Probable discharge in next 24 to 48 hours  Will stop nebulizer treatments

## 2025-02-08 NOTE — PROGRESS NOTE ADULT - SUBJECTIVE AND OBJECTIVE BOX
Patient is a 69y old  Female who presents with a chief complaint of Pneumonia (07 Feb 2025 11:43)      INTERVAL HPI/OVERNIGHT EVENTS: Patient seen and examined at bedside. Patient on 6L NC, she has home oxygen as well. Patient is on vanco IV now. She has mild dyspnea and cough. No other complaints.    MEDICATIONS  (STANDING):  albuterol/ipratropium for Nebulization 3 milliLiter(s) Nebulizer every 8 hours  amLODIPine   Tablet 10 milliGRAM(s) Oral daily  benzonatate 100 milliGRAM(s) Oral every 8 hours  cefTRIAXone   IVPB 1000 milliGRAM(s) IV Intermittent every 24 hours  cholecalciferol 2000 Unit(s) Oral daily  enoxaparin Injectable 40 milliGRAM(s) SubCutaneous every 24 hours  fluticasone propionate/ salmeterol 250-50 MICROgram(s) Diskus 1 Dose(s) Inhalation two times a day  guaiFENesin  milliGRAM(s) Oral every 12 hours  influenza  Vaccine (HIGH DOSE) 0.5 milliLiter(s) IntraMuscular once  lactated ringers. 1000 milliLiter(s) (75 mL/Hr) IV Continuous <Continuous>  methylPREDNISolone sodium succinate Injectable 40 milliGRAM(s) IV Push daily  mupirocin 2% Nasal 1 Application(s) Both Nostrils two times a day  vancomycin  IVPB 1000 milliGRAM(s) IV Intermittent every 12 hours    MEDICATIONS  (PRN):  acetaminophen     Tablet .. 650 milliGRAM(s) Oral every 6 hours PRN Temp greater or equal to 38C (100.4F), Mild Pain (1 - 3)  ALPRAZolam 0.25 milliGRAM(s) Oral daily PRN for anxiety  aluminum hydroxide/magnesium hydroxide/simethicone Suspension 30 milliLiter(s) Oral every 4 hours PRN Dyspepsia  ketorolac   Injectable 30 milliGRAM(s) IV Push two times a day PRN Moderate Pain (4 - 6)  melatonin 3 milliGRAM(s) Oral at bedtime PRN Insomnia  oxyCODONE    IR 2.5 milliGRAM(s) Oral every 6 hours PRN Severe Pain (7 - 10)  traMADol 25 milliGRAM(s) Oral every 6 hours PRN Mild Pain (1 - 3)      Allergies    No Known Allergies    Intolerances        REVIEW OF SYSTEMS:  CONSTITUTIONAL: No fever or chills  HEENT:  No headache, no sore throat  RESPIRATORY: + cough, wheezing  CARDIOVASCULAR: No chest pain, palpitations  GASTROINTESTINAL: No abd pain, nausea, vomiting, or diarrhea  GENITOURINARY: No dysuria, frequency, or hematuria  NEUROLOGICAL: no focal weakness or dizziness  MUSCULOSKELETAL: no myalgias     Vital Signs Last 24 Hrs  T(C): 36.7 (08 Feb 2025 05:33), Max: 36.8 (07 Feb 2025 20:20)  T(F): 98.1 (08 Feb 2025 05:33), Max: 98.3 (07 Feb 2025 20:20)  HR: 104 (08 Feb 2025 05:33) (96 - 110)  BP: 166/88 (08 Feb 2025 05:33) (141/66 - 166/88)  BP(mean): --  RR: 18 (08 Feb 2025 05:33) (18 - 20)  SpO2: 100% (08 Feb 2025 05:33) (92% - 100%)    Parameters below as of 08 Feb 2025 05:33  Patient On (Oxygen Delivery Method): nasal cannula  O2 Flow (L/min): 4      PHYSICAL EXAM:  GENERAL: NAD on 6L NC  HEENT:  anicteric, moist mucous membranes  CHEST/LUNG:  + wheezing  HEART:  RRR, S1, S2  ABDOMEN:  soft NT  EXTREMITIES: no edema  NERVOUS SYSTEM: answers questions and follows commands appropriately    LABS:    CBC Full  -  ( 07 Feb 2025 05:00 )  WBC Count : 7.18 K/uL  Hemoglobin : 9.0 g/dL  Hematocrit : 30.2 %  Platelet Count - Automated : 272 K/uL  Mean Cell Volume : 79.9 fl  Mean Cell Hemoglobin : 23.8 pg  Mean Cell Hemoglobin Concentration : 29.8 g/dL  Auto Neutrophil # : 6.10 K/uL  Auto Lymphocyte # : 0.43 K/uL  Auto Monocyte # : 0.29 K/uL  Auto Eosinophil # : 0.00 K/uL  Auto Basophil # : 0.00 K/uL  Auto Neutrophil % : 84.0 %  Auto Lymphocyte % : 6.0 %  Auto Monocyte % : 4.0 %  Auto Eosinophil % : 0.0 %  Auto Basophil % : 0.0 %      Ca    9.8        07 Feb 2025 05:00        Urinalysis Basic - ( 07 Feb 2025 05:00 )    Color: x / Appearance: x / SG: x / pH: x  Gluc: 141 mg/dL / Ketone: x  / Bili: x / Urobili: x   Blood: x / Protein: x / Nitrite: x   Leuk Esterase: x / RBC: x / WBC x   Sq Epi: x / Non Sq Epi: x / Bacteria: x      CAPILLARY BLOOD GLUCOSE            Culture - Sputum (collected 02-06-25 @ 05:19)  Source: .Sputum Sputum  Gram Stain (02-06-25 @ 21:38):    No polymorphonuclear leukocytes per low power field    Rare Squamous epithelial cells per low power field    Rare Yeast like cells per oil power field  Final Report (02-08-25 @ 08:08):    Commensal annetta consistent with body site    Culture - Urine (collected 02-05-25 @ 05:00)  Source: Clean Catch  Preliminary Report (02-06-25 @ 22:18):    50,000 - 99,000 CFU/mL Enterococcus faecalis    Urinalysis with Rflx Culture (collected 02-05-25 @ 05:00)    Culture - Blood (collected 02-03-25 @ 11:10)  Source: .Blood BLOOD  Preliminary Report (02-07-25 @ 17:01):    No growth at 4 days    Culture - Blood (collected 02-03-25 @ 11:05)  Source: .Blood BLOOD  Preliminary Report (02-07-25 @ 17:01):    No growth at 4 days        RADIOLOGY & ADDITIONAL TESTS:    Personally reviewed.     Consultant(s) Notes Reviewed:  [x] YES  [ ] NO

## 2025-02-08 NOTE — PROGRESS NOTE ADULT - PROBLEM SELECTOR PLAN 1
- Patient on 4L NC due to hypoxia, wean as tolerated  - Leukocytosis improved, afebrile  - BCx NGTD, UCx + E faecalis, sputum cx negative  - MRSA/MSSA +  - C/w Rocephin (Day 5) and Vancomycin (Day 2), bactroban BID x5 days for MSSA  - Urine strep and legionella negative, azithro discontinued  - C/w IV solumedrol taper  - Tylenol prn for fever, albuterol prn for SOB/wheezing, tessalon perles for cough   - Infectious disease Dr. Mondragon consult noted  - Pulmonology Dr. Dobson following  - Discuss with ID regarding when to end vanco course - Patient on 4L NC due to hypoxia, wean as tolerated  - Leukocytosis improved, afebrile  - BCx NGTD, UCx + E faecalis, sputum cx negative  - MRSA/MSSA +  - C/w Rocephin (Day 5) and Vancomycin (Day 2), bactroban BID x5 days for MSSA  - Urine strep and legionella negative, azithro discontinued  - C/w IV solumedrol taper  - Tylenol prn for fever, albuterol prn for SOB/wheezing, tessalon perles for cough   - Infectious disease Dr. Mondragon consult noted, rec to complete 7 day course of vanco  - Vanco trough 50 likely elevated 2/2 being taken after given dose  - Pulmonology Dr. Dobson following

## 2025-02-08 NOTE — PROGRESS NOTE ADULT - PROBLEM SELECTOR PLAN 2
History of COPD, well controlled, on home inhaler does not use due to lack of insurance. Diffuse wheezing on exam. On 4L NC  - Duonebs q8h  - Advair BID  - on prednisone 10mg daily at home, holding home med, currently on IV Solumedrol taper  - Supplemental O2 PRN. on 4L NC now  - Continue inhalers  - RVP negative  - Pulm Dr. Dobson following  - PRN laba iraheta

## 2025-02-08 NOTE — PROGRESS NOTE ADULT - ATTENDING COMMENTS
69F with past medical history of colon cancer in remission, rheumatoid arthritis, hypertension and COPD presents to the ER with shortness of breath found to have pneumonia. Severe sepsis (POA) with lactic acidosis and acute hypoxic respiratory failure 2/2 PNA (MRSA PNA) and COPD exacerbation. MSRA esequieles positive - MRSA PNA. Continue IV Vancomycin and Rocephin. Vanco trough mildly elevated - dose decreased, repeat trough ordered. Transition to PO steroids, s/p IV steroids. nebulizer treatments stopped per pulm. Will need home O2 - patient in agreement. Declining MELISSA at this time. Currently on 2L NC - taper to lower dose as tolerated. Discussed with son at bedside, aware and in agreement with above. 69F with past medical history of colon cancer in remission, rheumatoid arthritis, hypertension and COPD presents to the ER with shortness of breath found to have pneumonia. Severe sepsis (POA) with lactic acidosis and acute hypoxic respiratory failure 2/2 PNA (MRSA PNA) and COPD exacerbation. MSRA nares positive - MRSA PNA. Continue IV Vancomycin and Rocephin. Vanco trough mildly elevated - dose decreased, repeat trough ordered. Transition to PO steroids, s/p IV steroids. nebulizer treatments stopped per pulm. Will need home O2 - patient in agreement. Declining MELISSA at this time. Remains on 4L NC - taper to lower dose as tolerated. Discussed with son at bedside, aware and in agreement with above.

## 2025-02-08 NOTE — PHARMACOTHERAPY INTERVENTION NOTE - COMMENTS
70 yo female ordered for vancomycin 1000 mg q 12 for PNA. Level drawn 2/8 AM post infusion and falsely elevated. Recommended to add level prior to 18:00 dose 2/8 @17:00. Discussed with resident and order entered. 
68 yo female ordered for vancomycin 1250 mg q 12 for MRSA  pneumonia. Pk estimates for AUC/JULIA > 600. Recommended decreasing to 1000 mg q 12 to yield AUC JULIA between 400-600 and adding mucpirocin nasal twicedaily for 5 days. Discussed with Dr Mondragon and updated orders. 
Patient is a 69y Female presenting with pneumonia being treated with vancomycin 1000 mg IV q12h + ceftriaxone 1000 mg IV daily. Vancomycin level 2/7 @ 05:00 = 9 ug/mL, current AUC/JULIA = 418 hr*ug/mL following 1 dose of vancomycin. Discussed with Dr. Abdi, recommended trough 2/8 @ 05:00 at vancomycin steady state for continued pharmacokinetic monitoring. Accepted.    Afua FosterD  Clinical Pharmacy Specialist   [phone] or Teams

## 2025-02-09 LAB
ALBUMIN SERPL ELPH-MCNC: 2.9 G/DL — LOW (ref 3.3–5)
ALP SERPL-CCNC: 93 U/L — SIGNIFICANT CHANGE UP (ref 40–120)
ALT FLD-CCNC: 23 U/L — SIGNIFICANT CHANGE UP (ref 12–78)
ANION GAP SERPL CALC-SCNC: 6 MMOL/L — SIGNIFICANT CHANGE UP (ref 5–17)
AST SERPL-CCNC: 16 U/L — SIGNIFICANT CHANGE UP (ref 15–37)
BASOPHILS # BLD AUTO: 0.08 K/UL — SIGNIFICANT CHANGE UP (ref 0–0.2)
BASOPHILS NFR BLD AUTO: 0.7 % — SIGNIFICANT CHANGE UP (ref 0–2)
BILIRUB SERPL-MCNC: 0.5 MG/DL — SIGNIFICANT CHANGE UP (ref 0.2–1.2)
BUN SERPL-MCNC: 25 MG/DL — HIGH (ref 7–23)
CALCIUM SERPL-MCNC: 10 MG/DL — SIGNIFICANT CHANGE UP (ref 8.5–10.1)
CHLORIDE SERPL-SCNC: 94 MMOL/L — LOW (ref 96–108)
CO2 SERPL-SCNC: 38 MMOL/L — HIGH (ref 22–31)
CREAT SERPL-MCNC: 0.68 MG/DL — SIGNIFICANT CHANGE UP (ref 0.5–1.3)
EGFR: 95 ML/MIN/1.73M2 — SIGNIFICANT CHANGE UP
EOSINOPHIL # BLD AUTO: 0 K/UL — SIGNIFICANT CHANGE UP (ref 0–0.5)
EOSINOPHIL NFR BLD AUTO: 0 % — SIGNIFICANT CHANGE UP (ref 0–6)
GLUCOSE SERPL-MCNC: 192 MG/DL — HIGH (ref 70–99)
HCT VFR BLD CALC: 34.6 % — SIGNIFICANT CHANGE UP (ref 34.5–45)
HGB BLD-MCNC: 10.3 G/DL — LOW (ref 11.5–15.5)
IMM GRANULOCYTES NFR BLD AUTO: 8 % — HIGH (ref 0–0.9)
LYMPHOCYTES # BLD AUTO: 0.57 K/UL — LOW (ref 1–3.3)
LYMPHOCYTES # BLD AUTO: 5.3 % — LOW (ref 13–44)
MCHC RBC-ENTMCNC: 23.9 PG — LOW (ref 27–34)
MCHC RBC-ENTMCNC: 29.8 G/DL — LOW (ref 32–36)
MCV RBC AUTO: 80.3 FL — SIGNIFICANT CHANGE UP (ref 80–100)
MONOCYTES # BLD AUTO: 0.4 K/UL — SIGNIFICANT CHANGE UP (ref 0–0.9)
MONOCYTES NFR BLD AUTO: 3.7 % — SIGNIFICANT CHANGE UP (ref 2–14)
NEUTROPHILS # BLD AUTO: 8.9 K/UL — HIGH (ref 1.8–7.4)
NEUTROPHILS NFR BLD AUTO: 82.3 % — HIGH (ref 43–77)
NRBC # BLD: 0 /100 WBCS — SIGNIFICANT CHANGE UP (ref 0–0)
NRBC BLD-RTO: 0 /100 WBCS — SIGNIFICANT CHANGE UP (ref 0–0)
PLATELET # BLD AUTO: 320 K/UL — SIGNIFICANT CHANGE UP (ref 150–400)
POTASSIUM SERPL-MCNC: 4.5 MMOL/L — SIGNIFICANT CHANGE UP (ref 3.5–5.3)
POTASSIUM SERPL-SCNC: 4.5 MMOL/L — SIGNIFICANT CHANGE UP (ref 3.5–5.3)
PROT SERPL-MCNC: 6.5 G/DL — SIGNIFICANT CHANGE UP (ref 6–8.3)
RBC # BLD: 4.31 M/UL — SIGNIFICANT CHANGE UP (ref 3.8–5.2)
RBC # FLD: 16.1 % — HIGH (ref 10.3–14.5)
SODIUM SERPL-SCNC: 138 MMOL/L — SIGNIFICANT CHANGE UP (ref 135–145)
VANCOMYCIN TROUGH SERPL-MCNC: 23.8 UG/ML — HIGH (ref 10–20)
WBC # BLD: 10.81 K/UL — HIGH (ref 3.8–10.5)
WBC # FLD AUTO: 10.81 K/UL — HIGH (ref 3.8–10.5)

## 2025-02-09 PROCEDURE — 99233 SBSQ HOSP IP/OBS HIGH 50: CPT | Mod: GC

## 2025-02-09 PROCEDURE — 71045 X-RAY EXAM CHEST 1 VIEW: CPT | Mod: 26

## 2025-02-09 RX ORDER — METOPROLOL SUCCINATE 25 MG
12.5 TABLET, EXTENDED RELEASE 24 HR ORAL
Refills: 0 | Status: DISCONTINUED | OUTPATIENT
Start: 2025-02-09 | End: 2025-02-10

## 2025-02-09 RX ADMIN — Medication 0.25 MILLIGRAM(S): at 21:56

## 2025-02-09 RX ADMIN — Medication 1200 MILLIGRAM(S): at 05:01

## 2025-02-09 RX ADMIN — VANCOMYCIN HYDROCHLORIDE 250 MILLIGRAM(S): KIT at 05:02

## 2025-02-09 RX ADMIN — Medication 1200 MILLIGRAM(S): at 17:12

## 2025-02-09 RX ADMIN — MUPIROCIN 1 APPLICATION(S): 2 CREAM TOPICAL at 17:13

## 2025-02-09 RX ADMIN — Medication 10 MILLIGRAM(S): at 05:01

## 2025-02-09 RX ADMIN — Medication 100 MILLIGRAM(S): at 05:01

## 2025-02-09 RX ADMIN — PREDNISONE 40 MILLIGRAM(S): 5 TABLET ORAL at 05:02

## 2025-02-09 RX ADMIN — OXYCODONE HYDROCHLORIDE 2.5 MILLIGRAM(S): 30 TABLET ORAL at 12:32

## 2025-02-09 RX ADMIN — ENOXAPARIN SODIUM 40 MILLIGRAM(S): 100 INJECTION SUBCUTANEOUS at 17:12

## 2025-02-09 RX ADMIN — FLUTICASONE PROPIONATE AND SALMETEROL 1 DOSE(S): 113; 14 POWDER, METERED RESPIRATORY (INHALATION) at 21:18

## 2025-02-09 RX ADMIN — MUPIROCIN 1 APPLICATION(S): 2 CREAM TOPICAL at 05:02

## 2025-02-09 RX ADMIN — CEFTRIAXONE 100 MILLIGRAM(S): 250 INJECTION, POWDER, FOR SOLUTION INTRAMUSCULAR; INTRAVENOUS at 11:06

## 2025-02-09 RX ADMIN — FLUTICASONE PROPIONATE AND SALMETEROL 1 DOSE(S): 113; 14 POWDER, METERED RESPIRATORY (INHALATION) at 05:02

## 2025-02-09 RX ADMIN — Medication 2000 UNIT(S): at 11:05

## 2025-02-09 RX ADMIN — VANCOMYCIN HYDROCHLORIDE 250 MILLIGRAM(S): KIT at 17:12

## 2025-02-09 RX ADMIN — OXYCODONE HYDROCHLORIDE 2.5 MILLIGRAM(S): 30 TABLET ORAL at 11:06

## 2025-02-09 RX ADMIN — Medication 12.5 MILLIGRAM(S): at 17:12

## 2025-02-09 RX ADMIN — Medication 100 MILLIGRAM(S): at 12:37

## 2025-02-09 NOTE — PROGRESS NOTE ADULT - PROBLEM SELECTOR PLAN 2
History of COPD, well controlled, on home inhaler does not use due to lack of insurance. Diffuse wheezing on exam. On 4L NC  - Duonebs q8h  - Advair BID  - Supplemental O2 PRN. on 4L NC now  - Continue inhalers  - RVP negative  - Pulm Dr. Dobson following  - PRN tessalon perles  - restarted on home oral 10mg prednisone History of COPD, well controlled, on home inhaler does not use due to lack of insurance. Diffuse wheezing on exam. On 4L NC  - Duonebs q8h  - Advair BID  - Supplemental O2 PRN. on 4L NC now  - Continue inhalers  - RVP negative  - Pulm Dr. Dobson following  - PRN tessalon perles  - Now on 40mg prednisone (on 10mg daily at home) History of COPD, well controlled, on home inhaler does not use due to lack of insurance. Diffuse wheezing on exam. On 4L NC  - Duonebs q8h  - Advair BID  - Supplemental O2 PRN. on 4L NC now  - RVP negative  - Pulm Dr. Dobson following  - PRN tessalon perles  - Now on 40mg prednisone (on 10mg daily at home)

## 2025-02-09 NOTE — PROGRESS NOTE ADULT - ATTENDING COMMENTS
69F with past medical history of colon cancer in remission, rheumatoid arthritis, hypertension and COPD presents to the ER with shortness of breath found to have pneumonia. Severe sepsis (POA) with lactic acidosis and acute hypoxic respiratory failure 2/2 PNA (MRSA PNA) and COPD exacerbation. MSMAYRA esequieles positive - MRSA PNA. Continue IV Vancomycin and Rocephin. Vanco trough mildly elevated - dose decreased, repeat trough ordered. Transitioned to PO steroids, s/p IV steroids. nebulizer treatments stopped per pulm. Will need home O2 - patient in agreement - reports intermittent use of O2 at home. Declining MELISSA at this time. Remains on 4L NC - taper to lower dose as tolerated. Discussed with son at bedside, aware and in agreement with above.

## 2025-02-09 NOTE — PROGRESS NOTE ADULT - SUBJECTIVE AND OBJECTIVE BOX
Patient is a 69y old  Female who presents with a chief complaint of Pneumonia (09 Feb 2025 07:53)      INTERVAL HPI/OVERNIGHT EVENTS:    no new complaints    MEDICATIONS  (STANDING):  amLODIPine   Tablet 10 milliGRAM(s) Oral daily  benzonatate 100 milliGRAM(s) Oral every 8 hours  cefTRIAXone   IVPB 1000 milliGRAM(s) IV Intermittent every 24 hours  cholecalciferol 2000 Unit(s) Oral daily  enoxaparin Injectable 40 milliGRAM(s) SubCutaneous every 24 hours  fluticasone propionate/ salmeterol 250-50 MICROgram(s) Diskus 1 Dose(s) Inhalation two times a day  guaiFENesin ER 1200 milliGRAM(s) Oral every 12 hours  influenza  Vaccine (HIGH DOSE) 0.5 milliLiter(s) IntraMuscular once  lactated ringers. 1000 milliLiter(s) (75 mL/Hr) IV Continuous <Continuous>  metoprolol tartrate 12.5 milliGRAM(s) Oral two times a day  mupirocin 2% Nasal 1 Application(s) Both Nostrils two times a day  predniSONE   Tablet 40 milliGRAM(s) Oral daily  vancomycin  IVPB 750 milliGRAM(s) IV Intermittent every 12 hours      MEDICATIONS  (PRN):  acetaminophen     Tablet .. 650 milliGRAM(s) Oral every 6 hours PRN Temp greater or equal to 38C (100.4F), Mild Pain (1 - 3)  albuterol    90 MICROgram(s) HFA Inhaler 2 Puff(s) Inhalation every 6 hours PRN Shortness of Breath and/or Wheezing  ALPRAZolam 0.25 milliGRAM(s) Oral daily PRN for anxiety  aluminum hydroxide/magnesium hydroxide/simethicone Suspension 30 milliLiter(s) Oral every 4 hours PRN Dyspepsia  ketorolac   Injectable 30 milliGRAM(s) IV Push two times a day PRN Moderate Pain (4 - 6)  melatonin 3 milliGRAM(s) Oral at bedtime PRN Insomnia  oxyCODONE    IR 2.5 milliGRAM(s) Oral every 6 hours PRN Severe Pain (7 - 10)  traMADol 25 milliGRAM(s) Oral every 6 hours PRN Mild Pain (1 - 3)      Allergies    No Known Allergies    Intolerances        PAST MEDICAL & SURGICAL HISTORY:  COPD (chronic obstructive pulmonary disease)      Rheumatoid arthritis      HTN (hypertension)      Anxiety      No significant past surgical history  colon resection  02.2024          Vital Signs Last 24 Hrs  T(C): 36.8 (09 Feb 2025 12:40), Max: 36.8 (08 Feb 2025 21:22)  T(F): 98.2 (09 Feb 2025 12:40), Max: 98.2 (08 Feb 2025 21:22)  HR: 111 (09 Feb 2025 12:40) (100 - 114)  BP: 152/73 (09 Feb 2025 12:40) (152/73 - 154/74)  BP(mean): --  RR: 19 (09 Feb 2025 13:48) (18 - 19)  SpO2: 94% (09 Feb 2025 13:48) (94% - 98%)    Parameters below as of 09 Feb 2025 13:48  Patient On (Oxygen Delivery Method): room air        PHYSICAL EXAMINATION:    GENERAL: The patient is awake and alert in no apparent distress.     HEENT: Head is normocephalic and atraumatic.     NECK: no JVD    LUNGS: mild bilateral wheezes    HEART: Regular rate and rhythm without murmur.    ABDOMEN: Soft, nontender, and nondistended.      EXTREMITIES: Without any cyanosis, clubbing, rash, lesions or edema.    NEUROLOGIC: Grossly intact.    SKIN: No ulceration or induration present.      LABS:                        10.3   10.81 )-----------( 320      ( 09 Feb 2025 08:55 )             34.6     02-09    138  |  94[L]  |  25[H]  ----------------------------<  192[H]  4.5   |  38[H]  |  0.68    Ca    10.0      09 Feb 2025 08:55    TPro  6.5  /  Alb  2.9[L]  /  TBili  0.5  /  DBili  x   /  AST  16  /  ALT  23  /  AlkPhos  93  02-09      Urinalysis Basic - ( 09 Feb 2025 08:55 )    Color: x / Appearance: x / SG: x / pH: x  Gluc: 192 mg/dL / Ketone: x  / Bili: x / Urobili: x   Blood: x / Protein: x / Nitrite: x   Leuk Esterase: x / RBC: x / WBC x   Sq Epi: x / Non Sq Epi: x / Bacteria: x                      MICROBIOLOGY:  Culture Results:   Commensal annetta consistent with body site (02-06 @ 05:19)      Assessment:    Resolving Pneumonia  COPD with exacerbation  Acute on Chronic Hypoxic respiratory failure  Hx Colon CA    Plan:    Supplemental oxygen  Continue Rocephin + Vancomycin  Taper prednisone  Advair inhaler  DVT prophylaxis     Patient is a 69y old  Female who presents with a chief complaint of Pneumonia (09 Feb 2025 07:53)      INTERVAL HPI/OVERNIGHT EVENTS:    no new complaints    MEDICATIONS  (STANDING):  amLODIPine   Tablet 10 milliGRAM(s) Oral daily  benzonatate 100 milliGRAM(s) Oral every 8 hours  cefTRIAXone   IVPB 1000 milliGRAM(s) IV Intermittent every 24 hours  cholecalciferol 2000 Unit(s) Oral daily  enoxaparin Injectable 40 milliGRAM(s) SubCutaneous every 24 hours  fluticasone propionate/ salmeterol 250-50 MICROgram(s) Diskus 1 Dose(s) Inhalation two times a day  guaiFENesin ER 1200 milliGRAM(s) Oral every 12 hours  influenza  Vaccine (HIGH DOSE) 0.5 milliLiter(s) IntraMuscular once  lactated ringers. 1000 milliLiter(s) (75 mL/Hr) IV Continuous <Continuous>  metoprolol tartrate 12.5 milliGRAM(s) Oral two times a day  mupirocin 2% Nasal 1 Application(s) Both Nostrils two times a day  predniSONE   Tablet 40 milliGRAM(s) Oral daily  vancomycin  IVPB 750 milliGRAM(s) IV Intermittent every 12 hours      MEDICATIONS  (PRN):  acetaminophen     Tablet .. 650 milliGRAM(s) Oral every 6 hours PRN Temp greater or equal to 38C (100.4F), Mild Pain (1 - 3)  albuterol    90 MICROgram(s) HFA Inhaler 2 Puff(s) Inhalation every 6 hours PRN Shortness of Breath and/or Wheezing  ALPRAZolam 0.25 milliGRAM(s) Oral daily PRN for anxiety  aluminum hydroxide/magnesium hydroxide/simethicone Suspension 30 milliLiter(s) Oral every 4 hours PRN Dyspepsia  ketorolac   Injectable 30 milliGRAM(s) IV Push two times a day PRN Moderate Pain (4 - 6)  melatonin 3 milliGRAM(s) Oral at bedtime PRN Insomnia  oxyCODONE    IR 2.5 milliGRAM(s) Oral every 6 hours PRN Severe Pain (7 - 10)  traMADol 25 milliGRAM(s) Oral every 6 hours PRN Mild Pain (1 - 3)      Allergies    No Known Allergies    Intolerances        PAST MEDICAL & SURGICAL HISTORY:  COPD (chronic obstructive pulmonary disease)      Rheumatoid arthritis      HTN (hypertension)      Anxiety      No significant past surgical history  colon resection  02.2024          Vital Signs Last 24 Hrs  T(C): 36.8 (09 Feb 2025 12:40), Max: 36.8 (08 Feb 2025 21:22)  T(F): 98.2 (09 Feb 2025 12:40), Max: 98.2 (08 Feb 2025 21:22)  HR: 111 (09 Feb 2025 12:40) (100 - 114)  BP: 152/73 (09 Feb 2025 12:40) (152/73 - 154/74)  BP(mean): --  RR: 19 (09 Feb 2025 13:48) (18 - 19)  SpO2: 94% (09 Feb 2025 13:48) (94% - 98%)    Parameters below as of 09 Feb 2025 13:48  Patient On (Oxygen Delivery Method): room air        PHYSICAL EXAMINATION:    GENERAL: The patient is awake and alert in no apparent distress.     HEENT: Head is normocephalic and atraumatic.     NECK: no JVD    LUNGS: mild bilateral wheezes    HEART: Regular rate and rhythm without murmur.    ABDOMEN: Soft, nontender, and nondistended.      EXTREMITIES: Without any cyanosis, clubbing, rash, lesions or edema.    NEUROLOGIC: Grossly intact.    SKIN: No ulceration or induration present.      LABS:                        10.3   10.81 )-----------( 320      ( 09 Feb 2025 08:55 )             34.6     02-09    138  |  94[L]  |  25[H]  ----------------------------<  192[H]  4.5   |  38[H]  |  0.68    Ca    10.0      09 Feb 2025 08:55    TPro  6.5  /  Alb  2.9[L]  /  TBili  0.5  /  DBili  x   /  AST  16  /  ALT  23  /  AlkPhos  93  02-09      Urinalysis Basic - ( 09 Feb 2025 08:55 )    Color: x / Appearance: x / SG: x / pH: x  Gluc: 192 mg/dL / Ketone: x  / Bili: x / Urobili: x   Blood: x / Protein: x / Nitrite: x   Leuk Esterase: x / RBC: x / WBC x   Sq Epi: x / Non Sq Epi: x / Bacteria: x          Chest x ray today reveals significant improvement in left sided pulmonary infiltrates            MICROBIOLOGY:  Culture Results:   Commensal annetta consistent with body site (02-06 @ 05:19)      Assessment:    Resolving Pneumonia  COPD with exacerbation  Acute on Chronic Hypoxic respiratory failure  Hx Colon CA    Plan:    Supplemental oxygen  Continue Rocephin + Vancomycin  Taper prednisone  Advair inhaler  DVT prophylaxis

## 2025-02-09 NOTE — PROGRESS NOTE ADULT - SUBJECTIVE AND OBJECTIVE BOX
Patient is a 69y old  Female who presents with a chief complaint of Pneumonia (08 Feb 2025 16:41)      INTERVAL HPI/OVERNIGHT EVENTS: Patient seen and examined at bedside. No overnight events occurred. Patient is experiencing dyspnea still. On NC. Receiving vancomycin.    MEDICATIONS  (STANDING):  amLODIPine   Tablet 10 milliGRAM(s) Oral daily  benzonatate 100 milliGRAM(s) Oral every 8 hours  cefTRIAXone   IVPB 1000 milliGRAM(s) IV Intermittent every 24 hours  cholecalciferol 2000 Unit(s) Oral daily  enoxaparin Injectable 40 milliGRAM(s) SubCutaneous every 24 hours  fluticasone propionate/ salmeterol 250-50 MICROgram(s) Diskus 1 Dose(s) Inhalation two times a day  guaiFENesin ER 1200 milliGRAM(s) Oral every 12 hours  influenza  Vaccine (HIGH DOSE) 0.5 milliLiter(s) IntraMuscular once  lactated ringers. 1000 milliLiter(s) (75 mL/Hr) IV Continuous <Continuous>  mupirocin 2% Nasal 1 Application(s) Both Nostrils two times a day  predniSONE   Tablet 40 milliGRAM(s) Oral daily  vancomycin  IVPB 750 milliGRAM(s) IV Intermittent every 12 hours    MEDICATIONS  (PRN):  acetaminophen     Tablet .. 650 milliGRAM(s) Oral every 6 hours PRN Temp greater or equal to 38C (100.4F), Mild Pain (1 - 3)  albuterol    90 MICROgram(s) HFA Inhaler 2 Puff(s) Inhalation every 6 hours PRN Shortness of Breath and/or Wheezing  ALPRAZolam 0.25 milliGRAM(s) Oral daily PRN for anxiety  aluminum hydroxide/magnesium hydroxide/simethicone Suspension 30 milliLiter(s) Oral every 4 hours PRN Dyspepsia  ketorolac   Injectable 30 milliGRAM(s) IV Push two times a day PRN Moderate Pain (4 - 6)  melatonin 3 milliGRAM(s) Oral at bedtime PRN Insomnia  oxyCODONE    IR 2.5 milliGRAM(s) Oral every 6 hours PRN Severe Pain (7 - 10)  traMADol 25 milliGRAM(s) Oral every 6 hours PRN Mild Pain (1 - 3)      Allergies    No Known Allergies    Intolerances        REVIEW OF SYSTEMS:  CONSTITUTIONAL: No fever or chills  HEENT:  No headache, no sore throat  RESPIRATORY: No cough, wheezing, or shortness of breath  CARDIOVASCULAR: No chest pain, palpitations  GASTROINTESTINAL: No abd pain, nausea, vomiting, or diarrhea  GENITOURINARY: No dysuria, frequency, or hematuria  NEUROLOGICAL: no focal weakness or dizziness  MUSCULOSKELETAL: no myalgias     Vital Signs Last 24 Hrs  T(C): 36.7 (09 Feb 2025 04:52), Max: 36.9 (08 Feb 2025 11:47)  T(F): 98.1 (09 Feb 2025 04:52), Max: 98.5 (08 Feb 2025 11:47)  HR: 114 (09 Feb 2025 04:52) (100 - 114)  BP: 152/80 (09 Feb 2025 04:52) (152/80 - 156/74)  BP(mean): --  RR: 18 (09 Feb 2025 04:52) (17 - 18)  SpO2: 94% (09 Feb 2025 04:52) (94% - 98%)    Parameters below as of 09 Feb 2025 04:52  Patient On (Oxygen Delivery Method): nasal cannula  O2 Flow (L/min): 4      PHYSICAL EXAM:  GENERAL: NAD  HEENT:  anicteric, moist mucous membranes  CHEST/LUNG:  CTA b/l, no rales, wheezes, or rhonchi  HEART:  RRR, S1, S2  ABDOMEN:  BS+, soft, nontender, nondistended  EXTREMITIES: no edema, cyanosis, or calf tenderness  NERVOUS SYSTEM: answers questions and follows commands appropriately    LABS:                        9.5    9.82  )-----------( 288      ( 08 Feb 2025 08:00 )             31.9     CBC Full  -  ( 08 Feb 2025 08:00 )  WBC Count : 9.82 K/uL  Hemoglobin : 9.5 g/dL  Hematocrit : 31.9 %  Platelet Count - Automated : 288 K/uL  Mean Cell Volume : 79.8 fl  Mean Cell Hemoglobin : 23.8 pg  Mean Cell Hemoglobin Concentration : 29.8 g/dL  Auto Neutrophil # : 7.72 K/uL  Auto Lymphocyte # : 0.65 K/uL  Auto Monocyte # : 0.64 K/uL  Auto Eosinophil # : 0.00 K/uL  Auto Basophil # : 0.07 K/uL  Auto Neutrophil % : 78.7 %  Auto Lymphocyte % : 6.6 %  Auto Monocyte % : 6.5 %  Auto Eosinophil % : 0.0 %  Auto Basophil % : 0.7 %    08 Feb 2025 08:00    138    |  99     |  24     ----------------------------<  114    4.5     |  36     |  0.61     Ca    10.6       08 Feb 2025 08:00        Urinalysis Basic - ( 08 Feb 2025 08:00 )    Color: x / Appearance: x / SG: x / pH: x  Gluc: 114 mg/dL / Ketone: x  / Bili: x / Urobili: x   Blood: x / Protein: x / Nitrite: x   Leuk Esterase: x / RBC: x / WBC x   Sq Epi: x / Non Sq Epi: x / Bacteria: x      CAPILLARY BLOOD GLUCOSE            Culture - Sputum (collected 02-06-25 @ 05:19)  Source: .Sputum Sputum  Gram Stain (02-06-25 @ 21:38):    No polymorphonuclear leukocytes per low power field    Rare Squamous epithelial cells per low power field    Rare Yeast like cells per oil power field  Final Report (02-08-25 @ 08:08):    Commensal annetta consistent with body site    Culture - Urine (collected 02-05-25 @ 05:00)  Source: Clean Catch  Final Report (02-08-25 @ 14:38):    50,000 - 99,000 CFU/mL Enterococcus faecalis  Organism: Enterococcus faecalis (02-08-25 @ 14:38)  Organism: Enterococcus faecalis (02-08-25 @ 14:38)      Method Type: JULIA      -  Ampicillin: S <=2 Predicts results to ampicillin/sulbactam, amoxacillin-clavulanate and  piperacillin-tazobactam.      -  Ciprofloxacin: S <=1      -  Levofloxacin: S 2      -  Nitrofurantoin: S <=32 Should not be used to treat pyelonephritis.      -  Tetracycline: S <=4      -  Vancomycin: S 0.5    Urinalysis with Rflx Culture (collected 02-05-25 @ 05:00)    Culture - Blood (collected 02-03-25 @ 11:10)  Source: .Blood BLOOD  Final Report (02-08-25 @ 17:00):    No growth at 5 days    Culture - Blood (collected 02-03-25 @ 11:05)  Source: .Blood BLOOD  Final Report (02-08-25 @ 17:00):    No growth at 5 days        RADIOLOGY & ADDITIONAL TESTS:    Personally reviewed.     Consultant(s) Notes Reviewed:  [x] YES  [ ] NO     Patient is a 69y old  Female who presents with a chief complaint of Pneumonia (08 Feb 2025 16:41)      INTERVAL HPI/OVERNIGHT EVENTS: Patient seen and examined at bedside. No overnight events occurred. Patient is experiencing dyspnea still. On NC. Receiving vancomycin.    MEDICATIONS  (STANDING):  amLODIPine   Tablet 10 milliGRAM(s) Oral daily  benzonatate 100 milliGRAM(s) Oral every 8 hours  cefTRIAXone   IVPB 1000 milliGRAM(s) IV Intermittent every 24 hours  cholecalciferol 2000 Unit(s) Oral daily  enoxaparin Injectable 40 milliGRAM(s) SubCutaneous every 24 hours  fluticasone propionate/ salmeterol 250-50 MICROgram(s) Diskus 1 Dose(s) Inhalation two times a day  guaiFENesin ER 1200 milliGRAM(s) Oral every 12 hours  influenza  Vaccine (HIGH DOSE) 0.5 milliLiter(s) IntraMuscular once  lactated ringers. 1000 milliLiter(s) (75 mL/Hr) IV Continuous <Continuous>  mupirocin 2% Nasal 1 Application(s) Both Nostrils two times a day  predniSONE   Tablet 40 milliGRAM(s) Oral daily  vancomycin  IVPB 750 milliGRAM(s) IV Intermittent every 12 hours    MEDICATIONS  (PRN):  acetaminophen     Tablet .. 650 milliGRAM(s) Oral every 6 hours PRN Temp greater or equal to 38C (100.4F), Mild Pain (1 - 3)  albuterol    90 MICROgram(s) HFA Inhaler 2 Puff(s) Inhalation every 6 hours PRN Shortness of Breath and/or Wheezing  ALPRAZolam 0.25 milliGRAM(s) Oral daily PRN for anxiety  aluminum hydroxide/magnesium hydroxide/simethicone Suspension 30 milliLiter(s) Oral every 4 hours PRN Dyspepsia  ketorolac   Injectable 30 milliGRAM(s) IV Push two times a day PRN Moderate Pain (4 - 6)  melatonin 3 milliGRAM(s) Oral at bedtime PRN Insomnia  oxyCODONE    IR 2.5 milliGRAM(s) Oral every 6 hours PRN Severe Pain (7 - 10)  traMADol 25 milliGRAM(s) Oral every 6 hours PRN Mild Pain (1 - 3)      Allergies    No Known Allergies    Intolerances        REVIEW OF SYSTEMS:  CONSTITUTIONAL: No fever or chills  HEENT:  No headache, no sore throat  RESPIRATORY: No cough, wheezing, or shortness of breath  CARDIOVASCULAR: No chest pain, palpitations  GASTROINTESTINAL: No abd pain, nausea, vomiting, or diarrhea  NEUROLOGICAL: no focal weakness or dizziness  MUSCULOSKELETAL: no myalgias     Vital Signs Last 24 Hrs  T(C): 36.7 (09 Feb 2025 04:52), Max: 36.9 (08 Feb 2025 11:47)  T(F): 98.1 (09 Feb 2025 04:52), Max: 98.5 (08 Feb 2025 11:47)  HR: 114 (09 Feb 2025 04:52) (100 - 114)  BP: 152/80 (09 Feb 2025 04:52) (152/80 - 156/74)  BP(mean): --  RR: 18 (09 Feb 2025 04:52) (17 - 18)  SpO2: 94% (09 Feb 2025 04:52) (94% - 98%)    Parameters below as of 09 Feb 2025 04:52  Patient On (Oxygen Delivery Method): nasal cannula  O2 Flow (L/min): 4      PHYSICAL EXAM:  GENERAL: NAD  HEENT:  anicteric, moist mucous membranes  CHEST/LUNG:  CTA b/l, no rales, wheezes, or rhonchi  HEART:  RRR, S1, S2  ABDOMEN:  BS+, soft, nontender, nondistended  EXTREMITIES: no edema, cyanosis, or calf tenderness  NERVOUS SYSTEM: answers questions and follows commands appropriately    LABS:                        9.5    9.82  )-----------( 288      ( 08 Feb 2025 08:00 )             31.9     CBC Full  -  ( 08 Feb 2025 08:00 )  WBC Count : 9.82 K/uL  Hemoglobin : 9.5 g/dL  Hematocrit : 31.9 %  Platelet Count - Automated : 288 K/uL  Mean Cell Volume : 79.8 fl  Mean Cell Hemoglobin : 23.8 pg  Mean Cell Hemoglobin Concentration : 29.8 g/dL  Auto Neutrophil # : 7.72 K/uL  Auto Lymphocyte # : 0.65 K/uL  Auto Monocyte # : 0.64 K/uL  Auto Eosinophil # : 0.00 K/uL  Auto Basophil # : 0.07 K/uL  Auto Neutrophil % : 78.7 %  Auto Lymphocyte % : 6.6 %  Auto Monocyte % : 6.5 %  Auto Eosinophil % : 0.0 %  Auto Basophil % : 0.7 %    08 Feb 2025 08:00    138    |  99     |  24     ----------------------------<  114    4.5     |  36     |  0.61     Ca    10.6       08 Feb 2025 08:00        Urinalysis Basic - ( 08 Feb 2025 08:00 )    Color: x / Appearance: x / SG: x / pH: x  Gluc: 114 mg/dL / Ketone: x  / Bili: x / Urobili: x   Blood: x / Protein: x / Nitrite: x   Leuk Esterase: x / RBC: x / WBC x   Sq Epi: x / Non Sq Epi: x / Bacteria: x      CAPILLARY BLOOD GLUCOSE            Culture - Sputum (collected 02-06-25 @ 05:19)  Source: .Sputum Sputum  Gram Stain (02-06-25 @ 21:38):    No polymorphonuclear leukocytes per low power field    Rare Squamous epithelial cells per low power field    Rare Yeast like cells per oil power field  Final Report (02-08-25 @ 08:08):    Commensal annetta consistent with body site    Culture - Urine (collected 02-05-25 @ 05:00)  Source: Clean Catch  Final Report (02-08-25 @ 14:38):    50,000 - 99,000 CFU/mL Enterococcus faecalis  Organism: Enterococcus faecalis (02-08-25 @ 14:38)  Organism: Enterococcus faecalis (02-08-25 @ 14:38)      Method Type: JULIA      -  Ampicillin: S <=2 Predicts results to ampicillin/sulbactam, amoxacillin-clavulanate and  piperacillin-tazobactam.      -  Ciprofloxacin: S <=1      -  Levofloxacin: S 2      -  Nitrofurantoin: S <=32 Should not be used to treat pyelonephritis.      -  Tetracycline: S <=4      -  Vancomycin: S 0.5    Urinalysis with Rflx Culture (collected 02-05-25 @ 05:00)    Culture - Blood (collected 02-03-25 @ 11:10)  Source: .Blood BLOOD  Final Report (02-08-25 @ 17:00):    No growth at 5 days    Culture - Blood (collected 02-03-25 @ 11:05)  Source: .Blood BLOOD  Final Report (02-08-25 @ 17:00):    No growth at 5 days        RADIOLOGY & ADDITIONAL TESTS:    Personally reviewed.     Consultant(s) Notes Reviewed:  [x] YES  [ ] NO     Patient is a 69y old  Female who presents with a chief complaint of Pneumonia (08 Feb 2025 16:41)      INTERVAL HPI/OVERNIGHT EVENTS: Patient seen and examined at bedside. No overnight events occurred. Patient is complaining of cough but feels comfortable to go home. On day 4/7 of IV vanco (started 2/6). Patient on 5L NC O2. Patient states back pain 7/10.     MEDICATIONS  (STANDING):  amLODIPine   Tablet 10 milliGRAM(s) Oral daily  benzonatate 100 milliGRAM(s) Oral every 8 hours  cefTRIAXone   IVPB 1000 milliGRAM(s) IV Intermittent every 24 hours  cholecalciferol 2000 Unit(s) Oral daily  enoxaparin Injectable 40 milliGRAM(s) SubCutaneous every 24 hours  fluticasone propionate/ salmeterol 250-50 MICROgram(s) Diskus 1 Dose(s) Inhalation two times a day  guaiFENesin ER 1200 milliGRAM(s) Oral every 12 hours  influenza  Vaccine (HIGH DOSE) 0.5 milliLiter(s) IntraMuscular once  lactated ringers. 1000 milliLiter(s) (75 mL/Hr) IV Continuous <Continuous>  mupirocin 2% Nasal 1 Application(s) Both Nostrils two times a day  predniSONE   Tablet 40 milliGRAM(s) Oral daily  vancomycin  IVPB 750 milliGRAM(s) IV Intermittent every 12 hours    MEDICATIONS  (PRN):  acetaminophen     Tablet .. 650 milliGRAM(s) Oral every 6 hours PRN Temp greater or equal to 38C (100.4F), Mild Pain (1 - 3)  albuterol    90 MICROgram(s) HFA Inhaler 2 Puff(s) Inhalation every 6 hours PRN Shortness of Breath and/or Wheezing  ALPRAZolam 0.25 milliGRAM(s) Oral daily PRN for anxiety  aluminum hydroxide/magnesium hydroxide/simethicone Suspension 30 milliLiter(s) Oral every 4 hours PRN Dyspepsia  ketorolac   Injectable 30 milliGRAM(s) IV Push two times a day PRN Moderate Pain (4 - 6)  melatonin 3 milliGRAM(s) Oral at bedtime PRN Insomnia  oxyCODONE    IR 2.5 milliGRAM(s) Oral every 6 hours PRN Severe Pain (7 - 10)  traMADol 25 milliGRAM(s) Oral every 6 hours PRN Mild Pain (1 - 3)      Allergies    No Known Allergies    Intolerances        REVIEW OF SYSTEMS:  CONSTITUTIONAL: No fever or chills  HEENT:  No headache, no sore throat  RESPIRATORY: + cough, no wheezing  CARDIOVASCULAR: No chest pain, palpitations  GASTROINTESTINAL: No abd pain, nausea, vomiting, or diarrhea  NEUROLOGICAL: no focal weakness or dizziness  MUSCULOSKELETAL: no myalgias     Vital Signs Last 24 Hrs  T(C): 36.7 (09 Feb 2025 04:52), Max: 36.9 (08 Feb 2025 11:47)  T(F): 98.1 (09 Feb 2025 04:52), Max: 98.5 (08 Feb 2025 11:47)  HR: 114 (09 Feb 2025 04:52) (100 - 114)  BP: 152/80 (09 Feb 2025 04:52) (152/80 - 156/74)  BP(mean): --  RR: 18 (09 Feb 2025 04:52) (17 - 18)  SpO2: 94% (09 Feb 2025 04:52) (94% - 98%)    Parameters below as of 09 Feb 2025 04:52  Patient On (Oxygen Delivery Method): nasal cannula  O2 Flow (L/min): 4      PHYSICAL EXAM:  GENERAL: NAD  HEENT:  anicteric, moist mucous membranes  CHEST/LUNG: rhonchi  HEART:  RRR, S1, S2  ABDOMEN:  soft NT  EXTREMITIES: no edema, cyanosis, or calf tenderness  NERVOUS SYSTEM: answers questions and follows commands appropriately    LABS:                        9.5    9.82  )-----------( 288      ( 08 Feb 2025 08:00 )             31.9     CBC Full  -  ( 08 Feb 2025 08:00 )  WBC Count : 9.82 K/uL  Hemoglobin : 9.5 g/dL  Hematocrit : 31.9 %  Platelet Count - Automated : 288 K/uL  Mean Cell Volume : 79.8 fl  Mean Cell Hemoglobin : 23.8 pg  Mean Cell Hemoglobin Concentration : 29.8 g/dL  Auto Neutrophil # : 7.72 K/uL  Auto Lymphocyte # : 0.65 K/uL  Auto Monocyte # : 0.64 K/uL  Auto Eosinophil # : 0.00 K/uL  Auto Basophil # : 0.07 K/uL  Auto Neutrophil % : 78.7 %  Auto Lymphocyte % : 6.6 %  Auto Monocyte % : 6.5 %  Auto Eosinophil % : 0.0 %  Auto Basophil % : 0.7 %    08 Feb 2025 08:00    138    |  99     |  24     ----------------------------<  114    4.5     |  36     |  0.61     Ca    10.6       08 Feb 2025 08:00        Urinalysis Basic - ( 08 Feb 2025 08:00 )    Color: x / Appearance: x / SG: x / pH: x  Gluc: 114 mg/dL / Ketone: x  / Bili: x / Urobili: x   Blood: x / Protein: x / Nitrite: x   Leuk Esterase: x / RBC: x / WBC x   Sq Epi: x / Non Sq Epi: x / Bacteria: x      CAPILLARY BLOOD GLUCOSE            Culture - Sputum (collected 02-06-25 @ 05:19)  Source: .Sputum Sputum  Gram Stain (02-06-25 @ 21:38):    No polymorphonuclear leukocytes per low power field    Rare Squamous epithelial cells per low power field    Rare Yeast like cells per oil power field  Final Report (02-08-25 @ 08:08):    Commensal annetta consistent with body site    Culture - Urine (collected 02-05-25 @ 05:00)  Source: Clean Catch  Final Report (02-08-25 @ 14:38):    50,000 - 99,000 CFU/mL Enterococcus faecalis  Organism: Enterococcus faecalis (02-08-25 @ 14:38)  Organism: Enterococcus faecalis (02-08-25 @ 14:38)      Method Type: JULIA      -  Ampicillin: S <=2 Predicts results to ampicillin/sulbactam, amoxacillin-clavulanate and  piperacillin-tazobactam.      -  Ciprofloxacin: S <=1      -  Levofloxacin: S 2      -  Nitrofurantoin: S <=32 Should not be used to treat pyelonephritis.      -  Tetracycline: S <=4      -  Vancomycin: S 0.5    Urinalysis with Rflx Culture (collected 02-05-25 @ 05:00)    Culture - Blood (collected 02-03-25 @ 11:10)  Source: .Blood BLOOD  Final Report (02-08-25 @ 17:00):    No growth at 5 days    Culture - Blood (collected 02-03-25 @ 11:05)  Source: .Blood BLOOD  Final Report (02-08-25 @ 17:00):    No growth at 5 days        RADIOLOGY & ADDITIONAL TESTS:    Personally reviewed.     Consultant(s) Notes Reviewed:  [x] YES  [ ] NO     Patient is a 69y old  Female who presents with a chief complaint of Pneumonia (08 Feb 2025 16:41)      INTERVAL HPI/OVERNIGHT EVENTS: Patient seen and examined at bedside. No overnight events occurred. Patient is complaining of cough but requesting to go home. On day 4/7 of IV vanco (started 2/6). Patient on 4L NC O2.     MEDICATIONS  (STANDING):  amLODIPine   Tablet 10 milliGRAM(s) Oral daily  benzonatate 100 milliGRAM(s) Oral every 8 hours  cefTRIAXone   IVPB 1000 milliGRAM(s) IV Intermittent every 24 hours  cholecalciferol 2000 Unit(s) Oral daily  enoxaparin Injectable 40 milliGRAM(s) SubCutaneous every 24 hours  fluticasone propionate/ salmeterol 250-50 MICROgram(s) Diskus 1 Dose(s) Inhalation two times a day  guaiFENesin ER 1200 milliGRAM(s) Oral every 12 hours  influenza  Vaccine (HIGH DOSE) 0.5 milliLiter(s) IntraMuscular once  lactated ringers. 1000 milliLiter(s) (75 mL/Hr) IV Continuous <Continuous>  mupirocin 2% Nasal 1 Application(s) Both Nostrils two times a day  predniSONE   Tablet 40 milliGRAM(s) Oral daily  vancomycin  IVPB 750 milliGRAM(s) IV Intermittent every 12 hours    MEDICATIONS  (PRN):  acetaminophen     Tablet .. 650 milliGRAM(s) Oral every 6 hours PRN Temp greater or equal to 38C (100.4F), Mild Pain (1 - 3)  albuterol    90 MICROgram(s) HFA Inhaler 2 Puff(s) Inhalation every 6 hours PRN Shortness of Breath and/or Wheezing  ALPRAZolam 0.25 milliGRAM(s) Oral daily PRN for anxiety  aluminum hydroxide/magnesium hydroxide/simethicone Suspension 30 milliLiter(s) Oral every 4 hours PRN Dyspepsia  ketorolac   Injectable 30 milliGRAM(s) IV Push two times a day PRN Moderate Pain (4 - 6)  melatonin 3 milliGRAM(s) Oral at bedtime PRN Insomnia  oxyCODONE    IR 2.5 milliGRAM(s) Oral every 6 hours PRN Severe Pain (7 - 10)  traMADol 25 milliGRAM(s) Oral every 6 hours PRN Mild Pain (1 - 3)      Allergies    No Known Allergies    Intolerances        REVIEW OF SYSTEMS:  CONSTITUTIONAL: No fever or chills  HEENT:  No headache, no sore throat  RESPIRATORY: + cough, no wheezing  CARDIOVASCULAR: No chest pain, palpitations  GASTROINTESTINAL: No abd pain, nausea, vomiting, or diarrhea  NEUROLOGICAL: no focal weakness or dizziness  MUSCULOSKELETAL: no myalgias     Vital Signs Last 24 Hrs  T(C): 36.7 (09 Feb 2025 04:52), Max: 36.9 (08 Feb 2025 11:47)  T(F): 98.1 (09 Feb 2025 04:52), Max: 98.5 (08 Feb 2025 11:47)  HR: 114 (09 Feb 2025 04:52) (100 - 114)  BP: 152/80 (09 Feb 2025 04:52) (152/80 - 156/74)  BP(mean): --  RR: 18 (09 Feb 2025 04:52) (17 - 18)  SpO2: 94% (09 Feb 2025 04:52) (94% - 98%)    Parameters below as of 09 Feb 2025 04:52  Patient On (Oxygen Delivery Method): nasal cannula  O2 Flow (L/min): 4      PHYSICAL EXAM:  GENERAL: NAD  HEENT:  anicteric, moist mucous membranes  CHEST/LUNG: rhonchi  HEART:  RRR, S1, S2  ABDOMEN:  soft NT  EXTREMITIES: no edema, cyanosis, or calf tenderness  NERVOUS SYSTEM: answers questions and follows commands appropriately    LABS:                        9.5    9.82  )-----------( 288      ( 08 Feb 2025 08:00 )             31.9     CBC Full  -  ( 08 Feb 2025 08:00 )  WBC Count : 9.82 K/uL  Hemoglobin : 9.5 g/dL  Hematocrit : 31.9 %  Platelet Count - Automated : 288 K/uL  Mean Cell Volume : 79.8 fl  Mean Cell Hemoglobin : 23.8 pg  Mean Cell Hemoglobin Concentration : 29.8 g/dL  Auto Neutrophil # : 7.72 K/uL  Auto Lymphocyte # : 0.65 K/uL  Auto Monocyte # : 0.64 K/uL  Auto Eosinophil # : 0.00 K/uL  Auto Basophil # : 0.07 K/uL  Auto Neutrophil % : 78.7 %  Auto Lymphocyte % : 6.6 %  Auto Monocyte % : 6.5 %  Auto Eosinophil % : 0.0 %  Auto Basophil % : 0.7 %    08 Feb 2025 08:00    138    |  99     |  24     ----------------------------<  114    4.5     |  36     |  0.61     Ca    10.6       08 Feb 2025 08:00        Urinalysis Basic - ( 08 Feb 2025 08:00 )    Color: x / Appearance: x / SG: x / pH: x  Gluc: 114 mg/dL / Ketone: x  / Bili: x / Urobili: x   Blood: x / Protein: x / Nitrite: x   Leuk Esterase: x / RBC: x / WBC x   Sq Epi: x / Non Sq Epi: x / Bacteria: x      CAPILLARY BLOOD GLUCOSE            Culture - Sputum (collected 02-06-25 @ 05:19)  Source: .Sputum Sputum  Gram Stain (02-06-25 @ 21:38):    No polymorphonuclear leukocytes per low power field    Rare Squamous epithelial cells per low power field    Rare Yeast like cells per oil power field  Final Report (02-08-25 @ 08:08):    Commensal annetta consistent with body site    Culture - Urine (collected 02-05-25 @ 05:00)  Source: Clean Catch  Final Report (02-08-25 @ 14:38):    50,000 - 99,000 CFU/mL Enterococcus faecalis  Organism: Enterococcus faecalis (02-08-25 @ 14:38)  Organism: Enterococcus faecalis (02-08-25 @ 14:38)      Method Type: JULIA      -  Ampicillin: S <=2 Predicts results to ampicillin/sulbactam, amoxacillin-clavulanate and  piperacillin-tazobactam.      -  Ciprofloxacin: S <=1      -  Levofloxacin: S 2      -  Nitrofurantoin: S <=32 Should not be used to treat pyelonephritis.      -  Tetracycline: S <=4      -  Vancomycin: S 0.5    Urinalysis with Rflx Culture (collected 02-05-25 @ 05:00)    Culture - Blood (collected 02-03-25 @ 11:10)  Source: .Blood BLOOD  Final Report (02-08-25 @ 17:00):    No growth at 5 days    Culture - Blood (collected 02-03-25 @ 11:05)  Source: .Blood BLOOD  Final Report (02-08-25 @ 17:00):    No growth at 5 days        RADIOLOGY & ADDITIONAL TESTS:    Personally reviewed.     Consultant(s) Notes Reviewed:  [x] YES  [ ] NO

## 2025-02-09 NOTE — PROGRESS NOTE ADULT - PROBLEM SELECTOR PLAN 5
History of rheumatoid arthritis, well controlled  - Restarted 10mg prednisone home per pulm recs, recs appreciated History of rheumatoid arthritis, well controlled  - Restarted 40mg prednisone home per pulm recs, recs appreciated

## 2025-02-09 NOTE — PROGRESS NOTE ADULT - PROBLEM SELECTOR PLAN 1
- Patient on 4L NC due to hypoxia, wean as tolerated  - Leukocytosis improved, afebrile  - BCx NGTD, UCx + E faecalis, sputum cx negative  - MRSA/MSSA +  - C/w Rocephin (Day 5) and Vancomycin (Day 2), bactroban BID x5 days for MSSA  - Urine strep and legionella negative, azithro discontinued  - C/w IV solumedrol taper  - Tylenol prn for fever, albuterol prn for SOB/wheezing, tessalon perles for cough   - Infectious disease Dr. Mondragon consult noted, rec to complete 7 day course of vanco  - Started vanco 2/6 pm, likely will continue until 2/12 PM dose   - Vanco trough 50 likely elevated 2/2 being taken after given dose  - Pulmonology Dr. Dobson following - Patient on 4L NC due to hypoxia, wean as tolerated  - Leukocytosis improved, afebrile  - BCx NGTD, UCx + E faecalis, sputum cx negative  - MRSA/MSSA +  - C/w Rocephin (Day 5) and Vancomycin (Day 2), bactroban BID x5 days for MSSA  - Urine strep and legionella negative, azithro discontinued  - C/w IV solumedrol taper  - Tylenol prn for fever, albuterol prn for SOB/wheezing, tessalon perles for cough   - Infectious disease Dr. Mondragon consult noted, rec to complete 7 day course of vanco  - Started vanco 2/6 pm, likely will continue until 2/12 PM dose per ID recs  - Vanco trough 50 likely elevated 2/2 being taken after given dose  - Pulmonology Dr. Dobson following - Patient on 4L NC due to hypoxia, wean as tolerated  - Leukocytosis improved, afebrile  - BCx NGTD, UCx + E faecalis, sputum cx negative  - MRSA/MSSA +  - C/w Rocephin (Day 5) and Vancomycin (Day 2), bactroban BID x5 days for MSSA  - Urine strep and legionella negative, azithro discontinued  - Tylenol prn for fever, albuterol prn for SOB/wheezing, tessalon perles for cough   - Infectious disease Dr. Mondragon consult noted, rec to complete 7 day course of vanco  - Started vanco 2/6 pm, likely will continue until 2/12 PM dose per ID recs  - Vanco trough 50 likely elevated 2/2 being taken after given dose  - Pulmonology Dr. Dobson following  - Started on 40mg prednisone and proventil pulm recs, IV steroids stopped  - CXR 2/8 f/u report - Patient on 4L NC due to hypoxia, wean as tolerated  - Leukocytosis improved, afebrile  - BCx NGTD, UCx + E faecalis, sputum cx negative  - MRSA/MSSA +  - C/w Rocephin and Vancomycin, bactroban BID x5 days for MSSA  - Urine strep and legionella negative, azithro discontinued  - Tylenol prn for fever, albuterol prn for SOB/wheezing, tessalon perles for cough   - Infectious disease Dr. Mondragon consult noted, rec to complete 7 day course of vanco  - Started vanco 2/6 pm, likely will continue until 2/12 PM dose per ID recs  - Adjust vanco dosing based on trough  - Pulmonology Dr. Dobson following  - Started on 40mg prednisone and proventil pulm recs, IV steroids stopped  - CXR 2/8 performed, f/u report

## 2025-02-09 NOTE — PROGRESS NOTE ADULT - PROBLEM SELECTOR PLAN 7
DVT ppx: Lovenox    called sister 2/8 with updates DVT ppx: Lovenox    pt requesting not to call family with updates, son at bedside DVT ppx: Lovenox    pt son at bedside updated

## 2025-02-10 VITALS
TEMPERATURE: 98 F | RESPIRATION RATE: 19 BRPM | OXYGEN SATURATION: 97 % | SYSTOLIC BLOOD PRESSURE: 149 MMHG | DIASTOLIC BLOOD PRESSURE: 74 MMHG | HEART RATE: 104 BPM

## 2025-02-10 LAB
ALBUMIN SERPL ELPH-MCNC: 3.1 G/DL — LOW (ref 3.3–5)
ALP SERPL-CCNC: 98 U/L — SIGNIFICANT CHANGE UP (ref 40–120)
ALT FLD-CCNC: 24 U/L — SIGNIFICANT CHANGE UP (ref 12–78)
ANION GAP SERPL CALC-SCNC: 3 MMOL/L — LOW (ref 5–17)
AST SERPL-CCNC: 15 U/L — SIGNIFICANT CHANGE UP (ref 15–37)
BASOPHILS # BLD AUTO: 0.08 K/UL — SIGNIFICANT CHANGE UP (ref 0–0.2)
BASOPHILS NFR BLD AUTO: 0.9 % — SIGNIFICANT CHANGE UP (ref 0–2)
BILIRUB SERPL-MCNC: 0.4 MG/DL — SIGNIFICANT CHANGE UP (ref 0.2–1.2)
BUN SERPL-MCNC: 22 MG/DL — SIGNIFICANT CHANGE UP (ref 7–23)
CALCIUM SERPL-MCNC: 10.5 MG/DL — HIGH (ref 8.5–10.1)
CHLORIDE SERPL-SCNC: 97 MMOL/L — SIGNIFICANT CHANGE UP (ref 96–108)
CO2 SERPL-SCNC: 37 MMOL/L — HIGH (ref 22–31)
CREAT SERPL-MCNC: 0.64 MG/DL — SIGNIFICANT CHANGE UP (ref 0.5–1.3)
EGFR: 96 ML/MIN/1.73M2 — SIGNIFICANT CHANGE UP
EOSINOPHIL # BLD AUTO: 0.03 K/UL — SIGNIFICANT CHANGE UP (ref 0–0.5)
EOSINOPHIL NFR BLD AUTO: 0.3 % — SIGNIFICANT CHANGE UP (ref 0–6)
GLUCOSE SERPL-MCNC: 114 MG/DL — HIGH (ref 70–99)
HCT VFR BLD CALC: 35.6 % — SIGNIFICANT CHANGE UP (ref 34.5–45)
HGB BLD-MCNC: 10.5 G/DL — LOW (ref 11.5–15.5)
IMM GRANULOCYTES NFR BLD AUTO: 10.4 % — HIGH (ref 0–0.9)
LYMPHOCYTES # BLD AUTO: 1.73 K/UL — SIGNIFICANT CHANGE UP (ref 1–3.3)
LYMPHOCYTES # BLD AUTO: 19 % — SIGNIFICANT CHANGE UP (ref 13–44)
MAGNESIUM SERPL-MCNC: 2.3 MG/DL — SIGNIFICANT CHANGE UP (ref 1.6–2.6)
MCHC RBC-ENTMCNC: 24 PG — LOW (ref 27–34)
MCHC RBC-ENTMCNC: 29.5 G/DL — LOW (ref 32–36)
MCV RBC AUTO: 81.3 FL — SIGNIFICANT CHANGE UP (ref 80–100)
MONOCYTES # BLD AUTO: 0.71 K/UL — SIGNIFICANT CHANGE UP (ref 0–0.9)
MONOCYTES NFR BLD AUTO: 7.8 % — SIGNIFICANT CHANGE UP (ref 2–14)
NEUTROPHILS # BLD AUTO: 5.61 K/UL — SIGNIFICANT CHANGE UP (ref 1.8–7.4)
NEUTROPHILS NFR BLD AUTO: 61.6 % — SIGNIFICANT CHANGE UP (ref 43–77)
NRBC # BLD: 0 /100 WBCS — SIGNIFICANT CHANGE UP (ref 0–0)
NRBC BLD-RTO: 0 /100 WBCS — SIGNIFICANT CHANGE UP (ref 0–0)
PLATELET # BLD AUTO: 312 K/UL — SIGNIFICANT CHANGE UP (ref 150–400)
POTASSIUM SERPL-MCNC: 3.7 MMOL/L — SIGNIFICANT CHANGE UP (ref 3.5–5.3)
POTASSIUM SERPL-SCNC: 3.7 MMOL/L — SIGNIFICANT CHANGE UP (ref 3.5–5.3)
PROT SERPL-MCNC: 6.3 G/DL — SIGNIFICANT CHANGE UP (ref 6–8.3)
RBC # BLD: 4.38 M/UL — SIGNIFICANT CHANGE UP (ref 3.8–5.2)
RBC # FLD: 16.2 % — HIGH (ref 10.3–14.5)
SODIUM SERPL-SCNC: 137 MMOL/L — SIGNIFICANT CHANGE UP (ref 135–145)
WBC # BLD: 9.11 K/UL — SIGNIFICANT CHANGE UP (ref 3.8–10.5)
WBC # FLD AUTO: 9.11 K/UL — SIGNIFICANT CHANGE UP (ref 3.8–10.5)

## 2025-02-10 PROCEDURE — 94760 N-INVAS EAR/PLS OXIMETRY 1: CPT

## 2025-02-10 PROCEDURE — 71045 X-RAY EXAM CHEST 1 VIEW: CPT

## 2025-02-10 PROCEDURE — 93005 ELECTROCARDIOGRAM TRACING: CPT

## 2025-02-10 PROCEDURE — 96374 THER/PROPH/DIAG INJ IV PUSH: CPT

## 2025-02-10 PROCEDURE — 96375 TX/PRO/DX INJ NEW DRUG ADDON: CPT

## 2025-02-10 PROCEDURE — 82550 ASSAY OF CK (CPK): CPT

## 2025-02-10 PROCEDURE — 99232 SBSQ HOSP IP/OBS MODERATE 35: CPT

## 2025-02-10 PROCEDURE — 87641 MR-STAPH DNA AMP PROBE: CPT

## 2025-02-10 PROCEDURE — 83605 ASSAY OF LACTIC ACID: CPT

## 2025-02-10 PROCEDURE — 83880 ASSAY OF NATRIURETIC PEPTIDE: CPT

## 2025-02-10 PROCEDURE — 85025 COMPLETE CBC W/AUTO DIFF WBC: CPT

## 2025-02-10 PROCEDURE — 36415 COLL VENOUS BLD VENIPUNCTURE: CPT

## 2025-02-10 PROCEDURE — 80048 BASIC METABOLIC PNL TOTAL CA: CPT

## 2025-02-10 PROCEDURE — 83735 ASSAY OF MAGNESIUM: CPT

## 2025-02-10 PROCEDURE — 87040 BLOOD CULTURE FOR BACTERIA: CPT

## 2025-02-10 PROCEDURE — 97530 THERAPEUTIC ACTIVITIES: CPT

## 2025-02-10 PROCEDURE — 94640 AIRWAY INHALATION TREATMENT: CPT

## 2025-02-10 PROCEDURE — 87077 CULTURE AEROBIC IDENTIFY: CPT

## 2025-02-10 PROCEDURE — G0545: CPT

## 2025-02-10 PROCEDURE — 36600 WITHDRAWAL OF ARTERIAL BLOOD: CPT

## 2025-02-10 PROCEDURE — 99239 HOSP IP/OBS DSCHRG MGMT >30: CPT | Mod: GC

## 2025-02-10 PROCEDURE — 85610 PROTHROMBIN TIME: CPT

## 2025-02-10 PROCEDURE — 87186 SC STD MICRODIL/AGAR DIL: CPT

## 2025-02-10 PROCEDURE — 85379 FIBRIN DEGRADATION QUANT: CPT

## 2025-02-10 PROCEDURE — 80202 ASSAY OF VANCOMYCIN: CPT

## 2025-02-10 PROCEDURE — 71275 CT ANGIOGRAPHY CHEST: CPT | Mod: MC

## 2025-02-10 PROCEDURE — 85027 COMPLETE CBC AUTOMATED: CPT

## 2025-02-10 PROCEDURE — 85730 THROMBOPLASTIN TIME PARTIAL: CPT

## 2025-02-10 PROCEDURE — 84100 ASSAY OF PHOSPHORUS: CPT

## 2025-02-10 PROCEDURE — 87086 URINE CULTURE/COLONY COUNT: CPT

## 2025-02-10 PROCEDURE — 87070 CULTURE OTHR SPECIMN AEROBIC: CPT

## 2025-02-10 PROCEDURE — 87640 STAPH A DNA AMP PROBE: CPT

## 2025-02-10 PROCEDURE — 97161 PT EVAL LOW COMPLEX 20 MIN: CPT

## 2025-02-10 PROCEDURE — 99291 CRITICAL CARE FIRST HOUR: CPT

## 2025-02-10 PROCEDURE — 87637 SARSCOV2&INF A&B&RSV AMP PRB: CPT

## 2025-02-10 PROCEDURE — 81001 URINALYSIS AUTO W/SCOPE: CPT

## 2025-02-10 PROCEDURE — 87449 NOS EACH ORGANISM AG IA: CPT

## 2025-02-10 PROCEDURE — 80053 COMPREHEN METABOLIC PANEL: CPT

## 2025-02-10 PROCEDURE — 87205 SMEAR GRAM STAIN: CPT

## 2025-02-10 PROCEDURE — 82803 BLOOD GASES ANY COMBINATION: CPT

## 2025-02-10 PROCEDURE — 83036 HEMOGLOBIN GLYCOSYLATED A1C: CPT

## 2025-02-10 PROCEDURE — 84484 ASSAY OF TROPONIN QUANT: CPT

## 2025-02-10 RX ORDER — ALPRAZOLAM 2 MG
1 TABLET ORAL
Qty: 5 | Refills: 0
Start: 2025-02-10 | End: 2025-02-14

## 2025-02-10 RX ORDER — METOPROLOL SUCCINATE 25 MG
0.5 TABLET, EXTENDED RELEASE 24 HR ORAL
Qty: 30 | Refills: 0
Start: 2025-02-10

## 2025-02-10 RX ORDER — PREDNISONE 5 MG/1
1 TABLET ORAL
Qty: 10 | Refills: 0
Start: 2025-02-10 | End: 2025-02-14

## 2025-02-10 RX ORDER — PREDNISONE 5 MG/1
2 TABLET ORAL
Qty: 25 | Refills: 0
Start: 2025-02-10 | End: 2025-02-14

## 2025-02-10 RX ORDER — FLUTICASONE PROPIONATE AND SALMETEROL 113; 14 UG/1; UG/1
1 POWDER, METERED RESPIRATORY (INHALATION)
Qty: 1 | Refills: 0
Start: 2025-02-10

## 2025-02-10 RX ORDER — PREDNISONE 5 MG/1
1 TABLET ORAL
Qty: 30 | Refills: 0
Start: 2025-02-10

## 2025-02-10 RX ORDER — SULFAMETHOXAZOLE AND TRIMETHOPRIM 400; 80 MG/1; MG/1
1 TABLET ORAL EVERY 12 HOURS
Refills: 0 | Status: DISCONTINUED | OUTPATIENT
Start: 2025-02-10 | End: 2025-02-10

## 2025-02-10 RX ORDER — SULFAMETHOXAZOLE AND TRIMETHOPRIM 400; 80 MG/1; MG/1
1 TABLET ORAL
Qty: 10 | Refills: 0
Start: 2025-02-10

## 2025-02-10 RX ADMIN — Medication 100 MILLIGRAM(S): at 12:28

## 2025-02-10 RX ADMIN — Medication 0.25 MILLIGRAM(S): at 12:28

## 2025-02-10 RX ADMIN — PREDNISONE 40 MILLIGRAM(S): 5 TABLET ORAL at 05:56

## 2025-02-10 RX ADMIN — Medication 2000 UNIT(S): at 12:27

## 2025-02-10 RX ADMIN — Medication 1200 MILLIGRAM(S): at 05:56

## 2025-02-10 RX ADMIN — Medication 10 MILLIGRAM(S): at 05:56

## 2025-02-10 RX ADMIN — Medication 12.5 MILLIGRAM(S): at 05:56

## 2025-02-10 RX ADMIN — FLUTICASONE PROPIONATE AND SALMETEROL 1 DOSE(S): 113; 14 POWDER, METERED RESPIRATORY (INHALATION) at 05:56

## 2025-02-10 RX ADMIN — VANCOMYCIN HYDROCHLORIDE 250 MILLIGRAM(S): KIT at 06:03

## 2025-02-10 RX ADMIN — Medication 100 MILLIGRAM(S): at 05:56

## 2025-02-10 RX ADMIN — MUPIROCIN 1 APPLICATION(S): 2 CREAM TOPICAL at 05:56

## 2025-02-10 NOTE — PROGRESS NOTE ADULT - REASON FOR ADMISSION
Pneumonia

## 2025-02-10 NOTE — PROGRESS NOTE ADULT - TIME BILLING
Reviewing medical record including consultant recommendations, labs, vitals, medications, orders, imaging, and discussion of plan of care with patient, family, consultants, and nursing.
Reviewing chart notes and data, reviewing telemetry monitor records, face to face time counseling the patient, coordinating care with SW/CM at [facility].   Pt seen and examined. Case discussed with resident. Agree with assessment and plan above (edited by me in detail)
Reviewing chart notes and data, reviewing telemetry monitor records, face to face time counseling the patient, coordinating care with SW/CM at [facility].   Pt seen and examined. Case discussed with resident. Agree with assessment and plan above (edited by me in detail)
Reviewing chart notes and data, reviewing telemetry monitor records, face to face time counseling the patient,  coordinating care with SW/CM at [facility].   Pt seen and examined. Case discussed with resident. Agree with assessment and plan above (edited by me in detail)
Reviewing chart notes and data, reviewing telemetry monitor records, face to face time counseling the patient, coordinating care with SW/CM at [facility].   Pt seen and examined. Case discussed with resident. Agree with assessment and plan above (edited by me in detail)
Reviewing chart notes and data, reviewing telemetry monitor records, face to face time counseling the patient, coordinating care with SW/CM at [facility].   Pt seen and examined. Case discussed with resident. Agree with assessment and plan above (edited by me in detail)
Reviewing chart notes and data, reviewing telemetry monitor records, face to face time counseling the patient, coordinating care with SW/CM at [facility].   Pt seen and examined. Case discussed with resident. Agree with assessment and plan above (edited by me in detail)

## 2025-02-10 NOTE — PROGRESS NOTE ADULT - PROBLEM SELECTOR PROBLEM 2
COPD with pneumonia

## 2025-02-10 NOTE — CASE MANAGEMENT PROGRESS NOTE - NSCMPROGRESSNOTE_GEN_ALL_CORE
Per MD, the patient is medically cleared for discharge home today. CM has confirmed with [facility] and patient, that the patient has portable oxygen in the form of e-tanks. Transport wheelchair has been approved and will be delivered to the patient's home upon [employer] receiving the co-payment-patient made aware. Referral to [facility] has been updated and has been accepted for SOC 2/11/25. CM met with the patient at the bedside to discuss transition planning for today as stated above. CM has instructed the patient to have her  bring the e-tank to transport her home today. IMM reviewed/patient declined to sign/copy provided to the patient. Patient verbalized understanding of the transition plan and is in agreement. CM remains available.

## 2025-02-10 NOTE — PROGRESS NOTE ADULT - PROBLEM SELECTOR PLAN 2
History of COPD, well controlled, on home inhaler does not use due to lack of insurance.   - On 3L NC, home O2 eval completed  - Advair BID  - Supplemental O2 PRN. on 3L NC now  - RVP negative  - PRN tessalon perles  - Now on 40mg prednisone (on 10mg daily at home)  - Pulm Dr. Dobson following History of COPD, well controlled, on home inhaler does not use due to lack of insurance.   - On 3L NC, home O2 eval completed  - Advair BID  - Supplemental O2 PRN. on 3L NC now  - RVP negative  - PRN tessalon perles  - Now on 40mg prednisone (on 10mg daily at home) - taper by 5mg daily until back on 10mg qd home dose  - Pulm Dr. Dobson following

## 2025-02-10 NOTE — PROGRESS NOTE ADULT - ATTENDING COMMENTS
69F with past medical history of colon cancer in remission, rheumatoid arthritis, hypertension and COPD presents to the ER with shortness of breath found to have pneumonia. Severe sepsis (POA) with lactic acidosis and acute hypoxic respiratory failure 2/2 PNA (MRSA PNA) and COPD exacerbation. MSRA nares positive - MRSA PNA. Continue IV Vancomycin and Rocephin. Vanco trough mildly elevated - dose decreased, repeat trough ordered. Transitioned to PO steroids, s/p IV steroids- will taper back to 10mg home dose. Nebulizer treatments stopped per pulm. Continue 3L NC for dc home. Declining MELISSA at this time. DC planning to home with home care -patient in agreement. Abx switched to PO Bactrim BID to complete 10 day course - discussed with Dr Giancarlo OBREGON.

## 2025-02-10 NOTE — PROGRESS NOTE ADULT - PROBLEM SELECTOR PROBLEM 4
Hypertension
Hypertension
Pleural effusion
Hypertension

## 2025-02-10 NOTE — PROGRESS NOTE ADULT - PROVIDER SPECIALTY LIST ADULT
Hospitalist
Hospitalist
Infectious Disease
Infectious Disease
Pulmonology
Infectious Disease
Pulmonology
Infectious Disease
Pulmonology
Hospitalist

## 2025-02-10 NOTE — PROGRESS NOTE ADULT - NUTRITIONAL ASSESSMENT
This patient has been assessed with a concern for Malnutrition and has been determined to have a diagnosis/diagnoses of Moderate protein-calorie malnutrition.    This patient is being managed with:   Diet DASH/TLC-  Sodium & Cholesterol Restricted  Entered: Feb  3 2025  6:53PM  

## 2025-02-10 NOTE — PROGRESS NOTE ADULT - PROBLEM SELECTOR PLAN 1
- Patient on 3L NC due to hypoxia, wean as tolerated  - Leukocytosis improved, afebrile  - BCx NGTD, UCx + E faecalis, sputum cx negative  - MRSA/MSSA +  - C/w Rocephin and Vancomycin, bactroban BID x5 days for MSSA  - Started vanco 2/6 pm, likely will continue until 2/12 PM dose per ID recs  - Urine strep and legionella negative, azithro discontinued  - Tylenol prn for fever, albuterol prn for SOB/wheezing, tessalon perles for cough   - Adjust vanco dosing based on trough  - on 40mg prednisone and proventil   - CXR 2/8 performed, f/u report  - Infectious disease Dr. Mondragon consulted - recs appreciated  - Pulmonology Dr. Dobson following - pt follows with Dr. Dobson outpt - Patient on 3L NC due to hypoxia, wean as tolerated  - Leukocytosis improved, afebrile  - BCx NGTD, UCx + E faecalis, sputum cx negative  - MRSA/MSSA +  - s/p Rocephin and Vancomycin, bactroban BID x5 days for MSSA  - Started vanco 2/6 pm, day 5 today - can d/c home on Bactrim PO to complete 10 day course total   - Urine strep and legionella negative, azithro discontinued  - Tylenol prn for fever, albuterol prn for SOB/wheezing, tessalon perles for cough   - Adjust vanco dosing based on trough  - on 40mg prednisone and proventil   - taper prednisone on d/c by 5mg every day until back on home dose 10mg qd  - CXR 2/8 performed, f/u report  - Infectious disease Dr. Mondragon consulted - recs appreciated  - Pulmonology Dr. Dobson following - pt follows with Dr. Dobson outpt

## 2025-02-10 NOTE — PROGRESS NOTE ADULT - PROBLEM SELECTOR PLAN 5
History of rheumatoid arthritis, well controlled  - Restarted 40mg prednisone home per pulm recs, recs appreciated History of rheumatoid arthritis, well controlled  - Now on 40mg prednisone (on 10mg daily at home) - taper by 5mg daily until back on 10mg qd home dose

## 2025-02-10 NOTE — PROGRESS NOTE ADULT - SUBJECTIVE AND OBJECTIVE BOX
[facility]  INFECTIOUS DISEASES   [address] [place] [address]  Tel: [phone]     Fax: [phone]  ========================================================  MD Armando Scott Michelle, MD Shah, Kaushal, MD Sunjit, Jaspal, MD Sehrish Shahid, MD   ========================================================    [id number]  [facility]     Follow up: Pneumonia    Doing better, SOB is improving, 3L o2 with NC.    No fever.     PAST MEDICAL & SURGICAL HISTORY:  COPD (chronic obstructive pulmonary disease)  Rheumatoid arthritis  HTN (hypertension)  Anxiety  No significant past surgical history  colon resection  02.2024    Social Hx: No current smoking, EtOH or drugs     FAMILY HISTORY:  FHx: lung cancer    Allergies  No Known Allergies    MEDICATIONS  (STANDING):  amLODIPine   Tablet 10 milliGRAM(s) Oral daily  benzonatate 100 milliGRAM(s) Oral every 8 hours  cefTRIAXone   IVPB 1000 milliGRAM(s) IV Intermittent every 24 hours  cholecalciferol 2000 Unit(s) Oral daily  enoxaparin Injectable 40 milliGRAM(s) SubCutaneous every 24 hours  fluticasone propionate/ salmeterol 250-50 MICROgram(s) Diskus 1 Dose(s) Inhalation two times a day  guaiFENesin ER 1200 milliGRAM(s) Oral every 12 hours  influenza  Vaccine (HIGH DOSE) 0.5 milliLiter(s) IntraMuscular once  lactated ringers. 1000 milliLiter(s) (75 mL/Hr) IV Continuous <Continuous>  metoprolol tartrate 12.5 milliGRAM(s) Oral two times a day  mupirocin 2% Nasal 1 Application(s) Both Nostrils two times a day  predniSONE   Tablet 40 milliGRAM(s) Oral daily     REVIEW OF SYSTEMS:  CONSTITUTIONAL:  No Fever or chills  HEENT:  No diplopia or blurred vision.  No sore throat or runny nose.  CARDIOVASCULAR:  No chest pain   RESPIRATORY:  No cough, shortness of breath, PND or orthopnea.  GASTROINTESTINAL:  No nausea, vomiting or diarrhea.  GENITOURINARY:  No dysuria, frequency or urgency. No Blood in urine  MUSCULOSKELETAL:  no joint aches, no muscle pain  SKIN:  No change in skin, hair or nails.    Physical Exam:  Vital Signs Last 24 Hrs  T(C): 36.7 (10 Feb 2025 05:52), Max: 36.8 (09 Feb 2025 12:40)  T(F): 98 (10 Feb 2025 05:52), Max: 98.2 (09 Feb 2025 12:40)  HR: 110 (10 Feb 2025 05:52) (93 - 111)  BP: 170/78 (10 Feb 2025 05:52) (132/88 - 170/78)  BP(mean): --  RR: 19 (10 Feb 2025 05:52) (17 - 20)  SpO2: 90% (10 Feb 2025 05:52) (89% - 96%)  Parameters below as of 10 Feb 2025 05:52  Patient On (Oxygen Delivery Method): nasal cannula  GEN: NAD  HEENT: normocephalic and atraumatic. EOMI. PERRL.    NECK: Supple.  No lymphadenopathy   LUNGS: Crackles in left lung  HEART: Regular rate and rhythm   ABDOMEN: Soft, nontender, and nondistended.   EXTREMITIES: Without edema, joints with ulnar deviation and RA changes   NEUROLOGIC: grossly intact.  PSYCHIATRIC: Appropriate affect .  SKIN: No rash     Labs:                        10.5   9.11  )-----------( 312      ( 10 Feb 2025 06:20 )             35.6     02-10    137  |  97  |  22  ----------------------------<  114[H]  3.7   |  37[H]  |  0.64    Ca    10.5[H]      10 Feb 2025 06:20  Mg     2.3     02-10    TPro  6.3  /  Alb  3.1[L]  /  TBili  0.4  /  DBili  x   /  AST  15  /  ALT  24  /  AlkPhos  98  02-10    Culture - Sputum (collected 02-06-25 @ 05:19)  Source: .Sputum Sputum  Gram Stain (02-06-25 @ 21:38):    No polymorphonuclear leukocytes per low power field    Rare Squamous epithelial cells per low power field    Rare Yeast like cells per oil power field  Final Report (02-08-25 @ 08:08):    Commensal annetta consistent with body site    Culture - Urine (collected 02-05-25 @ 05:00)  Source: Clean Catch  Final Report (02-08-25 @ 14:38):    50,000 - 99,000 CFU/mL Enterococcus faecalis  Organism: Enterococcus faecalis (02-08-25 @ 14:38)  Organism: Enterococcus faecalis (02-08-25 @ 14:38)    Sensitivities:      Method Type: JULIA      -  Ampicillin: S <=2 Predicts results to ampicillin/sulbactam, amoxacillin-clavulanate and  piperacillin-tazobactam.      -  Ciprofloxacin: S <=1      -  Levofloxacin: S 2      -  Nitrofurantoin: S <=32 Should not be used to treat pyelonephritis.      -  Tetracycline: S <=4      -  Vancomycin: S 0.5    Urinalysis with Rflx Culture (collected 02-05-25 @ 05:00)    Culture - Blood (collected 02-03-25 @ 11:10)  Source: .Blood BLOOD  Final Report (02-08-25 @ 17:00):    No growth at 5 days    Culture - Blood (collected 02-03-25 @ 11:05)  Source: .Blood BLOOD  Final Report (02-08-25 @ 17:00):    No growth at 5 days    WBC Count: 9.11 K/uL (02-10-25 @ 06:20)  WBC Count: 10.81 K/uL (02-09-25 @ 08:55)  WBC Count: 9.82 K/uL (02-08-25 @ 08:00)  WBC Count: 7.18 K/uL (02-07-25 @ 05:00)  WBC Count: 8.44 K/uL (02-06-25 @ 07:16)    Creatinine: 0.64 mg/dL (02-10-25 @ 06:20)  Creatinine: 0.68 mg/dL (02-09-25 @ 08:55)  Creatinine: 0.61 mg/dL (02-08-25 @ 08:00)  Creatinine: 0.67 mg/dL (02-07-25 @ 05:00)  Creatinine: 0.78 mg/dL (02-06-25 @ 07:16)     SARS-CoV-2 Result: NotDetec (02-03-25 @ 11:30)    All imaging and other data have been reviewed.  < from: CT Angio Chest PE Protocol w/ IV Cont (02.03.25 @ 12:45) >  IMPRESSION:  No acute pulmonary embolus  Acute left sixth and seventh rib fractures as described. No pneumothorax  Left upper lobe mixed airspace and interstitial infiltrate compatible   with pneumonia  Complete lingular atelectasis    Assessment and Plan:   70 yo woman with PMH of HTN, COPD, rheumatoid arthritis, and colon cancer (now in remission), presents to the ER with shortness of breath.   She states that for the past 3 days she has had felt sick, coughing and congestion.   UCx with E faecalis low CFU unclear if contamination or not, was on vancomycin anyway that covers it.   In ED labs showed leukocytosis of 19 and CT with FEROZ pneumonia    # PNA    - Blood cultures NGTD   - WBC [id number]-->9, no fever   - Legionella Ag negative   - Pulmonary follow up   - mupirocin for nares for 5days  - On ceftriaxone   - On vancomycin, Trough high so holding it  - Can stop both antibiotics and start Bactrim DS q12 to complete total 10days from start of vancomycin   - MRSA PCR is positive     Will sign off please call with any question.     Lenora Mondragon MD  Division of Infectious Diseases   Please call ID service at [phone] with any question.    50 Minutes spent on total encounter assessing patient, examination, chart review, counseling and coordinating care by the attending physician/nurse/care manager.

## 2025-02-10 NOTE — PROGRESS NOTE ADULT - SUBJECTIVE AND OBJECTIVE BOX
Patient is a 69y old  Female who presents with a chief complaint of Pneumonia (09 Feb 2025 16:17)      INTERVAL HPI/OVERNIGHT EVENTS: No acute overnight events. Pt was seen and examined at bedside. Pt states that she is feeling better this AM, still has cough but not too bothersome. VSS. No other complaints at this time.  On day 5/7 of IV vanco (started 2/6). Patient requesting going home. on 3L NC.    MEDICATIONS  (STANDING):  amLODIPine   Tablet 10 milliGRAM(s) Oral daily  benzonatate 100 milliGRAM(s) Oral every 8 hours  cefTRIAXone   IVPB 1000 milliGRAM(s) IV Intermittent every 24 hours  cholecalciferol 2000 Unit(s) Oral daily  enoxaparin Injectable 40 milliGRAM(s) SubCutaneous every 24 hours  fluticasone propionate/ salmeterol 250-50 MICROgram(s) Diskus 1 Dose(s) Inhalation two times a day  guaiFENesin ER 1200 milliGRAM(s) Oral every 12 hours  influenza  Vaccine (HIGH DOSE) 0.5 milliLiter(s) IntraMuscular once  lactated ringers. 1000 milliLiter(s) (75 mL/Hr) IV Continuous <Continuous>  metoprolol tartrate 12.5 milliGRAM(s) Oral two times a day  mupirocin 2% Nasal 1 Application(s) Both Nostrils two times a day  predniSONE   Tablet 40 milliGRAM(s) Oral daily  vancomycin  IVPB 750 milliGRAM(s) IV Intermittent every 12 hours    MEDICATIONS  (PRN):  acetaminophen     Tablet .. 650 milliGRAM(s) Oral every 6 hours PRN Temp greater or equal to 38C (100.4F), Mild Pain (1 - 3)  albuterol    90 MICROgram(s) HFA Inhaler 2 Puff(s) Inhalation every 6 hours PRN Shortness of Breath and/or Wheezing  ALPRAZolam 0.25 milliGRAM(s) Oral daily PRN for anxiety  aluminum hydroxide/magnesium hydroxide/simethicone Suspension 30 milliLiter(s) Oral every 4 hours PRN Dyspepsia  ketorolac   Injectable 30 milliGRAM(s) IV Push two times a day PRN Moderate Pain (4 - 6)  melatonin 3 milliGRAM(s) Oral at bedtime PRN Insomnia  oxyCODONE    IR 2.5 milliGRAM(s) Oral every 6 hours PRN Severe Pain (7 - 10)  traMADol 25 milliGRAM(s) Oral every 6 hours PRN Mild Pain (1 - 3)      Allergies    No Known Allergies    Intolerances        REVIEW OF SYSTEMS:  CONSTITUTIONAL: No fever or chills  HEENT:  No headache, no sore throat  RESPIRATORY: + cough, no wheezing, or shortness of breath  CARDIOVASCULAR: No chest pain, palpitations  GASTROINTESTINAL: No abd pain, nausea, vomiting, or diarrhea  GENITOURINARY: No dysuria, frequency, or hematuria  NEUROLOGICAL: no focal weakness or dizziness  MUSCULOSKELETAL: no myalgias     Vital Signs Last 24 Hrs  T(C): 36.7 (10 Feb 2025 05:52), Max: 36.8 (09 Feb 2025 12:40)  T(F): 98 (10 Feb 2025 05:52), Max: 98.2 (09 Feb 2025 12:40)  HR: 110 (10 Feb 2025 05:52) (93 - 111)  BP: 170/78 (10 Feb 2025 05:52) (132/88 - 170/78)  BP(mean): --  RR: 19 (10 Feb 2025 05:52) (17 - 20)  SpO2: 90% (10 Feb 2025 05:52) (89% - 96%)    Parameters below as of 10 Feb 2025 05:52  Patient On (Oxygen Delivery Method): nasal cannula      PHYSICAL EXAM:  GENERAL: NAD  HEENT:  anicteric, moist mucous membranes  CHEST/LUNG: diffuse rhonchi b/l lung fields  HEART:  RRR, S1, S2  ABDOMEN:  soft NT  EXTREMITIES: no edema, cyanosis, or calf tenderness  NERVOUS SYSTEM: answers questions and follows commands appropriately    LABS:                        10.5   9.11  )-----------( 312      ( 10 Feb 2025 06:20 )             35.6     CBC Full  -  ( 10 Feb 2025 06:20 )  WBC Count : 9.11 K/uL  Hemoglobin : 10.5 g/dL  Hematocrit : 35.6 %  Platelet Count - Automated : 312 K/uL  Mean Cell Volume : 81.3 fl  Mean Cell Hemoglobin : 24.0 pg  Mean Cell Hemoglobin Concentration : 29.5 g/dL  Auto Neutrophil # : 5.61 K/uL  Auto Lymphocyte # : 1.73 K/uL  Auto Monocyte # : 0.71 K/uL  Auto Eosinophil # : 0.03 K/uL  Auto Basophil # : 0.08 K/uL  Auto Neutrophil % : 61.6 %  Auto Lymphocyte % : 19.0 %  Auto Monocyte % : 7.8 %  Auto Eosinophil % : 0.3 %  Auto Basophil % : 0.9 %    10 Feb 2025 06:20    137    |  97     |  22     ----------------------------<  114    3.7     |  37     |  0.64     Ca    10.5       10 Feb 2025 06:20  Mg     2.3       10 Feb 2025 06:20    TPro  6.3    /  Alb  3.1    /  TBili  0.4    /  DBili  x      /  AST  15     /  ALT  24     /  AlkPhos  98     10 Feb 2025 06:20      Urinalysis Basic - ( 10 Feb 2025 06:20 )    Color: x / Appearance: x / SG: x / pH: x  Gluc: 114 mg/dL / Ketone: x  / Bili: x / Urobili: x   Blood: x / Protein: x / Nitrite: x   Leuk Esterase: x / RBC: x / WBC x   Sq Epi: x / Non Sq Epi: x / Bacteria: x      CAPILLARY BLOOD GLUCOSE            Culture - Sputum (collected 02-06-25 @ 05:19)  Source: .Sputum Sputum  Gram Stain (02-06-25 @ 21:38):    No polymorphonuclear leukocytes per low power field    Rare Squamous epithelial cells per low power field    Rare Yeast like cells per oil power field  Final Report (02-08-25 @ 08:08):    Commensal annetta consistent with body site    Culture - Urine (collected 02-05-25 @ 05:00)  Source: Clean Catch  Final Report (02-08-25 @ 14:38):    50,000 - 99,000 CFU/mL Enterococcus faecalis  Organism: Enterococcus faecalis (02-08-25 @ 14:38)  Organism: Enterococcus faecalis (02-08-25 @ 14:38)      Method Type: JULIA      -  Ampicillin: S <=2 Predicts results to ampicillin/sulbactam, amoxacillin-clavulanate and  piperacillin-tazobactam.      -  Ciprofloxacin: S <=1      -  Levofloxacin: S 2      -  Nitrofurantoin: S <=32 Should not be used to treat pyelonephritis.      -  Tetracycline: S <=4      -  Vancomycin: S 0.5    Urinalysis with Rflx Culture (collected 02-05-25 @ 05:00)    Culture - Blood (collected 02-03-25 @ 11:10)  Source: .Blood BLOOD  Final Report (02-08-25 @ 17:00):    No growth at 5 days    Culture - Blood (collected 02-03-25 @ 11:05)  Source: .Blood BLOOD  Final Report (02-08-25 @ 17:00):    No growth at 5 days        RADIOLOGY & ADDITIONAL TESTS: ____    Personally reviewed.     Consultant(s) Notes Reviewed:  [x] YES  [ ] NO     Patient is a 69y old  Female who presents with a chief complaint of Pneumonia (09 Feb 2025 16:17)      INTERVAL HPI/OVERNIGHT EVENTS: No acute overnight events. Pt was seen and examined at bedside. Pt states that she is feeling better this AM, still has cough but not too bothersome. VSS. No other complaints at this time.  On day 5/7 of IV vanco (started 2/6). Patient requesting going home. on 3L NC.    MEDICATIONS  (STANDING):  amLODIPine   Tablet 10 milliGRAM(s) Oral daily  benzonatate 100 milliGRAM(s) Oral every 8 hours  cefTRIAXone   IVPB 1000 milliGRAM(s) IV Intermittent every 24 hours  cholecalciferol 2000 Unit(s) Oral daily  enoxaparin Injectable 40 milliGRAM(s) SubCutaneous every 24 hours  fluticasone propionate/ salmeterol 250-50 MICROgram(s) Diskus 1 Dose(s) Inhalation two times a day  guaiFENesin ER 1200 milliGRAM(s) Oral every 12 hours  influenza  Vaccine (HIGH DOSE) 0.5 milliLiter(s) IntraMuscular once  lactated ringers. 1000 milliLiter(s) (75 mL/Hr) IV Continuous <Continuous>  metoprolol tartrate 12.5 milliGRAM(s) Oral two times a day  mupirocin 2% Nasal 1 Application(s) Both Nostrils two times a day  predniSONE   Tablet 40 milliGRAM(s) Oral daily  vancomycin  IVPB 750 milliGRAM(s) IV Intermittent every 12 hours    MEDICATIONS  (PRN):  acetaminophen     Tablet .. 650 milliGRAM(s) Oral every 6 hours PRN Temp greater or equal to 38C (100.4F), Mild Pain (1 - 3)  albuterol    90 MICROgram(s) HFA Inhaler 2 Puff(s) Inhalation every 6 hours PRN Shortness of Breath and/or Wheezing  ALPRAZolam 0.25 milliGRAM(s) Oral daily PRN for anxiety  aluminum hydroxide/magnesium hydroxide/simethicone Suspension 30 milliLiter(s) Oral every 4 hours PRN Dyspepsia  ketorolac   Injectable 30 milliGRAM(s) IV Push two times a day PRN Moderate Pain (4 - 6)  melatonin 3 milliGRAM(s) Oral at bedtime PRN Insomnia  oxyCODONE    IR 2.5 milliGRAM(s) Oral every 6 hours PRN Severe Pain (7 - 10)  traMADol 25 milliGRAM(s) Oral every 6 hours PRN Mild Pain (1 - 3)      Allergies    No Known Allergies    Intolerances        REVIEW OF SYSTEMS:  CONSTITUTIONAL: No fever or chills  HEENT:  No headache, no sore throat  RESPIRATORY: + cough, no wheezing, or shortness of breath  CARDIOVASCULAR: No chest pain, palpitations  GASTROINTESTINAL: No abd pain, nausea, vomiting, or diarrhea  GENITOURINARY: No dysuria, frequency, or hematuria  NEUROLOGICAL: no focal weakness or dizziness  MUSCULOSKELETAL: no myalgias     Vital Signs Last 24 Hrs  T(C): 36.7 (10 Feb 2025 05:52), Max: 36.8 (09 Feb 2025 12:40)  T(F): 98 (10 Feb 2025 05:52), Max: 98.2 (09 Feb 2025 12:40)  HR: 110 (10 Feb 2025 05:52) (93 - 111)  BP: 170/78 (10 Feb 2025 05:52) (132/88 - 170/78)  BP(mean): --  RR: 19 (10 Feb 2025 05:52) (17 - 20)  SpO2: 90% (10 Feb 2025 05:52) (89% - 96%)    Parameters below as of 10 Feb 2025 05:52  Patient On (Oxygen Delivery Method): nasal cannula      PHYSICAL EXAM:  GENERAL: NAD  HEENT:  anicteric, moist mucous membranes  CHEST/LUNG: diffuse rhonchi b/l lung fields  HEART:  RRR, S1, S2  ABDOMEN:  soft NT  EXTREMITIES: no edema, cyanosis, or calf tenderness  NERVOUS SYSTEM: answers questions and follows commands appropriately    LABS:                        10.5   9.11  )-----------( 312      ( 10 Feb 2025 06:20 )             35.6     CBC Full  -  ( 10 Feb 2025 06:20 )  WBC Count : 9.11 K/uL  Hemoglobin : 10.5 g/dL  Hematocrit : 35.6 %  Platelet Count - Automated : 312 K/uL  Mean Cell Volume : 81.3 fl  Mean Cell Hemoglobin : 24.0 pg  Mean Cell Hemoglobin Concentration : 29.5 g/dL  Auto Neutrophil # : 5.61 K/uL  Auto Lymphocyte # : 1.73 K/uL  Auto Monocyte # : 0.71 K/uL  Auto Eosinophil # : 0.03 K/uL  Auto Basophil # : 0.08 K/uL  Auto Neutrophil % : 61.6 %  Auto Lymphocyte % : 19.0 %  Auto Monocyte % : 7.8 %  Auto Eosinophil % : 0.3 %  Auto Basophil % : 0.9 %    10 Feb 2025 06:20    137    |  97     |  22     ----------------------------<  114    3.7     |  37     |  0.64     Ca    10.5       10 Feb 2025 06:20  Mg     2.3       10 Feb 2025 06:20    TPro  6.3    /  Alb  3.1    /  TBili  0.4    /  DBili  x      /  AST  15     /  ALT  24     /  AlkPhos  98     10 Feb 2025 06:20      Urinalysis Basic - ( 10 Feb 2025 06:20 )    Color: x / Appearance: x / SG: x / pH: x  Gluc: 114 mg/dL / Ketone: x  / Bili: x / Urobili: x   Blood: x / Protein: x / Nitrite: x   Leuk Esterase: x / RBC: x / WBC x   Sq Epi: x / Non Sq Epi: x / Bacteria: x      CAPILLARY BLOOD GLUCOSE            Culture - Sputum (collected 02-06-25 @ 05:19)  Source: .Sputum Sputum  Gram Stain (02-06-25 @ 21:38):    No polymorphonuclear leukocytes per low power field    Rare Squamous epithelial cells per low power field    Rare Yeast like cells per oil power field  Final Report (02-08-25 @ 08:08):    Commensal annetta consistent with body site    Culture - Urine (collected 02-05-25 @ 05:00)  Source: Clean Catch  Final Report (02-08-25 @ 14:38):    50,000 - 99,000 CFU/mL Enterococcus faecalis  Organism: Enterococcus faecalis (02-08-25 @ 14:38)  Organism: Enterococcus faecalis (02-08-25 @ 14:38)      Method Type: JULIA      -  Ampicillin: S <=2 Predicts results to ampicillin/sulbactam, amoxacillin-clavulanate and  piperacillin-tazobactam.      -  Ciprofloxacin: S <=1      -  Levofloxacin: S 2      -  Nitrofurantoin: S <=32 Should not be used to treat pyelonephritis.      -  Tetracycline: S <=4      -  Vancomycin: S 0.5    Urinalysis with Rflx Culture (collected 02-05-25 @ 05:00)    Culture - Blood (collected 02-03-25 @ 11:10)  Source: .Blood BLOOD  Final Report (02-08-25 @ 17:00):    No growth at 5 days    Culture - Blood (collected 02-03-25 @ 11:05)  Source: .Blood BLOOD  Final Report (02-08-25 @ 17:00):    No growth at 5 days        RADIOLOGY & ADDITIONAL TESTS:    Personally reviewed.     Consultant(s) Notes Reviewed:  [x] YES  [ ] NO

## 2025-02-13 ENCOUNTER — INPATIENT (INPATIENT)
Facility: HOSPITAL | Age: 70
LOS: 4 days | Discharge: NURSING, REHAB OR LONG-TERM CARE | DRG: 192 | End: 2025-02-18
Attending: STUDENT IN AN ORGANIZED HEALTH CARE EDUCATION/TRAINING PROGRAM | Admitting: STUDENT IN AN ORGANIZED HEALTH CARE EDUCATION/TRAINING PROGRAM
Payer: MEDICARE

## 2025-02-13 VITALS
SYSTOLIC BLOOD PRESSURE: 159 MMHG | HEART RATE: 94 BPM | RESPIRATION RATE: 20 BRPM | OXYGEN SATURATION: 100 % | DIASTOLIC BLOOD PRESSURE: 75 MMHG | HEIGHT: 62 IN | TEMPERATURE: 98 F | WEIGHT: 100.09 LBS

## 2025-02-13 PROCEDURE — 71045 X-RAY EXAM CHEST 1 VIEW: CPT | Mod: 26

## 2025-02-13 RX ORDER — LEVALBUTEROL HYDROCHLORIDE 1.25 MG/3ML
0.63 SOLUTION RESPIRATORY (INHALATION) ONCE
Refills: 0 | Status: COMPLETED | OUTPATIENT
Start: 2025-02-13 | End: 2025-02-13

## 2025-02-13 RX ORDER — METHYLPREDNISOLONE ACETATE 80 MG/ML
125 INJECTION, SUSPENSION INTRA-ARTICULAR; INTRALESIONAL; INTRAMUSCULAR; SOFT TISSUE ONCE
Refills: 0 | Status: COMPLETED | OUTPATIENT
Start: 2025-02-13 | End: 2025-02-13

## 2025-02-13 NOTE — ED ADULT TRIAGE NOTE - CHIEF COMPLAINT QUOTE
Pt was discharged from hospital 2 days ago for pneumonia, currently on antibioticcs and steroids, using Nc at home 3L, c/o worsening SOB.

## 2025-02-13 NOTE — ED PROVIDER NOTE - PROGRESS NOTE DETAILS
Patient reevaluated, states she feels a little better.  States she cannot go home because she is too weak to even ambulate.

## 2025-02-13 NOTE — ED ADULT NURSE NOTE - NSFALLHARMRISKINTERV_ED_ALL_ED

## 2025-02-13 NOTE — ED ADULT NURSE NOTE - PLAN OF CARE
43 year old  new patient,  presents today with CC of dull chest pain. He reports symptoms began about a year ago. Patient denies any recent lab work or imaging for his symptoms.  Onset of chest pain 1 year ago,  it would happen initially only with cough or sneeze, but over the last 8 months ago chest pain has been more constant. It is not related to meals. No heartburn. No dysphagia No SOB Patient had a contrasted CT of chest which was NL, 2021 No N/V No hematemesis. BM: baseline is diarrhea 2-3 times a day, which he thinks is due to ETOH consumption. No visible blood in stool but just lately, in  2 occasions, has seen blood when he wipes. No known hemorrhoids. No melena ETOH: 6-8 beers a day, every day. He has been drinking for 20 yrs. No family hx of colon/gastric/esophageal cancer Father  from ETOH cirrhosis No ASA/NSAID use
Call bell/Explanation of exam/test/Fall precautions/Suicide precautions

## 2025-02-13 NOTE — ED PROVIDER NOTE - CLINICAL SUMMARY MEDICAL DECISION MAKING FREE TEXT BOX
69-year-old female who is presenting with worsening shortness of breath of the last few days since she was discharged from [facility] where she was admitted for pneumonia and COPD exacerbation.  Patient states feels like she is not getting enough oxygen.  Patient does not use albuterol because it makes her very jittery and anxious.  Patient is completing 10 days of Bactrim DS for MRSA in the nares.  Will evaluate for COPD exacerbation secondary to worsening pneumonia, PE, pleural effusion, pneumothorax.  Will get labs, EKG, chest x-ray.  Will give Xopenex, ipratropium, steroids.  Nasal cannula O2 increased to 5 L with patient reporting some mild improvement in her dyspnea.

## 2025-02-13 NOTE — ED ADULT NURSE NOTE - OBJECTIVE STATEMENT
received pt. from EMS Triage w/ c/o SOB for the past days accompanied with weakness and loss of appetite, recently discharge due to Pneumonia, on antibiotic and steroids, on O2 support 3-4 LPM at home as her baseline, denies chest pain, fever and dizziness, alert and oriented, safety maintained and applied.

## 2025-02-13 NOTE — ED PROVIDER NOTE - OBJECTIVE STATEMENT
69 year old female with hx of rheumatoid arthritis, colon cancer (now in remission), COPD and hypertension presents c/o worsening SOB on 3L NC. Pt was admitted to [facility] for PNA/COPD exacerbation from 2/3/25 to 2/10/25. States she started bad the following day. Denies fever, chest pain, new leg pain or swelling. Pt is completing 10 days of Bactrim DS.

## 2025-02-14 DIAGNOSIS — Z29.9 ENCOUNTER FOR PROPHYLACTIC MEASURES, UNSPECIFIED: ICD-10-CM

## 2025-02-14 DIAGNOSIS — I10 ESSENTIAL (PRIMARY) HYPERTENSION: ICD-10-CM

## 2025-02-14 DIAGNOSIS — J44.1 CHRONIC OBSTRUCTIVE PULMONARY DISEASE WITH (ACUTE) EXACERBATION: ICD-10-CM

## 2025-02-14 DIAGNOSIS — Z87.898 PERSONAL HISTORY OF OTHER SPECIFIED CONDITIONS: ICD-10-CM

## 2025-02-14 DIAGNOSIS — S22.39XA FRACTURE OF ONE RIB, UNSPECIFIED SIDE, INITIAL ENCOUNTER FOR CLOSED FRACTURE: ICD-10-CM

## 2025-02-14 LAB
ALBUMIN SERPL ELPH-MCNC: 3.2 G/DL — LOW (ref 3.3–5)
ALP SERPL-CCNC: 92 U/L — SIGNIFICANT CHANGE UP (ref 40–120)
ALT FLD-CCNC: 22 U/L — SIGNIFICANT CHANGE UP (ref 12–78)
ANION GAP SERPL CALC-SCNC: 7 MMOL/L — SIGNIFICANT CHANGE UP (ref 5–17)
AST SERPL-CCNC: 10 U/L — LOW (ref 15–37)
BASOPHILS # BLD AUTO: 0 K/UL — SIGNIFICANT CHANGE UP (ref 0–0.2)
BASOPHILS NFR BLD AUTO: 0 % — SIGNIFICANT CHANGE UP (ref 0–2)
BILIRUB SERPL-MCNC: 0.3 MG/DL — SIGNIFICANT CHANGE UP (ref 0.2–1.2)
BUN SERPL-MCNC: 28 MG/DL — HIGH (ref 7–23)
CALCIUM SERPL-MCNC: 10 MG/DL — SIGNIFICANT CHANGE UP (ref 8.5–10.1)
CHLORIDE SERPL-SCNC: 101 MMOL/L — SIGNIFICANT CHANGE UP (ref 96–108)
CO2 SERPL-SCNC: 35 MMOL/L — HIGH (ref 22–31)
CREAT SERPL-MCNC: 0.95 MG/DL — SIGNIFICANT CHANGE UP (ref 0.5–1.3)
D DIMER BLD IA.RAPID-MCNC: 399 NG/ML DDU — HIGH
EGFR: 65 ML/MIN/1.73M2 — SIGNIFICANT CHANGE UP
EOSINOPHIL # BLD AUTO: 0 K/UL — SIGNIFICANT CHANGE UP (ref 0–0.5)
EOSINOPHIL NFR BLD AUTO: 0 % — SIGNIFICANT CHANGE UP (ref 0–6)
GLUCOSE SERPL-MCNC: 120 MG/DL — HIGH (ref 70–99)
HCT VFR BLD CALC: 37.2 % — SIGNIFICANT CHANGE UP (ref 34.5–45)
HGB BLD-MCNC: 11.2 G/DL — LOW (ref 11.5–15.5)
HMPV RNA SPEC QL NAA+PROBE: DETECTED
LACTATE SERPL-SCNC: 0.9 MMOL/L — SIGNIFICANT CHANGE UP (ref 0.7–2)
LYMPHOCYTES # BLD AUTO: 0.64 K/UL — LOW (ref 1–3.3)
LYMPHOCYTES # BLD AUTO: 7 % — LOW (ref 13–44)
MCHC RBC-ENTMCNC: 24.2 PG — LOW (ref 27–34)
MCHC RBC-ENTMCNC: 30.1 G/DL — LOW (ref 32–36)
MCV RBC AUTO: 80.3 FL — SIGNIFICANT CHANGE UP (ref 80–100)
MONOCYTES # BLD AUTO: 0.37 K/UL — SIGNIFICANT CHANGE UP (ref 0–0.9)
MONOCYTES NFR BLD AUTO: 4 % — SIGNIFICANT CHANGE UP (ref 2–14)
NEUTROPHILS # BLD AUTO: 8.19 K/UL — HIGH (ref 1.8–7.4)
NEUTROPHILS NFR BLD AUTO: 85 % — HIGH (ref 43–77)
NRBC BLD AUTO-RTO: SIGNIFICANT CHANGE UP /100 WBCS (ref 0–0)
NT-PROBNP SERPL-SCNC: 107 PG/ML — SIGNIFICANT CHANGE UP (ref 0–125)
PLATELET # BLD AUTO: 303 K/UL — SIGNIFICANT CHANGE UP (ref 150–400)
POTASSIUM SERPL-MCNC: 4.3 MMOL/L — SIGNIFICANT CHANGE UP (ref 3.5–5.3)
POTASSIUM SERPL-SCNC: 4.3 MMOL/L — SIGNIFICANT CHANGE UP (ref 3.5–5.3)
PROT SERPL-MCNC: 6.5 G/DL — SIGNIFICANT CHANGE UP (ref 6–8.3)
RAPID RVP RESULT: DETECTED
RBC # BLD: 4.63 M/UL — SIGNIFICANT CHANGE UP (ref 3.8–5.2)
RBC # FLD: 17.8 % — HIGH (ref 10.3–14.5)
SARS-COV-2 RNA SPEC QL NAA+PROBE: SIGNIFICANT CHANGE UP
SODIUM SERPL-SCNC: 143 MMOL/L — SIGNIFICANT CHANGE UP (ref 135–145)
TROPONIN I, HIGH SENSITIVITY RESULT: 5.9 NG/L — SIGNIFICANT CHANGE UP
WBC # BLD: 9.2 K/UL — SIGNIFICANT CHANGE UP (ref 3.8–10.5)
WBC # FLD AUTO: 9.2 K/UL — SIGNIFICANT CHANGE UP (ref 3.8–10.5)

## 2025-02-14 PROCEDURE — 71275 CT ANGIOGRAPHY CHEST: CPT | Mod: 26

## 2025-02-14 RX ORDER — MELATONIN 5 MG
3 TABLET ORAL AT BEDTIME
Refills: 0 | Status: DISCONTINUED | OUTPATIENT
Start: 2025-02-14 | End: 2025-02-18

## 2025-02-14 RX ORDER — METOPROLOL SUCCINATE 50 MG/1
12.5 TABLET, EXTENDED RELEASE ORAL
Refills: 0 | Status: DISCONTINUED | OUTPATIENT
Start: 2025-02-14 | End: 2025-02-18

## 2025-02-14 RX ORDER — HEPARIN SODIUM 1000 [USP'U]/ML
5000 INJECTION INTRAVENOUS; SUBCUTANEOUS EVERY 12 HOURS
Refills: 0 | Status: DISCONTINUED | OUTPATIENT
Start: 2025-02-14 | End: 2025-02-18

## 2025-02-14 RX ORDER — HEPARIN SODIUM 1000 [USP'U]/ML
5000 INJECTION INTRAVENOUS; SUBCUTANEOUS EVERY 12 HOURS
Refills: 0 | Status: DISCONTINUED | OUTPATIENT
Start: 2025-02-14 | End: 2025-02-14

## 2025-02-14 RX ORDER — TIOTROPIUM BROMIDE INHALATION SPRAY 3.12 UG/1
2 SPRAY, METERED RESPIRATORY (INHALATION) DAILY
Refills: 0 | Status: DISCONTINUED | OUTPATIENT
Start: 2025-02-14 | End: 2025-02-18

## 2025-02-14 RX ORDER — METHYLPREDNISOLONE ACETATE 80 MG/ML
40 INJECTION, SUSPENSION INTRA-ARTICULAR; INTRALESIONAL; INTRAMUSCULAR; SOFT TISSUE EVERY 12 HOURS
Refills: 0 | Status: DISCONTINUED | OUTPATIENT
Start: 2025-02-14 | End: 2025-02-16

## 2025-02-14 RX ORDER — IPRATROPIUM BROMIDE AND ALBUTEROL SULFATE .5; 2.5 MG/3ML; MG/3ML
3 SOLUTION RESPIRATORY (INHALATION) EVERY 6 HOURS
Refills: 0 | Status: DISCONTINUED | OUTPATIENT
Start: 2025-02-14 | End: 2025-02-18

## 2025-02-14 RX ORDER — TRAMADOL HYDROCHLORIDE 50 MG/1
50 TABLET, FILM COATED ORAL DAILY
Refills: 0 | Status: DISCONTINUED | OUTPATIENT
Start: 2025-02-14 | End: 2025-02-14

## 2025-02-14 RX ORDER — IPRATROPIUM BROMIDE AND ALBUTEROL SULFATE .5; 2.5 MG/3ML; MG/3ML
3 SOLUTION RESPIRATORY (INHALATION) EVERY 6 HOURS
Refills: 0 | Status: DISCONTINUED | OUTPATIENT
Start: 2025-02-14 | End: 2025-02-14

## 2025-02-14 RX ORDER — INFLUENZA A VIRUS A/IDAHO/07/2018 (H1N1) ANTIGEN (MDCK CELL DERIVED, PROPIOLACTONE INACTIVATED, INFLUENZA A VIRUS A/INDIANA/08/2018 (H3N2) ANTIGEN (MDCK CELL DERIVED, PROPIOLACTONE INACTIVATED), INFLUENZA B VIRUS B/SINGAPORE/INFTT-16-0610/2016 ANTIGEN (MDCK CELL DERIVED, PROPIOLACTONE INACTIVATED), INFLUENZA B VIRUS B/IOWA/06/2017 ANTIGEN (MDCK CELL DERIVED, PROPIOLACTONE INACTIVATED) 15; 15; 15; 15 UG/.5ML; UG/.5ML; UG/.5ML; UG/.5ML
0.5 INJECTION, SUSPENSION INTRAMUSCULAR ONCE
Refills: 0 | Status: DISCONTINUED | OUTPATIENT
Start: 2025-02-14 | End: 2025-02-18

## 2025-02-14 RX ORDER — ALPRAZOLAM 0.5 MG
0.25 TABLET, EXTENDED RELEASE 24 HR ORAL DAILY
Refills: 0 | Status: DISCONTINUED | OUTPATIENT
Start: 2025-02-14 | End: 2025-02-18

## 2025-02-14 RX ORDER — AMLODIPINE BESYLATE 10 MG/1
10 TABLET ORAL DAILY
Refills: 0 | Status: DISCONTINUED | OUTPATIENT
Start: 2025-02-14 | End: 2025-02-18

## 2025-02-14 RX ORDER — ACETAMINOPHEN 500 MG/5ML
650 LIQUID (ML) ORAL EVERY 6 HOURS
Refills: 0 | Status: DISCONTINUED | OUTPATIENT
Start: 2025-02-14 | End: 2025-02-18

## 2025-02-14 RX ORDER — TRAMADOL HYDROCHLORIDE 50 MG/1
50 TABLET, FILM COATED ORAL DAILY
Refills: 0 | Status: DISCONTINUED | OUTPATIENT
Start: 2025-02-14 | End: 2025-02-16

## 2025-02-14 RX ADMIN — METOPROLOL SUCCINATE 12.5 MILLIGRAM(S): 50 TABLET, EXTENDED RELEASE ORAL at 19:26

## 2025-02-14 RX ADMIN — METHYLPREDNISOLONE ACETATE 40 MILLIGRAM(S): 80 INJECTION, SUSPENSION INTRA-ARTICULAR; INTRALESIONAL; INTRAMUSCULAR; SOFT TISSUE at 19:08

## 2025-02-14 RX ADMIN — METOPROLOL SUCCINATE 12.5 MILLIGRAM(S): 50 TABLET, EXTENDED RELEASE ORAL at 06:19

## 2025-02-14 RX ADMIN — METHYLPREDNISOLONE ACETATE 40 MILLIGRAM(S): 80 INJECTION, SUSPENSION INTRA-ARTICULAR; INTRALESIONAL; INTRAMUSCULAR; SOFT TISSUE at 06:22

## 2025-02-14 RX ADMIN — LEVALBUTEROL HYDROCHLORIDE 0.63 MILLIGRAM(S): 1.25 SOLUTION RESPIRATORY (INHALATION) at 00:13

## 2025-02-14 RX ADMIN — Medication 60 MILLILITER(S): at 06:26

## 2025-02-14 RX ADMIN — HEPARIN SODIUM 5000 UNIT(S): 1000 INJECTION INTRAVENOUS; SUBCUTANEOUS at 19:08

## 2025-02-14 RX ADMIN — METHYLPREDNISOLONE ACETATE 125 MILLIGRAM(S): 80 INJECTION, SUSPENSION INTRA-ARTICULAR; INTRALESIONAL; INTRAMUSCULAR; SOFT TISSUE at 00:09

## 2025-02-14 RX ADMIN — Medication 500 MICROGRAM(S): at 00:32

## 2025-02-14 RX ADMIN — IPRATROPIUM BROMIDE AND ALBUTEROL SULFATE 3 MILLILITER(S): .5; 2.5 SOLUTION RESPIRATORY (INHALATION) at 20:13

## 2025-02-14 NOTE — PHYSICAL THERAPY INITIAL EVALUATION ADULT - ACTIVE RANGE OF MOTION EXAMINATION, REHAB EVAL
pt sleeping with lights dimmed and side rails up. warm blanket placed over patient while sleeping. no Active ROM deficits were identified

## 2025-02-14 NOTE — H&P ADULT - PROBLEM SELECTOR PLAN 3
Chronic, stable on admission  Continue Amlodipine 10mg home medication w. hold edgard  Continue lopressor 12.5mg bid w. hold edgard  monitor routine hemodynamics Continue home Tramadol 50mg qd  -Fall precautions

## 2025-02-14 NOTE — SOCIAL WORK PROGRESS NOTE - NSSWPROGRESSNOTE_GEN_ALL_CORE
Belinda as consulted to the Ed to speak with the patient about MELISSA. Pt was recently at [facility] from 2/3-2/10 and return to the ED. Pt is now recommenced for MELISSA and the patient is in agreement with the placement. RADHA was requested for the patient and a list of facilities was provided to the patient for her to review. Belinda will cont to follow for safe DC planning.

## 2025-02-14 NOTE — H&P ADULT - NSHPSOCIALHISTORY_GEN_ALL_CORE
quit smoking >10 years ago, unsure of pack years  ambulates with walker though becoming deconditioned  requires assistance w. adls  no drugs or etoh use

## 2025-02-14 NOTE — PATIENT PROFILE ADULT - FALL HARM RISK - HARM RISK INTERVENTIONS

## 2025-02-14 NOTE — CARE COORDINATION ASSESSMENT. - OTHER PERTINENT REFERRAL INFORMATION
Sw met with Pt at bedside. Pt is A & O x4 and able to make her needs known. Sw introduced self and role and pt expressed verbal understanding. Pt lives in a pvt home with family and has 2STE and 12 to basement which pt does not use. 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 Pt has no hx of HC or DME and has O2 at home but stated that she only uses when needed.  Pt was recently at [facility] from 2/3-2/10 and was recommend for [facility] but pt declined but know patient would like to go to [facility]. Pt was given list and sw will cont to follow.  PCP Carlos Mac [phone].

## 2025-02-14 NOTE — H&P ADULT - PROBLEM SELECTOR PLAN 1
Pt presents with worsening shortness of breath, and wheezing likely 2/2 COPD exacerbation  --CT Angio Chest PE Protocol: No evidence of pulmonary embolus.Redemonstrated left sixth and seventh rib fractures.  -Cxray: Clear lungs, flattened diaphragm, hyperexpanded lung fields  -s/p Atrovent, Xopenex, Methylprednisolone 125mg IVP   -Continue Solumedrol 40mg IVP BID  -Duonebs q6 PRN SOB/Wheezing  -GI ppx with PPI while on steroids   -Continue Advair  -Day team to consult  Dr. Dobson pulmonology, f/u recs Pt presents with worsening shortness of breath, and wheezing likely 2/2 COPD exacerbation  --CT Angio Chest PE Protocol: No evidence of pulmonary embolus.Redemonstrated left sixth and seventh rib fractures.  -Cxray: Clear lungs, flattened diaphragm, hyperexpanded lung fields  -s/p Atrovent, Xopenex, Methylprednisolone 125mg IVP   -Continue Solumedrol 40mg IVP BID  -Duonebs q6 PRN SOB/Wheezing  -GI ppx with PPI while on steroids   -Continue Robe Dobson pulmonology consulted, f/u recs

## 2025-02-14 NOTE — ED ADULT NURSE REASSESSMENT NOTE - NS ED NURSE REASSESS COMMENT FT1
handover done to RYAN Coker for continuity of care, already gave report to [address],  pt. is ready to be transfer at 0730H.

## 2025-02-14 NOTE — CARE COORDINATION ASSESSMENT. - NSCAREPROVIDERS_GEN_ALL_CORE_FT
CARE PROVIDERS:  Accepting Physician: Jan Rod  Access Services: Jess Shaw  Administration: Hoda Messer  Administration: Shirin Hinojosa  Admitting: Jan Rod  Attending: Jan Rod  Covering Team: Nuno Daniels  ED Attending: Ravindra Mendieta  ED Nurse: Sheela Chaparro  Infection Control: Anton Granda  Nurse: Parker Marvin  Nurse: Farnaz Martin  Nurse: Drea Shepherd  Nurse: Connie Tidwell  Nurse: Brigida Gallo  Nurse: James Santoyo  Nurse: Russ Roberts  Nurse: Domonique Cazares  Ordered: ServiceAccount, [facility]  Outpatient Provider: Jose De León  Outpatient Provider: Gregory Rizvi  Override: Drea Shepherd  Override: Domonique Cazares  Physical Therapy: Dev Briceño  Primary Team: Madhu Ferguson  Primary Team: Vanessa Elizabeth  Primary Team: Susy Abdi  Primary Team: Jan Rod  Primary Team: Wilman Leary  : Lorena Roper  Student: Jermaine Chatman  UR// Supp. Assoc.: Judy Torres  UR// Supp. Assoc.: Tammy Martínez

## 2025-02-14 NOTE — H&P ADULT - ASSESSMENT
69 y/ oF PMHx of RA, Arthritis., Colon Ca (Remission), COPD, & HTN presenting with c/o of worsening SOB on 3L NC.  Pt admitted for COPD exacerbation.

## 2025-02-14 NOTE — H&P ADULT - PROBLEM SELECTOR PLAN 4
Continue Xanax .25mg qd Chronic, stable on admission  Continue Amlodipine 10mg home medication w. hold edgard  Continue lopressor 12.5mg bid w. hold edgard  monitor routine hemodynamics

## 2025-02-14 NOTE — H&P ADULT - ATTENDING COMMENTS
69 y/ oF PMHx of RA, Arthritis., Colon Ca (Remission), COPD, & HTN presenting with c/o of worsening SOB on 3L NC.  Pt admitted for COPD exacerbation.    #COPD exacerbation  Pt presents with worsening shortness of breath, and wheezing likely 2/2 COPD exacerbation  --CT Angio Chest PE Protocol: No evidence of pulmonary embolus. Redemonstrated left sixth and seventh rib fractures.  -Cxray: Clear lungs, flattened diaphragm, hyperexpanded lung fields  -s/p Atrovent, Xopenex, Methylprednisolone 125mg IVP   -Continue Solumedrol 40mg IVP BID  -Duonebs q6 PRN SOB/Wheezing  -GI ppx with PPI while on steroids   -Continue Advair  - Dr. Dobson pulmonology consulted, f/u recs.  -RVP ordered stat    Agree with Resident's note. Refer to resident's note for detailed plan of chronic comorbidities.  76 minutes spent on total encounter. The necessity of the time spent during the encounter on this date of service was due to:   activities including direct patient care, counseling and/or coordinating care, and reviewing notes/lab data/imaging.

## 2025-02-14 NOTE — PHYSICAL THERAPY INITIAL EVALUATION ADULT - PERTINENT HX OF CURRENT PROBLEM, REHAB EVAL
69 year old female with hx of rheumatoid arthritis, colon cancer (now in remission), COPD and hypertension presents c/o worsening SOB on 3L NC. Pt was admitted to [facility] for PNA/COPD exacerbation from 2/3/25 to 2/10/25. States she started bad the following day. Pt reports numbness bilateral LE's and decreased ambulation.

## 2025-02-14 NOTE — H&P ADULT - NSHPPHYSICALEXAM_GEN_ALL_CORE
VITALS:   T(C): 36.4 (02-14-25 @ 03:59), Max: 36.7 (02-13-25 @ 22:08)  HR: 95 (02-14-25 @ 03:59) (94 - 95)  BP: 139/73 (02-14-25 @ 03:59) (139/73 - 159/75)  RR: 20 (02-14-25 @ 03:59) (20 - 20)  SpO2: 94% (02-14-25 @ 03:59) (94% - 100%)    GENERAL: NAD, lying in bed comfortably  HEAD:  Atraumatic, normocephalic  EYES: EOMI, conjunctiva and sclera clear  ENT: Moist mucous membranes  NECK: Supple, no JVD  HEART: Regular rate and rhythm, no murmurs, rubs, or gallops  LUNGS: +Wheezing bilaterally.   ABDOMEN: Soft, nontender, nondistended, +BS  EXTREMITIES: 2+ peripheral pulses bilaterally. No clubbing, cyanosis, or edema  NERVOUS SYSTEM:  A&Ox3, no focal deficits   SKIN: No rashes or lesions VITALS:   T(C): 36.4 (02-14-25 @ 03:59), Max: 36.7 (02-13-25 @ 22:08)  HR: 95 (02-14-25 @ 03:59) (94 - 95)  BP: 139/73 (02-14-25 @ 03:59) (139/73 - 159/75)  RR: 20 (02-14-25 @ 03:59) (20 - 20)  SpO2: 94% (02-14-25 @ 03:59) (94% - 100%)    GENERAL: NAD, lying in bed comfortably, NC in place, cachectic appearing  HEAD:  Atraumatic, normocephalic  EYES: EOMI, conjunctiva and sclera clear  ENT: Moist mucous membranes  NECK: Supple, no JVD  HEART: Regular rate and rhythm, no murmurs, rubs, or gallops  LUNGS: +Wheezing bilaterally. Diminished breath sounds bilaterally  ABDOMEN: Soft, nontender, nondistended, +BS  EXTREMITIES: 2+ peripheral pulses bilaterally. No clubbing, cyanosis, or edema  NERVOUS SYSTEM:  A&Ox3, no focal deficits   SKIN: No rashes or lesions

## 2025-02-14 NOTE — H&P ADULT - HISTORY OF PRESENT ILLNESS
69 y/ oF PMHx of RA, Arthritis., Colon Ca (Remission), COPD, & HTN presenting with c/o of worsening SOB on 3L NC. Pt was admitted to [facility] for [facility]NA/COPD exacerbation 2/3/25-2/10/25, and has had worsening SOB, weakness ever since DC. Pt is completing 10 days of Bactrim.     She has a history of RA and normally on Prednisone 10mg qd. She was previously on Eliquis 5mg BID dt active cancer in 2024, no longer on AC.     ED Course:  -Vitals: /75, HR 94, T 98 F, Spo2 100 on 4L NC  -s/p Atrovent, Xopenex, Methylprednisolone 125mg IVP   -CT Angio Chest PE Protocol: No evidence of pulmonary embolus.Redemonstrated left sixth and seventh rib fractures.  -Cxray: Clear lungs, flattened diaphragm, hyperexpanded lung fields  -Labs: Hgb 11.2, D-dimer 399, CO2 35, BUN 28, Glucose 120, Albumin 3.2,    69 y/ oF PMHx of RA, Arthritis., Colon Ca (Remission), COPD, & HTN presenting with c/o of worsening SOB on 3L NC. Pt was admitted to [facility] for PNA/COPD exacerbation 2/3/25-2/10/25, and has had worsening SOB, weakness ever since DC. Patient reports since discharge, has not felt any better, having worsening shortness of breath, more chest congestion and productive cough. Pt is completing 10 days of Bactrim which was given on discharge, and was to increase her prednisone dose from 5mg back to 10 mg. Patient feels that IV antibiotics helped her more than the oral. She also reports generalized weakness and difficulty with ambulation and would like rehab placement. Patient denies any other concerns or complaints at this time, no fevers, headache, chest pain, abdominal pain, nausea/vomiting/diarrhea.      She has a history of RA and normally on Prednisone 10mg qd. She was previously on Eliquis 5mg BID dt active cancer in 2024, no longer on AC.     ED Course:  -Vitals: /75, HR 94, T 98 F, Spo2 100 on 4L NC  -s/p Atrovent, Xopenex, Methylprednisolone 125mg IVP   -CT Angio Chest PE Protocol: No evidence of pulmonary embolus.Redemonstrated left sixth and seventh rib fractures.  -Cxray: Clear lungs, flattened diaphragm, hyperexpanded lung fields  -Labs: Hgb 11.2, D-dimer 399, CO2 35, BUN 28, Glucose 120, Albumin 3.2,

## 2025-02-14 NOTE — PHYSICAL THERAPY INITIAL EVALUATION ADULT - ADL SKILLS, REHAB EVAL
Subjective:      Patient ID: Go Varner is a 38 y.o. male.     HPI    Review of Systems    Objective:   Physical Exam    Assessment:      ***      Plan:      ***        MOLLY Romo
independent

## 2025-02-14 NOTE — H&P ADULT - NSHPREVIEWOFSYSTEMS_GEN_ALL_CORE
Gen: +weakness. no fever, no change in appetite   Eyes: No eye irritation or discharge  ENT: no congestion, No ear pulling  Resp: +cough, +shortness of breath.,   Cardiovascular: No chest pain, no palpitations  GI: No vomiting or diarrhea  : No dysuria  MS: No joint or muscle pain  Skin: No rashes  Neuro: no loss of tone

## 2025-02-14 NOTE — CARE COORDINATION ASSESSMENT. - REASON FOR CONSULT
community resources/coordination of care/discharge planning/patient meets high risk criteria (i.e.: [facility] admit., readmit w/in 30 days)

## 2025-02-15 RX ORDER — OXYCODONE HYDROCHLORIDE 30 MG/1
5 TABLET ORAL EVERY 6 HOURS
Refills: 0 | Status: DISCONTINUED | OUTPATIENT
Start: 2025-02-15 | End: 2025-02-18

## 2025-02-15 RX ORDER — PYRIDOXINE HCL (VITAMIN B6) 25 MG
100 TABLET ORAL DAILY
Refills: 0 | Status: DISCONTINUED | OUTPATIENT
Start: 2025-02-15 | End: 2025-02-18

## 2025-02-15 RX ADMIN — Medication 40 MILLIGRAM(S): at 06:37

## 2025-02-15 RX ADMIN — HEPARIN SODIUM 5000 UNIT(S): 1000 INJECTION INTRAVENOUS; SUBCUTANEOUS at 17:21

## 2025-02-15 RX ADMIN — METHYLPREDNISOLONE ACETATE 40 MILLIGRAM(S): 80 INJECTION, SUSPENSION INTRA-ARTICULAR; INTRALESIONAL; INTRAMUSCULAR; SOFT TISSUE at 17:21

## 2025-02-15 RX ADMIN — METHYLPREDNISOLONE ACETATE 40 MILLIGRAM(S): 80 INJECTION, SUSPENSION INTRA-ARTICULAR; INTRALESIONAL; INTRAMUSCULAR; SOFT TISSUE at 06:36

## 2025-02-15 RX ADMIN — AMLODIPINE BESYLATE 10 MILLIGRAM(S): 10 TABLET ORAL at 06:37

## 2025-02-15 RX ADMIN — METOPROLOL SUCCINATE 12.5 MILLIGRAM(S): 50 TABLET, EXTENDED RELEASE ORAL at 17:21

## 2025-02-15 RX ADMIN — TRAMADOL HYDROCHLORIDE 50 MILLIGRAM(S): 50 TABLET, FILM COATED ORAL at 07:36

## 2025-02-15 RX ADMIN — Medication 1 DOSE(S): at 18:47

## 2025-02-15 RX ADMIN — HEPARIN SODIUM 5000 UNIT(S): 1000 INJECTION INTRAVENOUS; SUBCUTANEOUS at 06:37

## 2025-02-15 RX ADMIN — METOPROLOL SUCCINATE 12.5 MILLIGRAM(S): 50 TABLET, EXTENDED RELEASE ORAL at 06:37

## 2025-02-15 RX ADMIN — TIOTROPIUM BROMIDE INHALATION SPRAY 2 PUFF(S): 3.12 SPRAY, METERED RESPIRATORY (INHALATION) at 06:38

## 2025-02-15 RX ADMIN — TRAMADOL HYDROCHLORIDE 50 MILLIGRAM(S): 50 TABLET, FILM COATED ORAL at 06:36

## 2025-02-15 RX ADMIN — Medication 1 DOSE(S): at 06:38

## 2025-02-16 LAB
ANION GAP SERPL CALC-SCNC: 8 MMOL/L — SIGNIFICANT CHANGE UP (ref 5–17)
BUN SERPL-MCNC: 30 MG/DL — HIGH (ref 7–23)
CALCIUM SERPL-MCNC: 10.2 MG/DL — HIGH (ref 8.5–10.1)
CHLORIDE SERPL-SCNC: 96 MMOL/L — SIGNIFICANT CHANGE UP (ref 96–108)
CO2 SERPL-SCNC: 36 MMOL/L — HIGH (ref 22–31)
CREAT SERPL-MCNC: 0.89 MG/DL — SIGNIFICANT CHANGE UP (ref 0.5–1.3)
EGFR: 71 ML/MIN/1.73M2 — SIGNIFICANT CHANGE UP
GLUCOSE SERPL-MCNC: 99 MG/DL — SIGNIFICANT CHANGE UP (ref 70–99)
HCT VFR BLD CALC: 35.6 % — SIGNIFICANT CHANGE UP (ref 34.5–45)
HGB BLD-MCNC: 10.7 G/DL — LOW (ref 11.5–15.5)
MCHC RBC-ENTMCNC: 24.4 PG — LOW (ref 27–34)
MCHC RBC-ENTMCNC: 30.1 G/DL — LOW (ref 32–36)
MCV RBC AUTO: 81.3 FL — SIGNIFICANT CHANGE UP (ref 80–100)
NRBC BLD AUTO-RTO: 0 /100 WBCS — SIGNIFICANT CHANGE UP (ref 0–0)
PLATELET # BLD AUTO: 280 K/UL — SIGNIFICANT CHANGE UP (ref 150–400)
POTASSIUM SERPL-MCNC: 4.8 MMOL/L — SIGNIFICANT CHANGE UP (ref 3.5–5.3)
POTASSIUM SERPL-SCNC: 4.8 MMOL/L — SIGNIFICANT CHANGE UP (ref 3.5–5.3)
RBC # BLD: 4.38 M/UL — SIGNIFICANT CHANGE UP (ref 3.8–5.2)
RBC # FLD: 17.7 % — HIGH (ref 10.3–14.5)
SODIUM SERPL-SCNC: 140 MMOL/L — SIGNIFICANT CHANGE UP (ref 135–145)
WBC # BLD: 9.61 K/UL — SIGNIFICANT CHANGE UP (ref 3.8–10.5)
WBC # FLD AUTO: 9.61 K/UL — SIGNIFICANT CHANGE UP (ref 3.8–10.5)

## 2025-02-16 RX ORDER — PREDNISONE 20 MG/1
40 TABLET ORAL DAILY
Refills: 0 | Status: DISCONTINUED | OUTPATIENT
Start: 2025-02-17 | End: 2025-02-18

## 2025-02-16 RX ORDER — TRAMADOL HYDROCHLORIDE 50 MG/1
50 TABLET, FILM COATED ORAL EVERY 6 HOURS
Refills: 0 | Status: DISCONTINUED | OUTPATIENT
Start: 2025-02-16 | End: 2025-02-18

## 2025-02-16 RX ADMIN — METOPROLOL SUCCINATE 12.5 MILLIGRAM(S): 50 TABLET, EXTENDED RELEASE ORAL at 05:55

## 2025-02-16 RX ADMIN — OXYCODONE HYDROCHLORIDE 5 MILLIGRAM(S): 30 TABLET ORAL at 12:30

## 2025-02-16 RX ADMIN — HEPARIN SODIUM 5000 UNIT(S): 1000 INJECTION INTRAVENOUS; SUBCUTANEOUS at 17:47

## 2025-02-16 RX ADMIN — Medication 100 MILLIGRAM(S): at 11:59

## 2025-02-16 RX ADMIN — METHYLPREDNISOLONE ACETATE 40 MILLIGRAM(S): 80 INJECTION, SUSPENSION INTRA-ARTICULAR; INTRALESIONAL; INTRAMUSCULAR; SOFT TISSUE at 05:55

## 2025-02-16 RX ADMIN — Medication 1 DOSE(S): at 18:07

## 2025-02-16 RX ADMIN — METHYLPREDNISOLONE ACETATE 40 MILLIGRAM(S): 80 INJECTION, SUSPENSION INTRA-ARTICULAR; INTRALESIONAL; INTRAMUSCULAR; SOFT TISSUE at 17:47

## 2025-02-16 RX ADMIN — AMLODIPINE BESYLATE 10 MILLIGRAM(S): 10 TABLET ORAL at 05:56

## 2025-02-16 RX ADMIN — HEPARIN SODIUM 5000 UNIT(S): 1000 INJECTION INTRAVENOUS; SUBCUTANEOUS at 05:55

## 2025-02-16 RX ADMIN — Medication 40 MILLIGRAM(S): at 05:55

## 2025-02-16 RX ADMIN — OXYCODONE HYDROCHLORIDE 5 MILLIGRAM(S): 30 TABLET ORAL at 12:06

## 2025-02-16 RX ADMIN — Medication 1 DOSE(S): at 05:56

## 2025-02-16 RX ADMIN — METOPROLOL SUCCINATE 12.5 MILLIGRAM(S): 50 TABLET, EXTENDED RELEASE ORAL at 17:47

## 2025-02-16 RX ADMIN — TIOTROPIUM BROMIDE INHALATION SPRAY 2 PUFF(S): 3.12 SPRAY, METERED RESPIRATORY (INHALATION) at 05:56

## 2025-02-16 NOTE — DIETITIAN INITIAL EVALUATION ADULT - ORAL INTAKE PTA/DIET HISTORY
patient seen with reported poor PO per patient and nursing. patient seen with [employer]ke donuts brought from outside. patient states uses sugar in her coffee. A1c 6.4% discussed with patient most likely blood sugars elevated as patient on prednisone from home. discussed verbally whole grains, avoiding sugar use, and limiting sweets. patient reports past year with chemo low PO per  last admit RD note. patient at that admit dx malnourished and was taking in house smoothie. willing to try again. declining other ONS . discussed importance of protein in diet. likes greek yogurt will provide daily. patient reports that she has had especially poor PO for 1-2 weeks. no food allergies dislikes fish. denies chewing swallowing issues

## 2025-02-16 NOTE — DIETITIAN INITIAL EVALUATION ADULT - PERTINENT LABORATORY DATA
02-16    140  |  96  |  30[H]  ----------------------------<  99  4.8   |  36[H]  |  0.89    Ca    10.2[H]      16 Feb 2025 07:11    A1C with Estimated Average Glucose Result: 6.4 % (02-04-25 @ 05:40)

## 2025-02-16 NOTE — DIETITIAN INITIAL EVALUATION ADULT - PERTINENT MEDS FT
MEDICATIONS  (STANDING):  albuterol/ipratropium for Nebulization 3 milliLiter(s) Nebulizer every 6 hours  amLODIPine   Tablet 10 milliGRAM(s) Oral daily  fluticasone propionate/ salmeterol 250-50 MICROgram(s) Diskus 1 Dose(s) Inhalation two times a day  heparin   Injectable 5000 Unit(s) SubCutaneous every 12 hours  influenza  Vaccine (HIGH DOSE) 0.5 milliLiter(s) IntraMuscular once  methylPREDNISolone sodium succinate Injectable 40 milliGRAM(s) IV Push every 12 hours  metoprolol tartrate 12.5 milliGRAM(s) Oral two times a day  pantoprazole    Tablet 40 milliGRAM(s) Oral before breakfast  pyridoxine 100 milliGRAM(s) Oral daily  tiotropium 2.5 MICROgram(s) Inhaler 2 Puff(s) Inhalation daily    MEDICATIONS  (PRN):  acetaminophen     Tablet .. 650 milliGRAM(s) Oral every 6 hours PRN Temp greater or equal to 38C (100.4F), Mild Pain (1 - 3)  ALPRAZolam 0.25 milliGRAM(s) Oral daily PRN for anxiety  melatonin 3 milliGRAM(s) Oral at bedtime PRN Insomnia  oxyCODONE    IR 5 milliGRAM(s) Oral every 6 hours PRN Severe Pain (7 - 10)  traMADol 50 milliGRAM(s) Oral every 6 hours PRN Moderate Pain (4 - 6)

## 2025-02-16 NOTE — DIETITIAN INITIAL EVALUATION ADULT - PROBLEM SELECTOR PLAN 1
Pt presents with worsening shortness of breath, and wheezing likely 2/2 COPD exacerbation  --CT Angio Chest PE Protocol: No evidence of pulmonary embolus.Redemonstrated left sixth and seventh rib fractures.  -Cxray: Clear lungs, flattened diaphragm, hyperexpanded lung fields  -s/p Atrovent, Xopenex, Methylprednisolone 125mg IVP   -Continue Solumedrol 40mg IVP BID  -Duonebs q6 PRN SOB/Wheezing  -GI ppx with PPI while on steroids   -Continue Robe Dobson pulmonology consulted, f/u recs

## 2025-02-16 NOTE — DIETITIAN INITIAL EVALUATION ADULT - OTHER INFO
History of Present Illness:  Reason for Admission: COPD exacerbation  History of Present Illness:   69 y/ oF PMHx of RA, Arthritis., Colon Ca (Remission), COPD, & HTN presenting with c/o of worsening SOB on 3L NC. Pt was admitted [facility] for [facility]NA/COPD exacerbation 2/3/25-2/10/25, and has had worsening SOB, weakness ever since DC. Patient reports since discharge, has not felt any better, having worsening shortness of breath, more chest congestion and productive cough. Pt is completing 10 days of Bactrim which was given on discharge, and was to increase her prednisone dose from 5mg back to 10 mg. Patient feels that IV antibiotics helped her more than the oral. She also reports generalized weakness and difficulty with ambulation and would like rehab placement.   weight per patient now 100# reports weight was 145# patient cannot confirm time frame of weight loss. noted last admit CBW of  149#  would suggest re-weight patient currently   2/16 BM  patient for discharge planned to [facility]

## 2025-02-16 NOTE — SOCIAL WORK PROGRESS NOTE - NSSWPROGRESSNOTE_GEN_ALL_CORE
VIDHYA spoke with this pt at bedside with family present- Discussed DC planning to [facility]- choices are [facility], [facility] and [facility]. Referral sent. VIDHYA to follow.

## 2025-02-16 NOTE — DIETITIAN INITIAL EVALUATION ADULT - ADD RECOMMEND
1. recommend MVI 2. trend weights 3. trend PO intake  1. recommend MVI and Vit C 500mg BID 2. trend weights (await new weight)  3. trend PO intake and encourage PO intake

## 2025-02-16 NOTE — DIETITIAN INITIAL EVALUATION ADULT - PROBLEM SELECTOR PLAN 4
Chronic, stable on admission  Continue Amlodipine 10mg home medication w. hold edgard  Continue lopressor 12.5mg bid w. hold edgard  monitor routine hemodynamics

## 2025-02-17 DIAGNOSIS — J44.1 CHRONIC OBSTRUCTIVE PULMONARY DISEASE WITH (ACUTE) EXACERBATION: ICD-10-CM

## 2025-02-17 LAB
ANION GAP SERPL CALC-SCNC: 4 MMOL/L — LOW (ref 5–17)
BUN SERPL-MCNC: 36 MG/DL — HIGH (ref 7–23)
CALCIUM SERPL-MCNC: 10.8 MG/DL — HIGH (ref 8.5–10.1)
CHLORIDE SERPL-SCNC: 101 MMOL/L — SIGNIFICANT CHANGE UP (ref 96–108)
CO2 SERPL-SCNC: 34 MMOL/L — HIGH (ref 22–31)
CREAT SERPL-MCNC: 0.79 MG/DL — SIGNIFICANT CHANGE UP (ref 0.5–1.3)
EGFR: 81 ML/MIN/1.73M2 — SIGNIFICANT CHANGE UP
GLUCOSE SERPL-MCNC: 128 MG/DL — HIGH (ref 70–99)
HCT VFR BLD CALC: 35.7 % — SIGNIFICANT CHANGE UP (ref 34.5–45)
HGB BLD-MCNC: 10.8 G/DL — LOW (ref 11.5–15.5)
MCHC RBC-ENTMCNC: 24.2 PG — LOW (ref 27–34)
MCHC RBC-ENTMCNC: 30.3 G/DL — LOW (ref 32–36)
MCV RBC AUTO: 80 FL — SIGNIFICANT CHANGE UP (ref 80–100)
NRBC BLD AUTO-RTO: 0 /100 WBCS — SIGNIFICANT CHANGE UP (ref 0–0)
PLATELET # BLD AUTO: 268 K/UL — SIGNIFICANT CHANGE UP (ref 150–400)
POTASSIUM SERPL-MCNC: 4.9 MMOL/L — SIGNIFICANT CHANGE UP (ref 3.5–5.3)
POTASSIUM SERPL-SCNC: 4.9 MMOL/L — SIGNIFICANT CHANGE UP (ref 3.5–5.3)
RBC # BLD: 4.46 M/UL — SIGNIFICANT CHANGE UP (ref 3.8–5.2)
RBC # FLD: 18.1 % — HIGH (ref 10.3–14.5)
SODIUM SERPL-SCNC: 139 MMOL/L — SIGNIFICANT CHANGE UP (ref 135–145)
WBC # BLD: 8.79 K/UL — SIGNIFICANT CHANGE UP (ref 3.8–10.5)
WBC # FLD AUTO: 8.79 K/UL — SIGNIFICANT CHANGE UP (ref 3.8–10.5)

## 2025-02-17 RX ADMIN — TIOTROPIUM BROMIDE INHALATION SPRAY 2 PUFF(S): 3.12 SPRAY, METERED RESPIRATORY (INHALATION) at 06:08

## 2025-02-17 RX ADMIN — HEPARIN SODIUM 5000 UNIT(S): 1000 INJECTION INTRAVENOUS; SUBCUTANEOUS at 06:09

## 2025-02-17 RX ADMIN — HEPARIN SODIUM 5000 UNIT(S): 1000 INJECTION INTRAVENOUS; SUBCUTANEOUS at 18:09

## 2025-02-17 RX ADMIN — METOPROLOL SUCCINATE 12.5 MILLIGRAM(S): 50 TABLET, EXTENDED RELEASE ORAL at 18:09

## 2025-02-17 RX ADMIN — TRAMADOL HYDROCHLORIDE 50 MILLIGRAM(S): 50 TABLET, FILM COATED ORAL at 11:40

## 2025-02-17 RX ADMIN — Medication 100 MILLIGRAM(S): at 11:40

## 2025-02-17 RX ADMIN — PREDNISONE 40 MILLIGRAM(S): 20 TABLET ORAL at 06:09

## 2025-02-17 RX ADMIN — Medication 40 MILLIGRAM(S): at 06:09

## 2025-02-17 RX ADMIN — METOPROLOL SUCCINATE 12.5 MILLIGRAM(S): 50 TABLET, EXTENDED RELEASE ORAL at 06:09

## 2025-02-17 RX ADMIN — Medication 1 DOSE(S): at 06:08

## 2025-02-17 RX ADMIN — AMLODIPINE BESYLATE 10 MILLIGRAM(S): 10 TABLET ORAL at 06:09

## 2025-02-17 NOTE — SOCIAL WORK PROGRESS NOTE - NSSWPROGRESSNOTE_GEN_ALL_CORE
Pt and family accepting bed at [facility]- Pending ref# [id number] and clinicals faxed. SW to follow.

## 2025-02-18 ENCOUNTER — TRANSCRIPTION ENCOUNTER (OUTPATIENT)
Age: 70
End: 2025-02-18

## 2025-02-18 VITALS
OXYGEN SATURATION: 99 % | RESPIRATION RATE: 18 BRPM | TEMPERATURE: 98 F | HEART RATE: 99 BPM | SYSTOLIC BLOOD PRESSURE: 123 MMHG | DIASTOLIC BLOOD PRESSURE: 79 MMHG

## 2025-02-18 LAB
ANION GAP SERPL CALC-SCNC: 5 MMOL/L — SIGNIFICANT CHANGE UP (ref 5–17)
BUN SERPL-MCNC: 38 MG/DL — HIGH (ref 7–23)
CALCIUM SERPL-MCNC: 10.2 MG/DL — HIGH (ref 8.5–10.1)
CHLORIDE SERPL-SCNC: 100 MMOL/L — SIGNIFICANT CHANGE UP (ref 96–108)
CO2 SERPL-SCNC: 35 MMOL/L — HIGH (ref 22–31)
CREAT SERPL-MCNC: 0.74 MG/DL — SIGNIFICANT CHANGE UP (ref 0.5–1.3)
EGFR: 88 ML/MIN/1.73M2 — SIGNIFICANT CHANGE UP
GLUCOSE SERPL-MCNC: 117 MG/DL — HIGH (ref 70–99)
HCT VFR BLD CALC: 35 % — SIGNIFICANT CHANGE UP (ref 34.5–45)
HGB BLD-MCNC: 10.5 G/DL — LOW (ref 11.5–15.5)
MCHC RBC-ENTMCNC: 24.5 PG — LOW (ref 27–34)
MCHC RBC-ENTMCNC: 30 G/DL — LOW (ref 32–36)
MCV RBC AUTO: 81.6 FL — SIGNIFICANT CHANGE UP (ref 80–100)
NRBC BLD AUTO-RTO: 0 /100 WBCS — SIGNIFICANT CHANGE UP (ref 0–0)
PLATELET # BLD AUTO: 267 K/UL — SIGNIFICANT CHANGE UP (ref 150–400)
POTASSIUM SERPL-MCNC: 4 MMOL/L — SIGNIFICANT CHANGE UP (ref 3.5–5.3)
POTASSIUM SERPL-SCNC: 4 MMOL/L — SIGNIFICANT CHANGE UP (ref 3.5–5.3)
RBC # BLD: 4.29 M/UL — SIGNIFICANT CHANGE UP (ref 3.8–5.2)
RBC # FLD: 18.3 % — HIGH (ref 10.3–14.5)
SODIUM SERPL-SCNC: 140 MMOL/L — SIGNIFICANT CHANGE UP (ref 135–145)
WBC # BLD: 9.72 K/UL — SIGNIFICANT CHANGE UP (ref 3.8–10.5)
WBC # FLD AUTO: 9.72 K/UL — SIGNIFICANT CHANGE UP (ref 3.8–10.5)

## 2025-02-18 PROCEDURE — 83880 ASSAY OF NATRIURETIC PEPTIDE: CPT

## 2025-02-18 PROCEDURE — 83605 ASSAY OF LACTIC ACID: CPT

## 2025-02-18 PROCEDURE — 71045 X-RAY EXAM CHEST 1 VIEW: CPT

## 2025-02-18 PROCEDURE — 99239 HOSP IP/OBS DSCHRG MGMT >30: CPT

## 2025-02-18 PROCEDURE — 93005 ELECTROCARDIOGRAM TRACING: CPT

## 2025-02-18 PROCEDURE — 80048 BASIC METABOLIC PNL TOTAL CA: CPT

## 2025-02-18 PROCEDURE — 85027 COMPLETE CBC AUTOMATED: CPT

## 2025-02-18 PROCEDURE — 71275 CT ANGIOGRAPHY CHEST: CPT | Mod: MC

## 2025-02-18 PROCEDURE — 97110 THERAPEUTIC EXERCISES: CPT

## 2025-02-18 PROCEDURE — G0378: CPT

## 2025-02-18 PROCEDURE — 0225U NFCT DS DNA&RNA 21 SARSCOV2: CPT

## 2025-02-18 PROCEDURE — 96376 TX/PRO/DX INJ SAME DRUG ADON: CPT

## 2025-02-18 PROCEDURE — 84484 ASSAY OF TROPONIN QUANT: CPT

## 2025-02-18 PROCEDURE — 97162 PT EVAL MOD COMPLEX 30 MIN: CPT

## 2025-02-18 PROCEDURE — 97116 GAIT TRAINING THERAPY: CPT

## 2025-02-18 PROCEDURE — 85025 COMPLETE CBC W/AUTO DIFF WBC: CPT

## 2025-02-18 PROCEDURE — 99285 EMERGENCY DEPT VISIT HI MDM: CPT | Mod: 25

## 2025-02-18 PROCEDURE — 94760 N-INVAS EAR/PLS OXIMETRY 1: CPT

## 2025-02-18 PROCEDURE — 85379 FIBRIN DEGRADATION QUANT: CPT

## 2025-02-18 PROCEDURE — 96375 TX/PRO/DX INJ NEW DRUG ADDON: CPT

## 2025-02-18 PROCEDURE — 96374 THER/PROPH/DIAG INJ IV PUSH: CPT

## 2025-02-18 PROCEDURE — 36415 COLL VENOUS BLD VENIPUNCTURE: CPT

## 2025-02-18 PROCEDURE — 94640 AIRWAY INHALATION TREATMENT: CPT

## 2025-02-18 PROCEDURE — 97112 NEUROMUSCULAR REEDUCATION: CPT

## 2025-02-18 PROCEDURE — 80053 COMPREHEN METABOLIC PANEL: CPT

## 2025-02-18 RX ORDER — PREDNISONE 20 MG/1
2 TABLET ORAL
Qty: 0 | Refills: 0 | DISCHARGE
Start: 2025-02-18

## 2025-02-18 RX ORDER — PYRIDOXINE HCL (VITAMIN B6) 25 MG
1 TABLET ORAL
Qty: 0 | Refills: 0 | DISCHARGE
Start: 2025-02-18

## 2025-02-18 RX ORDER — OXYCODONE HYDROCHLORIDE 30 MG/1
1 TABLET ORAL
Qty: 0 | Refills: 0 | DISCHARGE
Start: 2025-02-18

## 2025-02-18 RX ADMIN — Medication 40 MILLIGRAM(S): at 06:17

## 2025-02-18 RX ADMIN — PREDNISONE 40 MILLIGRAM(S): 20 TABLET ORAL at 05:44

## 2025-02-18 RX ADMIN — METOPROLOL SUCCINATE 12.5 MILLIGRAM(S): 50 TABLET, EXTENDED RELEASE ORAL at 05:43

## 2025-02-18 RX ADMIN — TIOTROPIUM BROMIDE INHALATION SPRAY 2 PUFF(S): 3.12 SPRAY, METERED RESPIRATORY (INHALATION) at 07:50

## 2025-02-18 RX ADMIN — Medication 100 MILLIGRAM(S): at 11:04

## 2025-02-18 RX ADMIN — AMLODIPINE BESYLATE 10 MILLIGRAM(S): 10 TABLET ORAL at 05:43

## 2025-02-18 RX ADMIN — TRAMADOL HYDROCHLORIDE 50 MILLIGRAM(S): 50 TABLET, FILM COATED ORAL at 11:04

## 2025-02-18 RX ADMIN — Medication 1 DOSE(S): at 07:50

## 2025-02-18 RX ADMIN — Medication 1 DOSE(S): at 05:43

## 2025-02-18 RX ADMIN — HEPARIN SODIUM 5000 UNIT(S): 1000 INJECTION INTRAVENOUS; SUBCUTANEOUS at 05:44

## 2025-02-18 NOTE — DISCHARGE NOTE PROVIDER - CARE PROVIDER_API CALL
GEM RAZO  Phone: [phone]  Fax: [phone]  Follow Up Time:     Tres Dobson  Pulmonary Disease  [address] [place] [address]  Phone: [phone]  Fax: [phone]  Follow Up Time:     Kaleb Olmedo  Medical Oncology  [facility], [address] [address]  Phone: [phone]  Fax: [phone]  Follow Up Time:

## 2025-02-18 NOTE — DISCHARGE NOTE PROVIDER - DETAILS OF MALNUTRITION DIAGNOSIS/DIAGNOSES
This patient has been assessed with a concern for Malnutrition and was treated during this hospitalization for the following Nutrition diagnosis/diagnoses:     -  02/16/2025: Moderate protein-calorie malnutrition

## 2025-02-18 NOTE — DISCHARGE NOTE PROVIDER - HOSPITAL COURSE
FROM ADMISSION H+P:   HPI:  69 y/ oF PMHx of RA, Arthritis., Colon Ca (Remission), COPD, & HTN presenting with c/o of worsening SOB on 3L NC. Pt was admitted to [facility] for PNA/COPD exacerbation 2/3/25-2/10/25, and has had worsening SOB, weakness ever since DC. Patient reports since discharge, has not felt any better, having worsening shortness of breath, more chest congestion and productive cough. Pt is completing 10 days of Bactrim which was given on discharge, and was to increase her prednisone dose from 5mg back to 10 mg. Patient feels that IV antibiotics helped her more than the oral. She also reports generalized weakness and difficulty with ambulation and would like rehab placement. Patient denies any other concerns or complaints at this time, no fevers, headache, chest pain, abdominal pain, nausea/vomiting/diarrhea.      She has a history of RA and normally on Prednisone 10mg qd. She was previously on Eliquis 5mg BID dt active cancer in 2024, no longer on AC.     ED Course:  -Vitals: /75, HR 94, T 98 F, Spo2 100 on 4L NC  -s/p Atrovent, Xopenex, Methylprednisolone 125mg IVP   -CT Angio Chest PE Protocol: No evidence of pulmonary embolus.Redemonstrated left sixth and seventh rib fractures.  -Cxray: Clear lungs, flattened diaphragm, hyperexpanded lung fields  -Labs: Hgb 11.2, D-dimer 399, CO2 35, BUN 28, Glucose 120, Albumin 3.2,    (14 Feb 2025 04:58)      ---  HOSPITAL COURSE:     Patient admitted for SOB found to have COPD exacerbation due to HMPV. She had CXR done and was clear, dimer elevated and CTA negative for PE. Patient seen by pulm and started on duonebs as well as IV solumedrol, transitioned to PO prednisone. She remained on 2L NC but saturating well, does require it at home sometimes. Doing well and seen by PT, recommend [facility]. Stable for dc to [facility] [place]    ---  TIME SPENT:  I, the attending physician, was physically present for the key portions of the evaluation and management (E/M) service provided. The total amount of time spent reviewing the hospital notes, laboratory values, imaging findings, assessing/counseling the patient, discussing with consultant physicians, social work, nursing staff was 52 minutes

## 2025-02-18 NOTE — PROGRESS NOTE ADULT - PROBLEM SELECTOR PLAN 2
- Normally on prednisone 10mg qd
- Normally on prednisone 10mg qd  - Covered with solumedrol 40mg IV BID for the time being
- Normally on prednisone 10mg qd
- Normally on prednisone 10mg qd  - Covered with solumedrol 40mg IV BID for the time being

## 2025-02-18 NOTE — PROGRESS NOTE ADULT - PROBLEM SELECTOR PLAN 4
- Continue Amlodipine 10mg home medication w. hold edgard  - Continue lopressor 12.5mg bid w. hold edgard  - monitor routine hemodynamics

## 2025-02-18 NOTE — DISCHARGE NOTE PROVIDER - CARE PROVIDERS DIRECT ADDRESSES
[email or web address]

## 2025-02-18 NOTE — DISCHARGE NOTE NURSING/CASE MANAGEMENT/SOCIAL WORK - NSDCPEFALRISK_GEN_ALL_CORE
For information on Fall & Injury Prevention, visit: [email or web address] OR  [email or web address] OR  https://www.cdc.gov/steadi/patient.html

## 2025-02-18 NOTE — PROGRESS NOTE ADULT - PROBLEM SELECTOR PLAN 5
- Continue Xanax .25mg qd

## 2025-02-18 NOTE — PROGRESS NOTE ADULT - PROBLEM SELECTOR PLAN 1
- likely with COPD exacerbation in setting of HMPV  - dimer elevated, CTA chest negative for PE  - CXR with Clear lungs, flattened diaphragm, hyperexpanded lung fields  - continue albuterol ATC, advair, spiriva  - continue solumedrol 40mg IV BID  - pulm recs appreciated
- likely with COPD exacerbation in setting of HMPV  - dimer elevated, CTA chest negative for PE  - CXR with Clear lungs, flattened diaphragm, hyperexpanded lung fields  - continue albuterol ATC, advair, spiriva  - s/p solumedrol now on PO prednisone 40mg- will dc on taper until back on daily 10mg dose  - pulm recs appreciated
- likely with COPD exacerbation in setting of HMPV  - dimer elevated, CTA chest negative for PE  - CXR with Clear lungs, flattened diaphragm, hyperexpanded lung fields  - continue albuterol ATC, advair, spiriva  - s/p solumedrol now on PO prednisone 40mg- will dc on taper until back on daily 10mg dose  - pulm recs appreciated
- likely with COPD exacerbation in setting of HMPV  - dimer elevated, CTA chest negative for PE  - CXR with Clear lungs, flattened diaphragm, hyperexpanded lung fields  - continue albuterol ATC, advair, spiriva  - continue solumedrol 40mg IV BID- likely can transition in 1-2 days pending pulm recs  - pulm recs appreciated

## 2025-02-18 NOTE — DISCHARGE NOTE NURSING/CASE MANAGEMENT/SOCIAL WORK - PATIENT PORTAL LINK FT
You can access the FollowMyHealth Patient Portal offered by [facility] by registering at the following website: [email or web address]. By joining [employer]’s FollowMyHealth portal, you will also be able to view your health information using other applications (apps) compatible with our system.

## 2025-02-18 NOTE — DISCHARGE NOTE PROVIDER - PROVIDER TOKENS
PROVIDER:[TOKEN:[[id number]]],PROVIDER:[TOKEN:[[id number]]],PROVIDER:[TOKEN:[[id number]]]

## 2025-02-18 NOTE — PROGRESS NOTE ADULT - ASSESSMENT
69 y/ oF PMHx of RA, Arthritis., Colon Ca (Remission), COPD, & HTN presenting with c/o of worsening SOB on 3L NC admitted for COPD exacerbation.

## 2025-02-18 NOTE — SOCIAL WORK PROGRESS NOTE - NSSWPROGRESSNOTE_GEN_ALL_CORE
Jovita received for pt to go to [facility] for subacute rehab. Pollo (Kyle) arranged for 3pm transport. Pt/sister/MD all aware and in agreement with plan for DC. SW to remain available for follow up as needed to ensure safe transition.

## 2025-02-18 NOTE — DISCHARGE NOTE PROVIDER - NSDCCPCAREPLAN_GEN_ALL_CORE_FT
PRINCIPAL DISCHARGE DIAGNOSIS  Diagnosis: COPD exacerbation  Assessment and Plan of Treatment: You were found to have COPD exacerbation in setting of human metapneumovirus. You were placed on IV steroids and now will be on oral steroid taper. Please continue taper until you are back on daily prednisone 10mg dose for rheumatoid arthritis.   Follow up with pulmonologist outpatient      SECONDARY DISCHARGE DIAGNOSES  Diagnosis: Generalized weakness  Assessment and Plan of Treatment: You were found to have weakness and will go to rehab to get stronger

## 2025-02-18 NOTE — PROGRESS NOTE ADULT - PROBLEM SELECTOR PLAN 3
- Continue home Tramadol 50mg qd  - Fall precautions  - PT recommend [facility]- agreeable

## 2025-02-18 NOTE — DISCHARGE NOTE PROVIDER - NSDCMRMEDTOKEN_GEN_ALL_CORE_FT
Albuterol (Eqv-ProAir HFA) 90 mcg/inh inhalation aerosol: 2 puff(s) inhaled every 6 hours as needed for  shortness of breath and/or wheezing  ALPRAZolam 0.25 mg oral tablet: 1 tab(s) orally once a day as needed for  anxiety  amLODIPine 10 mg oral tablet: 1 tab(s) orally once a day  fluticasone-salmeterol 250 mcg-50 mcg inhalation powder: 1 puff(s) inhaled 2 times a day  Metoprolol Tartrate 25 mg oral tablet: 0.5 tab(s) orally 2 times a day  oxyCODONE 5 mg oral tablet: 1 tab(s) orally every 6 hours As needed Severe Pain (7 - 10)  predniSONE 20 mg oral tablet: 2 tab(s) orally once a day prednisone taper  prednisone 40mg daily on 2/19  prednisone 30mg daily 2/20-2/22  prednisone 20mg daily 2/23-2/25  prednisone 10mg daily starting 2/26 moving forward (home dose for RA)  Protonix 40 mg oral delayed release tablet: 1 tab(s) orally once a day  pyridoxine 100 mg oral tablet: 1 tab(s) orally once a day  traMADol 50 mg oral tablet: 1 tab(s) orally once a day

## 2025-02-18 NOTE — PROGRESS NOTE ADULT - SUBJECTIVE AND OBJECTIVE BOX
Patient is a 69y old  Female who presents with a chief complaint of COPD exacerbation (17 Feb 2025 14:20)      INTERVAL HPI/OVERNIGHT EVENTS:    Shortness of breath has improved. Scheduled to be transferred to subacute rehab    MEDICATIONS  (STANDING):  albuterol/ipratropium for Nebulization 3 milliLiter(s) Nebulizer every 6 hours  amLODIPine   Tablet 10 milliGRAM(s) Oral daily  fluticasone propionate/ salmeterol 250-50 MICROgram(s) Diskus 1 Dose(s) Inhalation two times a day  heparin   Injectable 5000 Unit(s) SubCutaneous every 12 hours  influenza  Vaccine (HIGH DOSE) 0.5 milliLiter(s) IntraMuscular once  metoprolol tartrate 12.5 milliGRAM(s) Oral two times a day  pantoprazole    Tablet 40 milliGRAM(s) Oral before breakfast  predniSONE   Tablet 40 milliGRAM(s) Oral daily  pyridoxine 100 milliGRAM(s) Oral daily  tiotropium 2.5 MICROgram(s) Inhaler 2 Puff(s) Inhalation daily      MEDICATIONS  (PRN):  acetaminophen     Tablet .. 650 milliGRAM(s) Oral every 6 hours PRN Temp greater or equal to 38C (100.4F), Mild Pain (1 - 3)  ALPRAZolam 0.25 milliGRAM(s) Oral daily PRN for anxiety  melatonin 3 milliGRAM(s) Oral at bedtime PRN Insomnia  oxyCODONE    IR 5 milliGRAM(s) Oral every 6 hours PRN Severe Pain (7 - 10)  traMADol 50 milliGRAM(s) Oral every 6 hours PRN Moderate Pain (4 - 6)      Allergies    No Known Allergies    Intolerances        PAST MEDICAL & SURGICAL HISTORY:  COPD (chronic obstructive pulmonary disease)      Rheumatoid arthritis      HTN (hypertension)      Anxiety      No significant past surgical history  colon resection  02.2024          Vital Signs Last 24 Hrs  T(C): 36.7 (17 Feb 2025 11:49), Max: 36.8 (16 Feb 2025 21:42)  T(F): 98 (17 Feb 2025 11:49), Max: 98.3 (17 Feb 2025 04:43)  HR: 100 (17 Feb 2025 11:49) (93 - 100)  BP: 156/76 (17 Feb 2025 11:49) (151/77 - 157/60)  BP(mean): --  RR: 17 (17 Feb 2025 11:49) (17 - 18)  SpO2: 97% (17 Feb 2025 11:49) (96% - 100%)    Parameters below as of 17 Feb 2025 11:49  Patient On (Oxygen Delivery Method): nasal cannula  O2 Flow (L/min): 2      PHYSICAL EXAMINATION:    GENERAL: The patient is awake and alert in no apparent distress.     HEENT: Head is normocephalic and atraumatic    NECK: no JVD    LUNGS: diminished air entry bilateral    HEART: Regular rate and rhythm without murmur.    ABDOMEN: Soft, nontender, and nondistended.      EXTREMITIES: Without any cyanosis, clubbing, rash, lesions or edema.    NEUROLOGIC: Grossly intact.    SKIN: No ulceration or induration present.      LABS:                        10.8   8.79  )-----------( 268      ( 17 Feb 2025 06:20 )             35.7     02-17    139  |  101  |  36[H]  ----------------------------<  128[H]  4.9   |  34[H]  |  0.79    Ca    10.8[H]      17 Feb 2025 06:20        Urinalysis Basic - ( 17 Feb 2025 06:20 )    Color: x / Appearance: x / SG: x / pH: x  Gluc: 128 mg/dL / Ketone: x  / Bili: x / Urobili: x   Blood: x / Protein: x / Nitrite: x   Leuk Esterase: x / RBC: x / WBC x   Sq Epi: x / Non Sq Epi: x / Bacteria: x      Assessment:    COPD with exacerbation  Human Metapneumovirus bronchitis  Acute on Chronic Hypoxic respiratory failure  Hx Colon CA    Plan:    Continue Prednisone + Oxygen + inhalers  Transfer to subacute rehab  
Patient is a 69y old  Female who presents with a chief complaint of COPD exacerbation (14 Feb 2025 04:58)      INTERVAL HPI/OVERNIGHT EVENTS:    70 YO female former smoker with history of COPD, chronic hypoxic respiratory failure, rheumatoid arthritis, hypertension, hyperlipidemia, and colon CA, who presented to ER last night with weakness, fever, cough, and shortness of breath. The patient was found to have left upper lobe pneumonia around 10 days ago and was admitted to [place], and was treated with IV antibiotics. She was discharged on 2/10/2025 but returned to ER last evening. The patient was using continuous oxygen at home.     MEDICATIONS  (STANDING):  amLODIPine   Tablet 10 milliGRAM(s) Oral daily  fluticasone propionate/ salmeterol 250-50 MICROgram(s) Diskus 1 Dose(s) Inhalation two times a day  heparin   Injectable 5000 Unit(s) SubCutaneous every 12 hours  influenza  Vaccine (HIGH DOSE) 0.5 milliLiter(s) IntraMuscular once  methylPREDNISolone sodium succinate Injectable 40 milliGRAM(s) IV Push every 12 hours  metoprolol tartrate 12.5 milliGRAM(s) Oral two times a day  pantoprazole    Tablet 40 milliGRAM(s) Oral before breakfast  sodium chloride 0.9%. 1000 milliLiter(s) (60 mL/Hr) IV Continuous <Continuous>  tiotropium 2.5 MICROgram(s) Inhaler 2 Puff(s) Inhalation daily      MEDICATIONS  (PRN):  acetaminophen     Tablet .. 650 milliGRAM(s) Oral every 6 hours PRN Temp greater or equal to 38C (100.4F), Mild Pain (1 - 3)  albuterol/ipratropium for Nebulization 3 milliLiter(s) Nebulizer every 6 hours PRN Shortness of Breath and/or Wheezing  ALPRAZolam 0.25 milliGRAM(s) Oral daily PRN for anxiety  melatonin 3 milliGRAM(s) Oral at bedtime PRN Insomnia  traMADol 50 milliGRAM(s) Oral daily PRN Moderate Pain (4 - 6)      Allergies    No Known Allergies    Intolerances        PAST MEDICAL & SURGICAL HISTORY:  COPD (chronic obstructive pulmonary disease)      Rheumatoid arthritis      HTN (hypertension)      Anxiety      No significant past surgical history  colon resection  02.2024          Vital Signs Last 24 Hrs  T(C): 36.9 (14 Feb 2025 14:10), Max: 36.9 (14 Feb 2025 14:10)  T(F): 98.5 (14 Feb 2025 14:10), Max: 98.5 (14 Feb 2025 14:10)  HR: 104 (14 Feb 2025 14:10) (92 - 104)  BP: 152/75 (14 Feb 2025 14:10) (139/73 - 159/75)  BP(mean): 99 (14 Feb 2025 06:15) (91 - 99)  RR: 18 (14 Feb 2025 14:10) (18 - 20)  SpO2: 99% (14 Feb 2025 14:10) (94% - 100%)    Parameters below as of 14 Feb 2025 14:10  Patient On (Oxygen Delivery Method): nasal cannula  O2 Flow (L/min): 3      PHYSICAL EXAMINATION:    GENERAL: The patient is awake and alert in no apparent distress.     HEENT: Head is normocephalic and atraumatic    NECK: no JVD    LUNGS: diminished air entry bilateral    HEART: Regular rate and rhythm without murmur.    ABDOMEN: Soft, nontender, and nondistended.      EXTREMITIES: Without any cyanosis, clubbing, rash, lesions or edema.    NEUROLOGIC: Grossly intact.    SKIN: No ulceration or induration present.      LABS:                        11.2   9.20  )-----------( 303      ( 14 Feb 2025 00:10 )             37.2     02-14    143  |  101  |  28[H]  ----------------------------<  120[H]  4.3   |  35[H]  |  0.95    Ca    10.0      14 Feb 2025 00:10    TPro  6.5  /  Alb  3.2[L]  /  TBili  0.3  /  DBili  x   /  AST  10[L]  /  ALT  22  /  AlkPhos  92  02-14      Urinalysis Basic - ( 14 Feb 2025 00:10 )    Color: x / Appearance: x / SG: x / pH: x  Gluc: 120 mg/dL / Ketone: x  / Bili: x / Urobili: x   Blood: x / Protein: x / Nitrite: x   Leuk Esterase: x / RBC: x / WBC x   Sq Epi: x / Non Sq Epi: x / Bacteria: x            D-Dimer Assay, Quantitative: 399 ng/mL DDU (02-14-25 @ 00:10)      Lactate, Blood: 0.9 mmol/L (02-14-25 @ 00:10)        Assessment:    COPD with exacerbation  Acute on chronic Hypoxic respiratory failure  Human Metapneumovirus  Hx Colon Ca  Hx Rheumatoid Arthritis    Plan:    IV Solumedrol  Supplemental oxygen  DVT prophylaxis  Nebulized bronchodilators  Advair + Symbicort    
Patient is a 69y old  Female who presents with a chief complaint of COPD exacerbation (15 Feb 2025 13:31)      INTERVAL HPI/OVERNIGHT EVENTS:    No change in cough and shortness of breath    MEDICATIONS  (STANDING):  albuterol/ipratropium for Nebulization 3 milliLiter(s) Nebulizer every 6 hours  amLODIPine   Tablet 10 milliGRAM(s) Oral daily  fluticasone propionate/ salmeterol 250-50 MICROgram(s) Diskus 1 Dose(s) Inhalation two times a day  heparin   Injectable 5000 Unit(s) SubCutaneous every 12 hours  influenza  Vaccine (HIGH DOSE) 0.5 milliLiter(s) IntraMuscular once  methylPREDNISolone sodium succinate Injectable 40 milliGRAM(s) IV Push every 12 hours  metoprolol tartrate 12.5 milliGRAM(s) Oral two times a day  pantoprazole    Tablet 40 milliGRAM(s) Oral before breakfast  pyridoxine 100 milliGRAM(s) Oral daily  tiotropium 2.5 MICROgram(s) Inhaler 2 Puff(s) Inhalation daily      MEDICATIONS  (PRN):  acetaminophen     Tablet .. 650 milliGRAM(s) Oral every 6 hours PRN Temp greater or equal to 38C (100.4F), Mild Pain (1 - 3)  ALPRAZolam 0.25 milliGRAM(s) Oral daily PRN for anxiety  melatonin 3 milliGRAM(s) Oral at bedtime PRN Insomnia  oxyCODONE    IR 5 milliGRAM(s) Oral every 6 hours PRN Severe Pain (7 - 10)  traMADol 50 milliGRAM(s) Oral daily PRN Moderate Pain (4 - 6)      Allergies    No Known Allergies    Intolerances        PAST MEDICAL & SURGICAL HISTORY:  COPD (chronic obstructive pulmonary disease)      Rheumatoid arthritis      HTN (hypertension)      Anxiety      No significant past surgical history  colon resection  02.2024          Vital Signs Last 24 Hrs  T(C): 37 (15 Feb 2025 12:27), Max: 37 (15 Feb 2025 12:27)  T(F): 98.6 (15 Feb 2025 12:27), Max: 98.6 (15 Feb 2025 12:27)  HR: 89 (15 Feb 2025 12:27) (80 - 103)  BP: 159/62 (15 Feb 2025 12:27) (152/78 - 159/62)  BP(mean): --  RR: 18 (15 Feb 2025 12:27) (18 - 18)  SpO2: 99% (15 Feb 2025 12:27) (97% - 100%)    Parameters below as of 15 Feb 2025 12:27  Patient On (Oxygen Delivery Method): nasal cannula  O2 Flow (L/min): 2      PHYSICAL EXAMINATION:    GENERAL: The patient is awake and alert in no apparent distress.     HEENT: Head is normocephalic and atraumatic    NECK: no JVD    LUNGS: diminished air entry bilateral    HEART: Regular rate and rhythm without murmur.    ABDOMEN: Soft, nontender, and nondistended.      EXTREMITIES: Without any cyanosis, clubbing, rash, lesions or edema.    NEUROLOGIC: Grossly intact.    SKIN: No ulceration or induration present.      LABS:                        11.2   9.20  )-----------( 303      ( 14 Feb 2025 00:10 )             37.2     02-14    143  |  101  |  28[H]  ----------------------------<  120[H]  4.3   |  35[H]  |  0.95    Ca    10.0      14 Feb 2025 00:10    TPro  6.5  /  Alb  3.2[L]  /  TBili  0.3  /  DBili  x   /  AST  10[L]  /  ALT  22  /  AlkPhos  92  02-14      Urinalysis Basic - ( 14 Feb 2025 00:10 )    Color: x / Appearance: x / SG: x / pH: x  Gluc: 120 mg/dL / Ketone: x  / Bili: x / Urobili: x   Blood: x / Protein: x / Nitrite: x   Leuk Esterase: x / RBC: x / WBC x   Sq Epi: x / Non Sq Epi: x / Bacteria: x      Assessment:    Viral bronchitis - (human metapneumovirus)  COPD with exacerbation  Acute on Chronic Hypoxic respiratory failure  Hx CA Colon    Plan:    Supplemental oxygen  IV steroids  Nebulized bronchodilators  Monitor pulse oximetry      
Patient is a 69y old  Female who presents with a chief complaint of COPD exacerbation (16 Feb 2025 13:33)      INTERVAL HPI/OVERNIGHT EVENTS:    Feels better. Cough is less frequent and less severe    MEDICATIONS  (STANDING):  albuterol/ipratropium for Nebulization 3 milliLiter(s) Nebulizer every 6 hours  amLODIPine   Tablet 10 milliGRAM(s) Oral daily  fluticasone propionate/ salmeterol 250-50 MICROgram(s) Diskus 1 Dose(s) Inhalation two times a day  heparin   Injectable 5000 Unit(s) SubCutaneous every 12 hours  influenza  Vaccine (HIGH DOSE) 0.5 milliLiter(s) IntraMuscular once  methylPREDNISolone sodium succinate Injectable 40 milliGRAM(s) IV Push every 12 hours  metoprolol tartrate 12.5 milliGRAM(s) Oral two times a day  pantoprazole    Tablet 40 milliGRAM(s) Oral before breakfast  pyridoxine 100 milliGRAM(s) Oral daily  tiotropium 2.5 MICROgram(s) Inhaler 2 Puff(s) Inhalation daily      MEDICATIONS  (PRN):  acetaminophen     Tablet .. 650 milliGRAM(s) Oral every 6 hours PRN Temp greater or equal to 38C (100.4F), Mild Pain (1 - 3)  ALPRAZolam 0.25 milliGRAM(s) Oral daily PRN for anxiety  melatonin 3 milliGRAM(s) Oral at bedtime PRN Insomnia  oxyCODONE    IR 5 milliGRAM(s) Oral every 6 hours PRN Severe Pain (7 - 10)  traMADol 50 milliGRAM(s) Oral every 6 hours PRN Moderate Pain (4 - 6)      Allergies    No Known Allergies    Intolerances        PAST MEDICAL & SURGICAL HISTORY:  COPD (chronic obstructive pulmonary disease)      Rheumatoid arthritis      HTN (hypertension)      Anxiety      No significant past surgical history  colon resection  02.2024          Vital Signs Last 24 Hrs  T(C): 37 (16 Feb 2025 12:03), Max: 37.1 (16 Feb 2025 04:54)  T(F): 98.6 (16 Feb 2025 12:03), Max: 98.7 (16 Feb 2025 04:54)  HR: 100 (16 Feb 2025 12:03) (79 - 100)  BP: 135/67 (16 Feb 2025 12:03) (135/67 - 169/84)  BP(mean): --  RR: 18 (16 Feb 2025 12:03) (18 - 18)  SpO2: 92% (16 Feb 2025 12:03) (92% - 100%)    Parameters below as of 16 Feb 2025 12:03  Patient On (Oxygen Delivery Method): room air  O2 Flow (L/min): 2      PHYSICAL EXAMINATION:    GENERAL: The patient is awake and alert in no apparent distress.     HEENT: Head is normocephalic and atraumatic    NECK: no JVD    LUNGS: diminished air entry bilateral    HEART: Regular rate and rhythm without murmur.    ABDOMEN: Soft, nontender, and nondistended.      EXTREMITIES: Without any cyanosis, clubbing, rash, lesions or edema.    NEUROLOGIC: Grossly intact.    SKIN: No ulceration or induration present.      LABS:                        10.7   9.61  )-----------( 280      ( 16 Feb 2025 07:11 )             35.6     02-16    140  |  96  |  30[H]  ----------------------------<  99  4.8   |  36[H]  |  0.89    Ca    10.2[H]      16 Feb 2025 07:11        Urinalysis Basic - ( 16 Feb 2025 07:11 )    Color: x / Appearance: x / SG: x / pH: x  Gluc: 99 mg/dL / Ketone: x  / Bili: x / Urobili: x   Blood: x / Protein: x / Nitrite: x   Leuk Esterase: x / RBC: x / WBC x   Sq Epi: x / Non Sq Epi: x / Bacteria: x          Assessment:    Resolving Human Metapneumovirus bronchitis  Acute on Chronic Hypoxic respiratory failure  COPD  Hx Colon CA    Plan:    Change to oral steroids  Supplemental oxygen  Duoneb QID  Advair + Spiriva inhalers  DVT prophylaxis    
Patient is a 69y old  Female who presents with a chief complaint of COPD exacerbation (15 Feb 2025 17:52)      Patient seen and examined at bedside. No events overnight. Patient feels breathing better now than yesterday, more near baseline. States she uses o2 PRN mostly after dc from hospital, otherwise does not use o2. Denies chest pain, abd pain, fever, chills. SOB improved. Currently on 2L NC but saturating well therefore brought down to 1L NC    ALLERGIES:  No Known Allergies    MEDICATIONS  (STANDING):  albuterol/ipratropium for Nebulization 3 milliLiter(s) Nebulizer every 6 hours  amLODIPine   Tablet 10 milliGRAM(s) Oral daily  fluticasone propionate/ salmeterol 250-50 MICROgram(s) Diskus 1 Dose(s) Inhalation two times a day  heparin   Injectable 5000 Unit(s) SubCutaneous every 12 hours  influenza  Vaccine (HIGH DOSE) 0.5 milliLiter(s) IntraMuscular once  methylPREDNISolone sodium succinate Injectable 40 milliGRAM(s) IV Push every 12 hours  metoprolol tartrate 12.5 milliGRAM(s) Oral two times a day  pantoprazole    Tablet 40 milliGRAM(s) Oral before breakfast  pyridoxine 100 milliGRAM(s) Oral daily  tiotropium 2.5 MICROgram(s) Inhaler 2 Puff(s) Inhalation daily    MEDICATIONS  (PRN):  acetaminophen     Tablet .. 650 milliGRAM(s) Oral every 6 hours PRN Temp greater or equal to 38C (100.4F), Mild Pain (1 - 3)  ALPRAZolam 0.25 milliGRAM(s) Oral daily PRN for anxiety  melatonin 3 milliGRAM(s) Oral at bedtime PRN Insomnia  oxyCODONE    IR 5 milliGRAM(s) Oral every 6 hours PRN Severe Pain (7 - 10)  traMADol 50 milliGRAM(s) Oral daily PRN Moderate Pain (4 - 6)    Vital Signs Last 24 Hrs  T(F): 98.6 (16 Feb 2025 12:03), Max: 98.7 (16 Feb 2025 04:54)  HR: 100 (16 Feb 2025 12:03) (79 - 100)  BP: 135/67 (16 Feb 2025 12:03) (135/67 - 169/84)  RR: 18 (16 Feb 2025 12:03) (18 - 18)  SpO2: 92% (16 Feb 2025 12:03) (92% - 100%)  I&O's Summary    15 Feb 2025 07:01  -  16 Feb 2025 07:00  --------------------------------------------------------  IN: 0 mL / OUT: 50 mL / NET: -50 mL        PHYSICAL EXAM:  GENERAL: NAD, sitting in bed, on 1L NC  HEAD:  Atraumatic, Normocephalic  EYES: PEERL, conjunctiva and sclera clear  ENMT: Moist mucous membranes, Supple, No JVD  CHEST/LUNG: no rales, wheezing or rhonchi, tight lung sounds  HEART: RRR; S1/S2, No murmur  ABDOMEN: Soft, Nontender, Nondistended; Bowel sounds present  EXTREMITIES: No calf tenderness, No cyanosis, No edema  SKIN: Warm, perfused  PSYCH: Normal mood, Normal affect  NERVOUS SYSTEM:  A/O x3, Good concentration    LABS:                        10.7   9.61  )-----------( 280      ( 16 Feb 2025 07:11 )             35.6     02-16    140  |  96  |  30  ----------------------------<  99  4.8   |  36  |  0.89    Ca    10.2      16 Feb 2025 07:11    TPro  6.5  /  Alb  3.2  /  TBili  0.3  /  DBili  x   /  AST  10  /  ALT  22  /  AlkPhos  92  02-14        Lactate, Blood: 0.9 mmol/L (02-14 @ 00:10)    CARDIAC MARKERS ( 14 Feb 2025 00:10 )  x     / 5.9 ng/L / x     / x     / x                            Urinalysis Basic - ( 16 Feb 2025 07:11 )    Color: x / Appearance: x / SG: x / pH: x  Gluc: 99 mg/dL / Ketone: x  / Bili: x / Urobili: x   Blood: x / Protein: x / Nitrite: x   Leuk Esterase: x / RBC: x / WBC x   Sq Epi: x / Non Sq Epi: x / Bacteria: x            RADIOLOGY & ADDITIONAL TESTS:    Care Discussed with Consultants/Other Providers:   
Patient is a 69y old  Female who presents with a chief complaint of COPD exacerbation (14 Feb 2025 19:01)      Patient seen and examined at bedside. No events overnight. Patient states that breathing is improved but not at baseline yet. States she uses o2 PRN mostly after dc from hospital, otherwise does not use o2. Denies chest pain, abd pain, fever, chills. SOB improved    ALLERGIES:  No Known Allergies    MEDICATIONS  (STANDING):  albuterol/ipratropium for Nebulization 3 milliLiter(s) Nebulizer every 6 hours  amLODIPine   Tablet 10 milliGRAM(s) Oral daily  fluticasone propionate/ salmeterol 250-50 MICROgram(s) Diskus 1 Dose(s) Inhalation two times a day  heparin   Injectable 5000 Unit(s) SubCutaneous every 12 hours  influenza  Vaccine (HIGH DOSE) 0.5 milliLiter(s) IntraMuscular once  methylPREDNISolone sodium succinate Injectable 40 milliGRAM(s) IV Push every 12 hours  metoprolol tartrate 12.5 milliGRAM(s) Oral two times a day  pantoprazole    Tablet 40 milliGRAM(s) Oral before breakfast  sodium chloride 0.9%. 1000 milliLiter(s) (60 mL/Hr) IV Continuous <Continuous>  tiotropium 2.5 MICROgram(s) Inhaler 2 Puff(s) Inhalation daily    MEDICATIONS  (PRN):  acetaminophen     Tablet .. 650 milliGRAM(s) Oral every 6 hours PRN Temp greater or equal to 38C (100.4F), Mild Pain (1 - 3)  ALPRAZolam 0.25 milliGRAM(s) Oral daily PRN for anxiety  melatonin 3 milliGRAM(s) Oral at bedtime PRN Insomnia  traMADol 50 milliGRAM(s) Oral daily PRN Moderate Pain (4 - 6)    Vital Signs Last 24 Hrs  T(F): 98.6 (15 Feb 2025 12:27), Max: 98.6 (15 Feb 2025 12:27)  HR: 89 (15 Feb 2025 12:27) (80 - 104)  BP: 159/62 (15 Feb 2025 12:27) (152/75 - 159/62)  RR: 18 (15 Feb 2025 12:27) (18 - 18)  SpO2: 99% (15 Feb 2025 12:27) (97% - 100%)  I&O's Summary    14 Feb 2025 07:01  -  15 Feb 2025 07:00  --------------------------------------------------------  IN: 0 mL / OUT: 100 mL / NET: -100 mL        PHYSICAL EXAM:  GENERAL: NAD, sitting in bed, on 2L NC  HEAD:  Atraumatic, Normocephalic  EYES: PEERL, conjunctiva and sclera clear  ENMT: Moist mucous membranes, Supple, No JVD  CHEST/LUNG: + wheezing, no rales or rhonchi, tight lung sounds  HEART: RRR; S1/S2, No murmur  ABDOMEN: Soft, Nontender, Nondistended; Bowel sounds present  EXTREMITIES: No calf tenderness, No cyanosis, No edema  SKIN: Warm, perfused  PSYCH: Normal mood, Normal affect  NERVOUS SYSTEM:  A/O x3, Good concentration    LABS:                        11.2   9.20  )-----------( 303      ( 14 Feb 2025 00:10 )             37.2     02-14    143  |  101  |  28  ----------------------------<  120  4.3   |  35  |  0.95    Ca    10.0      14 Feb 2025 00:10    TPro  6.5  /  Alb  3.2  /  TBili  0.3  /  DBili  x   /  AST  10  /  ALT  22  /  AlkPhos  92  02-14        Lactate, Blood: 0.9 mmol/L (02-14 @ 00:10)    CARDIAC MARKERS ( 14 Feb 2025 00:10 )  x     / 5.9 ng/L / x     / x     / x                            Urinalysis Basic - ( 14 Feb 2025 00:10 )    Color: x / Appearance: x / SG: x / pH: x  Gluc: 120 mg/dL / Ketone: x  / Bili: x / Urobili: x   Blood: x / Protein: x / Nitrite: x   Leuk Esterase: x / RBC: x / WBC x   Sq Epi: x / Non Sq Epi: x / Bacteria: x            RADIOLOGY & ADDITIONAL TESTS:    Care Discussed with Consultants/Other Providers:   
Patient is a 69y old  Female who presents with a chief complaint of COPD exacerbation (16 Feb 2025 17:24)      Patient seen and examined at bedside. No events overnight. Patient feels breathing better now and back to baseline. States she uses o2 PRN mostly after dc from hospital, otherwise does not use o2. Denies chest pain, abd pain, fever, chills. SOB improved. Currently on 2L NC but saturating well    ALLERGIES:  No Known Allergies    MEDICATIONS  (STANDING):  albuterol/ipratropium for Nebulization 3 milliLiter(s) Nebulizer every 6 hours  amLODIPine   Tablet 10 milliGRAM(s) Oral daily  fluticasone propionate/ salmeterol 250-50 MICROgram(s) Diskus 1 Dose(s) Inhalation two times a day  heparin   Injectable 5000 Unit(s) SubCutaneous every 12 hours  influenza  Vaccine (HIGH DOSE) 0.5 milliLiter(s) IntraMuscular once  metoprolol tartrate 12.5 milliGRAM(s) Oral two times a day  pantoprazole    Tablet 40 milliGRAM(s) Oral before breakfast  predniSONE   Tablet 40 milliGRAM(s) Oral daily  pyridoxine 100 milliGRAM(s) Oral daily  tiotropium 2.5 MICROgram(s) Inhaler 2 Puff(s) Inhalation daily    MEDICATIONS  (PRN):  acetaminophen     Tablet .. 650 milliGRAM(s) Oral every 6 hours PRN Temp greater or equal to 38C (100.4F), Mild Pain (1 - 3)  ALPRAZolam 0.25 milliGRAM(s) Oral daily PRN for anxiety  melatonin 3 milliGRAM(s) Oral at bedtime PRN Insomnia  oxyCODONE    IR 5 milliGRAM(s) Oral every 6 hours PRN Severe Pain (7 - 10)  traMADol 50 milliGRAM(s) Oral every 6 hours PRN Moderate Pain (4 - 6)    Vital Signs Last 24 Hrs  T(F): 98 (17 Feb 2025 11:49), Max: 98.3 (17 Feb 2025 04:43)  HR: 100 (17 Feb 2025 11:49) (93 - 100)  BP: 156/76 (17 Feb 2025 11:49) (151/77 - 157/60)  RR: 17 (17 Feb 2025 11:49) (17 - 18)  SpO2: 97% (17 Feb 2025 11:49) (96% - 100%)  I&O's Summary    17 Feb 2025 07:01  -  17 Feb 2025 14:21  --------------------------------------------------------  IN: 0 mL / OUT: 500 mL / NET: -500 mL          PHYSICAL EXAM:  GENERAL: NAD, sitting in bed, on 2L NC  HEAD:  Atraumatic, Normocephalic  EYES: PEERL, conjunctiva and sclera clear  ENMT: Moist mucous membranes, Supple, No JVD  CHEST/LUNG: no rales, wheezing or rhonchi, tight lung sounds  HEART: RRR; S1/S2, No murmur  ABDOMEN: Soft, Nontender, Nondistended; Bowel sounds present  EXTREMITIES: No calf tenderness, No cyanosis, No edema  SKIN: Warm, perfused  PSYCH: Normal mood, Normal affect  NERVOUS SYSTEM:  A/O x3, Good concentration      LABS:                        10.8   8.79  )-----------( 268      ( 17 Feb 2025 06:20 )             35.7     02-17    139  |  101  |  36  ----------------------------<  128  4.9   |  34  |  0.79    Ca    10.8      17 Feb 2025 06:20                                  Urinalysis Basic - ( 17 Feb 2025 06:20 )    Color: x / Appearance: x / SG: x / pH: x  Gluc: 128 mg/dL / Ketone: x  / Bili: x / Urobili: x   Blood: x / Protein: x / Nitrite: x   Leuk Esterase: x / RBC: x / WBC x   Sq Epi: x / Non Sq Epi: x / Bacteria: x            RADIOLOGY & ADDITIONAL TESTS:    Care Discussed with Consultants/Other Providers:   
Patient is a 69y old  Female who presents with a chief complaint of COPD exacerbation (17 Feb 2025 15:46)      Patient seen and examined at bedside. No events overnight. States feels back to baseline now. States she uses o2 PRN mostly after dc from hospital, otherwise does not use o2. Denies chest pain, abd pain, fever, chills. SOB improved. Currently on 2L NC but saturating well    ALLERGIES:  No Known Allergies    MEDICATIONS  (STANDING):  albuterol/ipratropium for Nebulization 3 milliLiter(s) Nebulizer every 6 hours  amLODIPine   Tablet 10 milliGRAM(s) Oral daily  fluticasone propionate/ salmeterol 250-50 MICROgram(s) Diskus 1 Dose(s) Inhalation two times a day  heparin   Injectable 5000 Unit(s) SubCutaneous every 12 hours  influenza  Vaccine (HIGH DOSE) 0.5 milliLiter(s) IntraMuscular once  metoprolol tartrate 12.5 milliGRAM(s) Oral two times a day  pantoprazole    Tablet 40 milliGRAM(s) Oral before breakfast  predniSONE   Tablet 40 milliGRAM(s) Oral daily  pyridoxine 100 milliGRAM(s) Oral daily  tiotropium 2.5 MICROgram(s) Inhaler 2 Puff(s) Inhalation daily    MEDICATIONS  (PRN):  acetaminophen     Tablet .. 650 milliGRAM(s) Oral every 6 hours PRN Temp greater or equal to 38C (100.4F), Mild Pain (1 - 3)  ALPRAZolam 0.25 milliGRAM(s) Oral daily PRN for anxiety  melatonin 3 milliGRAM(s) Oral at bedtime PRN Insomnia  oxyCODONE    IR 5 milliGRAM(s) Oral every 6 hours PRN Severe Pain (7 - 10)  traMADol 50 milliGRAM(s) Oral every 6 hours PRN Moderate Pain (4 - 6)    Vital Signs Last 24 Hrs  T(F): 98.4 (18 Feb 2025 11:46), Max: 98.5 (18 Feb 2025 04:31)  HR: 99 (18 Feb 2025 11:46) (95 - 103)  BP: 123/79 (18 Feb 2025 11:46) (123/79 - 165/81)  RR: 18 (18 Feb 2025 11:46) (18 - 18)  SpO2: 99% (18 Feb 2025 11:46) (98% - 99%)  I&O's Summary    17 Feb 2025 07:01  -  18 Feb 2025 07:00  --------------------------------------------------------  IN: 0 mL / OUT: 500 mL / NET: -500 mL        PHYSICAL EXAM:  GENERAL: NAD, sitting in bed, on 2L NC  HEAD:  Atraumatic, Normocephalic  EYES: PEERL, conjunctiva and sclera clear  ENMT: Moist mucous membranes, Supple, No JVD  CHEST/LUNG: no rales, wheezing or rhonchi, tight lung sounds  HEART: RRR; S1/S2, No murmur  ABDOMEN: Soft, Nontender, Nondistended; Bowel sounds present  EXTREMITIES: No calf tenderness, No cyanosis, No edema  SKIN: Warm, perfused  PSYCH: Normal mood, Normal affect  NERVOUS SYSTEM:  A/O x3, Good concentration    LABS:                        10.5   9.72  )-----------( 267      ( 18 Feb 2025 06:10 )             35.0     02-18    140  |  100  |  38  ----------------------------<  117  4.0   |  35  |  0.74    Ca    10.2      18 Feb 2025 06:10                                  Urinalysis Basic - ( 18 Feb 2025 06:10 )    Color: x / Appearance: x / SG: x / pH: x  Gluc: 117 mg/dL / Ketone: x  / Bili: x / Urobili: x   Blood: x / Protein: x / Nitrite: x   Leuk Esterase: x / RBC: x / WBC x   Sq Epi: x / Non Sq Epi: x / Bacteria: x            RADIOLOGY & ADDITIONAL TESTS:    Care Discussed with Consultants/Other Providers:

## 2025-02-18 NOTE — PROGRESS NOTE ADULT - NSPROGADDITIONALINFOA_GEN_ALL_CORE
discussed with sister Jenniffer Collins at bedside, all questions answered  dispo pending auth for MELISSA, medically stable
discussed with sister Jenniffer Collins over the phone, all questions answered

## 2025-02-18 NOTE — PROGRESS NOTE ADULT - PROBLEM/PLAN-1
The pt is laying in bed with eyes closed. Breathing is even and unlabored. The pt arousable to voice and becomes oriented. The pt neurologically intact. Vitals stable on room air. The pt remains on the monitor  
DISPLAY PLAN FREE TEXT

## 2025-02-18 NOTE — DISCHARGE NOTE NURSING/CASE MANAGEMENT/SOCIAL WORK - FINANCIAL ASSISTANCE
[facility] provides services at a reduced cost to those who are determined to be eligible through [facility]’s financial assistance program. Information regarding [facility]’s financial assistance program can be found by going to [email or web address] or by calling [phone].

## 2025-02-19 RX ORDER — IPRATROPIUM BROMIDE AND ALBUTEROL SULFATE .5; 2.5 MG/3ML; MG/3ML
3 SOLUTION RESPIRATORY (INHALATION)
Refills: 0 | DISCHARGE
Start: 2025-02-19

## 2025-02-25 RX ORDER — BUDESONIDE 0.25 MG/2ML
2 SUSPENSION RESPIRATORY (INHALATION)
Refills: 0 | DISCHARGE
Start: 2025-02-25

## 2025-02-26 ENCOUNTER — INPATIENT (INPATIENT)
Facility: HOSPITAL | Age: 70
LOS: 14 days | DRG: 189 | End: 2025-03-13
Attending: INTERNAL MEDICINE | Admitting: INTERNAL MEDICINE
Payer: MEDICARE

## 2025-02-26 VITALS
HEART RATE: 67 BPM | RESPIRATION RATE: 28 BRPM | HEIGHT: 62 IN | DIASTOLIC BLOOD PRESSURE: 65 MMHG | SYSTOLIC BLOOD PRESSURE: 148 MMHG | WEIGHT: 119.93 LBS | TEMPERATURE: 97 F | OXYGEN SATURATION: 67 %

## 2025-02-26 DIAGNOSIS — J96.01 ACUTE RESPIRATORY FAILURE WITH HYPOXIA: ICD-10-CM

## 2025-02-26 LAB
ALBUMIN SERPL ELPH-MCNC: 3.1 G/DL — LOW (ref 3.3–5)
ALP SERPL-CCNC: 109 U/L — SIGNIFICANT CHANGE UP (ref 40–120)
ALT FLD-CCNC: 24 U/L — SIGNIFICANT CHANGE UP (ref 10–45)
ANION GAP SERPL CALC-SCNC: 3 MMOL/L — LOW (ref 5–17)
APTT BLD: 27.1 SEC — SIGNIFICANT CHANGE UP (ref 24.5–35.6)
AST SERPL-CCNC: 20 U/L — SIGNIFICANT CHANGE UP (ref 10–40)
BASE EXCESS BLDA CALC-SCNC: 10 MMOL/L — HIGH (ref -2–3)
BASE EXCESS BLDA CALC-SCNC: 14.9 MMOL/L — HIGH (ref -2–3)
BASOPHILS # BLD AUTO: 0 K/UL — SIGNIFICANT CHANGE UP (ref 0–0.2)
BASOPHILS NFR BLD AUTO: 0 % — SIGNIFICANT CHANGE UP (ref 0–2)
BILIRUB SERPL-MCNC: 0.8 MG/DL — SIGNIFICANT CHANGE UP (ref 0.2–1.2)
BUN SERPL-MCNC: 20 MG/DL — SIGNIFICANT CHANGE UP (ref 7–23)
CALCIUM SERPL-MCNC: 10.2 MG/DL — SIGNIFICANT CHANGE UP (ref 8.4–10.5)
CHLORIDE SERPL-SCNC: 101 MMOL/L — SIGNIFICANT CHANGE UP (ref 96–108)
CO2 BLDA-SCNC: 38 MMOL/L — HIGH (ref 19–24)
CO2 BLDA-SCNC: 44 MMOL/L — HIGH (ref 19–24)
CO2 SERPL-SCNC: 39 MMOL/L — HIGH (ref 22–31)
CREAT SERPL-MCNC: 0.54 MG/DL — SIGNIFICANT CHANGE UP (ref 0.5–1.3)
EGFR: 100 ML/MIN/1.73M2 — SIGNIFICANT CHANGE UP
EGFR: 100 ML/MIN/1.73M2 — SIGNIFICANT CHANGE UP
EOSINOPHIL # BLD AUTO: 0 K/UL — SIGNIFICANT CHANGE UP (ref 0–0.5)
EOSINOPHIL NFR BLD AUTO: 0 % — SIGNIFICANT CHANGE UP (ref 0–6)
FLUAV AG NPH QL: DETECTED
FLUBV AG NPH QL: SIGNIFICANT CHANGE UP
GAS PNL BLDA: SIGNIFICANT CHANGE UP
GAS PNL BLDA: SIGNIFICANT CHANGE UP
GLUCOSE BLDC GLUCOMTR-MCNC: 195 MG/DL — HIGH (ref 70–99)
GLUCOSE BLDC GLUCOMTR-MCNC: 292 MG/DL — HIGH (ref 70–99)
GLUCOSE BLDC GLUCOMTR-MCNC: 307 MG/DL — HIGH (ref 70–99)
GLUCOSE SERPL-MCNC: 184 MG/DL — HIGH (ref 70–99)
HCO3 BLDA-SCNC: 36 MMOL/L — HIGH (ref 21–28)
HCO3 BLDA-SCNC: 41 MMOL/L — HIGH (ref 21–28)
HCT VFR BLD CALC: 35.9 % — SIGNIFICANT CHANGE UP (ref 34.5–45)
HGB BLD-MCNC: 10.5 G/DL — LOW (ref 11.5–15.5)
HOROWITZ INDEX BLDA+IHG-RTO: 100 — SIGNIFICANT CHANGE UP
HOROWITZ INDEX BLDA+IHG-RTO: 60 — SIGNIFICANT CHANGE UP
INR BLD: 0.96 RATIO — SIGNIFICANT CHANGE UP (ref 0.85–1.16)
LACTATE SERPL-SCNC: 1 MMOL/L — SIGNIFICANT CHANGE UP (ref 0.7–2)
LYMPHOCYTES # BLD AUTO: 0.22 K/UL — LOW (ref 1–3.3)
LYMPHOCYTES # BLD AUTO: 2 % — LOW (ref 13–44)
MANUAL SMEAR VERIFICATION: SIGNIFICANT CHANGE UP
MCHC RBC-ENTMCNC: 24.9 PG — LOW (ref 27–34)
MCHC RBC-ENTMCNC: 29.2 G/DL — LOW (ref 32–36)
MCV RBC AUTO: 85.3 FL — SIGNIFICANT CHANGE UP (ref 80–100)
MONOCYTES # BLD AUTO: 0.11 K/UL — SIGNIFICANT CHANGE UP (ref 0–0.9)
MONOCYTES NFR BLD AUTO: 1 % — LOW (ref 2–14)
NEUTROPHILS # BLD AUTO: 10.88 K/UL — HIGH (ref 1.8–7.4)
NEUTROPHILS NFR BLD AUTO: 89 % — HIGH (ref 43–77)
NEUTS BAND # BLD: 8 % — SIGNIFICANT CHANGE UP (ref 0–8)
NEUTS BAND NFR BLD: 8 % — SIGNIFICANT CHANGE UP (ref 0–8)
NRBC # BLD: 0 /100 WBCS — SIGNIFICANT CHANGE UP (ref 0–0)
NRBC BLD-RTO: 0 /100 WBCS — SIGNIFICANT CHANGE UP (ref 0–0)
NT-PROBNP SERPL-SCNC: 198 PG/ML — SIGNIFICANT CHANGE UP (ref 0–300)
PCO2 BLDA: 72 MMHG — CRITICAL HIGH (ref 32–35)
PCO2 BLDA: 84 MMHG — CRITICAL HIGH (ref 32–35)
PH BLDA: 7.3 — LOW (ref 7.35–7.45)
PH BLDA: 7.31 — LOW (ref 7.35–7.45)
PLAT MORPH BLD: NORMAL — SIGNIFICANT CHANGE UP
PLATELET # BLD AUTO: 239 K/UL — SIGNIFICANT CHANGE UP (ref 150–400)
PO2 BLDA: 114 MMHG — HIGH (ref 83–108)
PO2 BLDA: 118 MMHG — HIGH (ref 83–108)
POTASSIUM SERPL-MCNC: 4.7 MMOL/L — SIGNIFICANT CHANGE UP (ref 3.5–5.3)
POTASSIUM SERPL-SCNC: 4.7 MMOL/L — SIGNIFICANT CHANGE UP (ref 3.5–5.3)
PROCALCITONIN SERPL-MCNC: 0.1 NG/ML — HIGH
PROT SERPL-MCNC: 7.1 G/DL — SIGNIFICANT CHANGE UP (ref 6–8.3)
PROTHROM AB SERPL-ACNC: 11.3 SEC — SIGNIFICANT CHANGE UP (ref 9.9–13.4)
RBC # BLD: 4.21 M/UL — SIGNIFICANT CHANGE UP (ref 3.8–5.2)
RBC # FLD: 20.3 % — HIGH (ref 10.3–14.5)
RBC BLD AUTO: NORMAL — SIGNIFICANT CHANGE UP
RSV RNA NPH QL NAA+NON-PROBE: SIGNIFICANT CHANGE UP
SAO2 % BLDA: 99.1 % — HIGH (ref 94–98)
SAO2 % BLDA: 99.2 % — HIGH (ref 94–98)
SARS-COV-2 RNA SPEC QL NAA+PROBE: SIGNIFICANT CHANGE UP
SODIUM SERPL-SCNC: 143 MMOL/L — SIGNIFICANT CHANGE UP (ref 135–145)
TROPONIN I, HIGH SENSITIVITY RESULT: 10.1 NG/L — SIGNIFICANT CHANGE UP
WBC # BLD: 11.22 K/UL — HIGH (ref 3.8–10.5)
WBC # FLD AUTO: 11.22 K/UL — HIGH (ref 3.8–10.5)

## 2025-02-26 PROCEDURE — 93010 ELECTROCARDIOGRAM REPORT: CPT

## 2025-02-26 PROCEDURE — 71275 CT ANGIOGRAPHY CHEST: CPT | Mod: 26

## 2025-02-26 PROCEDURE — 71045 X-RAY EXAM CHEST 1 VIEW: CPT | Mod: 26,77

## 2025-02-26 PROCEDURE — 71045 X-RAY EXAM CHEST 1 VIEW: CPT | Mod: 26

## 2025-02-26 PROCEDURE — 99291 CRITICAL CARE FIRST HOUR: CPT

## 2025-02-26 RX ORDER — SODIUM CHLORIDE 9 G/1000ML
1000 INJECTION, SOLUTION INTRAVENOUS
Refills: 0 | Status: DISCONTINUED | OUTPATIENT
Start: 2025-02-26 | End: 2025-03-13

## 2025-02-26 RX ORDER — HEPARIN SODIUM 1000 [USP'U]/ML
4000 INJECTION INTRAVENOUS; SUBCUTANEOUS EVERY 6 HOURS
Refills: 0 | Status: DISCONTINUED | OUTPATIENT
Start: 2025-02-26 | End: 2025-02-26

## 2025-02-26 RX ORDER — IPRATROPIUM BROMIDE AND ALBUTEROL SULFATE .5; 2.5 MG/3ML; MG/3ML
3 SOLUTION RESPIRATORY (INHALATION) EVERY 6 HOURS
Refills: 0 | Status: DISCONTINUED | OUTPATIENT
Start: 2025-02-26 | End: 2025-02-27

## 2025-02-26 RX ORDER — DEXTROSE 50 % IN WATER 50 %
25 SYRINGE (ML) INTRAVENOUS ONCE
Refills: 0 | Status: DISCONTINUED | OUTPATIENT
Start: 2025-02-26 | End: 2025-03-13

## 2025-02-26 RX ORDER — KETOROLAC TROMETHAMINE 30 MG/ML
15 INJECTION, SOLUTION INTRAMUSCULAR; INTRAVENOUS ONCE
Refills: 0 | Status: DISCONTINUED | OUTPATIENT
Start: 2025-02-26 | End: 2025-02-26

## 2025-02-26 RX ORDER — HEPARIN SODIUM 1000 [USP'U]/ML
INJECTION INTRAVENOUS; SUBCUTANEOUS
Qty: 25000 | Refills: 0 | Status: DISCONTINUED | OUTPATIENT
Start: 2025-02-26 | End: 2025-02-26

## 2025-02-26 RX ORDER — HEPARIN SODIUM 1000 [USP'U]/ML
2000 INJECTION INTRAVENOUS; SUBCUTANEOUS EVERY 6 HOURS
Refills: 0 | Status: DISCONTINUED | OUTPATIENT
Start: 2025-02-26 | End: 2025-02-26

## 2025-02-26 RX ORDER — CEFEPIME 2 G/20ML
1000 INJECTION, POWDER, FOR SOLUTION INTRAVENOUS EVERY 12 HOURS
Refills: 0 | Status: DISCONTINUED | OUTPATIENT
Start: 2025-02-26 | End: 2025-02-27

## 2025-02-26 RX ORDER — INSULIN LISPRO 100 U/ML
INJECTION, SOLUTION INTRAVENOUS; SUBCUTANEOUS EVERY 6 HOURS
Refills: 0 | Status: DISCONTINUED | OUTPATIENT
Start: 2025-02-26 | End: 2025-03-01

## 2025-02-26 RX ORDER — ENOXAPARIN SODIUM 100 MG/ML
55 INJECTION SUBCUTANEOUS EVERY 12 HOURS
Refills: 0 | Status: DISCONTINUED | OUTPATIENT
Start: 2025-02-26 | End: 2025-03-02

## 2025-02-26 RX ORDER — METHYLPREDNISOLONE ACETATE 80 MG/ML
125 INJECTION, SUSPENSION INTRA-ARTICULAR; INTRALESIONAL; INTRAMUSCULAR; SOFT TISSUE ONCE
Refills: 0 | Status: COMPLETED | OUTPATIENT
Start: 2025-02-26 | End: 2025-02-26

## 2025-02-26 RX ORDER — CEFEPIME 2 G/20ML
2000 INJECTION, POWDER, FOR SOLUTION INTRAVENOUS ONCE
Refills: 0 | Status: COMPLETED | OUTPATIENT
Start: 2025-02-26 | End: 2025-02-26

## 2025-02-26 RX ORDER — DEXTROSE 50 % IN WATER 50 %
15 SYRINGE (ML) INTRAVENOUS ONCE
Refills: 0 | Status: DISCONTINUED | OUTPATIENT
Start: 2025-02-26 | End: 2025-02-27

## 2025-02-26 RX ORDER — VANCOMYCIN HCL IN 5 % DEXTROSE 1.5G/250ML
750 PLASTIC BAG, INJECTION (ML) INTRAVENOUS ONCE
Refills: 0 | Status: COMPLETED | OUTPATIENT
Start: 2025-02-26 | End: 2025-02-26

## 2025-02-26 RX ORDER — PREDNISONE 20 MG/1
2 TABLET ORAL
Refills: 0 | DISCHARGE
Start: 2025-02-26

## 2025-02-26 RX ORDER — VANCOMYCIN HCL IN 5 % DEXTROSE 1.5G/250ML
1000 PLASTIC BAG, INJECTION (ML) INTRAVENOUS ONCE
Refills: 0 | Status: COMPLETED | OUTPATIENT
Start: 2025-02-26 | End: 2025-02-26

## 2025-02-26 RX ORDER — METHYLPREDNISOLONE ACETATE 80 MG/ML
40 INJECTION, SUSPENSION INTRA-ARTICULAR; INTRALESIONAL; INTRAMUSCULAR; SOFT TISSUE EVERY 6 HOURS
Refills: 0 | Status: DISCONTINUED | OUTPATIENT
Start: 2025-02-26 | End: 2025-03-01

## 2025-02-26 RX ORDER — IPRATROPIUM BROMIDE AND ALBUTEROL SULFATE .5; 2.5 MG/3ML; MG/3ML
3 SOLUTION RESPIRATORY (INHALATION) ONCE
Refills: 0 | Status: COMPLETED | OUTPATIENT
Start: 2025-02-26 | End: 2025-02-26

## 2025-02-26 RX ORDER — BUDESONIDE 0.25 MG/2ML
0.5 SUSPENSION RESPIRATORY (INHALATION) EVERY 12 HOURS
Refills: 0 | Status: DISCONTINUED | OUTPATIENT
Start: 2025-02-26 | End: 2025-03-01

## 2025-02-26 RX ORDER — HEPARIN SODIUM 1000 [USP'U]/ML
4000 INJECTION INTRAVENOUS; SUBCUTANEOUS ONCE
Refills: 0 | Status: DISCONTINUED | OUTPATIENT
Start: 2025-02-26 | End: 2025-02-26

## 2025-02-26 RX ORDER — DEXTROSE 50 % IN WATER 50 %
12.5 SYRINGE (ML) INTRAVENOUS ONCE
Refills: 0 | Status: DISCONTINUED | OUTPATIENT
Start: 2025-02-26 | End: 2025-03-13

## 2025-02-26 RX ORDER — CEFEPIME 2 G/20ML
INJECTION, POWDER, FOR SOLUTION INTRAVENOUS
Refills: 0 | Status: DISCONTINUED | OUTPATIENT
Start: 2025-02-26 | End: 2025-02-26

## 2025-02-26 RX ORDER — GLUCAGON 3 MG/1
1 POWDER NASAL ONCE
Refills: 0 | Status: DISCONTINUED | OUTPATIENT
Start: 2025-02-26 | End: 2025-02-27

## 2025-02-26 RX ADMIN — METHYLPREDNISOLONE ACETATE 125 MILLIGRAM(S): 80 INJECTION, SUSPENSION INTRA-ARTICULAR; INTRALESIONAL; INTRAMUSCULAR; SOFT TISSUE at 13:58

## 2025-02-26 RX ADMIN — IPRATROPIUM BROMIDE AND ALBUTEROL SULFATE 3 MILLILITER(S): .5; 2.5 SOLUTION RESPIRATORY (INHALATION) at 13:58

## 2025-02-26 RX ADMIN — CEFEPIME 100 MILLIGRAM(S): 2 INJECTION, POWDER, FOR SOLUTION INTRAVENOUS at 17:33

## 2025-02-26 RX ADMIN — Medication 1700 MILLILITER(S): at 13:48

## 2025-02-26 RX ADMIN — ENOXAPARIN SODIUM 55 MILLIGRAM(S): 100 INJECTION SUBCUTANEOUS at 18:16

## 2025-02-26 RX ADMIN — INSULIN LISPRO 2: 100 INJECTION, SOLUTION INTRAVENOUS; SUBCUTANEOUS at 23:47

## 2025-02-26 RX ADMIN — METHYLPREDNISOLONE ACETATE 40 MILLIGRAM(S): 80 INJECTION, SUSPENSION INTRA-ARTICULAR; INTRALESIONAL; INTRAMUSCULAR; SOFT TISSUE at 23:47

## 2025-02-26 RX ADMIN — BUDESONIDE 0.5 MILLIGRAM(S): 0.25 SUSPENSION RESPIRATORY (INHALATION) at 21:29

## 2025-02-26 RX ADMIN — IPRATROPIUM BROMIDE AND ALBUTEROL SULFATE 3 MILLILITER(S): .5; 2.5 SOLUTION RESPIRATORY (INHALATION) at 14:10

## 2025-02-26 RX ADMIN — IPRATROPIUM BROMIDE AND ALBUTEROL SULFATE 3 MILLILITER(S): .5; 2.5 SOLUTION RESPIRATORY (INHALATION) at 14:21

## 2025-02-26 RX ADMIN — KETOROLAC TROMETHAMINE 15 MILLIGRAM(S): 30 INJECTION, SOLUTION INTRAMUSCULAR; INTRAVENOUS at 20:38

## 2025-02-26 RX ADMIN — IPRATROPIUM BROMIDE AND ALBUTEROL SULFATE 3 MILLILITER(S): .5; 2.5 SOLUTION RESPIRATORY (INHALATION) at 21:29

## 2025-02-26 RX ADMIN — CEFEPIME 100 MILLIGRAM(S): 2 INJECTION, POWDER, FOR SOLUTION INTRAVENOUS at 13:48

## 2025-02-26 RX ADMIN — KETOROLAC TROMETHAMINE 15 MILLIGRAM(S): 30 INJECTION, SOLUTION INTRAMUSCULAR; INTRAVENOUS at 20:08

## 2025-02-26 RX ADMIN — METHYLPREDNISOLONE ACETATE 40 MILLIGRAM(S): 80 INJECTION, SUSPENSION INTRA-ARTICULAR; INTRALESIONAL; INTRAMUSCULAR; SOFT TISSUE at 17:33

## 2025-02-26 RX ADMIN — Medication 250 MILLIGRAM(S): at 15:10

## 2025-02-26 NOTE — ED PROVIDER NOTE - PHYSICAL EXAMINATION
Constitutional: acute distress, sleepy arousable, dry mucus membranes  Eyes: EOMI, pupils equal round reactive  Head: Normocephalic atraumatic  Mouth: no airway obstruction  Cardiac: tachycardic, regular rhythm  Resp: diffusely decreased breath sounds, tachypnea, conversational dyspnea, increased work of breathing. satting 70s on CPAP for EMS with good pleth, switched over to bipap 14/5/100% with O2 of 90-92%  GI: Abd s/nt/nd  Neuro: CN2-12 intact  Skin: No rashes

## 2025-02-26 NOTE — ED PROVIDER NOTE - CLINICAL SUMMARY MEDICAL DECISION MAKING FREE TEXT BOX
septic workup, cefepime, vanco, 30cc/kg IVFs, CXR, CTA PE study, BiPAP, duonebs x3, salumedrol    Flu A (+) today; tamiflu given  hypercarbic respiratory failure on labs septic workup, cefepime, vanco, 30cc/kg IVFs, CXR, CTA PE study, BiPAP, duonebs x3, salumedrol    acute PE on CTA PE study, no R heart strain; heparin started  Flu A (+) today; no tamiflu given as pt is on bipap  hypercarbic respiratory failure on labs    case discussed w/ ICU, admitted to their service

## 2025-02-26 NOTE — ED PROVIDER NOTE - OBJECTIVE STATEMENT
61-year-old female with RA on prednisone 10mg daily no immunotherapy, COPD not on oxygen, hypercarbic respiratory failure, colon cancer status post mass excision and chemo with oncologist Dr. rivera presenting to ED for evaluation of shortness of breath, fatigue, lethargy x 4 days.  Patient recently discharged from [facility] with pneumonia, metapneumovirus.  Was discharged to rehab since her stay at rehab has been declining.  Arrives to ED via EMS on CPAP satting 70%.  Per EMS, patient satting 90% while on nonrebreather.  When patient was placed on CPAP work of breathing improved but oxygen saturations declined.  No medications given prior to arrival.

## 2025-02-26 NOTE — H&P ADULT - NSHPPHYSICALEXAM_GEN_ALL_CORE
ICU Vital Signs Last 24 Hrs  T(C): 36.3 (26 Feb 2025 13:19), Max: 36.3 (26 Feb 2025 13:19)  T(F): 97.3 (26 Feb 2025 13:19), Max: 97.3 (26 Feb 2025 13:19)  HR: 88 (26 Feb 2025 13:48) (67 - 88)  BP: 146/72 (26 Feb 2025 13:38) (146/72 - 148/65)  BP(mean): --  ABP: --  ABP(mean): --  RR: 17 (26 Feb 2025 13:38) (17 - 28)  SpO2: 98% (26 Feb 2025 13:48) (67% - 100%)    O2 Parameters below as of 26 Feb 2025 13:38  Patient On (Oxygen Delivery Method): BiPAP/CPAP      Physical Examination:  GENERAL:               Lethargic, arousable, spontanous movements,  No acute distress.    HEENT:                    No JVD, Moist MM, bi temporal waisting  PULM:                     Bilateral air entry, diminished to auscultation bilaterally, no significant sputum production, + Rales, No Rhonchi, No Wheezing  CVS:                         S1, S2,  + Murmur  ABD:                        Soft, nondistended, nontender, normoactive bowel sounds,   EXT:                         No edema, nontender, No Cyanosis or Clubbing   Vascular:                Warm Extremities, Normal Capillary refill, Normal Distal Pulses  SKIN:                       Warm and well perfused, no rashes noted.   NEURO:                  lethargic, nonfocal, follows simple commands  PSYC:                      Calm, no Insight.

## 2025-02-26 NOTE — ED ADULT NURSE NOTE - OBJECTIVE STATEMENT
69 year old Female comes to ER from [facility] for shortness of breath. As per family at bedside patient was 57% on room air. Patient A&Ox4, labored breathing, arrives on CPAP, placed on BiPap in ER. Family at bedside. Bed locked and in lowest position for safety.

## 2025-02-26 NOTE — PROGRESS NOTE ADULT - ASSESSMENT
PLAN:    Patient currently on BiPAP  -will titrate IPAP and EPAP to maintain pH >7.30 and SaO2 >92%  -alarms reviewed  -NPO while on BiPAP   -nebs steroids  -glycemic control  -pain control for hx of slipped disc  -DVt prophylaxis  -AM labs          TIME SPENT:  55 minutes of  time spent providing medical care for patient's acute illness/conditions. It includes time spent evaluating and treating the patient's acute illness as well as time spent reviewing labs, radiology, discussing goals of care with patient and/or patient's family, and discussing the case with a multidisciplinary team. This time is independent of any procedures performed.    DATE OF ENTRY OF THIS NOTE IS EQUAL TO THE DATE OF SERVICES RENDERED

## 2025-02-26 NOTE — ED PROVIDER NOTE - CARE PLAN
Principal Discharge DX:	Acute hypoxic on chronic hypercapnic respiratory failure  Secondary Diagnosis:	Acute pulmonary embolism  Secondary Diagnosis:	Pneumonia  Secondary Diagnosis:	Influenza A   1

## 2025-02-26 NOTE — PATIENT PROFILE ADULT - FALL HARM RISK - HARM RISK INTERVENTIONS

## 2025-02-26 NOTE — PATIENT PROFILE ADULT - NS PRO AD NO ADVANCE DIRECTIVE
Provider notified via pager # [phone] with temp of 100.7 ax and that blood culture sent and tylenol given.    No

## 2025-02-26 NOTE — PATIENT PROFILE ADULT - FUNCTIONAL ASSESSMENT - DAILY ACTIVITY SECTION LABEL
POSTPARTUM    • Long Term Goal:Experiences normal postpartum course Progressing    • Optimize infant feeding at the breast Progressing    • Appropriate maternal -  bonding Progressing        Patient/Family Goals    • Patient/Family Long Term Goal Pr .

## 2025-02-26 NOTE — ED PROVIDER NOTE - ADMIT DISPOSITION PRESENT ON ADMISSION SEPSIS Q2 - IDEAL BODY WEIGHT USED FOR CRYSTALLOID FLUIDS
Advanced Care Planning Note. Purpose of Encounter: Advanced care planning in light of ESRD on HD  Parties In Attendance: Patient  Decisional Capacity: Yes  Subjective: Patient with abdominal pain  Objective: Cr 6.3  Goals of Care Determination: Patient wants full support (CPR, vent, surgery, HD, trach, PEG)  Plan:  Surgery and Renal consults. NGT  Code Status: Full code   Time spent on Advanced care Plannin minutes  Advanced Care Planning Documents: Completed advanced directives on chart, daughter is the POA.     Jeannette Gilbert MD  2022 10:47 AM Not applicable

## 2025-02-26 NOTE — H&P ADULT - NSHPLABSRESULTS_GEN_ALL_CORE
ACC: [id number] EXAM:  CT ANGIO CHEST PULM [facility]   ORDERED BY:  DEAN MCNEILL     PROCEDURE DATE:  02/14/2025          INTERPRETATION:  CLINICAL INFORMATION: Shortness of breath, rule out   pulmonary embolus.    COMPARISON: 2/3/2025.    CONTRAST/COMPLICATIONS:  IV Contrast: Omnipaque 350  70 cc administered   30 cc discarded  Oral Contrast: NONE  .    PROCEDURE:  CT Angiography of the Chest.  Sagittal and coronal reformats were performed as well as 3D (MIP)   reconstructions.    FINDINGS:    LUNGS AND LARGE AIRWAYS: Patent central airways. Emphysema. No   parenchymal consolidation. 3 mm right upper lobe nodule (303:156), stable   dating back to 5/13/2012.  PLEURA: No pleural effusion. No pneumothorax.  VESSELS: No evidence of pulmonary embolus. Aortic calcifications.   Coronary artery calcifications.  HEART: Heart size is normal. No pericardial effusion.  MEDIASTINUM AND AYAH: No lymphadenopathy.  CHEST WALL AND LOWER NECK: Within normal limits.  VISUALIZED UPPER ABDOMEN: Right renal cyst and subcentimeter   hypodensities too small to characterize. No acute findings.  BONES: Degenerative changes. Stable anterior wedging and loss of height   of the T11 and T12 vertebral bodies. Osseous demineralization similar   fractures of the left posterior lateral sixth and seventh ribs with   adjacent small volume chest wall hematoma, slightly decreased in size   compared with prior. Healed fractures of the right anterior fourth and   fifth ribs.    IMPRESSION:  No evidence of pulmonary embolus.    Redemonstrated left sixth and seventh rib fractures.    Additional findings as above.        --- End of Report ---    ABG Trend  02-03-25 @ 11:20   - 7.44/44[H]/112[H]/99.4[H]  05-01-23 @ 23:19   - 7.43/43[H]/190[H]/100.0[H]

## 2025-02-26 NOTE — ED ADULT NURSE NOTE - ED STAT RN HANDOFF DETAILS
 Good nutrition is important when healing from an illness, injury, or surgery. Follow any nutrition recommendations given to you during your hospital stay.  If you were given an oral nutrition supplement while in the hospital, continue to take this supplement at home. You can take it with meals, in-between meals, and/or before bedtime. These supplements can be purchased at most local grocery stores, pharmacies, and chain super-stores.  If you have any questions about your diet or nutrition, call the hospital and ask for the dietitian. Heart-Healthy Diet: Care Instructions  Your Care Instructions     A heart-healthy diet has lots of vegetables, fruits, nuts, beans, and whole grains, and is low in salt. It limits foods that are high in saturated fat, such as meats, cheeses, and fried foods. It may be hard to change your diet, but even small changes can lower your risk of heart attack and heart disease. Follow-up care is a key part of your treatment and safety. Be sure to make and go to all appointments, and call your doctor if you are having problems. It's also a good idea to know your test results and keep a list of the medicines you take. How can you care for yourself at home? Watch your portions  Use food labels to learn what the recommended servings are for the foods you eat. Eat only the number of calories you need to stay at a healthy weight. If you need to lose weight, eat fewer calories than your body burns (through exercise and other physical activity). Eat more fruits and vegetables  Eat a variety of fruit and vegetables every day. Dark green, deep orange, red, or yellow fruits and vegetables are especially good for you. Examples include spinach, carrots, peaches, and berries. Keep carrots, celery, and other veggies handy for snacks. Buy fruit that is in season and store it where you can see it so that you will be tempted to eat it.   Cook dishes that have a lot of veggies in them, such as stir-fries and soups. Limit saturated fat  Read food labels, and try to avoid saturated fats. They increase your risk of heart disease. Use olive or canola oil when you cook. Bake, broil, grill, or steam foods instead of frying them. Choose lean meats instead of high-fat meats such as hot dogs and sausages. Cut off all visible fat when you prepare meat. Eat fish, skinless poultry, and meat alternatives such as soy products instead of high-fat meats. Soy products, such as tofu, may be especially good for your heart. Choose low-fat or fat-free milk and dairy products. Eat foods high in fiber  Eat a variety of grain products every day. Include whole-grain foods that have lots of fiber and nutrients. Examples of whole-grain foods include oats, whole wheat bread, and brown rice. Buy whole-grain breads and cereals, instead of white bread or pastries. Limit salt and sodium  Limit how much salt and sodium you eat to help lower your blood pressure. Taste food before you salt it. Add only a little salt when you think you need it. With time, your taste buds will adjust to less salt. Eat fewer snack items, fast foods, and other high-salt, processed foods. Check food labels for the amount of sodium in packaged foods. Choose low-sodium versions of canned goods (such as soups, vegetables, and beans). Limit sugar  Limit drinks and foods with added sugar. These include candy, desserts, and soda pop. Limit alcohol  Limit alcohol to no more than 2 drinks a day for men and 1 drink a day for women. Too much alcohol can cause health problems. When should you call for help? Watch closely for changes in your health, and be sure to contact your doctor if:    You would like help planning heart-healthy meals. Where can you learn more? Go to [email or web address] and sign in to your [employer] account.  Enter V137 in the [facility] box to learn more about \"Heart-Healthy Diet: Care Instructions. \"     If you do not have an account, please click on the \"Sign Up Now\" link. Current as of: December 17, 2020               Content Version: 12.8  © 2006-2021 Healthwise, Incorporated. Care instructions adapted under license by [facility] ([facility]). If you have questions about a medical condition or this instruction, always ask your healthcare professional. [address] any warranty or liability for your use of this information. Report received from break coverage RYAN Hercules

## 2025-02-26 NOTE — PROGRESS NOTE ADULT - SUBJECTIVE AND OBJECTIVE BOX
Initial Eval    69y Female admitted with hypoxemic resp. failure 2/2 to Influenza , required BiPAP        Vital Signs Last 24 Hrs  T(C): 36.3 (26 Feb 2025 13:19), Max: 36.3 (26 Feb 2025 13:19)  T(F): 97.3 (26 Feb 2025 13:19), Max: 97.3 (26 Feb 2025 13:19)  HR: 96 (26 Feb 2025 21:00) (67 - 104)  BP: 136/89 (26 Feb 2025 21:00) (121/86 - 148/65)  BP(mean): 100 (26 Feb 2025 21:00) (96 - 100)  RR: 18 (26 Feb 2025 21:00) (16 - 28)  SpO2: 97% (26 Feb 2025 21:00) (67% - 100%)    Parameters below as of 26 Feb 2025 19:00  Patient On (Oxygen Delivery Method): BiPAP/CPAP                                10.5   11.22 )-----------( 239      ( 26 Feb 2025 13:30 )             35.9         02-26    143  |  101  |  20  ----------------------------<  184[H]  4.7   |  39[H]  |  0.54    Ca    10.2      26 Feb 2025 13:30    TPro  7.1  /  Alb  3.1[L]  /  TBili  0.8  /  DBili  x   /  AST  20  /  ALT  24  /  AlkPhos  109  02-26        NEURO: no headaches, blurry vision, tremors, depression, anxity  CV: no chest pain, palpitations, murmurs, orthopnea  Resp: no shortness of breath, cough, wheeze, sputum production  GI: no stomach pain,nausea, vomitting, flatulence, hematemesis, hematochezia  PV: no swelling of extremities, no hair loss, no coolness to extremities  ENDO: no polydypsia, polyphagia, polyuria, weight loss, night sweats          NEURO: awake and alert  CV: (+) S1/S2, rrr, no mrg  RESP: CTA b/l  GI: soft, non tender

## 2025-02-26 NOTE — H&P ADULT - HISTORY OF PRESENT ILLNESS
69 year old female with h/o COPD - on home o2 (2-4 L), Ex smoker + Vaping, RA/Arthritis, Colon cancer s/p chemo with neuropathy, HTN, h/o PE who p/w increaisng sob and ams. of note pateint was just hospitalized in [facility] 2/3-10/2025 and 3/17-18/2025 for both pneumonia and COPD exacerbation.   patient was sent to  Rehab  Today noted to be having increasing sob and confusion   sent to ED where found to have Flu a, and acute on chronic Hypercarbic respiratory failure.

## 2025-02-26 NOTE — H&P ADULT - ASSESSMENT
Assessment  1. Acute Hypoxic and hypercarbic respiratory failure   2. Due to Acute flu A and Left lower lobe Pna  3. Chronic Advanced COPD with supplemental oxygen by nasal cannula  4. RA  5. h/o colon cancer  6. h/o PE      Plan  Admit to ICU  Bipap  trend abg  steroids, nebs,   Antibiotics   NPO while on nIv  will give tamiflu if able to come off niv and more awake  GI/DVTppx  check Mrsa swab  d/w Family - full code   Assessment  1. Acute Hypoxic and hypercarbic respiratory failure   2. Due to Acute flu A and Left lower lobe Pna  3. Chronic Advanced COPD with supplemental oxygen by nasal cannula  4. RA  5. h/o colon cancer  6. h/o PE  off a/c     Plan  Admit to ICU  Bipap  trend abg  steroids, nebs,   Antibiotics   NPO while on nIv  will give tamiflu if able to come off niv and more awake  GI/DVTppx  check Mrsa swab  d/w Family - full code   Assessment  1. Acute Hypoxic and hypercarbic respiratory failure   2. Due to Acute flu A and Left lower lobe Pna  3. Chronic Advanced COPD with supplemental oxygen by nasal cannula  4. RA  5. h/o colon cancer  6. h/o PE  off a/c now with Segmental and subsegmental pulmonary embolism right lower lobe.    Plan  Admit to ICU  Bipap  trend abg  steroids, nebs,   Antibiotics   NPO while on nIv  will give tamiflu if able to come off niv and more awake  GI/DVTppx  check Mrsa swab  a/c with lovenox  d/w Family - full code

## 2025-02-26 NOTE — H&P ADULT - NSICDXPASTMEDICALHX_GEN_ALL_CORE_FT
PAST MEDICAL HISTORY:  Advanced COPD     Arthritis     Cancer, colon     HTN (hypertension)     On supplemental oxygen by nasal cannula

## 2025-02-26 NOTE — ED PROVIDER NOTE - CRITICAL CARE ATTENDING CONTRIBUTION TO CARE
Elements for critical care include direct patient care, additional history taking, interpretation of diagnostic studies, documentation, consultation with other physicians, consult w/ pt's family directly relating to pts condition

## 2025-02-27 LAB
A1C WITH ESTIMATED AVERAGE GLUCOSE RESULT: 6.6 % — HIGH (ref 4–5.6)
ALBUMIN SERPL ELPH-MCNC: 2.9 G/DL — LOW (ref 3.3–5)
ALP SERPL-CCNC: 105 U/L — SIGNIFICANT CHANGE UP (ref 40–120)
ALT FLD-CCNC: 19 U/L — SIGNIFICANT CHANGE UP (ref 10–45)
ANION GAP SERPL CALC-SCNC: 7 MMOL/L — SIGNIFICANT CHANGE UP (ref 5–17)
APPEARANCE UR: ABNORMAL
AST SERPL-CCNC: 15 U/L — SIGNIFICANT CHANGE UP (ref 10–40)
BACTERIA # UR AUTO: ABNORMAL /HPF
BASE EXCESS BLDA CALC-SCNC: 12.2 MMOL/L — HIGH (ref -2–3)
BILIRUB SERPL-MCNC: 0.7 MG/DL — SIGNIFICANT CHANGE UP (ref 0.2–1.2)
BILIRUB UR-MCNC: NEGATIVE — SIGNIFICANT CHANGE UP
BLOOD GAS COMMENTS ARTERIAL: SIGNIFICANT CHANGE UP
BUN SERPL-MCNC: 26 MG/DL — HIGH (ref 7–23)
CALCIUM SERPL-MCNC: 10.4 MG/DL — SIGNIFICANT CHANGE UP (ref 8.4–10.5)
CHLORIDE SERPL-SCNC: 101 MMOL/L — SIGNIFICANT CHANGE UP (ref 96–108)
CO2 BLDA-SCNC: 38 MMOL/L — HIGH (ref 19–24)
CO2 SERPL-SCNC: 34 MMOL/L — HIGH (ref 22–31)
COD CRY URNS QL: PRESENT
COLOR SPEC: YELLOW — SIGNIFICANT CHANGE UP
COMMENT - URINE: SIGNIFICANT CHANGE UP
CREAT SERPL-MCNC: 0.68 MG/DL — SIGNIFICANT CHANGE UP (ref 0.5–1.3)
DIFF PNL FLD: NEGATIVE — SIGNIFICANT CHANGE UP
EGFR: 95 ML/MIN/1.73M2 — SIGNIFICANT CHANGE UP
EGFR: 95 ML/MIN/1.73M2 — SIGNIFICANT CHANGE UP
EPI CELLS # UR: PRESENT
ESTIMATED AVERAGE GLUCOSE: 143 MG/DL — HIGH (ref 68–114)
FLUAV H3 RNA SPEC QL NAA+PROBE: DETECTED
GAS PNL BLDA: SIGNIFICANT CHANGE UP
GLUCOSE BLDC GLUCOMTR-MCNC: 134 MG/DL — HIGH (ref 70–99)
GLUCOSE BLDC GLUCOMTR-MCNC: 159 MG/DL — HIGH (ref 70–99)
GLUCOSE BLDC GLUCOMTR-MCNC: 169 MG/DL — HIGH (ref 70–99)
GLUCOSE BLDC GLUCOMTR-MCNC: 255 MG/DL — HIGH (ref 70–99)
GLUCOSE SERPL-MCNC: 157 MG/DL — HIGH (ref 70–99)
GLUCOSE UR QL: 250 MG/DL
HCO3 BLDA-SCNC: 36 MMOL/L — HIGH (ref 21–28)
HCT VFR BLD CALC: 32.2 % — LOW (ref 34.5–45)
HGB BLD-MCNC: 9.8 G/DL — LOW (ref 11.5–15.5)
HMPV RNA SPEC QL NAA+PROBE: DETECTED
HOROWITZ INDEX BLDA+IHG-RTO: 40 — SIGNIFICANT CHANGE UP
KETONES UR-MCNC: ABNORMAL MG/DL
LACTATE SERPL-SCNC: 1 MMOL/L — SIGNIFICANT CHANGE UP (ref 0.7–2)
LEUKOCYTE ESTERASE UR-ACNC: ABNORMAL
MAGNESIUM SERPL-MCNC: 1.9 MG/DL — SIGNIFICANT CHANGE UP (ref 1.6–2.6)
MCHC RBC-ENTMCNC: 25 PG — LOW (ref 27–34)
MCHC RBC-ENTMCNC: 30.4 G/DL — LOW (ref 32–36)
MCV RBC AUTO: 82.1 FL — SIGNIFICANT CHANGE UP (ref 80–100)
MRSA PCR RESULT.: DETECTED
NITRITE UR-MCNC: POSITIVE
NRBC BLD AUTO-RTO: 0 /100 WBCS — SIGNIFICANT CHANGE UP (ref 0–0)
PCO2 BLDA: 55 MMHG — HIGH (ref 32–35)
PH BLDA: 7.43 — SIGNIFICANT CHANGE UP (ref 7.35–7.45)
PH UR: 5.5 — SIGNIFICANT CHANGE UP (ref 5–8)
PHOSPHATE SERPL-MCNC: 3.2 MG/DL — SIGNIFICANT CHANGE UP (ref 2.5–4.5)
PLATELET # BLD AUTO: 184 K/UL — SIGNIFICANT CHANGE UP (ref 150–400)
PO2 BLDA: 93 MMHG — SIGNIFICANT CHANGE UP (ref 83–108)
POTASSIUM SERPL-MCNC: 4.5 MMOL/L — SIGNIFICANT CHANGE UP (ref 3.5–5.3)
POTASSIUM SERPL-SCNC: 4.5 MMOL/L — SIGNIFICANT CHANGE UP (ref 3.5–5.3)
PROT SERPL-MCNC: 6.9 G/DL — SIGNIFICANT CHANGE UP (ref 6–8.3)
PROT UR-MCNC: 30 MG/DL
RAPID RVP RESULT: DETECTED
RBC # BLD: 3.92 M/UL — SIGNIFICANT CHANGE UP (ref 3.8–5.2)
RBC # FLD: 19.8 % — HIGH (ref 10.3–14.5)
RBC CASTS # UR COMP ASSIST: 0 /HPF — SIGNIFICANT CHANGE UP (ref 0–4)
S AUREUS DNA NOSE QL NAA+PROBE: DETECTED
SAO2 % BLDA: 98.6 % — HIGH (ref 94–98)
SARS-COV-2 RNA SPEC QL NAA+PROBE: SIGNIFICANT CHANGE UP
SODIUM SERPL-SCNC: 142 MMOL/L — SIGNIFICANT CHANGE UP (ref 135–145)
SP GR SPEC: 1.05 — HIGH (ref 1–1.03)
UROBILINOGEN FLD QL: 0.2 MG/DL — SIGNIFICANT CHANGE UP (ref 0.2–1)
WBC # BLD: 7.87 K/UL — SIGNIFICANT CHANGE UP (ref 3.8–10.5)
WBC # FLD AUTO: 7.87 K/UL — SIGNIFICANT CHANGE UP (ref 3.8–10.5)
WBC UR QL: 10 /HPF — HIGH (ref 0–5)

## 2025-02-27 PROCEDURE — 99223 1ST HOSP IP/OBS HIGH 75: CPT

## 2025-02-27 PROCEDURE — 99497 ADVNCD CARE PLAN 30 MIN: CPT | Mod: 25

## 2025-02-27 RX ORDER — OSELTAMIVIR PHOSPHATE 75 MG/1
75 CAPSULE ORAL
Refills: 0 | Status: DISCONTINUED | OUTPATIENT
Start: 2025-02-27 | End: 2025-02-27

## 2025-02-27 RX ORDER — METOPROLOL SUCCINATE 50 MG/1
12.5 TABLET, EXTENDED RELEASE ORAL
Refills: 0 | Status: DISCONTINUED | OUTPATIENT
Start: 2025-02-27 | End: 2025-03-03

## 2025-02-27 RX ORDER — ACETAMINOPHEN 500 MG/5ML
650 LIQUID (ML) ORAL EVERY 6 HOURS
Refills: 0 | Status: DISCONTINUED | OUTPATIENT
Start: 2025-02-27 | End: 2025-03-06

## 2025-02-27 RX ORDER — AMLODIPINE BESYLATE 10 MG/1
10 TABLET ORAL DAILY
Refills: 0 | Status: DISCONTINUED | OUTPATIENT
Start: 2025-02-27 | End: 2025-03-05

## 2025-02-27 RX ORDER — OSELTAMIVIR PHOSPHATE 75 MG/1
75 CAPSULE ORAL EVERY 12 HOURS
Refills: 0 | Status: COMPLETED | OUTPATIENT
Start: 2025-02-27 | End: 2025-03-04

## 2025-02-27 RX ORDER — CEFEPIME 2 G/20ML
2000 INJECTION, POWDER, FOR SOLUTION INTRAVENOUS EVERY 8 HOURS
Refills: 0 | Status: COMPLETED | OUTPATIENT
Start: 2025-02-27 | End: 2025-03-04

## 2025-02-27 RX ORDER — ALBUTEROL SULFATE 2.5 MG/3ML
2.5 VIAL, NEBULIZER (ML) INHALATION EVERY 6 HOURS
Refills: 0 | Status: DISCONTINUED | OUTPATIENT
Start: 2025-02-27 | End: 2025-03-13

## 2025-02-27 RX ORDER — OSELTAMIVIR PHOSPHATE 75 MG/1
75 CAPSULE ORAL EVERY 12 HOURS
Refills: 0 | Status: DISCONTINUED | OUTPATIENT
Start: 2025-02-27 | End: 2025-02-27

## 2025-02-27 RX ADMIN — METOPROLOL SUCCINATE 12.5 MILLIGRAM(S): 50 TABLET, EXTENDED RELEASE ORAL at 17:12

## 2025-02-27 RX ADMIN — METHYLPREDNISOLONE ACETATE 40 MILLIGRAM(S): 80 INJECTION, SUSPENSION INTRA-ARTICULAR; INTRALESIONAL; INTRAMUSCULAR; SOFT TISSUE at 11:03

## 2025-02-27 RX ADMIN — AMLODIPINE BESYLATE 10 MILLIGRAM(S): 10 TABLET ORAL at 13:42

## 2025-02-27 RX ADMIN — CEFEPIME 100 MILLIGRAM(S): 2 INJECTION, POWDER, FOR SOLUTION INTRAVENOUS at 06:05

## 2025-02-27 RX ADMIN — BUDESONIDE 0.5 MILLIGRAM(S): 0.25 SUSPENSION RESPIRATORY (INHALATION) at 08:04

## 2025-02-27 RX ADMIN — METHYLPREDNISOLONE ACETATE 40 MILLIGRAM(S): 80 INJECTION, SUSPENSION INTRA-ARTICULAR; INTRALESIONAL; INTRAMUSCULAR; SOFT TISSUE at 17:11

## 2025-02-27 RX ADMIN — CEFEPIME 100 MILLIGRAM(S): 2 INJECTION, POWDER, FOR SOLUTION INTRAVENOUS at 14:16

## 2025-02-27 RX ADMIN — METHYLPREDNISOLONE ACETATE 40 MILLIGRAM(S): 80 INJECTION, SUSPENSION INTRA-ARTICULAR; INTRALESIONAL; INTRAMUSCULAR; SOFT TISSUE at 06:09

## 2025-02-27 RX ADMIN — INSULIN LISPRO 2: 100 INJECTION, SOLUTION INTRAVENOUS; SUBCUTANEOUS at 06:05

## 2025-02-27 RX ADMIN — INSULIN LISPRO 6: 100 INJECTION, SOLUTION INTRAVENOUS; SUBCUTANEOUS at 11:14

## 2025-02-27 RX ADMIN — INSULIN LISPRO 2: 100 INJECTION, SOLUTION INTRAVENOUS; SUBCUTANEOUS at 17:12

## 2025-02-27 RX ADMIN — IPRATROPIUM BROMIDE AND ALBUTEROL SULFATE 3 MILLILITER(S): .5; 2.5 SOLUTION RESPIRATORY (INHALATION) at 08:04

## 2025-02-27 RX ADMIN — ENOXAPARIN SODIUM 55 MILLIGRAM(S): 100 INJECTION SUBCUTANEOUS at 06:35

## 2025-02-27 RX ADMIN — METHYLPREDNISOLONE ACETATE 40 MILLIGRAM(S): 80 INJECTION, SUSPENSION INTRA-ARTICULAR; INTRALESIONAL; INTRAMUSCULAR; SOFT TISSUE at 23:42

## 2025-02-27 RX ADMIN — ENOXAPARIN SODIUM 55 MILLIGRAM(S): 100 INJECTION SUBCUTANEOUS at 17:12

## 2025-02-27 RX ADMIN — IPRATROPIUM BROMIDE AND ALBUTEROL SULFATE 3 MILLILITER(S): .5; 2.5 SOLUTION RESPIRATORY (INHALATION) at 04:30

## 2025-02-27 RX ADMIN — OSELTAMIVIR PHOSPHATE 75 MILLIGRAM(S): 75 CAPSULE ORAL at 23:43

## 2025-02-27 RX ADMIN — CEFEPIME 100 MILLIGRAM(S): 2 INJECTION, POWDER, FOR SOLUTION INTRAVENOUS at 21:47

## 2025-02-27 RX ADMIN — Medication 2.5 MILLIGRAM(S): at 15:14

## 2025-02-27 RX ADMIN — Medication 1 APPLICATION(S): at 10:00

## 2025-02-27 RX ADMIN — OSELTAMIVIR PHOSPHATE 75 MILLIGRAM(S): 75 CAPSULE ORAL at 11:03

## 2025-02-27 NOTE — DIETITIAN INITIAL EVALUATION ADULT - PERTINENT MEDS FT
MEDICATIONS  (STANDING):  albuterol/ipratropium for Nebulization 3 milliLiter(s) Nebulizer every 6 hours  amLODIPine   Tablet 10 milliGRAM(s) Oral daily  buDESOnide    Inhalation Suspension 0.5 milliGRAM(s) Inhalation every 12 hours  cefepime   IVPB 2000 milliGRAM(s) IV Intermittent every 8 hours  chlorhexidine 2% Cloths 1 Application(s) Topical <User Schedule>  dextrose 5%. 1000 milliLiter(s) (50 mL/Hr) IV Continuous <Continuous>  dextrose 5%. 1000 milliLiter(s) (100 mL/Hr) IV Continuous <Continuous>  dextrose 50% Injectable 25 Gram(s) IV Push once  dextrose 50% Injectable 12.5 Gram(s) IV Push once  dextrose 50% Injectable 25 Gram(s) IV Push once  enoxaparin Injectable 55 milliGRAM(s) SubCutaneous every 12 hours  insulin lispro (ADMELOG) corrective regimen sliding scale   SubCutaneous every 6 hours  methylPREDNISolone sodium succinate Injectable 40 milliGRAM(s) IV Push every 6 hours  metoprolol tartrate 12.5 milliGRAM(s) Oral two times a day  oseltamivir Suspension 75 milliGRAM(s) Oral every 12 hours    MEDICATIONS  (PRN):  acetaminophen     Tablet .. 650 milliGRAM(s) Oral every 6 hours PRN Temp greater or equal to 38C (100.4F), Mild Pain (1 - 3)

## 2025-02-27 NOTE — DIETITIAN INITIAL EVALUATION ADULT - ADD RECOMMEND
1. Advance diet to soft & bite sized (texture per pt's request- pt with no hx dysphagia )  2. Greek yogurt TID- pt declines ONS  3. Add MVI po daily to meet 100% micronutrient needs

## 2025-02-27 NOTE — CONSULT NOTE ADULT - SUBJECTIVE AND OBJECTIVE BOX
HPI:  69 year old female with h/o COPD - on home o2 (2-4 L), Ex smoker + Vaping, RA/Arthritis, Colon cancer s/p chemo with neuropathy, HTN, h/o PE who p/w increaisng sob and ams. of note pateint was just hospitalized in [facility] 2/3-10/2025 and 3/17- for both pneumonia and COPD exacerbation.   patient was sent to  Rehab  Today noted to be having increasing sob and confusion   sent to ED where found to have Flu a, and acute on chronic Hypercarbic respiratory failure.      Palliative care c/s for GOC; pt recently admitted to [facility].  Pt seen and examined at bedside this morning. She doesn't know why she is in the hospital. I explained pt came in altered with SOB and is now in the ICU. She denies any acute pain.    PERTINENT PM/SXH:   Advanced COPD    On supplemental oxygen by nasal cannula    Arthritis    Cancer, colon    HTN (hypertension)      No significant past surgical history      FAMILY HISTORY:  No pertinent family history in first degree relatives      Family Hx substance abuse [ ]yes [ ]no  ITEMS NOT CHECKED ARE NOT PRESENT    SOCIAL HISTORY:   Significant other/partner[ x]  Children[ ]  Quaker/Spirituality: Jain  Substance hx:  [ ]   Tobacco hx:  [ x]   Alcohol hx: [ ]   Home Opioid hx:  [ ] I-Stop Reference No:  Living Situation: home, but came from rehab    ADVANCE DIRECTIVES:    DNR/MOLST - no - FULL CODE  DECISION MAKER(s):  [ ] Health Care Proxy(s)  [x ] Surrogate(s)  [ ] Guardian           Name(s): Phone Number(s): spouse Rasheed Taylor    BASELINE (I)ADL(s) (prior to admission):  [place]:   Moderate     Allergies  No Known Allergies    Intolerances    MEDICATIONS  (STANDING):  albuterol/ipratropium for Nebulization 3 milliLiter(s) Nebulizer every 6 hours  amLODIPine   Tablet 10 milliGRAM(s) Oral daily  buDESOnide    Inhalation Suspension 0.5 milliGRAM(s) Inhalation every 12 hours  cefepime   IVPB 2000 milliGRAM(s) IV Intermittent every 8 hours  chlorhexidine 2% Cloths 1 Application(s) Topical <User Schedule>  dextrose 5%. 1000 milliLiter(s) (50 mL/Hr) IV Continuous <Continuous>  dextrose 5%. 1000 milliLiter(s) (100 mL/Hr) IV Continuous <Continuous>  dextrose 50% Injectable 25 Gram(s) IV Push once  dextrose 50% Injectable 12.5 Gram(s) IV Push once  dextrose 50% Injectable 25 Gram(s) IV Push once  enoxaparin Injectable 55 milliGRAM(s) SubCutaneous every 12 hours  insulin lispro (ADMELOG) corrective regimen sliding scale   SubCutaneous every 6 hours  methylPREDNISolone sodium succinate Injectable 40 milliGRAM(s) IV Push every 6 hours  metoprolol tartrate 12.5 milliGRAM(s) Oral two times a day  oseltamivir 75 milliGRAM(s) Oral two times a day    MEDICATIONS  (PRN):  acetaminophen     Tablet .. 650 milliGRAM(s) Oral every 6 hours PRN Temp greater or equal to 38C (100.4F), Mild Pain (1 - 3)    PRESENT SYMPTOMS:      Pain: [ ]yes [x ]no  QOL impact -   Location -                    Aggravating factors -  Quality -  Radiation -  Timing-  Severity (0-10 scale):  Minimal acceptable level (0-10 scale):       Dyspnea:              mild-mod  Anxiety:                             does not report  Depressed:             does not report  Fatigue:                           none  Nausea:                         none  Loss of appetite:             mild  Constipation:             none      Other Symptoms:  [x ]All other review of systems negative       Chaplaincy Referral: [ ] yes [ ] refused [ ] following [ x] Deferred   Palliative Performance Status Version 2:     50    %    http://npcrc.org/files/news/palliative_performance_scale_ppsv2.pdf    PHYSICAL EXAM:  Vital Signs Last 24 Hrs  T(C): 35.9 (2025 06:00), Max: 36.5 (2025 21:00)  T(F): 96.6 (2025 06:00), Max: 97.7 (2025 21:00)  HR: 112 (2025 08:51) (67 - 115)  BP: 145/90 (2025 06:00) (105/69 - 149/85)  BP(mean): 107 (2025 06:00) (81 - 107)  RR: 24 (2025 06:00) (16 - 28)  SpO2: 96% (2025 08:51) (67% - 100%)    Parameters below as of 2025 08:51  Patient On (Oxygen Delivery Method): nasal cannula     I&O's Summary    2025 07:01  -  2025 07:00  --------------------------------------------------------  IN: 0 mL / OUT: 150 mL / NET: -150 mL      GENERAL: thin female pt laying in bed in NAD  HEENT: NC/AT, mmm  PULMONARY: diminished breath sounds throughout, mild rales to lung bases L>R  CARDIOVASCULAR: +tachycardic, no murmur  GASTROINTESTINAL: soft, mild distended, nontender  Last BM: unknown  MUSCULOSKELETAL: no edema to BLE; no cyanosis  Psych: confused but A&Ox3    LABS:                        9.8    7.87  )-----------( 184      ( 2025 06:55 )             32.2       142  |  101  |  26[H]  ----------------------------<  157[H]  4.5   |  34[H]  |  0.68    Ca    10.4      2025 05:51  Phos  3.2       Mg     1.9         TPro  6.9  /  Alb  2.9[L]  /  TBili  0.7  /  DBili  x   /  AST  15  /  ALT  19  /  AlkPhos  105    PT/INR - ( 2025 13:30 )   PT: 11.3 sec;   INR: 0.96 ratio         PTT - ( 2025 13:30 )  PTT:27.1 sec    Urinalysis Basic - ( 2025 06:32 )    Color: Yellow / Appearance: Cloudy / S.052 / pH: x  Gluc: x / Ketone: Trace mg/dL  / Bili: Negative / Urobili: 0.2 mg/dL   Blood: x / Protein: 30 mg/dL / Nitrite: Positive   Leuk Esterase: Moderate / RBC: 0 /HPF / WBC 10 /HPF   Sq Epi: x / Non Sq Epi: x / Bacteria: Too Numerous to count /HPF      RADIOLOGY & ADDITIONAL STUDIES:    PROCEDURE DATE:  2025          INTERPRETATION:  CLINICAL INFORMATION: Hypoxia.    COMPARISON: None.    CONTRAST/COMPLICATIONS:  IV Contrast: Omnipaque 350  90 cc administered   10 cc discarded  Oral Contrast: NONE  .    PROCEDURE:  CT Angiography of the Chest.  Sagittal and coronal reformats were performed as well as 3D (MIP)   reconstructions.    FINDINGS:    LUNGS AND LARGE AIRWAYS: Patent central airways. Bronchial plugging lower   lobes bilaterally, left more than right. Severe pulmonary emphysema.   Consolidative opacities bilateral lower lobes and additional small focal   airspace opacities the right lower lobe. These may represent a   combination of pneumonia and atelectasis. Additional concerning for a   pulmonary infarct in the right lower lobe.  PLEURA: No pleural effusion.  VESSELS: Segmental and subsegmental pulmonary embolism right lower lobe.   Atherosclerotic calcification including the coronary arteries.  HEART: Heart size is normal. No pericardial effusion.  MEDIASTINUM AND AYAH: No lymphadenopathy.  CHEST WALL AND LOWER NECK: Within normal limits.  VISUALIZED UPPER ABDOMEN: Right renal cyst. Possible small left renal   cyst. Probable small cyst left hepatic lobe.  BONES: Severe anterior compression of T11 and T12 with retropulsion. This   may be of some duration. Mild superior endplate compression of T10.    IMPRESSION:  Segmental and subsegmental pulmonary embolism right lower lobe.  Bilateral lower lobe opacities that may represent a combination of   atelectasis and infiltrates. Additional possibility of a pulmonary   infarct in the right lower lobe.  Severe pulmonary emphysema.    Findings were discussed with Dr. MO EUBANKS [phone] [id number]   3:34 PM by Dr. Leone with read back confirmation.    --- End of Report ---      PROTEIN CALORIE MALNUTRITION PRESENT: [ ]mild [ ]moderate [ ]severe [ ]underweight [ ]morbid obesity  https://www.andeal.org/vault/2440/web/files/ONC/Table_Clinical%20Characteristics%20to%20Document%20Malnutrition-White%20JV%20et%20al%2020.pdf    Height (cm): 157.5 (25 @ 13:19)  Weight (kg): 54.4 (25 @ 13:19)  BMI (kg/m2): 21.9 (25 @ 13:19)    [ ]PPSV2 < or = to 30% [ ]significant weight loss  [ ]poor nutritional intake  [ ]anasarca[ ]Artificial Nutrition      Other REFERRALS:  [ ]Hospice  [ ]Child Life  [ ]Social Work  [ ]Case management [ ]Holistic Therapy

## 2025-02-27 NOTE — DIETITIAN INITIAL EVALUATION ADULT - ENTER TO (ML/KG)
To MT: Please review patient loop recorder transmission. Was scheduled with you on 12/31/2024 for new onset Atrial Fibrillation   30

## 2025-02-27 NOTE — DIETITIAN INITIAL EVALUATION ADULT - OTHER INFO
69 year old female with h/o COPD - on home o2 (2-4 L), Ex smoker + Vaping, RA/Arthritis, Colon cancer s/p chemo with neuropathy, HTN, h/o PE who p/w increaisng sob and ams. of note pateint was just hospitalized in [facility] 2/3-10/2025 and 3/17-18/2025 for both pneumonia and COPD exacerbation.   patient was sent to  Rehab, Today noted to be having increasing sob and confusion.   Pt NPO at time of interview 2/2 NIV this am. No off of NIV. RN conducted bedside swallow, pt cleared for oral solids. Pt reports UBW 105lbs, .5lbs. NFPE findings consistent with severe malnutrition. No edema, stage I pressure ulcer sacrum. Pt denies N/V/D, constipation. Pt denies chewing/swallowing issues, but requesting soft/bite sized diet as per discussion with RN. Pt may advance to regular solids if requested (no hx dysphagia).

## 2025-02-27 NOTE — DIETITIAN INITIAL EVALUATION ADULT - ORAL INTAKE PTA/DIET HISTORY
Pt admitted from [facility] where she was receiving regular diet with thin liquids. Pt endorses decreased appetite due to chemo. Dislikes all oral nutrition supplements.

## 2025-02-27 NOTE — PROGRESS NOTE ADULT - ASSESSMENT
Physical Examination:  GENERAL:               Alert, Oriented, No acute distress.    HEENT:                   No JVD, Dry Moist MM, cachectic  PULM:                     Bilateral air entry, Clear to auscultation bilaterally, no significant sputum production, +Rales, No Rhonchi, No Wheezing  CVS:                         S1, S2,  + Murmur  ABD:                        Soft, nondistended, nontender, normoactive bowel sounds,   EXT:                         No edema, nontender, No Cyanosis or Clubbing   Vascular:                Warm Extremities, Normal Capillary refill, Normal Distal Pulses  SKIN:                       Warm and well perfused, no rashes noted.   NEURO:                  Alert, oriented, interactive, nonfocal, follows commands  PSYC:                      Calm, + Insight.        Assessment  1. Acute Hypoxic and hypercarbic respiratory failure   2. Due to Acute flu A and Left lower lobe Pna  3. Chronic Advanced COPD with supplemental oxygen by nasal cannula  4. RA  5. h/o colon cancer  6. h/o PE  off a/c now with Segmental and subsegmental pulmonary embolism right lower lobe.     Plan  More awake  bedside swallow to advance diet  start tamiflu  Monitor off NIV, NIV PRN  continue lovenox today, if h/h stable transition to eliquis    was on it prior  continue antibiotics for suspected pneumonia  Restart home meds  Palliative care.          PMD:	Dr Cunha		from 	                   Notified(Date):  2/26  Family Updated: 	/son	                                 Date: 2/26      Sedation & Analgesia:	n/a  Diet/Nutrition:		 advance as tolerated  GI PPx:			protonix  DVT Ppx:		enoxaparin Injectable 55 milliGRAM(s) SubCutaneous every 12 hours         Activity:		  PT/OOB  Head of Bed:               35-45 Deg  Glycemic Control:        n/a    Lines: PIV  CENTRAL LINE: 	[ ] YES [ ] NO	                    LOCATION:   	                       DATE INSERTED:   	                    REMOVE:  [ ] YES [ ] NO    A-LINE:  	                [ ] YES [ ] NO                      LOCATION:   	                       DATE INSERTED: 		            REMOVE:  [ ] YES [ ] NO    KEANE: 		        [ ] YES [ ] NO  		                                       DATE INSERTED:		            REMOVE:  [ ] YES [ ] NO      Restraints were deemed necessary to prevent removal of life-sustaining devices [  ] YES   [    ]  NO    Disposition: ICU Care    Goals of Care: Palliative care eval as multiple hospitalizations and advanced copd, currently Full code.

## 2025-02-27 NOTE — CONSULT NOTE ADULT - CONVERSATION DETAILS
Introduced palliative care team to pt at bedside and discussed supportive care role. Explained why pt was admitted to hospital as she did not know why and asked if she wanted me to Call her spouse on file for updates. She asked me to Call her sister Jo.     I also asked about pt's respiratory status and if she required more oxygenation, would she be okay with intubation if needed. Pt stated "I don't know about that". Discussed she should think about things such as CPR/ventilation since she has been rehospitalized for supplemental O2 and is in the ICU setting. She said ok, supportive care given. Pt remains FULL CODE Introduced palliative care team to pt at bedside and discussed supportive care role. Explained why pt was admitted to hospital as she did not know why and asked if she wanted me to Call her spouse on file for updates. She asked me to Call her sister Jenniffer.     I also asked about pt's respiratory status and if she required more oxygenation, would she be okay with intubation if needed. Pt stated "I don't know about that". Discussed she should think about things such as CPR/ventilation since she has been rehospitalized for supplemental O2 and is in the ICU setting. She said ok, supportive care given. Pt remains FULL CODE    Spoke with pt's sisters Jenniffer and Rosas at bedside and discussed pt's clinical status. Sister Jenniffer expressed concern pt was hospitalized at [facility] x2 recently and feels she was released too soon. She does not want pt to go back to the rehab facility she came from. I gave pt and family a copy of a HCP form and encouraged them to fill it out together as pt has been hospitalized 3x now. I also encouraged them to talk about pt's code status since she has required BiPAP mask. Pt then said she would want to be intubated in front of her sisters. Pt remains FULL CODE. Supportive care given

## 2025-02-27 NOTE — CONSULT NOTE ADULT - ASSESSMENT
69 year old female with h/o COPD - on home o2 (2-4 L), Ex smoker + Vaping, RA/Arthritis, Colon cancer s/p chemo with neuropathy, HTN, h/o PE who p/w increaisng sob and ams. of note pateint was just hospitalized in [facility] 2/3-10/2025 for both pneumonia and COPD exacerbation. patient was sent to  Rehab. Palliative care c/s for GOC    Debility  - PPS 40-50%    Metabolic encephalopathy in setting of infection below - improving  - mentation improved from yesterday although pt does not know why she is hospitalized  - supportive care    Acute Hypoxic and hypercarbic respiratory failure   Acute flu A and Left lower lobe Pna  Segmental and subsegmental pulmonary embolism right lower lobe  Chronic Advanced COPD with supplemental oxygen by nasal cannula  - AC per primary medical team  - pt required BiPAP, in ICU currently on supplemental O2 NC  - Tamiflu and abx per ICU team    H/o colon cancer    Advanced care planning  - Surrogate: spouse Rasheed Taylor ; sister Jenniffer  - see GOC note above, Pt is FULL CODE    Encounter for Palliative Care  - Introduced the palliative care team to pt at bedside, see GOC note above.   - Case discussed with ICU team Dr. Jane  - Will continue to follow for ongoing GOC conversations and supportive care.    69 year old female with h/o COPD - on home o2 (2-4 L), Ex smoker + Vaping, RA/Arthritis, Colon cancer s/p chemo with neuropathy, HTN, h/o PE who p/w increaisng sob and ams. of note pateint was just hospitalized in [facility] 2/3-10/2025 for both pneumonia and COPD exacerbation. patient was sent to  Rehab. Palliative care c/s for [facility]    Debility  - PPS 50%  - PT eval when able    Metabolic encephalopathy in setting of infection below - improving  - mentation improved from yesterday although pt does not know why she is hospitalized  - supportive care    Acute Hypoxic and hypercarbic respiratory failure   Acute flu A and Left lower lobe Pna  Segmental and subsegmental pulmonary embolism right lower lobe  Chronic Advanced COPD with supplemental oxygen by nasal cannula  - AC per primary medical team  - pt required BiPAP, in ICU currently on supplemental O2 NC  - Tamiflu and abx per ICU team    H/o colon cancer  - pt last chemo 9/25/24; follows with Dr. Patrick in [facility] every 3 months  - REC oncology consultation, sister asking for pt's oncologist to be updated    Advanced care planning  - Surrogate: spouse Rasheed Taylor ; sister Jenniffer  - see [facility] note above, Pt is FULL CODE    Encounter for Palliative Care  - Introduced the palliative care team to pt at bedside, see [facility] note above. Discussed pt's clinical status with both sisters at bedside and encouraged pt to have HCP form filled out with family. Pt expressed wanting intubation/CPR if needed in front of family; pt remains FULL CODE.   - Case discussed with ICU team Dr. Jane. Recommended oncology consultation for pt's oncologist to be updated  - Will continue to follow for supportive care.

## 2025-02-27 NOTE — PROGRESS NOTE ADULT - SUBJECTIVE AND OBJECTIVE BOX
Follow-up Critical Care Progress Note  Chief Complaint : Acute respiratory failure with hypoxia    patient seen and examined  comfortable  was on NIV  now sitting up in bed  unsure of overnight events  unsure how got here  denies sob, cp, palp, n/v, cough, secretions        Allergies :No Known Allergies      PAST MEDICAL & SURGICAL HISTORY:  Advanced COPD  On supplemental oxygen by nasal cannula  Arthritis  Cancer, colon  HTN (hypertension)  No significant past surgical history        Medications:  MEDICATIONS  (STANDING):  albuterol/ipratropium for Nebulization 3 milliLiter(s) Nebulizer every 6 hours  buDESOnide    Inhalation Suspension 0.5 milliGRAM(s) Inhalation every 12 hours  cefepime   IVPB 1000 milliGRAM(s) IV Intermittent every 12 hours  chlorhexidine 4% Liquid 1 Application(s) Topical <User Schedule>  dextrose 5%. 1000 milliLiter(s) (50 mL/Hr) IV Continuous <Continuous>  dextrose 5%. 1000 milliLiter(s) (100 mL/Hr) IV Continuous <Continuous>  dextrose 50% Injectable 25 Gram(s) IV Push once  dextrose 50% Injectable 12.5 Gram(s) IV Push once  dextrose 50% Injectable 25 Gram(s) IV Push once  enoxaparin Injectable 55 milliGRAM(s) SubCutaneous every 12 hours  glucagon  Injectable 1 milliGRAM(s) IntraMuscular once  insulin lispro (ADMELOG) corrective regimen sliding scale   SubCutaneous every 6 hours  methylPREDNISolone sodium succinate Injectable 40 milliGRAM(s) IV Push every 6 hours    MEDICATIONS  (PRN):  dextrose Oral Gel 15 Gram(s) Oral once PRN Blood Glucose LESS THAN 70 milliGRAM(s)/deciliter      Antibiotics History  cefepime   IVPB    , 02-26-25 @ 13:23  cefepime   IVPB 2000 milliGRAM(s) IV Intermittent once, 02-26-25 @ 13:32, Stop order after: 1 Doses  cefepime   IVPB 1000 milliGRAM(s) IV Intermittent every 12 hours, 02-26-25 @ 16:16  vancomycin  IVPB 750 milliGRAM(s) IV Intermittent Once, 02-26-25 @ 16:23, Stop order after: 1 Doses  vancomycin  IVPB. 1000 milliGRAM(s) IV Intermittent once, 02-26-25 @ 13:32, Stop order after: 1 Doses      Heme Medications   enoxaparin Injectable 55 milliGRAM(s) SubCutaneous every 12 hours, 02-26-25 @ 17:10       Trend Cardiac Enzymes  02-26-25 @ 13:30  [id number]-CPKMM/Trop I - -- - --  - --  -  --  /  10.1    Trend BNP  02-26-25 @ 13:30   -  198    Procalcitonin Trend  02-26-25 @ 13:30   -   0.10[H]    WBC Trend  02-27-25 @ 06:55   -  7.87  02-26-25 @ 13:30   -  11.22[H]    H/H Trend  02-27-25 @ 06:55   -   9.8[L]/ 32.2[L]  02-26-25 @ 13:30   -   10.5[L]/ 35.9    Stool Occult Blood    Platelet Trend  02-27-25 @ 06:55   -  184  02-26-25 @ 13:30   -  239    Trend Sodium  02-27-25 @ 05:51   -  142  02-26-25 @ 13:30   -  143    Trend Potassium  02-27-25 @ 05:51   -  4.5  02-26-25 @ 13:30   -  4.7    Trend Bun/Cr  02-27-25 @ 05:51  BUN/CR -  26[H] / 0.68  02-26-25 @ 13:30  BUN/CR -  20 / 0.54    Lactic Acid Trend  02-27-25 @ 05:51   -   1.0  02-26-25 @ 13:00   -   1.0    ABG Trend  02-27-25 @ 04:30   - 7.43/55[H]/93/98.6[H]  02-26-25 @ 16:18   - 7.31[L]/72[HH]/114[H]/99.2[H]  02-26-25 @ 13:35   - 7.30[L]/84[HH]/118[H]/99.1[H]    Trend AST/ALT/ALK Phos/Bili  02-27-25 @ 05:51   15/19/105/0.7  02-26-25 @ 13:30   20/24/109/0.8         Albumin Trend  02-27-25 @ 05:51   -   2.9[L]  02-26-25 @ 13:30   -   3.1[L]      PTT - PT - INR Trend  02-26-25 @ 13:30   -   27.1 - 11.3 - 0.96    Glucose Trend  02-27-25 @ 05:51   -  157[H] -- --  02-27-25 @ 05:32   -  -- -- 169[H]  02-26-25 @ 23:40   -  -- -- 195[H]  02-26-25 @ 18:15   -  -- -- 307[H]  02-26-25 @ 16:34   -  -- -- 292[H]  02-26-25 @ 13:30   -  184[H] -- --        LABS:                        9.8    7.87  )-----------( 184      ( 27 Feb 2025 06:55 )             32.2     02-27    142  |  101  |  26[H]  ----------------------------<  157[H]  4.5   |  34[H]  |  0.68    Ca    10.4      27 Feb 2025 05:51  Phos  3.2     02-27  Mg     1.9     02-27    TPro  6.9  /  Alb  2.9[L]  /  TBili  0.7  /  DBili  x   /  AST  15  /  ALT  19  /  AlkPhos  105  02-27           RADIOLOGY  CXR:    < from: Xray Chest 1 View-PORTABLE IMMEDIATE (02.26.25 @ 13:48) >    ACC: [id number] EXAM:  XR CHEST PORTABLE IMMED 1V   ORDERED BY:  MO EUBANKS     PROCEDURE DATE:  02/26/2025          INTERPRETATION:  TECHNIQUE: Single portable view of the chest.    COMPARISON:  None    CLINICAL HISTORY: Sepsis    FINDINGS:    Single frontal view of the chest demonstrates left basilar segmental   atelectasis/infiltrate. Please refer to the subsequent chest CT for   further details.. The cardiomediastinal silhouette is unremarkable. No   acute osseous abnormalities. Overlying EKG leads and wires are noted    IMPRESSION: Left basilar subsegmental atelectasis/infiltrate.    --- End of Report ---      < end of copied text >    CT:    ECHO:      VITALS:  T(C): 35.9 (02-27-25 @ 06:00), Max: 36.5 (02-26-25 @ 21:00)  T(F): 96.6 (02-27-25 @ 06:00), Max: 97.7 (02-26-25 @ 21:00)  HR: 111 (02-27-25 @ 06:00) (67 - 111)  BP: 145/90 (02-27-25 @ 06:00) (105/69 - 149/85)  BP(mean): 107 (02-27-25 @ 06:00) (81 - 107)  ABP: --  ABP(mean): --  RR: 24 (02-27-25 @ 06:00) (16 - 28)  SpO2: 100% (02-27-25 @ 06:00) (67% - 100%)  CVP(mm Hg): --  CVP(cm H2O): --    Ins and Outs     02-26-25 @ 07:01  -  02-27-25 @ 07:00  --------------------------------------------------------  IN: 0 mL / OUT: 150 mL / NET: -150 mL        Height (cm): 157.5 (02-26-25 @ 13:19)  Weight (kg): 54.4 (02-26-25 @ 13:19)  BMI (kg/m2): 21.9 (02-26-25 @ 13:19)        I&O's Detail    26 Feb 2025 07:01  -  27 Feb 2025 07:00  --------------------------------------------------------  IN:  Total IN: 0 mL    OUT:    Voided (mL): 150 mL  Total OUT: 150 mL    Total NET: -150 mL

## 2025-02-27 NOTE — DIETITIAN INITIAL EVALUATION ADULT - PERTINENT LABORATORY DATA
02-27    142  |  101  |  26[H]  ----------------------------<  157[H]  4.5   |  34[H]  |  0.68    Ca    10.4      27 Feb 2025 05:51  Phos  3.2     02-27  Mg     1.9     02-27    TPro  6.9  /  Alb  2.9[L]  /  TBili  0.7  /  DBili  x   /  AST  15  /  ALT  19  /  AlkPhos  105  02-27  POCT Blood Glucose.: 169 mg/dL (02-27-25 @ 05:32)

## 2025-02-28 DIAGNOSIS — C18.9 MALIGNANT NEOPLASM OF COLON, UNSPECIFIED: ICD-10-CM

## 2025-02-28 LAB
ALBUMIN SERPL ELPH-MCNC: 2.6 G/DL — LOW (ref 3.3–5)
ALP SERPL-CCNC: 90 U/L — SIGNIFICANT CHANGE UP (ref 40–120)
ALT FLD-CCNC: 17 U/L — SIGNIFICANT CHANGE UP (ref 10–45)
ANION GAP SERPL CALC-SCNC: 3 MMOL/L — LOW (ref 5–17)
AST SERPL-CCNC: 16 U/L — SIGNIFICANT CHANGE UP (ref 10–40)
BASE EXCESS BLDA CALC-SCNC: 14.2 MMOL/L — HIGH (ref -2–3)
BILIRUB SERPL-MCNC: 0.7 MG/DL — SIGNIFICANT CHANGE UP (ref 0.2–1.2)
BUN SERPL-MCNC: 31 MG/DL — HIGH (ref 7–23)
CALCIUM SERPL-MCNC: 10.4 MG/DL — SIGNIFICANT CHANGE UP (ref 8.4–10.5)
CHLORIDE SERPL-SCNC: 104 MMOL/L — SIGNIFICANT CHANGE UP (ref 96–108)
CO2 BLDA-SCNC: 40 MMOL/L — HIGH (ref 19–24)
CO2 SERPL-SCNC: 37 MMOL/L — HIGH (ref 22–31)
CREAT SERPL-MCNC: 0.61 MG/DL — SIGNIFICANT CHANGE UP (ref 0.5–1.3)
EGFR: 97 ML/MIN/1.73M2 — SIGNIFICANT CHANGE UP
EGFR: 97 ML/MIN/1.73M2 — SIGNIFICANT CHANGE UP
GAS PNL BLDA: SIGNIFICANT CHANGE UP
GLUCOSE BLDC GLUCOMTR-MCNC: 139 MG/DL — HIGH (ref 70–99)
GLUCOSE BLDC GLUCOMTR-MCNC: 142 MG/DL — HIGH (ref 70–99)
GLUCOSE BLDC GLUCOMTR-MCNC: 156 MG/DL — HIGH (ref 70–99)
GLUCOSE BLDC GLUCOMTR-MCNC: 181 MG/DL — HIGH (ref 70–99)
GLUCOSE BLDC GLUCOMTR-MCNC: 208 MG/DL — HIGH (ref 70–99)
GLUCOSE SERPL-MCNC: 153 MG/DL — HIGH (ref 70–99)
HCO3 BLDA-SCNC: 38 MMOL/L — HIGH (ref 21–28)
HCT VFR BLD CALC: 29.9 % — LOW (ref 34.5–45)
HGB BLD-MCNC: 8.8 G/DL — LOW (ref 11.5–15.5)
MAGNESIUM SERPL-MCNC: 2 MG/DL — SIGNIFICANT CHANGE UP (ref 1.6–2.6)
MCHC RBC-ENTMCNC: 24.4 PG — LOW (ref 27–34)
MCHC RBC-ENTMCNC: 29.4 G/DL — LOW (ref 32–36)
MCV RBC AUTO: 82.8 FL — SIGNIFICANT CHANGE UP (ref 80–100)
NRBC BLD AUTO-RTO: 0 /100 WBCS — SIGNIFICANT CHANGE UP (ref 0–0)
PCO2 BLDA: 58 MMHG — HIGH (ref 32–35)
PH BLDA: 7.43 — SIGNIFICANT CHANGE UP (ref 7.35–7.45)
PHOSPHATE SERPL-MCNC: 3.1 MG/DL — SIGNIFICANT CHANGE UP (ref 2.5–4.5)
PLATELET # BLD AUTO: 191 K/UL — SIGNIFICANT CHANGE UP (ref 150–400)
PO2 BLDA: 76 MMHG — LOW (ref 83–108)
POTASSIUM SERPL-MCNC: 4.2 MMOL/L — SIGNIFICANT CHANGE UP (ref 3.5–5.3)
POTASSIUM SERPL-SCNC: 4.2 MMOL/L — SIGNIFICANT CHANGE UP (ref 3.5–5.3)
PROT SERPL-MCNC: 6.3 G/DL — SIGNIFICANT CHANGE UP (ref 6–8.3)
RBC # BLD: 3.61 M/UL — LOW (ref 3.8–5.2)
RBC # FLD: 20.6 % — HIGH (ref 10.3–14.5)
SAO2 % BLDA: 96.5 % — SIGNIFICANT CHANGE UP (ref 94–98)
SODIUM SERPL-SCNC: 144 MMOL/L — SIGNIFICANT CHANGE UP (ref 135–145)
WBC # BLD: 7.99 K/UL — SIGNIFICANT CHANGE UP (ref 3.8–10.5)
WBC # FLD AUTO: 7.99 K/UL — SIGNIFICANT CHANGE UP (ref 3.8–10.5)

## 2025-02-28 PROCEDURE — 99223 1ST HOSP IP/OBS HIGH 75: CPT

## 2025-02-28 PROCEDURE — 71045 X-RAY EXAM CHEST 1 VIEW: CPT | Mod: 26

## 2025-02-28 RX ORDER — MUPIROCIN CALCIUM 20 MG/G
1 CREAM TOPICAL
Refills: 0 | Status: COMPLETED | OUTPATIENT
Start: 2025-02-28 | End: 2025-03-05

## 2025-02-28 RX ADMIN — BUDESONIDE 0.5 MILLIGRAM(S): 0.25 SUSPENSION RESPIRATORY (INHALATION) at 09:55

## 2025-02-28 RX ADMIN — METHYLPREDNISOLONE ACETATE 40 MILLIGRAM(S): 80 INJECTION, SUSPENSION INTRA-ARTICULAR; INTRALESIONAL; INTRAMUSCULAR; SOFT TISSUE at 17:44

## 2025-02-28 RX ADMIN — Medication 1 APPLICATION(S): at 05:53

## 2025-02-28 RX ADMIN — Medication 2.5 MILLIGRAM(S): at 09:55

## 2025-02-28 RX ADMIN — ENOXAPARIN SODIUM 55 MILLIGRAM(S): 100 INJECTION SUBCUTANEOUS at 17:41

## 2025-02-28 RX ADMIN — INSULIN LISPRO 2: 100 INJECTION, SOLUTION INTRAVENOUS; SUBCUTANEOUS at 05:51

## 2025-02-28 RX ADMIN — METOPROLOL SUCCINATE 12.5 MILLIGRAM(S): 50 TABLET, EXTENDED RELEASE ORAL at 05:52

## 2025-02-28 RX ADMIN — Medication 2.5 MILLIGRAM(S): at 15:27

## 2025-02-28 RX ADMIN — METHYLPREDNISOLONE ACETATE 40 MILLIGRAM(S): 80 INJECTION, SUSPENSION INTRA-ARTICULAR; INTRALESIONAL; INTRAMUSCULAR; SOFT TISSUE at 23:12

## 2025-02-28 RX ADMIN — Medication 2.5 MILLIGRAM(S): at 21:39

## 2025-02-28 RX ADMIN — OSELTAMIVIR PHOSPHATE 75 MILLIGRAM(S): 75 CAPSULE ORAL at 14:11

## 2025-02-28 RX ADMIN — AMLODIPINE BESYLATE 10 MILLIGRAM(S): 10 TABLET ORAL at 05:52

## 2025-02-28 RX ADMIN — BUDESONIDE 0.5 MILLIGRAM(S): 0.25 SUSPENSION RESPIRATORY (INHALATION) at 21:39

## 2025-02-28 RX ADMIN — Medication 1 DOSE(S): at 10:11

## 2025-02-28 RX ADMIN — CEFEPIME 100 MILLIGRAM(S): 2 INJECTION, POWDER, FOR SOLUTION INTRAVENOUS at 14:12

## 2025-02-28 RX ADMIN — OSELTAMIVIR PHOSPHATE 75 MILLIGRAM(S): 75 CAPSULE ORAL at 23:09

## 2025-02-28 RX ADMIN — CEFEPIME 100 MILLIGRAM(S): 2 INJECTION, POWDER, FOR SOLUTION INTRAVENOUS at 05:51

## 2025-02-28 RX ADMIN — ENOXAPARIN SODIUM 55 MILLIGRAM(S): 100 INJECTION SUBCUTANEOUS at 05:51

## 2025-02-28 RX ADMIN — INSULIN LISPRO 4: 100 INJECTION, SOLUTION INTRAVENOUS; SUBCUTANEOUS at 17:42

## 2025-02-28 RX ADMIN — METOPROLOL SUCCINATE 12.5 MILLIGRAM(S): 50 TABLET, EXTENDED RELEASE ORAL at 17:42

## 2025-02-28 RX ADMIN — CEFEPIME 100 MILLIGRAM(S): 2 INJECTION, POWDER, FOR SOLUTION INTRAVENOUS at 22:07

## 2025-02-28 RX ADMIN — Medication 1 DOSE(S): at 21:39

## 2025-02-28 RX ADMIN — METHYLPREDNISOLONE ACETATE 40 MILLIGRAM(S): 80 INJECTION, SUSPENSION INTRA-ARTICULAR; INTRALESIONAL; INTRAMUSCULAR; SOFT TISSUE at 12:50

## 2025-02-28 RX ADMIN — METHYLPREDNISOLONE ACETATE 40 MILLIGRAM(S): 80 INJECTION, SUSPENSION INTRA-ARTICULAR; INTRALESIONAL; INTRAMUSCULAR; SOFT TISSUE at 05:51

## 2025-02-28 RX ADMIN — MUPIROCIN CALCIUM 1 APPLICATION(S): 20 CREAM TOPICAL at 17:42

## 2025-02-28 NOTE — PROGRESS NOTE ADULT - ASSESSMENT
Physical Examination:  GENERAL:               Alert, Oriented, No acute distress.    HEENT:                   No JVD, Dry Moist MM, cachectic  PULM:                     Bilateral air entry, Clear to auscultation bilaterally, no significant sputum production, +Rales, No Rhonchi, + Wheezing  CVS:                         S1, S2,  + Murmur  ABD:                        Soft, nondistended, nontender, normoactive bowel sounds,   EXT:                         No edema, nontender, No Cyanosis or Clubbing   Vascular:                Warm Extremities, Normal Capillary refill, Normal Distal Pulses  SKIN:                       Warm and well perfused, no rashes noted.   NEURO:                  Alert, oriented, interactive, nonfocal, follows commands  PSYC:                      Calm,  limited Insight.        Assessment  1. Acute Hypoxic and hypercarbic respiratory failure   2. Due to Acute flu A and Left lower lobe Pna  3. Chronic Advanced COPD with supplemental oxygen by nasal cannula  4. RA  5. h/o colon cancer  6. h/o PE  off a/c now with Segmental and subsegmental pulmonary embolism right lower lobe.     Plan  more awake  still retaining co2 with normal pH, previous this [facility] no co2 retention  Continue tamiflu  used NIV partially, continue to use  Continue Lovenox,  steroids, nebs,   start Advair  noted urinary retention yesterday, hold anticholinergics   monitor urine output     continue lovenox today, consider transition to eliquis  continue antibiotics for suspected pneumonia  Restart home meds  Palliative care.          PMD:	Dr Cunha		from 	                   Notified(Date):  2/28  Family Updated: 	sister                                Date: 2/28      Sedation & Analgesia:	n/a  Diet/Nutrition:		 advance as tolerated  GI PPx:			protonix  DVT Ppx:		enoxaparin Injectable 55 milliGRAM(s) SubCutaneous every 12 hours         Activity:		  PT/OOB  Head of Bed:               35-45 Deg  Glycemic Control:        n/a    Lines: PIV  CENTRAL LINE: 	[ ] YES [ ] NO	                    LOCATION:   	                       DATE INSERTED:   	                    REMOVE:  [ ] YES [ ] NO    A-LINE:  	                [ ] YES [ ] NO                      LOCATION:   	                       DATE INSERTED: 		            REMOVE:  [ ] YES [ ] NO    KEANE: 		        [ ] YES [ ] NO  		                                       DATE INSERTED:		            REMOVE:  [ ] YES [ ] NO      Restraints were deemed necessary to prevent removal of life-sustaining devices [  ] YES   [    ]  NO    Disposition: ICU Care    Goals of Care: Palliative care eval as multiple hospitalizations and advanced copd, currently Full code.

## 2025-02-28 NOTE — CONSULT NOTE ADULT - SUBJECTIVE AND OBJECTIVE BOX
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 Jose Daniel Jane    HPI:  69 year old female with h/o COPD - on home o2 (2-4 L), Ex smoker + Vaping, RA/Arthritis, Colon cancer s/p chemo with neuropathy, HTN, h/o PE who p/w increaisng sob and ams. of note pateint was just hospitalized in [facility] 2/3-10/2025 and 3/17-18/2025 for both pneumonia and COPD exacerbation.   patient was sent to  Rehab  Today noted to be having increasing sob and confusion   sent to ED where found to have Flu a, and acute on chronic Hypercarbic respiratory failure.     PAST MEDICAL & SURGICAL HISTORY:  Advanced COPD  On supplemental oxygen by nasal cannula  Arthritis  Cancer, colon  HTN (hypertension)  No significant past surgical history    MEDICATION:  albuterol    0.083% 2.5 milliGRAM(s) Nebulizer every 6 hours  amLODIPine   Tablet 10 milliGRAM(s) Oral daily  buDESOnide    Inhalation Suspension 0.5 milliGRAM(s) Inhalation every 12 hours  cefepime   IVPB 2000 milliGRAM(s) IV Intermittent every 8 hours  chlorhexidine 2% Cloths 1 Application(s) Topical <User Schedule>  dextrose 5%. 1000 milliLiter(s) IV Continuous <Continuous>  dextrose 5%. 1000 milliLiter(s) IV Continuous <Continuous>  dextrose 50% Injectable 25 Gram(s) IV Push once  dextrose 50% Injectable 12.5 Gram(s) IV Push once  dextrose 50% Injectable 25 Gram(s) IV Push once  enoxaparin Injectable 55 milliGRAM(s) SubCutaneous every 12 hours  fluticasone propionate/ salmeterol 250-50 MICROgram(s) Diskus 1 Dose(s) Inhalation two times a day  insulin lispro (ADMELOG) corrective regimen sliding scale   SubCutaneous every 6 hours  methylPREDNISolone sodium succinate Injectable 40 milliGRAM(s) IV Push every 6 hours  metoprolol tartrate 12.5 milliGRAM(s) Oral two times a day  mupirocin 2% Nasal 1 Application(s) Both Nostrils two times a day  oseltamivir Suspension 75 milliGRAM(s) Oral every 12 hours    ALLERGIES:  No Known Allergies    FAMILY HISTORY:  Reviewed, non-contributory: [x ]     SOCIAL HISTORY:  Tobacco: YES [ ]  ; NO [ x]; Former smoker [ ]  Alcohol:   YES [ ]  ; NO [x ]; Social alcohol user [ ]    REVIEW SYSTEMS:  Constitutional: no fever, chills, night sweats, no weight loss  HEENT: denies visual changes; no oral ulcers, dysphagia, no epistaxis;   Respiratory: no dyspnea , wheezing, cough, hemoptysis  Cardiovascular: denies acute chest pain, palpitations  GI: no loss of appetite, dark stools, or abdominal tenderness / pain; no change in bowel habits.  Musculoskeletal: no new back pain, bone/ joint pain ,no extremity swelling  Integumentary: denies pruritus; no skin rash, bruises, no  suspicious skin lesions  Neurologic: denies peripheral numbness, no dizziness, no gait problems.  Heme: no reported easy bruisability; no lymph node enlargement    VITALS:  T(C): 36.8, Max: 36.8 (02-28-25 @ 05:00)  T(F): 98.2, Max: 98.2 (02-28-25 @ 05:00)  HR: 101 (81 - 108)  BP: 141/75 (124/73 - 160/86)  SpO2: 99% (91% - 100%)    PHYSICAL EXAM:  Constitutional: alert, well developed  HEENT: normocephalic, anicteric sclerae, no mucositis or thrush  Respiratory: bilateral clear to auscultation anteriorly  Cardiovascular : S1, S2 regular, rhythmic, no murmurs, gallops or rubs  Abdomen: soft, distended, + normoactive BS, no palpable HS- megaly  Extremities: no tenderness;  -c/c/e, pulses equal bilaterally  Integumentary: no rashes, scars, or lesions suggestive of malignancy  Neurologic: no gross focal deficits    LABS:  (02-28) WBC: 7.99 K/uL,Hemoglobin: 8.8 g/dL, Hematocrit: 29.9 %,  Platelet: 191 K/uL  (02-28) Na: 144 mmol/L ; K: 4.2 mmol/L ; BUN: 31 mg/dL ; Cr: 0.61 mg/dL.    RADIOLOGY:  ACC: [id number] EXAM:  CT ANGIO CHEST PULM ART [facility]   ORDERED BY:  MO EUBANKS     PROCEDURE DATE:  02/26/2025          INTERPRETATION:  CLINICAL INFORMATION: Hypoxia.    COMPARISON: None.    CONTRAST/COMPLICATIONS:  IV Contrast: Omnipaque 350 90 cc administered   10 cc discarded  Oral Contrast: NONE  .    PROCEDURE:  CT Angiography of the Chest.  Sagittal and coronal reformats were performed as well as 3D (MIP)   reconstructions.    FINDINGS:    LUNGS AND LARGE AIRWAYS: Patent central airways. Bronchial plugging lower   lobes bilaterally, left more than right. Severe pulmonary emphysema.   Consolidative opacities bilateral lower lobes and additional small focal   airspace opacities the right lower lobe. These may represent a   combination of pneumonia and atelectasis. Additional concerning for a   pulmonary infarct in the right lower lobe.  PLEURA: No pleural effusion.  VESSELS: Segmental and subsegmental pulmonary embolism right lower lobe.   Atherosclerotic calcification including the coronary arteries.  HEART: Heart size is normal. No pericardial effusion.  MEDIASTINUM AND AYAH: No lymphadenopathy.  CHEST WALL AND LOWER NECK: Within normal limits.  VISUALIZED UPPER ABDOMEN: Right renal cyst. Possible small left renal   cyst. Probable small cyst left hepatic lobe.  BONES: Severe anterior compression of T11 and T12 with retropulsion. This   may be of some duration. Mild superior endplate compression of T10.    IMPRESSION:  Segmental and subsegmental pulmonary embolism right lower lobe.  Bilateral lower lobe opacities that may represent a combination of   atelectasis and infiltrates. Additional possibility of a pulmonary   infarct in the right lower lobe.  Severe pulmonary emphysema.             KENDALL RIDER,  69y Female  MRN: [id number]  ATTENDING: Dr. Jose Daniel Jane    HPI:  69F, ex-smoker, PMHx rheumatoid arthritis, colon cancer, HTN, COPD, admitted to [facility] with COPD exacerbation.  This is 1 of multiple admission in the past month including previous to admission at [facility] (2/3 - 10/2025 and 2/17–18/2025) with pneumonia and COPD exacerbation.  She is was discharged to clinical hospital for in-hospital acute rehabilitation, but decompensated from respiratory perspective and is presently on antibiotics and prednisone monitored in ICU.  Patient presents with generalized weakness, difficulty ambulating.  Reportedly she has a history of colon cancer diagnosed at [facility] in 2024, treated with chemotherapy under the care of Dr. Kaleb Olmedo, currently in remission.  Oncology consulted as above.      PAST MEDICAL & SURGICAL HISTORY:  Advanced COPD  On supplemental oxygen by nasal cannula  Arthritis  Cancer, colon  HTN (hypertension)  No significant past surgical history    MEDICATION:  albuterol    0.083% 2.5 milliGRAM(s) Nebulizer every 6 hours  amLODIPine   Tablet 10 milliGRAM(s) Oral daily  buDESOnide    Inhalation Suspension 0.5 milliGRAM(s) Inhalation every 12 hours  cefepime   IVPB 2000 milliGRAM(s) IV Intermittent every 8 hours  chlorhexidine 2% Cloths 1 Application(s) Topical <User Schedule>  dextrose 5%. 1000 milliLiter(s) IV Continuous <Continuous>  dextrose 5%. 1000 milliLiter(s) IV Continuous <Continuous>  dextrose 50% Injectable 25 Gram(s) IV Push once  dextrose 50% Injectable 12.5 Gram(s) IV Push once  dextrose 50% Injectable 25 Gram(s) IV Push once  enoxaparin Injectable 55 milliGRAM(s) SubCutaneous every 12 hours  fluticasone propionate/ salmeterol 250-50 MICROgram(s) Diskus 1 Dose(s) Inhalation two times a day  insulin lispro (ADMELOG) corrective regimen sliding scale   SubCutaneous every 6 hours  methylPREDNISolone sodium succinate Injectable 40 milliGRAM(s) IV Push every 6 hours  metoprolol tartrate 12.5 milliGRAM(s) Oral two times a day  mupirocin 2% Nasal 1 Application(s) Both Nostrils two times a day  oseltamivir Suspension 75 milliGRAM(s) Oral every 12 hours    ALLERGIES:  No Known Allergies    FAMILY HISTORY:  Reviewed, non-contributory: [x ]     SOCIAL HISTORY:  Tobacco: YES [ ]  ; NO [ ]; Former smoker [ x]; current vaping  Alcohol:   YES [ ]  ; NO [x ]; Social alcohol user [ ]    REVIEW SYSTEMS:  Could not obtain- patient somnolent    VITALS:  T(C): 36.8, Max: 36.8 (02-28-25 @ 05:00)  T(F): 98.2, Max: 98.2 (02-28-25 @ 05:00)  HR: 101 (81 - 108)  BP: 141/75 (124/73 - 160/86)  SpO2: 99% (91% - 100%)    PHYSICAL EXAM:  Constitutional: alert, frail  HEENT: normocephalic, anicteric sclerae, no mucositis or thrush  Respiratory: bilateral clear to auscultation anteriorly  Cardiovascular : S1, S2 regular, rhythmic, no murmurs, gallops or rubs  Abdomen: soft, distended, + normoactive BS, no palpable HS- megaly  Extremities: no tenderness;  -c/c/e, pulses equal bilaterally  Integumentary: no rashes, scars, or lesions suggestive of malignancy  Neurologic: no gross focal deficits    LABS:  (02-28) WBC: 7.99 K/uL,Hemoglobin: 8.8 g/dL, Hematocrit: 29.9 %,  Platelet: 191 K/uL  (02-28) Na: 144 mmol/L ; K: 4.2 mmol/L ; BUN: 31 mg/dL ; Cr: 0.61 mg/dL.    RADIOLOGY:  ACC: [id number] [facility]   ORDERED BY:  MO EUBANKS     PROCEDURE DATE:  02/26/2025  INTERPRETATION:  CLINICAL INFORMATION: Hypoxia.    COMPARISON: None.    CONTRAST/COMPLICATIONS:  IV Contrast: Omnipaque 350 90 cc administered   10 cc discarded  Oral Contrast: NONE  .    PROCEDURE:  CT Angiography of the Chest.  Sagittal and coronal reformats were performed as well as 3D (MIP)   reconstructions.    FINDINGS:    LUNGS AND LARGE AIRWAYS: Patent central airways. Bronchial plugging lower   lobes bilaterally, left more than right. Severe pulmonary emphysema.   Consolidative opacities bilateral lower lobes and additional small focal   airspace opacities the right lower lobe. These may represent a   combination of pneumonia and atelectasis. Additional concerning for a   pulmonary infarct in the right lower lobe.  PLEURA: No pleural effusion.  VESSELS: Segmental and subsegmental pulmonary embolism right lower lobe.   Atherosclerotic calcification including the coronary arteries.  HEART: Heart size is normal. No pericardial effusion.  MEDIASTINUM AND AYAH: No lymphadenopathy.  CHEST WALL AND LOWER NECK: Within normal limits.  VISUALIZED UPPER ABDOMEN: Right renal cyst. Possible small left renal   cyst. Probable small cyst left hepatic lobe.  BONES: Severe anterior compression of T11 and T12 with retropulsion. This   may be of some duration. Mild superior endplate compression of T10.    IMPRESSION:  Segmental and subsegmental pulmonary embolism right lower lobe.  Bilateral lower lobe opacities that may represent a combination of   atelectasis and infiltrates. Additional possibility of a pulmonary   infarct in the right lower lobe.  Severe pulmonary emphysema.

## 2025-02-28 NOTE — PROGRESS NOTE ADULT - ASSESSMENT
PLAN:    Patient currently on BiPAP  -will titrate IPAP and EPAP to maintain pH >7.30 and SaO2 >92%  -alarms reviewed  -NPO while on BiPAP   -nebs steroids  -glycemic control  -pain control for hx of slipped disc  -DVt prophylaxis  -AM labs          TIME SPENT:  45 minutes of  time spent providing medical care for patient's acute illness/conditions. It includes time spent evaluating and treating the patient's acute illness as well as time spent reviewing labs, radiology, discussing goals of care with patient and/or patient's family, and discussing the case with a multidisciplinary team. This time is independent of any procedures performed.    DATE OF ENTRY OF THIS NOTE IS EQUAL TO THE DATE OF SERVICES RENDERED

## 2025-02-28 NOTE — PROGRESS NOTE ADULT - SUBJECTIVE AND OBJECTIVE BOX
Initial Eval    69y Female admitted with hypoxemic resp. failure 2/2 to Influenza , required BiPAP  pt awake , fully interactive.   off bipap on interview.        acetaminophen     Tablet .. 650 milliGRAM(s) Oral every 6 hours PRN  albuterol    0.083% 2.5 milliGRAM(s) Nebulizer every 6 hours  amLODIPine   Tablet 10 milliGRAM(s) Oral daily  buDESOnide    Inhalation Suspension 0.5 milliGRAM(s) Inhalation every 12 hours  cefepime   IVPB 2000 milliGRAM(s) IV Intermittent every 8 hours  chlorhexidine 2% Cloths 1 Application(s) Topical <User Schedule>  dextrose 5%. 1000 milliLiter(s) IV Continuous <Continuous>  dextrose 5%. 1000 milliLiter(s) IV Continuous <Continuous>  dextrose 50% Injectable 25 Gram(s) IV Push once  dextrose 50% Injectable 12.5 Gram(s) IV Push once  dextrose 50% Injectable 25 Gram(s) IV Push once  enoxaparin Injectable 55 milliGRAM(s) SubCutaneous every 12 hours  fluticasone propionate/ salmeterol 250-50 MICROgram(s) Diskus 1 Dose(s) Inhalation two times a day  insulin lispro (ADMELOG) corrective regimen sliding scale   SubCutaneous every 6 hours  methylPREDNISolone sodium succinate Injectable 40 milliGRAM(s) IV Push every 6 hours  metoprolol tartrate 12.5 milliGRAM(s) Oral two times a day  mupirocin 2% Nasal 1 Application(s) Both Nostrils two times a day  oseltamivir Suspension 75 milliGRAM(s) Oral every 12 hours                            8.8    7.99  )-----------( 191      ( 28 Feb 2025 05:30 )             29.9       Hemoglobin: 8.8 g/dL (02-28 @ 05:30)  Hemoglobin: 9.8 g/dL (02-27 @ 06:55)  Hemoglobin: 10.5 g/dL (02-26 @ 13:30)      02-28    144  |  104  |  31[H]  ----------------------------<  153[H]  4.2   |  37[H]  |  0.61    Ca    10.4      28 Feb 2025 05:30  Phos  3.1     02-28  Mg     2.0     02-28    TPro  6.3  /  Alb  2.6[L]  /  TBili  0.7  /  DBili  x   /  AST  16  /  ALT  17  /  AlkPhos  90  02-28    Creatinine Trend: 0.61<--, 0.68<--, 0.54<--    COAGS:           T(C): 36.8 (02-28-25 @ 05:00), Max: 36.8 (02-28-25 @ 05:00)  HR: 94 (02-28-25 @ 18:00) (81 - 108)  BP: 138/80 (02-28-25 @ 18:00) (129/81 - 160/86)  RR: 20 (02-28-25 @ 18:00) (15 - 34)  SpO2: 99% (02-28-25 @ 18:00) (91% - 100%)  Wt(kg): --    I&O's Summary    27 Feb 2025 07:01  -  28 Feb 2025 07:00  --------------------------------------------------------  IN: 50 mL / OUT: 2900 mL / NET: -2850 mL    28 Feb 2025 07:01  -  28 Feb 2025 19:58  --------------------------------------------------------  IN: 0 mL / OUT: 777 mL / NET: -777 mL    ROS:  NEURO: no headaches, blurry vision, tremors, depression, anxity  CV: no chest pain, palpitations, murmurs, orthopnea  Resp: no shortness of breath, cough, wheeze, sputum production  GI: no stomach pain,nausea, vomitting, flatulence, hematemesis, hematochezia  PV: no swelling of extremities, no hair loss, no coolness to extremities  ENDO: no polydypsia, polyphagia, polyuria, weight loss, night sweats          NEURO: awake and alert  CV: (+) S1/S2, rrr, no mrg  RESP: CTA b/l  GI: soft, non tender

## 2025-02-28 NOTE — PROGRESS NOTE ADULT - SUBJECTIVE AND OBJECTIVE BOX
Follow-up Critical Care Progress Note  Chief Complaint : Acute respiratory failure with hypoxia      pt seen and examined  appears sob,   states sob  no cp, palp, n/v, cough, secretions        Allergies :No Known Allergies      PAST MEDICAL & SURGICAL HISTORY:  Advanced COPD    On supplemental oxygen by nasal cannula    Arthritis    Cancer, colon    HTN (hypertension)    No significant past surgical history        Medications:  MEDICATIONS  (STANDING):  albuterol    0.083% 2.5 milliGRAM(s) Nebulizer every 6 hours  amLODIPine   Tablet 10 milliGRAM(s) Oral daily  buDESOnide    Inhalation Suspension 0.5 milliGRAM(s) Inhalation every 12 hours  cefepime   IVPB 2000 milliGRAM(s) IV Intermittent every 8 hours  chlorhexidine 2% Cloths 1 Application(s) Topical <User Schedule>  dextrose 5%. 1000 milliLiter(s) (50 mL/Hr) IV Continuous <Continuous>  dextrose 5%. 1000 milliLiter(s) (100 mL/Hr) IV Continuous <Continuous>  dextrose 50% Injectable 25 Gram(s) IV Push once  dextrose 50% Injectable 12.5 Gram(s) IV Push once  dextrose 50% Injectable 25 Gram(s) IV Push once  enoxaparin Injectable 55 milliGRAM(s) SubCutaneous every 12 hours  fluticasone propionate/ salmeterol 250-50 MICROgram(s) Diskus 1 Dose(s) Inhalation two times a day  insulin lispro (ADMELOG) corrective regimen sliding scale   SubCutaneous every 6 hours  methylPREDNISolone sodium succinate Injectable 40 milliGRAM(s) IV Push every 6 hours  metoprolol tartrate 12.5 milliGRAM(s) Oral two times a day  mupirocin 2% Nasal 1 Application(s) Both Nostrils two times a day  oseltamivir Suspension 75 milliGRAM(s) Oral every 12 hours    MEDICATIONS  (PRN):  acetaminophen     Tablet .. 650 milliGRAM(s) Oral every 6 hours PRN Temp greater or equal to 38C (100.4F), Mild Pain (1 - 3)      Antibiotics History  cefepime   IVPB 1000 milliGRAM(s) IV Intermittent every 12 hours, 02-26-25 @ 16:16  cefepime   IVPB 2000 milliGRAM(s) IV Intermittent every 8 hours, 02-27-25 @ 08:41, Stop order after: 5 Days  cefepime   IVPB    , 02-26-25 @ 13:23  cefepime   IVPB 2000 milliGRAM(s) IV Intermittent once, 02-26-25 @ 13:32, Stop order after: 1 Doses  oseltamivir 75 milliGRAM(s) Oral two times a day, 02-27-25 @ 08:11, Stop order after: 5 Days  oseltamivir Suspension 75 milliGRAM(s) Oral every 12 hours, 02-27-25 @ 09:53  oseltamivir Suspension 75 milliGRAM(s) Oral every 12 hours, 02-27-25 @ 10:24, Stop order after: 5 Days  vancomycin  IVPB 750 milliGRAM(s) IV Intermittent Once, 02-26-25 @ 16:23, Stop order after: 1 Doses  vancomycin  IVPB. 1000 milliGRAM(s) IV Intermittent once, 02-26-25 @ 13:32, Stop order after: 1 Doses      Heme Medications   enoxaparin Injectable 55 milliGRAM(s) SubCutaneous every 12 hours, 02-26-25 @ 17:10      GI Medications      COVID  02-26-25 @ 13:30  COVID -   NotDetec      COVID Biomarkers            Trend Cardiac Enzymes  02-26-25 @ 13:30  [id number]-[id number]-CPKMM/Trop I - -- - --  - --  -  --  /  10.1    Trend BNP  02-26-25 @ 13:30   -  198    Procalcitonin Trend  02-26-25 @ 13:30   -   0.10[H]    WBC Trend  02-28-25 @ 05:30   -  7.99  02-27-25 @ 06:55   -  7.87  02-26-25 @ 13:30   -  11.22[H]    H/H Trend  02-28-25 @ 05:30   -   8.8[L]/ 29.9[L]  02-27-25 @ 06:55   -   9.8[L]/ 32.2[L]  02-26-25 @ 13:30   -   10.5[L]/ 35.9    Stool Occult Blood    Platelet Trend  02-28-25 @ 05:30   -  191  02-27-25 @ 06:55   -  184  02-26-25 @ 13:30   -  239    Trend Sodium  02-28-25 @ 05:30   -  144  02-27-25 @ 05:51   -  142  02-26-25 @ 13:30   -  143    Trend Potassium  02-28-25 @ 05:30   -  4.2  02-27-25 @ 05:51   -  4.5  02-26-25 @ 13:30   -  4.7    Trend Bun/Cr  02-28-25 @ 05:30  BUN/CR -  31[H] / 0.61  02-27-25 @ 05:51  BUN/CR -  26[H] / 0.68  02-26-25 @ 13:30  BUN/CR -  20 / 0.54    Lactic Acid Trend  02-27-25 @ 05:51   -   1.0  02-26-25 @ 13:00   -   1.0    ABG Trend  02-28-25 @ 05:30   - 7.43/58[H]/76[L]/96.5  02-27-25 @ 04:30   - 7.43/55[H]/93/98.6[H]  02-26-25 @ 16:18   - 7.31[L]/72[HH]/114[H]/99.2[H]  02-26-25 @ 13:35   - 7.30[L]/84[HH]/118[H]/99.1[H]    Trend AST/ALT/ALK Phos/Bili  02-28-25 @ 05:30   16/17/90/0.7  02-27-25 @ 05:51   15/19/105/0.7  02-26-25 @ 13:30   20/24/109/0.8      Ammonia Trend      Amylase / Lipase Trend      Albumin Trend  02-28-25 @ 05:30   -   2.6[L]  02-27-25 @ 05:51   -   2.9[L]  02-26-25 @ 13:30   -   3.1[L]      PTT - PT - INR Trend  02-26-25 @ 13:30   -   27.1 - 11.3 - 0.96    Glucose Trend  02-28-25 @ 08:44   -  -- -- 156[H]  02-28-25 @ 05:30   -  153[H] -- --  02-28-25 @ 05:14   -  -- -- 181[H]  02-27-25 @ 23:38   -  -- -- 134[H]  02-27-25 @ 17:10   -  -- -- 159[H]  02-27-25 @ 11:06   -  -- -- 255[H]  02-27-25 @ 05:51   -  157[H] -- --  02-27-25 @ 05:32   -  -- -- 169[H]  02-26-25 @ 23:40   -  -- -- 195[H]  02-26-25 @ 18:15   -  -- -- 307[H]    A1C with Estimated Average Glucose Result: 6.6 % *H* [4.0 - 5.6] (02-27-25 @ 05:51)      LABS:                        8.8    7.99  )-----------( 191      ( 28 Feb 2025 05:30 )             29.9     02-28    144  |  104  |  31[H]  ----------------------------<  153[H]  4.2   |  37[H]  |  0.61    Ca    10.4      28 Feb 2025 05:30  Phos  3.1     02-28  Mg     2.0     02-28    TPro  6.3  /  Alb  2.6[L]  /  TBili  0.7  /  DBili  x   /  AST  16  /  ALT  17  /  AlkPhos  90  02-28            PT/INR - ( 26 Feb 2025 13:30 )   PT: 11.3 sec;   INR: 0.96 ratio         PTT - ( 26 Feb 2025 13:30 )  PTT:27.1 sec  Urinalysis Basic - ( 28 Feb 2025 05:30 )    Color: x / Appearance: x / SG: x / pH: x  Gluc: 153 mg/dL / Ketone: x  / Bili: x / Urobili: x   Blood: x / Protein: x / Nitrite: x   Leuk Esterase: x / RBC: x / WBC x   Sq Epi: x / Non Sq Epi: x / Bacteria: x      Procalcitonin: 0.10 ng/mL (02-26-25 @ 13:30)          CULTURES: (if applicable)    Culture - Blood (collected 02-26-25 @ 13:30)  Source: .Blood Blood-Peripheral  Preliminary Report (02-28-25 @ 05:01):    No growth at 24 hours    Culture - Blood (collected 02-26-25 @ 13:30)  Source: .Blood Blood-Peripheral  Preliminary Report (02-28-25 @ 05:01):    No growth at 24 hours      Rapid RVP Result: Detected (02-26-25 @ 13:30)      ABG - ( 28 Feb 2025 05:30 )  pH, Arterial: 7.43  pH, Blood: x     /  pCO2: 58    /  pO2: 76    / HCO3: 38    / Base Excess: 14.2  /  SaO2: 96.5              CAPILLARY BLOOD GLUCOSE      POCT Blood Glucose.: 156 mg/dL (28 Feb 2025 08:44)      RADIOLOGY  CXR:      CT:    ECHO:      VITALS:  T(C): 36.8 (02-28-25 @ 05:00), Max: 36.8 (02-28-25 @ 05:00)  T(F): 98.2 (02-28-25 @ 05:00), Max: 98.2 (02-28-25 @ 05:00)  HR: 81 (02-28-25 @ 08:00) (81 - 125)  BP: 144/68 (02-28-25 @ 08:00) (124/73 - 165/79)  BP(mean): 92 (02-28-25 @ 08:00) (90 - 105)  ABP: --  ABP(mean): --  RR: 19 (02-28-25 @ 08:00) (15 - 30)  SpO2: 96% (02-28-25 @ 08:00) (91% - 96%)  CVP(mm Hg): --  CVP(cm H2O): --    Ins and Outs     02-27-25 @ 07:01  -  02-28-25 @ 07:00  --------------------------------------------------------  IN: 50 mL / OUT: 2900 mL / NET: -2850 mL        Height (cm): 157.5 (02-26-25 @ 13:19)  Weight (kg): 54.4 (02-26-25 @ 13:19)  BMI (kg/m2): 21.9 (02-26-25 @ 13:19)        I&O's Detail    27 Feb 2025 07:01  -  28 Feb 2025 07:00  --------------------------------------------------------  IN:    IV PiggyBack: 50 mL  Total IN: 50 mL    OUT:    Intermittent Catheterization - Urethral (mL): 2400 mL    Voided (mL): 500 mL  Total OUT: 2900 mL    Total NET: -2850 mL                Follow-up Critical Care Progress Note  Chief Complaint : Acute respiratory failure with hypoxia      pt seen and examined  appears sob,   states sob  no cp, palp, n/v, cough, secretions        Allergies :No Known Allergies      PAST MEDICAL & SURGICAL HISTORY:  Advanced COPD  On supplemental oxygen by nasal cannula  Arthritis  Cancer, colon  HTN (hypertension)  No significant past surgical history        Medications:  MEDICATIONS  (STANDING):  albuterol    0.083% 2.5 milliGRAM(s) Nebulizer every 6 hours  amLODIPine   Tablet 10 milliGRAM(s) Oral daily  buDESOnide    Inhalation Suspension 0.5 milliGRAM(s) Inhalation every 12 hours  cefepime   IVPB 2000 milliGRAM(s) IV Intermittent every 8 hours  chlorhexidine 2% Cloths 1 Application(s) Topical <User Schedule>  dextrose 5%. 1000 milliLiter(s) (50 mL/Hr) IV Continuous <Continuous>  dextrose 5%. 1000 milliLiter(s) (100 mL/Hr) IV Continuous <Continuous>  dextrose 50% Injectable 25 Gram(s) IV Push once  dextrose 50% Injectable 12.5 Gram(s) IV Push once  dextrose 50% Injectable 25 Gram(s) IV Push once  enoxaparin Injectable 55 milliGRAM(s) SubCutaneous every 12 hours  fluticasone propionate/ salmeterol 250-50 MICROgram(s) Diskus 1 Dose(s) Inhalation two times a day  insulin lispro (ADMELOG) corrective regimen sliding scale   SubCutaneous every 6 hours  methylPREDNISolone sodium succinate Injectable 40 milliGRAM(s) IV Push every 6 hours  metoprolol tartrate 12.5 milliGRAM(s) Oral two times a day  mupirocin 2% Nasal 1 Application(s) Both Nostrils two times a day  oseltamivir Suspension 75 milliGRAM(s) Oral every 12 hours    MEDICATIONS  (PRN):  acetaminophen     Tablet .. 650 milliGRAM(s) Oral every 6 hours PRN Temp greater or equal to 38C (100.4F), Mild Pain (1 - 3)      Antibiotics History  cefepime   IVPB 1000 milliGRAM(s) IV Intermittent every 12 hours, 02-26-25 @ 16:16  cefepime   IVPB 2000 milliGRAM(s) IV Intermittent every 8 hours, 02-27-25 @ 08:41, Stop order after: 5 Days  cefepime   IVPB    , 02-26-25 @ 13:23  cefepime   IVPB 2000 milliGRAM(s) IV Intermittent once, 02-26-25 @ 13:32, Stop order after: 1 Doses  oseltamivir 75 milliGRAM(s) Oral two times a day, 02-27-25 @ 08:11, Stop order after: 5 Days  oseltamivir Suspension 75 milliGRAM(s) Oral every 12 hours, 02-27-25 @ 09:53  oseltamivir Suspension 75 milliGRAM(s) Oral every 12 hours, 02-27-25 @ 10:24, Stop order after: 5 Days  vancomycin  IVPB 750 milliGRAM(s) IV Intermittent Once, 02-26-25 @ 16:23, Stop order after: 1 Doses  vancomycin  IVPB. 1000 milliGRAM(s) IV Intermittent once, 02-26-25 @ 13:32, Stop order after: 1 Doses      Heme Medications   enoxaparin Injectable 55 milliGRAM(s) SubCutaneous every 12 hours, 02-26-25 @ 17:10         Trend Cardiac Enzymes  02-26-25 @ 13:30  [id number]-CPKMM/Trop I - -- - --  - --  -  --  /  10.1    Trend BNP  02-26-25 @ 13:30   -  198    Procalcitonin Trend  02-26-25 @ 13:30   -   0.10[H]    WBC Trend  02-28-25 @ 05:30   -  7.99  02-27-25 @ 06:55   -  7.87  02-26-25 @ 13:30   -  11.22[H]    H/H Trend  02-28-25 @ 05:30   -   8.8[L]/ 29.9[L]  02-27-25 @ 06:55   -   9.8[L]/ 32.2[L]  02-26-25 @ 13:30   -   10.5[L]/ 35.9    Stool Occult Blood    Platelet Trend  02-28-25 @ 05:30   -  191  02-27-25 @ 06:55   -  184  02-26-25 @ 13:30   -  239    Trend Sodium  02-28-25 @ 05:30   -  144  02-27-25 @ 05:51   -  142  02-26-25 @ 13:30   -  143    Trend Potassium  02-28-25 @ 05:30   -  4.2  02-27-25 @ 05:51   -  4.5  02-26-25 @ 13:30   -  4.7    Trend Bun/Cr  02-28-25 @ 05:30  BUN/CR -  31[H] / 0.61  02-27-25 @ 05:51  BUN/CR -  26[H] / 0.68  02-26-25 @ 13:30  BUN/CR -  20 / 0.54    Lactic Acid Trend  02-27-25 @ 05:51   -   1.0  02-26-25 @ 13:00   -   1.0    ABG Trend  02-28-25 @ 05:30   - 7.43/58[H]/76[L]/96.5  02-27-25 @ 04:30   - 7.43/55[H]/93/98.6[H]  02-26-25 @ 16:18   - 7.31[L]/72[HH]/114[H]/99.2[H]  02-26-25 @ 13:35   - 7.30[L]/84[HH]/118[H]/99.1[H]    Trend AST/ALT/ALK Phos/Bili  02-28-25 @ 05:30   16/17/90/0.7  02-27-25 @ 05:51   15/19/105/0.7  02-26-25 @ 13:30   20/24/109/0.8           Albumin Trend  02-28-25 @ 05:30   -   2.6[L]  02-27-25 @ 05:51   -   2.9[L]  02-26-25 @ 13:30   -   3.1[L]      PTT - PT - INR Trend  02-26-25 @ 13:30   -   27.1 - 11.3 - 0.96    Glucose Trend  02-28-25 @ 08:44   -  -- -- 156[H]  02-28-25 @ 05:30   -  153[H] -- --  02-28-25 @ 05:14   -  -- -- 181[H]  02-27-25 @ 23:38   -  -- -- 134[H]  02-27-25 @ 17:10   -  -- -- 159[H]  02-27-25 @ 11:06   -  -- -- 255[H]  02-27-25 @ 05:51   -  157[H] -- --  02-27-25 @ 05:32   -  -- -- 169[H]  02-26-25 @ 23:40   -  -- -- 195[H]  02-26-25 @ 18:15   -  -- -- 307[H]    A1C with Estimated Average Glucose Result: 6.6 % *H* [4.0 - 5.6] (02-27-25 @ 05:51)      LABS:                        8.8    7.99  )-----------( 191      ( 28 Feb 2025 05:30 )             29.9     02-28    144  |  104  |  31[H]  ----------------------------<  153[H]  4.2   |  37[H]  |  0.61    Ca    10.4      28 Feb 2025 05:30  Phos  3.1     02-28  Mg     2.0     02-28    TPro  6.3  /  Alb  2.6[L]  /  TBili  0.7  /  DBili  x   /  AST  16  /  ALT  17  /  AlkPhos  90  02-28            PT/INR - ( 26 Feb 2025 13:30 )   PT: 11.3 sec;   INR: 0.96 ratio         PTT - ( 26 Feb 2025 13:30 )  PTT:27.1 sec  Urinalysis Basic - ( 28 Feb 2025 05:30 )    Color: x / Appearance: x / SG: x / pH: x  Gluc: 153 mg/dL / Ketone: x  / Bili: x / Urobili: x   Blood: x / Protein: x / Nitrite: x   Leuk Esterase: x / RBC: x / WBC x   Sq Epi: x / Non Sq Epi: x / Bacteria: x      Procalcitonin: 0.10 ng/mL (02-26-25 @ 13:30)          CULTURES: (if applicable)    Culture - Blood (collected 02-26-25 @ 13:30)  Source: .Blood Blood-Peripheral  Preliminary Report (02-28-25 @ 05:01):    No growth at 24 hours    Culture - Blood (collected 02-26-25 @ 13:30)  Source: .Blood Blood-Peripheral  Preliminary Report (02-28-25 @ 05:01):    No growth at 24 hours      Rapid RVP Result: Detected (02-26-25 @ 13:30)      ABG - ( 28 Feb 2025 05:30 )  pH, Arterial: 7.43  pH, Blood: x     /  pCO2: 58    /  pO2: 76    / HCO3: 38    / Base Excess: 14.2  /  SaO2: 96.5        < from: Xray Chest 1 View- PORTABLE-Routine (02.26.25 @ 16:37) >    ACC: [id number] EXAM:  XR CHEST PORTABLE ROUTINE 1V   ORDERED BY: PETER MOYA     PROCEDURE DATE:  02/26/2025          INTERPRETATION:  CLINICAL INDICATION: 69 years  Female with eval pna.    COMPARISON: 2/26/2025 at 1:36 PM    The left lung apex is omitted.    AP view of the chest demonstrates persistent left lower lobe vertical   linear opacities consistent with atelectasis or pneumonia.. There is no   pleural effusion. The pulmonary vasculature is normal. There is no   pneumothorax.    The heart is normal in size. There is no mediastinal or hilar mass.    Mild thoracic degenerative changes are present.    IMPRESSION:    Persistent left lower lobe atelectasis versus pneumonia.    --- End of Report ---    < end of copied text >         VITALS:  T(C): 36.8 (02-28-25 @ 05:00), Max: 36.8 (02-28-25 @ 05:00)  T(F): 98.2 (02-28-25 @ 05:00), Max: 98.2 (02-28-25 @ 05:00)  HR: 81 (02-28-25 @ 08:00) (81 - 125)  BP: 144/68 (02-28-25 @ 08:00) (124/73 - 165/79)  BP(mean): 92 (02-28-25 @ 08:00) (90 - 105)  ABP: --  ABP(mean): --  RR: 19 (02-28-25 @ 08:00) (15 - 30)  SpO2: 96% (02-28-25 @ 08:00) (91% - 96%)  CVP(mm Hg): --  CVP(cm H2O): --    Ins and Outs     02-27-25 @ 07:01  -  02-28-25 @ 07:00  --------------------------------------------------------  IN: 50 mL / OUT: 2900 mL / NET: -2850 mL        Height (cm): 157.5 (02-26-25 @ 13:19)  Weight (kg): 54.4 (02-26-25 @ 13:19)  BMI (kg/m2): 21.9 (02-26-25 @ 13:19)        I&O's Detail    27 Feb 2025 07:01  -  28 Feb 2025 07:00  --------------------------------------------------------  IN:    IV PiggyBack: 50 mL  Total IN: 50 mL    OUT:    Intermittent Catheterization - Urethral (mL): 2400 mL    Voided (mL): 500 mL  Total OUT: 2900 mL    Total NET: -2850 mL

## 2025-02-28 NOTE — CONSULT NOTE ADULT - PROBLEM SELECTOR RECOMMENDATION 9
Asked to see this patient for colon cancer diagnosed in 2024 and treated with chemotherapy in the recent past by Dr. Olmedo.  Patient is not on maintenance anti- VEGF. This is her 3rd admission this month with respiratory decompensation due to severe pulmonary emphysema, segmental and subsegmental pulmonary embolism and right lower lobe, with CAT scan showing bilateral lower lobe opacities (atelectasis versus infiltrates) and additional infarct in the right lower lobe.  Oncologic consult requested by family.  There is no plan for further oncologic evaluation at this time.  She should be treated supportively and follow-up in ambulatory with Dr. Olmedo.  Will need anticoagulation indefinitely given background of neoplasm.  Continue monitoring CBC and transfuse PRN. Dr. Olmedo’s office informed.

## 2025-03-01 LAB
ALBUMIN SERPL ELPH-MCNC: 2.6 G/DL — LOW (ref 3.3–5)
ALP SERPL-CCNC: 87 U/L — SIGNIFICANT CHANGE UP (ref 40–120)
ALT FLD-CCNC: 22 U/L — SIGNIFICANT CHANGE UP (ref 10–45)
ANION GAP SERPL CALC-SCNC: 3 MMOL/L — LOW (ref 5–17)
AST SERPL-CCNC: 14 U/L — SIGNIFICANT CHANGE UP (ref 10–40)
BASE EXCESS BLDA CALC-SCNC: 16.1 MMOL/L — HIGH (ref -2–3)
BILIRUB SERPL-MCNC: 0.7 MG/DL — SIGNIFICANT CHANGE UP (ref 0.2–1.2)
BLOOD GAS COMMENTS ARTERIAL: SIGNIFICANT CHANGE UP
BUN SERPL-MCNC: 38 MG/DL — HIGH (ref 7–23)
CALCIUM SERPL-MCNC: 10.2 MG/DL — SIGNIFICANT CHANGE UP (ref 8.4–10.5)
CHLORIDE SERPL-SCNC: 101 MMOL/L — SIGNIFICANT CHANGE UP (ref 96–108)
CO2 BLDA-SCNC: 41 MMOL/L — HIGH (ref 19–24)
CO2 SERPL-SCNC: 39 MMOL/L — HIGH (ref 22–31)
CREAT SERPL-MCNC: 0.57 MG/DL — SIGNIFICANT CHANGE UP (ref 0.5–1.3)
EGFR: 99 ML/MIN/1.73M2 — SIGNIFICANT CHANGE UP
EGFR: 99 ML/MIN/1.73M2 — SIGNIFICANT CHANGE UP
GAS PNL BLDA: SIGNIFICANT CHANGE UP
GLUCOSE BLDC GLUCOMTR-MCNC: 150 MG/DL — HIGH (ref 70–99)
GLUCOSE BLDC GLUCOMTR-MCNC: 154 MG/DL — HIGH (ref 70–99)
GLUCOSE BLDC GLUCOMTR-MCNC: 164 MG/DL — HIGH (ref 70–99)
GLUCOSE BLDC GLUCOMTR-MCNC: 192 MG/DL — HIGH (ref 70–99)
GLUCOSE SERPL-MCNC: 141 MG/DL — HIGH (ref 70–99)
HCO3 BLDA-SCNC: 39 MMOL/L — HIGH (ref 21–28)
HCT VFR BLD CALC: 26.5 % — LOW (ref 34.5–45)
HCT VFR BLD CALC: 27.1 % — LOW (ref 34.5–45)
HGB BLD-MCNC: 7.8 G/DL — LOW (ref 11.5–15.5)
HGB BLD-MCNC: 8.3 G/DL — LOW (ref 11.5–15.5)
HOROWITZ INDEX BLDA+IHG-RTO: 40 — SIGNIFICANT CHANGE UP
MAGNESIUM SERPL-MCNC: 2 MG/DL — SIGNIFICANT CHANGE UP (ref 1.6–2.6)
MCHC RBC-ENTMCNC: 24.2 PG — LOW (ref 27–34)
MCHC RBC-ENTMCNC: 24.9 PG — LOW (ref 27–34)
MCHC RBC-ENTMCNC: 29.4 G/DL — LOW (ref 32–36)
MCHC RBC-ENTMCNC: 30.6 G/DL — LOW (ref 32–36)
MCV RBC AUTO: 81.4 FL — SIGNIFICANT CHANGE UP (ref 80–100)
MCV RBC AUTO: 82.3 FL — SIGNIFICANT CHANGE UP (ref 80–100)
NRBC BLD AUTO-RTO: 0 /100 WBCS — SIGNIFICANT CHANGE UP (ref 0–0)
NRBC BLD AUTO-RTO: 0 /100 WBCS — SIGNIFICANT CHANGE UP (ref 0–0)
PCO2 BLDA: 49 MMHG — HIGH (ref 32–35)
PH BLDA: 7.51 — HIGH (ref 7.35–7.45)
PHOSPHATE SERPL-MCNC: 2.5 MG/DL — SIGNIFICANT CHANGE UP (ref 2.5–4.5)
PLATELET # BLD AUTO: 191 K/UL — SIGNIFICANT CHANGE UP (ref 150–400)
PLATELET # BLD AUTO: 208 K/UL — SIGNIFICANT CHANGE UP (ref 150–400)
PO2 BLDA: 61 MMHG — LOW (ref 83–108)
POTASSIUM SERPL-MCNC: 3.8 MMOL/L — SIGNIFICANT CHANGE UP (ref 3.5–5.3)
POTASSIUM SERPL-SCNC: 3.8 MMOL/L — SIGNIFICANT CHANGE UP (ref 3.5–5.3)
PROT SERPL-MCNC: 6 G/DL — SIGNIFICANT CHANGE UP (ref 6–8.3)
RBC # BLD: 3.22 M/UL — LOW (ref 3.8–5.2)
RBC # BLD: 3.33 M/UL — LOW (ref 3.8–5.2)
RBC # FLD: 19.9 % — HIGH (ref 10.3–14.5)
RBC # FLD: 19.9 % — HIGH (ref 10.3–14.5)
SAO2 % BLDA: 93.8 % — LOW (ref 94–98)
SODIUM SERPL-SCNC: 143 MMOL/L — SIGNIFICANT CHANGE UP (ref 135–145)
WBC # BLD: 6.07 K/UL — SIGNIFICANT CHANGE UP (ref 3.8–10.5)
WBC # BLD: 8.33 K/UL — SIGNIFICANT CHANGE UP (ref 3.8–10.5)
WBC # FLD AUTO: 6.07 K/UL — SIGNIFICANT CHANGE UP (ref 3.8–10.5)
WBC # FLD AUTO: 8.33 K/UL — SIGNIFICANT CHANGE UP (ref 3.8–10.5)

## 2025-03-01 PROCEDURE — 70450 CT HEAD/BRAIN W/O DYE: CPT | Mod: 26

## 2025-03-01 PROCEDURE — 71045 X-RAY EXAM CHEST 1 VIEW: CPT | Mod: 26

## 2025-03-01 RX ORDER — METHYLPREDNISOLONE ACETATE 80 MG/ML
40 INJECTION, SUSPENSION INTRA-ARTICULAR; INTRALESIONAL; INTRAMUSCULAR; SOFT TISSUE EVERY 8 HOURS
Refills: 0 | Status: DISCONTINUED | OUTPATIENT
Start: 2025-03-01 | End: 2025-03-02

## 2025-03-01 RX ORDER — INSULIN LISPRO 100 U/ML
INJECTION, SOLUTION INTRAVENOUS; SUBCUTANEOUS
Refills: 0 | Status: DISCONTINUED | OUTPATIENT
Start: 2025-03-01 | End: 2025-03-04

## 2025-03-01 RX ORDER — TRAMADOL HYDROCHLORIDE 50 MG/1
50 TABLET, FILM COATED ORAL ONCE
Refills: 0 | Status: DISCONTINUED | OUTPATIENT
Start: 2025-03-01 | End: 2025-03-01

## 2025-03-01 RX ORDER — TAMSULOSIN HYDROCHLORIDE 0.4 MG/1
0.4 CAPSULE ORAL AT BEDTIME
Refills: 0 | Status: DISCONTINUED | OUTPATIENT
Start: 2025-03-01 | End: 2025-03-05

## 2025-03-01 RX ORDER — ACETAZOLAMIDE 250 MG/1
250 TABLET ORAL EVERY 12 HOURS
Refills: 0 | Status: COMPLETED | OUTPATIENT
Start: 2025-03-01 | End: 2025-03-01

## 2025-03-01 RX ADMIN — TRAMADOL HYDROCHLORIDE 50 MILLIGRAM(S): 50 TABLET, FILM COATED ORAL at 21:39

## 2025-03-01 RX ADMIN — ACETAZOLAMIDE 250 MILLIGRAM(S): 250 TABLET ORAL at 08:12

## 2025-03-01 RX ADMIN — OSELTAMIVIR PHOSPHATE 75 MILLIGRAM(S): 75 CAPSULE ORAL at 14:07

## 2025-03-01 RX ADMIN — Medication 2.5 MILLIGRAM(S): at 08:12

## 2025-03-01 RX ADMIN — AMLODIPINE BESYLATE 10 MILLIGRAM(S): 10 TABLET ORAL at 05:24

## 2025-03-01 RX ADMIN — MUPIROCIN CALCIUM 1 APPLICATION(S): 20 CREAM TOPICAL at 05:28

## 2025-03-01 RX ADMIN — ACETAZOLAMIDE 250 MILLIGRAM(S): 250 TABLET ORAL at 17:59

## 2025-03-01 RX ADMIN — Medication 2.5 MILLIGRAM(S): at 15:48

## 2025-03-01 RX ADMIN — INSULIN LISPRO 2: 100 INJECTION, SOLUTION INTRAVENOUS; SUBCUTANEOUS at 12:25

## 2025-03-01 RX ADMIN — INSULIN LISPRO 2: 100 INJECTION, SOLUTION INTRAVENOUS; SUBCUTANEOUS at 05:25

## 2025-03-01 RX ADMIN — OSELTAMIVIR PHOSPHATE 75 MILLIGRAM(S): 75 CAPSULE ORAL at 23:42

## 2025-03-01 RX ADMIN — CEFEPIME 100 MILLIGRAM(S): 2 INJECTION, POWDER, FOR SOLUTION INTRAVENOUS at 21:31

## 2025-03-01 RX ADMIN — MUPIROCIN CALCIUM 1 APPLICATION(S): 20 CREAM TOPICAL at 17:59

## 2025-03-01 RX ADMIN — ENOXAPARIN SODIUM 55 MILLIGRAM(S): 100 INJECTION SUBCUTANEOUS at 17:59

## 2025-03-01 RX ADMIN — TRAMADOL HYDROCHLORIDE 50 MILLIGRAM(S): 50 TABLET, FILM COATED ORAL at 22:09

## 2025-03-01 RX ADMIN — METHYLPREDNISOLONE ACETATE 40 MILLIGRAM(S): 80 INJECTION, SUSPENSION INTRA-ARTICULAR; INTRALESIONAL; INTRAMUSCULAR; SOFT TISSUE at 21:30

## 2025-03-01 RX ADMIN — ENOXAPARIN SODIUM 55 MILLIGRAM(S): 100 INJECTION SUBCUTANEOUS at 05:26

## 2025-03-01 RX ADMIN — Medication 1 DOSE(S): at 08:12

## 2025-03-01 RX ADMIN — Medication 2.5 MILLIGRAM(S): at 03:42

## 2025-03-01 RX ADMIN — INSULIN LISPRO 2: 100 INJECTION, SOLUTION INTRAVENOUS; SUBCUTANEOUS at 17:59

## 2025-03-01 RX ADMIN — METOPROLOL SUCCINATE 12.5 MILLIGRAM(S): 50 TABLET, EXTENDED RELEASE ORAL at 05:24

## 2025-03-01 RX ADMIN — Medication 2.5 MILLIGRAM(S): at 21:10

## 2025-03-01 RX ADMIN — METOPROLOL SUCCINATE 12.5 MILLIGRAM(S): 50 TABLET, EXTENDED RELEASE ORAL at 17:58

## 2025-03-01 RX ADMIN — METHYLPREDNISOLONE ACETATE 40 MILLIGRAM(S): 80 INJECTION, SUSPENSION INTRA-ARTICULAR; INTRALESIONAL; INTRAMUSCULAR; SOFT TISSUE at 14:07

## 2025-03-01 RX ADMIN — Medication 1 DOSE(S): at 21:10

## 2025-03-01 RX ADMIN — METHYLPREDNISOLONE ACETATE 40 MILLIGRAM(S): 80 INJECTION, SUSPENSION INTRA-ARTICULAR; INTRALESIONAL; INTRAMUSCULAR; SOFT TISSUE at 05:25

## 2025-03-01 RX ADMIN — CEFEPIME 100 MILLIGRAM(S): 2 INJECTION, POWDER, FOR SOLUTION INTRAVENOUS at 05:24

## 2025-03-01 RX ADMIN — Medication 1 APPLICATION(S): at 05:25

## 2025-03-01 RX ADMIN — CEFEPIME 100 MILLIGRAM(S): 2 INJECTION, POWDER, FOR SOLUTION INTRAVENOUS at 14:07

## 2025-03-01 NOTE — PROGRESS NOTE ADULT - SUBJECTIVE AND OBJECTIVE BOX
Follow-up Critical Care Progress Note  Chief Complaint : Acute respiratory failure with hypoxia      patient seen and examined  less sob  but on high flow  did not tolerate NIV overnight  family bedside complain of continued confusion  pt still feel weak not back to baseline  urinary retention continues        Allergies :No Known Allergies      PAST MEDICAL & SURGICAL HISTORY:  Advanced COPD    On supplemental oxygen by nasal cannula    Arthritis    Cancer, colon    HTN (hypertension)    No significant past surgical history        Medications:  MEDICATIONS  (STANDING):  acetaZOLAMIDE    Tablet 250 milliGRAM(s) Oral every 12 hours x 2 doses  albuterol    0.083% 2.5 milliGRAM(s) Nebulizer every 6 hours  amLODIPine   Tablet 10 milliGRAM(s) Oral daily  cefepime   IVPB 2000 milliGRAM(s) IV Intermittent every 8 hours  chlorhexidine 2% Cloths 1 Application(s) Topical <User Schedule>  dextrose 5%. 1000 milliLiter(s) (50 mL/Hr) IV Continuous <Continuous>  dextrose 5%. 1000 milliLiter(s) (100 mL/Hr) IV Continuous <Continuous>  dextrose 50% Injectable 25 Gram(s) IV Push once  dextrose 50% Injectable 12.5 Gram(s) IV Push once  dextrose 50% Injectable 25 Gram(s) IV Push once  enoxaparin Injectable 55 milliGRAM(s) SubCutaneous every 12 hours  fluticasone propionate/ salmeterol 250-50 MICROgram(s) Diskus 1 Dose(s) Inhalation two times a day  insulin lispro (ADMELOG) corrective regimen sliding scale   SubCutaneous every 6 hours  methylPREDNISolone sodium succinate Injectable 40 milliGRAM(s) IV Push every 6 hours  metoprolol tartrate 12.5 milliGRAM(s) Oral two times a day  mupirocin 2% Nasal 1 Application(s) Both Nostrils two times a day  oseltamivir Suspension 75 milliGRAM(s) Oral every 12 hours  tamsulosin 0.4 milliGRAM(s) Oral at bedtime    MEDICATIONS  (PRN):  acetaminophen     Tablet .. 650 milliGRAM(s) Oral every 6 hours PRN Temp greater or equal to 38C (100.4F), Mild Pain (1 - 3)      Antibiotics History  cefepime   IVPB 1000 milliGRAM(s) IV Intermittent every 12 hours, 02-26-25 @ 16:16  cefepime   IVPB    , 02-26-25 @ 13:23  cefepime   IVPB 2000 milliGRAM(s) IV Intermittent once, 02-26-25 @ 13:32, Stop order after: 1 Doses  cefepime   IVPB 2000 milliGRAM(s) IV Intermittent every 8 hours, 02-27-25 @ 08:41, Stop order after: 5 Days  oseltamivir 75 milliGRAM(s) Oral two times a day, 02-27-25 @ 08:11, Stop order after: 5 Days  oseltamivir Suspension 75 milliGRAM(s) Oral every 12 hours, 02-27-25 @ 09:53  oseltamivir Suspension 75 milliGRAM(s) Oral every 12 hours, 02-27-25 @ 10:24, Stop order after: 5 Days  vancomycin  IVPB 750 milliGRAM(s) IV Intermittent Once, 02-26-25 @ 16:23, Stop order after: 1 Doses  vancomycin  IVPB. 1000 milliGRAM(s) IV Intermittent once, 02-26-25 @ 13:32, Stop order after: 1 Doses      Heme Medications   enoxaparin Injectable 55 milliGRAM(s) SubCutaneous every 12 hours, 02-26-25 @ 17:10         Trend Cardiac Enzymes  02-26-25 @ 13:30  QPZ-[id number]-XJMXM-CPKMM/Trop I - -- - --  - --  -  --  /  10.1    Trend BNP  02-26-25 @ 13:30   -  198    Procalcitonin Trend  02-26-25 @ 13:30   -   0.10[H]    WBC Trend  03-01-25 @ 05:04   -  6.07  02-28-25 @ 05:30   -  7.99  02-27-25 @ 06:55   -  7.87  02-26-25 @ 13:30   -  11.22[H]    H/H Trend  03-01-25 @ 05:04   -   7.8[L]/ 26.5[L]  02-28-25 @ 05:30   -   8.8[L]/ 29.9[L]  02-27-25 @ 06:55   -   9.8[L]/ 32.2[L]  02-26-25 @ 13:30   -   10.5[L]/ 35.9    Stool Occult Blood    Platelet Trend  03-01-25 @ 05:04   -  191  02-28-25 @ 05:30   -  191  02-27-25 @ 06:55   -  184  02-26-25 @ 13:30   -  239    Trend Sodium  03-01-25 @ 05:04   -  143  02-28-25 @ 05:30   -  144  02-27-25 @ 05:51   -  142  02-26-25 @ 13:30   -  143    Trend Potassium  03-01-25 @ 05:04   -  3.8  02-28-25 @ 05:30   -  4.2  02-27-25 @ 05:51   -  4.5  02-26-25 @ 13:30   -  4.7    Trend Bun/Cr  03-01-25 @ 05:04  BUN/CR -  38[H] / 0.57  02-28-25 @ 05:30  BUN/CR -  31[H] / 0.61  02-27-25 @ 05:51  BUN/CR -  26[H] / 0.68  02-26-25 @ 13:30  BUN/CR -  20 / 0.54    Lactic Acid Trend  02-27-25 @ 05:51   -   1.0  02-26-25 @ 13:00   -   1.0    ABG Trend  03-01-25 @ 05:00   - 7.51[H]/49[H]/61[L]/93.8[L]  02-28-25 @ 05:30   - 7.43/58[H]/76[L]/96.5  02-27-25 @ 04:30   - 7.43/55[H]/93/98.6[H]  02-26-25 @ 16:18   - 7.31[L]/72[HH]/114[H]/99.2[H]  02-26-25 @ 13:35   - 7.30[L]/84[HH]/118[H]/99.1[H]    Trend AST/ALT/ALK Phos/Bili  03-01-25 @ 05:04   14/22/87/0.7  02-28-25 @ 05:30   16/17/90/0.7  02-27-25 @ 05:51   15/19/105/0.7  02-26-25 @ 13:30   20/24/109/0.8     Albumin Trend  03-01-25 @ 05:04   -   2.6[L]  02-28-25 @ 05:30   -   2.6[L]  02-27-25 @ 05:51   -   2.9[L]  02-26-25 @ 13:30   -   3.1[L]      PTT - PT - INR Trend  02-26-25 @ 13:30   -   27.1 - 11.3 - 0.96    Glucose Trend  03-01-25 @ 05:04   -  141[H] -- --  03-01-25 @ 04:50   -  -- -- 154[H]  02-28-25 @ 23:03   -  -- -- 139[H]  02-28-25 @ 17:15   -  -- -- 208[H]  02-28-25 @ 11:52   -  -- -- 142[H]  02-28-25 @ 08:44   -  -- -- 156[H]  02-28-25 @ 05:30   -  153[H] -- --  02-28-25 @ 05:14   -  -- -- 181[H]  02-27-25 @ 23:38   -  -- -- 134[H]  02-27-25 @ 17:10   -  -- -- 159[H]    A1C with Estimated Average Glucose Result: 6.6 % *H* [4.0 - 5.6] (02-27-25 @ 05:51)      LABS:                        7.8    6.07  )-----------( 191      ( 01 Mar 2025 05:04 )             26.5     03-01    143  |  101  |  38[H]  ----------------------------<  141[H]  3.8   |  39[H]  |  0.57    Ca    10.2      01 Mar 2025 05:04  Phos  2.5     03-01  Mg     2.0     03-01    TPro  6.0  /  Alb  2.6[L]  /  TBili  0.7  /  DBili  x   /  AST  14  /  ALT  22  /  AlkPhos  87  03-01               CULTURES: (if applicable)    Culture - Blood (collected 02-26-25 @ 13:30)  Source: .Blood Blood-Peripheral  Preliminary Report (03-01-25 @ 05:01):    No growth at 48 Hours    Culture - Blood (collected 02-26-25 @ 13:30)  Source: .Blood Blood-Peripheral  Preliminary Report (03-01-25 @ 05:01):    No growth at 48 Hours      Rapid RVP Result: Detected (02-26-25 @ 13:30)           RADIOLOGY  CXR:    < from: Xray Chest 1 View- PORTABLE-Routine (02.28.25 @ 06:17) >    ACC: [id number] EXAM:  XR CHEST PORTABLE ROUTINE 1V   ORDERED BY: GEM DIAZ     PROCEDURE DATE:  02/28/2025          INTERPRETATION:  Chest one view    HISTORY: Hypoxia    COMPARISON STUDY: 2/26/2025    Frontal expiratory view of the chest shows the heart to be normal in   size. The lungs show slight clearing of the left base and there is no   evidence of pneumothorax nor pleural effusion.    IMPRESSION:  Left base clearing.        Thank you for the courtesy of this referral.    --- End of Report ---      < end of copied text >    CT:    ECHO:      VITALS:  T(C): 37 (03-01-25 @ 05:00), Max: 37 (03-01-25 @ 05:00)  T(F): 98.6 (03-01-25 @ 05:00), Max: 98.6 (03-01-25 @ 05:00)  HR: 96 (03-01-25 @ 06:00) (81 - 114)  BP: 138/80 (03-01-25 @ 06:00) (129/81 - 155/77)  BP(mean): 96 (03-01-25 @ 06:00) (93 - 101)  ABP: --  ABP(mean): --  RR: 17 (03-01-25 @ 06:00) (16 - 34)  SpO2: 95% (03-01-25 @ 06:00) (91% - 100%)  CVP(mm Hg): --  CVP(cm H2O): --    Ins and Outs     02-28-25 @ 07:01  -  03-01-25 @ 07:00  --------------------------------------------------------  IN: 0 mL / OUT: 906 mL / NET: -906 mL        Height (cm): 157.5 (02-26-25 @ 13:19)  Weight (kg): 54.4 (02-26-25 @ 13:19)  BMI (kg/m2): 21.9 (02-26-25 @ 13:19)        I&O's Detail    28 Feb 2025 07:01  -  01 Mar 2025 07:00  --------------------------------------------------------  IN:  Total IN: 0 mL    OUT:    Post-Void Residual per Intermittent Catheterization (mL): 906 mL  Total OUT: 906 mL    Total NET: -906 mL

## 2025-03-01 NOTE — PROGRESS NOTE ADULT - ASSESSMENT
Physical Examination:  GENERAL:               Alert, Oriented, No acute distress.    HEENT:                   No JVD, Dry Moist MM, cachectic  PULM:                     Bilateral air entry, Clear to auscultation bilaterally, no significant sputum production, +Rales, No Rhonchi, + Wheezing  CVS:                         S1, S2,  + Murmur  ABD:                        Soft, nondistended, nontender, normoactive bowel sounds,   EXT:                         No edema, nontender, No Cyanosis or Clubbing    NEURO:                  Alert, oriented, interactive, nonfocal, follows commands  PSYC:                      Calm,  limited Insight.        Assessment  1. Acute Hypoxic and hypercarbic respiratory failure   2. Due to Acute flu A and Left lower lobe Pna  3. Chronic Advanced COPD with supplemental oxygen by nasal cannula  4. RA  5. h/o colon cancer  6. h/o PE  off a/c now with Segmental and subsegmental pulmonary embolism right lower lobe.  7. Anemia     Plan  still retaining co2 with normal pH, previous this [facility] no co2 retention    trial of diamox   repeat ABG in am  currently on high flow  finish course of tamiflu   used NIV partially, continue to use if able  check CT head   Continue Lovenox, trend cbc, check stool occult blood  taper steroids.   nebs,   Advair  noted urinary retention yesterday, hold anticholinergics , starting flomax  monitor urine output  monitor cbc    continue lovenox today, consider transition to eliquis  continue antibiotics for suspected pneumonia  Palliative care appreciated  d/w family bedside          PMD:	Dr Cunha		from 	                   Notified(Date):  2/28  Family Updated: 	sister                                Date: 3/1      Sedation & Analgesia:	n/a  Diet/Nutrition:		 advance as tolerated  GI PPx:			protonix  DVT Ppx:		enoxaparin Injectable 55 milliGRAM(s) SubCutaneous every 12 hours         Activity:		  PT/OOB  Head of Bed:               35-45 Deg  Glycemic Control:        n/a    Lines: PIV  CENTRAL LINE: 	[ ] YES [ ] NO	                    LOCATION:   	                       DATE INSERTED:   	                    REMOVE:  [ ] YES [ ] NO    A-LINE:  	                [ ] YES [ ] NO                      LOCATION:   	                       DATE INSERTED: 		            REMOVE:  [ ] YES [ ] NO    KEANE: 		        [ ] YES [ ] NO  		                                       DATE INSERTED:		            REMOVE:  [ ] YES [ ] NO      Restraints were deemed necessary to prevent removal of life-sustaining devices [  ] YES   [    ]  NO    Disposition: ICU Care    Goals of Care: Palliative care eval as multiple hospitalizations and advanced copd, currently Full code.

## 2025-03-02 LAB
ALBUMIN SERPL ELPH-MCNC: 2.8 G/DL — LOW (ref 3.3–5)
ALP SERPL-CCNC: 89 U/L — SIGNIFICANT CHANGE UP (ref 40–120)
ALT FLD-CCNC: 22 U/L — SIGNIFICANT CHANGE UP (ref 10–45)
ANION GAP SERPL CALC-SCNC: 2 MMOL/L — LOW (ref 5–17)
APPEARANCE UR: ABNORMAL
AST SERPL-CCNC: 12 U/L — SIGNIFICANT CHANGE UP (ref 10–40)
BACTERIA # UR AUTO: ABNORMAL /HPF
BASE EXCESS BLDA CALC-SCNC: 10.5 MMOL/L — HIGH (ref -2–3)
BILIRUB SERPL-MCNC: 0.6 MG/DL — SIGNIFICANT CHANGE UP (ref 0.2–1.2)
BILIRUB UR-MCNC: NEGATIVE — SIGNIFICANT CHANGE UP
BUN SERPL-MCNC: 56 MG/DL — HIGH (ref 7–23)
CALCIUM SERPL-MCNC: 10.4 MG/DL — SIGNIFICANT CHANGE UP (ref 8.4–10.5)
CHLORIDE SERPL-SCNC: 104 MMOL/L — SIGNIFICANT CHANGE UP (ref 96–108)
CO2 BLDA-SCNC: 38 MMOL/L — HIGH (ref 19–24)
CO2 SERPL-SCNC: 37 MMOL/L — HIGH (ref 22–31)
COLOR SPEC: YELLOW — SIGNIFICANT CHANGE UP
COMMENT - URINE: SIGNIFICANT CHANGE UP
CREAT SERPL-MCNC: 0.69 MG/DL — SIGNIFICANT CHANGE UP (ref 0.5–1.3)
DIFF PNL FLD: ABNORMAL
EGFR: 95 ML/MIN/1.73M2 — SIGNIFICANT CHANGE UP
EGFR: 95 ML/MIN/1.73M2 — SIGNIFICANT CHANGE UP
EPI CELLS # UR: PRESENT
GAS PNL BLDA: SIGNIFICANT CHANGE UP
GLUCOSE BLDC GLUCOMTR-MCNC: 172 MG/DL — HIGH (ref 70–99)
GLUCOSE BLDC GLUCOMTR-MCNC: 214 MG/DL — HIGH (ref 70–99)
GLUCOSE BLDC GLUCOMTR-MCNC: 217 MG/DL — HIGH (ref 70–99)
GLUCOSE BLDC GLUCOMTR-MCNC: 272 MG/DL — HIGH (ref 70–99)
GLUCOSE SERPL-MCNC: 216 MG/DL — HIGH (ref 70–99)
GLUCOSE UR QL: NEGATIVE MG/DL — SIGNIFICANT CHANGE UP
HCO3 BLDA-SCNC: 36 MMOL/L — HIGH (ref 21–28)
HCT VFR BLD CALC: 26.2 % — LOW (ref 34.5–45)
HGB BLD-MCNC: 7.7 G/DL — LOW (ref 11.5–15.5)
HOROWITZ INDEX BLDA+IHG-RTO: 40 — SIGNIFICANT CHANGE UP
HYALINE CASTS # UR AUTO: PRESENT
KETONES UR-MCNC: NEGATIVE MG/DL — SIGNIFICANT CHANGE UP
LEUKOCYTE ESTERASE UR-ACNC: ABNORMAL
MAGNESIUM SERPL-MCNC: 2.3 MG/DL — SIGNIFICANT CHANGE UP (ref 1.6–2.6)
MCHC RBC-ENTMCNC: 24.4 PG — LOW (ref 27–34)
MCHC RBC-ENTMCNC: 29.4 G/DL — LOW (ref 32–36)
MCV RBC AUTO: 82.9 FL — SIGNIFICANT CHANGE UP (ref 80–100)
NITRITE UR-MCNC: NEGATIVE — SIGNIFICANT CHANGE UP
NRBC BLD AUTO-RTO: 0 /100 WBCS — SIGNIFICANT CHANGE UP (ref 0–0)
PCO2 BLDA: 62 MMHG — HIGH (ref 32–35)
PH BLDA: 7.37 — SIGNIFICANT CHANGE UP (ref 7.35–7.45)
PH UR: 6.5 — SIGNIFICANT CHANGE UP (ref 5–8)
PHOSPHATE SERPL-MCNC: 3.2 MG/DL — SIGNIFICANT CHANGE UP (ref 2.5–4.5)
PLATELET # BLD AUTO: 171 K/UL — SIGNIFICANT CHANGE UP (ref 150–400)
PO2 BLDA: 84 MMHG — SIGNIFICANT CHANGE UP (ref 83–108)
POTASSIUM SERPL-MCNC: 3.2 MMOL/L — LOW (ref 3.5–5.3)
POTASSIUM SERPL-SCNC: 3.2 MMOL/L — LOW (ref 3.5–5.3)
PROT SERPL-MCNC: 6.3 G/DL — SIGNIFICANT CHANGE UP (ref 6–8.3)
PROT UR-MCNC: SIGNIFICANT CHANGE UP MG/DL
RBC # BLD: 3.16 M/UL — LOW (ref 3.8–5.2)
RBC # FLD: 19.1 % — HIGH (ref 10.3–14.5)
RBC CASTS # UR COMP ASSIST: 2 /HPF — SIGNIFICANT CHANGE UP (ref 0–4)
SAO2 % BLDA: 97.5 % — SIGNIFICANT CHANGE UP (ref 94–98)
SODIUM SERPL-SCNC: 143 MMOL/L — SIGNIFICANT CHANGE UP (ref 135–145)
SP GR SPEC: 1.02 — SIGNIFICANT CHANGE UP (ref 1–1.03)
UROBILINOGEN FLD QL: 0.2 MG/DL — SIGNIFICANT CHANGE UP (ref 0.2–1)
WBC # BLD: 6 K/UL — SIGNIFICANT CHANGE UP (ref 3.8–10.5)
WBC # FLD AUTO: 6 K/UL — SIGNIFICANT CHANGE UP (ref 3.8–10.5)
WBC UR QL: 4 /HPF — SIGNIFICANT CHANGE UP (ref 0–5)

## 2025-03-02 PROCEDURE — 71045 X-RAY EXAM CHEST 1 VIEW: CPT | Mod: 26

## 2025-03-02 PROCEDURE — 99223 1ST HOSP IP/OBS HIGH 75: CPT

## 2025-03-02 RX ORDER — POLYETHYLENE GLYCOL 3350 17 G/17G
17 POWDER, FOR SOLUTION ORAL DAILY
Refills: 0 | Status: DISCONTINUED | OUTPATIENT
Start: 2025-03-02 | End: 2025-03-13

## 2025-03-02 RX ORDER — SODIUM CHLORIDE 9 G/1000ML
1000 INJECTION, SOLUTION INTRAVENOUS
Refills: 0 | Status: DISCONTINUED | OUTPATIENT
Start: 2025-03-02 | End: 2025-03-03

## 2025-03-02 RX ORDER — B1/B2/B3/B5/B6/B12/VIT C/FOLIC 500-0.5 MG
1 TABLET ORAL DAILY
Refills: 0 | Status: DISCONTINUED | OUTPATIENT
Start: 2025-03-02 | End: 2025-03-13

## 2025-03-02 RX ORDER — FLUCONAZOLE 150 MG
100 TABLET ORAL EVERY 24 HOURS
Refills: 0 | Status: COMPLETED | OUTPATIENT
Start: 2025-03-02 | End: 2025-03-08

## 2025-03-02 RX ORDER — METHYLPREDNISOLONE ACETATE 80 MG/ML
40 INJECTION, SUSPENSION INTRA-ARTICULAR; INTRALESIONAL; INTRAMUSCULAR; SOFT TISSUE EVERY 12 HOURS
Refills: 0 | Status: DISCONTINUED | OUTPATIENT
Start: 2025-03-02 | End: 2025-03-08

## 2025-03-02 RX ORDER — SENNA 187 MG
2 TABLET ORAL AT BEDTIME
Refills: 0 | Status: DISCONTINUED | OUTPATIENT
Start: 2025-03-02 | End: 2025-03-06

## 2025-03-02 RX ADMIN — AMLODIPINE BESYLATE 10 MILLIGRAM(S): 10 TABLET ORAL at 06:05

## 2025-03-02 RX ADMIN — METHYLPREDNISOLONE ACETATE 40 MILLIGRAM(S): 80 INJECTION, SUSPENSION INTRA-ARTICULAR; INTRALESIONAL; INTRAMUSCULAR; SOFT TISSUE at 17:21

## 2025-03-02 RX ADMIN — INSULIN LISPRO 6: 100 INJECTION, SOLUTION INTRAVENOUS; SUBCUTANEOUS at 11:43

## 2025-03-02 RX ADMIN — Medication 2.5 MILLIGRAM(S): at 20:16

## 2025-03-02 RX ADMIN — METOPROLOL SUCCINATE 12.5 MILLIGRAM(S): 50 TABLET, EXTENDED RELEASE ORAL at 06:05

## 2025-03-02 RX ADMIN — Medication 1 DOSE(S): at 20:13

## 2025-03-02 RX ADMIN — METOPROLOL SUCCINATE 12.5 MILLIGRAM(S): 50 TABLET, EXTENDED RELEASE ORAL at 17:41

## 2025-03-02 RX ADMIN — TAMSULOSIN HYDROCHLORIDE 0.4 MILLIGRAM(S): 0.4 CAPSULE ORAL at 21:10

## 2025-03-02 RX ADMIN — Medication 2 TABLET(S): at 21:10

## 2025-03-02 RX ADMIN — Medication 40 MILLIGRAM(S): at 06:06

## 2025-03-02 RX ADMIN — CEFEPIME 100 MILLIGRAM(S): 2 INJECTION, POWDER, FOR SOLUTION INTRAVENOUS at 21:10

## 2025-03-02 RX ADMIN — ENOXAPARIN SODIUM 55 MILLIGRAM(S): 100 INJECTION SUBCUTANEOUS at 06:05

## 2025-03-02 RX ADMIN — MUPIROCIN CALCIUM 1 APPLICATION(S): 20 CREAM TOPICAL at 17:20

## 2025-03-02 RX ADMIN — Medication 1 TABLET(S): at 12:35

## 2025-03-02 RX ADMIN — Medication 2.5 MILLIGRAM(S): at 15:32

## 2025-03-02 RX ADMIN — CEFEPIME 100 MILLIGRAM(S): 2 INJECTION, POWDER, FOR SOLUTION INTRAVENOUS at 17:19

## 2025-03-02 RX ADMIN — Medication 1 DOSE(S): at 08:53

## 2025-03-02 RX ADMIN — SODIUM CHLORIDE 75 MILLILITER(S): 9 INJECTION, SOLUTION INTRAVENOUS at 19:03

## 2025-03-02 RX ADMIN — Medication 100 MILLIGRAM(S): at 17:42

## 2025-03-02 RX ADMIN — OSELTAMIVIR PHOSPHATE 75 MILLIGRAM(S): 75 CAPSULE ORAL at 12:35

## 2025-03-02 RX ADMIN — Medication 1 APPLICATION(S): at 06:06

## 2025-03-02 RX ADMIN — MUPIROCIN CALCIUM 1 APPLICATION(S): 20 CREAM TOPICAL at 06:05

## 2025-03-02 RX ADMIN — Medication 2.5 MILLIGRAM(S): at 08:54

## 2025-03-02 RX ADMIN — CEFEPIME 100 MILLIGRAM(S): 2 INJECTION, POWDER, FOR SOLUTION INTRAVENOUS at 06:04

## 2025-03-02 RX ADMIN — INSULIN LISPRO 2: 100 INJECTION, SOLUTION INTRAVENOUS; SUBCUTANEOUS at 18:00

## 2025-03-02 RX ADMIN — METHYLPREDNISOLONE ACETATE 40 MILLIGRAM(S): 80 INJECTION, SUSPENSION INTRA-ARTICULAR; INTRALESIONAL; INTRAMUSCULAR; SOFT TISSUE at 06:06

## 2025-03-02 RX ADMIN — INSULIN LISPRO 4: 100 INJECTION, SOLUTION INTRAVENOUS; SUBCUTANEOUS at 21:43

## 2025-03-02 NOTE — CONSULT NOTE ADULT - ASSESSMENT
Patient with budding yeast in urine. Bactauria. Urine cx?  IV ABx. dilfucan 100 mg daily x 7 days  Aggressive bowel regimen    Continue CIC for now.  toilet when stable

## 2025-03-02 NOTE — PROGRESS NOTE ADULT - ASSESSMENT
69y Female admitted with hypoxemic resp, failure 2/2 to Influenza , required BiPAP.      Patient currently on BiPAP  -will titrate IPAP and EPAP to maintain pH >7.30 and SaO2 >92%  -NPO while on BiPAP   -nebs steroids  -glycemic control  -pain control for hx of slipped disc  -DVt prophylaxis, GI ppx  -AM labs  - Pt with urinary retention seen by urology will continue straight cath Q 6 hours for residual urine  - ID: Empiric cefepime, Tamiflu for Flu A+, UA + started on diflucan  - IVF    CRITICAL CARE TIME SPENT:   Time spent on this patient encounter, which includes documenting this note in the electronic medical record, was 37 minutes including assessing the presenting problems with associated risks, reviewing the medical record to prepare for the encounter, and meeting face to face with patient to obtain additional history. I have also performed an appropriate physical exam, made interventions listed and ordered and interpreted appropriate diagnostic studies as documented. To improve communication and patient saftey, I have coordinated care with the multidisciplinary team including the bedside nurse, appropriate attending of record and consultants as needed.

## 2025-03-02 NOTE — CHART NOTE - NSCHARTNOTEFT_GEN_A_CORE
NUTRITION FOLLOW UP    SOURCE: Patient [X)   Family [ ]    Medical Record (X)    Diet, Soft and Bite Sized:   Supplement Feeding Modality:  Oral  Ensure Plus High Protein Cans or Servings Per Day:  1       Frequency:  Two Times a day (03-01-25 @ 12:57) [Active]    Pt seen this am on HFNC, assistance provided with bfast. PO intake has remained poor- preferences obtained from family. Pt continues with Greek yogurt TID, oatmeal at bfast, Soup @ lunch and dinner (good acceptance). Trial of Ensure per family request (pt previously refused)- pt observed taking 2-3 small sips- chocolate or strawberry. Family also brought in chicken salad for lunch which pt consumed with assistance from sister. Will provide scoop of chix salad with every lunch. No BM noted since admission, recommend addition of bowel regimen. POCT noted +SSI, likely due to solumedrol- maintain liberalized diet to augment caloric intake in setting of severe malnutrition. K+ repletion in progress.     PO INTAKE: 0-50%    CURRENT WEIGHT:  87.7lbs (3/2)    PERTINENT MEDS:   Pertinent Medications: MEDICATIONS  (STANDING):  albuterol    0.083% 2.5 milliGRAM(s) Nebulizer every 6 hours  amLODIPine   Tablet 10 milliGRAM(s) Oral daily  cefepime   IVPB 2000 milliGRAM(s) IV Intermittent every 8 hours  chlorhexidine 2% Cloths 1 Application(s) Topical <User Schedule>  dextrose 5%. 1000 milliLiter(s) (100 mL/Hr) IV Continuous <Continuous>  dextrose 5%. 1000 milliLiter(s) (50 mL/Hr) IV Continuous <Continuous>  dextrose 50% Injectable 25 Gram(s) IV Push once  dextrose 50% Injectable 12.5 Gram(s) IV Push once  dextrose 50% Injectable 25 Gram(s) IV Push once  fluticasone propionate/ salmeterol 250-50 MICROgram(s) Diskus 1 Dose(s) Inhalation two times a day  insulin lispro (ADMELOG) corrective regimen sliding scale   SubCutaneous Before meals and at bedtime  methylPREDNISolone sodium succinate Injectable 40 milliGRAM(s) IV Push every 12 hours  metoprolol tartrate 12.5 milliGRAM(s) Oral two times a day  multivitamin 1 Tablet(s) Oral daily  mupirocin 2% Nasal 1 Application(s) Both Nostrils two times a day  oseltamivir Suspension 75 milliGRAM(s) Oral every 12 hours  pantoprazole    Tablet 40 milliGRAM(s) Oral before breakfast  tamsulosin 0.4 milliGRAM(s) Oral at bedtime    MEDICATIONS  (PRN):  acetaminophen     Tablet .. 650 milliGRAM(s) Oral every 6 hours PRN Temp greater or equal to 38C (100.4F), Mild Pain (1 - 3)      PERTINENT LABS:  03-02 Na143 mmol/L Glu 216 mg/dL[H] K+ 3.2 mmol/L[L] Cr  0.69 mg/dL BUN 56 mg/dL[H] 03-02 Phos 3.2 mg/dL 03-02 Alb 2.8 g/dL[L]    CAPILLARY BLOOD GLUCOSE      POCT Blood Glucose.: 214 mg/dL (02 Mar 2025 05:57)  POCT Blood Glucose.: 150 mg/dL (01 Mar 2025 21:26)  POCT Blood Glucose.: 192 mg/dL (01 Mar 2025 17:57)  POCT Blood Glucose.: 164 mg/dL (01 Mar 2025 12:23)      SKIN:  no pressure ulcers  EDEMA: +2 bilateral arms  LAST BM:     ESTIMATED NEEDS:   [X] no change since previous assessment  [ ] recalculated:     PREVIOUS NUTRITION DIAGNOSIS:    1. Severe malnutrition- Ensure HP BID added, preferences obtained     NUTRITION DIAGNOSIS is :  (X)  Ongoing          NEW NUTRITION DIAGNOSIS: N/A    NUTRITION RECOMMENDATIONS:   1. Soft & bite sized (per pt preference)  2. Ensure HP BID (350kcals, 20g protein per serving)  3. Add MVI po daily to meet 100% RDA  4. Total feeding assistance with encouragement  5. Obtain and honor food preferences as able- on file with nutrition office   6. Add bowel regimen     MONITORING AND EVALUATION:   1. Tolerance to diet prescription   2. PO intake  3. Weights  4. Labs  5. Follow Up per protocol     RD to remain available   Elizabet Sesay RDN   x or TEAMS

## 2025-03-02 NOTE — CONSULT NOTE ADULT - TIME BILLING
Extensive chart review, discussion with family, discussion with ICU team. plan of care.
Time spent includes direct patient care  (interview and examination of patient), discussion with other providers, support staff and/or patient's family members, review of medical records, ordering diagnostic tests and analyzing results, and documentation, excluding time spent in ACP discussions.

## 2025-03-02 NOTE — PROGRESS NOTE ADULT - SUBJECTIVE AND OBJECTIVE BOX
Critical care note:    Reason for admission: SOB/AMS    Patient is a 69y old  Female who presents with a chief complaint of SOB/AMS (02 Mar 2025 13:40)      BRIEF HOSPITAL COURSE: 69F, ex-smoker, PMHx rheumatoid arthritis, colon cancer, HTN, COPD, Reportedly she has a history of colon cancer diagnosed at [place] in , treated with chemotherapy under the care of Dr. Kaleb Olmedo, currently in remission. Admitted to [facility] with COPD exacerbation with multiple admission in the past month including admission at [facility] (2/3 - 10/2025 and –) with pneumonia and COPD exacerbation.  She was discharged to in-hospital acute rehabilitation, but decompensated from respiratory perspective and is presently on antibiotics and prednisone monitored in ICU.  Patient presents with generalized weakness, difficulty ambulating.   Oncology consulted as above.          Events last 24 hours:   Pt had refused BIPAP last night but today has been on BIPAP and doing better    PAST MEDICAL & SURGICAL HISTORY:  Advanced COPD      On supplemental oxygen by nasal cannula      Arthritis      Cancer, colon      HTN (hypertension)      No significant past surgical history          Review of Systems:  AMS      Medications:  cefepime   IVPB 2000 milliGRAM(s) IV Intermittent every 8 hours  fluconAZOLE   Tablet 100 milliGRAM(s) Oral every 24 hours  oseltamivir Suspension 75 milliGRAM(s) Oral every 12 hours    amLODIPine   Tablet 10 milliGRAM(s) Oral daily  metoprolol tartrate 12.5 milliGRAM(s) Oral two times a day    albuterol    0.083% 2.5 milliGRAM(s) Nebulizer every 6 hours  fluticasone propionate/ salmeterol 250-50 MICROgram(s) Diskus 1 Dose(s) Inhalation two times a day    acetaminophen     Tablet .. 650 milliGRAM(s) Oral every 6 hours PRN        pantoprazole    Tablet 40 milliGRAM(s) Oral before breakfast  polyethylene glycol 3350 17 Gram(s) Oral daily  senna 2 Tablet(s) Oral at bedtime    tamsulosin 0.4 milliGRAM(s) Oral at bedtime    dextrose 50% Injectable 25 Gram(s) IV Push once  dextrose 50% Injectable 12.5 Gram(s) IV Push once  dextrose 50% Injectable 25 Gram(s) IV Push once  insulin lispro (ADMELOG) corrective regimen sliding scale   SubCutaneous Before meals and at bedtime  methylPREDNISolone sodium succinate Injectable 40 milliGRAM(s) IV Push every 12 hours    dextrose 5% + lactated ringers. 1000 milliLiter(s) IV Continuous <Continuous>  dextrose 5%. 1000 milliLiter(s) IV Continuous <Continuous>  dextrose 5%. 1000 milliLiter(s) IV Continuous <Continuous>  multivitamin 1 Tablet(s) Oral daily      chlorhexidine 2% Cloths 1 Application(s) Topical <User Schedule>  mupirocin 2% Nasal 1 Application(s) Both Nostrils two times a day            ICU Vital Signs Last 24 Hrs  T(C): 36.4 (02 Mar 2025 12:00), Max: 36.4 (02 Mar 2025 12:00)  T(F): 97.6 (02 Mar 2025 12:00), Max: 97.6 (02 Mar 2025 12:00)  HR: 93 (02 Mar 2025 21:44) (86 - 119)  BP: 129/65 (02 Mar 2025 21:00) (106/81 - 149/76)  BP(mean): 84 (02 Mar 2025 21:00) (79 - 105)  ABP: --  ABP(mean): --  RR: 24 (02 Mar 2025 21:00) (14 - 27)  SpO2: 95% (02 Mar 2025 21:44) (92% - 100%)    O2 Parameters below as of 02 Mar 2025 20:45  Patient On (Oxygen Delivery Method): nasal cannula, high flow  O2 Flow (L/min): 40  O2 Concentration (%): 40        ABG - ( 02 Mar 2025 05:00 )  pH, Arterial: 7.37  pH, Blood: x     /  pCO2: 62    /  pO2: 84    / HCO3: 36    / Base Excess: 10.5  /  SaO2: 97.5                I&O's Detail    01 Mar 2025 07:01  -  02 Mar 2025 07:00  --------------------------------------------------------  IN:  Total IN: 0 mL    OUT:    Intermittent Catheterization - Urethral (mL): 1300 mL  Total OUT: 1300 mL    Total NET: -1300 mL      02 Mar 2025 07:01  -  02 Mar 2025 23:31  --------------------------------------------------------  IN:    dextrose 5% + lactated ringers: 675 mL  Total IN: 675 mL    OUT:    Intermittent Catheterization - Urethral (mL): 300 mL    Voided (mL): 300 mL  Total OUT: 600 mL    Total NET: 75 mL            LABS:                        7.7    6.00  )-----------( 171      ( 02 Mar 2025 06:15 )             26.2     03-02    143  |  104  |  56[H]  ----------------------------<  216[H]  3.2[L]   |  37[H]  |  0.69    Ca    10.4      02 Mar 2025 06:15  Phos  3.2     03-02  Mg     2.3     -02    TPro  6.3  /  Alb  2.8[L]  /  TBili  0.6  /  DBili  x   /  AST  12  /  ALT  22  /  AlkPhos  89  03-02          CAPILLARY BLOOD GLUCOSE      POCT Blood Glucose.: 217 mg/dL (02 Mar 2025 21:39)      Urinalysis Basic - ( 02 Mar 2025 16:30 )    Color: Yellow / Appearance: Cloudy / S.022 / pH: x  Gluc: x / Ketone: Negative mg/dL  / Bili: Negative / Urobili: 0.2 mg/dL   Blood: x / Protein: Trace mg/dL / Nitrite: Negative   Leuk Esterase: Moderate / RBC: 2 /HPF / WBC 4 /HPF   Sq Epi: x / Non Sq Epi: x / Bacteria: Occasional /HPF      CULTURES:  Culture Results:   No growth at 72 Hours (25 @ 13:30)  Culture Results:   No growth at 72 Hours (25 @ 13:30)  Rapid RVP Result: Detected (25 @ 13:30)          Physical Examination:    General: No acute distress.  confused, interactive, nonfocal    HEENT: Pupils equal, reactive to light.  Symmetric.    PULM: Clear to auscultation bilaterally, no significant sputum production    CVS: Regular rate and rhythm, no murmurs, rubs, or gallops    ABD: Soft, nondistended, nontender, normoactive bowel sounds, no masses    EXT: No edema, nontender    SKIN: Warm and well perfused, no rashes noted.    RADIOLOGY: ***

## 2025-03-02 NOTE — CONSULT NOTE ADULT - SUBJECTIVE AND OBJECTIVE BOX
HPI:  Patient is a 69y Female who presented with malaise and MS changes from [place]. Unable to void requiring CIC. Colon cancer post chemo with neuropathy. Sisters at bedside describe patient was wetting without sensory awareness.  No last BM x 1 week.     COPD - on home o2 (2-4 L), Ex smoker + Vaping, RA/Arthritis, Colon cancer s/p chemo with neuropathy, HTN, h/o PE who p/w increaisng sob and ams. of note pateint was just hospitalized in [facility] 2/3-10/2025 and 3/17-18/2025 for both pneumonia and COPD exacerbation.   patient was sent to  Rehab  Today noted to be having increasing sob and confusion   sent to ED where found to have Flu a, and acute on chronic Hypercarbic respiratory failure.       PAST MEDICAL & SURGICAL HISTORY:  Advanced COPD      On supplemental oxygen by nasal cannula      Arthritis      Cancer, colon      HTN (hypertension)      No significant past surgical history        FAMILY HISTORY:  No pertinent family history in first degree relatives      SOCIAL HISTORY:   Tobacco hx:  MEDICATIONS  (STANDING):  albuterol    0.083% 2.5 milliGRAM(s) Nebulizer every 6 hours  amLODIPine   Tablet 10 milliGRAM(s) Oral daily  cefepime   IVPB 2000 milliGRAM(s) IV Intermittent every 8 hours  chlorhexidine 2% Cloths 1 Application(s) Topical <User Schedule>  dextrose 5% + lactated ringers. 1000 milliLiter(s) (75 mL/Hr) IV Continuous <Continuous>  dextrose 5%. 1000 milliLiter(s) (100 mL/Hr) IV Continuous <Continuous>  dextrose 5%. 1000 milliLiter(s) (50 mL/Hr) IV Continuous <Continuous>  dextrose 50% Injectable 25 Gram(s) IV Push once  dextrose 50% Injectable 12.5 Gram(s) IV Push once  dextrose 50% Injectable 25 Gram(s) IV Push once  fluticasone propionate/ salmeterol 250-50 MICROgram(s) Diskus 1 Dose(s) Inhalation two times a day  insulin lispro (ADMELOG) corrective regimen sliding scale   SubCutaneous Before meals and at bedtime  methylPREDNISolone sodium succinate Injectable 40 milliGRAM(s) IV Push every 12 hours  metoprolol tartrate 12.5 milliGRAM(s) Oral two times a day  multivitamin 1 Tablet(s) Oral daily  mupirocin 2% Nasal 1 Application(s) Both Nostrils two times a day  oseltamivir Suspension 75 milliGRAM(s) Oral every 12 hours  pantoprazole    Tablet 40 milliGRAM(s) Oral before breakfast  polyethylene glycol 3350 17 Gram(s) Oral daily  senna 2 Tablet(s) Oral at bedtime  tamsulosin 0.4 milliGRAM(s) Oral at bedtime    MEDICATIONS  (PRN):  acetaminophen     Tablet .. 650 milliGRAM(s) Oral every 6 hours PRN Temp greater or equal to 38C (100.4F), Mild Pain (1 - 3)    Allergies    No Known Allergies    Intolerances        REVIEW OF SYSTEMS: Pertinent positives and negatives as stated in HPI, otherwise negative    Vital signs  T(C): 35.9 (03-02-25 @ 06:00), Max: 36.1 (03-01-25 @ 16:00)  HR: 86 (03-02-25 @ 12:00)  BP: 130/68 (03-02-25 @ 12:00)  SpO2: 96% (03-02-25 @ 12:00)  Wt(kg): --    Output      Physical Exam  Gen: NAD  Pulm: No respiratory distress, no subcostal retractions  CV: RRR, no JVD  Abd: Soft, NT, ND  : \no flank pain.   MSK: No edema present    LABS:        03-02 @ 06:15    WBC 6.00  / Hct 26.2  / SCr 0.69     03-01 @ 16:32    WBC 8.33  / Hct 27.1  / SCr --       03-02    143  |  104  |  56[H]  ----------------------------<  216[H]  3.2[L]   |  37[H]  |  0.69    Ca    10.4      02 Mar 2025 06:15  Phos  3.2     03-02  Mg     2.3     03-02    TPro  6.3  /  Alb  2.8[L]  /  TBili  0.6  /  DBili  x   /  AST  12  /  ALT  22  /  AlkPhos  89  03-02      Urine Microscopic-Add On (NC) (02.27.25 @ 06:32)    White Blood Cell - Urine: 10 /HPF   Red Blood Cell - Urine: 0 /HPF   Bacteria: Too Numerous to count /HPF   Squamous Epithelial Cells: Present   Calcium Oxalate Crystals: Present   Comment - Urine: Budding yeast present.          Urine Cx:   Blood Cx:    Radiology:

## 2025-03-02 NOTE — PROGRESS NOTE ADULT - SUBJECTIVE AND OBJECTIVE BOX
Follow-up Critical Care Progress Note  Chief Complaint : Acute respiratory failure with hypoxia        patient seen and examined  comfortable  no cp, sob, palp, n/v, cough, secretions  slightly confused      Allergies :No Known Allergies      PAST MEDICAL & SURGICAL HISTORY:  Advanced COPD  On supplemental oxygen by nasal cannula  Arthritis  Cancer, colon  HTN (hypertension)  No significant past surgical history        Medications:  MEDICATIONS  (STANDING):  albuterol    0.083% 2.5 milliGRAM(s) Nebulizer every 6 hours  amLODIPine   Tablet 10 milliGRAM(s) Oral daily  cefepime   IVPB 2000 milliGRAM(s) IV Intermittent every 8 hours  chlorhexidine 2% Cloths 1 Application(s) Topical <User Schedule>  dextrose 5%. 1000 milliLiter(s) (100 mL/Hr) IV Continuous <Continuous>  dextrose 5%. 1000 milliLiter(s) (50 mL/Hr) IV Continuous <Continuous>  dextrose 50% Injectable 25 Gram(s) IV Push once  dextrose 50% Injectable 12.5 Gram(s) IV Push once  dextrose 50% Injectable 25 Gram(s) IV Push once  enoxaparin Injectable 55 milliGRAM(s) SubCutaneous every 12 hours  fluticasone propionate/ salmeterol 250-50 MICROgram(s) Diskus 1 Dose(s) Inhalation two times a day  insulin lispro (ADMELOG) corrective regimen sliding scale   SubCutaneous Before meals and at bedtime  methylPREDNISolone sodium succinate Injectable 40 milliGRAM(s) IV Push every 8 hours  metoprolol tartrate 12.5 milliGRAM(s) Oral two times a day  mupirocin 2% Nasal 1 Application(s) Both Nostrils two times a day  oseltamivir Suspension 75 milliGRAM(s) Oral every 12 hours  pantoprazole    Tablet 40 milliGRAM(s) Oral before breakfast  tamsulosin 0.4 milliGRAM(s) Oral at bedtime    MEDICATIONS  (PRN):  acetaminophen     Tablet .. 650 milliGRAM(s) Oral every 6 hours PRN Temp greater or equal to 38C (100.4F), Mild Pain (1 - 3)      Antibiotics History  cefepime   IVPB 2000 milliGRAM(s) IV Intermittent every 8 hours, 02-27-25 @ 08:41, Stop order after: 5 Days  cefepime   IVPB    , 02-26-25 @ 13:23  cefepime   IVPB 2000 milliGRAM(s) IV Intermittent once, 02-26-25 @ 13:32, Stop order after: 1 Doses  cefepime   IVPB 1000 milliGRAM(s) IV Intermittent every 12 hours, 02-26-25 @ 16:16  oseltamivir 75 milliGRAM(s) Oral two times a day, 02-27-25 @ 08:11, Stop order after: 5 Days  oseltamivir Suspension 75 milliGRAM(s) Oral every 12 hours, 02-27-25 @ 09:53  oseltamivir Suspension 75 milliGRAM(s) Oral every 12 hours, 02-27-25 @ 10:24, Stop order after: 5 Days  vancomycin  IVPB 750 milliGRAM(s) IV Intermittent Once, 02-26-25 @ 16:23, Stop order after: 1 Doses  vancomycin  IVPB. 1000 milliGRAM(s) IV Intermittent once, 02-26-25 @ 13:32, Stop order after: 1 Doses      Heme Medications   enoxaparin Injectable 55 milliGRAM(s) SubCutaneous every 12 hours, 02-26-25 @ 17:10      GI Medications  pantoprazole    Tablet 40 milliGRAM(s) Oral before breakfast, 03-01-25 @ 08:22, Now      COVID  02-26-25 @ 13:30  COVID -   NotDetec     Trend BNP  02-26-25 @ 13:30   -  198    Procalcitonin Trend  02-26-25 @ 13:30   -   0.10[H]    WBC Trend  03-02-25 @ 06:15   -  6.00  03-01-25 @ 16:32   -  8.33  03-01-25 @ 05:04   -  6.07  02-28-25 @ 05:30   -  7.99    H/H Trend  03-02-25 @ 06:15   -   7.7[L]/ 26.2[L]  03-01-25 @ 16:32   -   8.3[L]/ 27.1[L]  03-01-25 @ 05:04   -   7.8[L]/ 26.5[L]  02-28-25 @ 05:30   -   8.8[L]/ 29.9[L]  02-27-25 @ 06:55   -   9.8[L]/ 32.2[L]  02-26-25 @ 13:30   -   10.5[L]/ 35.9    Stool Occult Blood    Platelet Trend  03-02-25 @ 06:15   -  171  03-01-25 @ 16:32   -  208  03-01-25 @ 05:04   -  191  02-28-25 @ 05:30   -  191    Trend Sodium  03-02-25 @ 06:15   -  143  03-01-25 @ 05:04   -  143  02-28-25 @ 05:30   -  144    Trend Potassium  03-02-25 @ 06:15   -  3.2[L]  03-01-25 @ 05:04   -  3.8  02-28-25 @ 05:30   -  4.2    Trend Bun/Cr  03-02-25 @ 06:15  BUN/CR -  56[H] / 0.69  03-01-25 @ 05:04  BUN/CR -  38[H] / 0.57  02-28-25 @ 05:30  BUN/CR -  31[H] / 0.61    Lactic Acid Trend  02-27-25 @ 05:51   -   1.0  02-26-25 @ 13:00   -   1.0    ABG Trend  03-02-25 @ 05:00   - 7.37/62[H]/84/97.5  03-01-25 @ 05:00   - 7.51[H]/49[H]/61[L]/93.8[L]  02-28-25 @ 05:30   - 7.43/58[H]/76[L]/96.5  02-27-25 @ 04:30   - 7.43/55[H]/93/98.6[H]  02-26-25 @ 16:18   - 7.31[L]/72[HH]/114[H]/99.2[H]  02-26-25 @ 13:35   - 7.30[L]/84[HH]/118[H]/99.1[H]    Trend AST/ALT/ALK Phos/Bili  03-02-25 @ 06:15   12/22/89/0.6  03-01-25 @ 05:04   14/22/87/0.7  02-28-25 @ 05:30   16/17/90/0.7  02-27-25 @ 05:51   15/19/105/0.7  02-26-25 @ 13:30   20/24/109/0.8     Albumin Trend  03-02-25 @ 06:15   -   2.8[L]  03-01-25 @ 05:04   -   2.6[L]  02-28-25 @ 05:30   -   2.6[L]  02-27-25 @ 05:51   -   2.9[L]  02-26-25 @ 13:30   -   3.1[L]      PTT - PT - INR Trend  02-26-25 @ 13:30   -   27.1 - 11.3 - 0.96    Glucose Trend  03-02-25 @ 06:15   -  216[H] -- --  03-02-25 @ 05:57   -  -- -- 214[H]  03-01-25 @ 21:26   -  -- -- 150[H]  03-01-25 @ 17:57   -  -- -- 192[H]  03-01-25 @ 12:23   -  -- -- 164[H]  03-01-25 @ 05:04   -  141[H] -- --  03-01-25 @ 04:50   -  -- -- 154[H]  02-28-25 @ 23:03   -  -- -- 139[H]  02-28-25 @ 17:15   -  -- -- 208[H]  02-28-25 @ 11:52   -  -- -- 142[H]    A1C with Estimated Average Glucose Result: 6.6 % *H* [4.0 - 5.6] (02-27-25 @ 05:51)      LABS:                        7.7    6.00  )-----------( 171      ( 02 Mar 2025 06:15 )             26.2     03-02    143  |  104  |  56[H]  ----------------------------<  216[H]  3.2[L]   |  37[H]  |  0.69    Ca    10.4      02 Mar 2025 06:15  Phos  3.2     03-02  Mg     2.3     03-02    TPro  6.3  /  Alb  2.8[L]  /  TBili  0.6  /  DBili  x   /  AST  12  /  ALT  22  /  AlkPhos  89  03-02           CULTURES: (if applicable)    Culture - Blood (collected 02-26-25 @ 13:30)  Source: .Blood Blood-Peripheral  Preliminary Report (03-02-25 @ 05:00):    No growth at 72 Hours    Culture - Blood (collected 02-26-25 @ 13:30)  Source: .Blood Blood-Peripheral  Preliminary Report (03-02-25 @ 05:00):    No growth at 72 Hours      Rapid RVP Result: Detected (02-26-25 @ 13:30)      ABG - ( 02 Mar 2025 05:00 )  pH, Arterial: 7.37  pH, Blood: x     /  pCO2: 62    /  pO2: 84    / HCO3: 36    / Base Excess: 10.5  /  SaO2: 97.5         RADIOLOGY  CXR:    improving left base infiltrate      VITALS:  T(C): 35.9 (03-02-25 @ 06:00), Max: 36.8 (03-01-25 @ 12:00)  T(F): 96.6 (03-02-25 @ 06:00), Max: 98.2 (03-01-25 @ 12:00)  HR: 92 (03-02-25 @ 08:00) (90 - 119)  BP: 142/73 (03-02-25 @ 08:00) (127/77 - 156/78)  BP(mean): 92 (03-02-25 @ 08:00) (91 - 99)  ABP: --  ABP(mean): --  RR: 19 (03-02-25 @ 08:00) (16 - 31)  SpO2: 98% (03-02-25 @ 08:00) (94% - 100%)  CVP(mm Hg): --  CVP(cm H2O): --    Ins and Outs     03-01-25 @ 07:01  -  03-02-25 @ 07:00  --------------------------------------------------------  IN: 0 mL / OUT: 1300 mL / NET: -1300 mL                I&O's Detail    01 Mar 2025 07:01  -  02 Mar 2025 07:00  --------------------------------------------------------  IN:  Total IN: 0 mL    OUT:    Intermittent Catheterization - Urethral (mL): 1300 mL  Total OUT: 1300 mL    Total NET: -1300 mL

## 2025-03-02 NOTE — PROGRESS NOTE ADULT - ASSESSMENT
Physical Examination:  GENERAL:               Alert, Oriented, No acute distress.    HEENT:                   No JVD, Dry Moist MM, cachectic  PULM:                     Bilateral air entry, Clear to auscultation bilaterally, no significant sputum production, no Rales, No Rhonchi, noWheezing  CVS:                         S1, S2,  + Murmur  ABD:                        Soft, nondistended, nontender, normoactive bowel sounds,   EXT:                         No edema, nontender, No Cyanosis or Clubbing    NEURO:                  Alert, oriented, interactive, nonfocal, follows commands  PSYC:                      Calm,  limited Insight.        Assessment  1. Acute Hypoxic and hypercarbic respiratory failure   2. Due to Acute flu A and Left lower lobe Pna  3. Chronic Advanced COPD with supplemental oxygen by nasal cannula  4. RA  5. h/o colon cancer  6. h/o PE  off a/c now with Segmental and subsegmental pulmonary embolism right lower lobe.  7. Anemia     Plan  still retaining co2 with normal pH, previous this [facility] no co2 retention    trial of diamox made CO2 retention worse  mild metabolic encephelopathy  worsening anemia hold lovenox, pending stool occult blood    repeat ABG in am  currently on high flow  finish course of tamiflu   pneumonia improving  used NIV partially, continue to use if able, pt refused overnight   CT head  no acute bleeding  will taper steroids.   nebs,   Advair  noted urinary retention yesterday, hold anticholinergics , starting Flomax  urolgoy eval    finish course of antibiotics for suspected pneumonia  Palliative care appreciated  d/w family bedside          PMD:	Dr Cunha		from 	                   Notified(Date):  2/28  Family Updated: 	sister                                Date: 3/1      Sedation & Analgesia:	n/a  Diet/Nutrition:		 advance as tolerated  GI PPx:			protonix  DVT Ppx:		enoxaparin Injectable 55 milliGRAM(s) SubCutaneous every 12 hours         Activity:		  PT/OOB  Head of Bed:               35-45 Deg  Glycemic Control:        n/a    Lines: PIV  CENTRAL LINE: 	[ ] YES [ ] NO	                    LOCATION:   	                       DATE INSERTED:   	                    REMOVE:  [ ] YES [ ] NO    A-LINE:  	                [ ] YES [ ] NO                      LOCATION:   	                       DATE INSERTED: 		            REMOVE:  [ ] YES [ ] NO    KEANE: 		        [ ] YES [ ] NO  		                                       DATE INSERTED:		            REMOVE:  [ ] YES [ ] NO      Restraints were deemed necessary to prevent removal of life-sustaining devices [  ] YES   [    ]  NO    Disposition: ICU Care    Goals of Care: Palliative care eval as multiple hospitalizations and advanced copd, currently Full code.

## 2025-03-03 LAB
ALBUMIN SERPL ELPH-MCNC: 2.7 G/DL — LOW (ref 3.3–5)
ALP SERPL-CCNC: 79 U/L — SIGNIFICANT CHANGE UP (ref 40–120)
ALT FLD-CCNC: 27 U/L — SIGNIFICANT CHANGE UP (ref 10–45)
ANION GAP SERPL CALC-SCNC: 1 MMOL/L — LOW (ref 5–17)
ANION GAP SERPL CALC-SCNC: 3 MMOL/L — LOW (ref 5–17)
AST SERPL-CCNC: 11 U/L — SIGNIFICANT CHANGE UP (ref 10–40)
BASE EXCESS BLDA CALC-SCNC: 11.1 MMOL/L — HIGH (ref -2–3)
BASE EXCESS BLDA CALC-SCNC: 6.4 MMOL/L — HIGH (ref -2–3)
BASE EXCESS BLDA CALC-SCNC: 9.5 MMOL/L — HIGH (ref -2–3)
BILIRUB SERPL-MCNC: 0.6 MG/DL — SIGNIFICANT CHANGE UP (ref 0.2–1.2)
BLD GP AB SCN SERPL QL: SIGNIFICANT CHANGE UP
BUN SERPL-MCNC: 54 MG/DL — HIGH (ref 7–23)
BUN SERPL-MCNC: 55 MG/DL — HIGH (ref 7–23)
CALCIUM SERPL-MCNC: 10.2 MG/DL — SIGNIFICANT CHANGE UP (ref 8.4–10.5)
CALCIUM SERPL-MCNC: 9.8 MG/DL — SIGNIFICANT CHANGE UP (ref 8.4–10.5)
CHLORIDE SERPL-SCNC: 109 MMOL/L — HIGH (ref 96–108)
CHLORIDE SERPL-SCNC: 112 MMOL/L — HIGH (ref 96–108)
CO2 BLDA-SCNC: 34 MMOL/L — HIGH (ref 19–24)
CO2 BLDA-SCNC: 37 MMOL/L — HIGH (ref 19–24)
CO2 BLDA-SCNC: 38 MMOL/L — HIGH (ref 19–24)
CO2 SERPL-SCNC: 34 MMOL/L — HIGH (ref 22–31)
CO2 SERPL-SCNC: 37 MMOL/L — HIGH (ref 22–31)
CREAT SERPL-MCNC: 0.62 MG/DL — SIGNIFICANT CHANGE UP (ref 0.5–1.3)
CREAT SERPL-MCNC: 0.81 MG/DL — SIGNIFICANT CHANGE UP (ref 0.5–1.3)
EGFR: 79 ML/MIN/1.73M2 — SIGNIFICANT CHANGE UP
EGFR: 79 ML/MIN/1.73M2 — SIGNIFICANT CHANGE UP
EGFR: 97 ML/MIN/1.73M2 — SIGNIFICANT CHANGE UP
EGFR: 97 ML/MIN/1.73M2 — SIGNIFICANT CHANGE UP
FERRITIN SERPL-MCNC: 121 NG/ML — SIGNIFICANT CHANGE UP (ref 13–330)
GAS PNL BLDA: SIGNIFICANT CHANGE UP
GAS PNL BLDA: SIGNIFICANT CHANGE UP
GLUCOSE BLDC GLUCOMTR-MCNC: 145 MG/DL — HIGH (ref 70–99)
GLUCOSE BLDC GLUCOMTR-MCNC: 169 MG/DL — HIGH (ref 70–99)
GLUCOSE BLDC GLUCOMTR-MCNC: 273 MG/DL — HIGH (ref 70–99)
GLUCOSE BLDC GLUCOMTR-MCNC: 389 MG/DL — HIGH (ref 70–99)
GLUCOSE BLDC GLUCOMTR-MCNC: 393 MG/DL — HIGH (ref 70–99)
GLUCOSE SERPL-MCNC: 182 MG/DL — HIGH (ref 70–99)
GLUCOSE SERPL-MCNC: 363 MG/DL — HIGH (ref 70–99)
HCO3 BLDA-SCNC: 32 MMOL/L — HIGH (ref 21–28)
HCO3 BLDA-SCNC: 35 MMOL/L — HIGH (ref 21–28)
HCO3 BLDA-SCNC: 36 MMOL/L — HIGH (ref 21–28)
HCT VFR BLD CALC: 23 % — LOW (ref 34.5–45)
HCT VFR BLD CALC: 24 % — LOW (ref 34.5–45)
HGB BLD-MCNC: 6.7 G/DL — CRITICAL LOW (ref 11.5–15.5)
HGB BLD-MCNC: 7.1 G/DL — LOW (ref 11.5–15.5)
HOROWITZ INDEX BLDA+IHG-RTO: 40 — SIGNIFICANT CHANGE UP
HOROWITZ INDEX BLDA+IHG-RTO: 45 — SIGNIFICANT CHANGE UP
HOROWITZ INDEX BLDA+IHG-RTO: 50 — SIGNIFICANT CHANGE UP
IRON SATN MFR SERPL: 35 % — SIGNIFICANT CHANGE UP (ref 14–50)
IRON SATN MFR SERPL: 81 UG/DL — SIGNIFICANT CHANGE UP (ref 30–160)
MAGNESIUM SERPL-MCNC: 2.3 MG/DL — SIGNIFICANT CHANGE UP (ref 1.6–2.6)
MAGNESIUM SERPL-MCNC: 2.4 MG/DL — SIGNIFICANT CHANGE UP (ref 1.6–2.6)
MCHC RBC-ENTMCNC: 24.3 PG — LOW (ref 27–34)
MCHC RBC-ENTMCNC: 24.5 PG — LOW (ref 27–34)
MCHC RBC-ENTMCNC: 29.1 G/DL — LOW (ref 32–36)
MCHC RBC-ENTMCNC: 29.6 G/DL — LOW (ref 32–36)
MCV RBC AUTO: 82.2 FL — SIGNIFICANT CHANGE UP (ref 80–100)
MCV RBC AUTO: 84.2 FL — SIGNIFICANT CHANGE UP (ref 80–100)
NRBC BLD AUTO-RTO: 0 /100 WBCS — SIGNIFICANT CHANGE UP (ref 0–0)
NRBC BLD AUTO-RTO: 0 /100 WBCS — SIGNIFICANT CHANGE UP (ref 0–0)
PCO2 BLDA: 52 MMHG — HIGH (ref 32–35)
PCO2 BLDA: 58 MMHG — HIGH (ref 32–35)
PCO2 BLDA: 73 MMHG — CRITICAL HIGH (ref 32–35)
PH BLDA: 7.3 — LOW (ref 7.35–7.45)
PH BLDA: 7.35 — SIGNIFICANT CHANGE UP (ref 7.35–7.45)
PH BLDA: 7.44 — SIGNIFICANT CHANGE UP (ref 7.35–7.45)
PHOSPHATE SERPL-MCNC: 2.1 MG/DL — LOW (ref 2.5–4.5)
PHOSPHATE SERPL-MCNC: 2.9 MG/DL — SIGNIFICANT CHANGE UP (ref 2.5–4.5)
PLATELET # BLD AUTO: 163 K/UL — SIGNIFICANT CHANGE UP (ref 150–400)
PLATELET # BLD AUTO: 168 K/UL — SIGNIFICANT CHANGE UP (ref 150–400)
PO2 BLDA: 58 MMHG — LOW (ref 83–108)
PO2 BLDA: 74 MMHG — LOW (ref 83–108)
PO2 BLDA: 85 MMHG — SIGNIFICANT CHANGE UP (ref 83–108)
POTASSIUM SERPL-MCNC: 3.4 MMOL/L — LOW (ref 3.5–5.3)
POTASSIUM SERPL-MCNC: 4.3 MMOL/L — SIGNIFICANT CHANGE UP (ref 3.5–5.3)
POTASSIUM SERPL-SCNC: 3.4 MMOL/L — LOW (ref 3.5–5.3)
POTASSIUM SERPL-SCNC: 4.3 MMOL/L — SIGNIFICANT CHANGE UP (ref 3.5–5.3)
PROT SERPL-MCNC: 5.9 G/DL — LOW (ref 6–8.3)
RBC # BLD: 2.73 M/UL — LOW (ref 3.8–5.2)
RBC # BLD: 2.92 M/UL — LOW (ref 3.8–5.2)
RBC # FLD: 19.1 % — HIGH (ref 10.3–14.5)
RBC # FLD: 19.1 % — HIGH (ref 10.3–14.5)
SAO2 % BLDA: 91.7 % — LOW (ref 94–98)
SAO2 % BLDA: 97.1 % — SIGNIFICANT CHANGE UP (ref 94–98)
SAO2 % BLDA: 98.6 % — HIGH (ref 94–98)
SODIUM SERPL-SCNC: 147 MMOL/L — HIGH (ref 135–145)
SODIUM SERPL-SCNC: 149 MMOL/L — HIGH (ref 135–145)
TIBC SERPL-MCNC: 229 UG/DL — SIGNIFICANT CHANGE UP (ref 220–430)
UIBC SERPL-MCNC: 148 UG/DL — SIGNIFICANT CHANGE UP (ref 110–370)
WBC # BLD: 5.52 K/UL — SIGNIFICANT CHANGE UP (ref 3.8–10.5)
WBC # BLD: 7.71 K/UL — SIGNIFICANT CHANGE UP (ref 3.8–10.5)
WBC # FLD AUTO: 5.52 K/UL — SIGNIFICANT CHANGE UP (ref 3.8–10.5)
WBC # FLD AUTO: 7.71 K/UL — SIGNIFICANT CHANGE UP (ref 3.8–10.5)

## 2025-03-03 PROCEDURE — 99497 ADVNCD CARE PLAN 30 MIN: CPT | Mod: 25

## 2025-03-03 PROCEDURE — 99232 SBSQ HOSP IP/OBS MODERATE 35: CPT

## 2025-03-03 PROCEDURE — 93970 EXTREMITY STUDY: CPT | Mod: 26

## 2025-03-03 PROCEDURE — 93010 ELECTROCARDIOGRAM REPORT: CPT

## 2025-03-03 RX ORDER — POTASSIUM CHLORIDE, DEXTROSE MONOHYDRATE AND SODIUM CHLORIDE 150; 5; 900 MG/100ML; G/100ML; MG/100ML
1000 INJECTION, SOLUTION INTRAVENOUS
Refills: 0 | Status: DISCONTINUED | OUTPATIENT
Start: 2025-03-03 | End: 2025-03-03

## 2025-03-03 RX ORDER — BISACODYL 5 MG
10 TABLET, DELAYED RELEASE (ENTERIC COATED) ORAL EVERY 24 HOURS
Refills: 0 | Status: DISCONTINUED | OUTPATIENT
Start: 2025-03-03 | End: 2025-03-13

## 2025-03-03 RX ORDER — INSULIN GLARGINE-YFGN 100 [IU]/ML
10 INJECTION, SOLUTION SUBCUTANEOUS ONCE
Refills: 0 | Status: COMPLETED | OUTPATIENT
Start: 2025-03-03 | End: 2025-03-03

## 2025-03-03 RX ORDER — INSULIN GLARGINE-YFGN 100 [IU]/ML
10 INJECTION, SOLUTION SUBCUTANEOUS EVERY MORNING
Refills: 0 | Status: DISCONTINUED | OUTPATIENT
Start: 2025-03-04 | End: 2025-03-13

## 2025-03-03 RX ORDER — METOPROLOL SUCCINATE 50 MG/1
5 TABLET, EXTENDED RELEASE ORAL EVERY 6 HOURS
Refills: 0 | Status: DISCONTINUED | OUTPATIENT
Start: 2025-03-03 | End: 2025-03-05

## 2025-03-03 RX ORDER — SODIUM CHLORIDE 9 G/1000ML
1000 INJECTION, SOLUTION INTRAVENOUS
Refills: 0 | Status: DISCONTINUED | OUTPATIENT
Start: 2025-03-03 | End: 2025-03-04

## 2025-03-03 RX ADMIN — Medication 1 APPLICATION(S): at 05:29

## 2025-03-03 RX ADMIN — Medication 10 MILLIGRAM(S): at 07:57

## 2025-03-03 RX ADMIN — Medication 2.5 MILLIGRAM(S): at 03:06

## 2025-03-03 RX ADMIN — Medication 40 MILLIGRAM(S): at 06:29

## 2025-03-03 RX ADMIN — METHYLPREDNISOLONE ACETATE 40 MILLIGRAM(S): 80 INJECTION, SUSPENSION INTRA-ARTICULAR; INTRALESIONAL; INTRAMUSCULAR; SOFT TISSUE at 05:28

## 2025-03-03 RX ADMIN — INSULIN LISPRO 6: 100 INJECTION, SOLUTION INTRAVENOUS; SUBCUTANEOUS at 07:58

## 2025-03-03 RX ADMIN — AMLODIPINE BESYLATE 10 MILLIGRAM(S): 10 TABLET ORAL at 05:28

## 2025-03-03 RX ADMIN — OSELTAMIVIR PHOSPHATE 75 MILLIGRAM(S): 75 CAPSULE ORAL at 00:20

## 2025-03-03 RX ADMIN — Medication 2.5 MILLIGRAM(S): at 15:31

## 2025-03-03 RX ADMIN — METOPROLOL SUCCINATE 12.5 MILLIGRAM(S): 50 TABLET, EXTENDED RELEASE ORAL at 05:27

## 2025-03-03 RX ADMIN — METHYLPREDNISOLONE ACETATE 40 MILLIGRAM(S): 80 INJECTION, SUSPENSION INTRA-ARTICULAR; INTRALESIONAL; INTRAMUSCULAR; SOFT TISSUE at 17:48

## 2025-03-03 RX ADMIN — Medication 1 TABLET(S): at 12:15

## 2025-03-03 RX ADMIN — CEFEPIME 100 MILLIGRAM(S): 2 INJECTION, POWDER, FOR SOLUTION INTRAVENOUS at 13:47

## 2025-03-03 RX ADMIN — Medication 1 DOSE(S): at 08:25

## 2025-03-03 RX ADMIN — CEFEPIME 100 MILLIGRAM(S): 2 INJECTION, POWDER, FOR SOLUTION INTRAVENOUS at 21:04

## 2025-03-03 RX ADMIN — SODIUM CHLORIDE 75 MILLILITER(S): 9 INJECTION, SOLUTION INTRAVENOUS at 05:30

## 2025-03-03 RX ADMIN — INSULIN LISPRO 10: 100 INJECTION, SOLUTION INTRAVENOUS; SUBCUTANEOUS at 12:18

## 2025-03-03 RX ADMIN — INSULIN GLARGINE-YFGN 10 UNIT(S): 100 INJECTION, SOLUTION SUBCUTANEOUS at 12:55

## 2025-03-03 RX ADMIN — Medication 40 MILLIEQUIVALENT(S): at 07:57

## 2025-03-03 RX ADMIN — Medication 2.5 MILLIGRAM(S): at 21:09

## 2025-03-03 RX ADMIN — POTASSIUM CHLORIDE, DEXTROSE MONOHYDRATE AND SODIUM CHLORIDE 100 MILLILITER(S): 150; 5; 900 INJECTION, SOLUTION INTRAVENOUS at 09:58

## 2025-03-03 RX ADMIN — POLYETHYLENE GLYCOL 3350 17 GRAM(S): 17 POWDER, FOR SOLUTION ORAL at 12:15

## 2025-03-03 RX ADMIN — Medication 40 MILLIGRAM(S): at 21:04

## 2025-03-03 RX ADMIN — Medication 2.5 MILLIGRAM(S): at 08:24

## 2025-03-03 RX ADMIN — MUPIROCIN CALCIUM 1 APPLICATION(S): 20 CREAM TOPICAL at 05:29

## 2025-03-03 RX ADMIN — METOPROLOL SUCCINATE 5 MILLIGRAM(S): 50 TABLET, EXTENDED RELEASE ORAL at 17:48

## 2025-03-03 RX ADMIN — CEFEPIME 100 MILLIGRAM(S): 2 INJECTION, POWDER, FOR SOLUTION INTRAVENOUS at 05:27

## 2025-03-03 NOTE — PROGRESS NOTE ADULT - SUBJECTIVE AND OBJECTIVE BOX
Follow-up Critical Care Progress Note  Chief Complaint : Acute respiratory failure with hypoxia        Tolerated bipap overnight  Generally weak. frial this AM  Remains with shallow breathing, tacchypneic        Allergies :No Known Allergies      PAST MEDICAL & SURGICAL HISTORY:  Advanced COPD  On supplemental oxygen by nasal cannula  Arthritis  Cancer, colon  HTN (hypertension)  No significant past surgical history        Medications:  MEDICATIONS  (STANDING):  albuterol    0.083% 2.5 milliGRAM(s) Nebulizer every 6 hours  amLODIPine   Tablet 10 milliGRAM(s) Oral daily  bisacodyl Suppository 10 milliGRAM(s) Rectal every 24 hours  cefepime   IVPB 2000 milliGRAM(s) IV Intermittent every 8 hours  chlorhexidine 2% Cloths 1 Application(s) Topical <User Schedule>  dextrose 5% + sodium chloride 0.45% with potassium chloride 10 mEq/L 1000 milliLiter(s) (100 mL/Hr) IV Continuous <Continuous>  dextrose 5%. 1000 milliLiter(s) (50 mL/Hr) IV Continuous <Continuous>  dextrose 5%. 1000 milliLiter(s) (100 mL/Hr) IV Continuous <Continuous>  dextrose 50% Injectable 25 Gram(s) IV Push once  dextrose 50% Injectable 12.5 Gram(s) IV Push once  dextrose 50% Injectable 25 Gram(s) IV Push once  fluconAZOLE   Tablet 100 milliGRAM(s) Oral every 24 hours  fluticasone propionate/ salmeterol 250-50 MICROgram(s) Diskus 1 Dose(s) Inhalation two times a day  insulin lispro (ADMELOG) corrective regimen sliding scale   SubCutaneous Before meals and at bedtime  methylPREDNISolone sodium succinate Injectable 40 milliGRAM(s) IV Push every 12 hours  metoprolol tartrate 12.5 milliGRAM(s) Oral two times a day  multivitamin 1 Tablet(s) Oral daily  mupirocin 2% Nasal 1 Application(s) Both Nostrils two times a day  oseltamivir Suspension 75 milliGRAM(s) Oral every 12 hours  pantoprazole    Tablet 40 milliGRAM(s) Oral before breakfast  polyethylene glycol 3350 17 Gram(s) Oral daily  senna 2 Tablet(s) Oral at bedtime  tamsulosin 0.4 milliGRAM(s) Oral at bedtime    MEDICATIONS  (PRN):  acetaminophen     Tablet .. 650 milliGRAM(s) Oral every 6 hours PRN Temp greater or equal to 38C (100.4F), Mild Pain (1 - 3)      Antibiotics History  cefepime   IVPB 2000 milliGRAM(s) IV Intermittent every 8 hours, 02-27-25 @ 08:41, Stop order after: 5 Days  cefepime   IVPB    , 02-26-25 @ 13:23  cefepime   IVPB 2000 milliGRAM(s) IV Intermittent once, 02-26-25 @ 13:32, Stop order after: 1 Doses  cefepime   IVPB 1000 milliGRAM(s) IV Intermittent every 12 hours, 02-26-25 @ 16:16  fluconAZOLE   Tablet 100 milliGRAM(s) Oral every 24 hours, 03-02-25 @ 14:15, Stop order after: 7 Doses  oseltamivir 75 milliGRAM(s) Oral two times a day, 02-27-25 @ 08:11, Stop order after: 5 Days  oseltamivir Suspension 75 milliGRAM(s) Oral every 12 hours, 02-27-25 @ 09:53  oseltamivir Suspension 75 milliGRAM(s) Oral every 12 hours, 02-27-25 @ 10:24, Stop order after: 5 Days  vancomycin  IVPB 750 milliGRAM(s) IV Intermittent Once, 02-26-25 @ 16:23, Stop order after: 1 Doses  vancomycin  IVPB. 1000 milliGRAM(s) IV Intermittent once, 02-26-25 @ 13:32, Stop order after: 1 Doses      GI Medications  bisacodyl Suppository 10 milliGRAM(s) Rectal every 24 hours, 03-03-25 @ 07:27, Now  pantoprazole    Tablet 40 milliGRAM(s) Oral before breakfast, 03-01-25 @ 08:22, Now  polyethylene glycol 3350 17 Gram(s) Oral daily, 03-02-25 @ 12:16, Routine  senna 2 Tablet(s) Oral at bedtime, 03-02-25 @ 12:15, Routine      COVID  02-26-25 @ 13:30  COVID -   NotDetec      Trend BNP  02-26-25 @ 13:30   -  198    Procalcitonin Trend  02-26-25 @ 13:30   -   0.10[H]    WBC Trend  03-03-25 @ 06:00   -  5.52  03-02-25 @ 06:15   -  6.00  03-01-25 @ 16:32   -  8.33  03-01-25 @ 05:04   -  6.07    H/H Trend  03-03-25 @ 06:00   -   7.1[L]/ 24.0[L]  03-02-25 @ 06:15   -   7.7[L]/ 26.2[L]  03-01-25 @ 16:32   -   8.3[L]/ 27.1[L]  03-01-25 @ 05:04   -   7.8[L]/ 26.5[L]  02-28-25 @ 05:30   -   8.8[L]/ 29.9[L]  02-27-25 @ 06:55   -   9.8[L]/ 32.2[L]    Platelet Trend  03-03-25 @ 06:00   -  168  03-02-25 @ 06:15   -  171  03-01-25 @ 16:32   -  208  03-01-25 @ 05:04   -  191    Trend Sodium  03-03-25 @ 06:00   -  147[H]  03-02-25 @ 06:15   -  143  03-01-25 @ 05:04   -  143    Trend Potassium  03-03-25 @ 06:00   -  3.4[L]  03-02-25 @ 06:15   -  3.2[L]  03-01-25 @ 05:04   -  3.8    Trend Bun/Cr  03-03-25 @ 06:00  BUN/CR -  54[H] / 0.62  03-02-25 @ 06:15  BUN/CR -  56[H] / 0.69  03-01-25 @ 05:04  BUN/CR -  38[H] / 0.57    Lactic Acid Trend  02-27-25 @ 05:51   -   1.0  02-26-25 @ 13:00   -   1.0    ABG Trend  03-03-25 @ 04:10   - 7.44/52[H]/74[L]/97.1  03-02-25 @ 05:00   - 7.37/62[H]/84/97.5  03-01-25 @ 05:00   - 7.51[H]/49[H]/61[L]/93.8[L]  02-28-25 @ 05:30   - 7.43/58[H]/76[L]/96.5  02-27-25 @ 04:30   - 7.43/55[H]/93/98.6[H]  02-26-25 @ 16:18   - 7.31[L]/72[HH]/114[H]/99.2[H]  02-26-25 @ 13:35   - 7.30[L]/84[HH]/118[H]/99.1[H]    Trend AST/ALT/ALK Phos/Bili  03-03-25 @ 06:00   11/27/79/0.6  03-02-25 @ 06:15   12/22/89/0.6  03-01-25 @ 05:04   14/22/87/0.7  02-28-25 @ 05:30   16/17/90/0.7  02-27-25 @ 05:51   15/19/105/0.7  02-26-25 @ 13:30   20/24/109/0.8    Albumin Trend  03-03-25 @ 06:00   -   2.7[L]  03-02-25 @ 06:15   -   2.8[L]  03-01-25 @ 05:04   -   2.6[L]  02-28-25 @ 05:30   -   2.6[L]  02-27-25 @ 05:51   -   2.9[L]  02-26-25 @ 13:30   -   3.1[L]      PTT - PT - INR Trend  02-26-25 @ 13:30   -   27.1 - 11.3 - 0.96    Glucose Trend  03-03-25 @ 07:52   -  -- -- 273[H]  03-03-25 @ 06:00   -  182[H] -- --  03-02-25 @ 21:39   -  -- -- 217[H]  03-02-25 @ 17:27   -  -- -- 172[H]  03-02-25 @ 11:04   -  -- -- 272[H]  03-02-25 @ 06:15   -  216[H] -- --  03-02-25 @ 05:57   -  -- -- 214[H]  03-01-25 @ 21:26   -  -- -- 150[H]  03-01-25 @ 17:57   -  -- -- 192[H]  03-01-25 @ 12:23   -  -- -- 164[H]    A1C with Estimated Average Glucose Result: 6.6 % *H* [4.0 - 5.6] (02-27-25 @ 05:51)      LABS:                        7.1    5.52  )-----------( 168      ( 03 Mar 2025 06:00 )             24.0     03-03    147[H]  |  109[H]  |  54[H]  ----------------------------<  182[H]  3.4[L]   |  37[H]  |  0.62    Ca    10.2      03 Mar 2025 06:00  Phos  2.9     03-03  Mg     2.3     03-03    TPro  5.9[L]  /  Alb  2.7[L]  /  TBili  0.6  /  DBili  x   /  AST  11  /  ALT  27  /  AlkPhos  79  03-03      Urinalysis Basic - ( 03 Mar 2025 06:00 )    Color: x / Appearance: x / SG: x / pH: x  Gluc: 182 mg/dL / Ketone: x  / Bili: x / Urobili: x   Blood: x / Protein: x / Nitrite: x   Leuk Esterase: x / RBC: x / WBC x   Sq Epi: x / Non Sq Epi: x / Bacteria: x      CULTURES: (if applicable)    Culture - Blood (collected 02-26-25 @ 13:30)  Source: .Blood Blood-Peripheral  Preliminary Report (03-03-25 @ 05:01):    No growth at 4 days    Culture - Blood (collected 02-26-25 @ 13:30)  Source: .Blood Blood-Peripheral  Preliminary Report (03-03-25 @ 05:01):    No growth at 4 days      Rapid RVP Result: Detected (02-26-25 @ 13:30)      ABG - ( 03 Mar 2025 04:10 )  pH, Arterial: 7.44  pH, Blood: x     /  pCO2: 52    /  pO2: 74    / HCO3: 35    / Base Excess: 11.1  /  SaO2: 97.1        POCT Blood Glucose.: 273 mg/dL (03 Mar 2025 07:52)      RADIOLOGY  CXR: < from: Xray Chest 1 View- PORTABLE-Routine (03.02.25 @ 09:11) >  IMPRESSION: Persistent left base infiltrate. Improving small right base   infiltrate. Small vaguely defined density in the right upper outer chest   is again noted. This is shown on CAT scan of February 26 to be a rib   fracture.    < end of copied text >      CT: < from: CT Head No Cont (03.01.25 @ 09:56) >  IMPRESSION: No evidence of acute hemorrhage mass or mass effect.    < end of copied text >  < from: CT Angio Chest PE Protocol w/ IV Cont (02.26.25 @ 14:54) >  IMPRESSION:  Segmental and subsegmental pulmonary embolism right lower lobe.  Bilateral lower lobe opacities that may represent a combination of   atelectasis and infiltrates. Additional possibility of a pulmonary   infarct in the right lower lobe.  Severe pulmonary emphysema.    < end of copied text >        VITALS:  T(C): 36.4 (03-03-25 @ 04:00), Max: 36.6 (03-02-25 @ 21:00)  T(F): 97.5 (03-03-25 @ 04:00), Max: 97.8 (03-02-25 @ 21:00)  HR: 98 (03-03-25 @ 06:00) (86 - 110)  BP: 146/74 (03-03-25 @ 06:00) (106/81 - 150/81)  BP(mean): 91 (03-03-25 @ 06:00) (79 - 105)  RR: 19 (03-03-25 @ 06:00) (13 - 27)  SpO2: 96% (03-03-25 @ 06:00) (92% - 100%) on 40L/ 40% HFNC    Ins and Outs     03-02-25 @ 07:01  -  03-03-25 @ 07:00  --------------------------------------------------------  IN: 1275 mL / OUT: 1150 mL / NET: 125 mL          I&O's Detail    02 Mar 2025 07:01  -  03 Mar 2025 07:00  --------------------------------------------------------  IN:    dextrose 5% + lactated ringers: 1275 mL  Total IN: 1275 mL    OUT:    Intermittent Catheterization - Urethral (mL): 850 mL    Voided (mL): 300 mL  Total OUT: 1150 mL    Total NET: 125 mL          Physical Examination:  GENERAL:               Alert, Oriented, moderate distress.  , ill appearing, frail, thin   HEENT:                    Pupils equal, reactive to light.  Symmetric. No JVD, Moist MM  PULM:                     Bilateral air entry, diminshed bilaterally, no significant sputum production, tachypneic increased WOB  CVS:                         S1, S2,  No Murmur  ABD:                        Soft, +distended, nontender, normoactive bowel sounds,   EXT:                         No edema, nontender, No Cyanosis or Clubbing   Vascular:                Warm Extremities, Normal Capillary refill, Normal Distal Pulses  SKIN:                       Warm and well perfused, no rashes noted.   NEURO:                  Alert, oriented, interactive, nonfocal, follows commands  PSYC:                      Calm, + Insight.             Follow-up Critical Care Progress Note  Chief Complaint : Acute respiratory failure with hypoxia        Tolerated bipap overnight  Generally weak. frial this AM  Remains with shallow breathing, tachypneic        Allergies :No Known Allergies      PAST MEDICAL & SURGICAL HISTORY:  Advanced COPD  On supplemental oxygen by nasal cannula  Arthritis  Cancer, colon  HTN (hypertension)  No significant past surgical history        Medications:  MEDICATIONS  (STANDING):  albuterol    0.083% 2.5 milliGRAM(s) Nebulizer every 6 hours  amLODIPine   Tablet 10 milliGRAM(s) Oral daily  bisacodyl Suppository 10 milliGRAM(s) Rectal every 24 hours  cefepime   IVPB 2000 milliGRAM(s) IV Intermittent every 8 hours  chlorhexidine 2% Cloths 1 Application(s) Topical <User Schedule>  dextrose 5% + sodium chloride 0.45% with potassium chloride 10 mEq/L 1000 milliLiter(s) (100 mL/Hr) IV Continuous <Continuous>  dextrose 5%. 1000 milliLiter(s) (50 mL/Hr) IV Continuous <Continuous>  dextrose 5%. 1000 milliLiter(s) (100 mL/Hr) IV Continuous <Continuous>  dextrose 50% Injectable 25 Gram(s) IV Push once  dextrose 50% Injectable 12.5 Gram(s) IV Push once  dextrose 50% Injectable 25 Gram(s) IV Push once  fluconAZOLE   Tablet 100 milliGRAM(s) Oral every 24 hours  fluticasone propionate/ salmeterol 250-50 MICROgram(s) Diskus 1 Dose(s) Inhalation two times a day  insulin lispro (ADMELOG) corrective regimen sliding scale   SubCutaneous Before meals and at bedtime  methylPREDNISolone sodium succinate Injectable 40 milliGRAM(s) IV Push every 12 hours  metoprolol tartrate 12.5 milliGRAM(s) Oral two times a day  multivitamin 1 Tablet(s) Oral daily  mupirocin 2% Nasal 1 Application(s) Both Nostrils two times a day  oseltamivir Suspension 75 milliGRAM(s) Oral every 12 hours  pantoprazole    Tablet 40 milliGRAM(s) Oral before breakfast  polyethylene glycol 3350 17 Gram(s) Oral daily  senna 2 Tablet(s) Oral at bedtime  tamsulosin 0.4 milliGRAM(s) Oral at bedtime    MEDICATIONS  (PRN):  acetaminophen     Tablet .. 650 milliGRAM(s) Oral every 6 hours PRN Temp greater or equal to 38C (100.4F), Mild Pain (1 - 3)      Antibiotics History  cefepime   IVPB 2000 milliGRAM(s) IV Intermittent every 8 hours, 02-27-25 @ 08:41, Stop order after: 5 Days  cefepime   IVPB    , 02-26-25 @ 13:23  cefepime   IVPB 2000 milliGRAM(s) IV Intermittent once, 02-26-25 @ 13:32, Stop order after: 1 Doses  cefepime   IVPB 1000 milliGRAM(s) IV Intermittent every 12 hours, 02-26-25 @ 16:16  fluconAZOLE   Tablet 100 milliGRAM(s) Oral every 24 hours, 03-02-25 @ 14:15, Stop order after: 7 Doses  oseltamivir 75 milliGRAM(s) Oral two times a day, 02-27-25 @ 08:11, Stop order after: 5 Days  oseltamivir Suspension 75 milliGRAM(s) Oral every 12 hours, 02-27-25 @ 09:53  oseltamivir Suspension 75 milliGRAM(s) Oral every 12 hours, 02-27-25 @ 10:24, Stop order after: 5 Days  vancomycin  IVPB 750 milliGRAM(s) IV Intermittent Once, 02-26-25 @ 16:23, Stop order after: 1 Doses  vancomycin  IVPB. 1000 milliGRAM(s) IV Intermittent once, 02-26-25 @ 13:32, Stop order after: 1 Doses      GI Medications  bisacodyl Suppository 10 milliGRAM(s) Rectal every 24 hours, 03-03-25 @ 07:27, Now  pantoprazole    Tablet 40 milliGRAM(s) Oral before breakfast, 03-01-25 @ 08:22, Now  polyethylene glycol 3350 17 Gram(s) Oral daily, 03-02-25 @ 12:16, Routine  senna 2 Tablet(s) Oral at bedtime, 03-02-25 @ 12:15, Routine      COVID  02-26-25 @ 13:30  COVID -   NotDetec      Trend BNP  02-26-25 @ 13:30   -  198    Procalcitonin Trend  02-26-25 @ 13:30   -   0.10[H]    WBC Trend  03-03-25 @ 06:00   -  5.52  03-02-25 @ 06:15   -  6.00  03-01-25 @ 16:32   -  8.33  03-01-25 @ 05:04   -  6.07    H/H Trend  03-03-25 @ 06:00   -   7.1[L]/ 24.0[L]  03-02-25 @ 06:15   -   7.7[L]/ 26.2[L]  03-01-25 @ 16:32   -   8.3[L]/ 27.1[L]  03-01-25 @ 05:04   -   7.8[L]/ 26.5[L]  02-28-25 @ 05:30   -   8.8[L]/ 29.9[L]  02-27-25 @ 06:55   -   9.8[L]/ 32.2[L]    Platelet Trend  03-03-25 @ 06:00   -  168  03-02-25 @ 06:15   -  171  03-01-25 @ 16:32   -  208  03-01-25 @ 05:04   -  191    Trend Sodium  03-03-25 @ 06:00   -  147[H]  03-02-25 @ 06:15   -  143  03-01-25 @ 05:04   -  143    Trend Potassium  03-03-25 @ 06:00   -  3.4[L]  03-02-25 @ 06:15   -  3.2[L]  03-01-25 @ 05:04   -  3.8    Trend Bun/Cr  03-03-25 @ 06:00  BUN/CR -  54[H] / 0.62  03-02-25 @ 06:15  BUN/CR -  56[H] / 0.69  03-01-25 @ 05:04  BUN/CR -  38[H] / 0.57    Lactic Acid Trend  02-27-25 @ 05:51   -   1.0  02-26-25 @ 13:00   -   1.0    ABG Trend  03-03-25 @ 04:10   - 7.44/52[H]/74[L]/97.1  03-02-25 @ 05:00   - 7.37/62[H]/84/97.5  03-01-25 @ 05:00   - 7.51[H]/49[H]/61[L]/93.8[L]  02-28-25 @ 05:30   - 7.43/58[H]/76[L]/96.5  02-27-25 @ 04:30   - 7.43/55[H]/93/98.6[H]  02-26-25 @ 16:18   - 7.31[L]/72[HH]/114[H]/99.2[H]  02-26-25 @ 13:35   - 7.30[L]/84[HH]/118[H]/99.1[H]    Trend AST/ALT/ALK Phos/Bili  03-03-25 @ 06:00   11/27/79/0.6  03-02-25 @ 06:15   12/22/89/0.6  03-01-25 @ 05:04   14/22/87/0.7  02-28-25 @ 05:30   16/17/90/0.7  02-27-25 @ 05:51   15/19/105/0.7  02-26-25 @ 13:30   20/24/109/0.8    Albumin Trend  03-03-25 @ 06:00   -   2.7[L]  03-02-25 @ 06:15   -   2.8[L]  03-01-25 @ 05:04   -   2.6[L]  02-28-25 @ 05:30   -   2.6[L]  02-27-25 @ 05:51   -   2.9[L]  02-26-25 @ 13:30   -   3.1[L]      PTT - PT - INR Trend  02-26-25 @ 13:30   -   27.1 - 11.3 - 0.96    Glucose Trend  03-03-25 @ 07:52   -  -- -- 273[H]  03-03-25 @ 06:00   -  182[H] -- --  03-02-25 @ 21:39   -  -- -- 217[H]  03-02-25 @ 17:27   -  -- -- 172[H]  03-02-25 @ 11:04   -  -- -- 272[H]  03-02-25 @ 06:15   -  216[H] -- --  03-02-25 @ 05:57   -  -- -- 214[H]  03-01-25 @ 21:26   -  -- -- 150[H]  03-01-25 @ 17:57   -  -- -- 192[H]  03-01-25 @ 12:23   -  -- -- 164[H]    A1C with Estimated Average Glucose Result: 6.6 % *H* [4.0 - 5.6] (02-27-25 @ 05:51)      LABS:                        7.1    5.52  )-----------( 168      ( 03 Mar 2025 06:00 )             24.0     03-03    147[H]  |  109[H]  |  54[H]  ----------------------------<  182[H]  3.4[L]   |  37[H]  |  0.62    Ca    10.2      03 Mar 2025 06:00  Phos  2.9     03-03  Mg     2.3     03-03    TPro  5.9[L]  /  Alb  2.7[L]  /  TBili  0.6  /  DBili  x   /  AST  11  /  ALT  27  /  AlkPhos  79  03-03      Urinalysis Basic - ( 03 Mar 2025 06:00 )    Color: x / Appearance: x / SG: x / pH: x  Gluc: 182 mg/dL / Ketone: x  / Bili: x / Urobili: x   Blood: x / Protein: x / Nitrite: x   Leuk Esterase: x / RBC: x / WBC x   Sq Epi: x / Non Sq Epi: x / Bacteria: x      CULTURES: (if applicable)    Culture - Blood (collected 02-26-25 @ 13:30)  Source: .Blood Blood-Peripheral  Preliminary Report (03-03-25 @ 05:01):    No growth at 4 days    Culture - Blood (collected 02-26-25 @ 13:30)  Source: .Blood Blood-Peripheral  Preliminary Report (03-03-25 @ 05:01):    No growth at 4 days      Rapid RVP Result: Detected (02-26-25 @ 13:30)      ABG - ( 03 Mar 2025 04:10 )  pH, Arterial: 7.44  pH, Blood: x     /  pCO2: 52    /  pO2: 74    / HCO3: 35    / Base Excess: 11.1  /  SaO2: 97.1        POCT Blood Glucose.: 273 mg/dL (03 Mar 2025 07:52)      RADIOLOGY  CXR: < from: Xray Chest 1 View- PORTABLE-Routine (03.02.25 @ 09:11) >  IMPRESSION: Persistent left base infiltrate. Improving small right base   infiltrate. Small vaguely defined density in the right upper outer chest   is again noted. This is shown on CAT scan of February 26 to be a rib   fracture.    < end of copied text >      CT: < from: CT Head No Cont (03.01.25 @ 09:56) >  IMPRESSION: No evidence of acute hemorrhage mass or mass effect.    < end of copied text >  < from: CT Angio Chest PE Protocol w/ IV Cont (02.26.25 @ 14:54) >  IMPRESSION:  Segmental and subsegmental pulmonary embolism right lower lobe.  Bilateral lower lobe opacities that may represent a combination of   atelectasis and infiltrates. Additional possibility of a pulmonary   infarct in the right lower lobe.  Severe pulmonary emphysema.    < end of copied text >        VITALS:  T(C): 36.4 (03-03-25 @ 04:00), Max: 36.6 (03-02-25 @ 21:00)  T(F): 97.5 (03-03-25 @ 04:00), Max: 97.8 (03-02-25 @ 21:00)  HR: 98 (03-03-25 @ 06:00) (86 - 110)  BP: 146/74 (03-03-25 @ 06:00) (106/81 - 150/81)  BP(mean): 91 (03-03-25 @ 06:00) (79 - 105)  RR: 19 (03-03-25 @ 06:00) (13 - 27)  SpO2: 96% (03-03-25 @ 06:00) (92% - 100%) on 40L/ 40% HFNC    Ins and Outs     03-02-25 @ 07:01  -  03-03-25 @ 07:00  --------------------------------------------------------  IN: 1275 mL / OUT: 1150 mL / NET: 125 mL          I&O's Detail    02 Mar 2025 07:01  -  03 Mar 2025 07:00  --------------------------------------------------------  IN:    dextrose 5% + lactated ringers: 1275 mL  Total IN: 1275 mL    OUT:    Intermittent Catheterization - Urethral (mL): 850 mL    Voided (mL): 300 mL  Total OUT: 1150 mL    Total NET: 125 mL          Physical Examination:  GENERAL:               Alert, Oriented, moderate distress.  , ill appearing, frail, thin   HEENT:                    Pupils equal, reactive to light.  Symmetric. No JVD, Moist MM  PULM:                     Bilateral air entry, diminshed bilaterally, no significant sputum production, tachypneic increased WOB  CVS:                         S1, S2,  No Murmur  ABD:                        Soft, +distended, nontender, normoactive bowel sounds,   EXT:                         No edema, nontender, No Cyanosis or Clubbing   Vascular:                Warm Extremities, Normal Capillary refill, Normal Distal Pulses  SKIN:                       Warm and well perfused, no rashes noted.   NEURO:                  Alert, oriented, interactive, nonfocal, follows commands  PSYC:                      Calm, + Insight.

## 2025-03-03 NOTE — PROGRESS NOTE ADULT - CONVERSATION DETAILS
Spoke with family at bedside regarding pt's lethargy, poor respiratory status and overall poor level of nutrition; explained pt was very sick and Spoke with family at bedside regarding pt's AMS, poor respiratory status and overall poor level of nutrition; explained pt was very sick and and has had increased level of supplemental O2 since last week when I saw her. Expressed concern pt may need to get intubated if her requirements Spoke with family at bedside regarding pt's AMS, poor respiratory status and overall poor level of nutrition; explained pt was very sick and and has had increased level of supplemental O2 since last week when I saw her. Expressed concern pt may need to get intubated if her requirements increase. Aleksey then asked if pt would get a PEG/feeding tube at that Time to aid with her nutrition/strength. Discussed pt could not have feeding tube with bipap on currently which he understood. Discussed in extensive detail pt's current clinical status with PNA, flu, COPD and PE. Aleksey went on further to ask if pt required intubation for a long period of Time what would happen then; we briefly discussed trach/PEG but I said this was too early to tell about these interventions. Doreine was emotional during conversation, supportive care given.   During conversation, pt became very lethargic and BIPAP settings were changed, RT at bedside and ICU team present with ABG showing anemia requiring transfusion and pt retaining CO2.

## 2025-03-03 NOTE — PROGRESS NOTE ADULT - NS ATTEND AMEND GEN_ALL_CORE FT
pt seen and examined   remains frail  d/w family bedside  noted anemia, continue to hold a/c  check US LE Duplex to see if need IVC  filter

## 2025-03-03 NOTE — PROGRESS NOTE ADULT - SUBJECTIVE AND OBJECTIVE BOX
Indication for Geriatrics and Palliative Care Services/INTERVAL HPI: GOC    OVERNIGHT EVENTS: no acute events noted, however pt mentation poor  SUBJECTIVE AND OBJECTIVE: Pt seen and examined at bedside this morning. Sister Ilia was present. Pt reported some pain which sister states is from prior rib fx. Sister stated pt is more alert today but still confused.     Allergies  No Known Allergies    Intolerances    MEDICATIONS  (STANDING):  albuterol    0.083% 2.5 milliGRAM(s) Nebulizer every 6 hours  amLODIPine   Tablet 10 milliGRAM(s) Oral daily  bisacodyl Suppository 10 milliGRAM(s) Rectal every 24 hours  cefepime   IVPB 2000 milliGRAM(s) IV Intermittent every 8 hours  chlorhexidine 2% Cloths 1 Application(s) Topical <User Schedule>  dextrose 5% + sodium chloride 0.45% with potassium chloride 10 mEq/L 1000 milliLiter(s) (100 mL/Hr) IV Continuous <Continuous>  dextrose 5%. 1000 milliLiter(s) (100 mL/Hr) IV Continuous <Continuous>  dextrose 5%. 1000 milliLiter(s) (50 mL/Hr) IV Continuous <Continuous>  dextrose 50% Injectable 25 Gram(s) IV Push once  dextrose 50% Injectable 12.5 Gram(s) IV Push once  dextrose 50% Injectable 25 Gram(s) IV Push once  fluconAZOLE   Tablet 100 milliGRAM(s) Oral every 24 hours  fluticasone propionate/ salmeterol 250-50 MICROgram(s) Diskus 1 Dose(s) Inhalation two times a day  insulin lispro (ADMELOG) corrective regimen sliding scale   SubCutaneous Before meals and at bedtime  methylPREDNISolone sodium succinate Injectable 40 milliGRAM(s) IV Push every 12 hours  metoprolol tartrate 12.5 milliGRAM(s) Oral two times a day  multivitamin 1 Tablet(s) Oral daily  mupirocin 2% Nasal 1 Application(s) Both Nostrils two times a day  oseltamivir Suspension 75 milliGRAM(s) Oral every 12 hours  pantoprazole    Tablet 40 milliGRAM(s) Oral before breakfast  polyethylene glycol 3350 17 Gram(s) Oral daily  senna 2 Tablet(s) Oral at bedtime  tamsulosin 0.4 milliGRAM(s) Oral at bedtime    MEDICATIONS  (PRN):  acetaminophen     Tablet .. 650 milliGRAM(s) Oral every 6 hours PRN Temp greater or equal to 38C (100.4F), Mild Pain (1 - 3)      ITEMS UNCHECKED ARE NOT PRESENT    PRESENT SYMPTOMS:      Pain:  [x ]yes [ ]no  QOL impact - mild  Location -  R chest                  Aggravating factors -  Quality - does not report  Radiation - does not report  Timing- intermittent  Severity (0-10 scale): does not report  Minimal acceptable level (0-10 scale):  does not report    Dyspnea:              mild-mod  Anxiety:                             does not report  Depressed:             does not report  Fatigue:                           none  Nausea:                         none  Loss of appetite:             mild  Constipation:             none      Other Symptoms:  [x ]All other review of systems negative       Chaplaincy Referral: [ ] yes [ ] refused [ ] following [ x] Deferred   Palliative Performance Status Version 2:     40    %     http://npcrc.org/files/news/palliative_performance_scale_ppsv2.pdf    PHYSICAL EXAM:  Vital Signs Last 24 Hrs  T(C): 36.4 (03 Mar 2025 08:00), Max: 36.6 (02 Mar 2025 21:00)  T(F): 97.5 (03 Mar 2025 08:00), Max: 97.8 (02 Mar 2025 21:00)  HR: 85 (03 Mar 2025 10:00) (83 - 110)  BP: 122/67 (03 Mar 2025 10:00) (106/81 - 150/81)  BP(mean): 84 (03 Mar 2025 10:00) (83 - 105)  RR: 22 (03 Mar 2025 10:00) (13 - 27)  SpO2: 94% (03 Mar 2025 10:00) (91% - 100%)    Parameters below as of 03 Mar 2025 10:00  Patient On (Oxygen Delivery Method): nasal cannula, high flow  O2 Flow (L/min): 40  O2 Concentration (%): 40 I&O's Summary    02 Mar 2025 07:01  -  03 Mar 2025 07:00  --------------------------------------------------------  IN: 1375 mL / OUT: 1150 mL / NET: 225 mL    03 Mar 2025 07:01  -  03 Mar 2025 11:22  --------------------------------------------------------  IN: 400 mL / OUT: 0 mL / NET: 400 mL       GENERAL: thin female pt laying in bed with HFNC in place  HEENT: NC/AT, dry mucous membranes  PULMONARY: diminished breath sounds throughout, +tachypnea  CARDIOVASCULAR: +tachycardic   GASTROINTESTINAL: soft, mild distended, nontender  Last BM: unknown  MUSCULOSKELETAL: no edema to BLE; no cyanosis  Psych: confused and sleepy but arousable      LABS:                        7.1    5.52  )-----------( 168      ( 03 Mar 2025 06:00 )             24.0   03-03    147[H]  |  109[H]  |  54[H]  ----------------------------<  182[H]  3.4[L]   |  37[H]  |  0.62    Ca    10.2      03 Mar 2025 06:00  Phos  2.9     03-03  Mg     2.3     03-03    TPro  5.9[L]  /  Alb  2.7[L]  /  TBili  0.6  /  DBili  x   /  AST  11  /  ALT  27  /  AlkPhos  79  03-03      Urinalysis Basic - ( 03 Mar 2025 06:00 )    Color: x / Appearance: x / SG: x / pH: x  Gluc: 182 mg/dL / Ketone: x  / Bili: x / Urobili: x   Blood: x / Protein: x / Nitrite: x   Leuk Esterase: x / RBC: x / WBC x   Sq Epi: x / Non Sq Epi: x / Bacteria: x      RADIOLOGY & ADDITIONAL STUDIES:    PROCEDURE DATE:  03/02/2025          INTERPRETATION:  AP erect chest on March 2, 2025 at 8:41 AM. Patient is   hypoxic.    Heart magnified by technique. Lungs are likely hyperexpanded.    There is a persistent mild left base infiltrate roughly similar to March 1.    On March 1 of 2025 there was a slight right base infiltrate as well shows   improvement on this study.    IMPRESSION: Persistent left base infiltrate. Improving small right base   infiltrate. Small vaguely defined density in the right upper outer chest   is again noted. This is shown on CAT scan of February 26 to be a rib   fracture.    --- End of Report ---      Protein Calorie Malnutrition Present: [ ]mild [ ]moderate [ ]severe [ ]underweight [ ]morbid obesity  https://www.andeal.org/vault/2440/web/files/ONC/Table_Clinical%20Characteristics%20to%20Document%20Malnutrition-White%20JV%20et%20al%202012.pdf    Height (cm): 157.5 (02-26-25 @ 13:19)  Weight (kg): 54.4 (02-26-25 @ 13:19)  BMI (kg/m2): 21.9 (02-26-25 @ 13:19)    [ ]PPSV2 < or = 30%  [ ]significant weight loss [ ]poor nutritional intake [ ]anasarca[ ]Artificial Nutrition    Other REFERRALS:  [ ]Hospice  [ ]Child Life  [ ]Social Work  [ ]Case management [ ]Holistic Therapy  Indication for Geriatrics and Palliative Care Services/INTERVAL HPI: GOC    OVERNIGHT EVENTS: no acute events noted, however pt mentation poor  SUBJECTIVE AND OBJECTIVE: Pt seen and examined at bedside this morning. Sister Ilia was present. Pt reported some pain which sister states is from prior rib fx. Sister stated pt is more alert today but still confused.     Allergies  No Known Allergies    Intolerances    MEDICATIONS  (STANDING):  albuterol    0.083% 2.5 milliGRAM(s) Nebulizer every 6 hours  amLODIPine   Tablet 10 milliGRAM(s) Oral daily  bisacodyl Suppository 10 milliGRAM(s) Rectal every 24 hours  cefepime   IVPB 2000 milliGRAM(s) IV Intermittent every 8 hours  chlorhexidine 2% Cloths 1 Application(s) Topical <User Schedule>  dextrose 5% + sodium chloride 0.45% with potassium chloride 10 mEq/L 1000 milliLiter(s) (100 mL/Hr) IV Continuous <Continuous>  dextrose 5%. 1000 milliLiter(s) (100 mL/Hr) IV Continuous <Continuous>  dextrose 5%. 1000 milliLiter(s) (50 mL/Hr) IV Continuous <Continuous>  dextrose 50% Injectable 25 Gram(s) IV Push once  dextrose 50% Injectable 12.5 Gram(s) IV Push once  dextrose 50% Injectable 25 Gram(s) IV Push once  fluconAZOLE   Tablet 100 milliGRAM(s) Oral every 24 hours  fluticasone propionate/ salmeterol 250-50 MICROgram(s) Diskus 1 Dose(s) Inhalation two times a day  insulin lispro (ADMELOG) corrective regimen sliding scale   SubCutaneous Before meals and at bedtime  methylPREDNISolone sodium succinate Injectable 40 milliGRAM(s) IV Push every 12 hours  metoprolol tartrate 12.5 milliGRAM(s) Oral two times a day  multivitamin 1 Tablet(s) Oral daily  mupirocin 2% Nasal 1 Application(s) Both Nostrils two times a day  oseltamivir Suspension 75 milliGRAM(s) Oral every 12 hours  pantoprazole    Tablet 40 milliGRAM(s) Oral before breakfast  polyethylene glycol 3350 17 Gram(s) Oral daily  senna 2 Tablet(s) Oral at bedtime  tamsulosin 0.4 milliGRAM(s) Oral at bedtime    MEDICATIONS  (PRN):  acetaminophen     Tablet .. 650 milliGRAM(s) Oral every 6 hours PRN Temp greater or equal to 38C (100.4F), Mild Pain (1 - 3)      ITEMS UNCHECKED ARE NOT PRESENT    PRESENT SYMPTOMS:      Pain:  [x ]yes [ ]no  QOL impact - mild  Location -  R chest                  Aggravating factors -  Quality - does not report  Radiation - does not report  Timing- intermittent  Severity (0-10 scale): does not report  Minimal acceptable level (0-10 scale):  does not report    Dyspnea:              mild-mod  Anxiety:                             does not report  Depressed:             does not report  Fatigue:                           none  Nausea:                         none  Loss of appetite:             mild  Constipation:             none      Other Symptoms:  [x ]All other review of systems negative       Chaplaincy Referral: [ ] yes [ ] refused [ ] following [ x] Deferred   Palliative Performance Status Version 2:     40    %     http://npcrc.org/files/news/palliative_performance_scale_ppsv2.pdf    PHYSICAL EXAM:  Vital Signs Last 24 Hrs  T(C): 36.4 (03 Mar 2025 08:00), Max: 36.6 (02 Mar 2025 21:00)  T(F): 97.5 (03 Mar 2025 08:00), Max: 97.8 (02 Mar 2025 21:00)  HR: 85 (03 Mar 2025 10:00) (83 - 110)  BP: 122/67 (03 Mar 2025 10:00) (106/81 - 150/81)  BP(mean): 84 (03 Mar 2025 10:00) (83 - 105)  RR: 22 (03 Mar 2025 10:00) (13 - 27)  SpO2: 94% (03 Mar 2025 10:00) (91% - 100%)    Parameters below as of 03 Mar 2025 10:00  Patient On (Oxygen Delivery Method): nasal cannula, high flow  O2 Flow (L/min): 40  O2 Concentration (%): 40 I&O's Summary    02 Mar 2025 07:01  -  03 Mar 2025 07:00  --------------------------------------------------------  IN: 1375 mL / OUT: 1150 mL / NET: 225 mL    03 Mar 2025 07:01  -  03 Mar 2025 11:22  --------------------------------------------------------  IN: 400 mL / OUT: 0 mL / NET: 400 mL       GENERAL: thin female pt laying in bed with HFNC in place  HEENT: NC/AT, dry mucous membranes  PULMONARY: diminished breath sounds throughout, +tachypnea  CARDIOVASCULAR: +tachycardic   GASTROINTESTINAL: soft, mild distended, nontender  Last BM: unknown  MUSCULOSKELETAL: no edema to BLE; no cyanosis  Psych: confused and sleepy but arousable      LABS:                        7.1    5.52  )-----------( 168      ( 03 Mar 2025 06:00 )             24.0   03-03    147[H]  |  109[H]  |  54[H]  ----------------------------<  182[H]  3.4[L]   |  37[H]  |  0.62    Ca    10.2      03 Mar 2025 06:00  Phos  2.9     03-03  Mg     2.3     03-03    TPro  5.9[L]  /  Alb  2.7[L]  /  TBili  0.6  /  DBili  x   /  AST  11  /  ALT  27  /  AlkPhos  79  03-03      Urinalysis Basic - ( 03 Mar 2025 06:00 )    Color: x / Appearance: x / SG: x / pH: x  Gluc: 182 mg/dL / Ketone: x  / Bili: x / Urobili: x   Blood: x / Protein: x / Nitrite: x   Leuk Esterase: x / RBC: x / WBC x   Sq Epi: x / Non Sq Epi: x / Bacteria: x      RADIOLOGY & ADDITIONAL STUDIES:    PROCEDURE DATE:  03/02/2025          INTERPRETATION:  AP erect chest on March 2, 2025 at 8:41 AM. Patient is   hypoxic.    Heart magnified by technique. Lungs are likely hyperexpanded.    There is a persistent mild left base infiltrate roughly similar to March 1.    On March 1 of 2025 there was a slight right base infiltrate as well shows   improvement on this study.    IMPRESSION: Persistent left base infiltrate. Improving small right base   infiltrate. Small vaguely defined density in the right upper outer chest   is again noted. This is shown on CAT scan of February 26 to be a rib   fracture.    --- End of Report ---      Protein Calorie Malnutrition Present: severe  https://www.andeal.org/vault/8770/web/files/ONC/Table_Clinical%20Characteristics%20to%20Document%20Malnutrition-White%20JV%20et%20al%202012.pdf    Height (cm): 157.5 (02-26-25 @ 13:19)  Weight (kg): 54.4 (02-26-25 @ 13:19)  BMI (kg/m2): 21.9 (02-26-25 @ 13:19)    [ ]PPSV2 < or = 30%  [ ]significant weight loss [ ]poor nutritional intake [ ]anasarca[ ]Artificial Nutrition    Other REFERRALS:  [ ]Hospice  [ ]Child Life  [ ]Social Work  [ ]Case management [ ]Holistic Therapy

## 2025-03-03 NOTE — PROGRESS NOTE ADULT - ASSESSMENT
69 year old female with h/o COPD - on home o2 (2-4 L), Ex smoker + Vaping, RA/Arthritis, Colon cancer s/p chemo with neuropathy, HTN, h/o PE who p/w increaisng sob and ams. of note pateint was just hospitalized in [facility] 2/3-10/2025 for both pneumonia and COPD exacerbation. patient was sent to  Rehab. Palliative care c/s for [facility]    Debility  - PPS 40%  - PT eval when able    Metabolic encephalopathy in setting of infection below    -  pt more lethargic compared to last week and still with +confusion  - supportive care    Acute Hypoxic and hypercarbic respiratory failure   Acute flu A and Left lower lobe Pna  Segmental and subsegmental pulmonary embolism right lower lobe  Chronic Advanced COPD with supplemental oxygen by nasal cannula  - AC per primary medical team on hold due to worsening pt anemia, pending stool occult blood  - pt requiring BiPAP, in ICU currently now on HFNC  - Tamiflu and abx per ICU team    H/o colon cancer  - pt last chemo 9/25/24; follows with Dr. Patrick in [place], sees every 3 months  - Oncology consulted - No plans for systemic chemotherapy.  Will follow-up in ambulatory with Dr. Olmedo.  Recommend indefinite anticoagulation.     Advanced care planning  - Surrogate: spouse Rasheed Taylor ; sister Jenniffer  - Pt is FULL CODE    Encounter for Palliative Care  - Spoke with pt and sister Ilia at bedside, pt was confused. Discussed with pt's sister that she was requiring more supplemental O2 than when I saw her last week and even though pt is young, she is frail with severely diseased lungs. Expressed concern that pt is doing worse than last week and if she does not improve, intubation and more aggressive medical interventions may be needed. Offered to call pt's spouse but pt deferred to her sister and Rosas said she will let pt's spouse know. Supportive care given.   - Case discussed with ICU team Dr. Jane.  Pt is frail appearing, with poor PO intake and worsening O2 status. Prognosis is guarded   - Will continue to follow for supportive care.    69 year old female with h/o COPD - on home o2 (2-4 L), Ex smoker + Vaping, RA/Arthritis, Colon cancer s/p chemo with neuropathy, HTN, h/o PE who p/w increaisng sob and ams. of note pateint was just hospitalized in [facility] 2/3-10/2025 for both pneumonia and COPD exacerbation. patient was sent to  Rehab. Palliative care c/s for [facility]    Debility  - PPS 40%  - PT eval when able    Failure to thrive  Severe protein calorie malnutrition  - pt with poor PO intake, electrolyte abnormalities  - nutrition consult    Metabolic encephalopathy in setting of infection below    -  pt more lethargic compared to last week and still with +confusion  - supportive care    Acute Hypoxic and hypercarbic respiratory failure   Acute flu A and Left lower lobe Pna  Segmental and subsegmental pulmonary embolism right lower lobe  Chronic Advanced COPD with supplemental oxygen by nasal cannula  - AC per primary medical team on hold due to worsening pt anemia, pending stool occult blood  - pt requiring BiPAP, in ICU currently now on HFNC  - Tamiflu and abx per ICU team    H/o colon cancer  - pt last chemo 9/25/24; follows with Dr. Patrick in [place], sees every 3 months  - Oncology consulted - No plans for systemic chemotherapy.  Will follow-up in ambulatory with Dr. Olmedo.  Recommend indefinite anticoagulation.     Advanced care planning  - Surrogate: spouse Rasheed Taylor ; sister Jenniffer  - Pt is FULL CODE    Encounter for Palliative Care  - Spoke with pt and sister Ilia at bedside, pt was confused. Discussed with pt's sister that she was requiring more supplemental O2 than when I saw her last week and even though pt is young, she is frail with severely diseased lungs. Expressed concern that pt is doing worse than last week and if she does not improve, intubation and more aggressive medical interventions may be needed. Offered to call pt's spouse but pt deferred to her sister and Rosas said she will let pt's spouse know. Supportive care given.   - Case discussed with ICU team Dr. Jane.  Pt is frail appearing, with poor PO intake and worsening O2 status. Prognosis is guarded   - Will continue to follow for supportive care.    69 year old female with h/o COPD - on home o2 (2-4 L), Ex smoker + Vaping, RA/Arthritis, Colon cancer s/p chemo with neuropathy, HTN, h/o PE who p/w increaisng sob and ams. of note pateint was just hospitalized in [facility] 2/3-10/2025 for both pneumonia and COPD exacerbation. patient was sent to  Rehab. Palliative care c/s for [facility]    Debility  - PPS 40%  - PT eval when able    Failure to thrive  Severe protein calorie malnutrition  - pt with poor PO intake, electrolyte abnormalities  - nutrition consult    Metabolic encephalopathy in setting of infection below    -  pt more lethargic compared to last week and still with +confusion  - supportive care    Acute Hypoxic and hypercarbic respiratory failure   Acute flu A and Left lower lobe Pna  Segmental and subsegmental pulmonary embolism right lower lobe  Chronic Advanced COPD with supplemental oxygen by nasal cannula  - AC per primary medical team on hold due to worsening pt anemia, pending stool occult blood  - pt requiring BiPAP, in ICU currently now on HFNC  - Tamiflu and abx per ICU team    H/o colon cancer  - pt last chemo 9/25/24; follows with Dr. Patrick in [place], sees every 3 months  - Oncology consulted - No plans for systemic chemotherapy.  Will follow-up in ambulatory with Dr. Olmedo.  Recommend indefinite anticoagulation.     Advanced care planning  - Surrogate: spouse Rasheed Taylor ; sister Jenniffer  - Pt is FULL CODE    Encounter for Palliative Care  - Spoke with pt and sister Ilia at bedside, pt was confused. Discussed with pt's sister that she was requiring more supplemental O2 than when I saw her last week and even though pt is young, she is frail with severely diseased lungs. Expressed concern that pt is doing worse than last week and if she does not improve, intubation and more aggressive medical interventions may be needed. Offered to call pt's spouse but pt deferred to her sister and Rosas said she will let pt's spouse know. Supportive care given.   - Case discussed with ICU team Dr. Jane.  Pt is frail appearing, with poor PO intake and worsening O2 status. Prognosis is guarded   - Will continue to follow for supportive care.   - Spoke with pt's spouse and family at bedside, see [facility] note above. Family asked about intubation and artificial nutrition, ok with intubation. Pt remains FULL CODE

## 2025-03-03 NOTE — PROGRESS NOTE ADULT - SUBJECTIVE AND OBJECTIVE BOX
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  This is 1 of multiple admission in the past month including previous to admission at [facility] (2/3 - 10/2025 and 2/17–18/2025) with pneumonia and COPD exacerbation.  She is was discharged to clinical hospital for in-hospital acute rehabilitation, but decompensated from respiratory perspective and is presently on antibiotics and prednisone monitored in ICU.  Patient presents with generalized weakness, difficulty ambulating.  Reportedly she has a history of colon cancer diagnosed at [place] in 2024, treated with chemotherapy under the care of Dr. Kaleb Olmedo, currently in remission.  Oncology consulted as above.      MEDICATIONS:  acetaminophen     Tablet .. 650 milliGRAM(s) Oral every 6 hours PRN  albuterol    0.083% 2.5 milliGRAM(s) Nebulizer every 6 hours  amLODIPine   Tablet 10 milliGRAM(s) Oral daily  cefepime   IVPB 2000 milliGRAM(s) IV Intermittent every 8 hours  chlorhexidine 2% Cloths 1 Application(s) Topical <User Schedule>  dextrose 5% + lactated ringers. 1000 milliLiter(s) IV Continuous <Continuous>  dextrose 5%. 1000 milliLiter(s) IV Continuous <Continuous>  dextrose 5%. 1000 milliLiter(s) IV Continuous <Continuous>  dextrose 50% Injectable 25 Gram(s) IV Push once  dextrose 50% Injectable 12.5 Gram(s) IV Push once  dextrose 50% Injectable 25 Gram(s) IV Push once  fluconAZOLE   Tablet 100 milliGRAM(s) Oral every 24 hours  fluticasone propionate/ salmeterol 250-50 MICROgram(s) Diskus 1 Dose(s) Inhalation two times a day  insulin lispro (ADMELOG) corrective regimen sliding scale   SubCutaneous Before meals and at bedtime  methylPREDNISolone sodium succinate Injectable 40 milliGRAM(s) IV Push every 12 hours  metoprolol tartrate 12.5 milliGRAM(s) Oral two times a day  multivitamin 1 Tablet(s) Oral daily  mupirocin 2% Nasal 1 Application(s) Both Nostrils two times a day  oseltamivir Suspension 75 milliGRAM(s) Oral every 12 hours  pantoprazole    Tablet 40 milliGRAM(s) Oral before breakfast  polyethylene glycol 3350 17 Gram(s) Oral daily  potassium chloride    Tablet ER 20 milliEquivalent(s) Oral once  senna 2 Tablet(s) Oral at bedtime  tamsulosin 0.4 milliGRAM(s) Oral at bedtime    All other medications reviewed.    SUBJECTIVE:  Confused, remains in ICU for surveillance.    VITALS:  T(C): 36.4 (03-03-25 @ 04:00), Max: 36.6 (03-02-25 @ 21:00)  T(F): 97.5 (03-03-25 @ 04:00), Max: 97.8 (03-02-25 @ 21:00)  HR: 98 (03-03-25 @ 06:00) (86 - 110)  BP: 146/74 (03-03-25 @ 06:00) (106/81 - 150/81)      PHYSICAL EXAM:  Constitutional: alert, well developed  HEENT: normocephalic, anicteric sclerae, no mucositis or thrush  Respiratory: bilateral clear to auscultation anteriorly  Cardiovascular : S1, S2 regular, rhythmic, no murmurs, gallops or rubs  Abdomen: soft, distended, + normoactive BS, no palpable HS- megaly  Extremities: no tenderness;  -c/c/e, pulses equal bilaterally    LABS:  (03-03) WBC: 5.52 K/uL,Hemoglobin: 7.1 g/dL, Hematocrit: 24.0 %,  Platelet: 168 K/uL  (03-03) Na: 147 mmol/L ; K: 3.4 mmol/L ; BUN: 54 mg/dL ; Cr: 0.62 mg/dL.    RADIOLOGY:    ACC: [id number] EXAM:  CT ANGIO CHEST PULM [facility]   ORDERED BY:  MO EUBANKS     PROCEDURE DATE:  02/26/2025  INTERPRETATION:  CLINICAL INFORMATION: Hypoxia.    COMPARISON: None.    CONTRAST/COMPLICATIONS:  IV Contrast: Omnipaque 350 90 cc administered   10 cc discarded  Oral Contrast: NONE  .    PROCEDURE:  CT Angiography of the Chest.  Sagittal and coronal reformats were performed as well as 3D (MIP)   reconstructions.    FINDINGS:    LUNGS AND LARGE AIRWAYS: Patent central airways. Bronchial plugging lower   lobes bilaterally, left more than right. Severe pulmonary emphysema.   Consolidative opacities bilateral lower lobes and additional small focal   airspace opacities the right lower lobe. These may represent a   combination of pneumonia and atelectasis. Additional concerning for a   pulmonary infarct in the right lower lobe.  PLEURA: No pleural effusion.  VESSELS: Segmental and subsegmental pulmonary embolism right lower lobe.   Atherosclerotic calcification including the coronary arteries.  HEART: Heart size is normal. No pericardial effusion.  MEDIASTINUM AND AYAH: No lymphadenopathy.  CHEST WALL AND LOWER NECK: Within normal limits.  VISUALIZED UPPER ABDOMEN: Right renal cyst. Possible small left renal   cyst. Probable small cyst left hepatic lobe.  BONES: Severe anterior compression of T11 and T12 with retropulsion. This   may be of some duration. Mild superior endplate compression of T10.    IMPRESSION:  Segmental and subsegmental pulmonary embolism right lower lobe.  Bilateral lower lobe opacities that may represent a combination of   atelectasis and infiltrates. Additional possibility of a pulmonary   infarct in the right lower lobe.  Severe pulmonary emphysema.

## 2025-03-03 NOTE — PROGRESS NOTE ADULT - ASSESSMENT
Assessment  1. Acute Hypoxic and hypercarbic respiratory failure   2. Due to Acute flu A and Left lower lobe Pna  3. Chronic Advanced COPD with supplemental oxygen by nasal cannula  4. RA  5. h/o colon cancer  6. h/o PE  off a/c now with Segmental and subsegmental pulmonary embolism right lower lobe.  7. Anemia     Plan  still retaining co2 with normal pH, previous this [facility] no co2 retention    trial of diamox made CO2 retention worse  mild metabolic encephelopathy  worsening anemia hold lovenox, pending stool occult blood  Has not had BM in several days which is likely playing role in WOB,  will give eneme today   currently on high flow nasal cannula, 40L/40%, titrate to mainatin FIO@ > 90%  Nocturnal Bipap  finish course of tamiflu   CT head  no acute bleeding  continue steroid taper, nebs, Advair  noted urinary retention yesterday, hold anticholinergics , continue Flomax  constipation playing role in urinary retention  dvt and gi ppx  FULL CODE    Palliative care appreciated  Prognosis guarded   Family at bedside updated on plan of care     Patient seen and evaluated case discussed with Dr Jane who agrees with above stated plan of care

## 2025-03-04 LAB
A1C WITH ESTIMATED AVERAGE GLUCOSE RESULT: 6 % — HIGH (ref 4–5.6)
ANION GAP SERPL CALC-SCNC: 3 MMOL/L — LOW (ref 5–17)
BASE EXCESS BLDA CALC-SCNC: 8.9 MMOL/L — HIGH (ref -2–3)
BASE EXCESS BLDA CALC-SCNC: 9.9 MMOL/L — HIGH (ref -2–3)
BUN SERPL-MCNC: 47 MG/DL — HIGH (ref 7–23)
CALCIUM SERPL-MCNC: 9.9 MG/DL — SIGNIFICANT CHANGE UP (ref 8.4–10.5)
CHLORIDE SERPL-SCNC: 112 MMOL/L — HIGH (ref 96–108)
CO2 BLDA-SCNC: 35 MMOL/L — HIGH (ref 19–24)
CO2 BLDA-SCNC: 36 MMOL/L — HIGH (ref 19–24)
CO2 SERPL-SCNC: 35 MMOL/L — HIGH (ref 22–31)
CREAT SERPL-MCNC: 0.59 MG/DL — SIGNIFICANT CHANGE UP (ref 0.5–1.3)
CULTURE RESULTS: SIGNIFICANT CHANGE UP
CULTURE RESULTS: SIGNIFICANT CHANGE UP
EGFR: 98 ML/MIN/1.73M2 — SIGNIFICANT CHANGE UP
EGFR: 98 ML/MIN/1.73M2 — SIGNIFICANT CHANGE UP
ESTIMATED AVERAGE GLUCOSE: 126 MG/DL — HIGH (ref 68–114)
GAS PNL BLDA: SIGNIFICANT CHANGE UP
GAS PNL BLDA: SIGNIFICANT CHANGE UP
GLUCOSE BLDC GLUCOMTR-MCNC: 176 MG/DL — HIGH (ref 70–99)
GLUCOSE BLDC GLUCOMTR-MCNC: 184 MG/DL — HIGH (ref 70–99)
GLUCOSE BLDC GLUCOMTR-MCNC: 197 MG/DL — HIGH (ref 70–99)
GLUCOSE BLDC GLUCOMTR-MCNC: 200 MG/DL — HIGH (ref 70–99)
GLUCOSE SERPL-MCNC: 143 MG/DL — HIGH (ref 70–99)
HCO3 BLDA-SCNC: 33 MMOL/L — HIGH (ref 21–28)
HCO3 BLDA-SCNC: 35 MMOL/L — HIGH (ref 21–28)
HCT VFR BLD CALC: 30 % — LOW (ref 34.5–45)
HGB BLD-MCNC: 9.1 G/DL — LOW (ref 11.5–15.5)
HOROWITZ INDEX BLDA+IHG-RTO: 50 — SIGNIFICANT CHANGE UP
HOROWITZ INDEX BLDA+IHG-RTO: 50 — SIGNIFICANT CHANGE UP
MAGNESIUM SERPL-MCNC: 2.1 MG/DL — SIGNIFICANT CHANGE UP (ref 1.6–2.6)
MCHC RBC-ENTMCNC: 24.7 PG — LOW (ref 27–34)
MCHC RBC-ENTMCNC: 30.3 G/DL — LOW (ref 32–36)
MCV RBC AUTO: 81.3 FL — SIGNIFICANT CHANGE UP (ref 80–100)
NRBC BLD AUTO-RTO: 0 /100 WBCS — SIGNIFICANT CHANGE UP (ref 0–0)
OB PNL STL: POSITIVE
PCO2 BLDA: 50 MMHG — HIGH (ref 32–35)
PCO2 BLDA: 56 MMHG — HIGH (ref 32–35)
PH BLDA: 7.4 — SIGNIFICANT CHANGE UP (ref 7.35–7.45)
PH BLDA: 7.43 — SIGNIFICANT CHANGE UP (ref 7.35–7.45)
PHOSPHATE SERPL-MCNC: 2.8 MG/DL — SIGNIFICANT CHANGE UP (ref 2.5–4.5)
PLATELET # BLD AUTO: 154 K/UL — SIGNIFICANT CHANGE UP (ref 150–400)
PO2 BLDA: 60 MMHG — LOW (ref 83–108)
PO2 BLDA: 67 MMHG — LOW (ref 83–108)
POTASSIUM SERPL-MCNC: 4.3 MMOL/L — SIGNIFICANT CHANGE UP (ref 3.5–5.3)
POTASSIUM SERPL-SCNC: 4.3 MMOL/L — SIGNIFICANT CHANGE UP (ref 3.5–5.3)
RBC # BLD: 3.69 M/UL — LOW (ref 3.8–5.2)
RBC # FLD: 18.7 % — HIGH (ref 10.3–14.5)
SAO2 % BLDA: 93.7 % — LOW (ref 94–98)
SAO2 % BLDA: 95.9 % — SIGNIFICANT CHANGE UP (ref 94–98)
SODIUM SERPL-SCNC: 150 MMOL/L — HIGH (ref 135–145)
SPECIMEN SOURCE: SIGNIFICANT CHANGE UP
SPECIMEN SOURCE: SIGNIFICANT CHANGE UP
WBC # BLD: 8.89 K/UL — SIGNIFICANT CHANGE UP (ref 3.8–10.5)
WBC # FLD AUTO: 8.89 K/UL — SIGNIFICANT CHANGE UP (ref 3.8–10.5)

## 2025-03-04 PROCEDURE — 71045 X-RAY EXAM CHEST 1 VIEW: CPT | Mod: 26,76

## 2025-03-04 PROCEDURE — 99232 SBSQ HOSP IP/OBS MODERATE 35: CPT

## 2025-03-04 PROCEDURE — 74018 RADEX ABDOMEN 1 VIEW: CPT | Mod: 26

## 2025-03-04 RX ORDER — POLYETHYLENE GLYCOL 3350 17 G/17G
17 POWDER, FOR SOLUTION ORAL ONCE
Refills: 0 | Status: COMPLETED | OUTPATIENT
Start: 2025-03-04 | End: 2025-03-04

## 2025-03-04 RX ORDER — DEXMEDETOMIDINE HYDROCHLORIDE IN SODIUM CHLORIDE 4 UG/ML
0.2 INJECTION INTRAVENOUS
Qty: 400 | Refills: 0 | Status: DISCONTINUED | OUTPATIENT
Start: 2025-03-04 | End: 2025-03-06

## 2025-03-04 RX ORDER — FENTANYL CITRATE-0.9 % NACL/PF 100MCG/2ML
50 SYRINGE (ML) INTRAVENOUS ONCE
Refills: 0 | Status: DISCONTINUED | OUTPATIENT
Start: 2025-03-04 | End: 2025-03-04

## 2025-03-04 RX ORDER — SODIUM CHLORIDE 9 G/1000ML
1000 INJECTION, SOLUTION INTRAVENOUS
Refills: 0 | Status: DISCONTINUED | OUTPATIENT
Start: 2025-03-04 | End: 2025-03-05

## 2025-03-04 RX ORDER — INSULIN LISPRO 100 U/ML
INJECTION, SOLUTION INTRAVENOUS; SUBCUTANEOUS EVERY 6 HOURS
Refills: 0 | Status: DISCONTINUED | OUTPATIENT
Start: 2025-03-04 | End: 2025-03-13

## 2025-03-04 RX ORDER — ENOXAPARIN SODIUM 100 MG/ML
55 INJECTION SUBCUTANEOUS EVERY 12 HOURS
Refills: 0 | Status: DISCONTINUED | OUTPATIENT
Start: 2025-03-04 | End: 2025-03-06

## 2025-03-04 RX ORDER — PROPOFOL 10 MG/ML
100 INJECTION, EMULSION INTRAVENOUS ONCE
Refills: 0 | Status: COMPLETED | OUTPATIENT
Start: 2025-03-04 | End: 2025-03-04

## 2025-03-04 RX ORDER — SODIUM CHLORIDE 9 G/1000ML
500 INJECTION, SOLUTION INTRAVENOUS ONCE
Refills: 0 | Status: COMPLETED | OUTPATIENT
Start: 2025-03-04 | End: 2025-03-04

## 2025-03-04 RX ORDER — ETOMIDATE 2 MG/ML
20 SYRINGE (ML) INTRAVENOUS ONCE
Refills: 0 | Status: COMPLETED | OUTPATIENT
Start: 2025-03-04 | End: 2025-03-04

## 2025-03-04 RX ADMIN — TAMSULOSIN HYDROCHLORIDE 0.4 MILLIGRAM(S): 0.4 CAPSULE ORAL at 21:33

## 2025-03-04 RX ADMIN — INSULIN LISPRO 2: 100 INJECTION, SOLUTION INTRAVENOUS; SUBCUTANEOUS at 11:56

## 2025-03-04 RX ADMIN — Medication 50 MICROGRAM(S): at 13:39

## 2025-03-04 RX ADMIN — Medication 40 MILLIGRAM(S): at 18:24

## 2025-03-04 RX ADMIN — Medication 15 MILLILITER(S): at 18:24

## 2025-03-04 RX ADMIN — INSULIN LISPRO 2: 100 INJECTION, SOLUTION INTRAVENOUS; SUBCUTANEOUS at 18:24

## 2025-03-04 RX ADMIN — Medication 50 MICROGRAM(S): at 19:30

## 2025-03-04 RX ADMIN — INSULIN GLARGINE-YFGN 10 UNIT(S): 100 INJECTION, SOLUTION SUBCUTANEOUS at 08:17

## 2025-03-04 RX ADMIN — CEFEPIME 100 MILLIGRAM(S): 2 INJECTION, POWDER, FOR SOLUTION INTRAVENOUS at 05:15

## 2025-03-04 RX ADMIN — METHYLPREDNISOLONE ACETATE 40 MILLIGRAM(S): 80 INJECTION, SUSPENSION INTRA-ARTICULAR; INTRALESIONAL; INTRAMUSCULAR; SOFT TISSUE at 05:18

## 2025-03-04 RX ADMIN — DEXMEDETOMIDINE HYDROCHLORIDE IN SODIUM CHLORIDE 2.72 MICROGRAM(S)/KG/HR: 4 INJECTION INTRAVENOUS at 11:55

## 2025-03-04 RX ADMIN — Medication 50 MICROGRAM(S): at 19:13

## 2025-03-04 RX ADMIN — SODIUM CHLORIDE 100 MILLILITER(S): 9 INJECTION, SOLUTION INTRAVENOUS at 00:14

## 2025-03-04 RX ADMIN — METOPROLOL SUCCINATE 5 MILLIGRAM(S): 50 TABLET, EXTENDED RELEASE ORAL at 00:14

## 2025-03-04 RX ADMIN — SODIUM CHLORIDE 500 MILLILITER(S): 9 INJECTION, SOLUTION INTRAVENOUS at 13:09

## 2025-03-04 RX ADMIN — MUPIROCIN CALCIUM 1 APPLICATION(S): 20 CREAM TOPICAL at 18:23

## 2025-03-04 RX ADMIN — Medication 2.5 MILLIGRAM(S): at 21:01

## 2025-03-04 RX ADMIN — Medication 1 APPLICATION(S): at 05:19

## 2025-03-04 RX ADMIN — POLYETHYLENE GLYCOL 3350 17 GRAM(S): 17 POWDER, FOR SOLUTION ORAL at 14:26

## 2025-03-04 RX ADMIN — METOPROLOL SUCCINATE 5 MILLIGRAM(S): 50 TABLET, EXTENDED RELEASE ORAL at 05:18

## 2025-03-04 RX ADMIN — PROPOFOL 100 MILLIGRAM(S): 10 INJECTION, EMULSION INTRAVENOUS at 11:12

## 2025-03-04 RX ADMIN — DEXMEDETOMIDINE HYDROCHLORIDE IN SODIUM CHLORIDE 2.72 MICROGRAM(S)/KG/HR: 4 INJECTION INTRAVENOUS at 21:05

## 2025-03-04 RX ADMIN — Medication 2.5 MILLIGRAM(S): at 15:10

## 2025-03-04 RX ADMIN — Medication 10 MILLIGRAM(S): at 08:17

## 2025-03-04 RX ADMIN — Medication 2 TABLET(S): at 21:32

## 2025-03-04 RX ADMIN — INSULIN LISPRO 2: 100 INJECTION, SOLUTION INTRAVENOUS; SUBCUTANEOUS at 23:30

## 2025-03-04 RX ADMIN — Medication 2.5 MILLIGRAM(S): at 04:16

## 2025-03-04 RX ADMIN — Medication 50 MICROGRAM(S): at 13:09

## 2025-03-04 RX ADMIN — Medication 2.5 MILLIGRAM(S): at 08:42

## 2025-03-04 RX ADMIN — METHYLPREDNISOLONE ACETATE 40 MILLIGRAM(S): 80 INJECTION, SUSPENSION INTRA-ARTICULAR; INTRALESIONAL; INTRAMUSCULAR; SOFT TISSUE at 18:23

## 2025-03-04 RX ADMIN — Medication 100 MILLIGRAM(S): at 18:25

## 2025-03-04 RX ADMIN — INSULIN LISPRO 2: 100 INJECTION, SOLUTION INTRAVENOUS; SUBCUTANEOUS at 08:16

## 2025-03-04 RX ADMIN — SODIUM CHLORIDE 100 MILLILITER(S): 9 INJECTION, SOLUTION INTRAVENOUS at 08:16

## 2025-03-04 RX ADMIN — ENOXAPARIN SODIUM 55 MILLIGRAM(S): 100 INJECTION SUBCUTANEOUS at 15:59

## 2025-03-04 RX ADMIN — SODIUM CHLORIDE 100 MILLILITER(S): 9 INJECTION, SOLUTION INTRAVENOUS at 05:15

## 2025-03-04 RX ADMIN — Medication 1 DOSE(S): at 08:43

## 2025-03-04 RX ADMIN — SODIUM CHLORIDE 100 MILLILITER(S): 9 INJECTION, SOLUTION INTRAVENOUS at 11:55

## 2025-03-04 NOTE — PROGRESS NOTE ADULT - ASSESSMENT
6 y/o 9F, ex-smoker, with extensive medical history, along with colon cancer and multiple recent hospitalizations, now admitted with acute hypoxic and hypercarbic respiratory failure. Oncology consulted in light of Hx of colon cancer.      Problem/Plan - 1:  ·  Problem: Colon cancer.   ·  Plan: Patient with history of colon cancer, resident of [facility] admitted with urinary retention, malaise and altered mentation.  Currently treated for yeast UTI; on Diflucan x 7 days.  Now with acute Hypoxic and hypercarbic respiratory failure.  - No plans for systemic chemotherapy.   - Also with PE/B/l calf DVT; plans for intubation and lavage GI tract post intubation, if no bleeding will consider resuming AC  - Overall poor prognosis: per one of the sisters at bedside, primary Oncologist Dr. Olmedo informed family of poor prognosis due to ongoing debility/multiple admissions; not a candidate for chemotherapy; recommended comfort measures  - Palliative following.   70 y/o F, ex-smoker, with extensive medical history, along with colon cancer and multiple recent hospitalizations, now admitted with acute hypoxic and hypercarbic respiratory failure. Oncology consulted in light of Hx of colon cancer.      Problem/Plan - 1:  ·  Problem: Colon cancer.   ·  Plan: Patient with history of colon cancer, resident of [facility] admitted with urinary retention, malaise and altered mentation.  Currently treated for yeast UTI; on Diflucan x 7 days.  Now with acute Hypoxic and hypercarbic respiratory failure.  - No plans for systemic chemotherapy.   - Also with PE/B/l calf DVT; plans for intubation and lavage GI tract post intubation, if no bleeding will consider resuming AC  - Overall poor prognosis: per one of the sisters at bedside, primary Oncologist Dr. Olmedo informed family of poor prognosis due to ongoing debility/multiple admissions; not a candidate for chemotherapy; recommended comfort measures  - Palliative following.

## 2025-03-04 NOTE — PROGRESS NOTE ADULT - SUBJECTIVE AND OBJECTIVE BOX
Follow-up Critical Care Progress Note  Chief Complaint : Acute respiratory failure with hypoxia      patient seen and examined  in resp distress off niv  remains very lethargic   trial of NIV done to high flow  agonally breathing, very uncomfortable  extensive conversation had with pt and family  they agree to intubation       Allergies :No Known Allergies      PAST MEDICAL & SURGICAL HISTORY:  Advanced COPD    On supplemental oxygen by nasal cannula    Arthritis    Cancer, colon    HTN (hypertension)    No significant past surgical history        Medications:  MEDICATIONS  (STANDING):  albuterol    0.083% 2.5 milliGRAM(s) Nebulizer every 6 hours  amLODIPine   Tablet 10 milliGRAM(s) Oral daily  bisacodyl Suppository 10 milliGRAM(s) Rectal every 24 hours  chlorhexidine 0.12% Liquid 15 milliLiter(s) Oral Mucosa every 12 hours  chlorhexidine 2% Cloths 1 Application(s) Topical <User Schedule>  dexMEDEtomidine Infusion 0.2 MICROgram(s)/kG/Hr (2.72 mL/Hr) IV Continuous <Continuous>  dextrose 5%. 1000 milliLiter(s) (100 mL/Hr) IV Continuous <Continuous>  dextrose 5%. 1000 milliLiter(s) (100 mL/Hr) IV Continuous <Continuous>  dextrose 5%. 1000 milliLiter(s) (50 mL/Hr) IV Continuous <Continuous>  dextrose 50% Injectable 25 Gram(s) IV Push once  dextrose 50% Injectable 12.5 Gram(s) IV Push once  dextrose 50% Injectable 25 Gram(s) IV Push once  etomidate Injectable 20 milliGRAM(s) IV Push once  fluconAZOLE   Tablet 100 milliGRAM(s) Oral every 24 hours  fluticasone propionate/ salmeterol 250-50 MICROgram(s) Diskus 1 Dose(s) Inhalation two times a day  insulin glargine Injectable (LANTUS) 10 Unit(s) SubCutaneous every morning  insulin lispro (ADMELOG) corrective regimen sliding scale   SubCutaneous Before meals and at bedtime  methylPREDNISolone sodium succinate Injectable 40 milliGRAM(s) IV Push every 12 hours  metoprolol tartrate Injectable 5 milliGRAM(s) IV Push every 6 hours  multivitamin 1 Tablet(s) Oral daily  mupirocin 2% Nasal 1 Application(s) Both Nostrils two times a day  pantoprazole  Injectable 40 milliGRAM(s) IV Push at bedtime  polyethylene glycol 3350 17 Gram(s) Oral daily  propofol Injectable 100 milliGRAM(s) IV Push once  senna 2 Tablet(s) Oral at bedtime  tamsulosin 0.4 milliGRAM(s) Oral at bedtime    MEDICATIONS  (PRN):  acetaminophen     Tablet .. 650 milliGRAM(s) Oral every 6 hours PRN Temp greater or equal to 38C (100.4F), Mild Pain (1 - 3)      Antibiotics History  cefepime   IVPB    , 02-26-25 @ 13:23  cefepime   IVPB 2000 milliGRAM(s) IV Intermittent once, 02-26-25 @ 13:32, Stop order after: 1 Doses  cefepime   IVPB 1000 milliGRAM(s) IV Intermittent every 12 hours, 02-26-25 @ 16:16  cefepime   IVPB 2000 milliGRAM(s) IV Intermittent every 8 hours, 02-27-25 @ 08:41, Stop order after: 5 Days  fluconAZOLE   Tablet 100 milliGRAM(s) Oral every 24 hours, 03-02-25 @ 14:15, Stop order after: 7 Doses  oseltamivir 75 milliGRAM(s) Oral two times a day, 02-27-25 @ 08:11, Stop order after: 5 Days  oseltamivir Suspension 75 milliGRAM(s) Oral every 12 hours, 02-27-25 @ 09:53  oseltamivir Suspension 75 milliGRAM(s) Oral every 12 hours, 02-27-25 @ 10:24, Stop order after: 5 Days  vancomycin  IVPB 750 milliGRAM(s) IV Intermittent Once, 02-26-25 @ 16:23, Stop order after: 1 Doses  vancomycin  IVPB. 1000 milliGRAM(s) IV Intermittent once, 02-26-25 @ 13:32, Stop order after: 1 Doses      Heme Medications       GI Medications  bisacodyl Suppository 10 milliGRAM(s) Rectal every 24 hours, 03-03-25 @ 07:27, Now  pantoprazole  Injectable 40 milliGRAM(s) IV Push at bedtime, 03-03-25 @ 14:42, Routine  polyethylene glycol 3350 17 Gram(s) Oral daily, 03-02-25 @ 12:16, Routine  senna 2 Tablet(s) Oral at bedtime, 03-02-25 @ 12:15, Routine      COVID  02-26-25 @ 13:30  COVID -   NotDetec      COVID Biomarkers    03-03-25 @ 06:00 ESR --  ---  CRP --  ---  DDimer  --   ---   LDH --   ---   Ferritin 121         Trend BNP  02-26-25 @ 13:30   -  198    Procalcitonin Trend  02-26-25 @ 13:30   -   0.10[H]    WBC Trend  03-04-25 @ 06:00   -  8.89  03-03-25 @ 13:50   -  7.71  03-03-25 @ 06:00   -  5.52  03-02-25 @ 06:15   -  6.00  03-01-25 @ 16:32   -  8.33    H/H Trend  03-04-25 @ 06:00   -   9.1[L]/ 30.0[L]  03-03-25 @ 13:50   -   6.7[LL]/ 23.0[L]  03-03-25 @ 06:00   -   7.1[L]/ 24.0[L]  03-02-25 @ 06:15   -   7.7[L]/ 26.2[L]  03-01-25 @ 16:32   -   8.3[L]/ 27.1[L]  03-01-25 @ 05:04   -   7.8[L]/ 26.5[L]    Stool Occult Blood    Platelet Trend  03-04-25 @ 06:00   -  154  03-03-25 @ 13:50   -  163  03-03-25 @ 06:00   -  168  03-02-25 @ 06:15   -  171  03-01-25 @ 16:32   -  208    Trend Sodium  03-04-25 @ 06:00   -  150[H]  03-03-25 @ 13:50   -  149[H]  03-03-25 @ 06:00   -  147[H]  03-02-25 @ 06:15   -  143    Trend Potassium  03-04-25 @ 06:00   -  4.3  03-03-25 @ 13:50   -  4.3  03-03-25 @ 06:00   -  3.4[L]  03-02-25 @ 06:15   -  3.2[L]    Trend Bun/Cr  03-04-25 @ 06:00  BUN/CR -  47[H] / 0.59  03-03-25 @ 13:50  BUN/CR -  55[H] / 0.81  03-03-25 @ 06:00  BUN/CR -  54[H] / 0.62  03-02-25 @ 06:15  BUN/CR -  56[H] / 0.69    Lactic Acid Trend  02-27-25 @ 05:51   -   1.0  02-26-25 @ 13:00   -   1.0    ABG Trend  03-04-25 @ 08:00   - 7.40/56[H]/60[L]/93.7[L]  03-03-25 @ 15:18   - 7.35/58[H]/58[L]/91.7[L]  03-03-25 @ 14:05   - 7.30[L]/73[HH]/85/98.6[H]  03-03-25 @ 04:10   - 7.44/52[H]/74[L]/97.1  03-02-25 @ 05:00   - 7.37/62[H]/84/97.5  03-01-25 @ 05:00   - 7.51[H]/49[H]/61[L]/93.8[L]  02-28-25 @ 05:30   - 7.43/58[H]/76[L]/96.5  02-27-25 @ 04:30   - 7.43/55[H]/93/98.6[H]  02-26-25 @ 16:18   - 7.31[L]/72[HH]/114[H]/99.2[H]  02-26-25 @ 13:35   - 7.30[L]/84[HH]/118[H]/99.1[H]    Trend AST/ALT/ALK Phos/Bili  03-03-25 @ 06:00   11/27/79/0.6  03-02-25 @ 06:15   12/22/89/0.6  03-01-25 @ 05:04   14/22/87/0.7  02-28-25 @ 05:30   16/17/90/0.7  02-27-25 @ 05:51   15/19/105/0.7  02-26-25 @ 13:30   20/24/109/0.8      Ammonia Trend      Amylase / Lipase Trend      Albumin Trend  03-03-25 @ 06:00   -   2.7[L]  03-02-25 @ 06:15   -   2.8[L]  03-01-25 @ 05:04   -   2.6[L]  02-28-25 @ 05:30   -   2.6[L]  02-27-25 @ 05:51   -   2.9[L]  02-26-25 @ 13:30   -   3.1[L]      PTT - PT - INR Trend  02-26-25 @ 13:30   -   27.1 - 11.3 - 0.96    Glucose Trend  03-04-25 @ 08:14   -  -- -- 176[H]  03-04-25 @ 06:00   -  143[H] -- --  03-03-25 @ 21:08   -  -- -- 145[H]  03-03-25 @ 17:28   -  -- -- 169[H]  03-03-25 @ 13:50   -  363[H] -- --  03-03-25 @ 12:06   -  -- -- 389[H]  03-03-25 @ 12:04   -  -- -- 393[H]  03-03-25 @ 07:52   -  -- -- 273[H]  03-03-25 @ 06:00   -  182[H] -- --  03-02-25 @ 21:39   -  -- -- 217[H]    A1C with Estimated Average Glucose Result: 6.6 % *H* [4.0 - 5.6] (02-27-25 @ 05:51)      LABS:                        9.1    8.89  )-----------( 154      ( 04 Mar 2025 06:00 )             30.0     03-04    150[H]  |  112[H]  |  47[H]  ----------------------------<  143[H]  4.3   |  35[H]  |  0.59    Ca    9.9      04 Mar 2025 06:00  Phos  2.8     03-04  Mg     2.1     03-04    TPro  5.9[L]  /  Alb  2.7[L]  /  TBili  0.6  /  DBili  x   /  AST  11  /  ALT  27  /  AlkPhos  79  03-03             CULTURES: (if applicable)    Culture - Blood (collected 02-26-25 @ 13:30)  Source: .Blood Blood-Peripheral  Final Report (03-04-25 @ 05:00):    No growth at 5 days    Culture - Blood (collected 02-26-25 @ 13:30)  Source: .Blood Blood-Peripheral  Final Report (03-04-25 @ 05:00):    No growth at 5 days      Rapid RVP Result: Detected (02-26-25 @ 13:30)      ABG - ( 04 Mar 2025 08:00 )  pH, Arterial: 7.40  pH, Blood: x     /  pCO2: 56    /  pO2: 60    / HCO3: 35    / Base Excess: 9.9   /  SaO2: 93.7             RADIOLOGY  CXR:  no gross change/large infiltrate      VITALS:  T(C): 36.2 (03-04-25 @ 08:00), Max: 36.7 (03-03-25 @ 12:00)  T(F): 97.2 (03-04-25 @ 08:00), Max: 98 (03-03-25 @ 12:00)  HR: 109 (03-04-25 @ 08:00) (69 - 114)  BP: 137/80 (03-04-25 @ 08:00) (113/62 - 156/81)  BP(mean): 97 (03-04-25 @ 08:00) (76 - 105)  ABP: --  ABP(mean): --  RR: 22 (03-04-25 @ 08:00) (16 - 27)  SpO2: 91% (03-04-25 @ 08:00) (89% - 97%)  CVP(mm Hg): --  CVP(cm H2O): --    Ins and Outs     03-03-25 @ 07:01  -  03-04-25 @ 07:00  --------------------------------------------------------  IN: 2700 mL / OUT: 2658 mL / NET: 42 mL                I&O's Detail    03 Mar 2025 07:01  -  04 Mar 2025 07:00  --------------------------------------------------------  IN:    dextrose 5% + sodium chloride 0.45% w/ Additives: 500 mL    IV PiggyBack: 100 mL    Lactated Ringers: 1800 mL    PRBCs (Packed Red Blood Cells): 300 mL  Total IN: 2700 mL    OUT:    Intermittent Catheterization - Urethral (mL): 1450 mL    Post-Void Residual per Intermittent Catheterization (mL): 1208 mL    Voided (mL): 0 mL  Total OUT: 2658 mL    Total NET: 42 mL

## 2025-03-04 NOTE — CHART NOTE - NSCHARTNOTEFT_GEN_A_CORE
Nutrition Follow Up Note  Hospital Course (Per Electronic Medical Record):   Source: Medical Record [X]  Nursing Staff [X] MD[X]     Diet: Soft Bite Size , Ensure HP BID     Patient  intubated this morning , Precedex provided  for sedation ,IV fluids changed to  D5% due to hypernatremia &  hx of DM , infusing @ 100ml/hr presently . Patient s/p  1 unit PRBC due to noted anemia , pending occult stool. Hx of DM  insulin regimen noted , Patient's PO intake was noted poor PTA due to chemo tx ,  with a dx of severe protein calorie malnutrition upon admission .     Current Weight: (3/2) 87.7/39.8kg                          (2/27) 103.8/47.1kg                          (2/26) 99.4/45.1kg     Pertinent Medications: MEDICATIONS  (STANDING):  albuterol    0.083% 2.5 milliGRAM(s) Nebulizer every 6 hours  amLODIPine   Tablet 10 milliGRAM(s) Oral daily  bisacodyl Suppository 10 milliGRAM(s) Rectal every 24 hours  chlorhexidine 0.12% Liquid 15 milliLiter(s) Oral Mucosa every 12 hours  chlorhexidine 2% Cloths 1 Application(s) Topical <User Schedule>  dexMEDEtomidine Infusion 0.2 MICROgram(s)/kG/Hr (2.72 mL/Hr) IV Continuous <Continuous>  dextrose 5%. 1000 milliLiter(s) (100 mL/Hr) IV Continuous <Continuous>  dextrose 5%. 1000 milliLiter(s) (50 mL/Hr) IV Continuous <Continuous>  dextrose 5%. 1000 milliLiter(s) (100 mL/Hr) IV Continuous <Continuous>  dextrose 50% Injectable 25 Gram(s) IV Push once  dextrose 50% Injectable 12.5 Gram(s) IV Push once  dextrose 50% Injectable 25 Gram(s) IV Push once  fluconAZOLE   Tablet 100 milliGRAM(s) Oral every 24 hours  fluticasone propionate/ salmeterol 250-50 MICROgram(s) Diskus 1 Dose(s) Inhalation two times a day  insulin glargine Injectable (LANTUS) 10 Unit(s) SubCutaneous every morning  insulin lispro (ADMELOG) corrective regimen sliding scale   SubCutaneous Before meals and at bedtime  methylPREDNISolone sodium succinate Injectable 40 milliGRAM(s) IV Push every 12 hours  metoprolol tartrate Injectable 5 milliGRAM(s) IV Push every 6 hours  multivitamin 1 Tablet(s) Oral daily  mupirocin 2% Nasal 1 Application(s) Both Nostrils two times a day  pantoprazole  Injectable 40 milliGRAM(s) IV Push at bedtime  polyethylene glycol 3350 17 Gram(s) Oral daily  senna 2 Tablet(s) Oral at bedtime  tamsulosin 0.4 milliGRAM(s) Oral at bedtime    MEDICATIONS  (PRN):  acetaminophen     Tablet .. 650 milliGRAM(s) Oral every 6 hours PRN Temp greater or equal to 38C (100.4F), Mild Pain (1 - 3)      Pertinent Labs:  03-04 Na150 mmol/L[H] Glu 143 mg/dL[H] K+ 4.3 mmol/L Cr  0.59 mg/dL BUN 47 mg/dL[H] 03-04 Phos 2.8 mg/dL 03-03 Alb 2.7 g/dL[L]  Hgb 9.1g/dl<L>, Hct30.0%   POCT 154,139,208,142      Skin: stage I sacrum     Edema: (+1) hand     Last BM: unknown , (+) constipation  Enema provided (3/3) daily bowel regimen noted     Estimated Needs:   [X] No Change since Previous Assessment    Previous Nutrition Diagnosis: Severe protein Calorie Malnutrition     Nutrition Diagnosis is [X] Ongoing       New Nutrition Diagnosis: [X] Not Applicable      Interventions:   1. Recommend  2.     Monitoring & Evaluation: will monitor:  [X] Weights   [X] PO Intake   [X] Follow Up (Per Protocol)  [X] Tolerance to Diet Prescription       RD to follow as per Nutrition protocol  Joan Patel RDN Nutrition Follow Up Note  Hospital Course (Per Electronic Medical Record):   Source: Medical Record [X]  Nursing Staff [X] MD[X]     Diet: Soft Bite Size , Ensure HP BID     Patient  intubated this morning , Precedex provided  for sedation , NGT noted . IV fluids changed to  D5% due to hypernatremia &  hx of DM , infusing @ 100ml/hr presently . Patient s/p  1 unit PRBC due to noted anemia , pending occult stool. Hx of DM, patient receiving steroids ,   insulin regimen noted , Patient's PO intake was noted poor PTA due to chemo tx ,  with a dx of severe protein calorie malnutrition upon admission . Suggest initiate EN feeds of Vital1.5 @ 20ml/hr slowly advance q 8 hrs to 50ml/hr x 18 hrs which will provide 1350kcals, 61gms protein [id number] (27kcals,1.2gm/kg based on IBW ) as medically indicated request monitor Mg , Phos , Potassium as patient is at high risk for refeeding syndrome .    Current Weight: (3/2) 87.7/39.8kg                          (2/27) 103.8/47.1kg                          (2/26) 99.4/45.1kg     Pertinent Medications: MEDICATIONS  (STANDING):  albuterol    0.083% 2.5 milliGRAM(s) Nebulizer every 6 hours  amLODIPine   Tablet 10 milliGRAM(s) Oral daily  bisacodyl Suppository 10 milliGRAM(s) Rectal every 24 hours  chlorhexidine 0.12% Liquid 15 milliLiter(s) Oral Mucosa every 12 hours  chlorhexidine 2% Cloths 1 Application(s) Topical <User Schedule>  dexMEDEtomidine Infusion 0.2 MICROgram(s)/kG/Hr (2.72 mL/Hr) IV Continuous <Continuous>  dextrose 5%. 1000 milliLiter(s) (100 mL/Hr) IV Continuous <Continuous>  dextrose 5%. 1000 milliLiter(s) (50 mL/Hr) IV Continuous <Continuous>  dextrose 5%. 1000 milliLiter(s) (100 mL/Hr) IV Continuous <Continuous>  dextrose 50% Injectable 25 Gram(s) IV Push once  dextrose 50% Injectable 12.5 Gram(s) IV Push once  dextrose 50% Injectable 25 Gram(s) IV Push once  fluconAZOLE   Tablet 100 milliGRAM(s) Oral every 24 hours  fluticasone propionate/ salmeterol 250-50 MICROgram(s) Diskus 1 Dose(s) Inhalation two times a day  insulin glargine Injectable (LANTUS) 10 Unit(s) SubCutaneous every morning  insulin lispro (ADMELOG) corrective regimen sliding scale   SubCutaneous Before meals and at bedtime  methylPREDNISolone sodium succinate Injectable 40 milliGRAM(s) IV Push every 12 hours  metoprolol tartrate Injectable 5 milliGRAM(s) IV Push every 6 hours  multivitamin 1 Tablet(s) Oral daily  mupirocin 2% Nasal 1 Application(s) Both Nostrils two times a day  pantoprazole  Injectable 40 milliGRAM(s) IV Push at bedtime  polyethylene glycol 3350 17 Gram(s) Oral daily  senna 2 Tablet(s) Oral at bedtime  tamsulosin 0.4 milliGRAM(s) Oral at bedtime    MEDICATIONS  (PRN):  acetaminophen     Tablet .. 650 milliGRAM(s) Oral every 6 hours PRN Temp greater or equal to 38C (100.4F), Mild Pain (1 - 3)      Pertinent Labs:  03-04 Na150 mmol/L[H] Glu 143 mg/dL[H] K+ 4.3 mmol/L Cr  0.59 mg/dL BUN 47 mg/dL[H] 03-04 Phos 2.8 mg/dL 03-03 Alb 2.7 g/dL[L]  Hgb 9.1g/dl<L>, Hct30.0%   POCT 154,139,208,142      Skin: stage I sacrum - MVI provided     Edema: (+1) hand     Last BM: unknown , (+) constipation  Enema provided (3/3) daily bowel regimen noted     Estimated Needs:   [X] No Change since Previous Assessment    Previous Nutrition Diagnosis: Severe protein Calorie Malnutrition     Nutrition Diagnosis is [X] Ongoing       New Nutrition Diagnosis: [X] Not Applicable      Interventions:   1. Recommend initiate EN feeds of Vital 1.5 @ 20ml/hr advance 10mlq 8 hrs to goal rate of 50ml/hr   2. Request NPO order     Monitoring & Evaluation: will monitor:  [X] Weights   [X] Follow Up (Per Protocol)      RD to follow as per Nutrition protocol  Joan Patel RDN

## 2025-03-04 NOTE — PROGRESS NOTE ADULT - SUBJECTIVE AND OBJECTIVE BOX
KENDALL RIDER, 69y Female  MRN: [id number]  ATTENDING: Jose Daniel Jane    HPI:  69F, ex-smoker, PMHx rheumatoid arthritis, colon cancer, HTN, COPD, admitted to [facility] with COPD exacerbation.  This is 1 of multiple admission in the past month including previous to admission at [facility] (2/3 - 10/2025 and 2/17–18/2025) with pneumonia and COPD exacerbation.  She is was discharged to clinical hospital for in-hospital acute rehabilitation, but decompensated from respiratory perspective and is presently on antibiotics and prednisone monitored in ICU.  Patient presents with generalized weakness, difficulty ambulating.  Reportedly she has a history of colon cancer diagnosed at [facility] in 2024, treated with chemotherapy under the care of Dr. Kaleb Olmedo, currently in remission.  Oncology consulted as above.      MEDICATIONS  (STANDING):  albuterol    0.083% 2.5 milliGRAM(s) Nebulizer every 6 hours  amLODIPine   Tablet 10 milliGRAM(s) Oral daily  bisacodyl Suppository 10 milliGRAM(s) Rectal every 24 hours  chlorhexidine 0.12% Liquid 15 milliLiter(s) Oral Mucosa every 12 hours  chlorhexidine 2% Cloths 1 Application(s) Topical <User Schedule>  dexMEDEtomidine Infusion 0.2 MICROgram(s)/kG/Hr (2.72 mL/Hr) IV Continuous <Continuous>  dextrose 5%. 1000 milliLiter(s) (100 mL/Hr) IV Continuous <Continuous>  dextrose 5%. 1000 milliLiter(s) (50 mL/Hr) IV Continuous <Continuous>  dextrose 5%. 1000 milliLiter(s) (100 mL/Hr) IV Continuous <Continuous>  dextrose 50% Injectable 25 Gram(s) IV Push once  dextrose 50% Injectable 12.5 Gram(s) IV Push once  dextrose 50% Injectable 25 Gram(s) IV Push once  fluconAZOLE   Tablet 100 milliGRAM(s) Oral every 24 hours  fluticasone propionate/ salmeterol 250-50 MICROgram(s) Diskus 1 Dose(s) Inhalation two times a day  insulin glargine Injectable (LANTUS) 10 Unit(s) SubCutaneous every morning  insulin lispro (ADMELOG) corrective regimen sliding scale   SubCutaneous Before meals and at bedtime  methylPREDNISolone sodium succinate Injectable 40 milliGRAM(s) IV Push every 12 hours  metoprolol tartrate Injectable 5 milliGRAM(s) IV Push every 6 hours  multivitamin 1 Tablet(s) Oral daily  mupirocin 2% Nasal 1 Application(s) Both Nostrils two times a day  pantoprazole  Injectable 40 milliGRAM(s) IV Push at bedtime  polyethylene glycol 3350 17 Gram(s) Oral daily  senna 2 Tablet(s) Oral at bedtime  tamsulosin 0.4 milliGRAM(s) Oral at bedtime    MEDICATIONS  (PRN):  acetaminophen     Tablet .. 650 milliGRAM(s) Oral every 6 hours PRN Temp greater or equal to 38C (100.4F), Mild Pain (1 - 3)      All other medications reviewed.    SUBJECTIVE:  Confused, remains on BIPAP in ICU for surveillance., noted with agonal breathing, will undergo intubation per ICU team. Family at bedside.    Vital Signs Last 24 Hrs  T(C): 36.7 (04 Mar 2025 11:00), Max: 36.7 (03 Mar 2025 12:00)  T(F): 98.1 (04 Mar 2025 11:00), Max: 98.1 (04 Mar 2025 11:00)  HR: 103 (04 Mar 2025 11:00) (70 - 118)  BP: 115/76 (04 Mar 2025 11:00) (113/62 - 156/81)  BP(mean): 87 (04 Mar 2025 11:00) (76 - 105)  RR: 20 (04 Mar 2025 11:00) (16 - 27)  SpO2: 97% (04 Mar 2025 11:00) (89% - 98%)    Parameters below as of 04 Mar 2025 11:00  Patient On (Oxygen Delivery Method): ventilator      PHYSICAL EXAM:  Constitutional: on NIV, lethargic opens eyes, but appears ill  HEENT: normocephalic, anicteric sclerae, no mucositis or thrush  Respiratory: on NIV  Cardiovascular : S1, S2 regular, rhythmic, no murmurs, gallops or rubs  Abdomen: soft, distended, + normoactive BS, no palpable HS- megaly  Extremities: no tenderness;  -c/c/e, pulses equal bilaterally    LABS:                          9.1    8.89  )-----------( 154      ( 04 Mar 2025 06:00 )             30.0     (03-03) WBC: 5.52 K/uL,Hemoglobin: 7.1 g/dL, Hematocrit: 24.0 %,  Platelet: 168 K/uL      03-04    150[H]  |  112[H]  |  47[H]  ----------------------------<  143[H]  4.3   |  35[H]  |  0.59    Ca    9.9      04 Mar 2025 06:00  Phos  2.8     03-04  Mg     2.1     03-04    TPro  5.9[L]  /  Alb  2.7[L]  /  TBili  0.6  /  DBili  x   /  AST  11  /  ALT  27  /  AlkPhos  79  03-03    (03-03) Na: 147 mmol/L ; K: 3.4 mmol/L ; BUN: 54 mg/dL ; Cr: 0.62 mg/dL.    RADIOLOGY:    ACC: [id number] EXAM:  [facility]GIO CHEST [facility]LE[facility]   ORDERED BY:  MO EUBANKS     PROCEDURE DATE:  02/26/2025  INTERPRETATION:  CLINICAL INFORMATION: Hypoxia.    COMPARISON: None.    CONTRAST/COMPLICATIONS:  IV Contrast: Omnipaque 350 90 cc administered   10 cc discarded  Oral Contrast: NONE  .    PROCEDURE:  CT Angiography of the Chest.  Sagittal and coronal reformats were performed as well as 3D (MIP)   reconstructions.    FINDINGS:    LUNGS AND LARGE AIRWAYS: Patent central airways. Bronchial plugging lower   lobes bilaterally, left more than right. Severe pulmonary emphysema.   Consolidative opacities bilateral lower lobes and additional small focal   airspace opacities the right lower lobe. These may represent a   combination of pneumonia and atelectasis. Additional concerning for a   pulmonary infarct in the right lower lobe.  PLEURA: No pleural effusion.  VESSELS: Segmental and subsegmental pulmonary embolism right lower lobe.   Atherosclerotic calcification including the coronary arteries.  HEART: Heart size is normal. No pericardial effusion.  MEDIASTINUM AND AYAH: No lymphadenopathy.  CHEST WALL AND LOWER NECK: Within normal limits.  VISUALIZED UPPER ABDOMEN: Right renal cyst. Possible small left renal   cyst. Probable small cyst left hepatic lobe.  BONES: Severe anterior compression of T11 and T12 with retropulsion. This   may be of some duration. Mild superior endplate compression of T10.    IMPRESSION:  Segmental and subsegmental pulmonary embolism right lower lobe.  Bilateral lower lobe opacities that may represent a combination of   atelectasis and infiltrates. Additional possibility of a pulmonary   infarct in the right lower lobe.  Severe pulmonary emphysema.

## 2025-03-04 NOTE — PROGRESS NOTE ADULT - ASSESSMENT
Physical Examination:  GENERAL:               lethargic in severe distress on NIV   HEENT:                    No JVD, Dry MM  PULM:                     Bilateral air entry, very diminshed bilaterally, no significant sputum production, tachypneic increased WOB  CVS:                         S1, S2,  No Murmur  ABD:                        Soft, +distended, nontender, normoactive bowel sounds,   EXT:                         No edema, nontender, No Cyanosis or Clubbing    NEURO:                  Alert, oriented, interactive, nonfocal, follows commands  PSYC:                      Calm, + Insight.       Assessment  1. Acute Hypoxic and hypercarbic respiratory failure   2. Due to Acute flu A and Left lower lobe Pna  3. Chronic Advanced COPD with supplemental oxygen by nasal cannula  4. RA  5. h/o colon cancer  6. h/o PE  off a/c now with Segmental and subsegmental pulmonary embolism right lower lobe, and b/l Calf dvt on Duplex.  7. Anemia     Plan    Patient will need intubation  d/w family and agree  monitor for urinary retention  Check sputum culture  laxities  Lavage  gi tract post intubation, if no bleeding starting a/c should be considered  continue PPI         PMD:				                   Notified(Date):  Family Updated: 	Harjinder [phone]	                                 Date: 3/4/2025  Discussed intubation\  discussed high risk of not being able to wean and need for trach/peg vs palliative wean  wants full code      Sedation & Analgesia:	precedex  Diet/Nutrition:		tube feeding post intubation     GI PPx:			PPI    DVT Ppx:		Currently venodynes       Activity:		    Head of Bed:               35-45 Deg  Glycemic Control:          dextrose 5%. 1000 milliLiter(s) IV Continuous <Continuous>  dextrose 5%. 1000 milliLiter(s) IV Continuous <Continuous>  dextrose 5%. 1000 milliLiter(s) IV Continuous <Continuous>  dextrose 50% Injectable 25 Gram(s) IV Push once  dextrose 50% Injectable 12.5 Gram(s) IV Push once  dextrose 50% Injectable 25 Gram(s) IV Push once  insulin glargine Injectable (LANTUS) 10 Unit(s) SubCutaneous every morning  insulin lispro (ADMELOG) corrective regimen sliding scale   SubCutaneous Before meals and at bedtime  methylPREDNISolone sodium succinate Injectable 40 milliGRAM(s) IV Push every 12 hours  multivitamin 1 Tablet(s) Oral daily      Lines:  CENTRAL LINE: 	[ ] YES [ ] NO	                    LOCATION:   	                       DATE INSERTED:   	                    REMOVE:  [ ] YES [ ] NO    A-LINE:  	                [ ] YES [ ] NO                      LOCATION:   	                       DATE INSERTED: 		            REMOVE:  [ ] YES [ ] NO    KEANE: 		        [ ] YES [ ] NO  		                                       DATE INSERTED:		            REMOVE:  [ ] YES [ ] NO      Restraints were deemed necessary to prevent removal of life-sustaining devices [ x ] YES   [    ]  NO    Disposition: ICU care    Goals of Care: Full code

## 2025-03-04 NOTE — PROGRESS NOTE ADULT - CONVERSATION DETAILS
Discussed intubation\  discussed high risk of not being able to wean and need for trach/peg vs palliative wean  wants full code

## 2025-03-05 DIAGNOSIS — D64.9 ANEMIA, UNSPECIFIED: ICD-10-CM

## 2025-03-05 LAB
ANION GAP SERPL CALC-SCNC: 0 MMOL/L — LOW (ref 5–17)
BASE EXCESS BLDA CALC-SCNC: 7.4 MMOL/L — HIGH (ref -2–3)
BUN SERPL-MCNC: 46 MG/DL — HIGH (ref 7–23)
CALCIUM SERPL-MCNC: 9.1 MG/DL — SIGNIFICANT CHANGE UP (ref 8.4–10.5)
CHLORIDE SERPL-SCNC: 103 MMOL/L — SIGNIFICANT CHANGE UP (ref 96–108)
CO2 BLDA-SCNC: 34 MMOL/L — HIGH (ref 19–24)
CO2 SERPL-SCNC: 33 MMOL/L — HIGH (ref 22–31)
CREAT SERPL-MCNC: 0.69 MG/DL — SIGNIFICANT CHANGE UP (ref 0.5–1.3)
EGFR: 94 ML/MIN/1.73M2 — SIGNIFICANT CHANGE UP
EGFR: 94 ML/MIN/1.73M2 — SIGNIFICANT CHANGE UP
GAS PNL BLDA: SIGNIFICANT CHANGE UP
GLUCOSE BLDC GLUCOMTR-MCNC: 152 MG/DL — HIGH (ref 70–99)
GLUCOSE BLDC GLUCOMTR-MCNC: 193 MG/DL — HIGH (ref 70–99)
GLUCOSE BLDC GLUCOMTR-MCNC: 212 MG/DL — HIGH (ref 70–99)
GLUCOSE BLDC GLUCOMTR-MCNC: 86 MG/DL — SIGNIFICANT CHANGE UP (ref 70–99)
GLUCOSE BLDC GLUCOMTR-MCNC: 89 MG/DL — SIGNIFICANT CHANGE UP (ref 70–99)
GLUCOSE SERPL-MCNC: 208 MG/DL — HIGH (ref 70–99)
GRAM STN FLD: ABNORMAL
HCO3 BLDA-SCNC: 32 MMOL/L — HIGH (ref 21–28)
HCT VFR BLD CALC: 26.4 % — LOW (ref 34.5–45)
HGB BLD-MCNC: 7.9 G/DL — LOW (ref 11.5–15.5)
MAGNESIUM SERPL-MCNC: 1.9 MG/DL — SIGNIFICANT CHANGE UP (ref 1.6–2.6)
MCHC RBC-ENTMCNC: 24.7 PG — LOW (ref 27–34)
MCHC RBC-ENTMCNC: 29.9 G/DL — LOW (ref 32–36)
MCV RBC AUTO: 82.5 FL — SIGNIFICANT CHANGE UP (ref 80–100)
NRBC BLD AUTO-RTO: 0 /100 WBCS — SIGNIFICANT CHANGE UP (ref 0–0)
PCO2 BLDA: 55 MMHG — HIGH (ref 32–35)
PH BLDA: 7.38 — SIGNIFICANT CHANGE UP (ref 7.35–7.45)
PHOSPHATE SERPL-MCNC: 2.5 MG/DL — SIGNIFICANT CHANGE UP (ref 2.5–4.5)
PLATELET # BLD AUTO: 148 K/UL — LOW (ref 150–400)
PO2 BLDA: 76 MMHG — LOW (ref 83–108)
POTASSIUM SERPL-MCNC: 4.4 MMOL/L — SIGNIFICANT CHANGE UP (ref 3.5–5.3)
POTASSIUM SERPL-SCNC: 4.4 MMOL/L — SIGNIFICANT CHANGE UP (ref 3.5–5.3)
RBC # BLD: 3.2 M/UL — LOW (ref 3.8–5.2)
RBC # FLD: 19 % — HIGH (ref 10.3–14.5)
SAO2 % BLDA: 98.2 % — HIGH (ref 94–98)
SODIUM SERPL-SCNC: 136 MMOL/L — SIGNIFICANT CHANGE UP (ref 135–145)
SPECIMEN SOURCE: SIGNIFICANT CHANGE UP
WBC # BLD: 9.07 K/UL — SIGNIFICANT CHANGE UP (ref 3.8–10.5)
WBC # FLD AUTO: 9.07 K/UL — SIGNIFICANT CHANGE UP (ref 3.8–10.5)

## 2025-03-05 PROCEDURE — 74177 CT ABD & PELVIS W/CONTRAST: CPT | Mod: 26

## 2025-03-05 PROCEDURE — 71045 X-RAY EXAM CHEST 1 VIEW: CPT | Mod: 26

## 2025-03-05 PROCEDURE — 99223 1ST HOSP IP/OBS HIGH 75: CPT

## 2025-03-05 RX ORDER — DOXAZOSIN MESYLATE 8 MG/1
2 TABLET ORAL AT BEDTIME
Refills: 0 | Status: DISCONTINUED | OUTPATIENT
Start: 2025-03-05 | End: 2025-03-13

## 2025-03-05 RX ORDER — FENTANYL CITRATE-0.9 % NACL/PF 100MCG/2ML
25 SYRINGE (ML) INTRAVENOUS
Refills: 0 | Status: DISCONTINUED | OUTPATIENT
Start: 2025-03-05 | End: 2025-03-10

## 2025-03-05 RX ORDER — LACTULOSE 10 G/15ML
20 SOLUTION ORAL EVERY 4 HOURS
Refills: 0 | Status: COMPLETED | OUTPATIENT
Start: 2025-03-06 | End: 2025-03-06

## 2025-03-05 RX ORDER — SODIUM CHLORIDE 9 G/1000ML
1000 INJECTION, SOLUTION INTRAVENOUS
Refills: 0 | Status: DISCONTINUED | OUTPATIENT
Start: 2025-03-05 | End: 2025-03-07

## 2025-03-05 RX ORDER — LACTULOSE 10 G/15ML
20 SOLUTION ORAL EVERY 4 HOURS
Refills: 0 | Status: COMPLETED | OUTPATIENT
Start: 2025-03-05 | End: 2025-03-05

## 2025-03-05 RX ORDER — IOHEXOL 350 MG/ML
30 INJECTION, SOLUTION INTRAVENOUS ONCE
Refills: 0 | Status: COMPLETED | OUTPATIENT
Start: 2025-03-05 | End: 2025-03-05

## 2025-03-05 RX ADMIN — Medication 1 TABLET(S): at 11:48

## 2025-03-05 RX ADMIN — Medication 2.5 MILLIGRAM(S): at 08:55

## 2025-03-05 RX ADMIN — Medication 40 MILLIGRAM(S): at 05:27

## 2025-03-05 RX ADMIN — ENOXAPARIN SODIUM 55 MILLIGRAM(S): 100 INJECTION SUBCUTANEOUS at 03:21

## 2025-03-05 RX ADMIN — Medication 15 MILLILITER(S): at 17:50

## 2025-03-05 RX ADMIN — Medication 15 MILLILITER(S): at 05:27

## 2025-03-05 RX ADMIN — POLYETHYLENE GLYCOL 3350 17 GRAM(S): 17 POWDER, FOR SOLUTION ORAL at 11:48

## 2025-03-05 RX ADMIN — INSULIN LISPRO 2: 100 INJECTION, SOLUTION INTRAVENOUS; SUBCUTANEOUS at 11:49

## 2025-03-05 RX ADMIN — DEXMEDETOMIDINE HYDROCHLORIDE IN SODIUM CHLORIDE 2.72 MICROGRAM(S)/KG/HR: 4 INJECTION INTRAVENOUS at 11:46

## 2025-03-05 RX ADMIN — INSULIN GLARGINE-YFGN 10 UNIT(S): 100 INJECTION, SOLUTION SUBCUTANEOUS at 08:03

## 2025-03-05 RX ADMIN — LACTULOSE 20 GRAM(S): 10 SOLUTION ORAL at 10:37

## 2025-03-05 RX ADMIN — SODIUM CHLORIDE 100 MILLILITER(S): 9 INJECTION, SOLUTION INTRAVENOUS at 11:49

## 2025-03-05 RX ADMIN — METHYLPREDNISOLONE ACETATE 40 MILLIGRAM(S): 80 INJECTION, SUSPENSION INTRA-ARTICULAR; INTRALESIONAL; INTRAMUSCULAR; SOFT TISSUE at 17:49

## 2025-03-05 RX ADMIN — DOXAZOSIN MESYLATE 2 MILLIGRAM(S): 8 TABLET ORAL at 22:09

## 2025-03-05 RX ADMIN — DEXMEDETOMIDINE HYDROCHLORIDE IN SODIUM CHLORIDE 2.72 MICROGRAM(S)/KG/HR: 4 INJECTION INTRAVENOUS at 04:10

## 2025-03-05 RX ADMIN — IOHEXOL 30 MILLILITER(S): 350 INJECTION, SOLUTION INTRAVENOUS at 19:06

## 2025-03-05 RX ADMIN — Medication 2.5 MILLIGRAM(S): at 15:45

## 2025-03-05 RX ADMIN — ENOXAPARIN SODIUM 55 MILLIGRAM(S): 100 INJECTION SUBCUTANEOUS at 16:03

## 2025-03-05 RX ADMIN — LACTULOSE 20 GRAM(S): 10 SOLUTION ORAL at 16:03

## 2025-03-05 RX ADMIN — METHYLPREDNISOLONE ACETATE 40 MILLIGRAM(S): 80 INJECTION, SUSPENSION INTRA-ARTICULAR; INTRALESIONAL; INTRAMUSCULAR; SOFT TISSUE at 05:26

## 2025-03-05 RX ADMIN — Medication 2 TABLET(S): at 22:09

## 2025-03-05 RX ADMIN — Medication 2.5 MILLIGRAM(S): at 22:03

## 2025-03-05 RX ADMIN — INSULIN LISPRO 4: 100 INJECTION, SOLUTION INTRAVENOUS; SUBCUTANEOUS at 05:49

## 2025-03-05 RX ADMIN — Medication 40 MILLIGRAM(S): at 17:49

## 2025-03-05 RX ADMIN — Medication 2.5 MILLIGRAM(S): at 03:18

## 2025-03-05 RX ADMIN — Medication 10 MILLIGRAM(S): at 05:31

## 2025-03-05 RX ADMIN — Medication 100 MILLIGRAM(S): at 17:49

## 2025-03-05 RX ADMIN — Medication 1 APPLICATION(S): at 05:27

## 2025-03-05 RX ADMIN — MUPIROCIN CALCIUM 1 APPLICATION(S): 20 CREAM TOPICAL at 05:27

## 2025-03-05 RX ADMIN — SODIUM CHLORIDE 100 MILLILITER(S): 9 INJECTION, SOLUTION INTRAVENOUS at 20:28

## 2025-03-05 NOTE — PROGRESS NOTE ADULT - SUBJECTIVE AND OBJECTIVE BOX
Follow-up Critical Care Progress Note  Chief Complaint : Acute respiratory failure with hypoxia    pt intubated sedated with precedex  unabel to provide history or ros  no overnight events      Allergies :No Known Allergies      PAST MEDICAL & SURGICAL HISTORY:  Advanced COPD    On supplemental oxygen by nasal cannula    Arthritis    Cancer, colon    HTN (hypertension)    No significant past surgical history        Medications:  MEDICATIONS  (STANDING):  albuterol    0.083% 2.5 milliGRAM(s) Nebulizer every 6 hours  amLODIPine   Tablet 10 milliGRAM(s) Oral daily  bisacodyl Suppository 10 milliGRAM(s) Rectal every 24 hours  chlorhexidine 0.12% Liquid 15 milliLiter(s) Oral Mucosa every 12 hours  chlorhexidine 2% Cloths 1 Application(s) Topical <User Schedule>  dexMEDEtomidine Infusion 0.2 MICROgram(s)/kG/Hr (2.72 mL/Hr) IV Continuous <Continuous>  dextrose 5%. 1000 milliLiter(s) (100 mL/Hr) IV Continuous <Continuous>  dextrose 5%. 1000 milliLiter(s) (50 mL/Hr) IV Continuous <Continuous>  dextrose 5%. 1000 milliLiter(s) (100 mL/Hr) IV Continuous <Continuous>  dextrose 50% Injectable 25 Gram(s) IV Push once  dextrose 50% Injectable 12.5 Gram(s) IV Push once  dextrose 50% Injectable 25 Gram(s) IV Push once  enoxaparin Injectable 55 milliGRAM(s) SubCutaneous every 12 hours  fluconAZOLE   Tablet 100 milliGRAM(s) Oral every 24 hours  fluticasone propionate/ salmeterol 250-50 MICROgram(s) Diskus 1 Dose(s) Inhalation two times a day  insulin glargine Injectable (LANTUS) 10 Unit(s) SubCutaneous every morning  insulin lispro (ADMELOG) corrective regimen sliding scale   SubCutaneous every 6 hours  methylPREDNISolone sodium succinate Injectable 40 milliGRAM(s) IV Push every 12 hours  metoprolol tartrate Injectable 5 milliGRAM(s) IV Push every 6 hours  multivitamin 1 Tablet(s) Oral daily  pantoprazole   Suspension 40 milliGRAM(s) Enteral Tube every 12 hours  polyethylene glycol 3350 17 Gram(s) Oral daily  senna 2 Tablet(s) Oral at bedtime  tamsulosin 0.4 milliGRAM(s) Oral at bedtime    MEDICATIONS  (PRN):  acetaminophen     Tablet .. 650 milliGRAM(s) Oral every 6 hours PRN Temp greater or equal to 38C (100.4F), Mild Pain (1 - 3)      Antibiotics History  cefepime   IVPB 1000 milliGRAM(s) IV Intermittent every 12 hours, 02-26-25 @ 16:16  cefepime   IVPB    , 02-26-25 @ 13:23  cefepime   IVPB 2000 milliGRAM(s) IV Intermittent once, 02-26-25 @ 13:32, Stop order after: 1 Doses  cefepime   IVPB 2000 milliGRAM(s) IV Intermittent every 8 hours, 02-27-25 @ 08:41, Stop order after: 5 Days  fluconAZOLE   Tablet 100 milliGRAM(s) Oral every 24 hours, 03-02-25 @ 14:15, Stop order after: 7 Doses  oseltamivir 75 milliGRAM(s) Oral two times a day, 02-27-25 @ 08:11, Stop order after: 5 Days  oseltamivir Suspension 75 milliGRAM(s) Oral every 12 hours, 02-27-25 @ 09:53  oseltamivir Suspension 75 milliGRAM(s) Oral every 12 hours, 02-27-25 @ 10:24, Stop order after: 5 Days  vancomycin  IVPB 750 milliGRAM(s) IV Intermittent Once, 02-26-25 @ 16:23, Stop order after: 1 Doses  vancomycin  IVPB. 1000 milliGRAM(s) IV Intermittent once, 02-26-25 @ 13:32, Stop order after: 1 Doses      Heme Medications   enoxaparin Injectable 55 milliGRAM(s) SubCutaneous every 12 hours, 03-04-25 @ 15:00      GI Medications  bisacodyl Suppository 10 milliGRAM(s) Rectal every 24 hours, 03-03-25 @ 07:27, Now  pantoprazole   Suspension 40 milliGRAM(s) Enteral Tube every 12 hours, 03-04-25 @ 17:02, Routine  polyethylene glycol 3350 17 Gram(s) Oral daily, 03-02-25 @ 12:16, Routine  senna 2 Tablet(s) Oral at bedtime, 03-02-25 @ 12:15, Routine      COVID  02-26-25 @ 13:30  COVID -   NotDetec      COVID Biomarkers    03-03-25 @ 06:00 ESR --  ---  CRP --  ---  DDimer  --   ---   LDH --   ---   Ferritin 121         Trend BNP  02-26-25 @ 13:30   -  198    Procalcitonin Trend  02-26-25 @ 13:30   -   0.10[H]    WBC Trend  03-05-25 @ 05:00   -  9.07  03-04-25 @ 06:00   -  8.89  03-03-25 @ 13:50   -  7.71  03-03-25 @ 06:00   -  5.52    H/H Trend  03-05-25 @ 05:00   -   7.9[L]/ 26.4[L]  03-04-25 @ 06:00   -   9.1[L]/ 30.0[L]  03-03-25 @ 13:50   -   6.7[LL]/ 23.0[L]  03-03-25 @ 06:00   -   7.1[L]/ 24.0[L]  03-02-25 @ 06:15   -   7.7[L]/ 26.2[L]  03-01-25 @ 16:32   -   8.3[L]/ 27.1[L]    Stool Occult Blood  03-04-25 @ 14:30   -   Positive[!]    Platelet Trend  03-05-25 @ 05:00   -  148[L]  03-04-25 @ 06:00   -  154  03-03-25 @ 13:50   -  163  03-03-25 @ 06:00   -  168    Trend Sodium  03-04-25 @ 06:00   -  150[H]  03-03-25 @ 13:50   -  149[H]  03-03-25 @ 06:00   -  147[H]    Trend Potassium  03-04-25 @ 06:00   -  4.3  03-03-25 @ 13:50   -  4.3  03-03-25 @ 06:00   -  3.4[L]    Trend Bun/Cr  03-04-25 @ 06:00  BUN/CR -  47[H] / 0.59  03-03-25 @ 13:50  BUN/CR -  55[H] / 0.81  03-03-25 @ 06:00  BUN/CR -  54[H] / 0.62    Lactic Acid Trend  02-27-25 @ 05:51   -   1.0  02-26-25 @ 13:00   -   1.0    ABG Trend  03-05-25 @ 06:00   - 7.38/55[H]/76[L]/98.2[H]  03-04-25 @ 12:15   - 7.43/50[H]/67[L]/95.9  03-04-25 @ 08:00   - 7.40/56[H]/60[L]/93.7[L]  03-03-25 @ 15:18   - 7.35/58[H]/58[L]/91.7[L]  03-03-25 @ 14:05   - 7.30[L]/73[HH]/85/98.6[H]  03-03-25 @ 04:10   - 7.44/52[H]/74[L]/97.1  03-02-25 @ 05:00   - 7.37/62[H]/84/97.5  03-01-25 @ 05:00   - 7.51[H]/49[H]/61[L]/93.8[L]  02-28-25 @ 05:30   - 7.43/58[H]/76[L]/96.5  02-27-25 @ 04:30   - 7.43/55[H]/93/98.6[H]  02-26-25 @ 16:18   - 7.31[L]/72[HH]/114[H]/99.2[H]  02-26-25 @ 13:35   - 7.30[L]/84[HH]/118[H]/99.1[H]    Trend AST/ALT/ALK Phos/Bili  03-03-25 @ 06:00   11/27/79/0.6  03-02-25 @ 06:15   12/22/89/0.6  03-01-25 @ 05:04   14/22/87/0.7  02-28-25 @ 05:30   16/17/90/0.7  02-27-25 @ 05:51   15/19/105/0.7  02-26-25 @ 13:30   20/24/109/0.8         Albumin Trend  03-03-25 @ 06:00   -   2.7[L]  03-02-25 @ 06:15   -   2.8[L]  03-01-25 @ 05:04   -   2.6[L]  02-28-25 @ 05:30   -   2.6[L]  02-27-25 @ 05:51   -   2.9[L]  02-26-25 @ 13:30   -   3.1[L]      PTT - PT - INR Trend  02-26-25 @ 13:30   -   27.1 - 11.3 - 0.96    Glucose Trend  03-05-25 @ 05:47   -  -- -- 212[H]  03-04-25 @ 23:29   -  -- -- 200[H]  03-04-25 @ 18:22   -  -- -- 197[H]  03-04-25 @ 11:39   -  -- -- 184[H]  03-04-25 @ 08:14   -  -- -- 176[H]  03-04-25 @ 06:00   -  143[H] -- --  03-03-25 @ 21:08   -  -- -- 145[H]  03-03-25 @ 17:28   -  -- -- 169[H]  03-03-25 @ 13:50   -  363[H] -- --  03-03-25 @ 12:06   -  -- -- 389[H]    A1C with Estimated Average Glucose Result: 6.0 % *H* [4.0 - 5.6] (03-04-25 @ 06:00)  A1C with Estimated Average Glucose Result: 6.6 % *H* [4.0 - 5.6] (02-27-25 @ 05:51)      LABS:                        7.9    9.07  )-----------( 148      ( 05 Mar 2025 05:00 )             26.4     03-04    150[H]  |  112[H]  |  47[H]  ----------------------------<  143[H]  4.3   |  35[H]  |  0.59    Ca    9.9      04 Mar 2025 06:00  Phos  2.5     03-05  Mg     1.9     03-05                Urinalysis Basic - ( 04 Mar 2025 06:00 )    Color: x / Appearance: x / SG: x / pH: x  Gluc: 143 mg/dL / Ketone: x  / Bili: x / Urobili: x   Blood: x / Protein: x / Nitrite: x   Leuk Esterase: x / RBC: x / WBC x   Sq Epi: x / Non Sq Epi: x / Bacteria: x              CULTURES: (if applicable)    Culture - Sputum (collected 03-04-25 @ 11:57)  Source: Sputum Sputum  Gram Stain (03-05-25 @ 00:07):    Few polymorphonuclear leukocytes per low power field    No Squamous epithelial cells per low power field    Rare Yeast like cells per oil power field    Culture - Blood (collected 02-26-25 @ 13:30)  Source: .Blood Blood-Peripheral  Final Report (03-04-25 @ 05:00):    No growth at 5 days    Culture - Blood (collected 02-26-25 @ 13:30)  Source: .Blood Blood-Peripheral  Final Report (03-04-25 @ 05:00):    No growth at 5 days      Rapid RVP Result: Detected (02-26-25 @ 13:30)            RADIOLOGY  CXR:        ACC: [id number] EXAM: XR ABDOMEN PORTABLE ROUTINE 1V ORDERED BY: PETER MOYA    ACC: [id number] EXAM: XR CHEST AP OR PA 1V ORDERED BY: PETER MOYA    ACC: [id number] EXAM: XR CHEST PORTABLE IMMED 1V ORDERED BY: PETER MOYA    PROCEDURE DATE: 03/04/2025        INTERPRETATION: AP chest on March 4, 2025 at 8:39 AM. 2 images. Patient is short of breath.    Heart size normal. COPD hyperexpansion of the lungs is similar to March 2.    There is an infiltrate developing over the left lower heart border when compared to March 2.    Abdomen. Concern is constipation.    There is mottled increased fecal content in the rectosigmoid and right colon. No bowel obstruction. Clips overlie the lower lumbar spine. Bones grossly intact.    Follow-up AP chest on March 4, 2025 at 10:34 AM.    Endotracheal tube and nasogastric tube inserted. Persistent slight left base retrocardiac infiltrate.    IMPRESSION: Retrocardiac infiltrate. Endotracheal tube and nasogastric tube inserted. Mild to moderately increased fecal content in the right colon and rectosigmoid. No obstruction.    IMPRESSION: Suspicion of developing retrocardiac infiltrate. COPD again noted. Left rib fracture noted.    --- End of Report ---      VITALS:  T(C): 37.5 (03-05-25 @ 07:00), Max: 37.5 (03-05-25 @ 05:00)  T(F): 99.5 (03-05-25 @ 07:00), Max: 99.5 (03-05-25 @ 05:00)  HR: 70 (03-05-25 @ 07:00) (59 - 118)  BP: 110/65 (03-05-25 @ 07:00) (95/61 - 158/86)  BP(mean): 78 (03-05-25 @ 07:00) (71 - 106)  ABP: --  ABP(mean): --  RR: 20 (03-05-25 @ 07:00) (15 - 31)  SpO2: 98% (03-05-25 @ 07:00) (90% - 100%)  CVP(mm Hg): --  CVP(cm H2O): --    Ins and Outs     03-04-25 @ 07:01  -  03-05-25 @ 07:00  --------------------------------------------------------  IN: 3568 mL / OUT: 795 mL / NET: 2773 mL            Device: Avea, Mode: AC/ CMV (Assist Control/ Continuous Mandatory Ventilation), RR (machine): 20, RR (patient): 20, TV (machine): 400, TV (patient): 360, FiO2: 50, PEEP: 6, ITime: 1, MAP: 12, PIP: 23    I&O's Detail    04 Mar 2025 07:01  -  05 Mar 2025 07:00  --------------------------------------------------------  IN:    Dexmedetomidine: 208 mL    dextrose 5%: 2300 mL    Enteral Tube Flush: 40 mL    Glucerna 1.5: 520 mL    Lactated Ringers Bolus: 500 mL  Total IN: 3568 mL    OUT:    Indwelling Catheter - Urethral (mL): 345 mL    Intermittent Catheterization - Urethral (mL): 450 mL    Voided (mL): 0 mL  Total OUT: 795 mL    Total NET: 2773 mL

## 2025-03-05 NOTE — CONSULT NOTE ADULT - CONSULT REASON
stool occult positive anemia in patient who has Bellow knee dvt and PE  needing a/c
GOC
colon cancer
retention

## 2025-03-05 NOTE — CONSULT NOTE ADULT - SUBJECTIVE AND OBJECTIVE BOX
Gi Consult:  HPI:  69 year old female with h/o COPD - on home o2 (2-4 L), Ex smoker + Vaping, RA/Arthritis, Colon cancer s/p chemo with neuropathy, HTN, h/o PE who p/w increasing sob and ams. Patient was just hospitalized in [facility] 2/3-10/2025 and 3/17- for pneumonia and COPD exacerbation. Admitted to ICU with acute on chronic Hypercarbic respiratory failure and + Influenza.      MEDICATIONS  (STANDING):  albuterol    0.083% 2.5 milliGRAM(s) Nebulizer every 6 hours  bisacodyl Suppository 10 milliGRAM(s) Rectal every 24 hours  chlorhexidine 0.12% Liquid 15 milliLiter(s) Oral Mucosa every 12 hours  chlorhexidine 2% Cloths 1 Application(s) Topical <User Schedule>  dexMEDEtomidine Infusion 0.2 MICROgram(s)/kG/Hr (2.72 mL/Hr) IV Continuous <Continuous>  dextrose 5%. 1000 milliLiter(s) (50 mL/Hr) IV Continuous <Continuous>  dextrose 5%. 1000 milliLiter(s) (100 mL/Hr) IV Continuous <Continuous>  dextrose 50% Injectable 25 Gram(s) IV Push once  dextrose 50% Injectable 12.5 Gram(s) IV Push once  dextrose 50% Injectable 25 Gram(s) IV Push once  doxazosin 2 milliGRAM(s) Oral at bedtime  enoxaparin Injectable 55 milliGRAM(s) SubCutaneous every 12 hours  fluconAZOLE   Tablet 100 milliGRAM(s) Oral every 24 hours  insulin glargine Injectable (LANTUS) 10 Unit(s) SubCutaneous every morning  insulin lispro (ADMELOG) corrective regimen sliding scale   SubCutaneous every 6 hours  lactated ringers. 1000 milliLiter(s) (100 mL/Hr) IV Continuous <Continuous>  lactulose Syrup 20 Gram(s) Oral every 4 hours  methylPREDNISolone sodium succinate Injectable 40 milliGRAM(s) IV Push every 12 hours  multivitamin 1 Tablet(s) Oral daily  pantoprazole  Injectable 40 milliGRAM(s) IV Push every 12 hours  polyethylene glycol 3350 17 Gram(s) Oral daily  senna 2 Tablet(s) Oral at bedtime    MEDICATIONS  (PRN):  acetaminophen     Tablet .. 650 milliGRAM(s) Oral every 6 hours PRN Temp greater or equal to 38C (100.4F), Mild Pain (1 - 3)      Allergies    No Known Allergies    Intolerances        PAST MEDICAL & SURGICAL HISTORY:  Advanced COPD      On supplemental oxygen by nasal cannula      Arthritis      Cancer, colon      HTN (hypertension)      No significant past surgical history          REVIEW OF SYSTEMS	 - See HPI    PHYSICAL EXAM:   Vital Signs:  Vital Signs Last 24 Hrs  T(C): 37.6 (05 Mar 2025 10:00), Max: 37.6 (05 Mar 2025 08:00)  T(F): 99.7 (05 Mar 2025 10:00), Max: 99.7 (05 Mar 2025 08:00)  HR: 77 (05 Mar 2025 11:) (59 - 112)  BP: 119/69 (05 Mar 2025 11:00) (95/61 - 158/86)  BP(mean): 83 (05 Mar 2025 11:) (71 - 113)  RR: 20 (05 Mar 2025 11:) (15 - 31)  SpO2: 100% (05 Mar 2025 11:00) (90% - 100%)    Parameters below as of 05 Mar 2025 11:00  Patient On (Oxygen Delivery Method): ventilator    O2 Concentration (%): 50  Daily     Daily Weight in k.4 (05 Mar 2025 05:00)I&O's Summary    04 Mar 2025 07:01  -  05 Mar 2025 07:00  --------------------------------------------------------  IN: 3584.3 mL / OUT: 845 mL / NET: 2739.3 mL    05 Mar 2025 07:01  -  05 Mar 2025 12:23  --------------------------------------------------------  IN: 548.9 mL / OUT: 300 mL / NET: 248.9 mL        GENERAL: Intubated, sedated    HEENT:     No JVD, Dry MM  CHEST: Bilateral air entry, very diminished bilaterally, tachypneic   HEART:  S1, S2,  No Murmur  ABDOMEN:  Soft, non-tender, non-distended, normoactive bowel sounds, no masses  EXTREMITIES:  no edema  SKIN:  No rash  NEURO: intubated sedated        LABS:                        7.9    9.07  )-----------( 148      ( 05 Mar 2025 05:00 )             26.4     03-05    136  |  103  |  46[H]  ----------------------------<  208[H]  4.4   |  33[H]  |  0.69    Ca    9.1      05 Mar 2025 05:00  Phos  2.5     03-05  Mg     1.9     03-05        Urinalysis Basic - ( 05 Mar 2025 05:00 )    Color: x / Appearance: x / SG: x / pH: x  Gluc: 208 mg/dL / Ketone: x  / Bili: x / Urobili: x   Blood: x / Protein: x / Nitrite: x   Leuk Esterase: x / RBC: x / WBC x   Sq Epi: x / Non Sq Epi: x / Bacteria: x      amylase   lipase  RADIOLOGY & ADDITIONAL TESTS:

## 2025-03-05 NOTE — CHART NOTE - NSCHARTNOTEFT_GEN_A_CORE
Nutrition Brief Note    Patient remains intubated , CPAP trials noted , Patient with unknown last BM abdomen distended , EN feeds initiated however requested NPO until (+) BM , LR @ 100ml/hr infusing Aggressive bowel regimen to be provided ,Enema , Lactulose, Senna & Miralax noted . Insulin regimen noted , patient receiving Solumedrol . GI consult noted , hx of Colon Ca noted . Suggest initiate EN feeds of vital 1.5 slowly advance q 8 hrs to 50ml/hr x 18 hrs , patient at increased risk for refeeding due to poor nutritional intake PTA & at time of admission . Will follow clinical course

## 2025-03-05 NOTE — PROGRESS NOTE ADULT - ASSESSMENT
Physical Examination:  GENERAL:               Intubated, sedated    HEENT:                    No JVD, Dry MM  PULM:                     Bilateral air entry, very diminshed bilaterally, no significant sputum production, tachypneic increased WOB  CVS:                         S1, S2,  No Murmur  ABD:                        Soft, +distended, nontender, normoactive bowel sounds,   EXT:                         No edema, nontender, No Cyanosis or Clubbing    NEURO:                  intubated sedated  PSYC:                      Calm, no Insight.       Assessment  1. Acute Hypoxic and hypercarbic respiratory failure   2. Due to Acute flu A and Left lower lobe Pna and Left rib fx  3. Chronic Advanced COPD with supplemental oxygen by nasal cannula  4. RA  5. h/o colon cancer  6. h/o PE  off a/c now with Segmental and subsegmental pulmonary embolism right lower lobe, and b/l Calf dvt on Duplex.  7. Anemia     Plan  SAT/SBT  Tube feeding  GI eval for anemia  trend cbc  GI eval  PPI IV for 3 days  on tube feeding as behind on nutrition  lactulose for constipation  f/u sputum cultures, thin secretions, f/u cxr this am         PMD:				                   Notified(Date):  Family Updated: 	Harjinder [phone]	                                 Date: 3/4/2025  will update family       Sedation & Analgesia:	precedex  Diet/Nutrition:		tube feeding      GI PPx:			PPI    DVT Ppx:		Currently venodynes       Activity:		    Head of Bed:               35-45 Deg  Glycemic Control:             Lines:  CENTRAL LINE: 	[ ] YES [ ] NO	                    LOCATION:   	                       DATE INSERTED:   	                    REMOVE:  [ ] YES [ ] NO    A-LINE:  	                [ ] YES [ ] NO                      LOCATION:   	                       DATE INSERTED: 		            REMOVE:  [ ] YES [ ] NO    KEANE: 		        [ ] YES [ ] NO  		                                       DATE INSERTED:		            REMOVE:  [ ] YES [ ] NO      Restraints were deemed necessary to prevent removal of life-sustaining devices [ x ] YES   [    ]  NO    Disposition: ICU care    Goals of Care: Full code

## 2025-03-05 NOTE — CONSULT NOTE ADULT - PROBLEM SELECTOR RECOMMENDATION 9
- hxo Colon cancer with resection and chemo in 2024. Metachronous/ Recurrent lesion in DDx  - Follow transfusion protocol   - Trend HH  - Monitor for signs of active bleed  - Endoscopic evaluation when patient is stable.  - Supportive measures at this time   - Management by ICU

## 2025-03-05 NOTE — CONSULT NOTE ADULT - NS ATTEND AMEND GEN_ALL_CORE FT
Consent: The patient's consent was obtained including but not limited to risks of crusting, scabbing, blistering, scarring, darker or lighter pigmentary change, recurrence, incomplete removal and infection. Include Z78.9 (Other Specified Conditions Influencing Health Status) As An Associated Diagnosis?: No Number Of Freeze-Thaw Cycles: 3 freeze-thaw cycles Duration Of Freeze Thaw-Cycle (Seconds): 2 Medical Necessity Clause: This procedure was medically necessary because the lesions that were treated were: Anesthesia Volume In Cc: 0.5 I attest my time as attending was greater than 50% of the total time of 75 min on patient care (APC <= 37 min) on this DOS. Time spent includes review of hospital course, labs, vitals, medical records, patient evaluation, contact with family (when present), discussion of plan with the primary team, coordinating services and documenting encounter Detail Level: Detailed Medical Necessity Information: It is in your best interest to select a reason for this procedure from the list below. All of these items fulfill various CMS LCD requirements except the new and changing color options. Post-Care Instructions: I reviewed with the patient in detail post-care instructions. Patient is to wear sunprotection, and avoid picking at any of the treated lesions. Pt may apply Vaseline to crusted or scabbing areas.

## 2025-03-05 NOTE — CONSULT NOTE ADULT - ASSESSMENT
GI consult:   69 year old female seen and examined in the ICU PMHx COPD - on home o2 (2-4 L), Ex smoker + Vaping, RA/Arthritis, Colon cancer s/p chemo with neuropathy, HTN, h/o PE who p/w increasing sob and ams. Colon resection of adenocarcinoma 02/2024. Mets to 2 lymph nodes - report in [facility] (2nd chart from [facility])   No active bleed noted

## 2025-03-06 LAB
ABO RH CONFIRMATION: SIGNIFICANT CHANGE UP
ANION GAP SERPL CALC-SCNC: 3 MMOL/L — LOW (ref 5–17)
BASE EXCESS BLDA CALC-SCNC: 7.1 MMOL/L — HIGH (ref -2–3)
BUN SERPL-MCNC: 30 MG/DL — HIGH (ref 7–23)
CALCIUM SERPL-MCNC: 9.1 MG/DL — SIGNIFICANT CHANGE UP (ref 8.4–10.5)
CHLORIDE SERPL-SCNC: 103 MMOL/L — SIGNIFICANT CHANGE UP (ref 96–108)
CO2 BLDA-SCNC: 33 MMOL/L — HIGH (ref 19–24)
CO2 SERPL-SCNC: 34 MMOL/L — HIGH (ref 22–31)
CREAT SERPL-MCNC: 0.55 MG/DL — SIGNIFICANT CHANGE UP (ref 0.5–1.3)
CULTURE RESULTS: ABNORMAL
EGFR: 100 ML/MIN/1.73M2 — SIGNIFICANT CHANGE UP
EGFR: 100 ML/MIN/1.73M2 — SIGNIFICANT CHANGE UP
GAS PNL BLDA: SIGNIFICANT CHANGE UP
GLUCOSE BLDC GLUCOMTR-MCNC: 102 MG/DL — HIGH (ref 70–99)
GLUCOSE BLDC GLUCOMTR-MCNC: 102 MG/DL — HIGH (ref 70–99)
GLUCOSE BLDC GLUCOMTR-MCNC: 56 MG/DL — LOW (ref 70–99)
GLUCOSE BLDC GLUCOMTR-MCNC: 59 MG/DL — LOW (ref 70–99)
GLUCOSE BLDC GLUCOMTR-MCNC: 83 MG/DL — SIGNIFICANT CHANGE UP (ref 70–99)
GLUCOSE BLDC GLUCOMTR-MCNC: 93 MG/DL — SIGNIFICANT CHANGE UP (ref 70–99)
GLUCOSE SERPL-MCNC: 87 MG/DL — SIGNIFICANT CHANGE UP (ref 70–99)
HCO3 BLDA-SCNC: 32 MMOL/L — HIGH (ref 21–28)
HCT VFR BLD CALC: 23.1 % — LOW (ref 34.5–45)
HGB BLD-MCNC: 7.1 G/DL — LOW (ref 11.5–15.5)
HOROWITZ INDEX BLDA+IHG-RTO: 50 — SIGNIFICANT CHANGE UP
MAGNESIUM SERPL-MCNC: 1.8 MG/DL — SIGNIFICANT CHANGE UP (ref 1.6–2.6)
MCHC RBC-ENTMCNC: 25 PG — LOW (ref 27–34)
MCHC RBC-ENTMCNC: 30.7 G/DL — LOW (ref 32–36)
MCV RBC AUTO: 81.3 FL — SIGNIFICANT CHANGE UP (ref 80–100)
NRBC BLD AUTO-RTO: 0 /100 WBCS — SIGNIFICANT CHANGE UP (ref 0–0)
PCO2 BLDA: 50 MMHG — HIGH (ref 32–35)
PH BLDA: 7.41 — SIGNIFICANT CHANGE UP (ref 7.35–7.45)
PHOSPHATE SERPL-MCNC: 3 MG/DL — SIGNIFICANT CHANGE UP (ref 2.5–4.5)
PLATELET # BLD AUTO: 132 K/UL — LOW (ref 150–400)
PO2 BLDA: 106 MMHG — SIGNIFICANT CHANGE UP (ref 83–108)
POTASSIUM SERPL-MCNC: 4.4 MMOL/L — SIGNIFICANT CHANGE UP (ref 3.5–5.3)
POTASSIUM SERPL-SCNC: 4.4 MMOL/L — SIGNIFICANT CHANGE UP (ref 3.5–5.3)
RBC # BLD: 2.84 M/UL — LOW (ref 3.8–5.2)
RBC # FLD: 18.7 % — HIGH (ref 10.3–14.5)
SAO2 % BLDA: 99.2 % — HIGH (ref 94–98)
SODIUM SERPL-SCNC: 140 MMOL/L — SIGNIFICANT CHANGE UP (ref 135–145)
SPECIMEN SOURCE: SIGNIFICANT CHANGE UP
WBC # BLD: 7.52 K/UL — SIGNIFICANT CHANGE UP (ref 3.8–10.5)
WBC # FLD AUTO: 7.52 K/UL — SIGNIFICANT CHANGE UP (ref 3.8–10.5)

## 2025-03-06 PROCEDURE — 71045 X-RAY EXAM CHEST 1 VIEW: CPT | Mod: 26

## 2025-03-06 PROCEDURE — 99232 SBSQ HOSP IP/OBS MODERATE 35: CPT

## 2025-03-06 PROCEDURE — 99233 SBSQ HOSP IP/OBS HIGH 50: CPT

## 2025-03-06 PROCEDURE — 99497 ADVNCD CARE PLAN 30 MIN: CPT | Mod: 25

## 2025-03-06 RX ORDER — POLYETHYLENE GLYCOL-3350 AND ELECTROLYTES 236; 6.74; 5.86; 2.97; 22.74 G/274.31G; G/274.31G; G/274.31G; G/274.31G; G/274.31G
1000 POWDER, FOR SOLUTION ORAL ONCE
Refills: 0 | Status: COMPLETED | OUTPATIENT
Start: 2025-03-06 | End: 2025-03-06

## 2025-03-06 RX ORDER — DEXTROSE 50 % IN WATER 50 %
12.5 SYRINGE (ML) INTRAVENOUS ONCE
Refills: 0 | Status: COMPLETED | OUTPATIENT
Start: 2025-03-06 | End: 2025-03-06

## 2025-03-06 RX ORDER — MAGNESIUM, ALUMINUM HYDROXIDE 200-200 MG
30 TABLET,CHEWABLE ORAL EVERY 6 HOURS
Refills: 0 | Status: DISCONTINUED | OUTPATIENT
Start: 2025-03-06 | End: 2025-03-13

## 2025-03-06 RX ORDER — SENNA 187 MG
15 TABLET ORAL AT BEDTIME
Refills: 0 | Status: DISCONTINUED | OUTPATIENT
Start: 2025-03-06 | End: 2025-03-13

## 2025-03-06 RX ORDER — DEXMEDETOMIDINE HYDROCHLORIDE IN SODIUM CHLORIDE 4 UG/ML
0.3 INJECTION INTRAVENOUS
Qty: 400 | Refills: 0 | Status: DISCONTINUED | OUTPATIENT
Start: 2025-03-06 | End: 2025-03-07

## 2025-03-06 RX ORDER — POLYETHYLENE GLYCOL-3350 AND ELECTROLYTES 236; 6.74; 5.86; 2.97; 22.74 G/274.31G; G/274.31G; G/274.31G; G/274.31G; G/274.31G
1000 POWDER, FOR SOLUTION ORAL ONCE
Refills: 0 | Status: COMPLETED | OUTPATIENT
Start: 2025-03-07 | End: 2025-03-07

## 2025-03-06 RX ORDER — ACETAMINOPHEN 500 MG/5ML
650 LIQUID (ML) ORAL EVERY 6 HOURS
Refills: 0 | Status: DISCONTINUED | OUTPATIENT
Start: 2025-03-06 | End: 2025-03-13

## 2025-03-06 RX ORDER — FENTANYL CITRATE-0.9 % NACL/PF 100MCG/2ML
199 SYRINGE (ML) INTRAVENOUS ONCE
Refills: 0 | Status: DISCONTINUED | OUTPATIENT
Start: 2025-03-06 | End: 2025-03-06

## 2025-03-06 RX ADMIN — LACTULOSE 20 GRAM(S): 10 SOLUTION ORAL at 09:35

## 2025-03-06 RX ADMIN — DOXAZOSIN MESYLATE 2 MILLIGRAM(S): 8 TABLET ORAL at 21:51

## 2025-03-06 RX ADMIN — Medication 1 APPLICATION(S): at 06:09

## 2025-03-06 RX ADMIN — Medication 2.5 MILLIGRAM(S): at 21:20

## 2025-03-06 RX ADMIN — Medication 15 MILLILITER(S): at 06:09

## 2025-03-06 RX ADMIN — Medication 40 MILLIGRAM(S): at 06:08

## 2025-03-06 RX ADMIN — POLYETHYLENE GLYCOL-3350 AND ELECTROLYTES 1000 MILLILITER(S): 236; 6.74; 5.86; 2.97; 22.74 POWDER, FOR SOLUTION ORAL at 17:03

## 2025-03-06 RX ADMIN — Medication 12.5 GRAM(S): at 23:47

## 2025-03-06 RX ADMIN — Medication 10 MILLIGRAM(S): at 06:13

## 2025-03-06 RX ADMIN — Medication 40 MILLIGRAM(S): at 17:05

## 2025-03-06 RX ADMIN — SODIUM CHLORIDE 100 MILLILITER(S): 9 INJECTION, SOLUTION INTRAVENOUS at 18:16

## 2025-03-06 RX ADMIN — DEXMEDETOMIDINE HYDROCHLORIDE IN SODIUM CHLORIDE 2.72 MICROGRAM(S)/KG/HR: 4 INJECTION INTRAVENOUS at 11:55

## 2025-03-06 RX ADMIN — INSULIN LISPRO 0: 100 INJECTION, SOLUTION INTRAVENOUS; SUBCUTANEOUS at 06:10

## 2025-03-06 RX ADMIN — Medication 30 MILLILITER(S): at 12:08

## 2025-03-06 RX ADMIN — SODIUM CHLORIDE 100 MILLILITER(S): 9 INJECTION, SOLUTION INTRAVENOUS at 06:16

## 2025-03-06 RX ADMIN — METHYLPREDNISOLONE ACETATE 40 MILLIGRAM(S): 80 INJECTION, SUSPENSION INTRA-ARTICULAR; INTRALESIONAL; INTRAMUSCULAR; SOFT TISSUE at 17:07

## 2025-03-06 RX ADMIN — Medication 15 MILLILITER(S): at 17:05

## 2025-03-06 RX ADMIN — Medication 15 MILLILITER(S): at 21:51

## 2025-03-06 RX ADMIN — Medication 2.5 MILLIGRAM(S): at 15:23

## 2025-03-06 RX ADMIN — LACTULOSE 20 GRAM(S): 10 SOLUTION ORAL at 06:09

## 2025-03-06 RX ADMIN — METHYLPREDNISOLONE ACETATE 40 MILLIGRAM(S): 80 INJECTION, SUSPENSION INTRA-ARTICULAR; INTRALESIONAL; INTRAMUSCULAR; SOFT TISSUE at 06:08

## 2025-03-06 RX ADMIN — Medication 25 MICROGRAM(S): at 22:14

## 2025-03-06 RX ADMIN — INSULIN GLARGINE-YFGN 10 UNIT(S): 100 INJECTION, SOLUTION SUBCUTANEOUS at 09:38

## 2025-03-06 RX ADMIN — DEXMEDETOMIDINE HYDROCHLORIDE IN SODIUM CHLORIDE 4.08 MICROGRAM(S)/KG/HR: 4 INJECTION INTRAVENOUS at 18:16

## 2025-03-06 RX ADMIN — Medication 2.5 MILLIGRAM(S): at 04:28

## 2025-03-06 RX ADMIN — Medication 25 MICROGRAM(S): at 22:30

## 2025-03-06 RX ADMIN — ENOXAPARIN SODIUM 55 MILLIGRAM(S): 100 INJECTION SUBCUTANEOUS at 03:54

## 2025-03-06 RX ADMIN — DEXMEDETOMIDINE HYDROCHLORIDE IN SODIUM CHLORIDE 4.08 MICROGRAM(S)/KG/HR: 4 INJECTION INTRAVENOUS at 06:16

## 2025-03-06 RX ADMIN — Medication 2.5 MILLIGRAM(S): at 08:59

## 2025-03-06 NOTE — PROGRESS NOTE ADULT - SUBJECTIVE AND OBJECTIVE BOX
Indication for Geriatrics and Palliative Care Services/INTERVAL HPI: GOC    OVERNIGHT EVENTS: no acute events noted  SUBJECTIVE AND OBJECTIVE: Pt seen and examined at bedside this morning. Pt intubated/sedated. Sister Donna present at bedside.    Allergies  No Known Allergies    Intolerances    MEDICATIONS  (STANDING):  albuterol    0.083% 2.5 milliGRAM(s) Nebulizer every 6 hours  aluminum hydroxide/magnesium hydroxide/simethicone Suspension 30 milliLiter(s) Oral every 6 hours  bisacodyl Suppository 10 milliGRAM(s) Rectal every 24 hours  chlorhexidine 0.12% Liquid 15 milliLiter(s) Oral Mucosa every 12 hours  chlorhexidine 2% Cloths 1 Application(s) Topical <User Schedule>  dexMEDEtomidine Infusion 0.3 MICROgram(s)/kG/Hr (4.08 mL/Hr) IV Continuous <Continuous>  dextrose 5%. 1000 milliLiter(s) (100 mL/Hr) IV Continuous <Continuous>  dextrose 5%. 1000 milliLiter(s) (50 mL/Hr) IV Continuous <Continuous>  dextrose 50% Injectable 25 Gram(s) IV Push once  dextrose 50% Injectable 12.5 Gram(s) IV Push once  dextrose 50% Injectable 25 Gram(s) IV Push once  doxazosin 2 milliGRAM(s) Oral at bedtime  fluconAZOLE   Tablet 100 milliGRAM(s) Oral every 24 hours  insulin glargine Injectable (LANTUS) 10 Unit(s) SubCutaneous every morning  insulin lispro (ADMELOG) corrective regimen sliding scale   SubCutaneous every 6 hours  lactated ringers. 1000 milliLiter(s) (100 mL/Hr) IV Continuous <Continuous>  methylPREDNISolone sodium succinate Injectable 40 milliGRAM(s) IV Push every 12 hours  multivitamin 1 Tablet(s) Oral daily  pantoprazole  Injectable 40 milliGRAM(s) IV Push every 12 hours  polyethylene glycol 3350 17 Gram(s) Oral daily  senna Syrup 15 milliLiter(s) Oral at bedtime    MEDICATIONS  (PRN):  acetaminophen   Oral Liquid .. 650 milliGRAM(s) Enteral Tube every 6 hours PRN Temp greater or equal to 38C (100.4F), Mild Pain (1 - 3)  fentaNYL    Injectable 25 MICROGram(s) IV Push every 3 hours PRN agitaiton      ITEMS UNCHECKED ARE NOT PRESENT    PRESENT SYMPTOMS: [x ]Unable to self-report   Source if other than patient:  [ ]Family   [x ]Team     Pain:  [ ]yes [ ]no  QOL impact -   Location -                    Aggravating factors -  Quality -  Radiation -  Timing-  Severity (0-10 scale):  Minimal acceptable level (0-10 scale):     Dyspnea:                         Severe  Anxiety:                          unable to assess  Fatigue:                             Severe  Nausea:                       none  Depressed:                    unable to assess  Loss of appetite:              unable to assess  Constipation:                    none      Other Symptoms:  [ ]All other review of systems negative - pt nonverbal/unable to report     Chaplaincy Referral: [ ] yes [ ] refused [ ] following [ x] Deferred   Palliative Performance Status Version 2:     30    %      http://npcrc.org/files/news/palliative_performance_scale_ppsv2.pdf    PHYSICAL EXAM:  Vital Signs Last 24 Hrs  T(C): 36.6 (06 Mar 2025 08:00), Max: 37.7 (05 Mar 2025 18:00)  T(F): 97.9 (06 Mar 2025 08:00), Max: 99.9 (05 Mar 2025 18:00)  HR: 73 (06 Mar 2025 09:34) (56 - 94)  BP: 112/66 (06 Mar 2025 08:00) (100/72 - 139/89)  BP(mean): 81 (06 Mar 2025 08:00) (77 - 100)  RR: 23 (06 Mar 2025 08:00) (17 - 23)  SpO2: 100% (06 Mar 2025 09:34) (98% - 100%)    Parameters below as of 06 Mar 2025 09:34  Patient On (Oxygen Delivery Method): ventilator     I&O's Summary    05 Mar 2025 07:01  -  06 Mar 2025 07:00  --------------------------------------------------------  IN: 2658.6 mL / OUT: 1950 mL / NET: 708.6 mL        GENERAL: thin female pt laying in bed intubated/sedated  HEENT: +ET tube, dry mucous membranes  PULMONARY: on vent  GASTROINTESTINAL: soft,  nontender  Last BM: 3/6  MUSCULOSKELETAL: no edema to BLE; no cyanosis  Psych: sedated on vent  SKIN: frail skin with bleeding to R vandana, dressing in place      LABS:                        7.1    7.52  )-----------( 132      ( 06 Mar 2025 05:00 )             23.1   03-06    140  |  103  |  30[H]  ----------------------------<  87  4.4   |  34[H]  |  0.55    Ca    9.1      06 Mar 2025 05:00  Phos  3.0     03-06  Mg     1.8     03-06        Urinalysis Basic - ( 06 Mar 2025 05:00 )    Color: x / Appearance: x / SG: x / pH: x  Gluc: 87 mg/dL / Ketone: x  / Bili: x / Urobili: x   Blood: x / Protein: x / Nitrite: x   Leuk Esterase: x / RBC: x / WBC x   Sq Epi: x / Non Sq Epi: x / Bacteria: x      RADIOLOGY & ADDITIONAL STUDIES:    Protein Calorie Malnutrition Present: [ ]mild [ ]moderate [ ]severe [ ]underweight [ ]morbid obesity  https://www.andeal.org/vault/2440/web/files/ONC/Table_Clinical%20Characteristics%20to%20Document%20Malnutrition-White%20JV%20et%20al%202012.pdf    Height (cm): 157.5 (02-26-25 @ 13:19)  Weight (kg): 54.4 (02-26-25 @ 13:19)  BMI (kg/m2): 21.9 (02-26-25 @ 13:19)    [x ]PPSV2 < or = 30%  [ ]significant weight loss [ ]poor nutritional intake [ ]anasarca[x ]Artificial Nutrition    Other REFERRALS:  [ ]Hospice  [ ]Child Life  [ ]Social Work  [ ]Case management [ ]Holistic Therapy

## 2025-03-06 NOTE — PROGRESS NOTE ADULT - TIME BILLING
Time spent includes direct patient care  (interview and examination of patient), discussion with other providers, support staff and/or patient's family members, review of medical records, ordering diagnostic tests and analyzing results, and documentation, excluding time spent in ACP discussions.
Time spent includes direct patient care  (interview and examination of patient), discussion with other providers, support staff and/or patient's family members, review of medical records, ordering diagnostic tests and analyzing results, and documentation, excluding time spent in ACP discussions.

## 2025-03-06 NOTE — PROGRESS NOTE ADULT - ASSESSMENT
Physical Examination:  GENERAL:               Intubated, sedated    HEENT:                    No JVD, Dry MM  PULM:                     Bilateral air entry, very diminshed bilaterally, no significant sputum production, tachypneic increased WOB  CVS:                         S1, S2,  No Murmur  ABD:                        Soft, +distended, nontender, normoactive bowel sounds,   EXT:                         No edema, nontender, No Cyanosis or Clubbing    NEURO:                  intubated sedated  PSYC:                      Calm, no Insight.       Assessment  1. Acute Hypoxic and hypercarbic respiratory failure   2. Due to Acute flu A and Left lower lobe Pna and Left rib fx  3. Chronic Advanced COPD with supplemental oxygen by nasal cannula  4. RA  5. h/o colon cancer  6. h/o PE  off a/c now with Segmental and subsegmental pulmonary embolism right lower lobe, and b/l Calf dvt on Duplex.  7. Anemia     Plan  SAT/SBT doen, pt dyspneic unable to tolerate cpap ps 5  Tube feeding to start  CT scan as above no obstruction, will start simethicone  h/h down trending will need to hold a/c  d/w GI role in egd/colon prior to restart a/c  continue ppiv  start simethicone    on tube feeding as behind on nutrition  f/u sputum cultures, thin secretions,           PMD:				                   Notified(Date):  Family Updated: 	Harjinder [phone]	                                 Date: 3/5-6/2025  updated family   pt high risk of difficulty weaning from vent      Sedation & Analgesia:	precedex  Diet/Nutrition:		tube feeding      GI PPx:			PPI    DVT Ppx:		Currently venodynes       Activity:		    Head of Bed:               35-45 Deg  Glycemic Control:             Lines:  CENTRAL LINE: 	[ ] YES [ ] NO	                    LOCATION:   	                       DATE INSERTED:   	                    REMOVE:  [ ] YES [ ] NO    A-LINE:  	                [ ] YES [ ] NO                      LOCATION:   	                       DATE INSERTED: 		            REMOVE:  [ ] YES [ ] NO    KEANE: 		        [ ] YES [ ] NO  		                                       DATE INSERTED:		            REMOVE:  [ ] YES [ ] NO      Restraints were deemed necessary to prevent removal of life-sustaining devices [ x ] YES   [    ]  NO    Disposition: ICU care    Goals of Care: Full code Physical Examination:  GENERAL:               Intubated, sedated    HEENT:                    No JVD, Dry MM  PULM:                     Bilateral air entry, very diminshed bilaterally, no significant sputum production, tachypneic increased WOB  CVS:                         S1, S2,  No Murmur  ABD:                        Soft, +distended, nontender, normoactive bowel sounds,   EXT:                         No edema, nontender, No Cyanosis or Clubbing    NEURO:                  intubated sedated  PSYC:                      Calm, no Insight.       Assessment  1. Acute Hypoxic and hypercarbic respiratory failure   2. Due to Acute flu A and Left lower lobe Pna and Left rib fx  3. Chronic Advanced COPD with supplemental oxygen by nasal cannula  4. RA  5. h/o colon cancer  6. h/o PE  off a/c now with Segmental and subsegmental pulmonary embolism right lower lobe, and b/l Calf dvt on Duplex.  7. Anemia     Plan  SAT/SBT doen, pt dyspneic unable to tolerate cpap ps 5  Tube feeding to start  CT scan as above no obstruction, will start simethicone  h/h down trending will need to hold a/c  d/w GI role in egd/colon prior to restart a/c  continue ppi   start simethicone    on tube feeding as behind on nutrition  f/u sputum cultures, thin secretions,           PMD:				                   Notified(Date):  Family Updated: 	Harjinder [phone]	                                 Date: 3/5-6/2025  updated family   pt high risk of difficulty weaning from vent      Sedation & Analgesia:	precedex  Diet/Nutrition:		tube feeding      GI PPx:			PPI    DVT Ppx:		Currently venodynes       Activity:		    Head of Bed:               35-45 Deg  Glycemic Control:             Lines:  CENTRAL LINE: 	[ ] YES [ ] NO	                    LOCATION:   	                       DATE INSERTED:   	                    REMOVE:  [ ] YES [ ] NO    A-LINE:  	                [ ] YES [ ] NO                      LOCATION:   	                       DATE INSERTED: 		            REMOVE:  [ ] YES [ ] NO    KEANE: 		        [ ] YES [ ] NO  		                                       DATE INSERTED:		            REMOVE:  [ ] YES [ ] NO      Restraints were deemed necessary to prevent removal of life-sustaining devices [ x ] YES   [    ]  NO    Disposition: ICU care    Goals of Care: Full code

## 2025-03-06 NOTE — PROGRESS NOTE ADULT - SUBJECTIVE AND OBJECTIVE BOX
Follow-up Critical Care Progress Note  Chief Complaint : Acute respiratory failure with hypoxia        pt seen and examined  SAT/SBT       Allergies :No Known Allergies      PAST MEDICAL & SURGICAL HISTORY:  Advanced COPD    On supplemental oxygen by nasal cannula    Arthritis    Cancer, colon    HTN (hypertension)    No significant past surgical history        Medications:  MEDICATIONS  (STANDING):  albuterol    0.083% 2.5 milliGRAM(s) Nebulizer every 6 hours  bisacodyl Suppository 10 milliGRAM(s) Rectal every 24 hours  chlorhexidine 0.12% Liquid 15 milliLiter(s) Oral Mucosa every 12 hours  chlorhexidine 2% Cloths 1 Application(s) Topical <User Schedule>  dexMEDEtomidine Infusion 0.3 MICROgram(s)/kG/Hr (4.08 mL/Hr) IV Continuous <Continuous>  dextrose 5%. 1000 milliLiter(s) (100 mL/Hr) IV Continuous <Continuous>  dextrose 5%. 1000 milliLiter(s) (50 mL/Hr) IV Continuous <Continuous>  dextrose 50% Injectable 25 Gram(s) IV Push once  dextrose 50% Injectable 12.5 Gram(s) IV Push once  dextrose 50% Injectable 25 Gram(s) IV Push once  doxazosin 2 milliGRAM(s) Oral at bedtime  fluconAZOLE   Tablet 100 milliGRAM(s) Oral every 24 hours  insulin glargine Injectable (LANTUS) 10 Unit(s) SubCutaneous every morning  insulin lispro (ADMELOG) corrective regimen sliding scale   SubCutaneous every 6 hours  lactated ringers. 1000 milliLiter(s) (100 mL/Hr) IV Continuous <Continuous>  methylPREDNISolone sodium succinate Injectable 40 milliGRAM(s) IV Push every 12 hours  multivitamin 1 Tablet(s) Oral daily  pantoprazole  Injectable 40 milliGRAM(s) IV Push every 12 hours  polyethylene glycol 3350 17 Gram(s) Oral daily  senna Syrup 15 milliLiter(s) Oral at bedtime    MEDICATIONS  (PRN):  acetaminophen   Oral Liquid .. 650 milliGRAM(s) Enteral Tube every 6 hours PRN Temp greater or equal to 38C (100.4F), Mild Pain (1 - 3)  fentaNYL    Injectable 25 MICROGram(s) IV Push every 3 hours PRN agitaiton      Antibiotics History  cefepime   IVPB    , 02-26-25 @ 13:23  cefepime   IVPB 2000 milliGRAM(s) IV Intermittent once, 02-26-25 @ 13:32, Stop order after: 1 Doses  cefepime   IVPB 1000 milliGRAM(s) IV Intermittent every 12 hours, 02-26-25 @ 16:16  cefepime   IVPB 2000 milliGRAM(s) IV Intermittent every 8 hours, 02-27-25 @ 08:41, Stop order after: 5 Days  fluconAZOLE   Tablet 100 milliGRAM(s) Oral every 24 hours, 03-02-25 @ 14:15, Stop order after: 7 Doses  oseltamivir 75 milliGRAM(s) Oral two times a day, 02-27-25 @ 08:11, Stop order after: 5 Days  oseltamivir Suspension 75 milliGRAM(s) Oral every 12 hours, 02-27-25 @ 09:53  oseltamivir Suspension 75 milliGRAM(s) Oral every 12 hours, 02-27-25 @ 10:24, Stop order after: 5 Days  vancomycin  IVPB 750 milliGRAM(s) IV Intermittent Once, 02-26-25 @ 16:23, Stop order after: 1 Doses  vancomycin  IVPB. 1000 milliGRAM(s) IV Intermittent once, 02-26-25 @ 13:32, Stop order after: 1 Doses      Heme Medications       GI Medications  bisacodyl Suppository 10 milliGRAM(s) Rectal every 24 hours, 03-03-25 @ 07:27, Now  pantoprazole  Injectable 40 milliGRAM(s) IV Push every 12 hours, 03-05-25 @ 08:08, Routine  polyethylene glycol 3350 17 Gram(s) Oral daily, 03-02-25 @ 12:16, Routine  senna Syrup 15 milliLiter(s) Oral at bedtime, 03-06-25 @ 07:59, Routine      COVID  02-26-25 @ 13:30  COVID -   NotDete      COVID Biomarkers    03-03-25 @ 06:00 ESR --  ---  CRP --  ---  DDimer  --   ---   LDH --   ---   Ferritin 121            Trend Cardiac Enzymes    Trend BNP  02-26-25 @ 13:30   -  198    Procalcitonin Trend  02-26-25 @ 13:30   -   0.10[H]    WBC Trend  03-06-25 @ 05:00   -  7.52  03-05-25 @ 05:00   -  9.07  03-04-25 @ 06:00   -  8.89  03-03-25 @ 13:50   -  7.71    H/H Trend  03-06-25 @ 05:00   -   7.1[L]/ 23.1[L]  03-05-25 @ 05:00   -   7.9[L]/ 26.4[L]  03-04-25 @ 06:00   -   9.1[L]/ 30.0[L]  03-03-25 @ 13:50   -   6.7[LL]/ 23.0[L]  03-03-25 @ 06:00   -   7.1[L]/ 24.0[L]  03-02-25 @ 06:15   -   7.7[L]/ 26.2[L]    Stool Occult Blood  03-04-25 @ 14:30   -   Positive[!]    Platelet Trend  03-06-25 @ 05:00   -  132[L]  03-05-25 @ 05:00   -  148[L]  03-04-25 @ 06:00   -  154  03-03-25 @ 13:50   -  163    Trend Sodium  03-06-25 @ 05:00   -  140  03-05-25 @ 05:00   -  136  03-04-25 @ 06:00   -  150[H]  03-03-25 @ 13:50   -  149[H]    Trend Potassium  03-06-25 @ 05:00   -  4.4  03-05-25 @ 05:00   -  4.4  03-04-25 @ 06:00   -  4.3  03-03-25 @ 13:50   -  4.3    Trend Bun/Cr  03-06-25 @ 05:00  BUN/CR -  30[H] / 0.55  03-05-25 @ 05:00  BUN/CR -  46[H] / 0.69  03-04-25 @ 06:00  BUN/CR -  47[H] / 0.59  03-03-25 @ 13:50  BUN/CR -  55[H] / 0.81    Lactic Acid Trend  02-27-25 @ 05:51   -   1.0  02-26-25 @ 13:00   -   1.0    ABG Trend  03-05-25 @ 06:00   - 7.38/55[H]/76[L]/98.2[H]  03-04-25 @ 12:15   - 7.43/50[H]/67[L]/95.9  03-04-25 @ 08:00   - 7.40/56[H]/60[L]/93.7[L]  03-03-25 @ 15:18   - 7.35/58[H]/58[L]/91.7[L]  03-03-25 @ 14:05   - 7.30[L]/73[HH]/85/98.6[H]  03-03-25 @ 04:10   - 7.44/52[H]/74[L]/97.1  03-02-25 @ 05:00   - 7.37/62[H]/84/97.5  03-01-25 @ 05:00   - 7.51[H]/49[H]/61[L]/93.8[L]  02-28-25 @ 05:30   - 7.43/58[H]/76[L]/96.5  02-27-25 @ 04:30   - 7.43/55[H]/93/98.6[H]  02-26-25 @ 16:18   - 7.31[L]/72[HH]/114[H]/99.2[H]  02-26-25 @ 13:35   - 7.30[L]/84[HH]/118[H]/99.1[H]    Trend AST/ALT/ALK Phos/Bili  03-03-25 @ 06:00   11/27/79/0.6  03-02-25 @ 06:15   12/22/89/0.6  03-01-25 @ 05:04   14/22/87/0.7  02-28-25 @ 05:30   16/17/90/0.7  02-27-25 @ 05:51   15/19/105/0.7  02-26-25 @ 13:30   20/24/109/0.8      Ammonia Trend      Amylase / Lipase Trend      Albumin Trend  03-03-25 @ 06:00   -   2.7[L]  03-02-25 @ 06:15   -   2.8[L]  03-01-25 @ 05:04   -   2.6[L]  02-28-25 @ 05:30   -   2.6[L]  02-27-25 @ 05:51   -   2.9[L]  02-26-25 @ 13:30   -   3.1[L]      PTT - PT - INR Trend  02-26-25 @ 13:30   -   27.1 - 11.3 - 0.96    Glucose Trend  03-06-25 @ 09:37   -  -- -- 102[H]  03-06-25 @ 06:07   -  -- -- 93  03-06-25 @ 05:00   -  87 -- --  03-05-25 @ 23:23   -  -- -- 89  03-05-25 @ 17:46   -  -- -- 86  03-05-25 @ 11:46   -  -- -- 152[H]  03-05-25 @ 08:00   -  -- -- 193[H]  03-05-25 @ 05:47   -  -- -- 212[H]  03-05-25 @ 05:00   -  208[H] -- --  03-04-25 @ 23:29   -  -- -- 200[H]    A1C with Estimated Average Glucose Result: 6.0 % *H* [4.0 - 5.6] (03-04-25 @ 06:00)  A1C with Estimated Average Glucose Result: 6.6 % *H* [4.0 - 5.6] (02-27-25 @ 05:51)      LABS:                        7.1    7.52  )-----------( 132      ( 06 Mar 2025 05:00 )             23.1     03-06    140  |  103  |  30[H]  ----------------------------<  87  4.4   |  34[H]  |  0.55    Ca    9.1      06 Mar 2025 05:00  Phos  3.0     03-06  Mg     1.8     03-06                Urinalysis Basic - ( 06 Mar 2025 05:00 )    Color: x / Appearance: x / SG: x / pH: x  Gluc: 87 mg/dL / Ketone: x  / Bili: x / Urobili: x   Blood: x / Protein: x / Nitrite: x   Leuk Esterase: x / RBC: x / WBC x   Sq Epi: x / Non Sq Epi: x / Bacteria: x              CULTURES: (if applicable)    Culture - Sputum (collected 03-04-25 @ 11:57)  Source: Sputum Sputum  Gram Stain (03-05-25 @ 00:07):    Few polymorphonuclear leukocytes per low power field    No Squamous epithelial cells per low power field    Rare Yeast like cells per oil power field  Final Report (03-06-25 @ 08:20):    Commensal annetta consistent with body site    Culture - Blood (collected 02-26-25 @ 13:30)  Source: .Blood Blood-Peripheral  Final Report (03-04-25 @ 05:00):    No growth at 5 days    Culture - Blood (collected 02-26-25 @ 13:30)  Source: .Blood Blood-Peripheral  Final Report (03-04-25 @ 05:00):    No growth at 5 days      Rapid RVP Result: Detected (02-26-25 @ 13:30)      ABG - ( 05 Mar 2025 06:00 )  pH, Arterial: 7.38  pH, Blood: x     /  pCO2: 55    /  pO2: 76    / HCO3: 32    / Base Excess: 7.4   /  SaO2: 98.2              CAPILLARY BLOOD GLUCOSE      POCT Blood Glucose.: 102 mg/dL (06 Mar 2025 09:37)      RADIOLOGY  CXR:      CT:    ECHO:      VITALS:  T(C): 36.6 (03-06-25 @ 08:00), Max: 37.7 (03-05-25 @ 18:00)  T(F): 97.9 (03-06-25 @ 08:00), Max: 99.9 (03-05-25 @ 18:00)  HR: 73 (03-06-25 @ 09:34) (56 - 94)  BP: 112/66 (03-06-25 @ 08:00) (100/72 - 139/89)  BP(mean): 81 (03-06-25 @ 08:00) (77 - 100)  ABP: --  ABP(mean): --  RR: 23 (03-06-25 @ 08:00) (17 - 23)  SpO2: 100% (03-06-25 @ 09:34) (98% - 100%)  CVP(mm Hg): --  CVP(cm H2O): --    Ins and Outs     03-05-25 @ 07:01  -  03-06-25 @ 07:00  --------------------------------------------------------  IN: 2658.6 mL / OUT: 1950 mL / NET: 708.6 mL            Device: Avea, Mode: AC/ CMV (Assist Control/ Continuous Mandatory Ventilation), RR (machine): 20, RR (patient): 20, TV (machine): 400, TV (patient): 360, FiO2: 50, PEEP: 6, ITime: 1, MAP: 12, PIP: 22    I&O's Detail    05 Mar 2025 07:01  -  06 Mar 2025 07:00  --------------------------------------------------------  IN:    Dexmedetomidine: 277.1 mL    Dexmedetomidine: 81.5 mL    dextrose 5%: 300 mL    Lactated Ringers: 2000 mL  Total IN: 2658.6 mL    OUT:    Glucerna 1.5: 0 mL    Indwelling Catheter - Urethral (mL): 1950 mL    Vital1.5: 0 mL  Total OUT: 1950 mL    Total NET: 708.6 mL        Follow-up Critical Care Progress Note  Chief Complaint : Acute respiratory failure with hypoxia        pt seen and examined  SAT/SBT       Allergies :No Known Allergies      PAST MEDICAL & SURGICAL HISTORY:  Advanced COPD  On supplemental oxygen by nasal cannula  Arthritis  Cancer, colon  HTN (hypertension)    No significant past surgical history        Medications:  MEDICATIONS  (STANDING):  albuterol    0.083% 2.5 milliGRAM(s) Nebulizer every 6 hours  bisacodyl Suppository 10 milliGRAM(s) Rectal every 24 hours  chlorhexidine 0.12% Liquid 15 milliLiter(s) Oral Mucosa every 12 hours  chlorhexidine 2% Cloths 1 Application(s) Topical <User Schedule>  dexMEDEtomidine Infusion 0.3 MICROgram(s)/kG/Hr (4.08 mL/Hr) IV Continuous <Continuous>  dextrose 5%. 1000 milliLiter(s) (100 mL/Hr) IV Continuous <Continuous>  dextrose 5%. 1000 milliLiter(s) (50 mL/Hr) IV Continuous <Continuous>  dextrose 50% Injectable 25 Gram(s) IV Push once  dextrose 50% Injectable 12.5 Gram(s) IV Push once  dextrose 50% Injectable 25 Gram(s) IV Push once  doxazosin 2 milliGRAM(s) Oral at bedtime  fluconAZOLE   Tablet 100 milliGRAM(s) Oral every 24 hours  insulin glargine Injectable (LANTUS) 10 Unit(s) SubCutaneous every morning  insulin lispro (ADMELOG) corrective regimen sliding scale   SubCutaneous every 6 hours  lactated ringers. 1000 milliLiter(s) (100 mL/Hr) IV Continuous <Continuous>  methylPREDNISolone sodium succinate Injectable 40 milliGRAM(s) IV Push every 12 hours  multivitamin 1 Tablet(s) Oral daily  pantoprazole  Injectable 40 milliGRAM(s) IV Push every 12 hours  polyethylene glycol 3350 17 Gram(s) Oral daily  senna Syrup 15 milliLiter(s) Oral at bedtime    MEDICATIONS  (PRN):  acetaminophen   Oral Liquid .. 650 milliGRAM(s) Enteral Tube every 6 hours PRN Temp greater or equal to 38C (100.4F), Mild Pain (1 - 3)  fentaNYL    Injectable 25 MICROGram(s) IV Push every 3 hours PRN agitaiton      Antibiotics History  cefepime   IVPB    , 02-26-25 @ 13:23  cefepime   IVPB 2000 milliGRAM(s) IV Intermittent once, 02-26-25 @ 13:32, Stop order after: 1 Doses  cefepime   IVPB 1000 milliGRAM(s) IV Intermittent every 12 hours, 02-26-25 @ 16:16  cefepime   IVPB 2000 milliGRAM(s) IV Intermittent every 8 hours, 02-27-25 @ 08:41, Stop order after: 5 Days  fluconAZOLE   Tablet 100 milliGRAM(s) Oral every 24 hours, 03-02-25 @ 14:15, Stop order after: 7 Doses  oseltamivir 75 milliGRAM(s) Oral two times a day, 02-27-25 @ 08:11, Stop order after: 5 Days  oseltamivir Suspension 75 milliGRAM(s) Oral every 12 hours, 02-27-25 @ 09:53  oseltamivir Suspension 75 milliGRAM(s) Oral every 12 hours, 02-27-25 @ 10:24, Stop order after: 5 Days  vancomycin  IVPB 750 milliGRAM(s) IV Intermittent Once, 02-26-25 @ 16:23, Stop order after: 1 Doses  vancomycin  IVPB. 1000 milliGRAM(s) IV Intermittent once, 02-26-25 @ 13:32, Stop order after: 1 Doses      Heme Medications       GI Medications  bisacodyl Suppository 10 milliGRAM(s) Rectal every 24 hours, 03-03-25 @ 07:27, Now  pantoprazole  Injectable 40 milliGRAM(s) IV Push every 12 hours, 03-05-25 @ 08:08, Routine  polyethylene glycol 3350 17 Gram(s) Oral daily, 03-02-25 @ 12:16, Routine  senna Syrup 15 milliLiter(s) Oral at bedtime, 03-06-25 @ 07:59, Routine      COVID  02-26-25 @ 13:30  COVID -   NotDete      COVID Biomarkers    03-03-25 @ 06:00 ESR --  ---  CRP --  ---  DDimer  --   ---   LDH --   ---   Ferritin 121            Trend Cardiac Enzymes    Trend BNP  02-26-25 @ 13:30   -  198    Procalcitonin Trend  02-26-25 @ 13:30   -   0.10[H]    WBC Trend  03-06-25 @ 05:00   -  7.52  03-05-25 @ 05:00   -  9.07  03-04-25 @ 06:00   -  8.89  03-03-25 @ 13:50   -  7.71    H/H Trend  03-06-25 @ 05:00   -   7.1[L]/ 23.1[L]  03-05-25 @ 05:00   -   7.9[L]/ 26.4[L]  03-04-25 @ 06:00   -   9.1[L]/ 30.0[L]  03-03-25 @ 13:50   -   6.7[LL]/ 23.0[L]  03-03-25 @ 06:00   -   7.1[L]/ 24.0[L]  03-02-25 @ 06:15   -   7.7[L]/ 26.2[L]    Stool Occult Blood  03-04-25 @ 14:30   -   Positive[!]    Platelet Trend  03-06-25 @ 05:00   -  132[L]  03-05-25 @ 05:00   -  148[L]  03-04-25 @ 06:00   -  154  03-03-25 @ 13:50   -  163    Trend Sodium  03-06-25 @ 05:00   -  140  03-05-25 @ 05:00   -  136  03-04-25 @ 06:00   -  150[H]  03-03-25 @ 13:50   -  149[H]    Trend Potassium  03-06-25 @ 05:00   -  4.4  03-05-25 @ 05:00   -  4.4  03-04-25 @ 06:00   -  4.3  03-03-25 @ 13:50   -  4.3    Trend Bun/Cr  03-06-25 @ 05:00  BUN/CR -  30[H] / 0.55  03-05-25 @ 05:00  BUN/CR -  46[H] / 0.69  03-04-25 @ 06:00  BUN/CR -  47[H] / 0.59  03-03-25 @ 13:50  BUN/CR -  55[H] / 0.81    Lactic Acid Trend  02-27-25 @ 05:51   -   1.0  02-26-25 @ 13:00   -   1.0    ABG Trend  03-05-25 @ 06:00   - 7.38/55[H]/76[L]/98.2[H]  03-04-25 @ 12:15   - 7.43/50[H]/67[L]/95.9  03-04-25 @ 08:00   - 7.40/56[H]/60[L]/93.7[L]  03-03-25 @ 15:18   - 7.35/58[H]/58[L]/91.7[L]  03-03-25 @ 14:05   - 7.30[L]/73[HH]/85/98.6[H]  03-03-25 @ 04:10   - 7.44/52[H]/74[L]/97.1  03-02-25 @ 05:00   - 7.37/62[H]/84/97.5  03-01-25 @ 05:00   - 7.51[H]/49[H]/61[L]/93.8[L]  02-28-25 @ 05:30   - 7.43/58[H]/76[L]/96.5  02-27-25 @ 04:30   - 7.43/55[H]/93/98.6[H]  02-26-25 @ 16:18   - 7.31[L]/72[HH]/114[H]/99.2[H]  02-26-25 @ 13:35   - 7.30[L]/84[HH]/118[H]/99.1[H]    Trend AST/ALT/ALK Phos/Bili  03-03-25 @ 06:00   11/27/79/0.6  03-02-25 @ 06:15   12/22/89/0.6  03-01-25 @ 05:04   14/22/87/0.7  02-28-25 @ 05:30   16/17/90/0.7  02-27-25 @ 05:51   15/19/105/0.7  02-26-25 @ 13:30   20/24/109/0.8         Albumin Trend  03-03-25 @ 06:00   -   2.7[L]  03-02-25 @ 06:15   -   2.8[L]  03-01-25 @ 05:04   -   2.6[L]  02-28-25 @ 05:30   -   2.6[L]  02-27-25 @ 05:51   -   2.9[L]  02-26-25 @ 13:30   -   3.1[L]      PTT - PT - INR Trend  02-26-25 @ 13:30   -   27.1 - 11.3 - 0.96    Glucose Trend  03-06-25 @ 09:37   -  -- -- 102[H]  03-06-25 @ 06:07   -  -- -- 93  03-06-25 @ 05:00   -  87 -- --  03-05-25 @ 23:23   -  -- -- 89  03-05-25 @ 17:46   -  -- -- 86  03-05-25 @ 11:46   -  -- -- 152[H]  03-05-25 @ 08:00   -  -- -- 193[H]  03-05-25 @ 05:47   -  -- -- 212[H]  03-05-25 @ 05:00   -  208[H] -- --  03-04-25 @ 23:29   -  -- -- 200[H]    A1C with Estimated Average Glucose Result: 6.0 % *H* [4.0 - 5.6] (03-04-25 @ 06:00)  A1C with Estimated Average Glucose Result: 6.6 % *H* [4.0 - 5.6] (02-27-25 @ 05:51)      LABS:                        7.1    7.52  )-----------( 132      ( 06 Mar 2025 05:00 )             23.1     03-06    140  |  103  |  30[H]  ----------------------------<  87  4.4   |  34[H]  |  0.55    Ca    9.1      06 Mar 2025 05:00  Phos  3.0     03-06  Mg     1.8     03-06             CULTURES: (if applicable)    Culture - Sputum (collected 03-04-25 @ 11:57)  Source: Sputum Sputum  Gram Stain (03-05-25 @ 00:07):    Few polymorphonuclear leukocytes per low power field    No Squamous epithelial cells per low power field    Rare Yeast like cells per oil power field  Final Report (03-06-25 @ 08:20):    Commensal annetta consistent with body site    Culture - Blood (collected 02-26-25 @ 13:30)  Source: .Blood Blood-Peripheral  Final Report (03-04-25 @ 05:00):    No growth at 5 days    Culture - Blood (collected 02-26-25 @ 13:30)  Source: .Blood Blood-Peripheral  Final Report (03-04-25 @ 05:00):    No growth at 5 days      Rapid RVP Result: Detected (02-26-25 @ 13:30)      ABG - ( 05 Mar 2025 06:00 )  pH, Arterial: 7.38  pH, Blood: x     /  pCO2: 55    /  pO2: 76    / HCO3: 32    / Base Excess: 7.4   /  SaO2: 98.2             RADIOLOGY  CXR:      CT:    ACC: [id number] EXAM:  CT ABDOMEN AND PELVIS OC IC   ORDERED BY: JB MURO     PROCEDURE DATE:  03/05/2025          INTERPRETATION:  CLINICAL INFORMATION: Constipation, history of colon   cancer.    COMPARISON: Abdominal radiographs March 4, 2025, CT pulmonary angiogram   February 26, 2025.    CONTRAST/COMPLICATIONS:  IV Contrast: Omnipaque 350  70 cc administered   30 cc discarded  Oral Contrast: NONE  .    PROCEDURE:  CT of the Abdomen and Pelvis was performed.  Sagittal and coronal reformats were performed.    FINDINGS:  LOWER CHEST: There are residual patchy peribronchovascular opacities at   the visualized lung bases with mild bilateral lower lobe dependent   atelectasis.    LIVER: Scattered subcentimeter hepatic hypodensities, favored to   represent small cysts. Otherwise, within normal limits.  BILE DUCTS: Mild intrahepatic ductal dilatation. The common bile duct is   not dilated.  GALLBLADDER: Contracted.  SPLEEN: Within normal limits.  PANCREAS: Mildly atrophic and moderate fatty replacement.  ADRENALS: Within normal limits.  KIDNEYS/URETERS: The kidneys enhance symmetrically. Mild bilateral   perinephric fat stranding. Bilateral renal cysts, the largest of which   measures up to 4.3 cm in the posterior midpole of the right kidney. No   hydroureteronephrosis, mass, or stone.    BLADDER: Collapsed around Diaz catheter. Pelvic floor prolapse with a   cystocele.  REPRODUCTIVE ORGANS: Uterus and adnexa within normal limits.    BOWEL: Rectal temperature probe in place. Partially visualized enteric   tube seen coursing through the lower esophagus and terminating in the   collapsed stomach. Low anterior resection. Left colonic diverticulosis.   Contrast is seen within the proximal and mid small bowel. There is mild   fluid distention of the distal small bowel. There is progressive   dilatation of the air and stool-filled transverse and ascending colon   with maximal dilatation of the cecum measuring up to 7.5 cm. The rectum   and more distal colon are collapsed with a small amount of stool.  There   is no mesenteric adenopathy.  PERITONEUM/RETROPERITONEUM: Small amount of abdominal and pelvic free and   fluid.  VESSELS: Moderate atherosclerotic changes of the abdominal aorta   extending into the bilateralcommon iliac arteries. The mesenteric   vessels opacify unremarkably.  LYMPH NODES: No lymphadenopathy.  ABDOMINAL WALL: Within normal limits.  BONES: Age-indeterminate compression deformity of T12 and partially   visualized compression deformity of T11. Disc space narrowing and   spondylosis at L5-S1.    IMPRESSION:  1. Liquid stool and gas distention of the right colon with maximal   dilatation of the cecum measuring up to 7.5 cm, tapering to a collapsed   left colon. No evidence of abrupt transition to suggest bowel   obstruction. These findings are favored to represent ileus. Trace free   fluid in the peritoneal cavity. Recommend plain film follow-up.  2. Improved bibasilar consolidative opacities.        --- End of Report ---  ECHO:      VITALS:  T(C): 36.6 (03-06-25 @ 08:00), Max: 37.7 (03-05-25 @ 18:00)  T(F): 97.9 (03-06-25 @ 08:00), Max: 99.9 (03-05-25 @ 18:00)  HR: 73 (03-06-25 @ 09:34) (56 - 94)  BP: 112/66 (03-06-25 @ 08:00) (100/72 - 139/89)  BP(mean): 81 (03-06-25 @ 08:00) (77 - 100)  ABP: --  ABP(mean): --  RR: 23 (03-06-25 @ 08:00) (17 - 23)  SpO2: 100% (03-06-25 @ 09:34) (98% - 100%)  CVP(mm Hg): --  CVP(cm H2O): --    Ins and Outs     03-05-25 @ 07:01  -  03-06-25 @ 07:00  --------------------------------------------------------  IN: 2658.6 mL / OUT: 1950 mL / NET: 708.6 mL            Device: Avea, Mode: AC/ CMV (Assist Control/ Continuous Mandatory Ventilation), RR (machine): 20, RR (patient): 20, TV (machine): 400, TV (patient): 360, FiO2: 50, PEEP: 6, ITime: 1, MAP: 12, PIP: 22    I&O's Detail    05 Mar 2025 07:01  -  06 Mar 2025 07:00  --------------------------------------------------------  IN:    Dexmedetomidine: 277.1 mL    Dexmedetomidine: 81.5 mL    dextrose 5%: 300 mL    Lactated Ringers: 2000 mL  Total IN: 2658.6 mL    OUT:    Glucerna 1.5: 0 mL    Indwelling Catheter - Urethral (mL): 1950 mL    Vital1.5: 0 mL  Total OUT: 1950 mL    Total NET: 708.6 mL

## 2025-03-06 NOTE — PROGRESS NOTE ADULT - NS ATTEND AMEND GEN_ALL_CORE FT
Agree with assessment and plan as above. I attest my time as attending was greater than 50% of the total time of 50 min on patient care (ACP <= 25 mins) on this DOS. Time spent includes review of hospital course, labs, vitals, medical records, patient evaluation, contact with family (when present), discussion of plan with the primary team, coordinating services and documenting encounter.     Patient with ongoing anemia. History of prior colorectal cancer s/p resection 2024. Also with DVT and PE that will require anticoagulation. GI consulted for anemia. Will perform EGD and colonoscopy tomorrow to rule out GI source for anemia. No signs of overt GI bleeding thus far. Agree with assessment and plan as above. I attest my time as attending was greater than 50% of the total time of 50 min on patient care (ACP <= 25 mins) on this DOS. Time spent includes review of hospital course, labs, vitals, medical records, patient evaluation, contact with family (when present), discussion of plan with the primary team, coordinating services and documenting encounter.     Patient with ongoing anemia. History of prior colorectal cancer s/p resection 2024. Also with DVT and PE that will require anticoagulation. GI consulted for anemia. Will perform EGD and colonoscopy tomorrow to rule out GI source for anemia. No signs of overt GI bleeding thus far.    Surgical hx: patient had a  low anterior sigmoid resection with pelvic anastomosis and mobilization of the splenic flexure which was performed at [facility]. Operative report can be found in HIE.

## 2025-03-06 NOTE — PROGRESS NOTE ADULT - SUBJECTIVE AND OBJECTIVE BOX
KENDALL RIDER, 69y Female  MRN: [id number]  ATTENDING: Jose Daniel Jane    HPI:  69F, ex-smoker, PMHx rheumatoid arthritis, colon cancer, HTN, COPD, admitted to [facility] with COPD exacerbation.  This is 1 of multiple admission in the past month including previous to admission at [facility] (2/3 - 10/2025 and 2/17–18/2025) with pneumonia and COPD exacerbation.  She is was discharged to clinical hospital for in-hospital acute rehabilitation, but decompensated from respiratory perspective and is presently on antibiotics and prednisone monitored in ICU.  Patient presents with generalized weakness, difficulty ambulating.  Reportedly she has a history of colon cancer diagnosed at [place] in 2024, treated with chemotherapy under the care of Dr. Kaleb Olmedo, currently in remission.  Oncology consulted as above.      MEDICATIONS:  acetaminophen     Tablet .. 650 milliGRAM(s) Oral every 6 hours PRN  albuterol    0.083% 2.5 milliGRAM(s) Nebulizer every 6 hours  amLODIPine   Tablet 10 milliGRAM(s) Oral daily  cefepime   IVPB 2000 milliGRAM(s) IV Intermittent every 8 hours  chlorhexidine 2% Cloths 1 Application(s) Topical <User Schedule>  dextrose 5% + lactated ringers. 1000 milliLiter(s) IV Continuous <Continuous>  dextrose 5%. 1000 milliLiter(s) IV Continuous <Continuous>  dextrose 5%. 1000 milliLiter(s) IV Continuous <Continuous>  dextrose 50% Injectable 25 Gram(s) IV Push once  dextrose 50% Injectable 12.5 Gram(s) IV Push once  dextrose 50% Injectable 25 Gram(s) IV Push once  fluconAZOLE   Tablet 100 milliGRAM(s) Oral every 24 hours  fluticasone propionate/ salmeterol 250-50 MICROgram(s) Diskus 1 Dose(s) Inhalation two times a day  insulin lispro (ADMELOG) corrective regimen sliding scale   SubCutaneous Before meals and at bedtime  methylPREDNISolone sodium succinate Injectable 40 milliGRAM(s) IV Push every 12 hours  metoprolol tartrate 12.5 milliGRAM(s) Oral two times a day  multivitamin 1 Tablet(s) Oral daily  mupirocin 2% Nasal 1 Application(s) Both Nostrils two times a day  oseltamivir Suspension 75 milliGRAM(s) Oral every 12 hours  pantoprazole    Tablet 40 milliGRAM(s) Oral before breakfast  polyethylene glycol 3350 17 Gram(s) Oral daily  potassium chloride    Tablet ER 20 milliEquivalent(s) Oral once  senna 2 Tablet(s) Oral at bedtime  tamsulosin 0.4 milliGRAM(s) Oral at bedtime    All other medications reviewed.    SUBJECTIVE:  intubated    VITALS:  T(C): 36.4 (03-03-25 @ 04:00), Max: 36.6 (03-02-25 @ 21:00)  T(F): 97.5 (03-03-25 @ 04:00), Max: 97.8 (03-02-25 @ 21:00)  HR: 98 (03-03-25 @ 06:00) (86 - 110)  BP: 146/74 (03-03-25 @ 06:00) (106/81 - 150/81)      PHYSICAL EXAM:  Constitutional: sedated  HEENT: normocephalic, anicteric sclerae, no mucositis or thrush  Respiratory: bilateral clear to auscultation anteriorly  Cardiovascular : S1, S2 regular, rhythmic, no murmurs, gallops or rubs  Abdomen: soft, distended, + normoactive BS, no palpable HS- megaly  Extremities: no tenderness;  -c/c/e, pulses equal bilaterally    LABS:  (03-03) WBC: 5.52 K/uL,Hemoglobin: 7.1 g/dL, Hematocrit: 24.0 %,  Platelet: 168 K/uL  (03-03) Na: 147 mmol/L ; K: 3.4 mmol/L ; BUN: 54 mg/dL ; Cr: 0.62 mg/dL.    RADIOLOGY:    ACC: [id number] EXAM:  CT ANGIO CHEST PULM [facility]   ORDERED BY:  MO EUBANKS     PROCEDURE DATE:  02/26/2025  INTERPRETATION:  CLINICAL INFORMATION: Hypoxia.    COMPARISON: None.    CONTRAST/COMPLICATIONS:  IV Contrast: Omnipaque 350 90 cc administered   10 cc discarded  Oral Contrast: NONE  .    PROCEDURE:  CT Angiography of the Chest.  Sagittal and coronal reformats were performed as well as 3D (MIP)   reconstructions.    FINDINGS:    LUNGS AND LARGE AIRWAYS: Patent central airways. Bronchial plugging lower   lobes bilaterally, left more than right. Severe pulmonary emphysema.   Consolidative opacities bilateral lower lobes and additional small focal   airspace opacities the right lower lobe. These may represent a   combination of pneumonia and atelectasis. Additional concerning for a   pulmonary infarct in the right lower lobe.  PLEURA: No pleural effusion.  VESSELS: Segmental and subsegmental pulmonary embolism right lower lobe.   Atherosclerotic calcification including the coronary arteries.  HEART: Heart size is normal. No pericardial effusion.  MEDIASTINUM AND AYAH: No lymphadenopathy.  CHEST WALL AND LOWER NECK: Within normal limits.  VISUALIZED UPPER ABDOMEN: Right renal cyst. Possible small left renal   cyst. Probable small cyst left hepatic lobe.  BONES: Severe anterior compression of T11 and T12 with retropulsion. This   may be of some duration. Mild superior endplate compression of T10.    IMPRESSION:  Segmental and subsegmental pulmonary embolism right lower lobe.  Bilateral lower lobe opacities that may represent a combination of   atelectasis and infiltrates. Additional possibility of a pulmonary   infarct in the right lower lobe.  Severe pulmonary emphysema.

## 2025-03-06 NOTE — PROGRESS NOTE ADULT - CONVERSATION DETAILS
Spoke with sister at bedside about pt's poor prognosis and poor respiratory drive. Discussed CPAP trials with RT and how it is unlikely pt will come off the vent successfully. We elaborated on our prior discussion with pt's nephew Aleksey the other day about what trach/peg would look like and vent facility if Leora was unable to be taken off ventilator. Donna said Leora would not have wanted that. I encouraged her to talk with her sister Jenniffer and pt's  and family about the pt's quality of life.  Discussed that oncology was recommending hospice/comfort care. Described alternative to trach/peg would be compassionate extubation where symptom directed treatment would start and pt likely to die in the hospital.  Supportive care given.  I recommended from the palliative perspective to focus on the pt's comfort and not to pursue trach/peg as Leora's quality of life would not be elevated and she would be fairly restrained to a bed. Donna will discuss these options with her family. Pt remains FULL CODE

## 2025-03-06 NOTE — PROGRESS NOTE ADULT - ASSESSMENT
GI consult:   69 year old female seen and examined in the ICU PMHx COPD - on home o2 (2-4 L), Ex smoker + Vaping, RA/Arthritis, Colon cancer s/p chemo with neuropathy, HTN, h/o PE who p/w increasing sob and ams. Colon resection of adenocarcinoma 02/2024. Mets to 2 lymph nodes - report in [facility] (2nd chart from [facility])   No active bleed noted H&H low today

## 2025-03-06 NOTE — PROGRESS NOTE ADULT - SUBJECTIVE AND OBJECTIVE BOX
Chief Complaint:  Patient is a 69y old  Female who presents with a chief complaint of SOB/AMS (06 Mar 2025 11:33)     Patient seen and examined at bedside in ICU, sister present. She is intubated, as per RN no event over night, had 1 BM denies melena or hematochezia.     MEDICATIONS:   MEDICATIONS  (STANDING):  albuterol    0.083% 2.5 milliGRAM(s) Nebulizer every 6 hours  aluminum hydroxide/magnesium hydroxide/simethicone Suspension 30 milliLiter(s) Oral every 6 hours  bisacodyl Suppository 10 milliGRAM(s) Rectal every 24 hours  chlorhexidine 0.12% Liquid 15 milliLiter(s) Oral Mucosa every 12 hours  chlorhexidine 2% Cloths 1 Application(s) Topical <User Schedule>  dexMEDEtomidine Infusion 0.3 MICROgram(s)/kG/Hr (4.08 mL/Hr) IV Continuous <Continuous>  dextrose 5%. 1000 milliLiter(s) (100 mL/Hr) IV Continuous <Continuous>  dextrose 5%. 1000 milliLiter(s) (50 mL/Hr) IV Continuous <Continuous>  dextrose 50% Injectable 25 Gram(s) IV Push once  dextrose 50% Injectable 12.5 Gram(s) IV Push once  dextrose 50% Injectable 25 Gram(s) IV Push once  doxazosin 2 milliGRAM(s) Oral at bedtime  fluconAZOLE   Tablet 100 milliGRAM(s) Oral every 24 hours  insulin glargine Injectable (LANTUS) 10 Unit(s) SubCutaneous every morning  insulin lispro (ADMELOG) corrective regimen sliding scale   SubCutaneous every 6 hours  lactated ringers. 1000 milliLiter(s) (100 mL/Hr) IV Continuous <Continuous>  methylPREDNISolone sodium succinate Injectable 40 milliGRAM(s) IV Push every 12 hours  multivitamin 1 Tablet(s) Oral daily  pantoprazole  Injectable 40 milliGRAM(s) IV Push every 12 hours  polyethylene glycol 3350 17 Gram(s) Oral daily  senna Syrup 15 milliLiter(s) Oral at bedtime    MEDICATIONS  (PRN):  acetaminophen   Oral Liquid .. 650 milliGRAM(s) Enteral Tube every 6 hours PRN Temp greater or equal to 38C (100.4F), Mild Pain (1 - 3)  fentaNYL    Injectable 25 MICROGram(s) IV Push every 3 hours PRN agitation      Packed Red Cells Order:  1 Unit  Indication: Hgb <7 gm/dL  Infuse Unit : 2 Hours (03-03-25 @ 14:26)        DIET:  Diet, NPO:   Tube Feeding Modality: Orogastric  Vital 1.5 Jf  Total Volume for 24 Hours (mL): 1320  Continuous  Starting Tube Feed Rate mL per Hour: 10  Increase Tube Feed Rate by (mL): 10     Every 4 hours  Until Goal Tube Feed Rate (mL per Hour): 55  Tube Feed Duration (in Hours): 24  Tube Feed Start Time: 16:00 (03-05-25 @ 16:56) [Active]          ALLERGIES:   Allergies    No Known Allergies    Intolerances        VITAL SIGNS:   Vital Signs Last 24 Hrs  T(C): 36.6 (06 Mar 2025 08:00), Max: 37.7 (05 Mar 2025 18:00)  T(F): 97.9 (06 Mar 2025 08:00), Max: 99.9 (05 Mar 2025 18:00)  HR: 69 (06 Mar 2025 12:38) (56 - 94)  BP: 112/66 (06 Mar 2025 08:00) (100/72 - 139/89)  BP(mean): 81 (06 Mar 2025 08:00) (77 - 100)  RR: 23 (06 Mar 2025 08:00) (17 - 23)  SpO2: 100% (06 Mar 2025 12:38) (99% - 100%)    Parameters below as of 06 Mar 2025 09:34  Patient On (Oxygen Delivery Method): ventilator      I&O's Summary    05 Mar 2025 07:01  -  06 Mar 2025 07:00  --------------------------------------------------------  IN: 2658.6 mL / OUT: 1950 mL / NET: 708.6 mL        PHYSICAL EXAM:   GENERAL:  No acute distress, Sedated   HEENT:  NC/AT, conjunctiva clear, sclera anicteric  CHEST:  E T T to Vent   HEART:  Regular rate  ABDOMEN:  Soft, non-tender, non-distended, positive bowel sounds, NGT +   EXTREMITIES: No edema  SKIN:  Warm, dry  NEURO:  Calm, cooperative , Sedated     LABS:                        7.1    7.52  )-----------( 132      ( 06 Mar 2025 05:00 )             23.1     Hemoglobin: 7.1 g/dL (03-06-25 @ 05:00)  Hemoglobin: 7.9 g/dL (03-05-25 @ 05:00)  Hemoglobin: 9.1 g/dL (03-04-25 @ 06:00)  Hemoglobin: 6.7 g/dL (03-03-25 @ 13:50)    03-06    140  |  103  |  30[H]  ----------------------------<  87  4.4   |  34[H]  |  0.55    Ca    9.1      06 Mar 2025 05:00  Phos  3.0     03-06  Mg     1.8     03-06              Culture - Sputum (collected 04 Mar 2025 11:57)  Source: Sputum Sputum  Gram Stain (05 Mar 2025 00:07):    Few polymorphonuclear leukocytes per low power field    No Squamous epithelial cells per low power field    Rare Yeast like cells per oil power field  Final Report (06 Mar 2025 08:20):    Commensal annetta consistent with body site      RADIOLOGY & ADDITIONAL STUDIES:      ACC: [id number] EXAM:  CT ABDOMEN AND PELVIS OC IC   ORDERED BY: JB MRUO     PROCEDURE DATE:  03/05/2025          INTERPRETATION:  CLINICAL INFORMATION: Constipation, history of colon   cancer.    COMPARISON: Abdominal radiographs March 4, 2025, CT pulmonary angiogram   February 26, 2025.    CONTRAST/COMPLICATIONS:  IV Contrast: Omnipaque 350  70 cc administered   30 cc discarded  Oral Contrast: NONE  .    PROCEDURE:  CT of the Abdomen and Pelvis was performed.  Sagittal and coronal reformats were performed.    FINDINGS:  LOWER CHEST: There are residual patchy peribronchovascular opacities at   the visualized lung bases with mild bilateral lower lobe dependent   atelectasis.    LIVER: Scattered subcentimeter hepatic hypodensities, favored to   represent small cysts. Otherwise, within normal limits.  BILE DUCTS: Mild intrahepatic ductal dilatation. The common bile duct is   not dilated.  GALLBLADDER: Contracted.  SPLEEN: Within normal limits.  PANCREAS: Mildly atrophic and moderate fatty replacement.  ADRENALS: Within normal limits.  KIDNEYS/URETERS: The kidneys enhance symmetrically. Mild bilateral   perinephric fat stranding. Bilateral renal cysts, the largest of which   measures up to 4.3 cm in the posterior midpole of the right kidney. No   hydroureteronephrosis, mass, or stone.    BLADDER: Collapsed around Diaz catheter. Pelvic floor prolapse with a   cystocele.  REPRODUCTIVE ORGANS: Uterus and adnexa within normal limits.    BOWEL: Rectal temperature probe in place. Partially visualized enteric   tube seen coursing through the lower esophagus and terminating in the   collapsed stomach. Low anterior resection. Left colonic diverticulosis.   Contrast is seen within the proximal and mid small bowel. There is mild   fluid distention of the distal small bowel. There is progressive   dilatation of the air and stool-filled transverse and ascending colon   with maximal dilatation of the cecum measuring up to 7.5 cm. The rectum   and more distal colon are collapsed with a small amount of stool.  There   is no mesenteric adenopathy.  PERITONEUM/RETROPERITONEUM: Small amount of abdominal and pelvic free and   fluid.  VESSELS: Moderate atherosclerotic changes of the abdominal aorta   extending into the bilateralcommon iliac arteries. The mesenteric   vessels opacify unremarkably.  LYMPH NODES: No lymphadenopathy.  ABDOMINAL WALL: Within normal limits.  BONES: Age-indeterminate compression deformity of T12 and partially   visualized compression deformity of T11. Disc space narrowing and   spondylosis at L5-S1.    IMPRESSION:  1. Liquid stool and gas distention of the right colon with maximal   dilatation of the cecum measuring up to 7.5 cm, tapering to a collapsed   left colon. No evidence of abrupt transition to suggest bowel   obstruction. These findings are favored to represent ileus. Trace free   fluid in the peritoneal cavity. Recommend plain film follow-up.  2. Improved bibasilar consolidative opacities.        --- End of Report ---          ISABEL SALAZAR DO; Resident Radiologist  This document has been electronically signed.  SARAH PUCKETT MD; Attending Radiologist  This document has been electronically signed. Mar  6 2025  9:28AM  03-05-25 @ 21:43    -- -- --   ACC: [id number] EXAM:  XR ABDOMEN PORTABLE ROUTINE 1V   ORDERED BY: PETER MOYA     ACC: [id number] EXAM:  XR CHEST AP OR PA 1V   ORDERED BY: PETER MOYA     ACC: [id number] EXAM:  XR CHEST PORTABLE IMMED 1V   ORDERED BY: PETER MOYA     PROCEDURE DATE:  03/04/2025          INTERPRETATION:  AP chest on March 4, 2025 at 8:39 AM. 2 images. Patient   is short of breath.    Heart size normal. COPD hyperexpansion of the lungs is similar to March 2.    There is an infiltrate developing overthe left lower heart border when   compared to March 2.    Abdomen. Concern is constipation.    There is mottled increased fecal content in the rectosigmoid and right   colon. No bowel obstruction. Clips overlie the lower lumbar spine. Bones   grossly intact.    Follow-up AP chest on March 4, 2025 at 10:34 AM.    Endotracheal tube and nasogastric tube inserted. Persistent slight left   base retrocardiac infiltrate.    IMPRESSION: Retrocardiac infiltrate. Endotracheal tube and nasogastric   tube inserted. Mild to moderately increased fecal content in the right   colon and rectosigmoid. No obstruction.    IMPRESSION: Suspicion of developing retrocardiac infiltrate. COPD again   noted. Left rib fracture noted.    --- End of Report ---            EITAN SULLIVAN MD; Attending Radiologist  This document has been electronically signed. Mar  4 2025  1:31PM

## 2025-03-06 NOTE — PROGRESS NOTE ADULT - ASSESSMENT
69 year old female with h/o COPD - on home o2 (2-4 L), Ex smoker + Vaping, RA/Arthritis, Colon cancer s/p chemo with neuropathy, HTN, h/o PE who p/w increaisng sob and ams. of note pateint was just hospitalized in [facility] 2/3-10/2025 for both pneumonia and COPD exacerbation. patient was sent to  Rehab. Palliative care c/s for [facility]    Debility  - PPS 30%  - pt now intubated/sedated    Failure to thrive  Severe protein calorie malnutrition  - pt with poor PO intake, electrolyte abnormalities  - nutrition consult    Metabolic encephalopathy in setting of infection below    - pt now intubated in ICU, sedated  - supportive care    Acute Hypoxic and hypercarbic respiratory failure   Acute flu A and Left lower lobe Pna  Segmental and subsegmental pulmonary embolism right lower lobe  Chronic Advanced COPD with supplemental oxygen by nasal cannula  - AC per primary medical team on hold due to worsening pt anemia, GI consulted  - pt now intubated in ICU- pt dyspneic unable, to tolerate cpap ps 5    H/o colon cancer  - pt last chemo 9/25/24; follows with Dr. Patrick in [place], sees every 3 months  - Oncology consulted - No plans for systemic chemotherapy.  No plans for oncologic intervention.  Patient is a candidate for comfort care.    Advanced care planning  - Surrogate: spouse Rasheed Taylor ; sister Jenniffer/Donna  - Pt is FULL CODE    Encounter for Palliative Care  - Spoke with pt's sister Donna at bedside, see [facility] note above. Recommended to focus on comfort as pt with poor prognosis and unlikely to be extubated successfully. Recommended against trach/peg and encouraged family to discuss pt's quality of life. SUpportive care given. Pt remains FULL CODE.  - Case discussed with ICU team Dr. Jane.    - Will continue to follow for ongoing [facility] discussions and supportive care.

## 2025-03-07 ENCOUNTER — TRANSCRIPTION ENCOUNTER (OUTPATIENT)
Age: 70
End: 2025-03-07

## 2025-03-07 LAB
ALBUMIN SERPL ELPH-MCNC: 1.8 G/DL — LOW (ref 3.3–5)
ALP SERPL-CCNC: 75 U/L — SIGNIFICANT CHANGE UP (ref 40–120)
ALT FLD-CCNC: 27 U/L — SIGNIFICANT CHANGE UP (ref 10–45)
ANION GAP SERPL CALC-SCNC: 5 MMOL/L — SIGNIFICANT CHANGE UP (ref 5–17)
AST SERPL-CCNC: 19 U/L — SIGNIFICANT CHANGE UP (ref 10–40)
BASE EXCESS BLDA CALC-SCNC: 8.9 MMOL/L — HIGH (ref -2–3)
BILIRUB SERPL-MCNC: 0.4 MG/DL — SIGNIFICANT CHANGE UP (ref 0.2–1.2)
BLD GP AB SCN SERPL QL: SIGNIFICANT CHANGE UP
BLOOD GAS COMMENTS ARTERIAL: SIGNIFICANT CHANGE UP
BUN SERPL-MCNC: 26 MG/DL — HIGH (ref 7–23)
CALCIUM SERPL-MCNC: 8.6 MG/DL — SIGNIFICANT CHANGE UP (ref 8.4–10.5)
CHLORIDE SERPL-SCNC: 105 MMOL/L — SIGNIFICANT CHANGE UP (ref 96–108)
CO2 BLDA-SCNC: 35 MMOL/L — HIGH (ref 19–24)
CO2 SERPL-SCNC: 30 MMOL/L — SIGNIFICANT CHANGE UP (ref 22–31)
CREAT SERPL-MCNC: 0.47 MG/DL — LOW (ref 0.5–1.3)
EGFR: 104 ML/MIN/1.73M2 — SIGNIFICANT CHANGE UP
EGFR: 104 ML/MIN/1.73M2 — SIGNIFICANT CHANGE UP
GAS PNL BLDA: SIGNIFICANT CHANGE UP
GLUCOSE BLDC GLUCOMTR-MCNC: 132 MG/DL — HIGH (ref 70–99)
GLUCOSE BLDC GLUCOMTR-MCNC: 143 MG/DL — HIGH (ref 70–99)
GLUCOSE BLDC GLUCOMTR-MCNC: 154 MG/DL — HIGH (ref 70–99)
GLUCOSE BLDC GLUCOMTR-MCNC: 220 MG/DL — HIGH (ref 70–99)
GLUCOSE BLDC GLUCOMTR-MCNC: 92 MG/DL — SIGNIFICANT CHANGE UP (ref 70–99)
GLUCOSE SERPL-MCNC: 129 MG/DL — HIGH (ref 70–99)
HCO3 BLDA-SCNC: 33 MMOL/L — HIGH (ref 21–28)
HCT VFR BLD CALC: 20.8 % — CRITICAL LOW (ref 34.5–45)
HCT VFR BLD CALC: 29.6 % — LOW (ref 34.5–45)
HGB BLD-MCNC: 6.5 G/DL — CRITICAL LOW (ref 11.5–15.5)
HGB BLD-MCNC: 9.6 G/DL — LOW (ref 11.5–15.5)
HOROWITZ INDEX BLDA+IHG-RTO: 50 — SIGNIFICANT CHANGE UP
MAGNESIUM SERPL-MCNC: 1.9 MG/DL — SIGNIFICANT CHANGE UP (ref 1.6–2.6)
MCHC RBC-ENTMCNC: 25.5 PG — LOW (ref 27–34)
MCHC RBC-ENTMCNC: 27.7 PG — SIGNIFICANT CHANGE UP (ref 27–34)
MCHC RBC-ENTMCNC: 31.3 G/DL — LOW (ref 32–36)
MCHC RBC-ENTMCNC: 32.4 G/DL — SIGNIFICANT CHANGE UP (ref 32–36)
MCV RBC AUTO: 81.6 FL — SIGNIFICANT CHANGE UP (ref 80–100)
MCV RBC AUTO: 85.5 FL — SIGNIFICANT CHANGE UP (ref 80–100)
NRBC BLD AUTO-RTO: 0 /100 WBCS — SIGNIFICANT CHANGE UP (ref 0–0)
NRBC BLD AUTO-RTO: 0 /100 WBCS — SIGNIFICANT CHANGE UP (ref 0–0)
PCO2 BLDA: 50 MMHG — HIGH (ref 32–35)
PH BLDA: 7.43 — SIGNIFICANT CHANGE UP (ref 7.35–7.45)
PHOSPHATE SERPL-MCNC: 3.5 MG/DL — SIGNIFICANT CHANGE UP (ref 2.5–4.5)
PLATELET # BLD AUTO: 102 K/UL — LOW (ref 150–400)
PLATELET # BLD AUTO: 121 K/UL — LOW (ref 150–400)
PO2 BLDA: 113 MMHG — HIGH (ref 83–108)
POTASSIUM SERPL-MCNC: 4.1 MMOL/L — SIGNIFICANT CHANGE UP (ref 3.5–5.3)
POTASSIUM SERPL-SCNC: 4.1 MMOL/L — SIGNIFICANT CHANGE UP (ref 3.5–5.3)
PROT SERPL-MCNC: 4.3 G/DL — LOW (ref 6–8.3)
RBC # BLD: 2.55 M/UL — LOW (ref 3.8–5.2)
RBC # BLD: 3.46 M/UL — LOW (ref 3.8–5.2)
RBC # FLD: 18.3 % — HIGH (ref 10.3–14.5)
RBC # FLD: 18.6 % — HIGH (ref 10.3–14.5)
SAO2 % BLDA: 99.4 % — HIGH (ref 94–98)
SODIUM SERPL-SCNC: 140 MMOL/L — SIGNIFICANT CHANGE UP (ref 135–145)
WBC # BLD: 5.88 K/UL — SIGNIFICANT CHANGE UP (ref 3.8–10.5)
WBC # BLD: 7.68 K/UL — SIGNIFICANT CHANGE UP (ref 3.8–10.5)
WBC # FLD AUTO: 5.88 K/UL — SIGNIFICANT CHANGE UP (ref 3.8–10.5)
WBC # FLD AUTO: 7.68 K/UL — SIGNIFICANT CHANGE UP (ref 3.8–10.5)

## 2025-03-07 PROCEDURE — 71045 X-RAY EXAM CHEST 1 VIEW: CPT | Mod: 26,77

## 2025-03-07 PROCEDURE — 99232 SBSQ HOSP IP/OBS MODERATE 35: CPT

## 2025-03-07 PROCEDURE — 43235 EGD DIAGNOSTIC BRUSH WASH: CPT

## 2025-03-07 PROCEDURE — 71045 X-RAY EXAM CHEST 1 VIEW: CPT | Mod: 26,76

## 2025-03-07 PROCEDURE — 45378 DIAGNOSTIC COLONOSCOPY: CPT

## 2025-03-07 RX ORDER — SODIUM CHLORIDE 9 G/1000ML
1000 INJECTION, SOLUTION INTRAVENOUS
Refills: 0 | Status: DISCONTINUED | OUTPATIENT
Start: 2025-03-07 | End: 2025-03-08

## 2025-03-07 RX ORDER — MAGNESIUM SULFATE 500 MG/ML
2 SYRINGE (ML) INJECTION ONCE
Refills: 0 | Status: COMPLETED | OUTPATIENT
Start: 2025-03-07 | End: 2025-03-07

## 2025-03-07 RX ORDER — PROPOFOL 10 MG/ML
100 INJECTION, EMULSION INTRAVENOUS ONCE
Refills: 0 | Status: DISCONTINUED | OUTPATIENT
Start: 2025-03-07 | End: 2025-03-09

## 2025-03-07 RX ORDER — ALBUTEROL SULFATE 2.5 MG/3ML
2.5 VIAL, NEBULIZER (ML) INHALATION ONCE
Refills: 0 | Status: COMPLETED | OUTPATIENT
Start: 2025-03-07 | End: 2025-03-07

## 2025-03-07 RX ORDER — PROPOFOL 10 MG/ML
10 INJECTION, EMULSION INTRAVENOUS
Qty: 1000 | Refills: 0 | Status: DISCONTINUED | OUTPATIENT
Start: 2025-03-07 | End: 2025-03-12

## 2025-03-07 RX ADMIN — DEXMEDETOMIDINE HYDROCHLORIDE IN SODIUM CHLORIDE 4.08 MICROGRAM(S)/KG/HR: 4 INJECTION INTRAVENOUS at 05:36

## 2025-03-07 RX ADMIN — POLYETHYLENE GLYCOL-3350 AND ELECTROLYTES 1000 MILLILITER(S): 236; 6.74; 5.86; 2.97; 22.74 POWDER, FOR SOLUTION ORAL at 04:54

## 2025-03-07 RX ADMIN — INSULIN LISPRO 4: 100 INJECTION, SOLUTION INTRAVENOUS; SUBCUTANEOUS at 11:12

## 2025-03-07 RX ADMIN — Medication 2.5 MILLIGRAM(S): at 09:09

## 2025-03-07 RX ADMIN — Medication 1 APPLICATION(S): at 05:37

## 2025-03-07 RX ADMIN — Medication 15 MILLILITER(S): at 05:36

## 2025-03-07 RX ADMIN — PROPOFOL 3.26 MICROGRAM(S)/KG/MIN: 10 INJECTION, EMULSION INTRAVENOUS at 10:58

## 2025-03-07 RX ADMIN — SODIUM CHLORIDE 100 MILLILITER(S): 9 INJECTION, SOLUTION INTRAVENOUS at 21:44

## 2025-03-07 RX ADMIN — Medication 2.5 MILLIGRAM(S): at 21:54

## 2025-03-07 RX ADMIN — Medication 40 MILLIGRAM(S): at 06:02

## 2025-03-07 RX ADMIN — INSULIN LISPRO 2: 100 INJECTION, SOLUTION INTRAVENOUS; SUBCUTANEOUS at 06:02

## 2025-03-07 RX ADMIN — DEXMEDETOMIDINE HYDROCHLORIDE IN SODIUM CHLORIDE 4.08 MICROGRAM(S)/KG/HR: 4 INJECTION INTRAVENOUS at 00:08

## 2025-03-07 RX ADMIN — Medication 30 MILLILITER(S): at 06:18

## 2025-03-07 RX ADMIN — Medication 2.5 MILLIGRAM(S): at 15:05

## 2025-03-07 RX ADMIN — Medication 25 GRAM(S): at 18:33

## 2025-03-07 RX ADMIN — METHYLPREDNISOLONE ACETATE 40 MILLIGRAM(S): 80 INJECTION, SUSPENSION INTRA-ARTICULAR; INTRALESIONAL; INTRAMUSCULAR; SOFT TISSUE at 06:02

## 2025-03-07 RX ADMIN — SODIUM CHLORIDE 100 MILLILITER(S): 9 INJECTION, SOLUTION INTRAVENOUS at 00:21

## 2025-03-07 RX ADMIN — Medication 2.5 MILLIGRAM(S): at 04:34

## 2025-03-07 RX ADMIN — Medication 15 MILLILITER(S): at 18:33

## 2025-03-07 RX ADMIN — Medication 30 MILLILITER(S): at 00:08

## 2025-03-07 RX ADMIN — METHYLPREDNISOLONE ACETATE 40 MILLIGRAM(S): 80 INJECTION, SUSPENSION INTRA-ARTICULAR; INTRALESIONAL; INTRAMUSCULAR; SOFT TISSUE at 18:33

## 2025-03-07 RX ADMIN — Medication 15 MILLILITER(S): at 22:42

## 2025-03-07 RX ADMIN — Medication 40 MILLIGRAM(S): at 18:33

## 2025-03-07 RX ADMIN — DOXAZOSIN MESYLATE 2 MILLIGRAM(S): 8 TABLET ORAL at 22:42

## 2025-03-07 NOTE — PROGRESS NOTE ADULT - ASSESSMENT
69 year old female with a PMH of COPD - on home o2 (2-4 L), Ex smoker + Vaping, RA/Arthritis, Colon cancer s/p chemo with neuropathy, HTN, and h/o PE presenting to ICU for tx of:    1. Acute Hypoxic and hypercarbic respiratory failure   2. Due to Acute flu A and Left lower lobe Pna and Left rib fx  3. Chronic Advanced COPD with supplemental oxygen by nasal cannula  4. RA  5. h/o colon cancer  6. h/o PE  off a/c now with Segmental and subsegmental pulmonary embolism right lower lobe, and b/l Calf dvt on Duplex.  7. Anemia       Plan  Monitor vitals per policy  Maintain ett, wean vent settings as tolerated, abg prn  Daily SAT / SBT, pt unable to tolerate at this time; ++ secretions noted suction as needed, chest pt  Precedex gtt d/c'd, propofol gtt for sedation goal RASS 0/-1   Hgb 6.5 this AM, pending 2 unit prbc transfusion  AC being held at this time; d/w GI role in egd/colon prior to restart a/c  continue ppi   NPO for now, monitor blood glucose levels; + ISS coverage  Strict I/O's  Monitor renal function, u/o; SC for residual urine as needed per bladder scan results q6h  SCD for DVT prophylaxis  PPI for GI prophylaxis  Full code status

## 2025-03-07 NOTE — PROGRESS NOTE ADULT - SUBJECTIVE AND OBJECTIVE BOX
69 year old female with h/o COPD - on home o2 (2-4 L), Ex smoker + Vaping, RA/Arthritis, Colon cancer s/p chemo with neuropathy, HTN, h/o PE who p/w increaisng sob and ams. of note pateint was just hospitalized in [facility] 2/3-10/2025 and 3/17-18/2025 for both pneumonia and COPD exacerbation.   patient was sent to  Rehab  Today noted to be having increasing sob and confusion   sent to ED where found to have Flu a, and acute on chronic Hypercarbic respiratory failure.         24 hr events: Pt remains requiring MV and failing daily SBT. Remains HDS. Follows commands.       ## ROS: [ x ] unable to obtain      ## Labs:  CBC:                        6.5    5.88  )-----------( 121      ( 07 Mar 2025 05:15 )             20.8     Chem:  03-07    140  |  105  |  26[H]  ----------------------------<  129[H]  4.1   |  30  |  0.47[L]    Ca    8.6      07 Mar 2025 05:15  Phos  3.5     03-07  Mg     1.9     03-07    TPro  4.3[L]  /  Alb  1.8[L]  /  TBili  0.4  /  DBili  x   /  AST  19  /  ALT  27  /  AlkPhos  75  03-07      ## Medications:  fluconAZOLE   Tablet 100 milliGRAM(s) Oral every 24 hours  doxazosin 2 milliGRAM(s) Oral at bedtime  albuterol    0.083% 2.5 milliGRAM(s) Nebulizer every 6 hours  dextrose 50% Injectable 25 Gram(s) IV Push once  dextrose 50% Injectable 12.5 Gram(s) IV Push once  dextrose 50% Injectable 25 Gram(s) IV Push once  insulin glargine Injectable (LANTUS) 10 Unit(s) SubCutaneous every morning  insulin lispro (ADMELOG) corrective regimen sliding scale   SubCutaneous every 6 hours  methylPREDNISolone sodium succinate Injectable 40 milliGRAM(s) IV Push every 12 hours  aluminum hydroxide/magnesium hydroxide/simethicone Suspension 30 milliLiter(s) Oral every 6 hours  bisacodyl Suppository 10 milliGRAM(s) Rectal every 24 hours  pantoprazole  Injectable 40 milliGRAM(s) IV Push every 12 hours  polyethylene glycol 3350 17 Gram(s) Oral daily  senna Syrup 15 milliLiter(s) Oral at bedtime  acetaminophen   Oral Liquid .. 650 milliGRAM(s) Enteral Tube every 6 hours PRN  fentaNYL    Injectable 25 MICROGram(s) IV Push every 3 hours PRN  propofol Infusion 10 MICROgram(s)/kG/Min IV Continuous <Continuous>      ## Vitals:  T(C): 35.6 (03-07-25 @ 06:00), Max: 36.8 (03-06-25 @ 16:00)  HR: 59 (03-07-25 @ 09:10) (45 - 89)  BP: 123/60 (03-07-25 @ 08:00) (90/63 - 149/129)  BP(mean): 80 (03-07-25 @ 08:00) (69 - 136)  RR: 20 (03-07-25 @ 08:00) (10 - 24)  SpO2: 91% (03-07-25 @ 09:10) (87% - 100%)  Vent: Mode: AC/ CMV (Assist Control/ Continuous Mandatory Ventilation), RR (machine): 20, RR (patient): 20, TV (machine): 400, FiO2: 60, PEEP: 6, PIP: 20  ABG: ABG - ( 07 Mar 2025 04:50 )  pH, Arterial: 7.43  pH, Blood: x     /  pCO2: 50    /  pO2: 113   / HCO3: 33    / Base Excess: 8.9   /  SaO2: 99.4          03-06 @ 07:01  -  03-07 @ 07:00  --------------------------------------------------------  IN: 3591.2 mL / OUT: 300 mL / NET: 3291.2 mL    03-07 @ 07:01  -  03-07 @ 10:52  --------------------------------------------------------  IN: 200 mL / OUT: 0 mL / NET: 200 mL        ## P/E:  GENERAL:               Intubated, sedated    HEENT:                    No JVD, Dry MM  PULM:                     Bilateral air entry, very diminshed bilaterally, no significant sputum production, tachypneic increased WOB  CVS:                         S1, S2,  No Murmur  ABD:                        Soft, +distended, nontender, normoactive bowel sounds,   EXT:                         No edema, nontender, No Cyanosis or Clubbing    NEURO:                  intubated sedated  PSYC:                      Calm, no Insight.        CODE STATUS: [ x ] full code   Critical care follow up     24 hr events: Pt remains requiring MV and failing daily SBT. Remains HDS. Follows commands.   anemic today getting blood  plan for EGD/Colon today      ## ROS: [ x ] unable to obtain      ## Labs:  CBC:                        6.5    5.88  )-----------( 121      ( 07 Mar 2025 05:15 )             20.8     Chem:  03-07    140  |  105  |  26[H]  ----------------------------<  129[H]  4.1   |  30  |  0.47[L]    Ca    8.6      07 Mar 2025 05:15  Phos  3.5     03-07  Mg     1.9     03-07    TPro  4.3[L]  /  Alb  1.8[L]  /  TBili  0.4  /  DBili  x   /  AST  19  /  ALT  27  /  AlkPhos  75  03-07      ## Medications:  fluconAZOLE   Tablet 100 milliGRAM(s) Oral every 24 hours  doxazosin 2 milliGRAM(s) Oral at bedtime  albuterol    0.083% 2.5 milliGRAM(s) Nebulizer every 6 hours  dextrose 50% Injectable 25 Gram(s) IV Push once  dextrose 50% Injectable 12.5 Gram(s) IV Push once  dextrose 50% Injectable 25 Gram(s) IV Push once  insulin glargine Injectable (LANTUS) 10 Unit(s) SubCutaneous every morning  insulin lispro (ADMELOG) corrective regimen sliding scale   SubCutaneous every 6 hours  methylPREDNISolone sodium succinate Injectable 40 milliGRAM(s) IV Push every 12 hours  aluminum hydroxide/magnesium hydroxide/simethicone Suspension 30 milliLiter(s) Oral every 6 hours  bisacodyl Suppository 10 milliGRAM(s) Rectal every 24 hours  pantoprazole  Injectable 40 milliGRAM(s) IV Push every 12 hours  polyethylene glycol 3350 17 Gram(s) Oral daily  senna Syrup 15 milliLiter(s) Oral at bedtime  acetaminophen   Oral Liquid .. 650 milliGRAM(s) Enteral Tube every 6 hours PRN  fentaNYL    Injectable 25 MICROGram(s) IV Push every 3 hours PRN  propofol Infusion 10 MICROgram(s)/kG/Min IV Continuous <Continuous>      ## Vitals:  T(C): 35.6 (03-07-25 @ 06:00), Max: 36.8 (03-06-25 @ 16:00)  HR: 59 (03-07-25 @ 09:10) (45 - 89)  BP: 123/60 (03-07-25 @ 08:00) (90/63 - 149/129)  BP(mean): 80 (03-07-25 @ 08:00) (69 - 136)  RR: 20 (03-07-25 @ 08:00) (10 - 24)  SpO2: 91% (03-07-25 @ 09:10) (87% - 100%)  Vent: Mode: AC/ CMV (Assist Control/ Continuous Mandatory Ventilation), RR (machine): 20, RR (patient): 20, TV (machine): 400, FiO2: 60, PEEP: 6, PIP: 20  ABG: ABG - ( 07 Mar 2025 04:50 )  pH, Arterial: 7.43  pH, Blood: x     /  pCO2: 50    /  pO2: 113   / HCO3: 33    / Base Excess: 8.9   /  SaO2: 99.4          03-06 @ 07:01  -  03-07 @ 07:00  --------------------------------------------------------  IN: 3591.2 mL / OUT: 300 mL / NET: 3291.2 mL    03-07 @ 07:01  -  03-07 @ 10:52  --------------------------------------------------------  IN: 200 mL / OUT: 0 mL / NET: 200 mL        ## P/E:  GENERAL:               Intubated, sedated    HEENT:                    No JVD, Dry MM  PULM:                     Bilateral air entry, very diminshed bilaterally, no significant sputum production, tachypneic increased WOB  CVS:                         S1, S2,  No Murmur  ABD:                        Soft, +distended, nontender, normoactive bowel sounds,   EXT:                         No edema, nontender, No Cyanosis or Clubbing    NEURO:                  intubated sedated  PSYC:                      Calm, no Insight.        CODE STATUS: [ x ] full code

## 2025-03-07 NOTE — CHART NOTE - NSCHARTNOTEFT_GEN_A_CORE
Nutrition Follow Up Note  Hospital Course (Per Electronic Medical Record):   Source: Medical Record [X] MD[X] Nursing Staff [X]     Diet: NPO     Patient remains NPO, with NGT ,  IV fluids changed to D5LR @ 100ml/hr as patient noted hypoglycemic overnight ,insulin held .  Labs /POCT reviewed , patient receiving steroids . 2 unit's PRBC 's due to drop in Hgb/Hct . Patient pending colonoscopy today , Will await results , suggest initiate EN or if patient to remain NPO suggest consider initiate TPN ,     Current Weight:(3/7) 213.6/96.9kg                         (3/6) 216/98kg                         (3/3) 219.1/99.4kg     Pertinent Medications: MEDICATIONS  (STANDING):  albuterol    0.083% 2.5 milliGRAM(s) Nebulizer every 6 hours  aluminum hydroxide/magnesium hydroxide/simethicone Suspension 30 milliLiter(s) Oral every 6 hours  bisacodyl Suppository 10 milliGRAM(s) Rectal every 24 hours  chlorhexidine 0.12% Liquid 15 milliLiter(s) Oral Mucosa every 12 hours  chlorhexidine 2% Cloths 1 Application(s) Topical <User Schedule>  dexMEDEtomidine Infusion 0.3 MICROgram(s)/kG/Hr (4.08 mL/Hr) IV Continuous <Continuous>  dextrose 5% + lactated ringers. 1000 milliLiter(s) (100 mL/Hr) IV Continuous <Continuous>  dextrose 5%. 1000 milliLiter(s) (50 mL/Hr) IV Continuous <Continuous>  dextrose 5%. 1000 milliLiter(s) (100 mL/Hr) IV Continuous <Continuous>  dextrose 50% Injectable 25 Gram(s) IV Push once  dextrose 50% Injectable 12.5 Gram(s) IV Push once  dextrose 50% Injectable 25 Gram(s) IV Push once  doxazosin 2 milliGRAM(s) Oral at bedtime  fluconAZOLE   Tablet 100 milliGRAM(s) Oral every 24 hours  insulin glargine Injectable (LANTUS) 10 Unit(s) SubCutaneous every morning  insulin lispro (ADMELOG) corrective regimen sliding scale   SubCutaneous every 6 hours  magnesium sulfate  IVPB 2 Gram(s) IV Intermittent once  methylPREDNISolone sodium succinate Injectable 40 milliGRAM(s) IV Push every 12 hours  multivitamin 1 Tablet(s) Oral daily  pantoprazole  Injectable 40 milliGRAM(s) IV Push every 12 hours  polyethylene glycol 3350 17 Gram(s) Oral daily  senna Syrup 15 milliLiter(s) Oral at bedtime    MEDICATIONS  (PRN):  acetaminophen   Oral Liquid .. 650 milliGRAM(s) Enteral Tube every 6 hours PRN Temp greater or equal to 38C (100.4F), Mild Pain (1 - 3)  fentaNYL    Injectable 25 MICROGram(s) IV Push every 3 hours PRN agitaiton      Pertinent Labs:  03-07 Na140 mmol/L Glu 129 mg/dL[H] K+ 4.1 mmol/L Cr  0.47 mg/dL[L] BUN 26 mg/dL[H] 03-07 Phos 3.5 mg/dL 03-07 Alb 1.8 g/dL[L]  Hgb 6.5g/dl<L>, Hct20.8% <L> ,Mg 1.9mg/dl   POCT 154,143,56,59      Skin: Stage  I sacrum     Edema: (+1) hand     Last BM: (3/7) multiple BM's s/p Moviprep & bowel regimen noted     Estimated Needs:   [X] No Change since Previous Assessment    Previous Nutrition Diagnosis: Severe protein calorie malnutrition     Nutrition Diagnosis is [X] Ongoing       New Nutrition Diagnosis: [X] Not Applicable      Interventions:   1.initiate EN feeds as medically indicated , or initiate TPN if indicated     Monitoring & Evaluation: will monitor:  [X] Weights   [X] NPO  [X] Follow Up (Per Protocol)      RD to follow as per Nutrition protocol  Joan Patel RDN

## 2025-03-07 NOTE — PROVIDER CONTACT NOTE (HYPOGLYCEMIA EVENT) - NS PROVIDER CONTACT BACKGROUND-HYPO
75
Age: 69y    Gender: Female    POCT Blood Glucose:  143 mg/dL (03-07-25 @ 00:13)  56 mg/dL (03-06-25 @ 23:43)  59 mg/dL (03-06-25 @ 23:42)  83 mg/dL (03-06-25 @ 17:03)  102 mg/dL (03-06-25 @ 12:07)  102 mg/dL (03-06-25 @ 09:37)  93 mg/dL (03-06-25 @ 06:07)      eMAR:  dextrose 50% Injectable   12.5 Gram(s) IV Push (03-06-25 @ 23:47)    insulin glargine Injectable (LANTUS)   10 Unit(s) SubCutaneous (03-06-25 @ 09:38)    insulin lispro (ADMELOG) corrective regimen sliding scale   0 Unit(s) SubCutaneous (03-06-25 @ 06:10)    methylPREDNISolone sodium succinate Injectable   40 milliGRAM(s) IV Push (03-06-25 @ 17:07)   40 milliGRAM(s) IV Push (03-06-25 @ 06:08)

## 2025-03-07 NOTE — PROVIDER CONTACT NOTE (CRITICAL VALUE NOTIFICATION) - NS PROVIDER READ BACK
Wt Readings from Last 3 Encounters:   08/01/21 81 4 kg (179 lb 7 3 oz)   07/16/21 80 2 kg (176 lb 12 9 oz)   07/02/21 84 6 kg (186 lb 6 4 oz)   ·   · 52-year-old male with past history of lung cancer, diastolic heart failure, hypertension presented with shortness of breath  · Patient previously was admitted here several weeks prior with similar complaints  · 2D echo from last month reviewed, normal EF with grade 1 diastolic dysfunction  · ProBNP elevated 1600, similar as previous  · Chest x-ray reviewed, no significant pleural edema or effusions noted  · Physical exam does note +2 pitting of lower extremity  · Weight of 179 lb on admission, wife reports baseline weight of 172-174  · Will give 1 dose of IV Lasix 40mg as patient reports taking his 20 mg of torsemide this morning  · Monitor renal functions  · Fluid restrict, daily weights  · Appreciate cardiology evaluation  · Suspect etiology likely secondary to heart failure versus deconditioning due to [place] and radiation versus severe protein malnutrition given albumin at 1 8  · PT and OT consulted, family considering rehab
yes
yes

## 2025-03-07 NOTE — PROGRESS NOTE ADULT - SUBJECTIVE AND OBJECTIVE BOX
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  This is 1 of multiple admission in the past month including previous to admission at [facility] (2/3 - 10/2025 and 2/17–18/2025) with pneumonia and COPD exacerbation.  She is was discharged to clinical hospital for in-hospital acute rehabilitation, but decompensated from respiratory perspective and is presently on antibiotics and prednisone monitored in ICU.  Patient presents with generalized weakness, difficulty ambulating.  Reportedly she has a history of colon cancer diagnosed at [place] in 2024, treated with chemotherapy under the care of Dr. Kaleb Olmedo, currently in remission.  Oncology consulted as above.      MEDICATIONS:  acetaminophen     Tablet .. 650 milliGRAM(s) Oral every 6 hours PRN  albuterol    0.083% 2.5 milliGRAM(s) Nebulizer every 6 hours  amLODIPine   Tablet 10 milliGRAM(s) Oral daily  cefepime   IVPB 2000 milliGRAM(s) IV Intermittent every 8 hours  chlorhexidine 2% Cloths 1 Application(s) Topical <User Schedule>  dextrose 5% + lactated ringers. 1000 milliLiter(s) IV Continuous <Continuous>  dextrose 5%. 1000 milliLiter(s) IV Continuous <Continuous>  dextrose 5%. 1000 milliLiter(s) IV Continuous <Continuous>  dextrose 50% Injectable 25 Gram(s) IV Push once  dextrose 50% Injectable 12.5 Gram(s) IV Push once  dextrose 50% Injectable 25 Gram(s) IV Push once  fluconAZOLE   Tablet 100 milliGRAM(s) Oral every 24 hours  fluticasone propionate/ salmeterol 250-50 MICROgram(s) Diskus 1 Dose(s) Inhalation two times a day  insulin lispro (ADMELOG) corrective regimen sliding scale   SubCutaneous Before meals and at bedtime  methylPREDNISolone sodium succinate Injectable 40 milliGRAM(s) IV Push every 12 hours  metoprolol tartrate 12.5 milliGRAM(s) Oral two times a day  multivitamin 1 Tablet(s) Oral daily  mupirocin 2% Nasal 1 Application(s) Both Nostrils two times a day  oseltamivir Suspension 75 milliGRAM(s) Oral every 12 hours  pantoprazole    Tablet 40 milliGRAM(s) Oral before breakfast  polyethylene glycol 3350 17 Gram(s) Oral daily  potassium chloride    Tablet ER 20 milliEquivalent(s) Oral once  senna 2 Tablet(s) Oral at bedtime  tamsulosin 0.4 milliGRAM(s) Oral at bedtime    All other medications reviewed.    SUBJECTIVE:  No acute events overnight; continues mechanical ventilation.     VITALS:  T(C): 36.4 (03-03-25 @ 04:00), Max: 36.6 (03-02-25 @ 21:00)  T(F): 97.5 (03-03-25 @ 04:00), Max: 97.8 (03-02-25 @ 21:00)  HR: 98 (03-03-25 @ 06:00) (86 - 110)  BP: 146/74 (03-03-25 @ 06:00) (106/81 - 150/81)      PHYSICAL EXAM:  Constitutional: sedated; intubated  HEENT: normocephalic, anicteric sclerae, no mucositis or thrush  Respiratory: bilateral clear to auscultation anteriorly  Cardiovascular : S1, S2 regular, rhythmic, no murmurs, gallops or rubs  Abdomen: soft, distended, + normoactive BS, no palpable HS- megaly  Extremities: no tenderness;  -c/c/e, pulses equal bilaterally    LABS:  (03-03) WBC: 5.52 K/uL,Hemoglobin: 7.1 g/dL, Hematocrit: 24.0 %,  Platelet: 168 K/uL  (03-03) Na: 147 mmol/L ; K: 3.4 mmol/L ; BUN: 54 mg/dL ; Cr: 0.62 mg/dL.    RADIOLOGY:    ACC: [id number] EXAM:  CT ANGIO CHEST PULM [facility]   ORDERED BY:  MO EUBANKS     PROCEDURE DATE:  02/26/2025  INTERPRETATION:  CLINICAL INFORMATION: Hypoxia.    COMPARISON: None.    CONTRAST/COMPLICATIONS:  IV Contrast: Omnipaque 350 90 cc administered   10 cc discarded  Oral Contrast: NONE  .    PROCEDURE:  CT Angiography of the Chest.  Sagittal and coronal reformats were performed as well as 3D (MIP)   reconstructions.    FINDINGS:    LUNGS AND LARGE AIRWAYS: Patent central airways. Bronchial plugging lower   lobes bilaterally, left more than right. Severe pulmonary emphysema.   Consolidative opacities bilateral lower lobes and additional small focal   airspace opacities the right lower lobe. These may represent a   combination of pneumonia and atelectasis. Additional concerning for a   pulmonary infarct in the right lower lobe.  PLEURA: No pleural effusion.  VESSELS: Segmental and subsegmental pulmonary embolism right lower lobe.   Atherosclerotic calcification including the coronary arteries.  HEART: Heart size is normal. No pericardial effusion.  MEDIASTINUM AND AYAH: No lymphadenopathy.  CHEST WALL AND LOWER NECK: Within normal limits.  VISUALIZED UPPER ABDOMEN: Right renal cyst. Possible small left renal   cyst. Probable small cyst left hepatic lobe.  BONES: Severe anterior compression of T11 and T12 with retropulsion. This   may be of some duration. Mild superior endplate compression of T10.    IMPRESSION:  Segmental and subsegmental pulmonary embolism right lower lobe.  Bilateral lower lobe opacities that may represent a combination of   atelectasis and infiltrates. Additional possibility of a pulmonary   infarct in the right lower lobe.  Severe pulmonary emphysema.

## 2025-03-07 NOTE — CHART NOTE - NSCHARTNOTEFT_GEN_A_CORE
repeat cxr with stable PTX on left side    -discussed with eICU attending and Dr. kerr    -will continue to watch and repeat cxr in am

## 2025-03-07 NOTE — PROVIDER CONTACT NOTE (HYPOGLYCEMIA EVENT) - NS PROVIDER CONTACT ASSESS-HYPO
Pt fingerstick at 2342 was 59.  repeat fingerstick at 2343 was 56.  Activated D50 12.5gm and given at 2347.  Provider made aware and IVF changed to D5LR at 100cc/hr at 0021.  Repeated fingerstick at 0013 was 143.  Pt sedated on vent.  No apparent distress noted.

## 2025-03-08 LAB
ALBUMIN SERPL ELPH-MCNC: 2.1 G/DL — LOW (ref 3.3–5)
ALP SERPL-CCNC: 75 U/L — SIGNIFICANT CHANGE UP (ref 40–120)
ALT FLD-CCNC: 27 U/L — SIGNIFICANT CHANGE UP (ref 10–45)
ANION GAP SERPL CALC-SCNC: 7 MMOL/L — SIGNIFICANT CHANGE UP (ref 5–17)
AST SERPL-CCNC: 15 U/L — SIGNIFICANT CHANGE UP (ref 10–40)
BASE EXCESS BLDA CALC-SCNC: 1 MMOL/L — SIGNIFICANT CHANGE UP (ref -2–3)
BASE EXCESS BLDA CALC-SCNC: 3.9 MMOL/L — HIGH (ref -2–3)
BILIRUB SERPL-MCNC: 0.5 MG/DL — SIGNIFICANT CHANGE UP (ref 0.2–1.2)
BUN SERPL-MCNC: 24 MG/DL — HIGH (ref 7–23)
CALCIUM SERPL-MCNC: 9.2 MG/DL — SIGNIFICANT CHANGE UP (ref 8.4–10.5)
CHLORIDE SERPL-SCNC: 107 MMOL/L — SIGNIFICANT CHANGE UP (ref 96–108)
CO2 BLDA-SCNC: 26 MMOL/L — HIGH (ref 19–24)
CO2 BLDA-SCNC: 29 MMOL/L — HIGH (ref 19–24)
CO2 SERPL-SCNC: 28 MMOL/L — SIGNIFICANT CHANGE UP (ref 22–31)
CREAT SERPL-MCNC: 0.47 MG/DL — LOW (ref 0.5–1.3)
CULTURE RESULTS: SIGNIFICANT CHANGE UP
EGFR: 104 ML/MIN/1.73M2 — SIGNIFICANT CHANGE UP
EGFR: 104 ML/MIN/1.73M2 — SIGNIFICANT CHANGE UP
GAS PNL BLDA: SIGNIFICANT CHANGE UP
GAS PNL BLDA: SIGNIFICANT CHANGE UP
GLUCOSE BLDC GLUCOMTR-MCNC: 132 MG/DL — HIGH (ref 70–99)
GLUCOSE BLDC GLUCOMTR-MCNC: 172 MG/DL — HIGH (ref 70–99)
GLUCOSE BLDC GLUCOMTR-MCNC: 176 MG/DL — HIGH (ref 70–99)
GLUCOSE BLDC GLUCOMTR-MCNC: 184 MG/DL — HIGH (ref 70–99)
GLUCOSE BLDC GLUCOMTR-MCNC: 188 MG/DL — HIGH (ref 70–99)
GLUCOSE BLDC GLUCOMTR-MCNC: 216 MG/DL — HIGH (ref 70–99)
GLUCOSE SERPL-MCNC: 159 MG/DL — HIGH (ref 70–99)
GRAM STN FLD: SIGNIFICANT CHANGE UP
HCO3 BLDA-SCNC: 25 MMOL/L — SIGNIFICANT CHANGE UP (ref 21–28)
HCO3 BLDA-SCNC: 28 MMOL/L — SIGNIFICANT CHANGE UP (ref 21–28)
HCT VFR BLD CALC: 33.2 % — LOW (ref 34.5–45)
HGB BLD-MCNC: 10.9 G/DL — LOW (ref 11.5–15.5)
HOROWITZ INDEX BLDA+IHG-RTO: 100 — SIGNIFICANT CHANGE UP
HOROWITZ INDEX BLDA+IHG-RTO: 70 — SIGNIFICANT CHANGE UP
MAGNESIUM SERPL-MCNC: 2.4 MG/DL — SIGNIFICANT CHANGE UP (ref 1.6–2.6)
MCHC RBC-ENTMCNC: 27.5 PG — SIGNIFICANT CHANGE UP (ref 27–34)
MCHC RBC-ENTMCNC: 32.8 G/DL — SIGNIFICANT CHANGE UP (ref 32–36)
MCV RBC AUTO: 83.6 FL — SIGNIFICANT CHANGE UP (ref 80–100)
NRBC BLD AUTO-RTO: 0 /100 WBCS — SIGNIFICANT CHANGE UP (ref 0–0)
PCO2 BLDA: 36 MMHG — HIGH (ref 32–35)
PCO2 BLDA: 39 MMHG — HIGH (ref 32–35)
PH BLDA: 7.45 — SIGNIFICANT CHANGE UP (ref 7.35–7.45)
PH BLDA: 7.46 — HIGH (ref 7.35–7.45)
PHOSPHATE SERPL-MCNC: 2.9 MG/DL — SIGNIFICANT CHANGE UP (ref 2.5–4.5)
PLATELET # BLD AUTO: 140 K/UL — LOW (ref 150–400)
PO2 BLDA: 152 MMHG — HIGH (ref 83–108)
PO2 BLDA: 87 MMHG — SIGNIFICANT CHANGE UP (ref 83–108)
POTASSIUM SERPL-MCNC: 3.5 MMOL/L — SIGNIFICANT CHANGE UP (ref 3.5–5.3)
POTASSIUM SERPL-SCNC: 3.5 MMOL/L — SIGNIFICANT CHANGE UP (ref 3.5–5.3)
PROT SERPL-MCNC: 4.7 G/DL — LOW (ref 6–8.3)
RBC # BLD: 3.97 M/UL — SIGNIFICANT CHANGE UP (ref 3.8–5.2)
RBC # FLD: 18.6 % — HIGH (ref 10.3–14.5)
SAO2 % BLDA: 98.1 % — HIGH (ref 94–98)
SAO2 % BLDA: 99.4 % — HIGH (ref 94–98)
SODIUM SERPL-SCNC: 142 MMOL/L — SIGNIFICANT CHANGE UP (ref 135–145)
SPECIMEN SOURCE: SIGNIFICANT CHANGE UP
SPECIMEN SOURCE: SIGNIFICANT CHANGE UP
WBC # BLD: 9.37 K/UL — SIGNIFICANT CHANGE UP (ref 3.8–10.5)
WBC # FLD AUTO: 9.37 K/UL — SIGNIFICANT CHANGE UP (ref 3.8–10.5)

## 2025-03-08 PROCEDURE — 71250 CT THORAX DX C-: CPT | Mod: 26

## 2025-03-08 PROCEDURE — 71045 X-RAY EXAM CHEST 1 VIEW: CPT | Mod: 26

## 2025-03-08 RX ORDER — PREDNISONE 20 MG/1
TABLET ORAL
Refills: 0 | Status: DISCONTINUED | OUTPATIENT
Start: 2025-03-09 | End: 2025-03-13

## 2025-03-08 RX ORDER — SODIUM CHLORIDE 9 G/1000ML
1000 INJECTION, SOLUTION INTRAVENOUS ONCE
Refills: 0 | Status: COMPLETED | OUTPATIENT
Start: 2025-03-08 | End: 2025-03-08

## 2025-03-08 RX ORDER — PREDNISONE 20 MG/1
40 TABLET ORAL DAILY
Refills: 0 | Status: COMPLETED | OUTPATIENT
Start: 2025-03-09 | End: 2025-03-11

## 2025-03-08 RX ORDER — PREDNISONE 20 MG/1
30 TABLET ORAL DAILY
Refills: 0 | Status: COMPLETED | OUTPATIENT
Start: 2025-03-12 | End: 2025-03-12

## 2025-03-08 RX ORDER — SODIUM CHLORIDE 9 G/1000ML
1000 INJECTION, SOLUTION INTRAVENOUS
Refills: 0 | Status: DISCONTINUED | OUTPATIENT
Start: 2025-03-08 | End: 2025-03-10

## 2025-03-08 RX ORDER — NOREPINEPHRINE BITARTRATE 8 MG
0.05 SOLUTION INTRAVENOUS
Qty: 8 | Refills: 0 | Status: DISCONTINUED | OUTPATIENT
Start: 2025-03-08 | End: 2025-03-11

## 2025-03-08 RX ORDER — ZINC SULFATE 50(220)MG
220 CAPSULE ORAL DAILY
Refills: 0 | Status: DISCONTINUED | OUTPATIENT
Start: 2025-03-08 | End: 2025-03-13

## 2025-03-08 RX ORDER — FENTANYL CITRATE-0.9 % NACL/PF 100MCG/2ML
25 SYRINGE (ML) INTRAVENOUS ONCE
Refills: 0 | Status: DISCONTINUED | OUTPATIENT
Start: 2025-03-08 | End: 2025-03-08

## 2025-03-08 RX ORDER — ALBUMIN (HUMAN) 12.5 G/50ML
100 INJECTION, SOLUTION INTRAVENOUS ONCE
Refills: 0 | Status: COMPLETED | OUTPATIENT
Start: 2025-03-08 | End: 2025-03-08

## 2025-03-08 RX ORDER — ENOXAPARIN SODIUM 100 MG/ML
40 INJECTION SUBCUTANEOUS ONCE
Refills: 0 | Status: COMPLETED | OUTPATIENT
Start: 2025-03-08 | End: 2025-03-08

## 2025-03-08 RX ORDER — MIDODRINE HYDROCHLORIDE 5 MG/1
10 TABLET ORAL THREE TIMES A DAY
Refills: 0 | Status: DISCONTINUED | OUTPATIENT
Start: 2025-03-08 | End: 2025-03-09

## 2025-03-08 RX ORDER — MIDODRINE HYDROCHLORIDE 5 MG/1
10 TABLET ORAL EVERY 8 HOURS
Refills: 0 | Status: DISCONTINUED | OUTPATIENT
Start: 2025-03-08 | End: 2025-03-13

## 2025-03-08 RX ADMIN — Medication 25 MICROGRAM(S): at 11:36

## 2025-03-08 RX ADMIN — MIDODRINE HYDROCHLORIDE 10 MILLIGRAM(S): 5 TABLET ORAL at 12:41

## 2025-03-08 RX ADMIN — Medication 2.5 MILLIGRAM(S): at 15:06

## 2025-03-08 RX ADMIN — SODIUM CHLORIDE 100 MILLILITER(S): 9 INJECTION, SOLUTION INTRAVENOUS at 21:18

## 2025-03-08 RX ADMIN — DOXAZOSIN MESYLATE 2 MILLIGRAM(S): 8 TABLET ORAL at 21:14

## 2025-03-08 RX ADMIN — Medication 25 MICROGRAM(S): at 19:36

## 2025-03-08 RX ADMIN — Medication 1 TABLET(S): at 17:02

## 2025-03-08 RX ADMIN — INSULIN GLARGINE-YFGN 10 UNIT(S): 100 INJECTION, SOLUTION SUBCUTANEOUS at 09:11

## 2025-03-08 RX ADMIN — SODIUM CHLORIDE 100 MILLILITER(S): 9 INJECTION, SOLUTION INTRAVENOUS at 03:14

## 2025-03-08 RX ADMIN — Medication 25 MICROGRAM(S): at 16:49

## 2025-03-08 RX ADMIN — Medication 15 MILLILITER(S): at 05:13

## 2025-03-08 RX ADMIN — Medication 40 MILLIGRAM(S): at 05:12

## 2025-03-08 RX ADMIN — PROPOFOL 3.26 MICROGRAM(S)/KG/MIN: 10 INJECTION, EMULSION INTRAVENOUS at 01:35

## 2025-03-08 RX ADMIN — PROPOFOL 3.26 MICROGRAM(S)/KG/MIN: 10 INJECTION, EMULSION INTRAVENOUS at 11:37

## 2025-03-08 RX ADMIN — Medication 2.5 MILLIGRAM(S): at 04:28

## 2025-03-08 RX ADMIN — SODIUM CHLORIDE 1000 MILLILITER(S): 9 INJECTION, SOLUTION INTRAVENOUS at 20:24

## 2025-03-08 RX ADMIN — Medication 25 MICROGRAM(S): at 12:36

## 2025-03-08 RX ADMIN — Medication 2.5 MILLIGRAM(S): at 21:07

## 2025-03-08 RX ADMIN — Medication 15 MILLILITER(S): at 17:01

## 2025-03-08 RX ADMIN — Medication 15 MILLILITER(S): at 21:14

## 2025-03-08 RX ADMIN — Medication 10 MILLIGRAM(S): at 05:54

## 2025-03-08 RX ADMIN — INSULIN LISPRO 2: 100 INJECTION, SOLUTION INTRAVENOUS; SUBCUTANEOUS at 17:02

## 2025-03-08 RX ADMIN — Medication 100 MILLIGRAM(S): at 17:02

## 2025-03-08 RX ADMIN — POLYETHYLENE GLYCOL 3350 17 GRAM(S): 17 POWDER, FOR SOLUTION ORAL at 12:40

## 2025-03-08 RX ADMIN — ALBUMIN (HUMAN) 200 MILLILITER(S): 12.5 INJECTION, SOLUTION INTRAVENOUS at 20:30

## 2025-03-08 RX ADMIN — Medication 1 APPLICATION(S): at 05:13

## 2025-03-08 RX ADMIN — INSULIN LISPRO 2: 100 INJECTION, SOLUTION INTRAVENOUS; SUBCUTANEOUS at 05:30

## 2025-03-08 RX ADMIN — Medication 30 MILLILITER(S): at 17:01

## 2025-03-08 RX ADMIN — Medication 2.5 MILLIGRAM(S): at 09:01

## 2025-03-08 RX ADMIN — ENOXAPARIN SODIUM 40 MILLIGRAM(S): 100 INJECTION SUBCUTANEOUS at 12:40

## 2025-03-08 RX ADMIN — Medication 25 MICROGRAM(S): at 20:06

## 2025-03-08 RX ADMIN — Medication 25 MICROGRAM(S): at 17:49

## 2025-03-08 RX ADMIN — MIDODRINE HYDROCHLORIDE 10 MILLIGRAM(S): 5 TABLET ORAL at 21:14

## 2025-03-08 RX ADMIN — METHYLPREDNISOLONE ACETATE 40 MILLIGRAM(S): 80 INJECTION, SUSPENSION INTRA-ARTICULAR; INTRALESIONAL; INTRAMUSCULAR; SOFT TISSUE at 05:13

## 2025-03-08 RX ADMIN — INSULIN LISPRO 4: 100 INJECTION, SOLUTION INTRAVENOUS; SUBCUTANEOUS at 14:05

## 2025-03-08 RX ADMIN — SODIUM CHLORIDE 1000 MILLILITER(S): 9 INJECTION, SOLUTION INTRAVENOUS at 12:15

## 2025-03-08 RX ADMIN — Medication 30 MILLILITER(S): at 12:40

## 2025-03-08 RX ADMIN — Medication 40 MILLIGRAM(S): at 17:01

## 2025-03-08 NOTE — PROVIDER CONTACT NOTE (EICU) - SITUATION
Responded to Elert from bedside team - active CPR in progress for bradycardia/ PEA started at 11:01Am. Pt received 1 x epi at 11:02 AM and ROSC obtained at 11:03AM.   Left chest tube was adjusted by ICU Attending with evident air leak in PleurVAc. 2 sets of CXR confirmed left chest tube in good position; on 2nd CXR lung appears re-expanded , visible significant left subcutaneous air Responded to Elert from bedside team - active CPR in progress for bradycardia/ PEA started at 11:01Am. Pt received 1 x epi at 11:02 AM and ROSC obtained at 11:03AM.   Left chest tube was adjusted by ICU Attending with evident air leak in PleurVAc. 2 sets of CXR confirmed left chest tube in good position; on 2nd CXR lung appears re-expanded , visible significant left subcutaneous air.    ICU Attending Dr Jane at bedside evaluating and securing chest tube .  VS : /83, --> 112, spO2 100%

## 2025-03-08 NOTE — PROGRESS NOTE ADULT - ASSESSMENT
Physical Examination:  GENERAL:               Intubated, sedated    HEENT:                    No JVD, Dry MM  PULM:                     Bilateral air entry, very diminshed bilaterally, no significant sputum production, tachypneic increased WOB  CVS:                         S1, S2,  No Murmur  ABD:                        Soft, +distended, nontender, normoactive bowel sounds,   EXT:                         No edema, nontender, No Cyanosis or Clubbing    NEURO:                  intubated sedated  PSYC:                      Calm, no Insight.       Assessment  1. Acute Hypoxic and hypercarbic respiratory failure   2. Due to Acute flu A and Left lower lobe Pna and Left rib fx  3. Chronic Advanced COPD with supplemental oxygen by nasal cannula  4. RA  5. h/o colon cancer  6. h/o PE  off a/c now with Segmental and subsegmental pulmonary embolism right lower lobe, and b/l Calf dvt on Duplex.  7. Anemia     Plan  SAT/SBT delayed till able to place chest tube  Chest tube placed, cxr pending  Tube feeding placed  S/p EGD/Colon no active bleeding noted  Start Tube feeding to start  no evidence of active bleeding need a/c, if h/h stable in am will re attempt  continue ppi   taper steroids    f/u sputum cultures, thin secretions,           PMD:				                   Notified(Date):  Family Updated: 	Harjinder [phone]	                                 Date: 3/8/2025  updated family   conset for chest tube obtained  pt high risk of difficulty weaning from vent      Sedation & Analgesia:	precedex  Diet/Nutrition:		tube feeding      GI PPx:			PPI    DVT Ppx:		 lovenox dvt ppx      Activity:		    Head of Bed:               35-45 Deg  Glycemic Control:             Lines:  CENTRAL LINE: 	[ ] YES [ ] NO	                    LOCATION:   	                       DATE INSERTED:   	                    REMOVE:  [ ] YES [ ] NO    A-LINE:  	                [ ] YES [ ] NO                      LOCATION:   	                       DATE INSERTED: 		            REMOVE:  [ ] YES [ ] NO    KEANE: 		        [ ] YES [ ] NO  		                                       DATE INSERTED:		            REMOVE:  [ ] YES [ ] NO      Restraints were deemed necessary to prevent removal of life-sustaining devices [ x ] YES   [    ]  NO    Disposition: ICU care    Goals of Care: Full code

## 2025-03-08 NOTE — PROGRESS NOTE ADULT - SUBJECTIVE AND OBJECTIVE BOX
Follow-up Critical Care Progress Note  Chief Complaint : Acute respiratory failure with hypoxia      Pt seen and examined  left pntx increaisng in size on cxr  ct obtained  noted size, left pigtail chest tube consent obtained, pigtail chest tube placed and placed on 40% fio2  pt remains sedated      Allergies :No Known Allergies      PAST MEDICAL & SURGICAL HISTORY:  Advanced COPD    On supplemental oxygen by nasal cannula    Arthritis    Cancer, colon    HTN (hypertension)    No significant past surgical history        Medications:  MEDICATIONS  (STANDING):  albuterol    0.083% 2.5 milliGRAM(s) Nebulizer every 6 hours  aluminum hydroxide/magnesium hydroxide/simethicone Suspension 30 milliLiter(s) Oral every 6 hours  bisacodyl Suppository 10 milliGRAM(s) Rectal every 24 hours  chlorhexidine 0.12% Liquid 15 milliLiter(s) Oral Mucosa every 12 hours  chlorhexidine 2% Cloths 1 Application(s) Topical <User Schedule>  dextrose 5% + lactated ringers. 1000 milliLiter(s) (100 mL/Hr) IV Continuous <Continuous>  dextrose 5%. 1000 milliLiter(s) (50 mL/Hr) IV Continuous <Continuous>  dextrose 5%. 1000 milliLiter(s) (100 mL/Hr) IV Continuous <Continuous>  dextrose 50% Injectable 25 Gram(s) IV Push once  dextrose 50% Injectable 12.5 Gram(s) IV Push once  dextrose 50% Injectable 25 Gram(s) IV Push once  doxazosin 2 milliGRAM(s) Oral at bedtime  fluconAZOLE   Tablet 100 milliGRAM(s) Oral every 24 hours  insulin glargine Injectable (LANTUS) 10 Unit(s) SubCutaneous every morning  insulin lispro (ADMELOG) corrective regimen sliding scale   SubCutaneous every 6 hours  methylPREDNISolone sodium succinate Injectable 40 milliGRAM(s) IV Push every 12 hours  multivitamin 1 Tablet(s) Oral daily  pantoprazole   Suspension 40 milliGRAM(s) Oral every 12 hours  polyethylene glycol 3350 17 Gram(s) Oral daily  propofol Infusion 10 MICROgram(s)/kG/Min (3.26 mL/Hr) IV Continuous <Continuous>  propofol Injectable 100 milliGRAM(s) IV Push once  senna Syrup 15 milliLiter(s) Oral at bedtime    MEDICATIONS  (PRN):  acetaminophen   Oral Liquid .. 650 milliGRAM(s) Enteral Tube every 6 hours PRN Temp greater or equal to 38C (100.4F), Mild Pain (1 - 3)  fentaNYL    Injectable 25 MICROGram(s) IV Push every 3 hours PRN agitaiton      Antibiotics History  cefepime   IVPB 1000 milliGRAM(s) IV Intermittent every 12 hours, 02-26-25 @ 16:16  cefepime   IVPB    , 02-26-25 @ 13:23  cefepime   IVPB 2000 milliGRAM(s) IV Intermittent once, 02-26-25 @ 13:32, Stop order after: 1 Doses  cefepime   IVPB 2000 milliGRAM(s) IV Intermittent every 8 hours, 02-27-25 @ 08:41, Stop order after: 5 Days  fluconAZOLE   Tablet 100 milliGRAM(s) Oral every 24 hours, 03-02-25 @ 14:15, Stop order after: 7 Doses  oseltamivir 75 milliGRAM(s) Oral two times a day, 02-27-25 @ 08:11, Stop order after: 5 Days  oseltamivir Suspension 75 milliGRAM(s) Oral every 12 hours, 02-27-25 @ 09:53  oseltamivir Suspension 75 milliGRAM(s) Oral every 12 hours, 02-27-25 @ 10:24, Stop order after: 5 Days  vancomycin  IVPB 750 milliGRAM(s) IV Intermittent Once, 02-26-25 @ 16:23, Stop order after: 1 Doses  vancomycin  IVPB. 1000 milliGRAM(s) IV Intermittent once, 02-26-25 @ 13:32, Stop order after: 1 Doses      Heme Medications       GI Medications  aluminum hydroxide/magnesium hydroxide/simethicone Suspension 30 milliLiter(s) Oral every 6 hours, 03-06-25 @ 11:16, Now  bisacodyl Suppository 10 milliGRAM(s) Rectal every 24 hours, 03-03-25 @ 07:27, Now  pantoprazole   Suspension 40 milliGRAM(s) Oral every 12 hours, 03-08-25 @ 18:00, 18:00  polyethylene glycol 3350 17 Gram(s) Oral daily, 03-02-25 @ 12:16, Routine  senna Syrup 15 milliLiter(s) Oral at bedtime, 03-06-25 @ 07:59, Routine      COVID  02-26-25 @ 13:30  COVID -   NotDetec      COVID Biomarkers    03-03-25 @ 06:00 ESR --  ---  CRP --  ---  DDimer  --   ---   LDH --   ---   Ferritin 121           Trend BNP  02-26-25 @ 13:30   -  198    Procalcitonin Trend  02-26-25 @ 13:30   -   0.10[H]    WBC Trend  03-08-25 @ 05:00   -  9.37  03-07-25 @ 17:30   -  7.68  03-07-25 @ 05:15   -  5.88  03-06-25 @ 05:00   -  7.52    H/H Trend  03-08-25 @ 05:00   -   10.9[L]/ 33.2[L]  03-07-25 @ 17:30   -   9.6[L]/ 29.6[L]  03-07-25 @ 05:15   -   6.5[LL]/ 20.8[LL]  03-06-25 @ 05:00   -   7.1[L]/ 23.1[L]  03-05-25 @ 05:00   -   7.9[L]/ 26.4[L]  03-04-25 @ 06:00   -   9.1[L]/ 30.0[L]    Stool Occult Blood  03-04-25 @ 14:30   -   Positive[!]    Platelet Trend  03-08-25 @ 05:00   -  140[L]  03-07-25 @ 17:30   -  102[L]  03-07-25 @ 05:15   -  121[L]  03-06-25 @ 05:00   -  132[L]    Trend Sodium  03-08-25 @ 05:00   -  142  03-07-25 @ 05:15   -  140  03-06-25 @ 05:00   -  140    Trend Potassium  03-08-25 @ 05:00   -  3.5  03-07-25 @ 05:15   -  4.1  03-06-25 @ 05:00   -  4.4    Trend Bun/Cr  03-08-25 @ 05:00  BUN/CR -  24[H] / 0.47[L]  03-07-25 @ 05:15  BUN/CR -  26[H] / 0.47[L]  03-06-25 @ 05:00  BUN/CR -  30[H] / 0.55    Lactic Acid Trend  02-27-25 @ 05:51   -   1.0  02-26-25 @ 13:00   -   1.0    ABG Trend  03-08-25 @ 05:07   - 7.46[H]/39[H]/152[H]/99.4[H]  03-07-25 @ 04:50   - 7.43/50[H]/113[H]/99.4[H]  03-06-25 @ 10:45   - 7.41/50[H]/106/99.2[H]  03-05-25 @ 06:00   - 7.38/55[H]/76[L]/98.2[H]  03-04-25 @ 12:15   - 7.43/50[H]/67[L]/95.9  03-04-25 @ 08:00   - 7.40/56[H]/60[L]/93.7[L]  03-03-25 @ 15:18   - 7.35/58[H]/58[L]/91.7[L]  03-03-25 @ 14:05   - 7.30[L]/73[HH]/85/98.6[H]  03-03-25 @ 04:10   - 7.44/52[H]/74[L]/97.1  03-02-25 @ 05:00   - 7.37/62[H]/84/97.5  03-01-25 @ 05:00   - 7.51[H]/49[H]/61[L]/93.8[L]  02-28-25 @ 05:30   - 7.43/58[H]/76[L]/96.5    Trend AST/ALT/ALK Phos/Bili  03-08-25 @ 05:00   15/27/75/0.5  03-07-25 @ 05:15   19/27/75/0.4  03-03-25 @ 06:00   11/27/79/0.6  03-02-25 @ 06:15   12/22/89/0.6  03-01-25 @ 05:04   14/22/87/0.7  02-28-25 @ 05:30   16/17/90/0.7  02-27-25 @ 05:51   15/19/105/0.7  02-26-25 @ 13:30   20/24/109/0.8         Albumin Trend  03-08-25 @ 05:00   -   2.1[L]  03-07-25 @ 05:15   -   1.8[L]  03-03-25 @ 06:00   -   2.7[L]  03-02-25 @ 06:15   -   2.8[L]  03-01-25 @ 05:04   -   2.6[L]  02-28-25 @ 05:30   -   2.6[L]      PTT - PT - INR Trend  02-26-25 @ 13:30   -   27.1 - 11.3 - 0.96    Glucose Trend  03-08-25 @ 09:11   -  -- -- 172[H]  03-08-25 @ 07:51   -  -- -- 188[H]  03-08-25 @ 05:24   -  -- -- 176[H]  03-08-25 @ 05:00   -  159[H] -- --  03-07-25 @ 23:20   -  -- -- 132[H]  03-07-25 @ 18:31   -  -- -- 92  03-07-25 @ 11:07   -  -- -- 220[H]  03-07-25 @ 05:43   -  -- -- 154[H]  03-07-25 @ 05:15   -  129[H] -- --  03-07-25 @ 00:13   -  -- -- 143[H]    A1C with Estimated Average Glucose Result: 6.0 % *H* [4.0 - 5.6] (03-04-25 @ 06:00)  A1C with Estimated Average Glucose Result: 6.6 % *H* [4.0 - 5.6] (02-27-25 @ 05:51)      LABS:                        10.9   9.37  )-----------( 140      ( 08 Mar 2025 05:00 )             33.2     03-08    142  |  107  |  24[H]  ----------------------------<  159[H]  3.5   |  28  |  0.47[L]    Ca    9.2      08 Mar 2025 05:00  Phos  2.9     03-08  Mg     2.4     03-08    TPro  4.7[L]  /  Alb  2.1[L]  /  TBili  0.5  /  DBili  x   /  AST  15  /  ALT  27  /  AlkPhos  75  03-08               CULTURES: (if applicable)    Culture - Sputum (collected 03-07-25 @ 09:33)  Source: Sputum Sputum  Gram Stain (03-08-25 @ 00:57):    Moderate polymorphonuclear leukocytes per low power field    No Squamous epithelial cells per low power field    No organisms seen per oil power field  Preliminary Report (03-08-25 @ 07:43):    Commensal annetta consistent with body site    Culture - Sputum (collected 03-04-25 @ 11:57)  Source: Sputum Sputum  Gram Stain (03-05-25 @ 00:07):    Few polymorphonuclear leukocytes per low power field    No Squamous epithelial cells per low power field    Rare Yeast like cells per oil power field  Final Report (03-06-25 @ 08:20):    Commensal annetta consistent with body site    Culture - Blood (collected 02-26-25 @ 13:30)  Source: .Blood Blood-Peripheral  Final Report (03-04-25 @ 05:00):    No growth at 5 days    Culture - Blood (collected 02-26-25 @ 13:30)  Source: .Blood Blood-Peripheral  Final Report (03-04-25 @ 05:00):    No growth at 5 days      Rapid RVP Result: Detected (02-26-25 @ 13:30)      ABG - ( 08 Mar 2025 05:07 )  pH, Arterial: 7.46  pH, Blood: x     /  pCO2: 39    /  pO2: 152   / HCO3: 28    / Base Excess: 3.9   /  SaO2: 99.4       < from: CT Chest No Cont (03.08.25 @ 08:46) >    ACC: [id number] EXAM:  CT CHEST   ORDERED BY: PETER MOYA     PROCEDURE DATE:  03/08/2025          INTERPRETATION:  Clinical information: Left pneumothorax. Exam is   compared to previous study of 2/26/2025 as well as chest radiograph of   3/7/2025.    CT scan of the chest was obtained without administration of intravenous   contrast.    No hilar or mediastinal adenopathy is noted.    Heart is normal in size. No pericardial effusion is noted.    No endobronchial lesions are noted. Endotracheal tube is noted in place.   Near-complete atelectasis of the left lower lobe is noted. Emphysematous   changes are present in both lungs. Small to moderate size left   pneumothorax is noted. Very small right pleural effusion is noted.    Below the diaphragm, visualized portions of the abdomen demonstrate   ascites. Vicarious excretion of contrast is noted within the gallbladder.   Low-attenuation lesions within both kidneys are incompletely assessed on   this exam. Nasogastric tube is noted with tip in the stomach.    Degenerative changes of the spine are noted.    IMPRESSION: Small to moderate size left pneumothorax.    --- End of Report ---        < end of copied text >    VITALS:  T(C): 36.6 (03-08-25 @ 08:00), Max: 36.6 (03-07-25 @ 20:00)  T(F): 97.9 (03-08-25 @ 08:00), Max: 97.9 (03-08-25 @ 08:00)  HR: 108 (03-08-25 @ 09:02) (68 - 126)  BP: 100/65 (03-08-25 @ 09:00) (86/59 - 117/71)  BP(mean): 77 (03-08-25 @ 09:00) (69 - 88)  ABP: --  ABP(mean): --  RR: 20 (03-08-25 @ 09:00) (16 - 27)  SpO2: 100% (03-08-25 @ 09:02) (97% - 100%)  CVP(mm Hg): --  CVP(cm H2O): --    Ins and Outs     03-07-25 @ 07:01  -  03-08-25 @ 07:00  --------------------------------------------------------  IN: 2104.4 mL / OUT: 600 mL / NET: 1504.4 mL    03-08-25 @ 06:01  -  03-08-25 @ 10:21  --------------------------------------------------------  IN: 364.6 mL / OUT: 0 mL / NET: 364.6 mL            Device: Avea, Mode: AC/ CMV (Assist Control/ Continuous Mandatory Ventilation), RR (machine): 20, RR (patient): 20, TV (machine): 400, TV (patient): 360, FiO2: 100, PEEP: 6, ITime: 1, MAP: 11, PIP: 25    I&O's Detail    07 Mar 2025 07:01  -  08 Mar 2025 07:00  --------------------------------------------------------  IN:    dextrose 5% + lactated ringers: 2000 mL    Propofol: 104.4 mL  Total IN: 2104.4 mL    OUT:    Dexmedetomidine: 0 mL    Intermittent Catheterization - Urethral (mL): 600 mL  Total OUT: 600 mL    Total NET: 1504.4 mL      08 Mar 2025 06:01  -  08 Mar 2025 10:21  --------------------------------------------------------  IN:    dextrose 5% + lactated ringers: 300 mL    Propofol: 24.6 mL    Vital1.5: 40 mL  Total IN: 364.6 mL    OUT:  Total OUT: 0 mL    Total NET: 364.6 mL                Follow-up Critical Care Progress Note  Chief Complaint : Acute respiratory failure with hypoxia      Pt seen and examined  left pntx increasing in size on cxr  ct obtained  noted size, left pigtail chest tube consent obtained, pigtail chest tube placed and placed on 40% fio2  pt remains sedated      Allergies :No Known Allergies      PAST MEDICAL & SURGICAL HISTORY:  Advanced COPD    On supplemental oxygen by nasal cannula    Arthritis    Cancer, colon    HTN (hypertension)    No significant past surgical history        Medications:  MEDICATIONS  (STANDING):  albuterol    0.083% 2.5 milliGRAM(s) Nebulizer every 6 hours  aluminum hydroxide/magnesium hydroxide/simethicone Suspension 30 milliLiter(s) Oral every 6 hours  bisacodyl Suppository 10 milliGRAM(s) Rectal every 24 hours  chlorhexidine 0.12% Liquid 15 milliLiter(s) Oral Mucosa every 12 hours  chlorhexidine 2% Cloths 1 Application(s) Topical <User Schedule>  dextrose 5% + lactated ringers. 1000 milliLiter(s) (100 mL/Hr) IV Continuous <Continuous>  dextrose 5%. 1000 milliLiter(s) (50 mL/Hr) IV Continuous <Continuous>  dextrose 5%. 1000 milliLiter(s) (100 mL/Hr) IV Continuous <Continuous>  dextrose 50% Injectable 25 Gram(s) IV Push once  dextrose 50% Injectable 12.5 Gram(s) IV Push once  dextrose 50% Injectable 25 Gram(s) IV Push once  doxazosin 2 milliGRAM(s) Oral at bedtime  fluconAZOLE   Tablet 100 milliGRAM(s) Oral every 24 hours  insulin glargine Injectable (LANTUS) 10 Unit(s) SubCutaneous every morning  insulin lispro (ADMELOG) corrective regimen sliding scale   SubCutaneous every 6 hours  methylPREDNISolone sodium succinate Injectable 40 milliGRAM(s) IV Push every 12 hours  multivitamin 1 Tablet(s) Oral daily  pantoprazole   Suspension 40 milliGRAM(s) Oral every 12 hours  polyethylene glycol 3350 17 Gram(s) Oral daily  propofol Infusion 10 MICROgram(s)/kG/Min (3.26 mL/Hr) IV Continuous <Continuous>  propofol Injectable 100 milliGRAM(s) IV Push once  senna Syrup 15 milliLiter(s) Oral at bedtime    MEDICATIONS  (PRN):  acetaminophen   Oral Liquid .. 650 milliGRAM(s) Enteral Tube every 6 hours PRN Temp greater or equal to 38C (100.4F), Mild Pain (1 - 3)  fentaNYL    Injectable 25 MICROGram(s) IV Push every 3 hours PRN agitaiton      Antibiotics History  cefepime   IVPB 1000 milliGRAM(s) IV Intermittent every 12 hours, 02-26-25 @ 16:16  cefepime   IVPB    , 02-26-25 @ 13:23  cefepime   IVPB 2000 milliGRAM(s) IV Intermittent once, 02-26-25 @ 13:32, Stop order after: 1 Doses  cefepime   IVPB 2000 milliGRAM(s) IV Intermittent every 8 hours, 02-27-25 @ 08:41, Stop order after: 5 Days  fluconAZOLE   Tablet 100 milliGRAM(s) Oral every 24 hours, 03-02-25 @ 14:15, Stop order after: 7 Doses  oseltamivir 75 milliGRAM(s) Oral two times a day, 02-27-25 @ 08:11, Stop order after: 5 Days  oseltamivir Suspension 75 milliGRAM(s) Oral every 12 hours, 02-27-25 @ 09:53  oseltamivir Suspension 75 milliGRAM(s) Oral every 12 hours, 02-27-25 @ 10:24, Stop order after: 5 Days  vancomycin  IVPB 750 milliGRAM(s) IV Intermittent Once, 02-26-25 @ 16:23, Stop order after: 1 Doses  vancomycin  IVPB. 1000 milliGRAM(s) IV Intermittent once, 02-26-25 @ 13:32, Stop order after: 1 Doses      Heme Medications       GI Medications  aluminum hydroxide/magnesium hydroxide/simethicone Suspension 30 milliLiter(s) Oral every 6 hours, 03-06-25 @ 11:16, Now  bisacodyl Suppository 10 milliGRAM(s) Rectal every 24 hours, 03-03-25 @ 07:27, Now  pantoprazole   Suspension 40 milliGRAM(s) Oral every 12 hours, 03-08-25 @ 18:00, 18:00  polyethylene glycol 3350 17 Gram(s) Oral daily, 03-02-25 @ 12:16, Routine  senna Syrup 15 milliLiter(s) Oral at bedtime, 03-06-25 @ 07:59, Routine      COVID  02-26-25 @ 13:30  COVID -   NotDetec      COVID Biomarkers    03-03-25 @ 06:00 ESR --  ---  CRP --  ---  DDimer  --   ---   LDH --   ---   Ferritin 121           Trend BNP  02-26-25 @ 13:30   -  198    Procalcitonin Trend  02-26-25 @ 13:30   -   0.10[H]    WBC Trend  03-08-25 @ 05:00   -  9.37  03-07-25 @ 17:30   -  7.68  03-07-25 @ 05:15   -  5.88  03-06-25 @ 05:00   -  7.52    H/H Trend  03-08-25 @ 05:00   -   10.9[L]/ 33.2[L]  03-07-25 @ 17:30   -   9.6[L]/ 29.6[L]  03-07-25 @ 05:15   -   6.5[LL]/ 20.8[LL]  03-06-25 @ 05:00   -   7.1[L]/ 23.1[L]  03-05-25 @ 05:00   -   7.9[L]/ 26.4[L]  03-04-25 @ 06:00   -   9.1[L]/ 30.0[L]    Stool Occult Blood  03-04-25 @ 14:30   -   Positive[!]    Platelet Trend  03-08-25 @ 05:00   -  140[L]  03-07-25 @ 17:30   -  102[L]  03-07-25 @ 05:15   -  121[L]  03-06-25 @ 05:00   -  132[L]    Trend Sodium  03-08-25 @ 05:00   -  142  03-07-25 @ 05:15   -  140  03-06-25 @ 05:00   -  140    Trend Potassium  03-08-25 @ 05:00   -  3.5  03-07-25 @ 05:15   -  4.1  03-06-25 @ 05:00   -  4.4    Trend Bun/Cr  03-08-25 @ 05:00  BUN/CR -  24[H] / 0.47[L]  03-07-25 @ 05:15  BUN/CR -  26[H] / 0.47[L]  03-06-25 @ 05:00  BUN/CR -  30[H] / 0.55    Lactic Acid Trend  02-27-25 @ 05:51   -   1.0  02-26-25 @ 13:00   -   1.0    ABG Trend  03-08-25 @ 05:07   - 7.46[H]/39[H]/152[H]/99.4[H]  03-07-25 @ 04:50   - 7.43/50[H]/113[H]/99.4[H]  03-06-25 @ 10:45   - 7.41/50[H]/106/99.2[H]  03-05-25 @ 06:00   - 7.38/55[H]/76[L]/98.2[H]  03-04-25 @ 12:15   - 7.43/50[H]/67[L]/95.9  03-04-25 @ 08:00   - 7.40/56[H]/60[L]/93.7[L]  03-03-25 @ 15:18   - 7.35/58[H]/58[L]/91.7[L]  03-03-25 @ 14:05   - 7.30[L]/73[HH]/85/98.6[H]  03-03-25 @ 04:10   - 7.44/52[H]/74[L]/97.1  03-02-25 @ 05:00   - 7.37/62[H]/84/97.5  03-01-25 @ 05:00   - 7.51[H]/49[H]/61[L]/93.8[L]  02-28-25 @ 05:30   - 7.43/58[H]/76[L]/96.5    Trend AST/ALT/ALK Phos/Bili  03-08-25 @ 05:00   15/27/75/0.5  03-07-25 @ 05:15   19/27/75/0.4  03-03-25 @ 06:00   11/27/79/0.6  03-02-25 @ 06:15   12/22/89/0.6  03-01-25 @ 05:04   14/22/87/0.7  02-28-25 @ 05:30   16/17/90/0.7  02-27-25 @ 05:51   15/19/105/0.7  02-26-25 @ 13:30   20/24/109/0.8         Albumin Trend  03-08-25 @ 05:00   -   2.1[L]  03-07-25 @ 05:15   -   1.8[L]  03-03-25 @ 06:00   -   2.7[L]  03-02-25 @ 06:15   -   2.8[L]  03-01-25 @ 05:04   -   2.6[L]  02-28-25 @ 05:30   -   2.6[L]      PTT - PT - INR Trend  02-26-25 @ 13:30   -   27.1 - 11.3 - 0.96    Glucose Trend  03-08-25 @ 09:11   -  -- -- 172[H]  03-08-25 @ 07:51   -  -- -- 188[H]  03-08-25 @ 05:24   -  -- -- 176[H]  03-08-25 @ 05:00   -  159[H] -- --  03-07-25 @ 23:20   -  -- -- 132[H]  03-07-25 @ 18:31   -  -- -- 92  03-07-25 @ 11:07   -  -- -- 220[H]  03-07-25 @ 05:43   -  -- -- 154[H]  03-07-25 @ 05:15   -  129[H] -- --  03-07-25 @ 00:13   -  -- -- 143[H]    A1C with Estimated Average Glucose Result: 6.0 % *H* [4.0 - 5.6] (03-04-25 @ 06:00)  A1C with Estimated Average Glucose Result: 6.6 % *H* [4.0 - 5.6] (02-27-25 @ 05:51)      LABS:                        10.9   9.37  )-----------( 140      ( 08 Mar 2025 05:00 )             33.2     03-08    142  |  107  |  24[H]  ----------------------------<  159[H]  3.5   |  28  |  0.47[L]    Ca    9.2      08 Mar 2025 05:00  Phos  2.9     03-08  Mg     2.4     03-08    TPro  4.7[L]  /  Alb  2.1[L]  /  TBili  0.5  /  DBili  x   /  AST  15  /  ALT  27  /  AlkPhos  75  03-08               CULTURES: (if applicable)    Culture - Sputum (collected 03-07-25 @ 09:33)  Source: Sputum Sputum  Gram Stain (03-08-25 @ 00:57):    Moderate polymorphonuclear leukocytes per low power field    No Squamous epithelial cells per low power field    No organisms seen per oil power field  Preliminary Report (03-08-25 @ 07:43):    Commensal annetta consistent with body site    Culture - Sputum (collected 03-04-25 @ 11:57)  Source: Sputum Sputum  Gram Stain (03-05-25 @ 00:07):    Few polymorphonuclear leukocytes per low power field    No Squamous epithelial cells per low power field    Rare Yeast like cells per oil power field  Final Report (03-06-25 @ 08:20):    Commensal annetta consistent with body site    Culture - Blood (collected 02-26-25 @ 13:30)  Source: .Blood Blood-Peripheral  Final Report (03-04-25 @ 05:00):    No growth at 5 days    Culture - Blood (collected 02-26-25 @ 13:30)  Source: .Blood Blood-Peripheral  Final Report (03-04-25 @ 05:00):    No growth at 5 days      Rapid RVP Result: Detected (02-26-25 @ 13:30)      ABG - ( 08 Mar 2025 05:07 )  pH, Arterial: 7.46  pH, Blood: x     /  pCO2: 39    /  pO2: 152   / HCO3: 28    / Base Excess: 3.9   /  SaO2: 99.4       < from: CT Chest No Cont (03.08.25 @ 08:46) >    ACC: [id number] EXAM:  CT CHEST   ORDERED BY: PETER MOYA     PROCEDURE DATE:  03/08/2025          INTERPRETATION:  Clinical information: Left pneumothorax. Exam is   compared to previous study of 2/26/2025 as well as chest radiograph of   3/7/2025.    CT scan of the chest was obtained without administration of intravenous   contrast.    No hilar or mediastinal adenopathy is noted.    Heart is normal in size. No pericardial effusion is noted.    No endobronchial lesions are noted. Endotracheal tube is noted in place.   Near-complete atelectasis of the left lower lobe is noted. Emphysematous   changes are present in both lungs. Small to moderate size left   pneumothorax is noted. Very small right pleural effusion is noted.    Below the diaphragm, visualized portions of the abdomen demonstrate   ascites. Vicarious excretion of contrast is noted within the gallbladder.   Low-attenuation lesions within both kidneys are incompletely assessed on   this exam. Nasogastric tube is noted with tip in the stomach.    Degenerative changes of the spine are noted.    IMPRESSION: Small to moderate size left pneumothorax.    --- End of Report ---        < end of copied text >    VITALS:  T(C): 36.6 (03-08-25 @ 08:00), Max: 36.6 (03-07-25 @ 20:00)  T(F): 97.9 (03-08-25 @ 08:00), Max: 97.9 (03-08-25 @ 08:00)  HR: 108 (03-08-25 @ 09:02) (68 - 126)  BP: 100/65 (03-08-25 @ 09:00) (86/59 - 117/71)  BP(mean): 77 (03-08-25 @ 09:00) (69 - 88)  ABP: --  ABP(mean): --  RR: 20 (03-08-25 @ 09:00) (16 - 27)  SpO2: 100% (03-08-25 @ 09:02) (97% - 100%)  CVP(mm Hg): --  CVP(cm H2O): --    Ins and Outs     03-07-25 @ 07:01  -  03-08-25 @ 07:00  --------------------------------------------------------  IN: 2104.4 mL / OUT: 600 mL / NET: 1504.4 mL    03-08-25 @ 06:01  -  03-08-25 @ 10:21  --------------------------------------------------------  IN: 364.6 mL / OUT: 0 mL / NET: 364.6 mL            Device: Avea, Mode: AC/ CMV (Assist Control/ Continuous Mandatory Ventilation), RR (machine): 20, RR (patient): 20, TV (machine): 400, TV (patient): 360, FiO2: 100, PEEP: 6, ITime: 1, MAP: 11, PIP: 25    I&O's Detail    07 Mar 2025 07:01  -  08 Mar 2025 07:00  --------------------------------------------------------  IN:    dextrose 5% + lactated ringers: 2000 mL    Propofol: 104.4 mL  Total IN: 2104.4 mL    OUT:    Dexmedetomidine: 0 mL    Intermittent Catheterization - Urethral (mL): 600 mL  Total OUT: 600 mL    Total NET: 1504.4 mL      08 Mar 2025 06:01  -  08 Mar 2025 10:21  --------------------------------------------------------  IN:    dextrose 5% + lactated ringers: 300 mL    Propofol: 24.6 mL    Vital1.5: 40 mL  Total IN: 364.6 mL    OUT:  Total OUT: 0 mL    Total NET: 364.6 mL

## 2025-03-08 NOTE — CHART NOTE - NSCHARTNOTEFT_GEN_A_CORE
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

## 2025-03-08 NOTE — PROVIDER CONTACT NOTE (EICU) - SITUATION
Elerted by bedside ICU Attending Dr Jane  with request for stat CXR after left chest tube placement for PTX.

## 2025-03-09 LAB
ALBUMIN SERPL ELPH-MCNC: 2.5 G/DL — LOW (ref 3.3–5)
ALP SERPL-CCNC: 66 U/L — SIGNIFICANT CHANGE UP (ref 40–120)
ALT FLD-CCNC: 31 U/L — SIGNIFICANT CHANGE UP (ref 10–45)
ANION GAP SERPL CALC-SCNC: 5 MMOL/L — SIGNIFICANT CHANGE UP (ref 5–17)
AST SERPL-CCNC: 18 U/L — SIGNIFICANT CHANGE UP (ref 10–40)
BILIRUB SERPL-MCNC: 0.6 MG/DL — SIGNIFICANT CHANGE UP (ref 0.2–1.2)
BUN SERPL-MCNC: 25 MG/DL — HIGH (ref 7–23)
CALCIUM SERPL-MCNC: 9.1 MG/DL — SIGNIFICANT CHANGE UP (ref 8.4–10.5)
CHLORIDE SERPL-SCNC: 108 MMOL/L — SIGNIFICANT CHANGE UP (ref 96–108)
CO2 SERPL-SCNC: 29 MMOL/L — SIGNIFICANT CHANGE UP (ref 22–31)
CREAT SERPL-MCNC: 0.46 MG/DL — LOW (ref 0.5–1.3)
EGFR: 104 ML/MIN/1.73M2 — SIGNIFICANT CHANGE UP
EGFR: 104 ML/MIN/1.73M2 — SIGNIFICANT CHANGE UP
GLUCOSE BLDC GLUCOMTR-MCNC: 139 MG/DL — HIGH (ref 70–99)
GLUCOSE BLDC GLUCOMTR-MCNC: 146 MG/DL — HIGH (ref 70–99)
GLUCOSE BLDC GLUCOMTR-MCNC: 149 MG/DL — HIGH (ref 70–99)
GLUCOSE BLDC GLUCOMTR-MCNC: 200 MG/DL — HIGH (ref 70–99)
GLUCOSE BLDC GLUCOMTR-MCNC: 206 MG/DL — HIGH (ref 70–99)
GLUCOSE SERPL-MCNC: 116 MG/DL — HIGH (ref 70–99)
HCT VFR BLD CALC: 27.5 % — LOW (ref 34.5–45)
HGB BLD-MCNC: 8.9 G/DL — LOW (ref 11.5–15.5)
MAGNESIUM SERPL-MCNC: 2.1 MG/DL — SIGNIFICANT CHANGE UP (ref 1.6–2.6)
MCHC RBC-ENTMCNC: 28 PG — SIGNIFICANT CHANGE UP (ref 27–34)
MCHC RBC-ENTMCNC: 32.4 G/DL — SIGNIFICANT CHANGE UP (ref 32–36)
MCV RBC AUTO: 86.5 FL — SIGNIFICANT CHANGE UP (ref 80–100)
NRBC BLD AUTO-RTO: 0 /100 WBCS — SIGNIFICANT CHANGE UP (ref 0–0)
PHOSPHATE SERPL-MCNC: 2.3 MG/DL — LOW (ref 2.5–4.5)
PLATELET # BLD AUTO: 121 K/UL — LOW (ref 150–400)
POTASSIUM SERPL-MCNC: 3.6 MMOL/L — SIGNIFICANT CHANGE UP (ref 3.5–5.3)
POTASSIUM SERPL-SCNC: 3.6 MMOL/L — SIGNIFICANT CHANGE UP (ref 3.5–5.3)
PROT SERPL-MCNC: 4.7 G/DL — LOW (ref 6–8.3)
RBC # BLD: 3.18 M/UL — LOW (ref 3.8–5.2)
RBC # FLD: 19.5 % — HIGH (ref 10.3–14.5)
SODIUM SERPL-SCNC: 142 MMOL/L — SIGNIFICANT CHANGE UP (ref 135–145)
WBC # BLD: 7.94 K/UL — SIGNIFICANT CHANGE UP (ref 3.8–10.5)
WBC # FLD AUTO: 7.94 K/UL — SIGNIFICANT CHANGE UP (ref 3.8–10.5)

## 2025-03-09 PROCEDURE — 71045 X-RAY EXAM CHEST 1 VIEW: CPT | Mod: 26

## 2025-03-09 RX ADMIN — Medication 2.5 MILLIGRAM(S): at 15:03

## 2025-03-09 RX ADMIN — PREDNISONE 40 MILLIGRAM(S): 20 TABLET ORAL at 05:25

## 2025-03-09 RX ADMIN — Medication 220 MILLIGRAM(S): at 12:37

## 2025-03-09 RX ADMIN — MIDODRINE HYDROCHLORIDE 10 MILLIGRAM(S): 5 TABLET ORAL at 12:37

## 2025-03-09 RX ADMIN — Medication 2.5 MILLIGRAM(S): at 20:59

## 2025-03-09 RX ADMIN — INSULIN LISPRO 4: 100 INJECTION, SOLUTION INTRAVENOUS; SUBCUTANEOUS at 12:38

## 2025-03-09 RX ADMIN — Medication 15 MILLILITER(S): at 17:29

## 2025-03-09 RX ADMIN — INSULIN LISPRO 2: 100 INJECTION, SOLUTION INTRAVENOUS; SUBCUTANEOUS at 17:29

## 2025-03-09 RX ADMIN — Medication 25 MICROGRAM(S): at 04:19

## 2025-03-09 RX ADMIN — Medication 650 MILLIGRAM(S): at 10:41

## 2025-03-09 RX ADMIN — Medication 650 MILLIGRAM(S): at 11:11

## 2025-03-09 RX ADMIN — Medication 15 MILLILITER(S): at 05:26

## 2025-03-09 RX ADMIN — DOXAZOSIN MESYLATE 2 MILLIGRAM(S): 8 TABLET ORAL at 21:07

## 2025-03-09 RX ADMIN — SODIUM CHLORIDE 100 MILLILITER(S): 9 INJECTION, SOLUTION INTRAVENOUS at 06:15

## 2025-03-09 RX ADMIN — Medication 30 MILLILITER(S): at 23:37

## 2025-03-09 RX ADMIN — Medication 1000 MILLIGRAM(S): at 12:37

## 2025-03-09 RX ADMIN — Medication 10 MILLIGRAM(S): at 06:16

## 2025-03-09 RX ADMIN — NOREPINEPHRINE BITARTRATE 5.1 MICROGRAM(S)/KG/MIN: 8 SOLUTION at 00:12

## 2025-03-09 RX ADMIN — Medication 25 MICROGRAM(S): at 04:49

## 2025-03-09 RX ADMIN — Medication 40 MILLIGRAM(S): at 05:26

## 2025-03-09 RX ADMIN — Medication 2.5 MILLIGRAM(S): at 07:44

## 2025-03-09 RX ADMIN — Medication 30 MILLILITER(S): at 17:28

## 2025-03-09 RX ADMIN — Medication 30 MILLILITER(S): at 12:37

## 2025-03-09 RX ADMIN — Medication 1 TABLET(S): at 12:37

## 2025-03-09 RX ADMIN — Medication 40 MILLIGRAM(S): at 17:28

## 2025-03-09 RX ADMIN — Medication 15 MILLILITER(S): at 21:07

## 2025-03-09 RX ADMIN — MIDODRINE HYDROCHLORIDE 10 MILLIGRAM(S): 5 TABLET ORAL at 05:26

## 2025-03-09 RX ADMIN — INSULIN GLARGINE-YFGN 10 UNIT(S): 100 INJECTION, SOLUTION SUBCUTANEOUS at 09:30

## 2025-03-09 RX ADMIN — PROPOFOL 3.26 MICROGRAM(S)/KG/MIN: 10 INJECTION, EMULSION INTRAVENOUS at 01:33

## 2025-03-09 RX ADMIN — Medication 2.5 MILLIGRAM(S): at 04:36

## 2025-03-09 RX ADMIN — MIDODRINE HYDROCHLORIDE 10 MILLIGRAM(S): 5 TABLET ORAL at 21:07

## 2025-03-09 RX ADMIN — POLYETHYLENE GLYCOL 3350 17 GRAM(S): 17 POWDER, FOR SOLUTION ORAL at 12:38

## 2025-03-09 RX ADMIN — Medication 1 APPLICATION(S): at 05:26

## 2025-03-09 NOTE — PROGRESS NOTE ADULT - SUBJECTIVE AND OBJECTIVE BOX
Chief Complaint:  Patient is a 69y old  Female who presents with a chief complaint of SOB/AMS (09 Mar 2025 09:53)      Interval Events: Patient sedated and intubated. Per RN , no melena reported      Allergies:  No Known Allergies      Hospital Medications:  acetaminophen   Oral Liquid .. 650 milliGRAM(s) Enteral Tube every 6 hours PRN  albuterol    0.083% 2.5 milliGRAM(s) Nebulizer every 6 hours  aluminum hydroxide/magnesium hydroxide/simethicone Suspension 30 milliLiter(s) Oral every 6 hours  ascorbic acid 1000 milliGRAM(s) Oral daily  bisacodyl Suppository 10 milliGRAM(s) Rectal every 24 hours  chlorhexidine 0.12% Liquid 15 milliLiter(s) Oral Mucosa every 12 hours  chlorhexidine 2% Cloths 1 Application(s) Topical <User Schedule>  dextrose 5%. 1000 milliLiter(s) IV Continuous <Continuous>  dextrose 5%. 1000 milliLiter(s) IV Continuous <Continuous>  dextrose 50% Injectable 25 Gram(s) IV Push once  dextrose 50% Injectable 12.5 Gram(s) IV Push once  dextrose 50% Injectable 25 Gram(s) IV Push once  doxazosin 2 milliGRAM(s) Oral at bedtime  fentaNYL    Injectable 25 MICROGram(s) IV Push every 3 hours PRN  insulin glargine Injectable (LANTUS) 10 Unit(s) SubCutaneous every morning  insulin lispro (ADMELOG) corrective regimen sliding scale   SubCutaneous every 6 hours  lactated ringers. 1000 milliLiter(s) IV Continuous <Continuous>  midodrine 10 milliGRAM(s) Oral every 8 hours  midodrine. 10 milliGRAM(s) Oral three times a day  multivitamin 1 Tablet(s) Oral daily  norepinephrine Infusion 0.05 MICROgram(s)/kG/Min IV Continuous <Continuous>  pantoprazole   Suspension 40 milliGRAM(s) Oral every 12 hours  polyethylene glycol 3350 17 Gram(s) Oral daily  predniSONE   Tablet   Oral   predniSONE   Tablet 40 milliGRAM(s) Oral daily  propofol Infusion 10 MICROgram(s)/kG/Min IV Continuous <Continuous>  senna Syrup 15 milliLiter(s) Oral at bedtime  zinc sulfate 220 milliGRAM(s) Oral daily      PMHX/PSHX:  Advanced COPD    On supplemental oxygen by nasal cannula    Arthritis    Cancer, colon    HTN (hypertension)    No significant past surgical history          ROS:     GI:  See HPI      Vital Signs:  Vital Signs Last 24 Hrs  T(C): 36.4 (09 Mar 2025 04:00), Max: 36.6 (08 Mar 2025 16:00)  T(F): 97.6 (09 Mar 2025 04:00), Max: 97.9 (08 Mar 2025 16:00)  HR: 79 (09 Mar 2025 09:00) (43 - 122)  BP: 124/64 (09 Mar 2025 09:00) (77/54 - 132/75)  BP(mean): 83 (09 Mar 2025 09:00) (61 - 89)  RR: 12 (09 Mar 2025 09:00) (12 - 27)  SpO2: 100% (09 Mar 2025 09:00) (90% - 100%)    Parameters below as of 09 Mar 2025 07:46  Patient On (Oxygen Delivery Method): ventilator      Daily     Daily     PHYSICAL EXAM:   GENERAL:  No distress  HEENT: conjunctivae clear  CHEST:  Intubated - per RN on CPAP now  HEART:  Regular rhythm  ABDOMEN:  Soft, non-tender, non-distended  EXTREMITIES:  no edema  SKIN:  Dry/warm  NEURO:  intubated and sedated      LABS:                        8.9    7.94  )-----------( 121      ( 09 Mar 2025 06:00 )             27.5     03-09    142  |  108  |  25[H]  ----------------------------<  116[H]  3.6   |  29  |  0.46[L]    Ca    9.1      09 Mar 2025 06:00  Phos  2.3     03-09  Mg     2.1     03-09    TPro  4.7[L]  /  Alb  2.5[L]  /  TBili  0.6  /  DBili  x   /  AST  18  /  ALT  31  /  AlkPhos  66  03-09    LIVER FUNCTIONS - ( 09 Mar 2025 06:00 )  Alb: 2.5 g/dL / Pro: 4.7 g/dL / ALK PHOS: 66 U/L / ALT: 31 U/L / AST: 18 U/L / GGT: x             Urinalysis Basic - ( 09 Mar 2025 06:00 )    Color: x / Appearance: x / SG: x / pH: x  Gluc: 116 mg/dL / Ketone: x  / Bili: x / Urobili: x   Blood: x / Protein: x / Nitrite: x   Leuk Esterase: x / RBC: x / WBC x   Sq Epi: x / Non Sq Epi: x / Bacteria: x          Imaging:      ACC: [id number] EXAM:  CT ABDOMEN AND PELVIS OC IC   ORDERED BY: JB MURO     PROCEDURE DATE:  03/05/2025          INTERPRETATION:  CLINICAL INFORMATION: Constipation, history of colon   cancer.    COMPARISON: Abdominal radiographs March 4, 2025, CT pulmonary angiogram   February 26, 2025.    CONTRAST/COMPLICATIONS:  IV Contrast: Omnipaque 350  70 cc administered   30 cc discarded  Oral Contrast: NONE  .    PROCEDURE:  CT of the Abdomen and Pelvis was performed.  Sagittal and coronal reformats were performed.    FINDINGS:  LOWER CHEST: There are residual patchy peribronchovascular opacities at   the visualized lung bases with mild bilateral lower lobe dependent   atelectasis.    LIVER: Scattered subcentimeter hepatic hypodensities, favored to   represent small cysts. Otherwise, within normal limits.  BILE DUCTS: Mild intrahepatic ductal dilatation. The common bile duct is   not dilated.  GALLBLADDER: Contracted.  SPLEEN: Within normal limits.  PANCREAS: Mildly atrophic and moderate fatty replacement.  ADRENALS: Within normal limits.  KIDNEYS/URETERS: The kidneys enhance symmetrically. Mild bilateral   perinephric fat stranding. Bilateral renal cysts, the largest of which   measures up to 4.3 cm in the posterior midpole of the right kidney. No   hydroureteronephrosis, mass, or stone.    BLADDER: Collapsed around Diaz catheter. Pelvic floor prolapse with a   cystocele.  REPRODUCTIVE ORGANS: Uterus and adnexa within normal limits.    BOWEL: Rectal temperature probe in place. Partially visualized enteric   tube seen coursing through the lower esophagus and terminating in the   collapsed stomach. Low anterior resection. Left colonic diverticulosis.   Contrast is seen within the proximal and mid small bowel. There is mild   fluid distention of the distal small bowel. There is progressive   dilatation of the air and stool-filled transverse and ascending colon   with maximal dilatation of the cecum measuring up to 7.5 cm. The rectum   and more distal colon are collapsed with a small amount of stool.  There   is no mesenteric adenopathy.  PERITONEUM/RETROPERITONEUM: Small amount of abdominal and pelvic free and   fluid.  VESSELS: Moderate atherosclerotic changes of the abdominal aorta   extending into the bilateralcommon iliac arteries. The mesenteric   vessels opacify unremarkably.  LYMPH NODES: No lymphadenopathy.  ABDOMINAL WALL: Within normal limits.  BONES: Age-indeterminate compression deformity of T12 and partially   visualized compression deformity of T11. Disc space narrowing and   spondylosis at L5-S1.    IMPRESSION:  1. Liquid stool and gas distention of the right colon with maximal   dilatation of the cecum measuring up to 7.5 cm, tapering to a collapsed   left colon. No evidence of abrupt transition to suggest bowel   obstruction. These findings are favored to represent ileus. Trace free   fluid in the peritoneal cavity. Recommend plain film follow-up.  2. Improved bibasilar consolidative opacities.        --- End of Report ---          ISABEL SALAZAR DO; Resident Radiologist  This document has been electronically signed.  SARAH PUCKETT MD; Attending Radiologist  This document has been electronically signed. Mar  6 2025  9:28AM  03-05-25 @ 21:43    -- -- --   ACC: [id number] EXAM:  XR ABDOMEN PORTABLE ROUTINE 1V   ORDERED BY: PETER MOYA     ACC: [id number] EXAM:  XR CHEST AP OR PA 1V   ORDERED BY: PETER MOYA     ACC: [id number] EXAM:  XR CHEST PORTABLE IMMED 1V   ORDERED BY: PETER MOYA     PROCEDURE DATE:  03/04/2025          INTERPRETATION:  AP chest on March 4, 2025 at 8:39 AM. 2 images. Patient   is short of breath.    Heart size normal. COPD hyperexpansion of the lungs is similar to March 2.    There is an infiltrate developing overthe left lower heart border when   compared to March 2.    Abdomen. Concern is constipation.    There is mottled increased fecal content in the rectosigmoid and right   colon. No bowel obstruction. Clips overlie the lower lumbar spine. Bones   grossly intact.    Follow-up AP chest on March 4, 2025 at 10:34 AM.    Endotracheal tube and nasogastric tube inserted. Persistent slight left   base retrocardiac infiltrate.    IMPRESSION: Retrocardiac infiltrate. Endotracheal tube and nasogastric   tube inserted. Mild to moderately increased fecal content in the right   colon and rectosigmoid. No obstruction.    IMPRESSION: Suspicion of developing retrocardiac infiltrate. COPD again   noted. Left rib fracture noted.    --- End of Report ---            EITAN SULLIVAN MD; Attending Radiologist  This document has been electronically signed. Mar  4 2025  1:31PM

## 2025-03-09 NOTE — PROGRESS NOTE ADULT - ASSESSMENT
69 year old female with a PMH of COPD - on home o2 (2-4 L), Ex smoker + Vaping, RA/Arthritis, Colon cancer s/p chemo with neuropathy, HTN, and h/o PE who p/w increaisng sob and ams.      Assessment  1. Acute Hypoxic and hypercarbic respiratory failure   2. S/p Brief cardiac arrest 3/8/2025 s/p 1 round,   3. Due to Acute flu A and Left lower lobe Pna and Left rib fx  4. Left pntx s/p pigtail chest tube on Left 3/8/2025  5. Chronic Advanced COPD with supplemental oxygen by nasal cannula  6. RA  7. h/o colon cancer  8. h/o PE  off a/c now with Segmental and subsegmental pulmonary embolism right lower lobe, and b/l Calf dvt on Duplex.  9. Anemia    Plan:    NEURO:   - Continue with propofol drip wean as tolerates  -Monitor mental status closely, avoid neurosuppresants.     CV:   -Maintain goal MAP >65. Hypotensive requiring IV vasopressor therapy to maintain goal MAP. Monitor end points of organ perfusion.   -Keep K~4 and Mg>2 for optimal arrhythmia suppression.    PULM:   -Patient currently on Full vent support  -titrate settings to maintain SaO2 >90%, or pH >7.25  -tolerated cpap 10, not 5, appears to frail to wean  - continue Chest tube to suction -20  -Peridex oral care  -aggressive pulmonary toilet  -daily sedation vacation with spontaneous breathing trial if clinical condition warrants, discuss with respiratory therapy     GI:  -TF.    -S/p EGD/Colon no active bleeding noted  -GI PPX with PPI       RENAL:  -Renal function currently WNL.  -trend lytes/Scr daily with BMP  -I's and O's, goal UOP 0.5 cc/kg/hr  -renal dose meds and avoid nephrotoxins   -Gentle mIVF with  cc/h.     ENDO:   -Aggressive glycemic control to limit FS glucose to <180mg/dl. ISS, Lantus.      ID:  - Cx NGTD thus far.       HEME:   -VTE ppx with SCDs.   -h/h variable, unsure if safe to start full a/c for PE, currently on dvt ppx lovenox  re evaluate in am to increase to full dose    GOC: Full Code.       CRITICAL CARE TIME SPENT:   Time spent on this patient encounter, which includes documenting this note in the electronic medical record, was 41 minutes including assessing the presenting problems with associated risks, reviewing the medical record to prepare for the encounter, and meeting face to face with patient to obtain additional history. I have also performed an appropriate physical exam, made interventions listed and ordered and interpreted appropriate diagnostic studies as documented. To improve communication and patient saftey, I have coordinated care with the multidisciplinary team including the bedside nurse, appropriate attending of record and consultants as needed.

## 2025-03-09 NOTE — PROGRESS NOTE ADULT - SUBJECTIVE AND OBJECTIVE BOX
Critical care note:    Reason for admission: SOB/AMS     BRIEF HOSPITAL COURSE: 69 year old female seen and examined in the ICU PMHx COPD - on home o2 (2-4 L), Ex smoker + Vaping, RA/Arthritis, Colon cancer s/p chemo with neuropathy, HTN, h/o PE who p/w increasing sob and ams. Colon resection of adenocarcinoma 02/2024. Mets to 2 lymph nodes, who p/w increaisng sob and ams and admitted to ICU for resp failure.     Events last 24 hours:   S/P pneumothorax s/p chest tube placement with improvement  Remains on vent     PAST MEDICAL & SURGICAL HISTORY:  Advanced COPD      On supplemental oxygen by nasal cannula      Arthritis      Cancer, colon      HTN (hypertension)      No significant past surgical history          Review of Systems:  On vent    Medications:    doxazosin 2 milliGRAM(s) Oral at bedtime  midodrine 10 milliGRAM(s) Oral every 8 hours  norepinephrine Infusion 0.05 MICROgram(s)/kG/Min IV Continuous <Continuous>    albuterol    0.083% 2.5 milliGRAM(s) Nebulizer every 6 hours    acetaminophen   Oral Liquid .. 650 milliGRAM(s) Enteral Tube every 6 hours PRN  fentaNYL    Injectable 25 MICROGram(s) IV Push every 3 hours PRN  propofol Infusion 10 MICROgram(s)/kG/Min IV Continuous <Continuous>        aluminum hydroxide/magnesium hydroxide/simethicone Suspension 30 milliLiter(s) Oral every 6 hours  bisacodyl Suppository 10 milliGRAM(s) Rectal every 24 hours  pantoprazole   Suspension 40 milliGRAM(s) Oral every 12 hours  polyethylene glycol 3350 17 Gram(s) Oral daily  senna Syrup 15 milliLiter(s) Oral at bedtime      dextrose 50% Injectable 25 Gram(s) IV Push once  dextrose 50% Injectable 12.5 Gram(s) IV Push once  dextrose 50% Injectable 25 Gram(s) IV Push once  insulin glargine Injectable (LANTUS) 10 Unit(s) SubCutaneous every morning  insulin lispro (ADMELOG) corrective regimen sliding scale   SubCutaneous every 6 hours  predniSONE   Tablet   Oral   predniSONE   Tablet 40 milliGRAM(s) Oral daily    ascorbic acid 1000 milliGRAM(s) Oral daily  dextrose 5%. 1000 milliLiter(s) IV Continuous <Continuous>  dextrose 5%. 1000 milliLiter(s) IV Continuous <Continuous>  lactated ringers. 1000 milliLiter(s) IV Continuous <Continuous>  multivitamin 1 Tablet(s) Oral daily  zinc sulfate 220 milliGRAM(s) Oral daily      chlorhexidine 0.12% Liquid 15 milliLiter(s) Oral Mucosa every 12 hours  chlorhexidine 2% Cloths 1 Application(s) Topical <User Schedule>        Mode: AC/ CMV (Assist Control/ Continuous Mandatory Ventilation)  RR (machine): 26  TV (machine): 400  FiO2: 50  PEEP: 4  ITime: 1  MAP: 14  PIP: 27      ICU Vital Signs Last 24 Hrs  T(C): 37.5 (10 Mar 2025 00:00), Max: 37.5 (09 Mar 2025 21:00)  T(F): 99.5 (10 Mar 2025 00:00), Max: 99.5 (09 Mar 2025 21:00)  HR: 80 (10 Mar 2025 00:00) (66 - 106)  BP: 119/74 (10 Mar 2025 00:00) (96/59 - 134/62)  BP(mean): 87 (10 Mar 2025 00:00) (70 - 87)  ABP: --  ABP(mean): --  RR: 26 (10 Mar 2025 00:00) (12 - 26)  SpO2: 100% (10 Mar 2025 00:00) (96% - 100%)    O2 Parameters below as of 09 Mar 2025 18:00  Patient On (Oxygen Delivery Method): ventilator    O2 Concentration (%): 50        ABG - ( 08 Mar 2025 15:28 )  pH, Arterial: 7.45  pH, Blood: x     /  pCO2: 36    /  pO2: 87    / HCO3: 25    / Base Excess: 1.0   /  SaO2: 98.1                I&O's Detail    08 Mar 2025 06:01  -  09 Mar 2025 07:00  --------------------------------------------------------  IN:    dextrose 5% + lactated ringers: 700 mL    Lactated Ringers: 1500 mL    Lactated Ringers Bolus: 1000 mL    Norepinephrine: 1 mL    Propofol: 104.9 mL    Vital1.5: 490 mL  Total IN: 3795.9 mL    OUT:    Chest Tube (mL): 80 mL    Intermittent Catheterization - Urethral (mL): 500 mL  Total OUT: 580 mL    Total NET: 3215.9 mL      09 Mar 2025 07:01  -  10 Mar 2025 02:34  --------------------------------------------------------  IN:    Enteral Tube Flush: 210 mL    Lactated Ringers: 1800 mL    Propofol: 55 mL    Vital1.5: 680 mL  Total IN: 2745 mL    OUT:    Chest Tube (mL): 100 mL    Post-Void Residual per Intermittent Catheterization (mL): 950 mL  Total OUT: 1050 mL    Total NET: 1695 mL            LABS:                        8.9    7.94  )-----------( 121      ( 09 Mar 2025 06:00 )             27.5     03-09    142  |  108  |  25[H]  ----------------------------<  116[H]  3.6   |  29  |  0.46[L]    Ca    9.1      09 Mar 2025 06:00  Phos  2.3     03-09  Mg     2.1     03-09    TPro  4.7[L]  /  Alb  2.5[L]  /  TBili  0.6  /  DBili  x   /  AST  18  /  ALT  31  /  AlkPhos  66  03-09          CAPILLARY BLOOD GLUCOSE      POCT Blood Glucose.: 146 mg/dL (09 Mar 2025 23:20)      Urinalysis Basic - ( 09 Mar 2025 06:00 )    Color: x / Appearance: x / SG: x / pH: x  Gluc: 116 mg/dL / Ketone: x  / Bili: x / Urobili: x   Blood: x / Protein: x / Nitrite: x   Leuk Esterase: x / RBC: x / WBC x   Sq Epi: x / Non Sq Epi: x / Bacteria: x      CULTURES:  Culture Results:   Commensal annetta consistent with body site (03-07-25 @ 09:33)  Culture Results:   Commensal annetta consistent with body site (03-04-25 @ 11:57)      Mode: AC/ CMV (Assist Control/ Continuous Mandatory Ventilation), RR (machine): 26, TV (machine): 400, FiO2: 50, PEEP: 4, ITime: 1, MAP: 14, PIP: 27    Physical Examination:    General: No acute distress.  On vent, responsive to stimuli.      HEENT: Pupils equal, reactive to light.  Symmetric.    PULM: on vent    CVS: Regular rate and rhythm, no murmurs, rubs, or gallops    ABD: Soft, nondistended, nontender, normoactive bowel sounds, no masses    EXT: No edema, nontender    SKIN: Warm and well perfused, no rashes noted.

## 2025-03-09 NOTE — PROGRESS NOTE ADULT - ASSESSMENT
69 year old female seen and examined in the ICU PMHx COPD - on home o2 (2-4 L), Ex smoker + Vaping, RA/Arthritis, Colon cancer s/p chemo with neuropathy, HTN, h/o PE who p/w increasing sob and ams. Colon resection of adenocarcinoma 02/2024. Mets to 2 lymph nodes - report in [facility] (2nd chart from [facility])   No active bleed noted H&H low on 3/6/2025  3/7/2025 - Endoscopy and colonoscopy - No active bleeding noted

## 2025-03-09 NOTE — PROGRESS NOTE ADULT - ASSESSMENT
Physical Examination:  GENERAL:               Intubated, sedated    HEENT:                    No JVD, Dry MM  PULM:                     Bilateral air entry, very diminshed bilaterally, no significant sputum production, tachypneic increased WOB  CVS:                         S1, S2,  No Murmur  ABD:                        Soft, +distended, nontender, normoactive bowel sounds,   EXT:                         No edema, nontender, No Cyanosis or Clubbing    NEURO:                  intubated sedated  PSYC:                      Calm, no Insight.       Assessment  1. Acute Hypoxic and hypercarbic respiratory failure   2. S/p Brief cardiac arrest 3/8/2025 s/p 1 round,   3. Due to Acute flu A and Left lower lobe Pna and Left rib fx  4. Left pntx s/p pigtail chest tube on Left 3/8/2025  5. Chronic Advanced COPD with supplemental oxygen by nasal cannula  6. RA  7. h/o colon cancer  8. h/o PE  off a/c now with Segmental and subsegmental pulmonary embolism right lower lobe, and b/l Calf dvt on Duplex.  9. Anemia     Plan  SAT/SBT done  tolerating cpap 10, not 5  appears to frail to wean  h/h variable, unsure if safe to start full a/c for PE, currently on dvt ppx lovenox    re evaluate in am to increase to full dose  Chest tube to suction -20    Tube feeding placed  S/p EGD/Colon no active bleeding noted  continue ppi   taper steroids to off     f/u sputum cultures, thin secretions,    palliative care f/u            PMD:				                   Notified(Date):  Family Updated: 	Harjinder [phone]	                                 Date: 3/9/2025  updated family bedside    Sedation & Analgesia:	precedex  Diet/Nutrition:		tube feeding      GI PPx:			PPI    DVT Ppx:		 lovenox dvt ppx      Activity:		    Head of Bed:               35-45 Deg  Glycemic Control:             Lines:  CENTRAL LINE: 	[ ] YES [ ] NO	                    LOCATION:   	                       DATE INSERTED:   	                    REMOVE:  [ ] YES [ ] NO    A-LINE:  	                [ ] YES [ ] NO                      LOCATION:   	                       DATE INSERTED: 		            REMOVE:  [ ] YES [ ] NO    KEANE: 		        [ ] YES [ ] NO  		                                       DATE INSERTED:		            REMOVE:  [ ] YES [ ] NO      Restraints were deemed necessary to prevent removal of life-sustaining devices [ x ] YES   [    ]  NO    Disposition: ICU care    Goals of Care: Full code

## 2025-03-09 NOTE — PROGRESS NOTE ADULT - SUBJECTIVE AND OBJECTIVE BOX
Follow-up Critical Care Progress Note  Chief Complaint : Acute respiratory failure with hypoxia        patient with SAT/SBT  awake responsive, following commands  remains weak  overnight required pressors now off  Airleak from chest tube less frequent,   able to tolerate chest tube on suction today compared to yesterday  Placed on cpap 15 then 10 then 5  tolerating cpap 10, but on CPAP 5, hypoventilating  placed on cpap 10.        Allergies :No Known Allergies      PAST MEDICAL & SURGICAL HISTORY:  Advanced COPD  On supplemental oxygen by nasal cannula  Arthritis  Cancer, colon  HTN (hypertension)  No significant past surgical history        Medications:  MEDICATIONS  (STANDING):  albuterol    0.083% 2.5 milliGRAM(s) Nebulizer every 6 hours  aluminum hydroxide/magnesium hydroxide/simethicone Suspension 30 milliLiter(s) Oral every 6 hours  ascorbic acid 1000 milliGRAM(s) Oral daily  bisacodyl Suppository 10 milliGRAM(s) Rectal every 24 hours  chlorhexidine 0.12% Liquid 15 milliLiter(s) Oral Mucosa every 12 hours  chlorhexidine 2% Cloths 1 Application(s) Topical <User Schedule>  dextrose 5%. 1000 milliLiter(s) (100 mL/Hr) IV Continuous <Continuous>  dextrose 5%. 1000 milliLiter(s) (50 mL/Hr) IV Continuous <Continuous>  dextrose 50% Injectable 25 Gram(s) IV Push once  dextrose 50% Injectable 12.5 Gram(s) IV Push once  dextrose 50% Injectable 25 Gram(s) IV Push once  doxazosin 2 milliGRAM(s) Oral at bedtime  insulin glargine Injectable (LANTUS) 10 Unit(s) SubCutaneous every morning  insulin lispro (ADMELOG) corrective regimen sliding scale   SubCutaneous every 6 hours  lactated ringers. 1000 milliLiter(s) (100 mL/Hr) IV Continuous <Continuous>  midodrine 10 milliGRAM(s) Oral every 8 hours  midodrine. 10 milliGRAM(s) Oral three times a day  multivitamin 1 Tablet(s) Oral daily  norepinephrine Infusion 0.05 MICROgram(s)/kG/Min (5.1 mL/Hr) IV Continuous <Continuous>  pantoprazole   Suspension 40 milliGRAM(s) Oral every 12 hours  polyethylene glycol 3350 17 Gram(s) Oral daily  predniSONE   Tablet   Oral   predniSONE   Tablet 40 milliGRAM(s) Oral daily  propofol Infusion 10 MICROgram(s)/kG/Min (3.26 mL/Hr) IV Continuous <Continuous>  propofol Injectable 100 milliGRAM(s) IV Push once  senna Syrup 15 milliLiter(s) Oral at bedtime  zinc sulfate 220 milliGRAM(s) Oral daily    MEDICATIONS  (PRN):  acetaminophen   Oral Liquid .. 650 milliGRAM(s) Enteral Tube every 6 hours PRN Temp greater or equal to 38C (100.4F), Mild Pain (1 - 3)  fentaNYL    Injectable 25 MICROGram(s) IV Push every 3 hours PRN agitaiton      Antibiotics History  cefepime   IVPB    , 02-26-25 @ 13:23  cefepime   IVPB 2000 milliGRAM(s) IV Intermittent once, 02-26-25 @ 13:32, Stop order after: 1 Doses  cefepime   IVPB 1000 milliGRAM(s) IV Intermittent every 12 hours, 02-26-25 @ 16:16  cefepime   IVPB 2000 milliGRAM(s) IV Intermittent every 8 hours, 02-27-25 @ 08:41, Stop order after: 5 Days  fluconAZOLE   Tablet 100 milliGRAM(s) Oral every 24 hours, 03-02-25 @ 14:15, Stop order after: 7 Doses  oseltamivir 75 milliGRAM(s) Oral two times a day, 02-27-25 @ 08:11, Stop order after: 5 Days  oseltamivir Suspension 75 milliGRAM(s) Oral every 12 hours, 02-27-25 @ 09:53  oseltamivir Suspension 75 milliGRAM(s) Oral every 12 hours, 02-27-25 @ 10:24, Stop order after: 5 Days  vancomycin  IVPB 750 milliGRAM(s) IV Intermittent Once, 02-26-25 @ 16:23, Stop order after: 1 Doses  vancomycin  IVPB. 1000 milliGRAM(s) IV Intermittent once, 02-26-25 @ 13:32, Stop order after: 1 Doses      Heme Medications       GI Medications  aluminum hydroxide/magnesium hydroxide/simethicone Suspension 30 milliLiter(s) Oral every 6 hours, 03-06-25 @ 11:16, Now  bisacodyl Suppository 10 milliGRAM(s) Rectal every 24 hours, 03-03-25 @ 07:27, Now  pantoprazole   Suspension 40 milliGRAM(s) Oral every 12 hours, 03-08-25 @ 18:00, 18:00  polyethylene glycol 3350 17 Gram(s) Oral daily, 03-02-25 @ 12:16, Routine  senna Syrup 15 milliLiter(s) Oral at bedtime, 03-06-25 @ 07:59, Routine      COVID  02-26-25 @ 13:30  COVID -   NotDetec      COVID Biomarkers    03-03-25 @ 06:00 ESR --  ---  CRP --  ---  DDimer  --   ---   LDH --   ---   Ferritin 121           Trend BNP  02-26-25 @ 13:30   -  198    Procalcitonin Trend  02-26-25 @ 13:30   -   0.10[H]    WBC Trend  03-09-25 @ 06:00   -  7.94  03-08-25 @ 05:00   -  9.37  03-07-25 @ 17:30   -  7.68  03-07-25 @ 05:15   -  5.88    H/H Trend  03-09-25 @ 06:00   -   8.9[L]/ 27.5[L]  03-08-25 @ 05:00   -   10.9[L]/ 33.2[L]  03-07-25 @ 17:30   -   9.6[L]/ 29.6[L]  03-07-25 @ 05:15   -   6.5[LL]/ 20.8[LL]  03-06-25 @ 05:00   -   7.1[L]/ 23.1[L]  03-05-25 @ 05:00   -   7.9[L]/ 26.4[L]    Stool Occult Blood  03-04-25 @ 14:30   -   Positive[!]    Platelet Trend  03-09-25 @ 06:00   -  121[L]  03-08-25 @ 05:00   -  140[L]  03-07-25 @ 17:30   -  102[L]  03-07-25 @ 05:15   -  121[L]    Trend Sodium  03-09-25 @ 06:00   -  142  03-08-25 @ 05:00   -  142  03-07-25 @ 05:15   -  140    Trend Potassium  03-09-25 @ 06:00   -  3.6  03-08-25 @ 05:00   -  3.5  03-07-25 @ 05:15   -  4.1    Trend Bun/Cr  03-09-25 @ 06:00  BUN/CR -  25[H] / 0.46[L]  03-08-25 @ 05:00  BUN/CR -  24[H] / 0.47[L]  03-07-25 @ 05:15  BUN/CR -  26[H] / 0.47[L]    Lactic Acid Trend  02-27-25 @ 05:51   -   1.0  02-26-25 @ 13:00   -   1.0    ABG Trend  03-08-25 @ 15:28   - 7.45/36[H]/87/98.1[H]  03-08-25 @ 05:07   - 7.46[H]/39[H]/152[H]/99.4[H]  03-07-25 @ 04:50   - 7.43/50[H]/113[H]/99.4[H]  03-06-25 @ 10:45   - 7.41/50[H]/106/99.2[H]  03-05-25 @ 06:00   - 7.38/55[H]/76[L]/98.2[H]  03-04-25 @ 12:15   - 7.43/50[H]/67[L]/95.9  03-04-25 @ 08:00   - 7.40/56[H]/60[L]/93.7[L]  03-03-25 @ 15:18   - 7.35/58[H]/58[L]/91.7[L]  03-03-25 @ 14:05   - 7.30[L]/73[HH]/85/98.6[H]  03-03-25 @ 04:10   - 7.44/52[H]/74[L]/97.1  03-02-25 @ 05:00   - 7.37/62[H]/84/97.5  03-01-25 @ 05:00   - 7.51[H]/49[H]/61[L]/93.8[L]    Trend AST/ALT/ALK Phos/Bili  03-09-25 @ 06:00   18/31/66/0.6  03-08-25 @ 05:00   15/27/75/0.5  03-07-25 @ 05:15   19/27/75/0.4  03-03-25 @ 06:00   11/27/79/0.6  03-02-25 @ 06:15   12/22/89/0.6  03-01-25 @ 05:04   14/22/87/0.7  02-28-25 @ 05:30   16/17/90/0.7     Albumin Trend  03-09-25 @ 06:00   -   2.5[L]  03-08-25 @ 05:00   -   2.1[L]  03-07-25 @ 05:15   -   1.8[L]  03-03-25 @ 06:00   -   2.7[L]  03-02-25 @ 06:15   -   2.8[L]  03-01-25 @ 05:04   -   2.6[L]      PTT - PT - INR Trend  02-26-25 @ 13:30   -   27.1 - 11.3 - 0.96    Glucose Trend  03-09-25 @ 09:28   -  -- -- 139[H]  03-09-25 @ 06:00   -  116[H] -- --  03-09-25 @ 05:53   -  -- -- 149[H]  03-08-25 @ 23:03   -  -- -- 132[H]  03-08-25 @ 16:53   -  -- -- 184[H]  03-08-25 @ 13:59   -  -- -- 216[H]  03-08-25 @ 09:11   -  -- -- 172[H]  03-08-25 @ 07:51   -  -- -- 188[H]  03-08-25 @ 05:24   -  -- -- 176[H]  03-08-25 @ 05:00   -  159[H] -- --    A1C with Estimated Average Glucose Result: 6.0 % *H* [4.0 - 5.6] (03-04-25 @ 06:00)  A1C with Estimated Average Glucose Result: 6.6 % *H* [4.0 - 5.6] (02-27-25 @ 05:51)      LABS:                        8.9    7.94  )-----------( 121      ( 09 Mar 2025 06:00 )             27.5     03-09    142  |  108  |  25[H]  ----------------------------<  116[H]  3.6   |  29  |  0.46[L]    Ca    9.1      09 Mar 2025 06:00  Phos  2.3     03-09  Mg     2.1     03-09    TPro  4.7[L]  /  Alb  2.5[L]  /  TBili  0.6  /  DBili  x   /  AST  18  /  ALT  31  /  AlkPhos  66  03-09             CULTURES: (if applicable)    Culture - Sputum (collected 03-07-25 @ 09:33)  Source: Sputum Sputum  Gram Stain (03-08-25 @ 00:57):    Moderate polymorphonuclear leukocytes per low power field    No Squamous epithelial cells per low power field    No organisms seen per oil power field  Final Report (03-08-25 @ 21:51):    Commensal annetta consistent with body site    Culture - Sputum (collected 03-04-25 @ 11:57)  Source: Sputum Sputum  Gram Stain (03-05-25 @ 00:07):    Few polymorphonuclear leukocytes per low power field    No Squamous epithelial cells per low power field    Rare Yeast like cells per oil power field  Final Report (03-06-25 @ 08:20):    Commensal annetta consistent with body site    Culture - Blood (collected 02-26-25 @ 13:30)  Source: .Blood Blood-Peripheral  Final Report (03-04-25 @ 05:00):    No growth at 5 days    Culture - Blood (collected 02-26-25 @ 13:30)  Source: .Blood Blood-Peripheral  Final Report (03-04-25 @ 05:00):    No growth at 5 days      Rapid RVP Result: Detected (02-26-25 @ 13:30)       RADIOLOGY  CXR:  lung expanded  chest tube and lines in place  no gross infiltrate        VITALS:  T(C): 36.4 (03-09-25 @ 04:00), Max: 36.6 (03-08-25 @ 16:00)  T(F): 97.6 (03-09-25 @ 04:00), Max: 97.9 (03-08-25 @ 16:00)  HR: 79 (03-09-25 @ 09:00) (43 - 122)  BP: 124/64 (03-09-25 @ 09:00) (77/54 - 132/75)  BP(mean): 83 (03-09-25 @ 09:00) (61 - 89)  ABP: --  ABP(mean): --  RR: 12 (03-09-25 @ 09:00) (12 - 27)  SpO2: 100% (03-09-25 @ 09:00) (90% - 100%)  CVP(mm Hg): --  CVP(cm H2O): --    Ins and Outs     03-08-25 @ 06:01  -  03-09-25 @ 07:00  --------------------------------------------------------  IN: 3650.9 mL / OUT: 580 mL / NET: 3070.9 mL            Mode: CPAP with PS, RR (patient): 9, TV (patient): 520, FiO2: 50, PEEP: 4, PS: 15    I&O's Detail    08 Mar 2025 06:01  -  09 Mar 2025 07:00  --------------------------------------------------------  IN:    dextrose 5% + lactated ringers: 700 mL    Lactated Ringers: 1400 mL    Lactated Ringers Bolus: 1000 mL    Norepinephrine: 1 mL    Propofol: 99.9 mL    Vital1.5: 450 mL  Total IN: 3650.9 mL    OUT:    Chest Tube (mL): 80 mL    Intermittent Catheterization - Urethral (mL): 500 mL  Total OUT: 580 mL    Total NET: 3070.9 mL

## 2025-03-10 LAB
ALBUMIN SERPL ELPH-MCNC: 2 G/DL — LOW (ref 3.3–5)
ALP SERPL-CCNC: 62 U/L — SIGNIFICANT CHANGE UP (ref 40–120)
ALT FLD-CCNC: 30 U/L — SIGNIFICANT CHANGE UP (ref 10–45)
ANION GAP SERPL CALC-SCNC: 2 MMOL/L — LOW (ref 5–17)
AST SERPL-CCNC: 22 U/L — SIGNIFICANT CHANGE UP (ref 10–40)
BILIRUB SERPL-MCNC: 0.5 MG/DL — SIGNIFICANT CHANGE UP (ref 0.2–1.2)
BUN SERPL-MCNC: 19 MG/DL — SIGNIFICANT CHANGE UP (ref 7–23)
CALCIUM SERPL-MCNC: 8.7 MG/DL — SIGNIFICANT CHANGE UP (ref 8.4–10.5)
CHLORIDE SERPL-SCNC: 108 MMOL/L — SIGNIFICANT CHANGE UP (ref 96–108)
CO2 SERPL-SCNC: 32 MMOL/L — HIGH (ref 22–31)
CREAT SERPL-MCNC: 0.36 MG/DL — LOW (ref 0.5–1.3)
EGFR: 110 ML/MIN/1.73M2 — SIGNIFICANT CHANGE UP
EGFR: 110 ML/MIN/1.73M2 — SIGNIFICANT CHANGE UP
GLUCOSE BLDC GLUCOMTR-MCNC: 109 MG/DL — HIGH (ref 70–99)
GLUCOSE BLDC GLUCOMTR-MCNC: 123 MG/DL — HIGH (ref 70–99)
GLUCOSE BLDC GLUCOMTR-MCNC: 124 MG/DL — HIGH (ref 70–99)
GLUCOSE BLDC GLUCOMTR-MCNC: 171 MG/DL — HIGH (ref 70–99)
GLUCOSE BLDC GLUCOMTR-MCNC: 90 MG/DL — SIGNIFICANT CHANGE UP (ref 70–99)
GLUCOSE SERPL-MCNC: 107 MG/DL — HIGH (ref 70–99)
HCT VFR BLD CALC: 26.5 % — LOW (ref 34.5–45)
HGB BLD-MCNC: 8.4 G/DL — LOW (ref 11.5–15.5)
MAGNESIUM SERPL-MCNC: 1.9 MG/DL — SIGNIFICANT CHANGE UP (ref 1.6–2.6)
MCHC RBC-ENTMCNC: 27.5 PG — SIGNIFICANT CHANGE UP (ref 27–34)
MCHC RBC-ENTMCNC: 31.7 G/DL — LOW (ref 32–36)
MCV RBC AUTO: 86.9 FL — SIGNIFICANT CHANGE UP (ref 80–100)
NRBC BLD AUTO-RTO: 0 /100 WBCS — SIGNIFICANT CHANGE UP (ref 0–0)
PHOSPHATE SERPL-MCNC: 1.2 MG/DL — LOW (ref 2.5–4.5)
PLATELET # BLD AUTO: 106 K/UL — LOW (ref 150–400)
POTASSIUM SERPL-MCNC: 3.5 MMOL/L — SIGNIFICANT CHANGE UP (ref 3.5–5.3)
POTASSIUM SERPL-SCNC: 3.5 MMOL/L — SIGNIFICANT CHANGE UP (ref 3.5–5.3)
PROT SERPL-MCNC: 4.3 G/DL — LOW (ref 6–8.3)
RBC # BLD: 3.05 M/UL — LOW (ref 3.8–5.2)
RBC # FLD: 20.3 % — HIGH (ref 10.3–14.5)
SODIUM SERPL-SCNC: 142 MMOL/L — SIGNIFICANT CHANGE UP (ref 135–145)
WBC # BLD: 8.57 K/UL — SIGNIFICANT CHANGE UP (ref 3.8–10.5)
WBC # FLD AUTO: 8.57 K/UL — SIGNIFICANT CHANGE UP (ref 3.8–10.5)

## 2025-03-10 PROCEDURE — 71045 X-RAY EXAM CHEST 1 VIEW: CPT | Mod: 26

## 2025-03-10 PROCEDURE — 99232 SBSQ HOSP IP/OBS MODERATE 35: CPT

## 2025-03-10 PROCEDURE — 71045 X-RAY EXAM CHEST 1 VIEW: CPT | Mod: 26,77

## 2025-03-10 PROCEDURE — 99497 ADVNCD CARE PLAN 30 MIN: CPT

## 2025-03-10 RX ORDER — MAGNESIUM SULFATE 500 MG/ML
2 SYRINGE (ML) INJECTION ONCE
Refills: 0 | Status: COMPLETED | OUTPATIENT
Start: 2025-03-10 | End: 2025-03-10

## 2025-03-10 RX ORDER — POTASSIUM PHOSPHATE, MONOBASIC POTASSIUM PHOSPHATE, DIBASIC INJECTION, 236; 224 MG/ML; MG/ML
30 SOLUTION, CONCENTRATE INTRAVENOUS ONCE
Refills: 0 | Status: COMPLETED | OUTPATIENT
Start: 2025-03-10 | End: 2025-03-10

## 2025-03-10 RX ORDER — NYSTATIN 100000 [USP'U]/G
1 CREAM TOPICAL EVERY 12 HOURS
Refills: 0 | Status: DISCONTINUED | OUTPATIENT
Start: 2025-03-10 | End: 2025-03-13

## 2025-03-10 RX ADMIN — Medication 15 MILLILITER(S): at 17:48

## 2025-03-10 RX ADMIN — POLYETHYLENE GLYCOL 3350 17 GRAM(S): 17 POWDER, FOR SOLUTION ORAL at 11:38

## 2025-03-10 RX ADMIN — POTASSIUM PHOSPHATE, MONOBASIC POTASSIUM PHOSPHATE, DIBASIC INJECTION, 83.33 MILLIMOLE(S): 236; 224 SOLUTION, CONCENTRATE INTRAVENOUS at 09:55

## 2025-03-10 RX ADMIN — MIDODRINE HYDROCHLORIDE 10 MILLIGRAM(S): 5 TABLET ORAL at 05:13

## 2025-03-10 RX ADMIN — Medication 40 MILLIGRAM(S): at 05:12

## 2025-03-10 RX ADMIN — Medication 30 MILLILITER(S): at 11:44

## 2025-03-10 RX ADMIN — Medication 25 MICROGRAM(S): at 10:20

## 2025-03-10 RX ADMIN — Medication 30 MILLILITER(S): at 17:48

## 2025-03-10 RX ADMIN — Medication 2.5 MILLIGRAM(S): at 21:09

## 2025-03-10 RX ADMIN — PROPOFOL 3.26 MICROGRAM(S)/KG/MIN: 10 INJECTION, EMULSION INTRAVENOUS at 23:56

## 2025-03-10 RX ADMIN — Medication 30 MILLILITER(S): at 05:12

## 2025-03-10 RX ADMIN — INSULIN LISPRO 2: 100 INJECTION, SOLUTION INTRAVENOUS; SUBCUTANEOUS at 18:23

## 2025-03-10 RX ADMIN — PROPOFOL 3.26 MICROGRAM(S)/KG/MIN: 10 INJECTION, EMULSION INTRAVENOUS at 02:11

## 2025-03-10 RX ADMIN — Medication 15 MILLILITER(S): at 05:18

## 2025-03-10 RX ADMIN — PREDNISONE 40 MILLIGRAM(S): 20 TABLET ORAL at 05:18

## 2025-03-10 RX ADMIN — Medication 1 TABLET(S): at 11:38

## 2025-03-10 RX ADMIN — DOXAZOSIN MESYLATE 2 MILLIGRAM(S): 8 TABLET ORAL at 21:36

## 2025-03-10 RX ADMIN — MIDODRINE HYDROCHLORIDE 10 MILLIGRAM(S): 5 TABLET ORAL at 21:37

## 2025-03-10 RX ADMIN — Medication 650 MILLIGRAM(S): at 18:15

## 2025-03-10 RX ADMIN — Medication 30 MILLILITER(S): at 23:19

## 2025-03-10 RX ADMIN — Medication 1000 MILLIGRAM(S): at 11:38

## 2025-03-10 RX ADMIN — Medication 25 MICROGRAM(S): at 10:06

## 2025-03-10 RX ADMIN — Medication 25 GRAM(S): at 09:55

## 2025-03-10 RX ADMIN — Medication 2.5 MILLIGRAM(S): at 10:05

## 2025-03-10 RX ADMIN — Medication 1 APPLICATION(S): at 05:19

## 2025-03-10 RX ADMIN — Medication 220 MILLIGRAM(S): at 11:38

## 2025-03-10 RX ADMIN — MIDODRINE HYDROCHLORIDE 10 MILLIGRAM(S): 5 TABLET ORAL at 15:27

## 2025-03-10 RX ADMIN — Medication 40 MILLIGRAM(S): at 17:48

## 2025-03-10 RX ADMIN — Medication 2.5 MILLIGRAM(S): at 05:06

## 2025-03-10 RX ADMIN — SODIUM CHLORIDE 100 MILLILITER(S): 9 INJECTION, SOLUTION INTRAVENOUS at 02:10

## 2025-03-10 RX ADMIN — NYSTATIN 1 APPLICATION(S): 100000 CREAM TOPICAL at 17:48

## 2025-03-10 RX ADMIN — Medication 2.5 MILLIGRAM(S): at 15:34

## 2025-03-10 RX ADMIN — Medication 650 MILLIGRAM(S): at 17:49

## 2025-03-10 NOTE — PROGRESS NOTE ADULT - SUBJECTIVE AND OBJECTIVE BOX
HPI:  69 year old female with h/o COPD - on home o2 (2-4 L), Ex smoker + Vaping, RA/Arthritis, Colon cancer s/p chemo with neuropathy, HTN, h/o PE who p/w increaisng sob and ams. of note pateint was just hospitalized in [facility] 2/3-10/2025 and 3/17-18/2025 for both pneumonia and COPD exacerbation.   patient was sent to  Rehab  Today noted to be having increasing sob and confusion   sent to ED where found to have Flu a, and acute on chronic Hypercarbic respiratory failure.          PAST MEDICAL & SURGICAL HISTORY:    Advanced COPD  On supplemental oxygen by nasal cannula  Arthritis  Cancer, colon- s/p Chemp/Sx  HTN (hypertension)      MEDICATIONS  (STANDING):  albuterol    0.083% 2.5 milliGRAM(s) Nebulizer every 6 hours  aluminum hydroxide/magnesium hydroxide/simethicone Suspension 30 milliLiter(s) Oral every 6 hours  ascorbic acid 1000 milliGRAM(s) Oral daily  bisacodyl Suppository 10 milliGRAM(s) Rectal every 24 hours  chlorhexidine 0.12% Liquid 15 milliLiter(s) Oral Mucosa every 12 hours  chlorhexidine 2% Cloths 1 Application(s) Topical <User Schedule>  dextrose 5%. 1000 milliLiter(s) (50 mL/Hr) IV Continuous <Continuous>  dextrose 5%. 1000 milliLiter(s) (100 mL/Hr) IV Continuous <Continuous>  dextrose 50% Injectable 25 Gram(s) IV Push once  dextrose 50% Injectable 12.5 Gram(s) IV Push once  dextrose 50% Injectable 25 Gram(s) IV Push once  doxazosin 2 milliGRAM(s) Oral at bedtime  insulin glargine Injectable (LANTUS) 10 Unit(s) SubCutaneous every morning  insulin lispro (ADMELOG) corrective regimen sliding scale   SubCutaneous every 6 hours  lactated ringers. 1000 milliLiter(s) (100 mL/Hr) IV Continuous <Continuous>  magnesium sulfate  IVPB 2 Gram(s) IV Intermittent once  midodrine 10 milliGRAM(s) Oral every 8 hours  multivitamin 1 Tablet(s) Oral daily  norepinephrine Infusion 0.05 MICROgram(s)/kG/Min (5.1 mL/Hr) IV Continuous <Continuous>  nystatin Powder 1 Application(s) Topical every 12 hours  pantoprazole   Suspension 40 milliGRAM(s) Oral every 12 hours  polyethylene glycol 3350 17 Gram(s) Oral daily  potassium phosphate IVPB 30 milliMole(s) IV Intermittent once  predniSONE   Tablet   Oral   predniSONE   Tablet 40 milliGRAM(s) Oral daily  propofol Infusion 10 MICROgram(s)/kG/Min (3.26 mL/Hr) IV Continuous <Continuous>  senna Syrup 15 milliLiter(s) Oral at bedtime  zinc sulfate 220 milliGRAM(s) Oral daily    MEDICATIONS  (PRN):  acetaminophen   Oral Liquid .. 650 milliGRAM(s) Enteral Tube every 6 hours PRN Temp greater or equal to 38C (100.4F), Mild Pain (1 - 3)  fentaNYL    Injectable 25 MICROGram(s) IV Push every 3 hours PRN agitaiton      Allergies    No Known Allergies    Intolerances      REVIEW OF SYSTEM:    Constitutional, Eyes, ENT, Cardiovascular, Respiratory, Gastrointestinal, Genitourinary, Musculoskeletal, Integumentary, Neurological, Psychiatric, Endocrine, Heme/Lymph and Allergic/Immunologic review of systems are otherwise negative except as noted in HPI.     Vital Signs Last 24 Hrs  T(C): 37.4 (10 Mar 2025 08:00), Max: 37.5 (09 Mar 2025 21:00)  T(F): 99.3 (10 Mar 2025 08:00), Max: 99.5 (09 Mar 2025 21:00)  HR: 110 (10 Mar 2025 08:07) (80 - 110)  BP: 107/64 (10 Mar 2025 08:00) (85/57 - 134/62)  BP(mean): 77 (10 Mar 2025 08:00) (67 - 87)  RR: 26 (10 Mar 2025 08:00) (16 - 28)  SpO2: 100% (10 Mar 2025 08:07) (96% - 100%)    Parameters below as of 10 Mar 2025 08:00  Patient On (Oxygen Delivery Method): ventilator    O2 Concentration (%): 50    PHYSICAL EXAM:    Constitutional: frail, sick appearing  Eyes: no conjunctival infection, anicteric.   ENT: pharynx is unremarkable  Neck: supple without JVD  Pulmonary: clear to auscultation bilaterally   Cardiac: RRR  Vascular: no calf tenderness, venous stasis changes, varices  Abdomen: normoactive bowel sounds, soft and nontender, no hepatosplenomegaly or masses appreciated  Lymphatic: no peripheral adenopathy appreciated  Musculoskeletal: full range of motion and no deformities appreciated  Skin: normal appearance, no rash/erythema  Neurology: awake, alert, oriented; no focal deficits      LABS:  CBC Full  -  ( 10 Mar 2025 05:09 )  WBC Count : 8.57 K/uL  RBC Count : 3.05 M/uL  Hemoglobin : 8.4 g/dL  Hematocrit : 26.5 %  Platelet Count - Automated : 106 K/uL  Mean Cell Volume : 86.9 fl  Mean Cell Hemoglobin : 27.5 pg  Mean Cell Hemoglobin Concentration : 31.7 g/dL  Auto Neutrophil # : x  Auto Lymphocyte # : x  Auto Monocyte # : x  Auto Eosinophil # : x  Auto Basophil # : x  Auto Neutrophil % : x  Auto Lymphocyte % : x  Auto Monocyte % : x  Auto Eosinophil % : x  Auto Basophil % : x    03-10    142  |  108  |  19  ----------------------------<  107[H]  3.5   |  32[H]  |  0.36[L]    Ca    8.7      10 Mar 2025 05:09  Phos  1.2     03-10  Mg     1.9     03-10    TPro  4.3[L]  /  Alb  2.0[L]  /  TBili  0.5  /  DBili  x   /  AST  22  /  ALT  30  /  AlkPhos  62  03-10      Urinalysis Basic - ( 10 Mar 2025 05:09 )    Color: x / Appearance: x / SG: x / pH: x  Gluc: 107 mg/dL / Ketone: x  / Bili: x / Urobili: x   Blood: x / Protein: x / Nitrite: x   Leuk Esterase: x / RBC: x / WBC x   Sq Epi: x / Non Sq Epi: x / Bacteria: x      RADIOLOGY & ADDITIONAL STUDIES:    EXAM:  CT CHEST      PROCEDURE DATE:  03/08/2025      INTERPRETATION:  Clinical information: Left pneumothorax. Exam is   compared to previous study of 2/26/2025 as well as chest radiograph of   3/7/2025.    CT scan of the chest was obtained without administration of intravenous   contrast.    No hilar or mediastinal adenopathy is noted.    Heart is normal in size. No pericardial effusion is noted.    No endobronchial lesions are noted. Endotracheal tube is noted in place.   Near-complete atelectasis of the left lower lobe is noted. Emphysematous   changes are present in both lungs. Small to moderate size left   pneumothorax is noted. Very small right pleural effusion is noted.    Below the diaphragm, visualized portions of the abdomen demonstrate   ascites. Vicarious excretion of contrast is noted within the gallbladder.   Low-attenuation lesions within both kidneys are incompletely assessed on   this exam. Nasogastric tube is noted with tip in the stomach.    Degenerative changes of the spine are noted.    IMPRESSION: Small to moderate size left pneumothorax.          EXAM:  CT ANGIO CHEST PUL ART [facility]     PROCEDURE DATE:  02/26/2025      INTERPRETATION:  CLINICAL INFORMATION: Hypoxia.    COMPARISON: None.    CONTRAST/COMPLICATIONS:  IV Contrast: Omnipaque 350 90 cc administered   10 cc discarded  Oral Contrast: NONE  .    PROCEDURE:  CT Angiography of the Chest.  Sagittal and coronal reformats were performed as well as 3D (MIP)   reconstructions.    FINDINGS:    LUNGS AND LARGE AIRWAYS: Patent central airways. Bronchial plugging lower   lobes bilaterally, left more than right. Severe pulmonary emphysema.   Consolidative opacities bilateral lower lobes and additional small focal   airspace opacities the right lower lobe. These may represent a   combination of pneumonia and atelectasis. Additional concerning for a   pulmonary infarct in the right lower lobe.  PLEURA: No pleural effusion.  VESSELS: Segmental and subsegmental pulmonary embolism right lower lobe.   Atherosclerotic calcification including the coronary arteries.  HEART: Heart size is normal. No pericardial effusion.  MEDIASTINUM AND AYAH: No lymphadenopathy.  CHEST WALL AND LOWER NECK: Within normal limits.  VISUALIZED UPPER ABDOMEN: Right renal cyst. Possible small left renal   cyst. Probable small cyst left hepatic lobe.  BONES: Severe anterior compression of T11 and T12 with retropulsion. This   may be of some duration. Mild superior endplate compression of T10.    IMPRESSION:  Segmental and subsegmental pulmonary embolism right lower lobe.  Bilateral lower lobe opacities that may represent a combination of   atelectasis and infiltrates. Additional possibility of a pulmonary   infarct in the right lower lobe.  Severe pulmonary emphysema.    EXAM:  US DPLX LWR EXT VEINS COMPL BI    PROCEDURE DATE:  03/03/2025      INTERPRETATION:  CLINICAL INFORMATION: Hypoxia, PE    COMPARISON: None available.    TECHNIQUE: Duplex sonography of the BILATERAL LOWER extremity veins with   color and spectral Doppler, with and without compression.    FINDINGS:    RIGHT:  There is questionable thrombus within the distal aspect of the deep   femoral vein  Normal compressibility of the RIGHT common femoral, femoral and popliteal   veins.  Doppler examination shows normal spontaneous and phasic flow.  There is gastrocnemius vein DVT. Other calf veins are patent and   compressible.    LEFT:  Normal compressibility of the LEFT common femoral, femoral and popliteal   veins.  Doppler examination shows normal spontaneous and phasic flow.  There is left soleal vein DVT. Other calf veins are patent.  Left popliteal fossa cyst measures 4.8 x 0.9 x 3.2 cm.    IMPRESSION:  Acute deep venous thrombosis: above and below the knee.    Questionable thrombus within the distal aspect of the right deep femoral   vein.    Bilateral calf vein DVT, as described above.            EXAM:  CT CHEST   ORDERED BY: PETER MOYA     PROCEDURE DATE:  03/08/2025          INTERPRETATION:  Clinical information: Left pneumothorax. Exam is   compared to previous study of 2/26/2025 as well as chest radiograph of   3/7/2025.    CT scan of the chest was obtained without administration of intravenous   contrast.    No hilar or mediastinal adenopathy is noted.    Heart is normal in size. No pericardial effusion is noted.    No endobronchial lesions are noted. Endotracheal tube is noted in place.   Near-complete atelectasis of the left lower lobe is noted. Emphysematous   changes are present in both lungs. Small to moderate size left   pneumothorax is noted. Very small right pleural effusion is noted.    Below the diaphragm, visualized portions of the abdomen demonstrate   ascites. Vicarious excretion of contrast is noted within the gallbladder.   Low-attenuation lesions within both kidneys are incompletely assessed on   this exam. Nasogastric tube is noted with tip in the stomach.    Degenerative changes of the spine are noted.    IMPRESSION: Small to moderate size left pneumothorax.    EXAM:  CT ABDOMEN AND PELVIS OC IC   PROCEDURE DATE:  03/05/2025      INTERPRETATION:  CLINICAL INFORMATION: Constipation, history of colon   cancer.    COMPARISON: Abdominal radiographs March 4, 2025, CT pulmonary angiogram   February 26, 2025.    CONTRAST/COMPLICATIONS:  IV Contrast: Omnipaque 350  70 cc administered   30 cc discarded  Oral Contrast: NONE  .    PROCEDURE:  CT of the Abdomen and Pelvis was performed.  Sagittal and coronal reformats were performed.    FINDINGS:  LOWER CHEST: There are residual patchy peribronchovascular opacities at   the visualized lung bases with mild bilateral lower lobe dependent   atelectasis.    LIVER: Scattered subcentimeter hepatic hypodensities, favored to   represent small cysts. Otherwise, within normal limits.  BILE DUCTS: Mild intrahepatic ductal dilatation. The common bile duct is   not dilated.  GALLBLADDER: Contracted.  SPLEEN: Within normal limits.  PANCREAS: Mildly atrophic and moderate fatty replacement.  ADRENALS: Within normal limits.  KIDNEYS/URETERS: The kidneys enhance symmetrically. Mild bilateral   perinephric fat stranding. Bilateral renal cysts, the largest of which   measures up to 4.3 cm in the posterior midpole of the right kidney. No   hydroureteronephrosis, mass, or stone.    BLADDER: Collapsed around Diaz catheter. Pelvic floor prolapse with a   cystocele.  REPRODUCTIVE ORGANS: Uterus and adnexa within normal limits.    BOWEL: Rectal temperature probe in place. Partially visualized enteric   tube seen coursing through the lower esophagus and terminating in the   collapsed stomach. Low anterior resection. Left colonic diverticulosis.   Contrast is seen within the proximal and mid small bowel. There is mild   fluid distention of the distal small bowel. There is progressive   dilatation of the air and stool-filled transverse and ascending colon   with maximal dilatation of the cecum measuring up to 7.5 cm. The rectum   and more distal colon are collapsed with a small amount of stool.  There   is no mesenteric adenopathy.  PERITONEUM/RETROPERITONEUM: Small amount of abdominal and pelvic free and   fluid.  VESSELS: Moderate atherosclerotic changes of the abdominal aorta   extending into the bilateralcommon iliac arteries. The mesenteric   vessels opacify unremarkably.  LYMPH NODES: No lymphadenopathy.  ABDOMINAL WALL: Within normal limits.  BONES: Age-indeterminate compression deformity of T12 and partially   visualized compression deformity of T11. Disc space narrowing and   spondylosis at L5-S1.    IMPRESSION:  1. Liquid stool and gas distention of the right colon with maximal   dilatation of the cecum measuring up to 7.5 cm, tapering to a collapsed   left colon. No evidence of abrupt transition to suggest bowel   obstruction. These findings are favored to represent ileus. Trace free   fluid in the peritoneal cavity. Recommend plain film follow-up.  2. Improved bibasilar consolidative opacities.       HPI:  69 year old female with h/o COPD - on home o2 (2-4 L), Ex smoker + Vaping, RA/Arthritis, Colon cancer s/p chemo with neuropathy, HTN, h/o PE who p/w increaisng sob and ams. of note pateint was just hospitalized in [facility] 2/3-10/2025 and 3/17-18/2025 for both pneumonia and COPD exacerbation.   patient was sent to  Rehab  Today noted to be having increasing sob and confusion   sent to ED where found to have Flu a, and acute on chronic Hypercarbic respiratory failure.          PAST MEDICAL & SURGICAL HISTORY:    Advanced COPD  On supplemental oxygen by nasal cannula  Arthritis  Cancer, colon- s/p Chemp/Sx  HTN (hypertension)      MEDICATIONS  (STANDING):  albuterol    0.083% 2.5 milliGRAM(s) Nebulizer every 6 hours  aluminum hydroxide/magnesium hydroxide/simethicone Suspension 30 milliLiter(s) Oral every 6 hours  ascorbic acid 1000 milliGRAM(s) Oral daily  bisacodyl Suppository 10 milliGRAM(s) Rectal every 24 hours  chlorhexidine 0.12% Liquid 15 milliLiter(s) Oral Mucosa every 12 hours  chlorhexidine 2% Cloths 1 Application(s) Topical <User Schedule>  dextrose 5%. 1000 milliLiter(s) (50 mL/Hr) IV Continuous <Continuous>  dextrose 5%. 1000 milliLiter(s) (100 mL/Hr) IV Continuous <Continuous>  dextrose 50% Injectable 25 Gram(s) IV Push once  dextrose 50% Injectable 12.5 Gram(s) IV Push once  dextrose 50% Injectable 25 Gram(s) IV Push once  doxazosin 2 milliGRAM(s) Oral at bedtime  insulin glargine Injectable (LANTUS) 10 Unit(s) SubCutaneous every morning  insulin lispro (ADMELOG) corrective regimen sliding scale   SubCutaneous every 6 hours  lactated ringers. 1000 milliLiter(s) (100 mL/Hr) IV Continuous <Continuous>  magnesium sulfate  IVPB 2 Gram(s) IV Intermittent once  midodrine 10 milliGRAM(s) Oral every 8 hours  multivitamin 1 Tablet(s) Oral daily  norepinephrine Infusion 0.05 MICROgram(s)/kG/Min (5.1 mL/Hr) IV Continuous <Continuous>  nystatin Powder 1 Application(s) Topical every 12 hours  pantoprazole   Suspension 40 milliGRAM(s) Oral every 12 hours  polyethylene glycol 3350 17 Gram(s) Oral daily  potassium phosphate IVPB 30 milliMole(s) IV Intermittent once  predniSONE   Tablet   Oral   predniSONE   Tablet 40 milliGRAM(s) Oral daily  propofol Infusion 10 MICROgram(s)/kG/Min (3.26 mL/Hr) IV Continuous <Continuous>  senna Syrup 15 milliLiter(s) Oral at bedtime  zinc sulfate 220 milliGRAM(s) Oral daily    MEDICATIONS  (PRN):  acetaminophen   Oral Liquid .. 650 milliGRAM(s) Enteral Tube every 6 hours PRN Temp greater or equal to 38C (100.4F), Mild Pain (1 - 3)  fentaNYL    Injectable 25 MICROGram(s) IV Push every 3 hours PRN agitaiton      Allergies    No Known Allergies    Intolerances      REVIEW OF SYSTEM:    Constitutional, Eyes, ENT, Cardiovascular, Respiratory, Gastrointestinal, Genitourinary, Musculoskeletal, Integumentary, Neurological, Psychiatric, Endocrine, Heme/Lymph and Allergic/Immunologic review of systems are otherwise negative except as noted in HPI.     Vital Signs Last 24 Hrs  T(C): 37.4 (10 Mar 2025 08:00), Max: 37.5 (09 Mar 2025 21:00)  T(F): 99.3 (10 Mar 2025 08:00), Max: 99.5 (09 Mar 2025 21:00)  HR: 110 (10 Mar 2025 08:07) (80 - 110)  BP: 107/64 (10 Mar 2025 08:00) (85/57 - 134/62)  BP(mean): 77 (10 Mar 2025 08:00) (67 - 87)  RR: 26 (10 Mar 2025 08:00) (16 - 28)  SpO2: 100% (10 Mar 2025 08:07) (96% - 100%)    Parameters below as of 10 Mar 2025 08:00  Patient On (Oxygen Delivery Method): ventilator    O2 Concentration (%): 50    PHYSICAL EXAM:    Constitutional: awake, frail, sick appearing  Eyes: no conjunctival infection, anicteric.   Pulmonary: Rhonchi anteriorly : On Ventilator support  Cardiac: On Monitor  Vascular: no calf tenderness, venous stasis changes, varices  Abdomen: normoactive bowel sounds, soft and nontender, Orogastric feeding  Musculoskeletal: full range of motion and no deformities appreciated  Skin: normal appearance, no rash/erythema  Neurology: awake, poorly assessed      LABS:  CBC Full  -  ( 10 Mar 2025 05:09 )  WBC Count : 8.57 K/uL  RBC Count : 3.05 M/uL  Hemoglobin : 8.4 g/dL  Hematocrit : 26.5 %  Platelet Count - Automated : 106 K/uL  Mean Cell Volume : 86.9 fl  Mean Cell Hemoglobin : 27.5 pg  Mean Cell Hemoglobin Concentration : 31.7 g/dL  Auto Neutrophil # : x  Auto Lymphocyte # : x  Auto Monocyte # : x  Auto Eosinophil # : x  Auto Basophil # : x  Auto Neutrophil % : x  Auto Lymphocyte % : x  Auto Monocyte % : x  Auto Eosinophil % : x  Auto Basophil % : x    03-10    142  |  108  |  19  ----------------------------<  107[H]  3.5   |  32[H]  |  0.36[L]    Ca    8.7      10 Mar 2025 05:09  Phos  1.2     03-10  Mg     1.9     03-10    TPro  4.3[L]  /  Alb  2.0[L]  /  TBili  0.5  /  DBili  x   /  AST  22  /  ALT  30  /  AlkPhos  62  03-10      Urinalysis Basic - ( 10 Mar 2025 05:09 )    Color: x / Appearance: x / SG: x / pH: x  Gluc: 107 mg/dL / Ketone: x  / Bili: x / Urobili: x   Blood: x / Protein: x / Nitrite: x   Leuk Esterase: x / RBC: x / WBC x   Sq Epi: x / Non Sq Epi: x / Bacteria: x      RADIOLOGY & ADDITIONAL STUDIES:    EXAM:  CT CHEST      PROCEDURE DATE:  03/08/2025      INTERPRETATION:  Clinical information: Left pneumothorax. Exam is   compared to previous study of 2/26/2025 as well as chest radiograph of   3/7/2025.    CT scan of the chest was obtained without administration of intravenous   contrast.    No hilar or mediastinal adenopathy is noted.    Heart is normal in size. No pericardial effusion is noted.    No endobronchial lesions are noted. Endotracheal tube is noted in place.   Near-complete atelectasis of the left lower lobe is noted. Emphysematous   changes are present in both lungs. Small to moderate size left   pneumothorax is noted. Very small right pleural effusion is noted.    Below the diaphragm, visualized portions of the abdomen demonstrate   ascites. Vicarious excretion of contrast is noted within the gallbladder.   Low-attenuation lesions within both kidneys are incompletely assessed on   this exam. Nasogastric tube is noted with tip in the stomach.    Degenerative changes of the spine are noted.    IMPRESSION: Small to moderate size left pneumothorax.          EXAM:  CT ANGIO CHEST PULM ART [facility]     PROCEDURE DATE:  02/26/2025      INTERPRETATION:  CLINICAL INFORMATION: Hypoxia.    COMPARISON: None.    CONTRAST/COMPLICATIONS:  IV Contrast: Omnipaque 350 90 cc administered   10 cc discarded  Oral Contrast: NONE  .    PROCEDURE:  CT Angiography of the Chest.  Sagittal and coronal reformats were performed as well as 3D (MIP)   reconstructions.    FINDINGS:    LUNGS AND LARGE AIRWAYS: Patent central airways. Bronchial plugging lower   lobes bilaterally, left more than right. Severe pulmonary emphysema.   Consolidative opacities bilateral lower lobes and additional small focal   airspace opacities the right lower lobe. These may represent a   combination of pneumonia and atelectasis. Additional concerning for a   pulmonary infarct in the right lower lobe.  PLEURA: No pleural effusion.  VESSELS: Segmental and subsegmental pulmonary embolism right lower lobe.   Atherosclerotic calcification including the coronary arteries.  HEART: Heart size is normal. No pericardial effusion.  MEDIASTINUM AND AYAH: No lymphadenopathy.  CHEST WALL AND LOWER NECK: Within normal limits.  VISUALIZED UPPER ABDOMEN: Right renal cyst. Possible small left renal   cyst. Probable small cyst left hepatic lobe.  BONES: Severe anterior compression of T11 and T12 with retropulsion. This   may be of some duration. Mild superior endplate compression of T10.    IMPRESSION:  Segmental and subsegmental pulmonary embolism right lower lobe.  Bilateral lower lobe opacities that may represent a combination of   atelectasis and infiltrates. Additional possibility of a pulmonary   infarct in the right lower lobe.  Severe pulmonary emphysema.    EXAM:  US DPLX LWR EXT VEINS COMPL BI    PROCEDURE DATE:  03/03/2025      INTERPRETATION:  CLINICAL INFORMATION: Hypoxia, PE    COMPARISON: None available.    TECHNIQUE: Duplex sonography of the BILATERAL LOWER extremity veins with   color and spectral Doppler, with and without compression.    FINDINGS:    RIGHT:  There is questionable thrombus within the distal aspect of the deep   femoral vein  Normal compressibility of the RIGHT common femoral, femoral and popliteal   veins.  Doppler examination shows normal spontaneous and phasic flow.  There is gastrocnemius vein DVT. Other calf veins are patent and   compressible.    LEFT:  Normal compressibility of the LEFT common femoral, femoral and popliteal   veins.  Doppler examination shows normal spontaneous and phasic flow.  There is left soleal vein DVT. Other calf veins are patent.  Left popliteal fossa cyst measures 4.8 x 0.9 x 3.2 cm.    IMPRESSION:  Acute deep venous thrombosis: above and below the knee.    Questionable thrombus within the distal aspect of the right deep femoral   vein.    Bilateral calf vein DVT, as described above.            EXAM:  CT CHEST   ORDERED BY: PETER MOYA     PROCEDURE DATE:  03/08/2025          INTERPRETATION:  Clinical information: Left pneumothorax. Exam is   compared to previous study of 2/26/2025 as well as chest radiograph of   3/7/2025.    CT scan of the chest was obtained without administration of intravenous   contrast.    No hilar or mediastinal adenopathy is noted.    Heart is normal in size. No pericardial effusion is noted.    No endobronchial lesions are noted. Endotracheal tube is noted in place.   Near-complete atelectasis of the left lower lobe is noted. Emphysematous   changes are present in both lungs. Small to moderate size left   pneumothorax is noted. Very small right pleural effusion is noted.    Below the diaphragm, visualized portions of the abdomen demonstrate   ascites. Vicarious excretion of contrast is noted within the gallbladder.   Low-attenuation lesions within both kidneys are incompletely assessed on   this exam. Nasogastric tube is noted with tip in the stomach.    Degenerative changes of the spine are noted.    IMPRESSION: Small to moderate size left pneumothorax.    EXAM:  CT ABDOMEN AND PELVIS OC IC   PROCEDURE DATE:  03/05/2025      INTERPRETATION:  CLINICAL INFORMATION: Constipation, history of colon   cancer.    COMPARISON: Abdominal radiographs March 4, 2025, CT pulmonary angiogram   February 26, 2025.    CONTRAST/COMPLICATIONS:  IV Contrast: Omnipaque 350  70 cc administered   30 cc discarded  Oral Contrast: NONE  .    PROCEDURE:  CT of the Abdomen and Pelvis was performed.  Sagittal and coronal reformats were performed.    FINDINGS:  LOWER CHEST: There are residual patchy peribronchovascular opacities at   the visualized lung bases with mild bilateral lower lobe dependent   atelectasis.    LIVER: Scattered subcentimeter hepatic hypodensities, favored to   represent small cysts. Otherwise, within normal limits.  BILE DUCTS: Mild intrahepatic ductal dilatation. The common bile duct is   not dilated.  GALLBLADDER: Contracted.  SPLEEN: Within normal limits.  PANCREAS: Mildly atrophic and moderate fatty replacement.  ADRENALS: Within normal limits.  KIDNEYS/URETERS: The kidneys enhance symmetrically. Mild bilateral   perinephric fat stranding. Bilateral renal cysts, the largest of which   measures up to 4.3 cm in the posterior midpole of the right kidney. No   hydroureteronephrosis, mass, or stone.    BLADDER: Collapsed around Diaz catheter. Pelvic floor prolapse with a   cystocele.  REPRODUCTIVE ORGANS: Uterus and adnexa within normal limits.    BOWEL: Rectal temperature probe in place. Partially visualized enteric   tube seen coursing through the lower esophagus and terminating in the   collapsed stomach. Low anterior resection. Left colonic diverticulosis.   Contrast is seen within the proximal and mid small bowel. There is mild   fluid distention of the distal small bowel. There is progressive   dilatation of the air and stool-filled transverse and ascending colon   with maximal dilatation of the cecum measuring up to 7.5 cm. The rectum   and more distal colon are collapsed with a small amount of stool.  There   is no mesenteric adenopathy.  PERITONEUM/RETROPERITONEUM: Small amount of abdominal and pelvic free and   fluid.  VESSELS: Moderate atherosclerotic changes of the abdominal aorta   extending into the bilateralcommon iliac arteries. The mesenteric   vessels opacify unremarkably.  LYMPH NODES: No lymphadenopathy.  ABDOMINAL WALL: Within normal limits.  BONES: Age-indeterminate compression deformity of T12 and partially   visualized compression deformity of T11. Disc space narrowing and   spondylosis at L5-S1.    IMPRESSION:  1. Liquid stool and gas distention of the right colon with maximal   dilatation of the cecum measuring up to 7.5 cm, tapering to a collapsed   left colon. No evidence of abrupt transition to suggest bowel   obstruction. These findings are favored to represent ileus. Trace free   fluid in the peritoneal cavity. Recommend plain film follow-up.  2. Improved bibasilar consolidative opacities.       HPI:  69 year old female with h/o COPD - on home o2 (2-4 L), Ex smoker + Vaping, RA/Arthritis, Colon cancer s/p chemo with neuropathy, HTN, h/o PE who p/w increasing sob and ams. Of note patient was just hospitalized in [facility] 2/3-10/2025 and 3/17-18/2025 for both pneumonia and COPD exacerbation.   Patient was sent to  Rehab  Sent to ED where found to have Flu a, and acute on chronic Hypercarbic respiratory failure.   Oncology consulted for h/o colon cancer.         PAST MEDICAL & SURGICAL HISTORY:    Advanced COPD  On supplemental oxygen by nasal cannula  Arthritis  Cancer, colon- s/p Chemp/Sx  HTN (hypertension)      MEDICATIONS  (STANDING):  albuterol    0.083% 2.5 milliGRAM(s) Nebulizer every 6 hours  aluminum hydroxide/magnesium hydroxide/simethicone Suspension 30 milliLiter(s) Oral every 6 hours  ascorbic acid 1000 milliGRAM(s) Oral daily  bisacodyl Suppository 10 milliGRAM(s) Rectal every 24 hours  chlorhexidine 0.12% Liquid 15 milliLiter(s) Oral Mucosa every 12 hours  chlorhexidine 2% Cloths 1 Application(s) Topical <User Schedule>  dextrose 5%. 1000 milliLiter(s) (50 mL/Hr) IV Continuous <Continuous>  dextrose 5%. 1000 milliLiter(s) (100 mL/Hr) IV Continuous <Continuous>  dextrose 50% Injectable 25 Gram(s) IV Push once  dextrose 50% Injectable 12.5 Gram(s) IV Push once  dextrose 50% Injectable 25 Gram(s) IV Push once  doxazosin 2 milliGRAM(s) Oral at bedtime  insulin glargine Injectable (LANTUS) 10 Unit(s) SubCutaneous every morning  insulin lispro (ADMELOG) corrective regimen sliding scale   SubCutaneous every 6 hours  lactated ringers. 1000 milliLiter(s) (100 mL/Hr) IV Continuous <Continuous>  magnesium sulfate  IVPB 2 Gram(s) IV Intermittent once  midodrine 10 milliGRAM(s) Oral every 8 hours  multivitamin 1 Tablet(s) Oral daily  norepinephrine Infusion 0.05 MICROgram(s)/kG/Min (5.1 mL/Hr) IV Continuous <Continuous>  nystatin Powder 1 Application(s) Topical every 12 hours  pantoprazole   Suspension 40 milliGRAM(s) Oral every 12 hours  polyethylene glycol 3350 17 Gram(s) Oral daily  potassium phosphate IVPB 30 milliMole(s) IV Intermittent once  predniSONE   Tablet   Oral   predniSONE   Tablet 40 milliGRAM(s) Oral daily  propofol Infusion 10 MICROgram(s)/kG/Min (3.26 mL/Hr) IV Continuous <Continuous>  senna Syrup 15 milliLiter(s) Oral at bedtime  zinc sulfate 220 milliGRAM(s) Oral daily    MEDICATIONS  (PRN):  acetaminophen   Oral Liquid .. 650 milliGRAM(s) Enteral Tube every 6 hours PRN Temp greater or equal to 38C (100.4F), Mild Pain (1 - 3)  fentaNYL    Injectable 25 MICROGram(s) IV Push every 3 hours PRN agitaiton      Allergies    No Known Allergies    REVIEW OF SYSTEM:    Patient on vent. Shook head no when asked if any pain.    Vital Signs Last 24 Hrs  T(C): 37.4 (10 Mar 2025 08:00), Max: 37.5 (09 Mar 2025 21:00)  T(F): 99.3 (10 Mar 2025 08:00), Max: 99.5 (09 Mar 2025 21:00)  HR: 110 (10 Mar 2025 08:07) (80 - 110)  BP: 107/64 (10 Mar 2025 08:00) (85/57 - 134/62)  BP(mean): 77 (10 Mar 2025 08:00) (67 - 87)  RR: 26 (10 Mar 2025 08:00) (16 - 28)  SpO2: 100% (10 Mar 2025 08:07) (96% - 100%)    PHYSICAL EXAM:    Constitutional: awake, frail appearing on vent  Eyes: no conjunctival infection, anicteric.   Pulmonary: Rhonchi anteriorly : On Ventilator support  Cardiac: RRR  Vascular: no calf tenderness elicited  Abdomen: normoactive bowel sounds, soft and nontender, no palpable masses  Skin: no rash on visible skin ant.  Neurology: awake to verbal stimuli      LABS:  CBC Full  -  ( 10 Mar 2025 05:09 )  WBC Count : 8.57 K/uL  RBC Count : 3.05 M/uL  Hemoglobin : 8.4 g/dL  Hematocrit : 26.5 %  Platelet Count - Automated : 106 K/uL  Mean Cell Volume : 86.9 fl  Mean Cell Hemoglobin : 27.5 pg  Mean Cell Hemoglobin Concentration : 31.7 g/dL  Auto Neutrophil # : x  Auto Lymphocyte # : x  Auto Monocyte # : x  Auto Eosinophil # : x  Auto Basophil # : x  Auto Neutrophil % : x  Auto Lymphocyte % : x  Auto Monocyte % : x  Auto Eosinophil % : x  Auto Basophil % : x    03-10    142  |  108  |  19  ----------------------------<  107[H]  3.5   |  32[H]  |  0.36[L]    Ca    8.7      10 Mar 2025 05:09  Phos  1.2     03-10  Mg     1.9     03-10    TPro  4.3[L]  /  Alb  2.0[L]  /  TBili  0.5  /  DBili  x   /  AST  22  /  ALT  30  /  AlkPhos  62  03-10      RADIOLOGY & ADDITIONAL STUDIES:    EXAM:  CT CHEST      PROCEDURE DATE:  03/08/2025      INTERPRETATION:  Clinical information: Left pneumothorax. Exam is   compared to previous study of 2/26/2025 as well as chest radiograph of   3/7/2025.    CT scan of the chest was obtained without administration of intravenous   contrast.    No hilar or mediastinal adenopathy is noted.    Heart is normal in size. No pericardial effusion is noted.    No endobronchial lesions are noted. Endotracheal tube is noted in place.   Near-complete atelectasis of the left lower lobe is noted. Emphysematous   changes are present in both lungs. Small to moderate size left   pneumothorax is noted. Very small right pleural effusion is noted.    Below the diaphragm, visualized portions of the abdomen demonstrate   ascites. Vicarious excretion of contrast is noted within the gallbladder.   Low-attenuation lesions within both kidneys are incompletely assessed on   this exam. Nasogastric tube is noted with tip in the stomach.    Degenerative changes of the spine are noted.    IMPRESSION: Small to moderate size left pneumothorax.

## 2025-03-10 NOTE — PROVIDER CONTACT NOTE (CHANGE IN STATUS NOTIFICATION) - ACTION/TREATMENT ORDERED:
held lantus in am. IVF d/c'd , intervention of adding free water via OGT 250ml/hr. increase feeding rate goal of 50ml/hr.
CXR repeated. Pt hemodynamically stable.

## 2025-03-10 NOTE — CHART NOTE - NSCHARTNOTEFT_GEN_A_CORE
Nutrition Follow Up Note  Hospital Course (Per Electronic Medical Record):   Source: Medical Record [X]  Nursing Staff [X]     Diet: Vital 1.5 @ 40ml/hr x 24 hrs via OGT     Patient remains intubated, CPAP trial ongoing , Sedated on Precedex/ Propofol @ 6ml/hr (158non protein kcal) RN reports no sing of intolerance with EN feeds @ 40ml/hr x 24 hrs noted . IV fluids to be  discontinued & will add free water to EN , patient on steroid taper . Labs / ,POCT reviewed ,  insulin to be held this morning . EN feeds @ 24hr regimen however will modify to 18 hrs due to CPAP trials . Suggest change EN to Vital 1.5 @ 50ml/hr x 18 hrs with 1410kcals, 75gms protein, [id number] with additional 250ml q 6hrs . Dx of severe malnutrition  noted, EN feeds may be indicated long tern due to patient 's hx of poor nutrition intake PTA .      Current Weight: (3/8) 117.5/53.3kg                         (3/7) 107.3/48.7kg                          (3/6) 104/47.2kg     Pertinent Medications: MEDICATIONS  (STANDING):  albuterol    0.083% 2.5 milliGRAM(s) Nebulizer every 6 hours  aluminum hydroxide/magnesium hydroxide/simethicone Suspension 30 milliLiter(s) Oral every 6 hours  ascorbic acid 1000 milliGRAM(s) Oral daily  bisacodyl Suppository 10 milliGRAM(s) Rectal every 24 hours  chlorhexidine 0.12% Liquid 15 milliLiter(s) Oral Mucosa every 12 hours  chlorhexidine 2% Cloths 1 Application(s) Topical <User Schedule>  dextrose 5%. 1000 milliLiter(s) (50 mL/Hr) IV Continuous <Continuous>  dextrose 5%. 1000 milliLiter(s) (100 mL/Hr) IV Continuous <Continuous>  dextrose 50% Injectable 25 Gram(s) IV Push once  dextrose 50% Injectable 12.5 Gram(s) IV Push once  dextrose 50% Injectable 25 Gram(s) IV Push once  doxazosin 2 milliGRAM(s) Oral at bedtime  insulin glargine Injectable (LANTUS) 10 Unit(s) SubCutaneous every morning  insulin lispro (ADMELOG) corrective regimen sliding scale   SubCutaneous every 6 hours  lactated ringers. 1000 milliLiter(s) (100 mL/Hr) IV Continuous <Continuous>  magnesium sulfate  IVPB 2 Gram(s) IV Intermittent once  midodrine 10 milliGRAM(s) Oral every 8 hours  multivitamin 1 Tablet(s) Oral daily  norepinephrine Infusion 0.05 MICROgram(s)/kG/Min (5.1 mL/Hr) IV Continuous <Continuous>  nystatin Powder 1 Application(s) Topical every 12 hours  pantoprazole   Suspension 40 milliGRAM(s) Oral every 12 hours  polyethylene glycol 3350 17 Gram(s) Oral daily  potassium phosphate IVPB 30 milliMole(s) IV Intermittent once  predniSONE   Tablet   Oral   predniSONE   Tablet 40 milliGRAM(s) Oral daily  propofol Infusion 10 MICROgram(s)/kG/Min (3.26 mL/Hr) IV Continuous <Continuous>  senna Syrup 15 milliLiter(s) Oral at bedtime  zinc sulfate 220 milliGRAM(s) Oral daily    MEDICATIONS  (PRN):  acetaminophen   Oral Liquid .. 650 milliGRAM(s) Enteral Tube every 6 hours PRN Temp greater or equal to 38C (100.4F), Mild Pain (1 - 3)  fentaNYL    Injectable 25 MICROGram(s) IV Push every 3 hours PRN agitaiton      Pertinent Labs:  03-10 Na142 mmol/L Glu 107 mg/dL[H] K+ 3.5 mmol/L Cr  0.36 mg/dL[L] BUN 19 mg/dL 03-10 Phos 1.2 mg/dL[L] 03-10 Alb 2.0 g/dL[L]  Hgb8.4g/dl<L>, Hct26.5% <L> Mg 1.9mg/dl,   POCT 123,109,146,200      Skin: ecchymotic , MAD/IAD , stage I - MVI , Vit C & Zinc sulfate provided to aid with wound healing ,     Edema: (+2) generalized     Last BM: (3/9)     Estimated Needs:   [X] No Change since Previous Assessment      Previous Nutrition Diagnosis: Severe protein calorie malnutrition     Nutrition Diagnosis is [X] Ongoing, addressed with EN        New Nutrition Diagnosis: [X] Not Applicable      Interventions:   1. Recommend change eN feeds to [place] l1.5 @ 50ml/hr x 18 hrs, 30ml Prosource QD with 250ml free water flush q 6 hrs        Monitoring & Evaluation: will monitor:  [X] Weights   [X] tolerance to EN feeds   [X] Follow Up (Per Protocol)  [X] Tolerance to Diet Prescription       RD to follow as per Nutrition protocol  Joan Patel RDN

## 2025-03-10 NOTE — PHARMACOTHERAPY INTERVENTION NOTE - NSPHARMCOMMASP
ASP - Therapy recommended/ Alternative therapy
ASP - Therapy recommended/ Alternative therapy
ASP - Dose optimization/Non-Renal dose adjustment

## 2025-03-10 NOTE — PROGRESS NOTE ADULT - ASSESSMENT
70 y/o female with MH of COPD - on home O2 (2-4 L), Ex smoker + Vaping, RA/Arthritis, Colon cancer s/p chemo with neuropathy, HTN, PE who p/w increasing SOB and AMS. Multiple recent inpatient hospitalizations also for PNA & COPD exacerbation, currently admitted for ARF, intubated/ventilated.  Heme/Onc consulted due to Hx of colon CA.      Problem/Recommendation  Problem: Metastatic Adenocarcinoma of Colon  - Dx 'd 2/2024- s/p Low anterior sigmoid resection with pelvic anastomosis and mobilization of the splenic flexure, followed by Chemo with Dr. Olmedo  - Currently admitted with respiratory failure, mechanically ventilated at present.  Patient's performance status steadily declining, unlikely to be weaned off ventilator.    - No plans for systemic treatment.   - Per family; Dr. Olmedo encouraged family to consider comfort/hospice care.    - Palliative care recommendations reasonable.      Problem/Recommendation  Problem: Anemia/Thrombocytopenia  - Likely due to ongoing acute events; underlying malignancy; antibiotic use; nutritional deficiencies  - Positive Guaiac -s/o EGD -3/7/25--> Normal mucosa in the terminal ileum. Mild diverticulosis of the descending colon and sigmoid colon.  - Monitor CBC  - Transfusional supportive measures as clinically indicated    Problem/Recommendation  Problem: Acute Hypoxic and hypercarbic respiratory failure in setting of PNA/ Left Rib fx    - Intubated/Ventilated   - Brief cardiac arrest 3/8/2025 s/p 1 round,   - Scan c/w Left pneumothorax-->  s/p pigtail chest tube on Left 3/8/2025  - CTA - PE, with Segmental and Subsegmental pulmonary embolism right lower lobe & and b/l Calf DVT on Duplex.   - Currently off AC               68 y/o female with MH of COPD - on home O2 (2-4 L), Ex smoker + Vaping, RA/Arthritis, Colon cancer s/p chemo with neuropathy, HTN, PE who p/w increasing SOB and AMS. Multiple recent inpatient hospitalizations also for PNA & COPD exacerbation, currently admitted for ARF, intubated/ventilated.  Heme/Onc consulted due to Hx of colon CA.      History of colon cancer  - Dx 'd 2/2024- s/p Low anterior sigmoid resection with pelvic anastomosis and mobilization of the splenic flexure, followed by Chemo with Dr. Olmedo  - Currently admitted with respiratory failure, mechanically ventilated at present.  Patient's performance status steadily declining.    - No plans for systemic treatment at this time.  - Palliative care reasonable in light of co-morbidities.      Problem/Recommendation  Problem: Anemia/Thrombocytopenia  - Positive Guaiac - EGD/colonoscopy -3/7/25--> Normal mucosa in the terminal ileum. Mild diverticulosis of the descending colon and sigmoid colon.  - Monitor CBC  - Transfusional support as needed-PRBC for hemoglobin <7/related symptoms    VTE    - Intubated/Ventilated   - CTA - PE, with Segmental and Subsegmental pulmonary embolism right lower lobe & and b/l Calf DVT on Duplex.   - Currently off AC

## 2025-03-10 NOTE — PROVIDER CONTACT NOTE (CHANGE IN STATUS NOTIFICATION) - RECOMMENDATIONS
Chest compressions initiated at 1101. 1 of epi administered per ICU PA. ROSC achieved at 1103.
held morning lantus dose.

## 2025-03-10 NOTE — PROVIDER CONTACT NOTE (CHANGE IN STATUS NOTIFICATION) - DATE AND TIME:
Home to rest.  Drink plenty of fluids.  Tylenol/ibuprofen as directed as needed.  Take your Zofran as prescribed if needed.  Take the antibiotic as prescribed, do not have any leftover.  See your primary care provider in the office in 3 days.  Return to the emergency department right away if symptoms worsen or any problems.  
10-Mar-2025 08:45
08-Mar-2025 11:00
Dutasteride Counseling: Dustasteride Counseling:  I discussed with the patient the risks of use of dutasteride including but not limited to decreased libido, decreased ejaculate volume, and gynecomastia. Women who can become pregnant should not handle medication.  All of the patient's questions and concerns were addressed.
Dutasteride Male Counseling: Dustasteride Counseling:  I discussed with the patient the risks of use of dutasteride including but not limited to decreased libido, decreased ejaculate volume, and gynecomastia. Women who can become pregnant should not handle medication.  All of the patient's questions and concerns were addressed.

## 2025-03-10 NOTE — PROGRESS NOTE ADULT - ASSESSMENT
69 year old female with history of HTN, COPD on home O2(2-4 liter), RA, colon CA s/p CTx, who was admitted on 2/26/25 for acute respiratory failure secondary to flu/PNA. Palliative medicine team was consulted to assist with GOC.    1. Patient is intubated and sedated. Symptom management as per the ICU team.    2. Advanced directives   - surrogate:    - code status: full code     3. Palliative medicine encounter  - will f/u to further discuss regarding Trach/PEG.

## 2025-03-10 NOTE — PROGRESS NOTE ADULT - NS ATTEND AMEND GEN_ALL_CORE FT
I have personally seen and examined this patient. I fully participated in the care of this patient. I have made amendments to the documentation where necessary and agree with the history, physical exam and plan as documented by PAMELA Tao.

## 2025-03-10 NOTE — PROGRESS NOTE ADULT - ATTENDING COMMENTS
69 year old female with a PMH of COPD - on home o2 (2-4 L), Ex smoker + Vaping, RA/Arthritis, Colon cancer s/p chemo with neuropathy, HTN, and h/o PE who p/w increasing sob and ams.      Assessment  1. Acute Hypoxic and hypercarbic respiratory failure   2. S/p Brief cardiac arrest 3/8/2025 s/p 1 round,   3. Due to Acute flu A and Left lower lobe Pna and Left rib fx  4. Left pntx s/p pigtail chest tube on Left 3/8/2025  5. Chronic Advanced COPD with supplemental oxygen by nasal cannula  6. RA  7. h/o colon cancer  8. h/o PE  off a/c now with Segmental and subsegmental pulmonary embolism right lower lobe, and b/l Calf dvt on Duplex.  9. Anemia    Plan:    NEURO:   - Continue with propofol drip wean as tolerates  -Monitor mental status closely, avoid neurosuppresants.     CV:   -Maintain goal MAP >65. Hypotensive requiring IV vasopressor therapy to maintain goal MAP. Monitor end points of organ perfusion.   -Keep K~4 and Mg>2 for optimal arrhythmia suppression.    PULM:   -Patient currently on Full vent support  -titrate settings to maintain SaO2 >90%, or pH >7.25  -tolerated cpap 10, not 5, appears to frail to wean  - Failed SBT   - continue Chest tube to suction -20  -Peridex oral care  -aggressive pulmonary toilet  -daily sedation vacation with spontaneous breathing trial if clinical condition warrants, discuss with respiratory therapy     GI:  -TF.    -S/p EGD/Colon no active bleeding noted  -GI PPX with PPI       RENAL:  -Renal function currently WNL.  -trend lytes/Scr daily with BMP  -I's and O's, goal UOP 0.5 cc/kg/hr  -renal dose meds and avoid nephrotoxins   -Gentle mIVF with  cc/h.     ENDO:   -Aggressive glycemic control to limit FS glucose to <180mg/dl. ISS, Lantus.      ID:  - Cx NGTD thus far.       HEME:   -hemeonc recs appreciated   -VTE ppx with SCDs.   -h/h variable, unsure if safe to start full a/c for PE, currently on dvt ppx lovenox  re evaluate in am to increase to full dose    GOC: Full Code.    spoke with sister Linda at bedside regarding trach and PEG

## 2025-03-10 NOTE — PROGRESS NOTE ADULT - SUBJECTIVE AND OBJECTIVE BOX
SUBJECTIVE AND OBJECTIVE:  Indication for Geriatrics and Palliative Care Services/INTERVAL HPI:    OVERNIGHT EVENTS:    Allergies    No Known Allergies    Intolerances    MEDICATIONS  (STANDING):  albuterol    0.083% 2.5 milliGRAM(s) Nebulizer every 6 hours  aluminum hydroxide/magnesium hydroxide/simethicone Suspension 30 milliLiter(s) Oral every 6 hours  ascorbic acid 1000 milliGRAM(s) Oral daily  bisacodyl Suppository 10 milliGRAM(s) Rectal every 24 hours  chlorhexidine 0.12% Liquid 15 milliLiter(s) Oral Mucosa every 12 hours  chlorhexidine 2% Cloths 1 Application(s) Topical <User Schedule>  dextrose 5%. 1000 milliLiter(s) (50 mL/Hr) IV Continuous <Continuous>  dextrose 5%. 1000 milliLiter(s) (100 mL/Hr) IV Continuous <Continuous>  dextrose 50% Injectable 25 Gram(s) IV Push once  dextrose 50% Injectable 12.5 Gram(s) IV Push once  dextrose 50% Injectable 25 Gram(s) IV Push once  doxazosin 2 milliGRAM(s) Oral at bedtime  insulin glargine Injectable (LANTUS) 10 Unit(s) SubCutaneous every morning  insulin lispro (ADMELOG) corrective regimen sliding scale   SubCutaneous every 6 hours  lactated ringers. 1000 milliLiter(s) (100 mL/Hr) IV Continuous <Continuous>  midodrine 10 milliGRAM(s) Oral every 8 hours  multivitamin 1 Tablet(s) Oral daily  norepinephrine Infusion 0.05 MICROgram(s)/kG/Min (5.1 mL/Hr) IV Continuous <Continuous>  nystatin Powder 1 Application(s) Topical every 12 hours  pantoprazole   Suspension 40 milliGRAM(s) Oral every 12 hours  polyethylene glycol 3350 17 Gram(s) Oral daily  predniSONE   Tablet   Oral   predniSONE   Tablet 40 milliGRAM(s) Oral daily  propofol Infusion 10 MICROgram(s)/kG/Min (3.26 mL/Hr) IV Continuous <Continuous>  senna Syrup 15 milliLiter(s) Oral at bedtime  zinc sulfate 220 milliGRAM(s) Oral daily    MEDICATIONS  (PRN):  acetaminophen   Oral Liquid .. 650 milliGRAM(s) Enteral Tube every 6 hours PRN Temp greater or equal to 38C (100.4F), Mild Pain (1 - 3)  fentaNYL    Injectable 25 MICROGram(s) IV Push every 3 hours PRN agitaiton      ITEMS UNCHECKED ARE NOT PRESENT    PRESENT SYMPTOMS: [ ]Unable to self-report   Source if other than patient:  [ ]Family   [ ]Team     Pain:  [ ]yes [ ]no  QOL impact -   Location -                    Aggravating factors -  Quality -  Radiation -  Timing-  Severity (0-10 scale):  Minimal acceptable level (0-10 scale):     Dyspnea:                           [ ]Mild [ ]Moderate [ ]Severe  Anxiety:                             [ ]Mild [ ]Moderate [ ]Severe  Fatigue:                             [ ]Mild [ ]Moderate [ ]Severe  Nausea:                             [ ]Mild [ ]Moderate [ ]Severe  Loss of appetite:              [ ]Mild [ ]Moderate [ ]Severe  Constipation:                    [ ]Mild [ ]Moderate [ ]Severe      Other Symptoms:  [ ]All other review of systems negative     Chaplaincy Referral: [ ] yes [ ] refused [ ] following [ ] Deferred   Palliative Performance Status Version 2:         %      http://npcrc.org/files/news/palliative_performance_scale_ppsv2.pdf    PHYSICAL EXAM:  Vital Signs Last 24 Hrs  T(C): 37.6 (10 Mar 2025 14:00), Max: 37.6 (10 Mar 2025 14:00)  T(F): 99.7 (10 Mar 2025 14:00), Max: 99.7 (10 Mar 2025 14:00)  HR: 100 (10 Mar 2025 14:00) (80 - 110)  BP: 107/60 (10 Mar 2025 14:00) (85/57 - 131/60)  BP(mean): 71 (10 Mar 2025 14:00) (67 - 87)  RR: 26 (10 Mar 2025 14:00) (16 - 28)  SpO2: 100% (10 Mar 2025 14:00) (97% - 100%)    Parameters below as of 10 Mar 2025 14:00  Patient On (Oxygen Delivery Method): ventilator    O2 Concentration (%): 40 I&O's Summary    09 Mar 2025 07:01  -  10 Mar 2025 07:00  --------------------------------------------------------  IN: 3615 mL / OUT: 1660 mL / NET: 1955 mL    10 Mar 2025 07:01  -  10 Mar 2025 14:52  --------------------------------------------------------  IN: 1075 mL / OUT: 0 mL / NET: 1075 mL       GENERAL: [ ]Cachexia    [ ]Alert  [ ]Oriented x   [ ]Lethargic  [ ]Unarousable  [ ]Verbal  [ ]Non-Verbal  Behavioral:   [ ]Anxiety  [ ]Delirium [ ]Agitation [ ]Other  HEENT:  [ ]Normal   [ ]Dry mouth   [ ]ET Tube/Trach  [ ]Oral lesions  PULMONARY:   [ ]Clear [ ]Tachypnea  [ ]Audible excessive secretions   [ ]Rhonchi        [ ]Right [ ]Left [ ]Bilateral  [ ]Crackles        [ ]Right [ ]Left [ ]Bilateral  [ ]Wheezing     [ ]Right [ ]Left [ ]Bilateral  [ ]Diminished BS [ ] Right [ ]Left [ ]Bilateral  CARDIOVASCULAR:    [ ]Regular [ ]Irregular [ ]Tachy  [ ]Isrrael [ ]Murmur [ ]Other  GASTROINTESTINAL:  [ ]Soft  [ ]Distended   [ ]+BS  [ ]Non tender [ ]Tender  [ ]Other [ ]PEG [ ]OGT/ NGT   Last BM:   GENITOURINARY:  [ ]Normal [ ]Incontinent   [ ]Oliguria/Anuria   [ ]Diaz  MUSCULOSKELETAL:   [ ]Normal   [ ]Weakness  [ ]Bed/Wheelchair bound [ ]Edema  NEUROLOGIC:   [ ]No focal deficits  [ ] Cognitive impairment  [ ] Dysphagia [ ]Dysarthria [ ] Paresis [ ]Other   SKIN:   [ ]Normal  [ ]Rash  [ ]Other  [ ]Pressure ulcer(s) [ ]y [ ]n present on admission    CRITICAL CARE:  [ ]Shock Present  [ ]Septic [ ]Cardiogenic [ ]Neurologic [ ]Hypovolemic  [ ]Vasopressors [ ]Inotropes  [ ]Respiratory failure present [ ]Mechanical Ventilation [ ]Non-invasive ventilatory support [ ]High-Flow Mode: AC/ CMV (Assist Control/ Continuous Mandatory Ventilation), RR (machine): 26, TV (machine): 400, FiO2: 40, PEEP: 4, ITime: 1, MAP: 14, PIP: 25  [ ]Acute  [ ]Chronic [ ]Hypoxic  [ ]Hypercarbic [ ]Other  [ ]Other organ failure     LABS:                        8.4    8.57  )-----------( 106      ( 10 Mar 2025 05:09 )             26.5   03-10    142  |  108  |  19  ----------------------------<  107[H]  3.5   |  32[H]  |  0.36[L]    Ca    8.7      10 Mar 2025 05:09  Phos  1.2     03-10  Mg     1.9     03-10    TPro  4.3[L]  /  Alb  2.0[L]  /  TBili  0.5  /  DBili  x   /  AST  22  /  ALT  30  /  AlkPhos  62  03-10      Urinalysis Basic - ( 10 Mar 2025 05:09 )    Color: x / Appearance: x / SG: x / pH: x  Gluc: 107 mg/dL / Ketone: x  / Bili: x / Urobili: x   Blood: x / Protein: x / Nitrite: x   Leuk Esterase: x / RBC: x / WBC x   Sq Epi: x / Non Sq Epi: x / Bacteria: x      RADIOLOGY & ADDITIONAL STUDIES:    Protein Calorie Malnutrition Present: [ ]mild [ ]moderate [ ]severe [ ]underweight [ ]morbid obesity  https://www.andeal.org/vault/2440/web/files/ONC/Table_Clinical%20Characteristics%20to%20Document%20Malnutrition-White%20JV%20et%20al%202012.pdf    Height (cm): 157.5 (02-26-25 @ 13:19)  Weight (kg): 54.4 (02-26-25 @ 13:19)  BMI (kg/m2): 21.9 (02-26-25 @ 13:19)    [ ]PPSV2 < or = 30%  [ ]significant weight loss [ ]poor nutritional intake [ ]anasarca[ ]Artificial Nutrition    Other REFERRALS:  [ ]Hospice  [ ]Child Life  [ ]Social Work  [ ]Case management [ ]Holistic Therapy

## 2025-03-10 NOTE — PROGRESS NOTE ADULT - SUBJECTIVE AND OBJECTIVE BOX
Critical care note:    Reason for admission: SOB/AMS     BRIEF HOSPITAL COURSE: 69 year old female seen and examined in the ICU PMHx COPD - on home o2 (2-4 L), Ex smoker + Vaping, RA/Arthritis, Colon cancer s/p chemo with neuropathy, HTN, h/o PE who p/w increasing sob and ams. Colon resection of adenocarcinoma 02/2024. Mets to 2 lymph nodes, who p/w increasing sob and ams and admitted to ICU for resp failure.     Events last 24 hours:   S/P pneumothorax s/p chest tube placement with improvement  Remains on vent     PAST MEDICAL & SURGICAL HISTORY:  Advanced COPD      On supplemental oxygen by nasal cannula      Arthritis      Cancer, colon      HTN (hypertension)      No significant past surgical history        Review of Systems:  On vent      Medications:  MEDICATIONS  (STANDING):  albuterol    0.083% 2.5 milliGRAM(s) Nebulizer every 6 hours  aluminum hydroxide/magnesium hydroxide/simethicone Suspension 30 milliLiter(s) Oral every 6 hours  ascorbic acid 1000 milliGRAM(s) Oral daily  bisacodyl Suppository 10 milliGRAM(s) Rectal every 24 hours  chlorhexidine 0.12% Liquid 15 milliLiter(s) Oral Mucosa every 12 hours  chlorhexidine 2% Cloths 1 Application(s) Topical <User Schedule>  dextrose 5%. 1000 milliLiter(s) (50 mL/Hr) IV Continuous <Continuous>  dextrose 5%. 1000 milliLiter(s) (100 mL/Hr) IV Continuous <Continuous>  dextrose 50% Injectable 25 Gram(s) IV Push once  dextrose 50% Injectable 12.5 Gram(s) IV Push once  dextrose 50% Injectable 25 Gram(s) IV Push once  doxazosin 2 milliGRAM(s) Oral at bedtime  insulin glargine Injectable (LANTUS) 10 Unit(s) SubCutaneous every morning  insulin lispro (ADMELOG) corrective regimen sliding scale   SubCutaneous every 6 hours  lactated ringers. 1000 milliLiter(s) (100 mL/Hr) IV Continuous <Continuous>  midodrine 10 milliGRAM(s) Oral every 8 hours  multivitamin 1 Tablet(s) Oral daily  norepinephrine Infusion 0.05 MICROgram(s)/kG/Min (5.1 mL/Hr) IV Continuous <Continuous>  nystatin Powder 1 Application(s) Topical every 12 hours  pantoprazole   Suspension 40 milliGRAM(s) Oral every 12 hours  polyethylene glycol 3350 17 Gram(s) Oral daily  predniSONE   Tablet   Oral   predniSONE   Tablet 40 milliGRAM(s) Oral daily  propofol Infusion 10 MICROgram(s)/kG/Min (3.26 mL/Hr) IV Continuous <Continuous>  senna Syrup 15 milliLiter(s) Oral at bedtime  zinc sulfate 220 milliGRAM(s) Oral daily    MEDICATIONS  (PRN):  acetaminophen   Oral Liquid .. 650 milliGRAM(s) Enteral Tube every 6 hours PRN Temp greater or equal to 38C (100.4F), Mild Pain (1 - 3)  fentaNYL    Injectable 25 MICROGram(s) IV Push every 3 hours PRN agitaiton        Mode: AC/ CMV (Assist Control/ Continuous Mandatory Ventilation)  RR (machine): 26  TV (machine): 400  FiO2: 50  PEEP: 4  ITime: 1  MAP: 14  PIP: 27      ICU Vital Signs Last 24 Hrs  T(C): 37.2 (10 Mar 2025 10:00), Max: 37.5 (09 Mar 2025 21:00)  T(F): 99 (10 Mar 2025 10:00), Max: 99.5 (09 Mar 2025 21:00)  HR: 109 (10 Mar 2025 10:06) (80 - 110)  BP: 114/66 (10 Mar 2025 10:00) (85/57 - 134/62)  BP(mean): 80 (10 Mar 2025 10:00) (67 - 87)  ABP: --  ABP(mean): --  RR: 23 (10 Mar 2025 10:00) (16 - 28)  SpO2: 100% (10 Mar 2025 10:06) (96% - 100%)    O2 Parameters below as of 10 Mar 2025 10:06  Patient On (Oxygen Delivery Method): ventilator      ABG - ( 08 Mar 2025 15:28 )  pH, Arterial: 7.45  pH, Blood: x     /  pCO2: 36    /  pO2: 87    / HCO3: 25    / Base Excess: 1.0   /  SaO2: 98.1          09 Mar 2025 07:01  -  10 Mar 2025 07:00  --------------------------------------------------------  IN:    Enteral Tube Flush: 210 mL    Lactated Ringers: 2400 mL    Propofol: 85 mL    Vital1.5: 920 mL  Total IN: 3615 mL    OUT:    Chest Tube (mL): 160 mL    Intermittent Catheterization - Urethral (mL): 550 mL    Norepinephrine: 0 mL    Post-Void Residual per Intermittent Catheterization (mL): 950 mL  Total OUT: 1660 mL    Total NET: 1955 mL      10 Mar 2025 07:01  -  10 Mar 2025 11:11  --------------------------------------------------------  IN:    Lactated Ringers: 100 mL    Propofol: 5 mL    Vital1.5: 40 mL  Total IN: 145 mL    OUT:  Total OUT: 0 mL    Total NET: 145 mL        LABS:                      8.4    8.57  )-----------( 106      ( 10 Mar 2025 05:09 )             26.5     10 Mar 2025 05:09    142    |  108    |  19     ----------------------------<  107    3.5     |  32     |  0.36     Ca    8.7        10 Mar 2025 05:09  Phos  1.2       10 Mar 2025 05:09  Mg     1.9       10 Mar 2025 05:09    TPro  4.3    /  Alb  2.0    /  TBili  0.5    /  DBili  x      /  AST  22     /  ALT  30     /  AlkPhos  62     10 Mar 2025 05:09      CAPILLARY BLOOD GLUCOSE      POCT Blood Glucose.: 90 mg/dL (10 Mar 2025 11:05)  POCT Blood Glucose.: 123 mg/dL (10 Mar 2025 07:57)  POCT Blood Glucose.: 109 mg/dL (10 Mar 2025 04:52)  POCT Blood Glucose.: 146 mg/dL (09 Mar 2025 23:20)  POCT Blood Glucose.: 200 mg/dL (09 Mar 2025 17:27)  POCT Blood Glucose.: 206 mg/dL (09 Mar 2025 12:33)    LIVER FUNCTIONS - ( 10 Mar 2025 05:09 )  Alb: 2.0 g/dL / Pro: 4.3 g/dL / ALK PHOS: 62 U/L / ALT: 30 U/L / AST: 22 U/L / GGT: x           Urinalysis Basic - ( 10 Mar 2025 05:09 )    Color: x / Appearance: x / SG: x / pH: x  Gluc: 107 mg/dL / Ketone: x  / Bili: x / Urobili: x   Blood: x / Protein: x / Nitrite: x   Leuk Esterase: x / RBC: x / WBC x   Sq Epi: x / Non Sq Epi: x / Bacteria: x      CULTURES:  Culture Results:   Commensal annetta consistent with body site (03-07-25 @ 09:33)  Culture Results:   Commensal annetta consistent with body site (03-04-25 @ 11:57)      Mode: AC/ CMV (Assist Control/ Continuous Mandatory Ventilation), RR (machine): 26, TV (machine): 400, FiO2: 50, PEEP: 4, ITime: 1, MAP: 14, PIP: 27      Physical Examination:    General: No acute distress.  On vent, responsive to stimuli.      HEENT: Pupils equal, reactive to light.  Symmetric.    PULM: on vent    CVS: Regular rate and rhythm, no murmurs, rubs, or gallops    ABD: Soft, nondistended, nontender, normoactive bowel sounds, no masses    EXT: No edema, nontender    SKIN: Warm and well perfused, no rashes noted.

## 2025-03-10 NOTE — PROGRESS NOTE ADULT - CONVERSATION DETAILS
Patient remains intubated. Pt's sister and brother in law at the bedside. She states that patient has a , and 2 sons, and she has been involved in her care deeply since the admission.     She states that prior to this admission, patient was AAOx3, ambulating with a walker, finished CTx for her colon CA in October/2024. She also states that there is different opinion regarding her next step- trach/PEG and family needs to have a discussion.

## 2025-03-10 NOTE — PROGRESS NOTE ADULT - ASSESSMENT
69 year old female with a PMH of COPD - on home o2 (2-4 L), Ex smoker + Vaping, RA/Arthritis, Colon cancer s/p chemo with neuropathy, HTN, and h/o PE who p/w increasing sob and ams.      Assessment  1. Acute Hypoxic and hypercarbic respiratory failure   2. S/p Brief cardiac arrest 3/8/2025 s/p 1 round,   3. Due to Acute flu A and Left lower lobe Pna and Left rib fx  4. Left pntx s/p pigtail chest tube on Left 3/8/2025  5. Chronic Advanced COPD with supplemental oxygen by nasal cannula  6. RA  7. h/o colon cancer  8. h/o PE  off a/c now with Segmental and subsegmental pulmonary embolism right lower lobe, and b/l Calf dvt on Duplex.  9. Anemia    Plan:    NEURO:   - Continue with propofol drip wean as tolerates  -Monitor mental status closely, avoid neurosuppresants.     CV:   -Maintain goal MAP >65. Hypotensive requiring IV vasopressor therapy to maintain goal MAP. Monitor end points of organ perfusion.   -Keep K~4 and Mg>2 for optimal arrhythmia suppression.    PULM:   -Patient currently on Full vent support  -titrate settings to maintain SaO2 >90%, or pH >7.25  -tolerated cpap 10, not 5, appears to frail to wean  - continue Chest tube to suction -20  -Peridex oral care  -aggressive pulmonary toilet  -daily sedation vacation with spontaneous breathing trial if clinical condition warrants, discuss with respiratory therapy     GI:  -TF.    -S/p EGD/Colon no active bleeding noted  -GI PPX with PPI       RENAL:  -Renal function currently WNL.  -trend lytes/Scr daily with BMP  -I's and O's, goal UOP 0.5 cc/kg/hr  -renal dose meds and avoid nephrotoxins   -Gentle mIVF with  cc/h.     ENDO:   -Aggressive glycemic control to limit FS glucose to <180mg/dl. ISS, Lantus.      ID:  - Cx NGTD thus far.       HEME:   -hemeonc recs appreciated   -VTE ppx with SCDs.   -h/h variable, unsure if safe to start full a/c for PE, currently on dvt ppx lovenox  re evaluate in am to increase to full dose    GOC: Full Code.     Case dw Dr. Guzman

## 2025-03-11 LAB
ALBUMIN SERPL ELPH-MCNC: 1.6 G/DL — LOW (ref 3.3–5)
ALP SERPL-CCNC: 66 U/L — SIGNIFICANT CHANGE UP (ref 40–120)
ALT FLD-CCNC: 27 U/L — SIGNIFICANT CHANGE UP (ref 10–45)
ANION GAP SERPL CALC-SCNC: 1 MMOL/L — LOW (ref 5–17)
AST SERPL-CCNC: 24 U/L — SIGNIFICANT CHANGE UP (ref 10–40)
BILIRUB SERPL-MCNC: 0.5 MG/DL — SIGNIFICANT CHANGE UP (ref 0.2–1.2)
BUN SERPL-MCNC: 17 MG/DL — SIGNIFICANT CHANGE UP (ref 7–23)
CALCIUM SERPL-MCNC: 7.9 MG/DL — LOW (ref 8.4–10.5)
CHLORIDE SERPL-SCNC: 105 MMOL/L — SIGNIFICANT CHANGE UP (ref 96–108)
CO2 SERPL-SCNC: 32 MMOL/L — HIGH (ref 22–31)
CREAT SERPL-MCNC: 0.42 MG/DL — LOW (ref 0.5–1.3)
EGFR: 107 ML/MIN/1.73M2 — SIGNIFICANT CHANGE UP
EGFR: 107 ML/MIN/1.73M2 — SIGNIFICANT CHANGE UP
GLUCOSE BLDC GLUCOMTR-MCNC: 145 MG/DL — HIGH (ref 70–99)
GLUCOSE BLDC GLUCOMTR-MCNC: 163 MG/DL — HIGH (ref 70–99)
GLUCOSE BLDC GLUCOMTR-MCNC: 181 MG/DL — HIGH (ref 70–99)
GLUCOSE BLDC GLUCOMTR-MCNC: 200 MG/DL — HIGH (ref 70–99)
GLUCOSE BLDC GLUCOMTR-MCNC: 232 MG/DL — HIGH (ref 70–99)
GLUCOSE SERPL-MCNC: 160 MG/DL — HIGH (ref 70–99)
HCT VFR BLD CALC: 25.1 % — LOW (ref 34.5–45)
HGB BLD-MCNC: 7.9 G/DL — LOW (ref 11.5–15.5)
MAGNESIUM SERPL-MCNC: 2.3 MG/DL — SIGNIFICANT CHANGE UP (ref 1.6–2.6)
MCHC RBC-ENTMCNC: 27.7 PG — SIGNIFICANT CHANGE UP (ref 27–34)
MCHC RBC-ENTMCNC: 31.5 G/DL — LOW (ref 32–36)
MCV RBC AUTO: 88.1 FL — SIGNIFICANT CHANGE UP (ref 80–100)
NRBC BLD AUTO-RTO: 0 /100 WBCS — SIGNIFICANT CHANGE UP (ref 0–0)
PHOSPHATE SERPL-MCNC: 1.7 MG/DL — LOW (ref 2.5–4.5)
PLATELET # BLD AUTO: 91 K/UL — LOW (ref 150–400)
POTASSIUM SERPL-MCNC: 4.3 MMOL/L — SIGNIFICANT CHANGE UP (ref 3.5–5.3)
POTASSIUM SERPL-SCNC: 4.3 MMOL/L — SIGNIFICANT CHANGE UP (ref 3.5–5.3)
PROT SERPL-MCNC: 4.3 G/DL — LOW (ref 6–8.3)
RBC # BLD: 2.85 M/UL — LOW (ref 3.8–5.2)
RBC # FLD: 21.2 % — HIGH (ref 10.3–14.5)
SODIUM SERPL-SCNC: 138 MMOL/L — SIGNIFICANT CHANGE UP (ref 135–145)
WBC # BLD: 10.39 K/UL — SIGNIFICANT CHANGE UP (ref 3.8–10.5)
WBC # FLD AUTO: 10.39 K/UL — SIGNIFICANT CHANGE UP (ref 3.8–10.5)

## 2025-03-11 PROCEDURE — 99232 SBSQ HOSP IP/OBS MODERATE 35: CPT

## 2025-03-11 PROCEDURE — 99497 ADVNCD CARE PLAN 30 MIN: CPT

## 2025-03-11 RX ADMIN — Medication 220 MILLIGRAM(S): at 12:16

## 2025-03-11 RX ADMIN — INSULIN GLARGINE-YFGN 10 UNIT(S): 100 INJECTION, SOLUTION SUBCUTANEOUS at 08:28

## 2025-03-11 RX ADMIN — Medication 30 MILLILITER(S): at 17:08

## 2025-03-11 RX ADMIN — MIDODRINE HYDROCHLORIDE 10 MILLIGRAM(S): 5 TABLET ORAL at 21:31

## 2025-03-11 RX ADMIN — Medication 30 MILLILITER(S): at 12:18

## 2025-03-11 RX ADMIN — INSULIN LISPRO 2: 100 INJECTION, SOLUTION INTRAVENOUS; SUBCUTANEOUS at 06:26

## 2025-03-11 RX ADMIN — Medication 1000 MILLIGRAM(S): at 12:17

## 2025-03-11 RX ADMIN — Medication 650 MILLIGRAM(S): at 17:38

## 2025-03-11 RX ADMIN — Medication 2.5 MILLIGRAM(S): at 16:59

## 2025-03-11 RX ADMIN — DOXAZOSIN MESYLATE 2 MILLIGRAM(S): 8 TABLET ORAL at 21:31

## 2025-03-11 RX ADMIN — Medication 30 MILLILITER(S): at 06:28

## 2025-03-11 RX ADMIN — NYSTATIN 1 APPLICATION(S): 100000 CREAM TOPICAL at 06:26

## 2025-03-11 RX ADMIN — Medication 650 MILLIGRAM(S): at 17:08

## 2025-03-11 RX ADMIN — Medication 15 MILLILITER(S): at 06:24

## 2025-03-11 RX ADMIN — Medication 2.5 MILLIGRAM(S): at 03:38

## 2025-03-11 RX ADMIN — MIDODRINE HYDROCHLORIDE 10 MILLIGRAM(S): 5 TABLET ORAL at 06:28

## 2025-03-11 RX ADMIN — Medication 1 APPLICATION(S): at 06:27

## 2025-03-11 RX ADMIN — NYSTATIN 1 APPLICATION(S): 100000 CREAM TOPICAL at 17:09

## 2025-03-11 RX ADMIN — Medication 40 MILLIGRAM(S): at 17:09

## 2025-03-11 RX ADMIN — Medication 2.5 MILLIGRAM(S): at 09:27

## 2025-03-11 RX ADMIN — Medication 650 MILLIGRAM(S): at 10:29

## 2025-03-11 RX ADMIN — INSULIN LISPRO 2: 100 INJECTION, SOLUTION INTRAVENOUS; SUBCUTANEOUS at 12:16

## 2025-03-11 RX ADMIN — Medication 15 MILLILITER(S): at 17:08

## 2025-03-11 RX ADMIN — PROPOFOL 3.26 MICROGRAM(S)/KG/MIN: 10 INJECTION, EMULSION INTRAVENOUS at 16:44

## 2025-03-11 RX ADMIN — Medication 650 MILLIGRAM(S): at 10:59

## 2025-03-11 RX ADMIN — Medication 40 MILLIGRAM(S): at 06:25

## 2025-03-11 RX ADMIN — PREDNISONE 40 MILLIGRAM(S): 20 TABLET ORAL at 06:25

## 2025-03-11 RX ADMIN — MIDODRINE HYDROCHLORIDE 10 MILLIGRAM(S): 5 TABLET ORAL at 13:54

## 2025-03-11 RX ADMIN — INSULIN LISPRO 4: 100 INJECTION, SOLUTION INTRAVENOUS; SUBCUTANEOUS at 17:07

## 2025-03-11 RX ADMIN — Medication 1 TABLET(S): at 12:16

## 2025-03-11 RX ADMIN — Medication 2.5 MILLIGRAM(S): at 22:18

## 2025-03-11 NOTE — PROGRESS NOTE ADULT - SUBJECTIVE AND OBJECTIVE BOX
SUBJECTIVE AND OBJECTIVE:  Indication for Geriatrics and Palliative Care Services/INTERVAL HPI:  Patient remained intubated.     OVERNIGHT EVENTS:    Allergies    No Known Allergies    Intolerances    MEDICATIONS  (STANDING):  albuterol    0.083% 2.5 milliGRAM(s) Nebulizer every 6 hours  aluminum hydroxide/magnesium hydroxide/simethicone Suspension 30 milliLiter(s) Oral every 6 hours  ascorbic acid 1000 milliGRAM(s) Oral daily  bisacodyl Suppository 10 milliGRAM(s) Rectal every 24 hours  chlorhexidine 0.12% Liquid 15 milliLiter(s) Oral Mucosa every 12 hours  chlorhexidine 2% Cloths 1 Application(s) Topical <User Schedule>  dextrose 5%. 1000 milliLiter(s) (50 mL/Hr) IV Continuous <Continuous>  dextrose 5%. 1000 milliLiter(s) (100 mL/Hr) IV Continuous <Continuous>  dextrose 50% Injectable 25 Gram(s) IV Push once  dextrose 50% Injectable 12.5 Gram(s) IV Push once  dextrose 50% Injectable 25 Gram(s) IV Push once  doxazosin 2 milliGRAM(s) Oral at bedtime  insulin glargine Injectable (LANTUS) 10 Unit(s) SubCutaneous every morning  insulin lispro (ADMELOG) corrective regimen sliding scale   SubCutaneous every 6 hours  midodrine 10 milliGRAM(s) Oral every 8 hours  multivitamin 1 Tablet(s) Oral daily  nystatin Powder 1 Application(s) Topical every 12 hours  pantoprazole   Suspension 40 milliGRAM(s) Oral every 12 hours  polyethylene glycol 3350 17 Gram(s) Oral daily  predniSONE   Tablet   Oral   propofol Infusion 10 MICROgram(s)/kG/Min (3.26 mL/Hr) IV Continuous <Continuous>  senna Syrup 15 milliLiter(s) Oral at bedtime  zinc sulfate 220 milliGRAM(s) Oral daily    MEDICATIONS  (PRN):  acetaminophen   Oral Liquid .. 650 milliGRAM(s) Enteral Tube every 6 hours PRN Temp greater or equal to 38C (100.4F), Mild Pain (1 - 3)  fentaNYL    Injectable 25 MICROGram(s) IV Push every 3 hours PRN agitaiton      ITEMS UNCHECKED ARE NOT PRESENT    PRESENT SYMPTOMS: [ ]Unable to self-report   Source if other than patient:  [ ]Family   [ ]Team     Pain:  [ ]yes [ ]no  QOL impact -   Location -                    Aggravating factors -  Quality -  Radiation -  Timing-  Severity (0-10 scale):  Minimal acceptable level (0-10 scale):     Dyspnea:                           [ ]Mild [ ]Moderate [ ]Severe  Anxiety:                             [ ]Mild [ ]Moderate [ ]Severe  Fatigue:                             [ ]Mild [ ]Moderate [ ]Severe  Nausea:                             [ ]Mild [ ]Moderate [ ]Severe  Loss of appetite:              [ ]Mild [ ]Moderate [ ]Severe  Constipation:                    [ ]Mild [ ]Moderate [ ]Severe      Other Symptoms:  [ ]All other review of systems negative     Chaplaincy Referral: [ ] yes [ ] refused [ ] following [ ] Deferred   Palliative Performance Status Version 2:         %      http://[email or web address].org/files/news/palliative_performance_scale_[email or web address].pdf    PHYSICAL EXAM:  Vital Signs Last 24 Hrs  T(C): 37.3 (11 Mar 2025 10:00), Max: 38.1 (10 Mar 2025 17:00)  T(F): 99.1 (11 Mar 2025 10:00), Max: 100.6 (10 Mar 2025 17:00)  HR: 100 (11 Mar 2025 13:45) (83 - 115)  BP: 133/70 (11 Mar 2025 13:28) (77/57 - 133/70)  BP(mean): 86 (11 Mar 2025 13:28) (65 - 86)  RR: 26 (11 Mar 2025 13:45) (16 - 27)  SpO2: 95% (11 Mar 2025 13:45) (92% - 100%)    Parameters below as of 11 Mar 2025 10:41  Patient On (Oxygen Delivery Method): ventilator     I&O's Summary    10 Mar 2025 07:01  -  11 Mar 2025 07:00  --------------------------------------------------------  IN: 3573.9 mL / OUT: 739 mL / NET: 2834.9 mL       GENERAL: [ ]Cachexia    [ ]Alert  [ ]Oriented x   [ ]Lethargic  [ ]Unarousable  [ ]Verbal  [ ]Non-Verbal  Behavioral:   [ ]Anxiety  [ ]Delirium [ ]Agitation [ ]Other  HEENT:  [ ]Normal   [ ]Dry mouth   [ ]ET Tube/Trach  [ ]Oral lesions  PULMONARY:   [ ]Clear [ ]Tachypnea  [ ]Audible excessive secretions   [ ]Rhonchi        [ ]Right [ ]Left [ ]Bilateral  [ ]Crackles        [ ]Right [ ]Left [ ]Bilateral  [ ]Wheezing     [ ]Right [ ]Left [ ]Bilateral  [ ]Diminished BS [ ] Right [ ]Left [ ]Bilateral  CARDIOVASCULAR:    [ ]Regular [ ]Irregular [ ]Tachy  [ ]Isrrael [ ]Murmur [ ]Other  GASTROINTESTINAL:  [ ]Soft  [ ]Distended   [ ]+BS  [ ]Non tender [ ]Tender  [ ]Other [ ]PEG [ ]OGT/ NGT   Last BM:   GENITOURINARY:  [ ]Normal [ ]Incontinent   [ ]Oliguria/Anuria   [ ]Diaz  MUSCULOSKELETAL:   [ ]Normal   [ ]Weakness  [ ]Bed/Wheelchair bound [ ]Edema  NEUROLOGIC:   [ ]No focal deficits  [ ] Cognitive impairment  [ ] Dysphagia [ ]Dysarthria [ ] Paresis [ ]Other   SKIN:   [ ]Normal  [ ]Rash  [ ]Other  [ ]Pressure ulcer(s) [ ]y [ ]n present on admission    CRITICAL CARE:  [ ]Shock Present  [ ]Septic [ ]Cardiogenic [ ]Neurologic [ ]Hypovolemic  [ ]Vasopressors [ ]Inotropes  [ ]Respiratory failure present [ ]Mechanical Ventilation [ ]Non-invasive ventilatory support [ ]High-Flow Mode: AC/ CMV (Assist Control/ Continuous Mandatory Ventilation), RR (machine): 26, TV (machine): 400, FiO2: 40, PEEP: 4, ITime: 1, MAP: 16, PIP: 31  [ ]Acute  [ ]Chronic [ ]Hypoxic  [ ]Hypercarbic [ ]Other  [ ]Other organ failure     LABS:                        7.9    10.39 )-----------( 91       ( 11 Mar 2025 06:00 )             25.1   03-11    138  |  105  |  17  ----------------------------<  160[H]  4.3   |  32[H]  |  0.42[L]    Ca    7.9[L]      11 Mar 2025 06:00  Phos  1.7     03-11  Mg     2.3     03-11    TPro  4.3[L]  /  Alb  1.6[L]  /  TBili  0.5  /  DBili  x   /  AST  24  /  ALT  27  /  AlkPhos  66  03-11      Urinalysis Basic - ( 11 Mar 2025 06:00 )    Color: x / Appearance: x / SG: x / pH: x  Gluc: 160 mg/dL / Ketone: x  / Bili: x / Urobili: x   Blood: x / Protein: x / Nitrite: x   Leuk Esterase: x / RBC: x / WBC x   Sq Epi: x / Non Sq Epi: x / Bacteria: x      RADIOLOGY & ADDITIONAL STUDIES:    Protein Calorie Malnutrition Present: [ ]mild [ ]moderate [ ]severe [ ]underweight [ ]morbid obesity  https://www.andeal.org/vault/2440/web/files/ONC/Table_Clinical%20Characteristics%20to%20Document%20Malnutrition-White%20JV%20et%20al%202012.pdf    Height (cm): 157.5 (02-26-25 @ 13:19)  Weight (kg): 54.4 (02-26-25 @ 13:19)  BMI (kg/m2): 21.9 (02-26-25 @ 13:19)    [ ]PPSV2 < or = 30%  [ ]significant weight loss [ ]poor nutritional intake [ ]anasarca[ ]Artificial Nutrition    Other REFERRALS:  [ ]Hospice  [ ]Child Life  [ ]Social Work  [ ]Case management [ ]Holistic Therapy

## 2025-03-11 NOTE — PROGRESS NOTE ADULT - CONVERSATION DETAILS
Spoke with the  and relative at the bedside. ICU ALESIA Gonzalez present.     Clinical course explained by ICU team. Treatment options- PEG/trach vs palliative extubation explained to the family.  bursts into tears, "I just went through this with my mother 2 years ago. I don't know what to do. I don't want her to suffer. ". Empathetic listening and emotional support provided.      would like to discuss with the family.

## 2025-03-11 NOTE — PROGRESS NOTE ADULT - SUBJECTIVE AND OBJECTIVE BOX
INTERVAL HPI/OVERNIGHT EVENTS: pt intubated sedated on vent . Sister and relatives at bedside       Antimicrobial:    Cardiovascular:  doxazosin 2 milliGRAM(s) Oral at bedtime  midodrine 10 milliGRAM(s) Oral every 8 hours    Pulmonary:  albuterol    0.083% 2.5 milliGRAM(s) Nebulizer every 6 hours    Hematalogic:    Other:  acetaminophen   Oral Liquid .. 650 milliGRAM(s) Enteral Tube every 6 hours PRN  aluminum hydroxide/magnesium hydroxide/simethicone Suspension 30 milliLiter(s) Oral every 6 hours  ascorbic acid 1000 milliGRAM(s) Oral daily  bisacodyl Suppository 10 milliGRAM(s) Rectal every 24 hours  chlorhexidine 0.12% Liquid 15 milliLiter(s) Oral Mucosa every 12 hours  chlorhexidine 2% Cloths 1 Application(s) Topical <User Schedule>  dextrose 5%. 1000 milliLiter(s) IV Continuous <Continuous>  dextrose 5%. 1000 milliLiter(s) IV Continuous <Continuous>  dextrose 50% Injectable 25 Gram(s) IV Push once  dextrose 50% Injectable 12.5 Gram(s) IV Push once  dextrose 50% Injectable 25 Gram(s) IV Push once  fentaNYL    Injectable 25 MICROGram(s) IV Push every 3 hours PRN  insulin glargine Injectable (LANTUS) 10 Unit(s) SubCutaneous every morning  insulin lispro (ADMELOG) corrective regimen sliding scale   SubCutaneous every 6 hours  multivitamin 1 Tablet(s) Oral daily  nystatin Powder 1 Application(s) Topical every 12 hours  pantoprazole   Suspension 40 milliGRAM(s) Oral every 12 hours  polyethylene glycol 3350 17 Gram(s) Oral daily  predniSONE   Tablet   Oral   propofol Infusion 10 MICROgram(s)/kG/Min IV Continuous <Continuous>  senna Syrup 15 milliLiter(s) Oral at bedtime  zinc sulfate 220 milliGRAM(s) Oral daily      Drug Dosing Weight  Height (cm): 157.5 (26 Feb 2025 13:19)  Weight (kg): 54.4 (26 Feb 2025 13:19)  BMI (kg/m2): 21.9 (26 Feb 2025 13:19)  BSA (m2): 1.54 (26 Feb 2025 13:19)    CENTRAL LINE: [ ] YES [ ] NO  LOCATION:   DATE INSERTED:    KEANE: [ ] YES [ ] NO    DATE INSERTED:    A-LINE:  [ ] YES [ ] NO  LOCATION:   DATE INSERTED:    PMH/Social Hx/Fam Hx -reviewed admission note, no change since admission  PAST MEDICAL & SURGICAL HISTORY:  Advanced COPD      On supplemental oxygen by nasal cannula      Arthritis      Cancer, colon      HTN (hypertension)      No significant past surgical history          T(C): 37.3 (03-11-25 @ 10:00), Max: 38.1 (03-10-25 @ 17:00)  HR: 100 (03-11-25 @ 13:45)  BP: 133/70 (03-11-25 @ 13:28)  BP(mean): 86 (03-11-25 @ 13:28)  ABP: --  ABP(mean): --  RR: 26 (03-11-25 @ 13:45)  SpO2: 95% (03-11-25 @ 13:45)  Wt(kg): --          03-10 @ 07:01  -  03-11 @ 07:00  --------------------------------------------------------  IN: 3573.9 mL / OUT: 739 mL / NET: 2834.9 mL        Mode: AC/ CMV (Assist Control/ Continuous Mandatory Ventilation)  RR (machine): 26  TV (machine): 400  FiO2: 40  PEEP: 4  ITime: 1  MAP: 16  PIP: 31      PHYSICAL EXAM:    GENERAL: No signs of distress, comfortable + ETT  HEAD: Atraumatic, Normocephalic  EYES: EOMI, PERRLA  ENMT: No erythema, exudates, or enlargement, Moist mucous membranes  NECK: Supple, normal appearance, No JVD; [  ] central line (if applicable)  CHEST/LUNG: No chest deformity, fair bilateral air entry; + barrel chest  + left chest tube to suction   HEART: Regular rate and rhythm; No murmurs, rubs, or gallops;   ABDOMEN: Soft, Nontender, Nondistended; Bowel sounds present  EXTREMITIES:  + Peripheral Pulses, No clubbing, cyanosis, or edema  NERVOUS SYSTEM: sedated   LYMPH: No lymphadenopathy noted  SKIN: No rashes or lesions; good turgor, warm, dry      LABS:  CBC Full  -  ( 11 Mar 2025 06:00 )  WBC Count : 10.39 K/uL  RBC Count : 2.85 M/uL  Hemoglobin : 7.9 g/dL  Hematocrit : 25.1 %  Platelet Count - Automated : 91 K/uL  Mean Cell Volume : 88.1 fl  Mean Cell Hemoglobin : 27.7 pg  Mean Cell Hemoglobin Concentration : 31.5 g/dL  Auto Neutrophil # : x  Auto Lymphocyte # : x  Auto Monocyte # : x  Auto Eosinophil # : x  Auto Basophil # : x  Auto Neutrophil % : x  Auto Lymphocyte % : x  Auto Monocyte % : x  Auto Eosinophil % : x  Auto Basophil % : x    03-11    138  |  105  |  17  ----------------------------<  160[H]  4.3   |  32[H]  |  0.42[L]    Ca    7.9[L]      11 Mar 2025 06:00  Phos  1.7     03-11  Mg     2.3     03-11    TPro  4.3[L]  /  Alb  1.6[L]  /  TBili  0.5  /  DBili  x   /  AST  24  /  ALT  27  /  AlkPhos  66  03-11      Urinalysis Basic - ( 11 Mar 2025 06:00 )    Color: x / Appearance: x / SG: x / pH: x  Gluc: 160 mg/dL / Ketone: x  / Bili: x / Urobili: x   Blood: x / Protein: x / Nitrite: x   Leuk Esterase: x / RBC: x / WBC x   Sq Epi: x / Non Sq Epi: x / Bacteria: x    RADIOLOGY & ADDITIONAL STUDIES REVIEWED     < from: Xray Chest 1 View- PORTABLE-Urgent (Xray Chest 1 View- PORTABLE-Urgent .) (03.10.25 @ 19:49) >  IMPRESSION:  Catheters and tubes in place.  RIGHT mid zone peripheral ill-defined multifocal small airspace opacities.      < end of copied text >      IMPRESSION:  PAST MEDICAL & SURGICAL HISTORY:  Advanced COPD      On supplemental oxygen by nasal cannula      Arthritis      Cancer, colon      HTN (hypertension)      No significant past surgical history       p/w         IMP: This is a 69 year old woman  with chronic hypoxic resp failure due to  COPD requiring supp O2 @ 2-4 L, Ex smoker + Vaping, RA/Arthritis, Colon cancer s/p chemo with neuropathy, HTN, and h/o PE who p/w increasing sob and ams.      Assessment  1. Acute Hypoxic and hypercarbic respiratory failure   2. S/p Brief cardiac arrest 3/8/2025 s/p 1 round,   3. Due to Acute flu A and Left lower lobe Pna and Left rib fx  4. Left pntx s/p pigtail chest tube on Left 3/8/2025  5. Chronic Advanced COPD with supplemental oxygen by nasal cannula  6. RA  7. h/o colon cancer  8. h/o PE  off a/c now with Segmental and subsegmental pulmonary embolism right lower lobe, and b/l Calf dvt on Duplex.  9. Anemia    Plan:    NEURO:   - Continue with propofol drip wean as tolerates  -Monitor mental status closely, avoid neurosuppresants.     CV:   -Maintain goal MAP >65. Hypotensive requiring IV vasopressor therapy to maintain goal MAP. Monitor end points of organ perfusion.   -Keep K~4 and Mg>2 for optimal arrhythmia suppression.    PULM:   -Patient currently on Full vent support  -titrate settings to maintain SaO2 >90%, or pH >7.25  -tolerated cpap 12, not 5, appears to frail to wean  - Failed SBT  after 30 min   - continue Chest tube to suction -20  -Peridex oral care  -aggressive pulmonary toilet  -daily sedation vacation with spontaneous breathing trial if clinical condition warrants, discuss with respiratory therapy     GI:  -TF.    -S/p EGD/Colon no active bleeding noted  -GI PPX with PPI       RENAL:  -Renal function currently WNL.  -trend lytes/Scr daily with BMP  -I's and O's, goal UOP 0.5 cc/kg/hr  -renal dose meds and avoid nephrotoxins   -Gentle mIVF with  cc/h.     ENDO:   -Aggressive glycemic control to limit FS glucose to <180mg/dl. ISS, Lantus.      ID:  - Cx NGTD thus far.       HEME:   -hemeonc recs appreciated   -VTE ppx with SCDs.   -h/h variable, unsure if safe to start full a/c for PE, currently on dvt ppx lovenox  re evaluate in am to increase to full dose    HEME ONCO:  f/u noted . Not a candidate for antineoplastic  Rx at this time   - Palliative following   GOC: Full Code.    spoke with sister Gurwinder with niece  at bedside regarding trach and PEG . vs palliative / comfort care

## 2025-03-11 NOTE — PROGRESS NOTE ADULT - ASSESSMENT
70 y/o female with MH of COPD - on home O2 (2-4 L), Ex smoker + Vaping, RA/Arthritis, Colon cancer s/p chemo with neuropathy, HTN, PE who p/w increasing SOB and AMS. Multiple recent inpatient hospitalizations also for PNA & COPD exacerbation, currently admitted for ARF, intubated/ventilated.  Heme/Onc consulted due to Hx of colon CA.      History of colon cancer  - Dx 'd 2/2024- s/p Low anterior sigmoid resection with pelvic anastomosis and mobilization of the splenic flexure, followed by Chemo with Dr. Olmedo  - Currently admitted with respiratory failure, mechanically ventilated at present.  Patient's performance status steadily declining.    - No plans for systemic treatment at this time.  - Palliative care reasonable in light of co-morbidities.      Problem/Recommendation  Problem: Anemia/Thrombocytopenia  - Positive Guaiac - EGD/colonoscopy -3/7/25--> Normal mucosa in the terminal ileum. Mild diverticulosis of the descending colon and sigmoid colon.  - Monitor CBC  - Transfusional support as needed-PRBC for hemoglobin <7/related symptoms    VTE  - Intubated/Ventilated   - CTA - PE, with Segmental and Subsegmental pulmonary embolism right lower lobe & and b/l Calf DVT on Duplex.   - Currently off AC

## 2025-03-11 NOTE — PROGRESS NOTE ADULT - SUBJECTIVE AND OBJECTIVE BOX
KENDALL RIDER, 69y Female  MRN: [id number]  ATTENDING: Jose Daniel Jane    HPI:  69F, ex-smoker, PMHx rheumatoid arthritis, colon cancer, HTN, COPD, admitted to [facility] with COPD exacerbation.  This is 1 of multiple admission in the past month including previous to admission at [facility] (2/3 - 10/2025 and 2/17–18/2025) with pneumonia and COPD exacerbation.  She is was discharged to clinical hospital for in-hospital acute rehabilitation, but decompensated from respiratory perspective and is presently on antibiotics and prednisone monitored in ICU.  Patient presents with generalized weakness, difficulty ambulating.  Reportedly she has a history of colon cancer diagnosed at [facility] in 2024, treated with chemotherapy under the care of Dr. Kaleb Olmedo, currently in remission.  Oncology consulted as above.      MEDICATIONS  (STANDING):  albuterol    0.083% 2.5 milliGRAM(s) Nebulizer every 6 hours  aluminum hydroxide/magnesium hydroxide/simethicone Suspension 30 milliLiter(s) Oral every 6 hours  ascorbic acid 1000 milliGRAM(s) Oral daily  bisacodyl Suppository 10 milliGRAM(s) Rectal every 24 hours  chlorhexidine 0.12% Liquid 15 milliLiter(s) Oral Mucosa every 12 hours  chlorhexidine 2% Cloths 1 Application(s) Topical <User Schedule>  dextrose 5%. 1000 milliLiter(s) (50 mL/Hr) IV Continuous <Continuous>  dextrose 5%. 1000 milliLiter(s) (100 mL/Hr) IV Continuous <Continuous>  dextrose 50% Injectable 25 Gram(s) IV Push once  dextrose 50% Injectable 12.5 Gram(s) IV Push once  dextrose 50% Injectable 25 Gram(s) IV Push once  doxazosin 2 milliGRAM(s) Oral at bedtime  insulin glargine Injectable (LANTUS) 10 Unit(s) SubCutaneous every morning  insulin lispro (ADMELOG) corrective regimen sliding scale   SubCutaneous every 6 hours  midodrine 10 milliGRAM(s) Oral every 8 hours  multivitamin 1 Tablet(s) Oral daily  nystatin Powder 1 Application(s) Topical every 12 hours  pantoprazole   Suspension 40 milliGRAM(s) Oral every 12 hours  polyethylene glycol 3350 17 Gram(s) Oral daily  predniSONE   Tablet   Oral   propofol Infusion 10 MICROgram(s)/kG/Min (3.26 mL/Hr) IV Continuous <Continuous>  senna Syrup 15 milliLiter(s) Oral at bedtime  zinc sulfate 220 milliGRAM(s) Oral daily    MEDICATIONS  (PRN):  acetaminophen   Oral Liquid .. 650 milliGRAM(s) Enteral Tube every 6 hours PRN Temp greater or equal to 38C (100.4F), Mild Pain (1 - 3)  fentaNYL    Injectable 25 MICROGram(s) IV Push every 3 hours PRN agitaiton      SUBJECTIVE:  No acute events overnight; continues mechanical ventilation. Family at bedside, no changes or signs of recovery. awaiting family decision on Trach/PEG Vs palliative    Vital Signs Last 24 Hrs  T(C): 37.3 (11 Mar 2025 10:00), Max: 38.1 (10 Mar 2025 17:00)  T(F): 99.1 (11 Mar 2025 10:00), Max: 100.6 (10 Mar 2025 17:00)  HR: 100 (11 Mar 2025 13:45) (83 - 115)  BP: 133/70 (11 Mar 2025 13:28) (77/57 - 133/70)  BP(mean): 86 (11 Mar 2025 13:28) (65 - 86)  RR: 26 (11 Mar 2025 13:45) (16 - 27)  SpO2: 95% (11 Mar 2025 13:45) (92% - 100%)    Parameters below as of 11 Mar 2025 10:41  Patient On (Oxygen Delivery Method): ventilator          PHYSICAL EXAM:  Constitutional: sedated; intubated  HEENT: normocephalic, anicteric sclerae, no mucositis or thrush  Respiratory: bilateral clear to auscultation anteriorly  Cardiovascular : S1, S2 regular, rhythmic, no murmurs, gallops or rubs  Abdomen: soft, distended, + normoactive BS, no palpable HS- megaly  Extremities: no tenderness;  -c/c/e, pulses equal bilaterally    LABS:                          7.9    10.39 )-----------( 91       ( 11 Mar 2025 06:00 )             25.1     03-11    138  |  105  |  17  ----------------------------<  160[H]  4.3   |  32[H]  |  0.42[L]    Ca    7.9[L]      11 Mar 2025 06:00  Phos  1.7     03-11  Mg     2.3     03-11    TPro  4.3[L]  /  Alb  1.6[L]  /  TBili  0.5  /  DBili  x   /  AST  24  /  ALT  27  /  AlkPhos  66  03-11        RADIOLOGY:    ACC: [id number] EXAM:  CT ANGIO CHEST PULM [facility]   ORDERED BY:  MO EUBANKS     PROCEDURE DATE:  02/26/2025  INTERPRETATION:  CLINICAL INFORMATION: Hypoxia.    COMPARISON: None.    CONTRAST/COMPLICATIONS:  IV Contrast: Omnipaque 350 90 cc administered   10 cc discarded  Oral Contrast: NONE  .    PROCEDURE:  CT Angiography of the Chest.  Sagittal and coronal reformats were performed as well as 3D (MIP)   reconstructions.    FINDINGS:    LUNGS AND LARGE AIRWAYS: Patent central airways. Bronchial plugging lower   lobes bilaterally, left more than right. Severe pulmonary emphysema.   Consolidative opacities bilateral lower lobes and additional small focal   airspace opacities the right lower lobe. These may represent a   combination of pneumonia and atelectasis. Additional concerning for a   pulmonary infarct in the right lower lobe.  PLEURA: No pleural effusion.  VESSELS: Segmental and subsegmental pulmonary embolism right lower lobe.   Atherosclerotic calcification including the coronary arteries.  HEART: Heart size is normal. No pericardial effusion.  MEDIASTINUM AND AYAH: No lymphadenopathy.  CHEST WALL AND LOWER NECK: Within normal limits.  VISUALIZED UPPER ABDOMEN: Right renal cyst. Possible small left renal   cyst. Probable small cyst left hepatic lobe.  BONES: Severe anterior compression of T11 and T12 with retropulsion. This   may be of some duration. Mild superior endplate compression of T10.    IMPRESSION:  Segmental and subsegmental pulmonary embolism right lower lobe.  Bilateral lower lobe opacities that may represent a combination of   atelectasis and infiltrates. Additional possibility of a pulmonary   infarct in the right lower lobe.  Severe pulmonary emphysema.

## 2025-03-11 NOTE — PROGRESS NOTE ADULT - ASSESSMENT
69 year old female with history of HTN, COPD on home O2(2-4 liter), RA, colon CA s/p CTx, who was admitted on 2/26/25 for acute respiratory failure secondary to flu/PNA. Palliative medicine team was consulted to assist with GOC.    1. Patient is intubated and sedated. Symptom management as per the ICU team.    2. Advanced directives   - surrogate:    - code status: full code     3. Palliative medicine encounter  - PEG/trach vs palliative extubation, will f/u for ongoing GOC

## 2025-03-11 NOTE — PROGRESS NOTE ADULT - CONVERSATION/DISCUSSION
Diagnosis/Prognosis/Treatment Options/Palliative Care Referral
Diagnosis/Prognosis/MOLST Discussed
Diagnosis/Prognosis/MOLST Discussed/Treatment Options
Diagnosis/Prognosis/Treatment Options/Palliative Care Referral
Diagnosis/Prognosis/MOLST Discussed/Treatment Options

## 2025-03-12 LAB
ALBUMIN SERPL ELPH-MCNC: 1.7 G/DL — LOW (ref 3.3–5)
ALP SERPL-CCNC: 76 U/L — SIGNIFICANT CHANGE UP (ref 40–120)
ALT FLD-CCNC: 33 U/L — SIGNIFICANT CHANGE UP (ref 10–45)
ANION GAP SERPL CALC-SCNC: 2 MMOL/L — LOW (ref 5–17)
AST SERPL-CCNC: 28 U/L — SIGNIFICANT CHANGE UP (ref 10–40)
BILIRUB SERPL-MCNC: 0.4 MG/DL — SIGNIFICANT CHANGE UP (ref 0.2–1.2)
BUN SERPL-MCNC: 19 MG/DL — SIGNIFICANT CHANGE UP (ref 7–23)
CALCIUM SERPL-MCNC: 8.3 MG/DL — LOW (ref 8.4–10.5)
CHLORIDE SERPL-SCNC: 102 MMOL/L — SIGNIFICANT CHANGE UP (ref 96–108)
CO2 SERPL-SCNC: 33 MMOL/L — HIGH (ref 22–31)
CREAT SERPL-MCNC: 0.39 MG/DL — LOW (ref 0.5–1.3)
EGFR: 109 ML/MIN/1.73M2 — SIGNIFICANT CHANGE UP
EGFR: 109 ML/MIN/1.73M2 — SIGNIFICANT CHANGE UP
GLUCOSE BLDC GLUCOMTR-MCNC: 119 MG/DL — HIGH (ref 70–99)
GLUCOSE BLDC GLUCOMTR-MCNC: 147 MG/DL — HIGH (ref 70–99)
GLUCOSE BLDC GLUCOMTR-MCNC: 167 MG/DL — HIGH (ref 70–99)
GLUCOSE BLDC GLUCOMTR-MCNC: 218 MG/DL — HIGH (ref 70–99)
GLUCOSE SERPL-MCNC: 163 MG/DL — HIGH (ref 70–99)
HCT VFR BLD CALC: 24.9 % — LOW (ref 34.5–45)
HGB BLD-MCNC: 7.9 G/DL — LOW (ref 11.5–15.5)
MAGNESIUM SERPL-MCNC: 2.3 MG/DL — SIGNIFICANT CHANGE UP (ref 1.6–2.6)
MCHC RBC-ENTMCNC: 27.7 PG — SIGNIFICANT CHANGE UP (ref 27–34)
MCHC RBC-ENTMCNC: 31.7 G/DL — LOW (ref 32–36)
MCV RBC AUTO: 87.4 FL — SIGNIFICANT CHANGE UP (ref 80–100)
NRBC BLD AUTO-RTO: 0 /100 WBCS — SIGNIFICANT CHANGE UP (ref 0–0)
PHOSPHATE SERPL-MCNC: 1.4 MG/DL — LOW (ref 2.5–4.5)
PLATELET # BLD AUTO: 104 K/UL — LOW (ref 150–400)
POTASSIUM SERPL-MCNC: 4.8 MMOL/L — SIGNIFICANT CHANGE UP (ref 3.5–5.3)
POTASSIUM SERPL-SCNC: 4.8 MMOL/L — SIGNIFICANT CHANGE UP (ref 3.5–5.3)
PROT SERPL-MCNC: 4.6 G/DL — LOW (ref 6–8.3)
RBC # BLD: 2.85 M/UL — LOW (ref 3.8–5.2)
RBC # FLD: 21.2 % — HIGH (ref 10.3–14.5)
SODIUM SERPL-SCNC: 137 MMOL/L — SIGNIFICANT CHANGE UP (ref 135–145)
WBC # BLD: 10.76 K/UL — HIGH (ref 3.8–10.5)
WBC # FLD AUTO: 10.76 K/UL — HIGH (ref 3.8–10.5)

## 2025-03-12 PROCEDURE — 71045 X-RAY EXAM CHEST 1 VIEW: CPT | Mod: 26

## 2025-03-12 PROCEDURE — 99232 SBSQ HOSP IP/OBS MODERATE 35: CPT

## 2025-03-12 RX ORDER — SODIUM PHOSPHATE,DIBASIC DIHYD
30 POWDER (GRAM) MISCELLANEOUS ONCE
Refills: 0 | Status: COMPLETED | OUTPATIENT
Start: 2025-03-12 | End: 2025-03-12

## 2025-03-12 RX ORDER — POTASSIUM PHOSPHATE, MONOBASIC POTASSIUM PHOSPHATE, DIBASIC INJECTION, 236; 224 MG/ML; MG/ML
30 SOLUTION, CONCENTRATE INTRAVENOUS ONCE
Refills: 0 | Status: DISCONTINUED | OUTPATIENT
Start: 2025-03-12 | End: 2025-03-12

## 2025-03-12 RX ORDER — SCOPOLAMINE 1 MG/3D
1 PATCH, EXTENDED RELEASE TRANSDERMAL
Refills: 0 | Status: DISCONTINUED | OUTPATIENT
Start: 2025-03-12 | End: 2025-03-13

## 2025-03-12 RX ORDER — LORAZEPAM 4 MG/ML
2 VIAL (ML) INJECTION ONCE
Refills: 0 | Status: DISCONTINUED | OUTPATIENT
Start: 2025-03-12 | End: 2025-03-12

## 2025-03-12 RX ADMIN — Medication 40 MILLIGRAM(S): at 05:58

## 2025-03-12 RX ADMIN — Medication 1000 MILLIGRAM(S): at 11:27

## 2025-03-12 RX ADMIN — Medication 2 MILLIGRAM(S): at 21:31

## 2025-03-12 RX ADMIN — MIDODRINE HYDROCHLORIDE 10 MILLIGRAM(S): 5 TABLET ORAL at 05:58

## 2025-03-12 RX ADMIN — Medication 1 APPLICATION(S): at 05:59

## 2025-03-12 RX ADMIN — Medication 2.5 MILLIGRAM(S): at 03:53

## 2025-03-12 RX ADMIN — NYSTATIN 1 APPLICATION(S): 100000 CREAM TOPICAL at 06:00

## 2025-03-12 RX ADMIN — MIDODRINE HYDROCHLORIDE 10 MILLIGRAM(S): 5 TABLET ORAL at 14:40

## 2025-03-12 RX ADMIN — Medication 650 MILLIGRAM(S): at 11:57

## 2025-03-12 RX ADMIN — Medication 2 MILLIGRAM(S): at 22:22

## 2025-03-12 RX ADMIN — Medication 30 MILLILITER(S): at 17:31

## 2025-03-12 RX ADMIN — Medication 85 MILLIMOLE(S): at 10:10

## 2025-03-12 RX ADMIN — Medication 15 MILLILITER(S): at 17:31

## 2025-03-12 RX ADMIN — Medication 2 MILLIGRAM(S): at 20:53

## 2025-03-12 RX ADMIN — Medication 220 MILLIGRAM(S): at 11:28

## 2025-03-12 RX ADMIN — Medication 30 MILLILITER(S): at 00:07

## 2025-03-12 RX ADMIN — INSULIN LISPRO 2: 100 INJECTION, SOLUTION INTRAVENOUS; SUBCUTANEOUS at 06:03

## 2025-03-12 RX ADMIN — Medication 30 MILLILITER(S): at 05:57

## 2025-03-12 RX ADMIN — Medication 2.5 MILLIGRAM(S): at 08:17

## 2025-03-12 RX ADMIN — INSULIN LISPRO 4: 100 INJECTION, SOLUTION INTRAVENOUS; SUBCUTANEOUS at 17:30

## 2025-03-12 RX ADMIN — Medication 650 MILLIGRAM(S): at 11:27

## 2025-03-12 RX ADMIN — SCOPOLAMINE 1 PATCH: 1 PATCH, EXTENDED RELEASE TRANSDERMAL at 20:30

## 2025-03-12 RX ADMIN — INSULIN GLARGINE-YFGN 10 UNIT(S): 100 INJECTION, SOLUTION SUBCUTANEOUS at 07:44

## 2025-03-12 RX ADMIN — Medication 40 MILLIGRAM(S): at 17:31

## 2025-03-12 RX ADMIN — Medication 1 TABLET(S): at 11:28

## 2025-03-12 RX ADMIN — Medication 2 MILLIGRAM(S): at 21:01

## 2025-03-12 RX ADMIN — Medication 2.5 MILLIGRAM(S): at 15:54

## 2025-03-12 RX ADMIN — Medication 4 MILLIGRAM(S): at 21:22

## 2025-03-12 RX ADMIN — PREDNISONE 30 MILLIGRAM(S): 20 TABLET ORAL at 05:58

## 2025-03-12 RX ADMIN — Medication 15 MILLILITER(S): at 05:57

## 2025-03-12 RX ADMIN — NYSTATIN 1 APPLICATION(S): 100000 CREAM TOPICAL at 17:30

## 2025-03-12 RX ADMIN — Medication 30 MILLILITER(S): at 11:35

## 2025-03-12 RX ADMIN — Medication 4 MILLIGRAM(S): at 20:52

## 2025-03-12 RX ADMIN — INSULIN LISPRO 2: 100 INJECTION, SOLUTION INTRAVENOUS; SUBCUTANEOUS at 00:06

## 2025-03-12 RX ADMIN — PROPOFOL 3.26 MICROGRAM(S)/KG/MIN: 10 INJECTION, EMULSION INTRAVENOUS at 12:52

## 2025-03-12 NOTE — PHARMACOTHERAPY INTERVENTION NOTE - INTERVENTION TYPE RECOOMEND
Therapy Recommended - Additional therapy
Therapy Recommended - Alternative treatment

## 2025-03-12 NOTE — PHARMACOTHERAPY INTERVENTION NOTE - COMMENTS
Discussed with family
Consider adding simethicone 
Na 137  K 4.8  P 1.4  Recommend Na phosphate over K phosphate
SMOG enema ordered 2/2 constipation with current bowel regimen, discussed during IDR
Discussed w/ Dr. Jane about anticoagulation for b/l DVT and but in setting of anemia. Will try to start heparin (short-acting) infusion later today. 
Discussed restarting heparin regarding PE and DVTs, however c/f anemia. Concerns regarding anticoagulation will be addressed in GOC discussion with family.    
FEP 2g q8h for optimal anti-pseudomonal dosing

## 2025-03-12 NOTE — CHART NOTE - NSCHARTNOTESELECT_GEN_ALL_CORE
Nutrition Services
Comfort Note/Event Note
Event Note
Event Note
Nutrition Services
Nutrition Services

## 2025-03-12 NOTE — GOALS OF CARE CONVERSATION - ADVANCED CARE PLANNING - CONVERSATION DETAILS
Patient off sedation, tracking with eyes and nodding to questioning.    With sister and family at bedside asked the patient the following:    Do you know you are on a machine to keep you alive?  Patient nodded yes    Do you want to be on the machine?  Patient nodded no    Do you know without the machine you may pass away?  Patient nodded yes.    Sister at bedside said she will relay this to family for further discussion.

## 2025-03-12 NOTE — CHART NOTE - NSCHARTNOTEFT_GEN_A_CORE
Nutrition Follow Up Note  Hospital Course (Per Electronic Medical Record):   Source: Medical Record [X] MD/PA[X]   Nursing Staff [X]     Diet: Vital 1.5 @ 50ml/hr x 18 hrs , 30ml Prosource (10:00-4:00) with 250ml free water q 6 hrs via NGT     Patient remains intubated , Propofol @ 4.89ml/hr (129non protein kcals)  for sedation , patient continues on NGT EN , no signs of intolerance noted as per nursing . Labs /POCT reviewed , insulin regimen reviewed . Hypophosphatemia  noted ,  Sodium Phosphate IVPB provided . Palliative note reviewed , continue current EN regimen & follow clinical course     Current Weight:(3/12) 122.7/55.7kg - weight noted to fluctuate , (+) edema ,                         (3/8) 117.5/53.3kg                         (3/7) 107.3/48.7kg     Pertinent Medications: MEDICATIONS  (STANDING):  albuterol    0.083% 2.5 milliGRAM(s) Nebulizer every 6 hours  aluminum hydroxide/magnesium hydroxide/simethicone Suspension 30 milliLiter(s) Oral every 6 hours  ascorbic acid 1000 milliGRAM(s) Oral daily  bisacodyl Suppository 10 milliGRAM(s) Rectal every 24 hours  chlorhexidine 0.12% Liquid 15 milliLiter(s) Oral Mucosa every 12 hours  chlorhexidine 2% Cloths 1 Application(s) Topical <User Schedule>  dextrose 5%. 1000 milliLiter(s) (50 mL/Hr) IV Continuous <Continuous>  dextrose 5%. 1000 milliLiter(s) (100 mL/Hr) IV Continuous <Continuous>  dextrose 50% Injectable 25 Gram(s) IV Push once  dextrose 50% Injectable 12.5 Gram(s) IV Push once  dextrose 50% Injectable 25 Gram(s) IV Push once  doxazosin 2 milliGRAM(s) Oral at bedtime  insulin glargine Injectable (LANTUS) 10 Unit(s) SubCutaneous every morning  insulin lispro (ADMELOG) corrective regimen sliding scale   SubCutaneous every 6 hours  midodrine 10 milliGRAM(s) Oral every 8 hours  multivitamin 1 Tablet(s) Oral daily  nystatin Powder 1 Application(s) Topical every 12 hours  pantoprazole   Suspension 40 milliGRAM(s) Oral every 12 hours  polyethylene glycol 3350 17 Gram(s) Oral daily  predniSONE   Tablet   Oral   predniSONE   Tablet 30 milliGRAM(s) Oral daily  propofol Infusion 10 MICROgram(s)/kG/Min (3.26 mL/Hr) IV Continuous <Continuous>  senna Syrup 15 milliLiter(s) Oral at bedtime  sodium phosphate 30 milliMole(s)/500 mL IVPB 30 milliMole(s) IV Intermittent once  zinc sulfate 220 milliGRAM(s) Oral daily    MEDICATIONS  (PRN):  acetaminophen   Oral Liquid .. 650 milliGRAM(s) Enteral Tube every 6 hours PRN Temp greater or equal to 38C (100.4F), Mild Pain (1 - 3)  fentaNYL    Injectable 25 MICROGram(s) IV Push every 3 hours PRN agitaiton      Pertinent Labs:  03-12 Na137 mmol/L Glu 163 mg/dL[H] K+ 4.8 mmol/L Cr  0.39 mg/dL[L] BUN 19 mg/dL 03-12 Phos 1.4 mg/dL[L] 03-12 Alb 1.7 g/dL[L]  Hgb 7.9g/dl<L>, Hct24.9% <L> Mg 2.3mg/dl,  POCT 119,167,[id number]      Skin: stage I sacrum , MVI , Vit C & Zinc Sulfate to aid with wound healing     Edema: (+1) generalized ,(+3) arm     Last BM: (3/11) x 2 , hyperactive bowel sounds noted    Estimated Needs:   [X] No Change since Previous Assessment    Previous Nutrition Diagnosis: Severe protein calorie malnutrition     Nutrition Diagnosis is [X] addressed with EN         Interventions:   1. continue current EN regimen       Monitoring & Evaluation: will monitor:  [X] Weights   [X] tolerance to EN   [X] Follow Up (Per Protocol)  [X] Tolerance to Diet Prescription       RD to follow as per Nutrition protocol  Joan Patel RDN Nutrition Follow Up Note  Hospital Course (Per Electronic Medical Record):   Source: Medical Record [X] MD/PA[X]   Nursing Staff [X]     Diet: Vital 1.5 @ 50ml/hr x 18 hrs , 30ml Prosource (10:00-4:00) with 250ml free water q 6 hrs via OGT     Patient remains intubated , Propofol @ 4.89ml/hr (129non protein kcals)  for sedation , patient continues on  EN via OGT @ 50ml/hr x 18 hrs , no signs of intolerance noted as per nursing . Labs /POCT reviewed , insulin regimen reviewed . Hypophosphatemia  noted ,  Sodium Phosphate IVPB provided . Palliative note reviewed , continue current EN regimen & follow clinical course     Current Weight:(3/12) 122.7/55.7kg - weight noted to fluctuate , (+) edema ,                         (3/8) 117.5/53.3kg                         (3/7) 107.3/48.7kg     Pertinent Medications: MEDICATIONS  (STANDING):  albuterol    0.083% 2.5 milliGRAM(s) Nebulizer every 6 hours  aluminum hydroxide/magnesium hydroxide/simethicone Suspension 30 milliLiter(s) Oral every 6 hours  ascorbic acid 1000 milliGRAM(s) Oral daily  bisacodyl Suppository 10 milliGRAM(s) Rectal every 24 hours  chlorhexidine 0.12% Liquid 15 milliLiter(s) Oral Mucosa every 12 hours  chlorhexidine 2% Cloths 1 Application(s) Topical <User Schedule>  dextrose 5%. 1000 milliLiter(s) (50 mL/Hr) IV Continuous <Continuous>  dextrose 5%. 1000 milliLiter(s) (100 mL/Hr) IV Continuous <Continuous>  dextrose 50% Injectable 25 Gram(s) IV Push once  dextrose 50% Injectable 12.5 Gram(s) IV Push once  dextrose 50% Injectable 25 Gram(s) IV Push once  doxazosin 2 milliGRAM(s) Oral at bedtime  insulin glargine Injectable (LANTUS) 10 Unit(s) SubCutaneous every morning  insulin lispro (ADMELOG) corrective regimen sliding scale   SubCutaneous every 6 hours  midodrine 10 milliGRAM(s) Oral every 8 hours  multivitamin 1 Tablet(s) Oral daily  nystatin Powder 1 Application(s) Topical every 12 hours  pantoprazole   Suspension 40 milliGRAM(s) Oral every 12 hours  polyethylene glycol 3350 17 Gram(s) Oral daily  predniSONE   Tablet   Oral   predniSONE   Tablet 30 milliGRAM(s) Oral daily  propofol Infusion 10 MICROgram(s)/kG/Min (3.26 mL/Hr) IV Continuous <Continuous>  senna Syrup 15 milliLiter(s) Oral at bedtime  sodium phosphate 30 milliMole(s)/500 mL IVPB 30 milliMole(s) IV Intermittent once  zinc sulfate 220 milliGRAM(s) Oral daily    MEDICATIONS  (PRN):  acetaminophen   Oral Liquid .. 650 milliGRAM(s) Enteral Tube every 6 hours PRN Temp greater or equal to 38C (100.4F), Mild Pain (1 - 3)  fentaNYL    Injectable 25 MICROGram(s) IV Push every 3 hours PRN agitaiton      Pertinent Labs:  03-12 Na137 mmol/L Glu 163 mg/dL[H] K+ 4.8 mmol/L Cr  0.39 mg/dL[L] BUN 19 mg/dL 03-12 Phos 1.4 mg/dL[L] 03-12 Alb 1.7 g/dL[L]  Hgb 7.9g/dl<L>, Hct24.9% <L> Mg 2.3mg/dl,  POCT 119,167,[id number]      Skin: stage I sacrum , MVI , Vit C & Zinc Sulfate to aid with wound healing     Edema: (+1) generalized ,(+3) arm     Last BM: (3/11) x 2 , hyperactive bowel sounds noted    Estimated Needs:   [X] No Change since Previous Assessment    Previous Nutrition Diagnosis: Severe protein calorie malnutrition     Nutrition Diagnosis is [X] addressed with EN         Interventions:   1. continue current EN regimen       Monitoring & Evaluation: will monitor:  [X] Weights   [X] tolerance to EN   [X] Follow Up (Per Protocol)  [X] Tolerance to Diet Prescription       RD to follow as per Nutrition protocol  Joan Patel RDN

## 2025-03-12 NOTE — PROGRESS NOTE ADULT - PROBLEM SELECTOR PLAN 1
Advanced colon cancer, admitted with respiratory failure, mechanically ventilated at present.  Patient's performance status steadily declining, unlikely to be weaned off ventilator.  No plans for systemic treatment.  Encouraged family to consider comfort/hospice care.  Palliative care recommendations reasonable.  Awaiting family's decision.
Advanced colon cancer in patient with steady and rapid decline, acute hypoxic respiratory failure in the setting of pneumonia and background of severe COPD.  Events yesterday noted; patient is intubated.  Plans for supportive care; ICU team to discuss DNR and weaning.  No plans for oncologic intervention.  Patient is a candidate for comfort care.
hxo Colon cancer with resection and chemo in 2024. Metachronous/ Recurrent lesion in DDx  - Follow transfusion protocol   - Trend HH  - Monitor for signs of active bleed  - Will plan Endoscopy and colonoscopy tomorrow  -Hold tube feed Keep NPO  -Start bowel prep Movi prep 1 dose this evening and next dose early morning tomorrow   - Management by ICU.
hxo Colon cancer with resection and chemo in 2024. Metachronous/ Recurrent lesion in DDx  - Trend H+ H (Today's Hgb noted to be downtrending )  - Follow transfusion protocol   - Monitor for signs of active bleed  - Management by ICU.
h/o Colon cancer s/p chemo (Dr. Olmedo). S/P hospitalization in [facility] 2/3-10/2025 for both pneumonia and COPD exacerbation. Patient was sent to  Rehab  Admitted with Flu a, and acute on chronic Hypercarbic respiratory failure.  -intensive care management  -oncology surveillance
Patient with history of colon cancer, resident of [facility] admitted with urinary retention, malaise and altered mentation.  Currently treated for yeast UTI; on Diflucan x 7 days.  No plans for systemic chemotherapy.  Will follow-up in ambulatory with Dr. Olmedo.  Recommend indefinite anticoagulation.  Transfuse as needed.

## 2025-03-12 NOTE — PROGRESS NOTE ADULT - ASSESSMENT
69 year old female with h/o COPD - on home o2 (2-4 L), Ex smoker + Vaping, RA/Arthritis, Colon cancer s/p chemo with neuropathy, HTN, h/o PE who p/w increasing sob and ams. Of note patient was just hospitalized in [facility] 2/3-10/2025 and 3/17-18/2025 for both pneumonia and COPD exacerbation.   Patient was sent to  Rehab  Sent to ED where found to have Flu a, and acute on chronic Hypercarbic respiratory failure.   Oncology consulted for h/o colon cancer.

## 2025-03-12 NOTE — PROGRESS NOTE ADULT - ASSESSMENT
Physical Examination:  GENERAL:               Intubated, sedated    HEENT:                    No JVD, Dry MM  PULM:                     Bilateral air entry, very diminshed bilaterally, no significant sputum production, tachypneic increased WOB  CVS:                         S1, S2,  No Murmur  ABD:                        Soft, +distended, nontender, normoactive bowel sounds,   EXT:                         No edema, nontender, No Cyanosis or Clubbing    NEURO:                  intubated sedated  PSYC:                      Calm, no Insight.       Assessment  1. Acute Hypoxic and hypercarbic respiratory failure s/p intubation 3/4/2025  2. S/p Brief cardiac arrest 3/8/2025 s/p 1 round,   3. Due to Acute flu A and Left lower lobe Pna and Left rib fx  4. Left pntx s/p pigtail chest tube on Left 3/8/2025  5. Chronic Advanced COPD with supplemental oxygen by nasal cannula  6. RA  7. h/o colon cancer  8. h/o PE  off a/c now with Segmental and subsegmental pulmonary embolism right lower lobe, and b/l Calf dvt on Duplex.  9. Anemia     Plan  SAT/SBT done  tolerating cpap 10 will try 5 when able  appears too frail to wean  h/h downtrending with h/h now off lovenox dvt ppx  continue chest tube to suction -20    no air leak    Tube feeding placed  S/p EGD/Colon no active bleeding noted  continue ppi   taper steroids to off     f/u sputum cultures, thin secretions,    palliative care f/u   await family input for trach and peg.          PMD:				                   Notified(Date):  Family Updated: 	Harjinder [phone]	                                 Date:       Sedation & Analgesia:	precedex  Diet/Nutrition:		tube feeding      GI PPx:			PPI    DVT Ppx:		  off Venodyne due to B/l bellow knee dvt and off Chemical dvt ppx due to anemia      Activity:		    Head of Bed:               35-45 Deg  Glycemic Control:             Lines:  CENTRAL LINE: 	[ ] YES [ ] NO	                    LOCATION:   	                       DATE INSERTED:   	                    REMOVE:  [ ] YES [ ] NO    A-LINE:  	                [ ] YES [ ] NO                      LOCATION:   	                       DATE INSERTED: 		            REMOVE:  [ ] YES [ ] NO    KEANE: 		        [ ] YES [ ] NO  		                                       DATE INSERTED:		            REMOVE:  [ ] YES [ ] NO      Restraints were deemed necessary to prevent removal of life-sustaining devices [ x ] YES   [    ]  NO    Disposition: ICU care    Goals of Care: Full code   ongoing discussion

## 2025-03-12 NOTE — PROGRESS NOTE ADULT - PROBLEM SELECTOR PLAN 2
Anemia/thrombocytopenia.  h/o Guaiac+ stool - 3/7/2025-EGD-Normal mucosa in the whole esophagus. Erythema in the whole stomach compatible with non-erosive gastritis. Normal mucosa in the duodenal bulb and second part of the duodenum.  Colonoscopy-Mild diverticulosis of the descending colon and sigmoid colon.  Platelet count acceptable currently.  -PRBC transfusional support as needed-t/c PRBC for hemoglobin <7/related symptoms.

## 2025-03-12 NOTE — CHART NOTE - NSCHARTNOTEFT_GEN_A_CORE
Approached by family spouse, to start a full comfort care . Approached by multiple  family members and her spouse, requesting that they are ready to start a full comfort care and to follow wishes of Mrs. Leora Taylor to proceed with extubation ; removing breathing tube and placing patient on full comfort care, free of pain and any discomfort.   Emotional support provided.  Together with family we formed the full comfort plan.  Start with morphine 4 mg IVP x 1,  Ativan 2mg IVP x1 and scaloppine patch.   Will turn off propofol gtt. Will extubate patient to supplemental oxygen with NC for comfort .   Morphine 2 mf q 4 hrs PRN .   MOLST form completed and placed on chart.

## 2025-03-12 NOTE — PROGRESS NOTE ADULT - SUBJECTIVE AND OBJECTIVE BOX
KENDALL RIDER   69y   Female    Admitting: [facility]  HPI:  69 year old female with h/o COPD - on home o2 (2-4 L), Ex smoker + Vaping, RA/Arthritis, Colon cancer s/p chemo with neuropathy, HTN, h/o PE who p/w increasing sob and ams. Of note patient was just hospitalized in [facility] 2/3-10/2025 and 3/17-18/2025 for both pneumonia and COPD exacerbation.   Patient was sent to  Rehab  Sent to ED where found to have Flu a, and acute on chronic Hypercarbic respiratory failure.   Oncology consulted for h/o colon cancer.     PAST MEDICAL & SURGICAL HISTORY:  Advanced COPD      On supplemental oxygen by nasal cannula      Arthritis      Cancer, colon      HTN (hypertension)      HEALTH ISSUES - PROBLEM Dx:  Colon cancer    Anemia      MEDICATIONS  (STANDING):  albuterol    0.083% 2.5 milliGRAM(s) Nebulizer every 6 hours  aluminum hydroxide/magnesium hydroxide/simethicone Suspension 30 milliLiter(s) Oral every 6 hours  ascorbic acid 1000 milliGRAM(s) Oral daily  bisacodyl Suppository 10 milliGRAM(s) Rectal every 24 hours  chlorhexidine 0.12% Liquid 15 milliLiter(s) Oral Mucosa every 12 hours  chlorhexidine 2% Cloths 1 Application(s) Topical <User Schedule>  dextrose 5%. 1000 milliLiter(s) (50 mL/Hr) IV Continuous <Continuous>  dextrose 5%. 1000 milliLiter(s) (100 mL/Hr) IV Continuous <Continuous>  dextrose 50% Injectable 25 Gram(s) IV Push once  dextrose 50% Injectable 12.5 Gram(s) IV Push once  dextrose 50% Injectable 25 Gram(s) IV Push once  doxazosin 2 milliGRAM(s) Oral at bedtime  insulin glargine Injectable (LANTUS) 10 Unit(s) SubCutaneous every morning  insulin lispro (ADMELOG) corrective regimen sliding scale   SubCutaneous every 6 hours  midodrine 10 milliGRAM(s) Oral every 8 hours  multivitamin 1 Tablet(s) Oral daily  nystatin Powder 1 Application(s) Topical every 12 hours  pantoprazole   Suspension 40 milliGRAM(s) Oral every 12 hours  polyethylene glycol 3350 17 Gram(s) Oral daily  predniSONE   Tablet   Oral   predniSONE   Tablet 30 milliGRAM(s) Oral daily  propofol Infusion 10 MICROgram(s)/kG/Min (3.26 mL/Hr) IV Continuous <Continuous>  senna Syrup 15 milliLiter(s) Oral at bedtime  zinc sulfate 220 milliGRAM(s) Oral daily    MEDICATIONS  (PRN):  acetaminophen   Oral Liquid .. 650 milliGRAM(s) Enteral Tube every 6 hours PRN Temp greater or equal to 38C (100.4F), Mild Pain (1 - 3)  fentaNYL    Injectable 25 MICROGram(s) IV Push every 3 hours PRN agitaiton    Allergies    No Known Allergies    INTERVAL HPI/OVERNIGHT EVENTS:  Patient S&E at bedside. On vent. Shakes head no when asked if has pain.    VITAL SIGNS:  T(F): 99 (03-12-25 @ 06:00)  HR: 101 (03-12-25 @ 08:13)  BP: 125/72 (03-12-25 @ 06:00)  RR: 23 (03-12-25 @ 06:00)  SpO2: 99% (03-12-25 @ 08:13)      PHYSICAL EXAM:  Constitutional: NAD  Eyes: sclera non-icteric  Neck: no JVD  Respiratory: decreased BS, few scattered crackles ant.  Cardiovascular: RRR, no M/R/G  Gastrointestinal: soft, NTND, no masses palpable  Extremities: no calf tenderness elicited  Neurological: Awake, alert on vent    Labs:             7.9    10.76 )-----------( 104      ( 03-12 @ 05:55 )             24.9                7.9    10.39 )-----------( 91       ( 03-11 @ 06:00 )             25.1                8.4    8.57  )-----------( 106      ( 03-10 @ 05:09 )             26.5       03-12    137  |  102  |  19  ----------------------------<  163[H]  4.8   |  33[H]  |  0.39[L]    Ca    8.3[L]      12 Mar 2025 05:55  Phos  1.4     03-12  Mg     2.3     03-12    TPro  4.6[L]  /  Alb  1.7[L]  /  TBili  0.4  /  DBili  x   /  AST  28  /  ALT  33  /  AlkPhos  76  03-12      Consultant notes reviewed : YES [x ] ; NO [ ]

## 2025-03-12 NOTE — PROGRESS NOTE ADULT - SUBJECTIVE AND OBJECTIVE BOX
Follow-up Critical Care Progress Note  Chief Complaint : Acute respiratory failure with hypoxia      pt seen and examiend  on prop, awake responsive  SAT/SBT started, tolerating cpap 10 at this time, plan to taper  chest tube no air leak noted    Allergies :No Known Allergies      PAST MEDICAL & SURGICAL HISTORY:  Advanced COPD    On supplemental oxygen by nasal cannula    Arthritis    Cancer, colon    HTN (hypertension)    No significant past surgical history        Medications:  MEDICATIONS  (STANDING):  albuterol    0.083% 2.5 milliGRAM(s) Nebulizer every 6 hours  aluminum hydroxide/magnesium hydroxide/simethicone Suspension 30 milliLiter(s) Oral every 6 hours  ascorbic acid 1000 milliGRAM(s) Oral daily  bisacodyl Suppository 10 milliGRAM(s) Rectal every 24 hours  chlorhexidine 0.12% Liquid 15 milliLiter(s) Oral Mucosa every 12 hours  chlorhexidine 2% Cloths 1 Application(s) Topical <User Schedule>  dextrose 5%. 1000 milliLiter(s) (50 mL/Hr) IV Continuous <Continuous>  dextrose 5%. 1000 milliLiter(s) (100 mL/Hr) IV Continuous <Continuous>  dextrose 50% Injectable 25 Gram(s) IV Push once  dextrose 50% Injectable 12.5 Gram(s) IV Push once  dextrose 50% Injectable 25 Gram(s) IV Push once  doxazosin 2 milliGRAM(s) Oral at bedtime  insulin glargine Injectable (LANTUS) 10 Unit(s) SubCutaneous every morning  insulin lispro (ADMELOG) corrective regimen sliding scale   SubCutaneous every 6 hours  midodrine 10 milliGRAM(s) Oral every 8 hours  multivitamin 1 Tablet(s) Oral daily  nystatin Powder 1 Application(s) Topical every 12 hours  pantoprazole   Suspension 40 milliGRAM(s) Oral every 12 hours  polyethylene glycol 3350 17 Gram(s) Oral daily  predniSONE   Tablet   Oral   predniSONE   Tablet 30 milliGRAM(s) Oral daily  propofol Infusion 10 MICROgram(s)/kG/Min (3.26 mL/Hr) IV Continuous <Continuous>  senna Syrup 15 milliLiter(s) Oral at bedtime  zinc sulfate 220 milliGRAM(s) Oral daily    MEDICATIONS  (PRN):  acetaminophen   Oral Liquid .. 650 milliGRAM(s) Enteral Tube every 6 hours PRN Temp greater or equal to 38C (100.4F), Mild Pain (1 - 3)  fentaNYL    Injectable 25 MICROGram(s) IV Push every 3 hours PRN agitaiton      Antibiotics History  cefepime   IVPB 2000 milliGRAM(s) IV Intermittent every 8 hours, 02-27-25 @ 08:41, Stop order after: 5 Days  cefepime   IVPB 1000 milliGRAM(s) IV Intermittent every 12 hours, 02-26-25 @ 16:16  cefepime   IVPB    , 02-26-25 @ 13:23  cefepime   IVPB 2000 milliGRAM(s) IV Intermittent once, 02-26-25 @ 13:32, Stop order after: 1 Doses  fluconAZOLE   Tablet 100 milliGRAM(s) Oral every 24 hours, 03-02-25 @ 14:15, Stop order after: 7 Doses  oseltamivir 75 milliGRAM(s) Oral two times a day, 02-27-25 @ 08:11, Stop order after: 5 Days  oseltamivir Suspension 75 milliGRAM(s) Oral every 12 hours, 02-27-25 @ 09:53  oseltamivir Suspension 75 milliGRAM(s) Oral every 12 hours, 02-27-25 @ 10:24, Stop order after: 5 Days  vancomycin  IVPB 750 milliGRAM(s) IV Intermittent Once, 02-26-25 @ 16:23, Stop order after: 1 Doses  vancomycin  IVPB. 1000 milliGRAM(s) IV Intermittent once, 02-26-25 @ 13:32, Stop order after: 1 Doses      Heme Medications       GI Medications  aluminum hydroxide/magnesium hydroxide/simethicone Suspension 30 milliLiter(s) Oral every 6 hours, 03-06-25 @ 11:16, Now  bisacodyl Suppository 10 milliGRAM(s) Rectal every 24 hours, 03-03-25 @ 07:27, Now  pantoprazole   Suspension 40 milliGRAM(s) Oral every 12 hours, 03-08-25 @ 18:00, 18:00  polyethylene glycol 3350 17 Gram(s) Oral daily, 03-02-25 @ 12:16, Routine  senna Syrup 15 milliLiter(s) Oral at bedtime, 03-06-25 @ 07:59, Routine      COVID  02-26-25 @ 13:30  COVID -   NotDetec      COVID Biomarkers    03-03-25 @ 06:00 ESR --  ---  CRP --  ---  DDimer  --   ---   LDH --   ---   Ferritin 121           Trend BNP  02-26-25 @ 13:30   -  198    Procalcitonin Trend  02-26-25 @ 13:30   -   0.10[H]    WBC Trend  03-12-25 @ 05:55   -  10.76[H]  03-11-25 @ 06:00   -  10.39  03-10-25 @ 05:09   -  8.57    H/H Trend  03-12-25 @ 05:55   -   7.9[L]/ 24.9[L]  03-11-25 @ 06:00   -   7.9[L]/ 25.1[L]  03-10-25 @ 05:09   -   8.4[L]/ 26.5[L]  03-09-25 @ 06:00   -   8.9[L]/ 27.5[L]  03-08-25 @ 05:00   -   10.9[L]/ 33.2[L]  03-07-25 @ 17:30   -   9.6[L]/ 29.6[L]    Stool Occult Blood  03-04-25 @ 14:30   -   Positive[!]    Platelet Trend  03-12-25 @ 05:55   -  104[L]  03-11-25 @ 06:00   -  91[L]  03-10-25 @ 05:09   -  106[L]    Trend Sodium  03-12-25 @ 05:55   -  137  03-11-25 @ 06:00   -  138  03-10-25 @ 05:09   -  142    Trend Potassium  03-12-25 @ 05:55   -  4.8  03-11-25 @ 06:00   -  4.3  03-10-25 @ 05:09   -  3.5    Trend Bun/Cr  03-12-25 @ 05:55  BUN/CR -  19 / 0.39[L]  03-11-25 @ 06:00  BUN/CR -  17 / 0.42[L]  03-10-25 @ 05:09  BUN/CR -  19 / 0.36[L]    Lactic Acid Trend    ABG Trend  03-08-25 @ 15:28   - 7.45/36[H]/87/98.1[H]  03-08-25 @ 05:07   - 7.46[H]/39[H]/152[H]/99.4[H]  03-07-25 @ 04:50   - 7.43/50[H]/113[H]/99.4[H]  03-06-25 @ 10:45   - 7.41/50[H]/106/99.2[H]  03-05-25 @ 06:00   - 7.38/55[H]/76[L]/98.2[H]  03-04-25 @ 12:15   - 7.43/50[H]/67[L]/95.9     Trend AST/ALT/ALK Phos/Bili  03-12-25 @ 05:55   28/33/76/0.4  03-11-25 @ 06:00   24/27/66/0.5  03-10-25 @ 05:09   22/30/62/0.5  03-09-25 @ 06:00   18/31/66/0.6  03-08-25 @ 05:00   15/27/75/0.5  03-07-25 @ 05:15   19/27/75/0.4        Albumin Trend  03-12-25 @ 05:55   -   1.7[L]  03-11-25 @ 06:00   -   1.6[L]  03-10-25 @ 05:09   -   2.0[L]  03-09-25 @ 06:00   -   2.5[L]  03-08-25 @ 05:00   -   2.1[L]  03-07-25 @ 05:15   -   1.8[L]      PTT - PT - INR Trend  02-26-25 @ 13:30   -   27.1 - 11.3 - 0.96    Glucose Trend  03-12-25 @ 07:37   -  -- -- 119[H]  03-12-25 @ 06:00   -  -- -- 167[H]  03-12-25 @ 05:55   -  163[H] -- --  03-11-25 @ 23:49   -  -- -- 200[H]  03-11-25 @ 17:00   -  -- -- 232[H]  03-11-25 @ 12:15   -  -- -- 181[H]  03-11-25 @ 08:31   -  -- -- 145[H]  03-11-25 @ 06:19   -  -- -- 163[H]  03-11-25 @ 06:00   -  160[H] -- --  03-10-25 @ 23:07   -  -- -- 124[H]    A1C with Estimated Average Glucose Result: 6.0 % *H* [4.0 - 5.6] (03-04-25 @ 06:00)  A1C with Estimated Average Glucose Result: 6.6 % *H* [4.0 - 5.6] (02-27-25 @ 05:51)      LABS:                        7.9    10.76 )-----------( 104      ( 12 Mar 2025 05:55 )             24.9     03-12    137  |  102  |  19  ----------------------------<  163[H]  4.8   |  33[H]  |  0.39[L]    Ca    8.3[L]      12 Mar 2025 05:55  Phos  1.4     03-12  Mg     2.3     03-12    TPro  4.6[L]  /  Alb  1.7[L]  /  TBili  0.4  /  DBili  x   /  AST  28  /  ALT  33  /  AlkPhos  76  03-12         CULTURES: (if applicable)    Culture - Sputum (collected 03-07-25 @ 09:33)  Source: Sputum Sputum  Gram Stain (03-08-25 @ 00:57):    Moderate polymorphonuclear leukocytes per low power field    No Squamous epithelial cells per low power field    No organisms seen per oil power field  Final Report (03-08-25 @ 21:51):    Commensal annetta consistent with body site    Culture - Sputum (collected 03-04-25 @ 11:57)  Source: Sputum Sputum  Gram Stain (03-05-25 @ 00:07):    Few polymorphonuclear leukocytes per low power field    No Squamous epithelial cells per low power field    Rare Yeast like cells per oil power field  Final Report (03-06-25 @ 08:20):    Commensal annetta consistent with body site    Culture - Blood (collected 02-26-25 @ 13:30)  Source: .Blood Blood-Peripheral  Final Report (03-04-25 @ 05:00):    No growth at 5 days    Culture - Blood (collected 02-26-25 @ 13:30)  Source: .Blood Blood-Peripheral  Final Report (03-04-25 @ 05:00):    No growth at 5 days      Rapid RVP Result: Detected (02-26-25 @ 13:30)        CAPILLARY BLOOD GLUCOSE      POCT Blood Glucose.: 119 mg/dL (12 Mar 2025 07:37)      RADIOLOGY  CXR:   lung expanded   lungs / tubes in place      VITALS:  T(C): 37.2 (03-12-25 @ 06:00), Max: 37.5 (03-11-25 @ 08:00)  T(F): 99 (03-12-25 @ 06:00), Max: 99.5 (03-11-25 @ 08:00)  HR: 93 (03-12-25 @ 06:00) (68 - 115)  BP: 125/72 (03-12-25 @ 06:00) (90/61 - 133/70)  BP(mean): 88 (03-12-25 @ 06:00) (68 - 89)  ABP: --  ABP(mean): --  RR: 23 (03-12-25 @ 06:00) (16 - 26)  SpO2: 100% (03-12-25 @ 06:00) (89% - 100%)  CVP(mm Hg): --  CVP(cm H2O): --    Ins and Outs     03-11-25 @ 07:01  -  03-12-25 @ 07:00  --------------------------------------------------------  IN: 1873.8 mL / OUT: 1150 mL / NET: 723.8 mL            Device: Avea, Mode: AC/ CMV (Assist Control/ Continuous Mandatory Ventilation), RR (machine): 26, RR (patient): 26, TV (machine): 400, TV (patient): 380, FiO2: 40, PEEP: 4, ITime: 1, MAP: 13, PIP: 23    I&O's Detail    11 Mar 2025 07:01  -  12 Mar 2025 07:00  --------------------------------------------------------  IN:    Free Water: 1000 mL    Propofol: 73.8 mL    Vital1.5: 800 mL  Total IN: 1873.8 mL    OUT:    Chest Tube (mL): 50 mL    Indwelling Catheter - Urethral (mL): 600 mL    Post-Void Residual per Intermittent Catheterization (mL): 500 mL  Total OUT: 1150 mL    Total NET: 723.8 mL

## 2025-03-13 VITALS — HEART RATE: 93 BPM | RESPIRATION RATE: 21 BRPM | TEMPERATURE: 97 F

## 2025-03-13 PROCEDURE — 36430 TRANSFUSION BLD/BLD COMPNT: CPT

## 2025-03-13 PROCEDURE — 74018 RADEX ABDOMEN 1 VIEW: CPT

## 2025-03-13 PROCEDURE — 84484 ASSAY OF TROPONIN QUANT: CPT

## 2025-03-13 PROCEDURE — 81001 URINALYSIS AUTO W/SCOPE: CPT

## 2025-03-13 PROCEDURE — 82728 ASSAY OF FERRITIN: CPT

## 2025-03-13 PROCEDURE — 93970 EXTREMITY STUDY: CPT

## 2025-03-13 PROCEDURE — 84100 ASSAY OF PHOSPHORUS: CPT

## 2025-03-13 PROCEDURE — 93005 ELECTROCARDIOGRAM TRACING: CPT

## 2025-03-13 PROCEDURE — 83540 ASSAY OF IRON: CPT

## 2025-03-13 PROCEDURE — 80053 COMPREHEN METABOLIC PANEL: CPT

## 2025-03-13 PROCEDURE — 87205 SMEAR GRAM STAIN: CPT

## 2025-03-13 PROCEDURE — 80048 BASIC METABOLIC PNL TOTAL CA: CPT

## 2025-03-13 PROCEDURE — 74177 CT ABD & PELVIS W/CONTRAST: CPT | Mod: MC

## 2025-03-13 PROCEDURE — 87640 STAPH A DNA AMP PROBE: CPT

## 2025-03-13 PROCEDURE — 85610 PROTHROMBIN TIME: CPT

## 2025-03-13 PROCEDURE — 87637 SARSCOV2&INF A&B&RSV AMP PRB: CPT

## 2025-03-13 PROCEDURE — 85027 COMPLETE CBC AUTOMATED: CPT

## 2025-03-13 PROCEDURE — 96374 THER/PROPH/DIAG INJ IV PUSH: CPT

## 2025-03-13 PROCEDURE — 94640 AIRWAY INHALATION TREATMENT: CPT

## 2025-03-13 PROCEDURE — 83735 ASSAY OF MAGNESIUM: CPT

## 2025-03-13 PROCEDURE — 96375 TX/PRO/DX INJ NEW DRUG ADDON: CPT

## 2025-03-13 PROCEDURE — 85025 COMPLETE CBC W/AUTO DIFF WBC: CPT

## 2025-03-13 PROCEDURE — 86901 BLOOD TYPING SEROLOGIC RH(D): CPT

## 2025-03-13 PROCEDURE — 94003 VENT MGMT INPAT SUBQ DAY: CPT

## 2025-03-13 PROCEDURE — 82803 BLOOD GASES ANY COMBINATION: CPT

## 2025-03-13 PROCEDURE — 86923 COMPATIBILITY TEST ELECTRIC: CPT

## 2025-03-13 PROCEDURE — 36600 WITHDRAWAL OF ARTERIAL BLOOD: CPT

## 2025-03-13 PROCEDURE — 82272 OCCULT BLD FECES 1-3 TESTS: CPT

## 2025-03-13 PROCEDURE — 83550 IRON BINDING TEST: CPT

## 2025-03-13 PROCEDURE — 99291 CRITICAL CARE FIRST HOUR: CPT

## 2025-03-13 PROCEDURE — 94660 CPAP INITIATION&MGMT: CPT

## 2025-03-13 PROCEDURE — 83880 ASSAY OF NATRIURETIC PEPTIDE: CPT

## 2025-03-13 PROCEDURE — 71045 X-RAY EXAM CHEST 1 VIEW: CPT

## 2025-03-13 PROCEDURE — 36415 COLL VENOUS BLD VENIPUNCTURE: CPT

## 2025-03-13 PROCEDURE — 0225U NFCT DS DNA&RNA 21 SARSCOV2: CPT

## 2025-03-13 PROCEDURE — 85730 THROMBOPLASTIN TIME PARTIAL: CPT

## 2025-03-13 PROCEDURE — P9016: CPT

## 2025-03-13 PROCEDURE — 83605 ASSAY OF LACTIC ACID: CPT

## 2025-03-13 PROCEDURE — 94667 MNPJ CHEST WALL 1ST: CPT

## 2025-03-13 PROCEDURE — 83036 HEMOGLOBIN GLYCOSYLATED A1C: CPT

## 2025-03-13 PROCEDURE — 86850 RBC ANTIBODY SCREEN: CPT

## 2025-03-13 PROCEDURE — 84145 PROCALCITONIN (PCT): CPT

## 2025-03-13 PROCEDURE — 71250 CT THORAX DX C-: CPT | Mod: MC

## 2025-03-13 PROCEDURE — 87070 CULTURE OTHR SPECIMN AEROBIC: CPT

## 2025-03-13 PROCEDURE — 86900 BLOOD TYPING SEROLOGIC ABO: CPT

## 2025-03-13 PROCEDURE — 94760 N-INVAS EAR/PLS OXIMETRY 1: CPT

## 2025-03-13 PROCEDURE — 82962 GLUCOSE BLOOD TEST: CPT

## 2025-03-13 PROCEDURE — P9047: CPT

## 2025-03-13 PROCEDURE — 71275 CT ANGIOGRAPHY CHEST: CPT | Mod: MC

## 2025-03-13 PROCEDURE — 99232 SBSQ HOSP IP/OBS MODERATE 35: CPT

## 2025-03-13 PROCEDURE — 70450 CT HEAD/BRAIN W/O DYE: CPT | Mod: MC

## 2025-03-13 PROCEDURE — 87641 MR-STAPH DNA AMP PROBE: CPT

## 2025-03-13 PROCEDURE — 87040 BLOOD CULTURE FOR BACTERIA: CPT

## 2025-03-13 RX ORDER — SCOPOLAMINE 1 MG/3D
1 PATCH, EXTENDED RELEASE TRANSDERMAL
Refills: 0 | Status: DISCONTINUED | OUTPATIENT
Start: 2025-03-13 | End: 2025-03-13

## 2025-03-13 RX ADMIN — Medication 2 MILLIGRAM(S): at 05:01

## 2025-03-13 RX ADMIN — Medication 2 MILLIGRAM(S): at 05:31

## 2025-03-13 RX ADMIN — Medication 2 MILLIGRAM(S): at 09:14

## 2025-03-13 RX ADMIN — SCOPOLAMINE 1 PATCH: 1 PATCH, EXTENDED RELEASE TRANSDERMAL at 12:09

## 2025-03-13 RX ADMIN — Medication 2 MILLIGRAM(S): at 01:34

## 2025-03-13 RX ADMIN — Medication 2 MILLIGRAM(S): at 12:58

## 2025-03-13 RX ADMIN — Medication 2 MILLIGRAM(S): at 12:28

## 2025-03-13 RX ADMIN — Medication 2 MILLIGRAM(S): at 08:44

## 2025-03-13 RX ADMIN — Medication 2 MILLIGRAM(S): at 01:04

## 2025-03-13 NOTE — PROGRESS NOTE ADULT - NUTRITIONAL ASSESSMENT
This patient has been assessed with a concern for Malnutrition and has been determined to have a diagnosis/diagnoses of Severe protein-calorie malnutrition and Underweight (BMI < 19).    This patient is being managed with:   Diet NPO with Tube Feed-  Tube Feeding Modality: Orogastric  Vital 1.5 Jf  Total Volume for 24 Hours (mL): 900  Continuous  Starting Tube Feed Rate {mL per Hour}: 40  Increase Tube Feed Rate by (mL): 10     Every 4 hours  Until Goal Tube Feed Rate (mL per Hour): 50  Tube Feed Duration (in Hours): 18  Tube Feed Start Time: 10:00  Tube Feed Stop Time: 04:00  Free Water Flush  Bolus   Total Volume per Flush (mL): 250   Frequency: Every 6 Hours  No Carb Prosource TF     Qty per Day:  1  Entered: Mar 10 2025 10:19AM  
This patient has been assessed with a concern for Malnutrition and has been determined to have a diagnosis/diagnoses of Severe protein-calorie malnutrition and Underweight (BMI < 19).    This patient is being managed with:   Diet NPO with Tube Feed-  Tube Feeding Modality: Orogastric  Vital 1.5 Jf  Total Volume for 24 Hours (mL): 960  Continuous  Starting Tube Feed Rate {mL per Hour}: 20  Increase Tube Feed Rate by (mL): 10     Every 8 hours  Until Goal Tube Feed Rate (mL per Hour): 40  Tube Feed Duration (in Hours): 24  Tube Feed Start Time: 00:00     Qty per Day:  1  Entered: Mar  8 2025  8:48AM  
This patient has been assessed with a concern for Malnutrition and has been determined to have a diagnosis/diagnoses of Severe protein-calorie malnutrition and Underweight (BMI < 19).    This patient is being managed with:   Diet NPO with Tube Feed-  Tube Feeding Modality: Orogastric  Vital 1.5 Jf  Total Volume for 24 Hours (mL): 960  Continuous  Starting Tube Feed Rate {mL per Hour}: 20  Increase Tube Feed Rate by (mL): 10     Every 8 hours  Until Goal Tube Feed Rate (mL per Hour): 40  Tube Feed Duration (in Hours): 24  Tube Feed Start Time: 00:00     Qty per Day:  1  Entered: Mar  8 2025  8:48AM  
This patient has been assessed with a concern for Malnutrition and has been determined to have a diagnosis/diagnoses of Severe protein-calorie malnutrition and Underweight (BMI < 19).    This patient is being managed with:   Diet Soft and Bite Sized-  Entered: Feb 27 2025  9:53AM  
This patient has been assessed with a concern for Malnutrition and has been determined to have a diagnosis/diagnoses of Severe protein-calorie malnutrition and Underweight (BMI < 19).    This patient is being managed with:   Diet Soft and Bite Sized-  Supplement Feeding Modality:  Oral  Ensure Plus High Protein Cans or Servings Per Day:  1       Frequency:  Two Times a day  Entered: Mar  1 2025 12:57PM  
This patient has been assessed with a concern for Malnutrition and has been determined to have a diagnosis/diagnoses of Severe protein-calorie malnutrition and Underweight (BMI < 19).    This patient is being managed with:   Diet NPO with Tube Feed-  Tube Feeding Modality: Orogastric  Vital 1.5 Jf  Total Volume for 24 Hours (mL): 960  Continuous  Starting Tube Feed Rate {mL per Hour}: 20  Increase Tube Feed Rate by (mL): 10     Every 8 hours  Until Goal Tube Feed Rate (mL per Hour): 40  Tube Feed Duration (in Hours): 24  Tube Feed Start Time: 00:00     Qty per Day:  1  Entered: Mar  8 2025  8:48AM  
This patient has been assessed with a concern for Malnutrition and has been determined to have a diagnosis/diagnoses of Severe protein-calorie malnutrition and Underweight (BMI < 19).    This patient is being managed with:   Diet NPO-  Entered: Mar  6 2025  1:37PM  
This patient has been assessed with a concern for Malnutrition and has been determined to have a diagnosis/diagnoses of Severe protein-calorie malnutrition and Underweight (BMI < 19).    This patient is being managed with:   Diet NPO-  Tube Feeding Modality: Orogastric  Vital 1.5 Jf  Total Volume for 24 Hours (mL): 1320  Continuous  Starting Tube Feed Rate {mL per Hour}: 10  Increase Tube Feed Rate by (mL): 10     Every 4 hours  Until Goal Tube Feed Rate (mL per Hour): 55  Tube Feed Duration (in Hours): 24  Tube Feed Start Time: 16:00  Entered: Mar  5 2025  4:57PM  
This patient has been assessed with a concern for Malnutrition and has been determined to have a diagnosis/diagnoses of Severe protein-calorie malnutrition and Underweight (BMI < 19).    This patient is being managed with:   Diet Soft and Bite Sized-  Entered: Feb 27 2025  9:53AM  
This patient has been assessed with a concern for Malnutrition and has been determined to have a diagnosis/diagnoses of Severe protein-calorie malnutrition and Underweight (BMI < 19).    This patient is being managed with:   Diet Soft and Bite Sized-  Supplement Feeding Modality:  Oral  Ensure Plus High Protein Cans or Servings Per Day:  1       Frequency:  Two Times a day  Entered: Mar  1 2025 12:57PM  
This patient has been assessed with a concern for Malnutrition and has been determined to have a diagnosis/diagnoses of Severe protein-calorie malnutrition and Underweight (BMI < 19).    This patient is being managed with:   Diet Soft and Bite Sized-  Supplement Feeding Modality:  Oral  Ensure Plus High Protein Cans or Servings Per Day:  1       Frequency:  Two Times a day  Entered: Mar  1 2025 12:57PM    This patient has been assessed with a concern for Malnutrition and has been determined to have a diagnosis/diagnoses of Severe protein-calorie malnutrition and Underweight (BMI < 19).    This patient is being managed with:   Diet Soft and Bite Sized-  Supplement Feeding Modality:  Oral  Ensure Plus High Protein Cans or Servings Per Day:  1       Frequency:  Two Times a day  Entered: Mar  1 2025 12:57PM  
This patient has been assessed with a concern for Malnutrition and has been determined to have a diagnosis/diagnoses of Severe protein-calorie malnutrition and Underweight (BMI < 19).    This patient is being managed with:   Diet NPO with Tube Feed-  Tube Feeding Modality: Orogastric  Vital 1.5 Jf  Total Volume for 24 Hours (mL): 960  Continuous  Starting Tube Feed Rate {mL per Hour}: 20  Increase Tube Feed Rate by (mL): 10     Every 8 hours  Until Goal Tube Feed Rate (mL per Hour): 40  Tube Feed Duration (in Hours): 24  Tube Feed Start Time: 00:00     Qty per Day:  1  Entered: Mar  8 2025  8:48AM    The following pending diet order is being considered for treatment of Severe protein-calorie malnutrition and Underweight (BMI < 19):  Diet NPO with Tube Feed-  Tube Feeding Modality: Orogastric  Vital 1.5 Jf  Total Volume for 24 Hours (mL): 900  Continuous  Starting Tube Feed Rate {mL per Hour}: 40  Increase Tube Feed Rate by (mL): 10     Every 4 hours  Until Goal Tube Feed Rate (mL per Hour): 50  Tube Feed Duration (in Hours): 18  Tube Feed Start Time: 10:00  Tube Feed Stop Time: 04:00  Free Water Flush  Bolus   Total Volume per Flush (mL): 250   Frequency: Every 6 Hours  No Carb Prosource TF     Qty per Day:  1  Entered: Mar 10 2025 10:19AM  
This patient has been assessed with a concern for Malnutrition and has been determined to have a diagnosis/diagnoses of Severe protein-calorie malnutrition and Underweight (BMI < 19).    This patient is being managed with:   Diet NPO-  Tube Feeding Modality: Orogastric  Glucerna 1.5 Jf  Total Volume for 24 Hours (mL): 1200  Continuous  Starting Tube Feed Rate {mL per Hour}: 30  Increase Tube Feed Rate by (mL): 10     Every 8 hours  Until Goal Tube Feed Rate (mL per Hour): 50  Tube Feed Duration (in Hours): 24  Tube Feed Start Time: 15:00  Entered: Mar  4 2025  2:48PM    The following pending diet order is being considered for treatment of Severe protein-calorie malnutrition and Underweight (BMI < 19):  Diet NPO-  Entered: Mar  4 2025 11:14AM  
This patient has been assessed with a concern for Malnutrition and has been determined to have a diagnosis/diagnoses of Severe protein-calorie malnutrition and Underweight (BMI < 19).    This patient is being managed with:   Diet Soft and Bite Sized-  Entered: Feb 27 2025  9:53AM  
This patient has been assessed with a concern for Malnutrition and has been determined to have a diagnosis/diagnoses of Severe protein-calorie malnutrition and Underweight (BMI < 19).    This patient is being managed with:   Diet NPO with Tube Feed-  Tube Feeding Modality: Orogastric  Vital 1.5 Jf  Total Volume for 24 Hours (mL): 900  Continuous  Starting Tube Feed Rate {mL per Hour}: 40  Increase Tube Feed Rate by (mL): 10     Every 4 hours  Until Goal Tube Feed Rate (mL per Hour): 50  Tube Feed Duration (in Hours): 18  Tube Feed Start Time: 10:00  Tube Feed Stop Time: 04:00  Free Water Flush  Bolus   Total Volume per Flush (mL): 250   Frequency: Every 6 Hours  No Carb Prosource TF     Qty per Day:  1  Entered: Mar 10 2025 10:19AM  
This patient has been assessed with a concern for Malnutrition and has been determined to have a diagnosis/diagnoses of Severe protein-calorie malnutrition and Underweight (BMI < 19).    This patient is being managed with:   Diet Soft and Bite Sized-  Supplement Feeding Modality:  Oral  Ensure Plus High Protein Cans or Servings Per Day:  1       Frequency:  Two Times a day  Entered: Mar  3 2025  4:58PM

## 2025-03-13 NOTE — PROGRESS NOTE ADULT - SUBJECTIVE AND OBJECTIVE BOX
Follow-up Critical Care Progress Note  Chief Complaint : Acute respiratory failure with hypoxia        patient seen and examined  overnight family bedside decided on palliative wean  paient on prn morphine  on n/c o2  lethargic, agonally breathing  unable to provide history or ROS      Allergies :No Known Allergies      PAST MEDICAL & SURGICAL HISTORY:  Advanced COPD    On supplemental oxygen by nasal cannula    Arthritis    Cancer, colon    HTN (hypertension)    No significant past surgical history        Medications:  MEDICATIONS  (STANDING):  albuterol    0.083% 2.5 milliGRAM(s) Nebulizer every 6 hours  aluminum hydroxide/magnesium hydroxide/simethicone Suspension 30 milliLiter(s) Oral every 6 hours  ascorbic acid 1000 milliGRAM(s) Oral daily  bisacodyl Suppository 10 milliGRAM(s) Rectal every 24 hours  chlorhexidine 2% Cloths 1 Application(s) Topical <User Schedule>  dextrose 5%. 1000 milliLiter(s) (100 mL/Hr) IV Continuous <Continuous>  dextrose 5%. 1000 milliLiter(s) (50 mL/Hr) IV Continuous <Continuous>  doxazosin 2 milliGRAM(s) Oral at bedtime  insulin glargine Injectable (LANTUS) 10 Unit(s) SubCutaneous every morning  multivitamin 1 Tablet(s) Oral daily  nystatin Powder 1 Application(s) Topical every 12 hours  polyethylene glycol 3350 17 Gram(s) Oral daily  predniSONE   Tablet   Oral   scopolamine 1 mG/72 Hr(s) Patch 1 Patch Transdermal every 72 hours  senna Syrup 15 milliLiter(s) Oral at bedtime    MEDICATIONS  (PRN):  morphine  - Injectable 2 milliGRAM(s) IV Push every 2 hours PRN SOB with Mod-severe pain      Antibiotics History  cefepime   IVPB 2000 milliGRAM(s) IV Intermittent every 8 hours, 02-27-25 @ 08:41, Stop order after: 5 Days  cefepime   IVPB 1000 milliGRAM(s) IV Intermittent every 12 hours, 02-26-25 @ 16:16  cefepime   IVPB    , 02-26-25 @ 13:23  cefepime   IVPB 2000 milliGRAM(s) IV Intermittent once, 02-26-25 @ 13:32, Stop order after: 1 Doses  fluconAZOLE   Tablet 100 milliGRAM(s) Oral every 24 hours, 03-02-25 @ 14:15, Stop order after: 7 Doses  oseltamivir 75 milliGRAM(s) Oral two times a day, 02-27-25 @ 08:11, Stop order after: 5 Days  oseltamivir Suspension 75 milliGRAM(s) Oral every 12 hours, 02-27-25 @ 09:53  oseltamivir Suspension 75 milliGRAM(s) Oral every 12 hours, 02-27-25 @ 10:24, Stop order after: 5 Days  vancomycin  IVPB 750 milliGRAM(s) IV Intermittent Once, 02-26-25 @ 16:23, Stop order after: 1 Doses  vancomycin  IVPB. 1000 milliGRAM(s) IV Intermittent once, 02-26-25 @ 13:32, Stop order after: 1 Doses      Heme Medications       GI Medications  aluminum hydroxide/magnesium hydroxide/simethicone Suspension 30 milliLiter(s) Oral every 6 hours, 03-06-25 @ 11:16, Now  bisacodyl Suppository 10 milliGRAM(s) Rectal every 24 hours, 03-03-25 @ 07:27, Now  polyethylene glycol 3350 17 Gram(s) Oral daily, 03-02-25 @ 12:16, Routine  senna Syrup 15 milliLiter(s) Oral at bedtime, 03-06-25 @ 07:59, Routine      COVID  02-26-25 @ 13:30  COVID -   NotDetec      COVID Biomarkers    03-03-25 @ 06:00 ESR --  ---  CRP --  ---  DDimer  --   ---   LDH --   ---   Ferritin 121         Trend BNP  02-26-25 @ 13:30   -  198    Procalcitonin Trend  02-26-25 @ 13:30   -   0.10[H]    WBC Trend  03-12-25 @ 05:55   -  10.76[H]  03-11-25 @ 06:00   -  10.39    H/H Trend  03-12-25 @ 05:55   -   7.9[L]/ 24.9[L]  03-11-25 @ 06:00   -   7.9[L]/ 25.1[L]  03-10-25 @ 05:09   -   8.4[L]/ 26.5[L]  03-09-25 @ 06:00   -   8.9[L]/ 27.5[L]  03-08-25 @ 05:00   -   10.9[L]/ 33.2[L]  03-07-25 @ 17:30   -   9.6[L]/ 29.6[L]    Stool Occult Blood  03-04-25 @ 14:30   -   Positive[!]    Platelet Trend  03-12-25 @ 05:55   -  104[L]  03-11-25 @ 06:00   -  91[L]    Trend Sodium  03-12-25 @ 05:55   -  137  03-11-25 @ 06:00   -  138    Trend Potassium  03-12-25 @ 05:55   -  4.8  03-11-25 @ 06:00   -  4.3    Trend Bun/Cr  03-12-25 @ 05:55  BUN/CR -  19 / 0.39[L]  03-11-25 @ 06:00  BUN/CR -  17 / 0.42[L]    Lactic Acid Trend    ABG Trend  03-08-25 @ 15:28   - 7.45/36[H]/87/98.1[H]  03-08-25 @ 05:07   - 7.46[H]/39[H]/152[H]/99.4[H]  03-07-25 @ 04:50   - 7.43/50[H]/113[H]/99.4[H]  03-06-25 @ 10:45   - 7.41/50[H]/106/99.2[H]  03-05-25 @ 06:00   - 7.38/55[H]/76[L]/98.2[H]  03-04-25 @ 12:15   - 7.43/50[H]/67[L]/95.9  03-04-25 @ 08:00   - 7.40/56[H]/60[L]/93.7[L]  03-03-25 @ 15:18   - 7.35/58[H]/58[L]/91.7[L]  03-03-25 @ 14:05   - 7.30[L]/73[HH]/85/98.6[H]  03-03-25 @ 04:10   - 7.44/52[H]/74[L]/97.1  03-02-25 @ 05:00   - 7.37/62[H]/84/97.5  03-01-25 @ 05:00   - 7.51[H]/49[H]/61[L]/93.8[L]    Trend AST/ALT/ALK Phos/Bili  03-12-25 @ 05:55   28/33/76/0.4  03-11-25 @ 06:00   24/27/66/0.5  03-10-25 @ 05:09   22/30/62/0.5  03-09-25 @ 06:00   18/31/66/0.6  03-08-25 @ 05:00   15/27/75/0.5  03-07-25 @ 05:15   19/27/75/0.4  03-03-25 @ 06:00   11/27/79/0.6  03-02-25 @ 06:15   12/22/89/0.6  03-01-25 @ 05:04   14/22/87/0.7  02-28-25 @ 05:30   16/17/90/0.7  02-27-25 @ 05:51   15/19/105/0.7  02-26-25 @ 13:30   20/24/109/0.8       Albumin Trend  03-12-25 @ 05:55   -   1.7[L]  03-11-25 @ 06:00   -   1.6[L]  03-10-25 @ 05:09   -   2.0[L]  03-09-25 @ 06:00   -   2.5[L]  03-08-25 @ 05:00   -   2.1[L]  03-07-25 @ 05:15   -   1.8[L]      PTT - PT - INR Trend  02-26-25 @ 13:30   -   27.1 - 11.3 - 0.96    Glucose Trend  03-12-25 @ 17:27   -  -- -- 218[H]  03-12-25 @ 11:24   -  -- -- 147[H]  03-12-25 @ 07:37   -  -- -- 119[H]  03-12-25 @ 06:00   -  -- -- 167[H]  03-12-25 @ 05:55   -  163[H] -- --  03-11-25 @ 23:49   -  -- -- 200[H]  03-11-25 @ 17:00   -  -- -- 232[H]  03-11-25 @ 12:15   -  -- -- 181[H]  03-11-25 @ 08:31   -  -- -- 145[H]  03-11-25 @ 06:19   -  -- -- 163[H]    A1C with Estimated Average Glucose Result: 6.0 % *H* [4.0 - 5.6] (03-04-25 @ 06:00)  A1C with Estimated Average Glucose Result: 6.6 % *H* [4.0 - 5.6] (02-27-25 @ 05:51)      LABS:                        7.9    10.76 )-----------( 104      ( 12 Mar 2025 05:55 )             24.9     03-12    137  |  102  |  19  ----------------------------<  163[H]  4.8   |  33[H]  |  0.39[L]    Ca    8.3[L]      12 Mar 2025 05:55  Phos  1.4     03-12  Mg     2.3     03-12    TPro  4.6[L]  /  Alb  1.7[L]  /  TBili  0.4  /  DBili  x   /  AST  28  /  ALT  33  /  AlkPhos  76  03-12             CULTURES: (if applicable)    Culture - Sputum (collected 03-07-25 @ 09:33)  Source: Sputum Sputum  Gram Stain (03-08-25 @ 00:57):    Moderate polymorphonuclear leukocytes per low power field    No Squamous epithelial cells per low power field    No organisms seen per oil power field  Final Report (03-08-25 @ 21:51):    Commensal annetta consistent with body site    Culture - Sputum (collected 03-04-25 @ 11:57)  Source: Sputum Sputum  Gram Stain (03-05-25 @ 00:07):    Few polymorphonuclear leukocytes per low power field    No Squamous epithelial cells per low power field    Rare Yeast like cells per oil power field  Final Report (03-06-25 @ 08:20):    Commensal annetta consistent with body site           VITALS:  T(C): 37.1 (03-13-25 @ 07:00), Max: 37.4 (03-12-25 @ 21:00)  T(F): 98.8 (03-13-25 @ 07:00), Max: 99.3 (03-12-25 @ 21:00)  HR: 113 (03-13-25 @ 07:00) (80 - 118)  BP: 109/55 (03-13-25 @ 07:00) (93/57 - 150/70)  BP(mean): 70 (03-13-25 @ 07:00) (67 - 102)  ABP: --  ABP(mean): --  RR: 28 (03-13-25 @ 07:00) (13 - 28)  SpO2: 94% (03-13-25 @ 00:00) (86% - 100%)  CVP(mm Hg): --  CVP(cm H2O): --    Ins and Outs     03-12-25 @ 07:01  -  03-13-25 @ 07:00  --------------------------------------------------------  IN: 1215 mL / OUT: 1450 mL / NET: -235 mL            Device: Avea, Mode: AC/ CMV (Assist Control/ Continuous Mandatory Ventilation), RR (machine): 26, RR (patient): 26, TV (machine): 400, TV (patient): 360, FiO2: 40, PEEP: 4, ITime: 1, MAP: 16, PIP: 30    I&O's Detail    12 Mar 2025 07:01  -  13 Mar 2025 07:00  --------------------------------------------------------  IN:    Enteral Tube Flush: 220 mL    Free Water: 500 mL    Propofol: 45 mL    Vital1.5: 450 mL  Total IN: 1215 mL    OUT:    Post-Void Residual per Intermittent Catheterization (mL): 1450 mL  Total OUT: 1450 mL    Total NET: -235 mL

## 2025-03-13 NOTE — PROGRESS NOTE ADULT - REASON FOR ADMISSION
SOB/AMS

## 2025-03-13 NOTE — DISCHARGE NOTE FOR THE EXPIRED PATIENT - HOSPITAL COURSE
69 year old female with PMH oxygen dependent COPD, rheumatoid arthritis, colon cancer, recent rig fractures, neuropathy, HTN and history of PE who presented to [facility] ED on 2/26 from [facility] with progressively worsening SOB and change in mental status.  Of note, patient had just been hospitalized at [facility] 2/3-10/2025 and 3/17-18/2025 for both viral pneumonia and COPD exacerbation.  In ED found to have acute chronic hypercarbic respiratory failure, diagnosed with influenza A.Flu.    Admitted to ICU for BiPAP support.  Initially weaned from BiPAP but clinically worsened and intubated on 3/4.  ICU course marked by episodes of anemia requiring blood transfusion.  Had upper and lower endoscopies with no significant findings.  Developed likely ruptured bleb, left pigtail catheter placed.  Had bradycardic PEA arrest and underwent code Blue with ROSC.      Patient treated with antibiotics and high dose steroid therapy, did not make significant clinical improvement.  Patient reached juncture where she would require tracheostomy due to inability to wean from vent.  Sedation held, patient asked if she wanted to continue on mechanical ventilation, said no.  Palliatively extubated yesterday, today noted asystolic on cardiac monitor at 16:51. 69 year old female with PMH oxygen dependent COPD, rheumatoid arthritis, colon cancer, neuropathy, HTN and history of PE who presented to [facility] ED on 2/26 from [facility] with progressively worsening SOB and change in mental status.  Of note, patient had just been hospitalized at [facility] 2/3-10/2025 and 3/17-18/2025 for both viral pneumonia and COPD exacerbation.  In ED found to have acute chronic hypercarbic respiratory failure, diagnosed with influenza A.Flu.    Admitted to ICU for BiPAP support.  Initially weaned from BiPAP but clinically worsened and intubated on 3/4.  ICU course marked by episodes of anemia requiring blood transfusion.  Had upper and lower endoscopies with no significant findings.  Developed likely ruptured bleb, left pigtail catheter placed.  Had bradycardic PEA arrest and underwent code Blue with ROSC.      Patient treated with antibiotics and high dose steroid therapy, did not make significant clinical improvement.  Patient reached juncture where she would require tracheostomy due to inability to wean from vent.  Sedation held, patient asked if she wanted to continue on mechanical ventilation, said no.  Palliatively extubated yesterday, today noted asystolic on cardiac monitor at 16:51. 69 year old female with PMH oxygen dependent COPD, rheumatoid arthritis, colon cancer, neuropathy, HTN and history of PE who presented to [facility] ED on 2/26 from [facility] with progressively worsening SOB and change in mental status.  Of note, patient had just been hospitalized at [facility] 2/3-10/2025 and 3/17-18/2025 for both viral pneumonia and COPD exacerbation.  In ED found to have acute chronic hypercarbic respiratory failure, diagnosed with influenza A.Flu.    Admitted to ICU for BiPAP support.  Initially weaned from BiPAP but clinically worsened and intubated on 3/4.  ICU course marked by episodes of anemia requiring blood transfusion.  Had upper and lower endoscopies with no significant findings.  Developed likely ruptured bleb with left pntx s/p left pigtail catheter placed.  Had bradycardic PEA arrest and underwent code Blue with ROSC.      Patient treated with antibiotics and high dose steroid therapy, did not make significant clinical improvement.  Patient reached juncture where she would require tracheostomy due to inability to wean from vent.  Sedation held, patient asked if she wanted to continue on mechanical ventilation, said no.  Palliatively extubated yesterday, today noted asystolic on cardiac monitor at 16:51.    For full account of hospital course please refer to actual medical records. As this is a brief summery.

## 2025-03-13 NOTE — PROGRESS NOTE ADULT - PROVIDER SPECIALTY LIST ADULT
Critical Care
Heme/Onc
Critical Care
Critical Care
Heme/Onc
Heme/Onc
Critical Care
Critical Care
Heme/Onc
Palliative Care
Critical Care
Heme/Onc
Palliative Care
Critical Care
Gastroenterology
Palliative Care
Gastroenterology
Palliative Care
Palliative Care

## 2025-03-13 NOTE — PROGRESS NOTE ADULT - ASSESSMENT
69 year old female with history of HTN, COPD on home O2(2-4 liter), RA, colon CA s/p CTx, who was admitted on 2/26/25 for acute respiratory failure secondary to flu/PNA. Palliative medicine team was consulted to assist with GOC.    1. Patient is s/p palliative extubation on 3/12/25. On comfort care.     2. Advanced directives   - surrogate:    - code status: DNR/DNI, comfort care     3. Palliative medicine encounter  -  will f/u for ongoing support.

## 2025-03-13 NOTE — PROGRESS NOTE ADULT - ASSESSMENT
Physical Examination:  GENERAL:               stupeous , hypoventilating  + acute distress.    HEENT:                    Pupils equal, reactive to light.  constricted. No JVD, dry MM  PULM:                     Bilateral air entry, course to auscultation bilaterally, no significant sputum production, No Rales, No Rhonchi, No Wheezing  CVS:                         S1, S2,  + Murmur  ABD:                        Soft, nondistended, nontender, normoactive bowel sounds,   EXT:                         No edema, nontender, No Cyanosis or Clubbing    NEURO:                 stupeous minimally responsive  PSYC:                      Calm, no Insight.        Assessment  1. Acute Hypoxic and hypercarbic respiratory failure s/p intubation 3/4/2025  2. S/p Brief cardiac arrest 3/8/2025 s/p 1 round,   3. Due to Acute flu A and Left lower lobe Pna and Left rib fx  4. Left pntx s/p pigtail chest tube on Left 3/8/2025  5. Chronic Advanced COPD with supplemental oxygen by nasal cannula  6. RA  7. h/o colon cancer  8. h/o PE  off a/c now with Segmental and subsegmental pulmonary embolism right lower lobe, and b/l Calf dvt on Duplex.  9. Anemia       Plan  now extubated palliative measures  on n/c o2  pain control with morphine , may require morphine drip  NPO  d/c all oral meds  continue chest tube to suction -20, no leak today,   continue supportive care  palliative care f/u    PMD:				                   Notified(Date):  Family Updated: 	Harjinder [phone]	                               Date: 3/12-13/2025  Family bedside also updated.     Sedation & Analgesia:	morphine  Diet/Nutrition:		 NPO  GI PPx:			n/a  DVT Ppx:		n/a       Activity:		  Bedrest  Head of Bed:               35-45 Deg  Glycemic Control:         n/a    Lines: PIV     Restraints were deemed necessary to prevent removal of life-sustaining devices [  ] YES   [ x   ]  NO  Disposition: Can transition to   Goals of Care: DNR/I , Comofrt care

## 2025-03-13 NOTE — PROGRESS NOTE ADULT - SUBJECTIVE AND OBJECTIVE BOX
SUBJECTIVE AND OBJECTIVE:  Indication for Geriatrics and Palliative Care Services/INTERVAL HPI:    Palliative extubation yesterday. Patient on comfort care. Appears to be comfortable,  her son at the bedside. Emotional support and empathetic listening provided.       Allergies    No Known Allergies    Intolerances    MEDICATIONS  (STANDING):  chlorhexidine 2% Cloths 1 Application(s) Topical <User Schedule>  dextrose 5%. 1000 milliLiter(s) (100 mL/Hr) IV Continuous <Continuous>  dextrose 5%. 1000 milliLiter(s) (50 mL/Hr) IV Continuous <Continuous>  nystatin Powder 1 Application(s) Topical every 12 hours  scopolamine 1 mG/72 Hr(s) Patch 1 Patch Transdermal every 72 hours    MEDICATIONS  (PRN):  morphine  - Injectable 2 milliGRAM(s) IV Push every 2 hours PRN SOB with Mod-severe pain      ITEMS UNCHECKED ARE NOT PRESENT    PRESENT SYMPTOMS: [X ]Unable to self-report   Source if other than patient:  [ ]Family   [ ]Team     Pain:  [ ]yes [ ]no  QOL impact -   Location -                    Aggravating factors -  Quality -  Radiation -  Timing-  Severity (0-10 scale):  Minimal acceptable level (0-10 scale):     Dyspnea:                           [ ]Mild [ ]Moderate [ ]Severe  Anxiety:                             [ ]Mild [ ]Moderate [ ]Severe  Fatigue:                             [ ]Mild [ ]Moderate [ ]Severe  Nausea:                             [ ]Mild [ ]Moderate [ ]Severe  Loss of appetite:              [ ]Mild [ ]Moderate [ ]Severe  Constipation:                    [ ]Mild [ ]Moderate [ ]Severe      Other Symptoms:  [ ]All other review of systems negative     Chaplaincy Referral: [ ] yes [ ] refused [ ] following [ ] Deferred   Palliative Performance Status Version 2:         %      http://npcrc.org/files/news/palliative_performance_scale_ppsv2.pdf    PHYSICAL EXAM:  Vital Signs Last 24 Hrs  T(C): 36 (13 Mar 2025 12:00), Max: 37.4 (12 Mar 2025 21:00)  T(F): 96.8 (13 Mar 2025 12:00), Max: 99.3 (12 Mar 2025 21:00)  HR: 102 (13 Mar 2025 12:00) (80 - 113)  BP: 103/53 (13 Mar 2025 11:00) (93/57 - 149/86)  BP(mean): 68 (13 Mar 2025 11:00) (67 - 102)  RR: 24 (13 Mar 2025 12:00) (13 - 39)  SpO2: 94% (13 Mar 2025 00:00) (86% - 100%)    Parameters below as of 12 Mar 2025 23:00  Patient On (Oxygen Delivery Method): nasal cannula  O2 Flow (L/min): 3   I&O's Summary    12 Mar 2025 07:01  -  13 Mar 2025 07:00  --------------------------------------------------------  IN: 1215 mL / OUT: 1450 mL / NET: -235 mL    13 Mar 2025 07:01  -  13 Mar 2025 13:14  --------------------------------------------------------  IN: 0 mL / OUT: 2900 mL / NET: -2900 mL       GENERAL: [ ]Cachexia    [ ]Alert  [ ]Oriented x   [ ]Lethargic  [X ]Unarousable  [ ]Verbal  [X]Non-Verbal  Behavioral:   [ ]Anxiety  [ ]Delirium [ ]Agitation [ ]Other  HEENT:  [ ]Normal   [ ]Dry mouth   [ ]ET Tube/Trach  [ ]Oral lesions  PULMONARY: no respiratory distress  CARDIOVASCULAR:    [ ]Regular [ ]Irregular [ ]Tachy  [ ]Isrrael [ ]Murmur [ ]Other  GASTROINTESTINAL:  [ ]Soft  [ ]Distended   [ ]+BS  [ ]Non tender [ ]Tender  [ ]Other [ ]PEG [ ]OGT/ NGT   Last BM:   GENITOURINARY:  [ ]Normal [ ]Incontinent   [ ]Oliguria/Anuria   [ ]Diaz  MUSCULOSKELETAL:   [ ]Normal   [ ]Weakness  [ ]Bed/Wheelchair bound [ ]Edema  NEUROLOGIC:   [ ]No focal deficits  [ ] Cognitive impairment  [ ] Dysphagia [ ]Dysarthria [ ] Paresis [ ]Other   SKIN:   [ ]Normal  [ ]Rash  [ ]Other  [ ]Pressure ulcer(s) [ ]y [ ]n present on admission    CRITICAL CARE:  [ ]Shock Present  [ ]Septic [ ]Cardiogenic [ ]Neurologic [ ]Hypovolemic  [ ]Vasopressors [ ]Inotropes  [ ]Respiratory failure present [ ]Mechanical Ventilation [ ]Non-invasive ventilatory support [ ]High-Flow Mode: AC/ CMV (Assist Control/ Continuous Mandatory Ventilation), RR (machine): 26, TV (machine): 400, FiO2: 40, PEEP: 4, ITime: 1, MAP: 16, PIP: 30  [ ]Acute  [ ]Chronic [ ]Hypoxic  [ ]Hypercarbic [ ]Other  [ ]Other organ failure     LABS:                        7.9    10.76 )-----------( 104      ( 12 Mar 2025 05:55 )             24.9   03-12    137  |  102  |  19  ----------------------------<  163[H]  4.8   |  33[H]  |  0.39[L]    Ca    8.3[L]      12 Mar 2025 05:55  Phos  1.4     03-12  Mg     2.3     03-12    TPro  4.6[L]  /  Alb  1.7[L]  /  TBili  0.4  /  DBili  x   /  AST  28  /  ALT  33  /  AlkPhos  76  03-12      Urinalysis Basic - ( 12 Mar 2025 05:55 )    Color: x / Appearance: x / SG: x / pH: x  Gluc: 163 mg/dL / Ketone: x  / Bili: x / Urobili: x   Blood: x / Protein: x / Nitrite: x   Leuk Esterase: x / RBC: x / WBC x   Sq Epi: x / Non Sq Epi: x / Bacteria: x      RADIOLOGY & ADDITIONAL STUDIES:    Protein Calorie Malnutrition Present: [ ]mild [ ]moderate [ ]severe [ ]underweight [ ]morbid obesity  https://www.andeal.org/vault/2440/web/files/ONC/Table_Clinical%20Characteristics%20to%20Document%20Malnutrition-White%20JV%20et%20al%202012.pdf    Height (cm): 157.5 (02-26-25 @ 13:19)  Weight (kg): 54.4 (02-26-25 @ 13:19)  BMI (kg/m2): 21.9 (02-26-25 @ 13:19)    [ ]PPSV2 < or = 30%  [ ]significant weight loss [ ]poor nutritional intake [ ]anasarca[ ]Artificial Nutrition    Other REFERRALS:  [ ]Hospice  [ ]Child Life  [ ]Social Work  [ ]Case management [ ]Holistic Therapy

## 2025-03-25 RX ORDER — ACETAMINOPHEN 500 MG/5ML
2 LIQUID (ML) ORAL
Refills: 0 | DISCHARGE

## 2025-03-25 RX ORDER — AMLODIPINE BESYLATE 10 MG/1
10 TABLET ORAL
Refills: 0 | DISCHARGE

## 2025-03-25 RX ORDER — AZITHROMYCIN 250 MG
500 CAPSULE ORAL
Refills: 0 | DISCHARGE

## 2025-03-25 RX ORDER — METOPROLOL SUCCINATE 50 MG/1
0.5 TABLET, EXTENDED RELEASE ORAL
Refills: 0 | DISCHARGE

## 2025-03-25 RX ORDER — CEFTRIAXONE 500 MG/1
1 INJECTION, POWDER, FOR SOLUTION INTRAMUSCULAR; INTRAVENOUS
Refills: 0 | DISCHARGE
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
## (undated) DEVICE — CATH IV SAFE BC 20G X 1.16" (PINK)

## (undated) DEVICE — TROCAR COVIDIEN VERSAPORT BLADELESS OPTICAL 5MM STANDARD

## (undated) DEVICE — DRAPE MAYO STAND 23"

## (undated) DEVICE — VENODYNE/SCD SLEEVE CALF LARGE

## (undated) DEVICE — TUBING SUCTION CONN 6FT STERILE

## (undated) DEVICE — TUBING CANNULA SALTER LABS NASAL ADULT 7FT

## (undated) DEVICE — SNARE POLYP SENS 27MM 240CM

## (undated) DEVICE — SUT SOFSILK 3-0 30" V-20

## (undated) DEVICE — VENODYNE/SCD SLEEVE CALF MEDIUM

## (undated) DEVICE — LAP PAD 4 X 18"

## (undated) DEVICE — KIT ENDO PROCEDURE CUST W/VLV

## (undated) DEVICE — FORCEP RADIAL JAW 4 JUMBO 2.8MM 3.2MM 240CM ORANGE DISP

## (undated) DEVICE — D HELP - CLEARVIEW CLEARIFY SYSTEM

## (undated) DEVICE — SENSOR O2 FINGER XL ADULT 24/BX 6BX/CA

## (undated) DEVICE — ELCTR BOVIE TIP BLADE INSULATED 2.75" EDGE

## (undated) DEVICE — SUT POLYSORB 2-0 30" GS-21 UNDYED

## (undated) DEVICE — ELCTR BOVIE PENCIL SMOKE EVACUATION

## (undated) DEVICE — SUT MONOCRYL 4-0 27" PS-2 UNDYED

## (undated) DEVICE — TUBING IV SET GRAVITY 3Y 100" MACRO

## (undated) DEVICE — TRAP SPECIMEN SPUTUM 40CC

## (undated) DEVICE — TUBING CAP SET ERBEFLO CLEVERCAP HYBRID CO2 FOR OLYMPUS SCOPES AND UCR

## (undated) DEVICE — POSITIONER PINK PAD PIGAZZI SYSTEM

## (undated) DEVICE — WARMING BLANKET UPPER ADULT

## (undated) DEVICE — TROCAR COVIDIEN ENDO CLOSE SUTURING DEVICE

## (undated) DEVICE — STAPLER COVIDIEN ENDO GIA STANDARD HANDLE

## (undated) DEVICE — NDL HYPO REGULAR BEVEL 25G X 1.5" (BLUE)

## (undated) DEVICE — TROCAR COVIDIEN VERSAONE BLUNT TIP HASSAN 12MM

## (undated) DEVICE — SUT MAXON 0 27" T-12

## (undated) DEVICE — MARKER ENDO SPOT EX

## (undated) DEVICE — FORMALIN CUPS 10% BUFFERED

## (undated) DEVICE — PLV-SCD MACHINE: Type: DURABLE MEDICAL EQUIPMENT

## (undated) DEVICE — PACK GENERAL LAPAROSCOPY

## (undated) DEVICE — CLAMP BX HOT RAD JAW 3

## (undated) DEVICE — GOWN SMARTGOWN RAGLAN XLG

## (undated) DEVICE — DRAPE 3/4 SHEET W REINFORCEMENT 56X77"

## (undated) DEVICE — Device

## (undated) DEVICE — GLV 7 PROTEXIS (WHITE)

## (undated) DEVICE — SUT POLYSORB 3-0 36" GS-21 UNDYED

## (undated) DEVICE — SUCTION YANKAUER TAPERED BULBOUS NO VENT

## (undated) DEVICE — TUBING STRYKER PNEUMOSURE HEATED RTP

## (undated) DEVICE — CATH ELECHMSTAT  INJ 7FR 210CM

## (undated) DEVICE — DRAPE FLUID WARMER 44 X 66"

## (undated) DEVICE — NDL INJ SCLERO INTERJECT 23G

## (undated) DEVICE — POLY TRAP ETRAP

## (undated) DEVICE — SHEARS COVIDIEN ENDO SHEAR 5MM X 31CM W UNIPOLAR CAUTERY

## (undated) DEVICE — SOL IRR BAG H2O 1000ML

## (undated) DEVICE — TRAP QUICK CATCH  SINGL CHAMBER

## (undated) DEVICE — PLV/PSP-SUCTION IRRIGATOR STRYKER: Type: DURABLE MEDICAL EQUIPMENT

## (undated) DEVICE — GOWN LG

## (undated) DEVICE — BLADE SCALPEL SAFETYLOCK #15

## (undated) DEVICE — PACK MINOR WITH LAP

## (undated) DEVICE — BRUSH COLONOSCOPY CYTOLOGY

## (undated) DEVICE — CANISTER SUCTION 1200CC 10/SL

## (undated) DEVICE — TUBING STRYKER PNEUMOCLEAR HIGH FLOW

## (undated) DEVICE — ELCTR GROUNDING PAD ADULT COVIDIEN

## (undated) DEVICE — PACK IV START WITH CHG

## (undated) DEVICE — TROCAR COVIDIEN VERSAONE FIXATION CANNULA 5MM

## (undated) DEVICE — SNARE LRG

## (undated) DEVICE — ENDOCUFF VISION SZ 3 SM PRPL

## (undated) DEVICE — SYR LUER SLIP TIP 50CC

## (undated) DEVICE — TROCAR COVIDIEN VERSAPORT BLADELESS OPTICAL 12MM STANDARD

## (undated) DEVICE — SYR LUER SLIP TIP 30CC

## (undated) DEVICE — PREP BETADINE KIT

## (undated) DEVICE — DRSG DERMABOND 0.7ML

## (undated) DEVICE — CATH ELCTR GLIDE PRB 7FR

## (undated) DEVICE — ELCTR BOVIE TIP BLADE INSULATED 6.5" EDGE

## (undated) DEVICE — SOL IRR NS 0.9% 250ML

## (undated) DEVICE — SYR LUER LOK 10CC

## (undated) DEVICE — FORCEP RADIAL JAW 4 W NDL 2.4MM 2.8MM 240CM ORANGE DISP

## (undated) DEVICE — SET IV PUMP BLOOD 1VALVE 180FILTER NON-DEHP

## (undated) DEVICE — TUBE O2 SUPL CRUSH RESIS CONN SOUTHSIDE ONLY

## (undated) DEVICE — TUBING IV SET SECONDARY 34"

## (undated) DEVICE — ENDOCUFF VISION SZ 2 LG GRN

## (undated) DEVICE — PLV/PSP-ESU T7E14761DX: Type: DURABLE MEDICAL EQUIPMENT

## (undated) DEVICE — DRAPE LAVH 124" X 30" X125"

## (undated) DEVICE — VALVE BIOPSY

## (undated) DEVICE — SUT POLYSORB 0 30" GU-46

## (undated) DEVICE — STRYKER INTERPULSE HANDPIECE W IRR SUCTION TUBE

## (undated) DEVICE — SOL IRR POUR H2O 500ML

## (undated) DEVICE — SUT POLYSORB 3-0 30" V-20 UNDYED

## (undated) DEVICE — SYR ASEPTO

## (undated) DEVICE — DRSG CURITY GAUZE SPONGE 4 X 4" 12-PLY

## (undated) DEVICE — RETRIEVER ROTH NET PLATINUM-UNIVERSAL

## (undated) DEVICE — MASK O2 NON REBREATH 3IN1 ADULT

## (undated) DEVICE — SHEARS HARMONIC 1100 5MM X 36CM CURVED TIP

## (undated) DEVICE — CATH IV SAFE BC 22G X 1" (BLUE)

## (undated) DEVICE — STERIS DEFENDO 3-PIECE KIT (AIR/WATER, SUCTION & BIOPSY VALVES)

## (undated) DEVICE — SOL IRR POUR H2O 1000ML

## (undated) DEVICE — SYR IV POSIFLUSH NS 3ML 30/TY

## (undated) DEVICE — TROCAR COVIDIEN VERSAPORT BLADELESS OPTICAL 11MM STANDARD

## (undated) DEVICE — TUBE RECTAL 24FR

## (undated) DEVICE — DRAPE TOWEL BLUE 17" X 24"

## (undated) DEVICE — SUT HEWSON RETRIEVER

## (undated) DEVICE — SOL IRR BAG NS 0.9% 3000ML

## (undated) DEVICE — MASK LRG MED AND HIGH O2 CONC M TO M 10FT

## (undated) DEVICE — SOL IRR POUR NS 0.9% 1000ML

## (undated) DEVICE — FOLEY TRAY 16FR LF URINE METER SURESTEP

## (undated) DEVICE — BIOSEL SIGMOIDOSCOPE KIT DISP